# Patient Record
Sex: FEMALE | Race: WHITE | NOT HISPANIC OR LATINO | Employment: OTHER | URBAN - METROPOLITAN AREA
[De-identification: names, ages, dates, MRNs, and addresses within clinical notes are randomized per-mention and may not be internally consistent; named-entity substitution may affect disease eponyms.]

---

## 2017-04-03 ENCOUNTER — NURSING HOME VISIT (OUTPATIENT)
Dept: GERIATRICS | Facility: CLINIC | Age: 82
End: 2017-04-03
Payer: MEDICARE

## 2017-04-03 VITALS
OXYGEN SATURATION: 97 % | HEART RATE: 63 BPM | RESPIRATION RATE: 18 BRPM | DIASTOLIC BLOOD PRESSURE: 63 MMHG | WEIGHT: 205 LBS | TEMPERATURE: 97.6 F | SYSTOLIC BLOOD PRESSURE: 122 MMHG

## 2017-04-03 DIAGNOSIS — E11.51 TYPE II DIABETES MELLITUS WITH PERIPHERAL CIRCULATORY DISORDER (H): ICD-10-CM

## 2017-04-03 DIAGNOSIS — M62.81 GENERALIZED MUSCLE WEAKNESS: ICD-10-CM

## 2017-04-03 DIAGNOSIS — I63.89 CEREBROVASCULAR ACCIDENT (CVA) DUE TO OTHER MECHANISM (H): Primary | ICD-10-CM

## 2017-04-03 DIAGNOSIS — I10 BENIGN ESSENTIAL HYPERTENSION: ICD-10-CM

## 2017-04-03 PROBLEM — E78.00 HYPERCHOLESTEROLEMIA: Status: ACTIVE | Noted: 2017-04-03

## 2017-04-03 PROBLEM — I48.91 A-FIB (H): Status: ACTIVE | Noted: 2017-04-03

## 2017-04-03 PROBLEM — E11.9 DIABETES MELLITUS, TYPE 2 (H): Status: ACTIVE | Noted: 2017-04-03

## 2017-04-03 PROBLEM — I50.9 CHF (CONGESTIVE HEART FAILURE) (H): Status: ACTIVE | Noted: 2017-04-03

## 2017-04-03 PROBLEM — I63.9 CEREBROVASCULAR ACCIDENT (H): Status: ACTIVE | Noted: 2017-04-03

## 2017-04-03 PROCEDURE — 99306 1ST NF CARE HIGH MDM 50: CPT | Performed by: NURSE PRACTITIONER

## 2017-04-03 PROCEDURE — 99207 ZZC CDG-CODE CATEGORY CHANGED: CPT | Performed by: NURSE PRACTITIONER

## 2017-04-03 RX ORDER — LIDOCAINE 50 MG/G
1 PATCH TOPICAL EVERY 12 HOURS
COMMUNITY
End: 2017-07-19

## 2017-04-03 RX ORDER — CARBOXYMETHYLCELLULOSE SODIUM 5 MG/ML
1 SOLUTION/ DROPS OPHTHALMIC 3 TIMES DAILY PRN
COMMUNITY
End: 2020-10-26

## 2017-04-03 RX ORDER — AMOXICILLIN 250 MG
1 CAPSULE ORAL 2 TIMES DAILY PRN
COMMUNITY
End: 2018-01-16

## 2017-04-03 NOTE — PROGRESS NOTES
[place] [facility]  PRIMARY CARE PROVIDER AND CLINIC:  Anirudh Ye  GERIATRIC SERVICES [address] / [place] *  Chief Complaint   Patient presents with     Hospital F/U     Nursing Home Acute       HPI:    Mecca Slade is a 89 year old  (12/14/1921),admitted to the [facility]  from [facility].    Admitted to this facility for  rehab, medical management and nursing care. Current issues are:      Cerebrovascular accident (CVA) due to other mechanism (H)  With physical and functional decline and Significant deficits requiring NH placement   Transferred from rehab unit in [place]  Evidently not able to return home r/t weakness   NH working on getting more information from rehab unit     Type II diabetes mellitus with peripheral circulatory disorder (H)  Diet controlled per orders from rehab unit  Good po intake  Will have nursing do accu cheks tid to get baselin  Benign essential hypertension  Stable per chart and nursing     Generalized muscle weakness   Requiring extensive assistance from nursing  Up for meals only o/w spends the day resting in bed  Participating with therapy   Right now needs PAL for transfers      CODE STATUS/ADVANCE DIRECTIVES DISCUSSION:   DNR / DNI  Patient's living condition: lives alone    ALLERGIES:Review of patient's allergies indicates no known allergies.  PAST MEDICAL HISTORY:  has no past medical history on file.  PAST SURGICAL HISTORY:  has no past surgical history on file.  FAMILY HISTORY: family history is not on file.  SOCIAL HISTORY:      Post Discharge Medication Reconciliation Status: discharge medications reconciled and changed, per note/orders (see AVS).  Current Outpatient Prescriptions   Medication Sig Dispense Refill     Acetaminophen (TYLENOL PO) Take 1,000 mg by mouth 2 times daily And BID PRN       ATORVASTATIN CALCIUM PO Take 20 mg by mouth daily       carboxymethylcellulose (REFRESH PLUS) 0.5 % SOLN ophthalmic  solution Place 1 drop into both eyes 3 times daily as needed for dry eyes       VITAMIN D, CHOLECALCIFEROL, PO Take 2,000 Units by mouth daily       Furosemide (LASIX PO) Take 40 mg by mouth daily       lidocaine (LIDODERM) 5 % Patch Place 1 patch onto the skin every 12 hours To right knee       METOPROLOL SUCCINATE ER PO Take 50 mg by mouth daily       Omega-3 300 MG CAPS Take 1 capsule by mouth daily       senna-docusate (SENOKOT-S;PERICOLACE) 8.6-50 MG per tablet Take 1 tablet by mouth 2 times daily       VERAPAMIL HCL PO Take 120 mg by mouth At Bedtime       Warfarin Sodium (COUMADIN PO) Take by mouth daily         ROS:  10 point ROS of systems including Constitutional, Eyes, Respiratory, Cardiovascular, Gastroenterology, Genitourinary, Integumentary, Muscularskeletal, Psychiatric were all negative except for pertinent positives noted in my HPI.    Exam:  /63  Pulse 63  Temp 97.6  F (36.4  C)  Resp 18  Wt 205 lb (93 kg)  SpO2 97%  GENERAL APPEARANCE:  Alert, in no distress  ENT:  normal hearing acuity, oral mucous membranes moist  EYES:  EOM normal, conjunctiva and lids normal  RESP:  lungs clear to auscultation , no respiratory distress, diminished breath sounds    CV:  regular rate and rhythm, no murmur, rub, or gallop, no edema  ABDOMEN:  bowel sounds normal, soft, non-tender  M/S:   Gait and station abnormal up in w/c  frail  SKIN:  pale w/d  PSYCH:  oriented X 3, normal insight, judgement and memory, memory impaired , affect and mood normal       ASSESSMENT/PLAN:  Encounter Diagnoses   Name Primary?     Cerebrovascular accident (CVA) due to other mechanism (H) Yes     Type II diabetes mellitus with peripheral circulatory disorder (H)      Benign essential hypertension      Generalized muscle weakness          Orders:  accu chek tid  The current medical regimen is effective;  continue present plan and medications.    Information reviewed:  Medications, vital signs, orders, nursing notes, problem  list, hospital information. Total time spent with patient visit was 35 min including patient visit and review of past records. Greater than 50% of total time spent with counseling and coordinating care.    Electronically signed by:  JOSUE Tai CNP

## 2017-04-10 ENCOUNTER — NURSING HOME VISIT (OUTPATIENT)
Dept: GERIATRICS | Facility: CLINIC | Age: 82
End: 2017-04-10
Payer: COMMERCIAL

## 2017-04-10 VITALS
OXYGEN SATURATION: 97 % | DIASTOLIC BLOOD PRESSURE: 69 MMHG | HEART RATE: 62 BPM | RESPIRATION RATE: 16 BRPM | SYSTOLIC BLOOD PRESSURE: 140 MMHG | TEMPERATURE: 98 F | WEIGHT: 205 LBS

## 2017-04-10 DIAGNOSIS — I10 BENIGN ESSENTIAL HYPERTENSION: ICD-10-CM

## 2017-04-10 DIAGNOSIS — I63.411 CEREBROVASCULAR ACCIDENT (CVA) DUE TO EMBOLISM OF RIGHT MIDDLE CEREBRAL ARTERY (H): Primary | ICD-10-CM

## 2017-04-10 DIAGNOSIS — I48.20 CHRONIC ATRIAL FIBRILLATION (H): ICD-10-CM

## 2017-04-10 DIAGNOSIS — H35.30 MACULAR DEGENERATION OF RIGHT EYE: ICD-10-CM

## 2017-04-10 DIAGNOSIS — E11.8 TYPE 2 DIABETES MELLITUS WITH COMPLICATION, WITHOUT LONG-TERM CURRENT USE OF INSULIN (H): ICD-10-CM

## 2017-04-10 DIAGNOSIS — R54 FRAILTY: ICD-10-CM

## 2017-04-10 DIAGNOSIS — I50.32 CHRONIC DIASTOLIC CONGESTIVE HEART FAILURE (H): ICD-10-CM

## 2017-04-10 PROCEDURE — 99207 ZZC CDG-CORRECTLY CODED, REVIEWED AND AGREE: CPT | Performed by: FAMILY MEDICINE

## 2017-04-10 PROCEDURE — 99306 1ST NF CARE HIGH MDM 50: CPT | Performed by: FAMILY MEDICINE

## 2017-04-10 NOTE — PROGRESS NOTES
[place] [facility]  PRIMARY CARE PROVIDER AND CLINIC:  Anirudh Ye  GERIATRIC SERVICES [address] / [place] *  Chief Complaint   Patient presents with     Establish Care       HPI:   Obtained from the patient, medical record and from the medial staffs.     Mecca Slade is a 89 year old  (12/14/1921),admitted to the [facility]  from [facility].    Admitted to this facility for  rehab, medical management and nursing care. Current issues are:      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)  - Recent stroke March 1st 2017, had TPA but was not a candidate for thrombectomy. Continues to have weakness on the left side. Transferred to [facility] and transferred to this facility due to increase ADLs dependency.  - Started on PT/OT reports left arm is getting  Better but not the left leg.   -     Benign essential hypertension  - No CP, HA or fainting      Type 2 diabetes mellitus with complication, without long-term current use of insulin (H)  - No hypoglycemic episode reported.  -  Resident diet of choice  - Reports was on glipizide 5 mg from home, but unsure why it was stopped.       Chronic atrial fibrillation (H)  - Denies CP, SOB or palpitation  -Was not on anticoagulation at the time of admission to the hospital for CVA due to hx of difficulty controlling INR and hemarthrosis according to HP at the time of admission to CVA. No DC summary in the chart, but apparently patient started on coumadin.     Frailty   Requires assistance with ADLs.         CODE STATUS/ADVANCE DIRECTIVES DISCUSSION:   DNR / DNI  Patient's living condition: lives alone    ALLERGIES:Review of patient's allergies indicates no known allergies.  PAST MEDICAL HISTORY:  has no past medical history on file.  PAST SURGICAL HISTORY:  has no past surgical history on file.  FAMILY HISTORY: family history is not on file.  SOCIAL HISTORY:      Post  Discharge Medication Reconciliation Status: discharge medications reconciled and changed, per note/orders (see AVS).  Current Outpatient Prescriptions   Medication Sig Dispense Refill     Acetaminophen (TYLENOL PO) Take 1,000 mg by mouth 2 times daily And BID PRN       ATORVASTATIN CALCIUM PO Take 20 mg by mouth daily       carboxymethylcellulose (REFRESH PLUS) 0.5 % SOLN ophthalmic solution Place 1 drop into both eyes 3 times daily as needed for dry eyes       VITAMIN D, CHOLECALCIFEROL, PO Take 2,000 Units by mouth daily       Furosemide (LASIX PO) Take 40 mg by mouth daily       lidocaine (LIDODERM) 5 % Patch Place 1 patch onto the skin every 12 hours To right knee       METOPROLOL SUCCINATE ER PO Take 50 mg by mouth daily       Omega-3 300 MG CAPS Take 1 capsule by mouth daily       senna-docusate (SENOKOT-S;PERICOLACE) 8.6-50 MG per tablet Take 1 tablet by mouth 2 times daily       VERAPAMIL HCL PO Take 120 mg by mouth At Bedtime       Warfarin Sodium (COUMADIN PO) Take by mouth daily         ROS:  10 point ROS of systems including Constitutional, Eyes, Respiratory, Cardiovascular, Gastroenterology, Genitourinary, Integumentary, Muscularskeletal, Psychiatric were all negative except for pertinent positives noted in my HPI.    Exam:  /69  Pulse 62  Temp 98  F (36.7  C)  Resp 16  Wt 205 lb (93 kg)  SpO2 97%  GENERAL APPEARANCE:  Alert, in no distress  ENT:  Mouth and posterior oropharynx normal, moist mucous membranes, [place], edentulous  with denture plateboth levels  EYES:  EOM, conjunctivae, lids, pupils and irises normal  NECK:  No adenopathy,masses or thyromegaly  RESP:  respiratory effort and palpation of chest normal, lungs clear to auscultation , no respiratory distress  CV:  Palpation and auscultation of heart done , regular rate and rhythm, no murmur, rub, or gallop, peripheral edema 1+ in pedal area, pitting  ABDOMEN:  normal bowel sounds, soft, nontender, no hepatosplenomegaly or other  masses  M/S:   Gait and station abnormal uses WC for ambulation. Ms strenght: 4/5 LUE, 3/5 LLE.   SKIN:  Inspection of skin and subcutaneous tissue baseline, Palpation of skin and subcutaneous tissue baseline  NEURO:   Cranial nerves decreased visual acuity on both sides. No facial droop.  tested and .  PSYCH:  oriented X 3, normal insight, judgement and memory, affect and mood normal    Lab/Diagnostic data: All labs reviewed, none recent         ASSESSMENT/PLAN:  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)  - with left-sided weakness  - On ASA, lipitor and coumadin.  - Continue OT/PT    Benign essential hypertension   BP Readings from Last 3 Encounters:   04/10/17 140/69   04/03/17 122/63   -  On average SBP in 120s as in the EHR  - On metoprolol and verapamil and lasix  - Keep SBP> 130 mmHg and DBP > 65 mmHg (levels below these increase mortality as shown by standard studies and observations), and in pt with stroke it is advisable to keep SBP b/w 135 and 145 mmHg according to a recent study.  - Will reduce Toprol from 50 mg to 25 mg. HR around 65's on average.         Type 2 diabetes mellitus with complication, without long-term current use of insulin (H)  - No results found for: A1C  - not on medications.   - Accu check showed < 200s fasting but upper 200s HS on average.  - Will check HbA1C, and will start metformin 500 mg daily.    Chronic atrial fibrillation (H)  -  on coumadin with INR followed closely  - Has hx of difficulty controlling INR. If INR plus/minus 0.5 from the normal range, consider not to change the dose but rather repeat INR in 3 days.     - On reate control (Toprol and verapamil).     Frailty  - Significant  Deficits requiring NH placement. Requiring extensive assistance from nursing. Up for meals only o/w spends the day resting in bed      Macular degeneration of right eye  - cause unknown, possible related to DM2.  - Also has poor vision on the left  side.     Chronic diastolic congestive heart failure (H)  - recent echo result unavailable.  The one from 2012 with EF > 70% according to medical record  - On lasix, and metoprolol  - Compensated.        Orders:  - check HbA1C (dx DM2)  - Start metformin 500 mg daily  - Reduce Toprol from 50 mg to 25 mg.   - Check BMP ( on lasix and BB), Dx HTN    Information reviewed:  Medications, vital signs, orders, nursing notes, problem list, hospital information. Total time spent with patient visit was 45 min including patient visit and review of past records.     Electronically signed by:  Anirudh Ye MD

## 2017-04-12 ENCOUNTER — NURSING HOME VISIT (OUTPATIENT)
Dept: GERIATRICS | Facility: CLINIC | Age: 82
End: 2017-04-12
Payer: COMMERCIAL

## 2017-04-12 VITALS
OXYGEN SATURATION: 97 % | SYSTOLIC BLOOD PRESSURE: 132 MMHG | TEMPERATURE: 97.8 F | WEIGHT: 205 LBS | HEART RATE: 64 BPM | DIASTOLIC BLOOD PRESSURE: 63 MMHG | RESPIRATION RATE: 16 BRPM

## 2017-04-12 DIAGNOSIS — I48.20 CHRONIC ATRIAL FIBRILLATION (H): ICD-10-CM

## 2017-04-12 DIAGNOSIS — I63.411 CEREBROVASCULAR ACCIDENT (CVA) DUE TO EMBOLISM OF RIGHT MIDDLE CEREBRAL ARTERY (H): ICD-10-CM

## 2017-04-12 DIAGNOSIS — R52 PAIN: ICD-10-CM

## 2017-04-12 DIAGNOSIS — E11.8 TYPE 2 DIABETES MELLITUS WITH COMPLICATION, WITHOUT LONG-TERM CURRENT USE OF INSULIN (H): Primary | ICD-10-CM

## 2017-04-12 PROCEDURE — 99207 ZZC CDG-CORRECTLY CODED, REVIEWED AND AGREE: CPT | Performed by: NURSE PRACTITIONER

## 2017-04-12 PROCEDURE — 99309 SBSQ NF CARE MODERATE MDM 30: CPT | Performed by: NURSE PRACTITIONER

## 2017-04-12 NOTE — PROGRESS NOTES
[place] [facility]    Chief Complaint   Patient presents with     Nursing Home Acute       HPI:    Mecca Slade is a 89 year old  (12/14/1921), who is being seen today for an episodic care visit at [facility] . Today's concern is:  Here to see patient at request of nursing    Type 2 diabetes mellitus with complication, without long-term current use of insulin (H)  Previously on glipizide but d/c prior to admit to NH now on metformin 500 mg po qd  With BS checked bid and several BS > 350   Good po intake and appetite   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)  With physical and functional decline and Significant deficits requiring NH placement  Previously lived independently, now needs LTC  Tends to sleep t/o much of day but Participates with therapy with goal to improve strength and gait    Chronic atrial fibrillation (H)  Prior to CVA was not on coumadin r/t difficulty managing medication on own.  Now On chronic coumadin with goal INR 2-3   INR back today at 7.45 on 4 mg coumadin.  No CP/SOB,HA   Pain  Hx of right knee pain and back pain  On tylenol without effectiveness.  Therapy and staff report patient crying out in pain with transfers and cares    ALLERGIES: Review of patient's allergies indicates no known allergies.  Past Medical, Surgical, Family and Social History reviewed and updated in [employer].    Current Outpatient Prescriptions   Medication Sig Dispense Refill     METFORMIN HCL PO Take 500 mg by mouth 2 times daily (with meals)       TraMADol HCl (ULTRAM PO) Take 25 mg by mouth daily And q 4 hr prn       Acetaminophen (TYLENOL PO) Take 1,000 mg by mouth 2 times daily And BID PRN       ATORVASTATIN CALCIUM PO Take 20 mg by mouth daily       carboxymethylcellulose (REFRESH PLUS) 0.5 % SOLN ophthalmic solution Place 1 drop into both eyes 3 times daily as needed for dry eyes       VITAMIN D, CHOLECALCIFEROL, PO Take 2,000 Units by mouth  daily       Furosemide (LASIX PO) Take 40 mg by mouth daily       lidocaine (LIDODERM) 5 % Patch Place 1 patch onto the skin every 12 hours To right knee       METOPROLOL SUCCINATE ER PO Take 25 mg by mouth daily        Omega-3 300 MG CAPS Take 1 capsule by mouth daily       senna-docusate (SENOKOT-S;PERICOLACE) 8.6-50 MG per tablet Take 1 tablet by mouth 2 times daily       VERAPAMIL HCL PO Take 120 mg by mouth At Bedtime       Warfarin Sodium (COUMADIN PO) Take by mouth daily       Medications reviewed:  Medications reconciled to facility chart and changes were made to reflect current medications as identified as above med list. Below are the changes that were made:   Medications stopped since last EPIC medication reconciliation:   There are no discontinued medications.    Medications started since last [facility] medication reconciliation:  Orders Placed This Encounter   Medications     METFORMIN HCL PO     Sig: Take 500 mg by mouth 2 times daily (with meals)     TraMADol HCl (ULTRAM PO)     Sig: Take 25 mg by mouth daily And q 4 hr prn         REVIEW OF SYSTEMS:  4 point ROS including Respiratory, CV, GI and , other than that noted in the HPI,  is negative    Physical Exam:  /63  Pulse 64  Temp 97.8  F (36.6  C)  Resp 16  Wt 205 lb (93 kg)  SpO2 97%  GENERAL APPEARANCE:  Alert, in no distress  ENT:  normal hearing acuity, oral mucous membranes moist  EYES:  EOM normal, conjunctiva and lids normal  RESP:  lungs clear to auscultation , no respiratory distress, diminished breath sounds    CV:  regular rate and rhythm, no murmur, rub, or gallop, no edema  ABDOMEN:  bowel sounds normal, soft, non-tender  M/S:   Gait and station abnormal up in recliner napping frail  SKIN:  pale w/d  PSYCH:  oriented X 3, normal insight, judgement and memory, memory impaired , affect and mood normal    Recent Labs:  All labs reviewed, none recent    Blood Sugars-  8am   162 - 210  2000pm  211 - 393    Assessment/Plan:     Type  2 diabetes mellitus with complication, without long-term current use of insulin (H)  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)  Chronic atrial fibrillation (H)  Pain      Orders:  Hold coumadin x 3 days then check INR  Ultram 25 mg po qd and q 4 hrs prn  Ok for 4 u novolog x 1 for BS > 350    Time  Total time spent with patient visit was 25 min including review of past records. Greater than 50% of total time spent with counseling and coordinating care    Electronically signed by  JOSUE Tai CNP

## 2017-04-13 ENCOUNTER — TRANSFERRED RECORDS (OUTPATIENT)
Dept: HEALTH INFORMATION MANAGEMENT | Facility: CLINIC | Age: 82
End: 2017-04-13

## 2017-04-13 ENCOUNTER — RECORDS - HEALTHEAST (OUTPATIENT)
Dept: LAB | Facility: CLINIC | Age: 82
End: 2017-04-13

## 2017-04-13 LAB
ANION GAP SERPL CALCULATED.3IONS-SCNC: 8 MMOL/L (ref 5–18)
BUN SERPL-MCNC: 27 MG/DL (ref 8–28)
CALCIUM SERPL-MCNC: 10.8 MG/DL (ref 8.5–10.5)
CHLORIDE SERPLBLD-SCNC: 101 MMOL/L (ref 98–107)
CO2 SERPL-SCNC: 30 MMOL/L (ref 22–31)
CREAT SERPL-MCNC: 0.93 MG/DL (ref 0.6–1.1)
GFR SERPL CREATININE-BSD FRML MDRD: 56 ML/MIN/1.73M2
GLUCOSE SERPL-MCNC: 193 MG/DL (ref 70–125)
HBA1C MFR BLD: 7.6 % (ref 4.2–6.1)
HBA1C MFR BLD: 7.6 % (ref 4.2–6.1)
POTASSIUM SERPL-SCNC: 4 MMOL/L (ref 3.5–5)
SODIUM SERPL-SCNC: 139 MMOL/L (ref 136–145)

## 2017-04-14 ENCOUNTER — TELEPHONE (OUTPATIENT)
Dept: GERIATRICS | Facility: CLINIC | Age: 82
End: 2017-04-14

## 2017-04-14 NOTE — TELEPHONE ENCOUNTER
TELEPHONE ENCOUNTER:    Mecca Slade is a 89 year old  (12/14/1921),Nurse called today to report: INR continues to be elevated, now down to 4.81 from 7.49.  Has been off Coumadin since 4/10.  No antibiotics, ASA, or other anticoagulants.  No S/SX bleeding    ASSESSMENT/PLAN  Supra-therapeutic INR - continue to hold Coumadin Friday, Saturday, and Sunday with a recheck INR on Monday.    JOSUE Larsen CNP

## 2017-04-17 ENCOUNTER — NURSING HOME VISIT (OUTPATIENT)
Dept: GERIATRICS | Facility: CLINIC | Age: 82
End: 2017-04-17
Payer: COMMERCIAL

## 2017-04-17 VITALS
DIASTOLIC BLOOD PRESSURE: 73 MMHG | RESPIRATION RATE: 16 BRPM | HEART RATE: 60 BPM | SYSTOLIC BLOOD PRESSURE: 129 MMHG | OXYGEN SATURATION: 95 % | WEIGHT: 205 LBS | TEMPERATURE: 97.9 F

## 2017-04-17 DIAGNOSIS — R52 PAIN: ICD-10-CM

## 2017-04-17 DIAGNOSIS — I48.20 CHRONIC ATRIAL FIBRILLATION (H): Primary | ICD-10-CM

## 2017-04-17 DIAGNOSIS — E11.8 TYPE 2 DIABETES MELLITUS WITH COMPLICATION, WITHOUT LONG-TERM CURRENT USE OF INSULIN (H): ICD-10-CM

## 2017-04-17 DIAGNOSIS — I63.411 CEREBROVASCULAR ACCIDENT (CVA) DUE TO EMBOLISM OF RIGHT MIDDLE CEREBRAL ARTERY (H): ICD-10-CM

## 2017-04-17 PROCEDURE — 99309 SBSQ NF CARE MODERATE MDM 30: CPT | Performed by: NURSE PRACTITIONER

## 2017-04-17 PROCEDURE — 99207 ZZC CDG-CORRECTLY CODED, REVIEWED AND AGREE: CPT | Performed by: NURSE PRACTITIONER

## 2017-04-20 ENCOUNTER — TELEPHONE (OUTPATIENT)
Dept: GERIATRICS | Facility: CLINIC | Age: 82
End: 2017-04-20

## 2017-04-20 ENCOUNTER — NURSING HOME VISIT (OUTPATIENT)
Dept: GERIATRICS | Facility: CLINIC | Age: 82
End: 2017-04-20
Payer: COMMERCIAL

## 2017-04-20 VITALS
HEIGHT: 66 IN | RESPIRATION RATE: 18 BRPM | SYSTOLIC BLOOD PRESSURE: 143 MMHG | DIASTOLIC BLOOD PRESSURE: 66 MMHG | WEIGHT: 205 LBS | TEMPERATURE: 98.9 F | HEART RATE: 62 BPM | BODY MASS INDEX: 32.95 KG/M2 | OXYGEN SATURATION: 96 %

## 2017-04-20 DIAGNOSIS — R52 PAIN: ICD-10-CM

## 2017-04-20 DIAGNOSIS — I48.91 ATRIAL FIBRILLATION, UNSPECIFIED TYPE (H): Primary | ICD-10-CM

## 2017-04-20 DIAGNOSIS — I63.89 CEREBROVASCULAR ACCIDENT (CVA) DUE TO OTHER MECHANISM (H): ICD-10-CM

## 2017-04-20 PROCEDURE — 99207 ZZC CDG-CORRECTLY CODED, REVIEWED AND AGREE: CPT | Performed by: NURSE PRACTITIONER

## 2017-04-20 PROCEDURE — 99309 SBSQ NF CARE MODERATE MDM 30: CPT | Performed by: NURSE PRACTITIONER

## 2017-04-20 NOTE — PROGRESS NOTES
[place] [facility]    Chief Complaint   Patient presents with     Nursing Home Acute       HPI:    Mecca Slade is a 89 year old  (12/14/1921), who is being seen today for an episodic care visit at [facility] . Today's concern is:  Here to see patient at request of nursing and patient   A-fib (H)  Cerebrovascular accident (CVA) due to other mechanism (H)  With physical and functional decline  And Significant deficits requiring NH placement  Up for meals only o/w spends the day resting in recliner  On chronic coumadin and metoprolol   Denies CP/SOB, HA  Recent INR back at 7.45 with coumadin now on hold and new INR back today  Prior to medication on hold dose was 5 mg qd  Pain  R/t arthritis and CVA   Patient c/o right knee and back pain and crying out with cares and transfers     voltaren 2 gr qid suspected to contribute to increased INR value   Discussed options with staff and patient  Patient relies on staff for most decision making r/t memory loss     ALLERGIES: Review of patient's allergies indicates no known allergies.  Past Medical, Surgical, Family and Social History reviewed and updated in [facility].    Current Outpatient Prescriptions   Medication Sig Dispense Refill     diclofenac (VOLTAREN) 1 % GEL topical gel Place 1 g onto the skin 2 times daily       METFORMIN HCL PO Take 500 mg by mouth 2 times daily (with meals)       TraMADol HCl (ULTRAM PO) Take 25 mg by mouth 3 times daily And q 4 hr prn        Acetaminophen (TYLENOL PO) Take 1,000 mg by mouth 2 times daily And BID PRN       ATORVASTATIN CALCIUM PO Take 20 mg by mouth daily       carboxymethylcellulose (REFRESH PLUS) 0.5 % SOLN ophthalmic solution Place 1 drop into both eyes 3 times daily as needed for dry eyes       VITAMIN D, CHOLECALCIFEROL, PO Take 2,000 Units by mouth daily       Furosemide (LASIX PO) Take 40 mg by mouth daily       lidocaine (LIDODERM) 5 % Patch Place 1 patch onto the skin every 12 hours To right  "knee       METOPROLOL SUCCINATE ER PO Take 25 mg by mouth daily        Omega-3 300 MG CAPS Take 1 capsule by mouth daily       senna-docusate (SENOKOT-S;PERICOLACE) 8.6-50 MG per tablet Take 1 tablet by mouth 2 times daily       VERAPAMIL HCL PO Take 120 mg by mouth At Bedtime       Warfarin Sodium (COUMADIN PO) Take by mouth daily       Medications reviewed:  Medications reconciled to facility chart and changes were made to reflect current medications as identified as above med list. Below are the changes that were made:   Medications stopped since last EPIC medication reconciliation:   There are no discontinued medications.    Medications started since last [facility] medication reconciliation:  No orders of the defined types were placed in this encounter.       REVIEW OF SYSTEMS:  Unobtainable secondary to cognitive impairment or aphasia. and 4 point ROS including Respiratory, CV, GI and , other than that noted in the HPI,  is negative    Physical Exam:  /66  Pulse 62  Temp 98.9  F (37.2  C)  Resp 18  Ht 5' 6\" (1.676 m)  Wt 205 lb (93 kg)  SpO2 96%  BMI 33.09 kg/m2  GENERAL APPEARANCE:  Alert, in no distress  ENT:  normal hearing acuity, oral mucous membranes moist  EYES:  EOM normal, conjunctiva and lids normal  RESP:  lungs clear to auscultation , no respiratory distress, diminished breath sounds    CV:  regular rate and rhythm, no murmur, rub, or gallop, no edema  ABDOMEN:  bowel sounds normal, soft, non-tender  M/S:   Gait and station abnormal up in recliner napping frail  SKIN:  pale w/d  PSYCH:  oriented X 3, normal insight, judgement and memory, memory impaired , affect and mood normal      Recent Labs:    Results for orders placed or performed in visit on 04/13/17   Hemoglobin A1c   Result Value Ref Range    Hemoglobin A1C 7.6 (A) 4.2 - 6.1 %    [facility]    [facility]   Basic metabolic panel   Result Value Ref Range    Sodium 139 136 - 145 mmol/L    Potassium 4.0 3.5 - 5.0 mmol/L    " Chloride 101 98 - 107 mmol/L    Carbon Dioxide 30 22 - 31 mmol/L    Anion Gap 8 5 - 18 mmol/L    Glucose 193 (A) 70 - 125 mg/dL    Urea Nitrogen 27 8 - 28 mg/dL    Creatinine 0.93 0.60 - 1.10 mg/dL    Calcium 10.8 (A) 8.5 - 10.5 mg/dL    GFR Estimate 56 (L) >60 mL/min/1.73m2    GFR Estimate If Black >60 >60 mL/min/1.73m2    Narrative    [facility]   INR 1.58 With coumadin on hold    Assessment/Plan:     A-fib (H)  Cerebrovascular accident (CVA) due to other mechanism (H)  Pain      Orders:  1.  Increase Tramadol to 25 mg po tid and po q 4 hours prn.  2.  Decrease Voltaren gel to 1 gram bid to right knee.  3  Coumadin 4 mg po qd   4 check INR Three days       Time Total time spent with patient visit was 25 min including patient visit and review of past records. Greater than 50% of total time spent with counseling and coordinating care .       Electronically signed by  Ruth Araujo, JOSUE CNP                23

## 2017-04-20 NOTE — TELEPHONE ENCOUNTER
Called by nursing staff at [facility] about patient's INR results  4/17  1.57  Today 1.58     Taking 3 mg/day of warfarin    PLAN: increase to 4 mg/day and recheck INR Monday 4/24    Ana Yoo MD

## 2017-04-20 NOTE — PATIENT INSTRUCTIONS
Orders:  1.  Increase Tramadol to 25 mg po tid and po q 4 hours prn.  2.  Decrease Voltaren gel to 1 gram bid to right knee.

## 2017-05-07 PROBLEM — Z00.00 ROUTINE GENERAL MEDICAL EXAMINATION AT A HEALTH CARE FACILITY: Status: ACTIVE | Noted: 2017-05-07

## 2017-05-11 ENCOUNTER — NURSING HOME VISIT (OUTPATIENT)
Dept: GERIATRICS | Facility: CLINIC | Age: 82
End: 2017-05-11
Payer: COMMERCIAL

## 2017-05-11 VITALS
RESPIRATION RATE: 18 BRPM | OXYGEN SATURATION: 95 % | BODY MASS INDEX: 33.75 KG/M2 | WEIGHT: 210 LBS | SYSTOLIC BLOOD PRESSURE: 132 MMHG | TEMPERATURE: 97.9 F | HEART RATE: 76 BPM | DIASTOLIC BLOOD PRESSURE: 72 MMHG | HEIGHT: 66 IN

## 2017-05-11 DIAGNOSIS — M62.81 GENERALIZED MUSCLE WEAKNESS: ICD-10-CM

## 2017-05-11 DIAGNOSIS — I63.89 CEREBROVASCULAR ACCIDENT (CVA) DUE TO OTHER MECHANISM (H): Primary | ICD-10-CM

## 2017-05-11 DIAGNOSIS — I48.91 ATRIAL FIBRILLATION, UNSPECIFIED TYPE (H): ICD-10-CM

## 2017-05-11 DIAGNOSIS — R52 PAIN: ICD-10-CM

## 2017-05-11 DIAGNOSIS — E11.51 TYPE II DIABETES MELLITUS WITH PERIPHERAL CIRCULATORY DISORDER (H): ICD-10-CM

## 2017-05-11 DIAGNOSIS — I10 BENIGN ESSENTIAL HYPERTENSION: ICD-10-CM

## 2017-05-11 DIAGNOSIS — F41.8 DEPRESSION WITH ANXIETY: ICD-10-CM

## 2017-05-11 PROCEDURE — 99207 ZZC CDG-CORRECTLY CODED, REVIEWED AND AGREE: CPT | Performed by: NURSE PRACTITIONER

## 2017-05-11 PROCEDURE — 99318 ZZC ANNUAL NURSING FAC ASSESSMNT, STABLE: CPT | Performed by: NURSE PRACTITIONER

## 2017-05-11 NOTE — PROGRESS NOTES
[place] [facility]  Chief Complaint   Patient presents with     Annual Comprehensive Nursing Home       HPI:    Mecca Slade is a 89 year old  (12/14/1921), who is being seen today for an annual comprehensive visit at [facility] . Today's concerns are:  Cerebrovascular accident (CVA) due to other mechanism (H)  With physical and functional decline and Significant deficits requiring NH placement   Transferred from rehab unit in [place]  Evidently not able to return home r/t weakness  - Recent stroke March 1st 2017, had TPA but was not a candidate for thrombectomy. Continues to have weakness on the left side.Previously lived independently, now needs LTC  Tends to sleep t/o much of day but Participated with therapy with goal to improve strength and gait.  Patient remains w/c bound and requires assistance with all ADLs    Type II diabetes mellitus with peripheral circulatory disorder (H)  Previously on glipizide but d/c prior to admit to NH now on metformin 500 mg po bid r/t several high BS.  BS  checked bid and range 185 to 225.    Good po intake and appetite. Tolerates medications without problems   Atrial fibrillation, unspecified type (H)  On chronic coumadin  No CP/SOB, HA  Prior to CVA and admit to NH patient was not on coumadin r/t difficulty managing medication on own.  On Verapamil and Toprol XL with good rate control  Pain  Long hx DJD now with left side pain probably r/t CVA.  Patient especially c/o right knee pain and reports that scheduled and prn tramadol is helpful.  Staff reports that patient would cry with transfers and cares but since the tramadol started, patient tolerating activity.  No adverse s/e reported   Generalized muscle weakness   Requiring extensive assistance from nursing  Up for meals only o/w spends the day resting in recliner    Benign essential hypertension  Stable per chart and nursing     BP Readings from Last 3 Encounters:   05/11/17 132/72   04/20/17  143/66   04/17/17 129/73   Depression  Does express some sadness over loss of independence but overall appears to accept NH placement  Always very sweet and cooperative with staff.   PHQ9 0-4 indicating no depression    ALLERGIES: Review of patient's allergies indicates no known allergies.  PROBLEM LIST:  Patient Active Problem List   Diagnosis     Cerebrovascular accident (H)     A-fib (H)     CHF (congestive heart failure) (H)     Diabetes mellitus, type 2 (H)     Benign essential hypertension     Hypercholesterolemia     Right sided cerebral hemisphere cerebrovascular accident (H)     Cardiac pacemaker in situ     Neurological neglect syndrome     Pure hypercholesterolemia     ANNUAL 5/9/17     PAST MEDICAL HISTORY:  has a past medical history of A-fib (H); Acute CVA (cerebrovascular accident) (H) (03/01/2017); Acute right MCA stroke (H) (03/01/2017); Cardiac pacemaker in situ; CHF (congestive heart failure) (H); 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  PAST SURGICAL HISTORY:  has no past surgical history on file.  FAMILY HISTORY: family history is not on file.  SOCIAL HISTORY:  reports that she has never smoked. She has never used smokeless tobacco. She reports that she does not drink alcohol or use illicit drugs.  IMMUNIZATIONS:  Most Recent Immunizations   Administered Date(s) Administered     Influenza (High Dose) 3 valent vaccine 09/30/2011     Influenza (IIV3) 09/15/2012     Influenza Vaccine IM 3yrs+ 4 Valent IIV4 09/03/2013     Pneumococcal 23 valent 10/28/2011     TD (ADULT, 7+) 01/24/2001     Above immunizations pulled from [facility]. [facility] and facility  records also reconciled. Outstanding information sent to  to update [facility]  .  Future immunizations needed:  TDAP, PCV13 and yearly influenza per facility protocol  MEDICATIONS:  Current Outpatient Prescriptions   Medication Sig Dispense Refill     diclofenac (VOLTAREN) 1 % GEL topical gel Place 1 g onto the skin 2 times daily       METFORMIN HCL PO Take 500 mg by mouth 2 times daily (with meals)       TraMADol HCl (ULTRAM PO) Take 25 mg by mouth 3 times daily And q 4 hr prn        Acetaminophen (TYLENOL PO) Take 1,000 mg by mouth 2 times daily And BID PRN       ATORVASTATIN CALCIUM PO Take 20 mg by mouth daily       carboxymethylcellulose (REFRESH PLUS) 0.5 % SOLN ophthalmic solution Place 1 drop into both eyes 3 times daily as needed for dry eyes       VITAMIN D, CHOLECALCIFEROL, PO Take 2,000 Units by mouth daily       Furosemide (LASIX PO) Take 40 mg by mouth daily       lidocaine (LIDODERM) 5 % Patch Place 1 patch onto the skin every 12 hours To right knee       METOPROLOL SUCCINATE ER PO Take 25 mg by mouth daily        Omega-3 300 MG CAPS Take 1 capsule by mouth daily       senna-docusate (SENOKOT-S;PERICOLACE) 8.6-50 MG per tablet Take 1 tablet by mouth 2 times daily       VERAPAMIL HCL PO Take 120 mg by mouth At Bedtime       Warfarin Sodium (COUMADIN PO) Take by mouth daily       Medications reviewed:  Medications reconciled to facility chart and changes were made to reflect current medications as identified as above med list. Below are the changes that were made:   Medications stopped since last EPIC medication reconciliation:   There are no discontinued medications.    Medications started since last [facility] medication reconciliation:  No orders of the defined types were placed in this encounter.  Case Management:  I have reviewed the facility/SNF care plan/MDS which was done 5/11/17, including the falls risk, nutrition and pain screening. I also reviewed the current immunizations, and  "preventive care..Future cancer screening is not clinically indicated secondary to age/goals of care Patient's desire to return to the community is present, but is not able due to care needs . Current Level of Care is appropriate.    Advance Directive Discussion:    I reviewed the current advanced directives as reflected in EPIC, the POLST and the facility chart, and verified the congruency of orders  . I contacted the first party frandy lau and left a message regarding the plan of Care.  I did review the advance directives with the resident.     Team Discussion:  I communicated with the appropriate disciplines involved with the Plan of Care:   Nursing    Patient Goal:  Patient's goal is pain control and comfort.    Information reviewed:  Medications, vital signs, orders, and nursing notes.    ROS:  10 point ROS of systems including Constitutional, Eyes, Respiratory, Cardiovascular, Gastroenterology, Genitourinary, Integumentary, Muscularskeletal, Psychiatric were all negative except for pertinent positives noted in my HPI.    Exam:  /72  Pulse 76  Temp 97.9  F (36.6  C)  Resp 18  Ht 5' 6\" (1.676 m)  Wt 210 lb (95.3 kg)  SpO2 95%  BMI 33.89 kg/m2   GENERAL APPEARANCE:  Alert, in no distress  ENT:  normal hearing acuity, oral mucous membranes moist  EYES:  EOM normal, conjunctiva and lids normal  RESP:  lungs clear to auscultation , no respiratory distress, diminished breath sounds    CV:  regular rate and rhythm, no murmur, rub, or gallop, no edema  ABDOMEN:  bowel sounds normal, soft, non-tender  M/S:   Gait and station abnormal up in w/c frail limited ROM left   SKIN:  pale w/d  PSYCH:  oriented X 3, normal insight, judgement and memory, memory impaired , affect and mood normal      Lab/Diagnostic data:    Results for orders placed or performed in visit on 04/13/17   Hemoglobin A1c   Result Value Ref Range    Hemoglobin A1C 7.6 (A) 4.2 - 6.1 %    Narrative    [facility] 04/13/17   Basic " metabolic panel   Result Value Ref Range    Sodium 139 136 - 145 mmol/L    Potassium 4.0 3.5 - 5.0 mmol/L    Chloride 101 98 - 107 mmol/L    Carbon Dioxide 30 22 - 31 mmol/L    Anion Gap 8 5 - 18 mmol/L    Glucose 193 (A) 70 - 125 mg/dL    Urea Nitrogen 27 8 - 28 mg/dL    Creatinine 0.93 0.60 - 1.10 mg/dL    Calcium 10.8 (A) 8.5 - 10.5 mg/dL    GFR Estimate 56 (L) >60 mL/min/1.73m2    GFR Estimate If Black >60 >60 mL/min/1.73m2    [facility]    [facility] 04/13/17         ASSESSMENT/PLAN     Cerebrovascular accident (CVA) due to other mechanism (H)  Type II diabetes mellitus with peripheral circulatory disorder (H)  Atrial fibrillation, unspecified type (H)  Pain  Generalized muscle weakness  Benign essential hypertension  Depression         Orders:  The current medical regimen is effective;  continue present plan and medications.    Electronically signed by:  JOSUE Tai CNP

## 2017-05-24 ENCOUNTER — NURSING HOME VISIT (OUTPATIENT)
Dept: GERIATRICS | Facility: CLINIC | Age: 82
End: 2017-05-24
Payer: COMMERCIAL

## 2017-05-24 VITALS
SYSTOLIC BLOOD PRESSURE: 132 MMHG | BODY MASS INDEX: 34.06 KG/M2 | OXYGEN SATURATION: 95 % | TEMPERATURE: 97.9 F | RESPIRATION RATE: 18 BRPM | DIASTOLIC BLOOD PRESSURE: 72 MMHG | HEART RATE: 76 BPM | WEIGHT: 211 LBS

## 2017-05-24 DIAGNOSIS — I63.89 CEREBROVASCULAR ACCIDENT (CVA) DUE TO OTHER MECHANISM (H): ICD-10-CM

## 2017-05-24 DIAGNOSIS — M62.81 GENERALIZED MUSCLE WEAKNESS: ICD-10-CM

## 2017-05-24 DIAGNOSIS — R52 PAIN: ICD-10-CM

## 2017-05-24 DIAGNOSIS — I48.91 ATRIAL FIBRILLATION, UNSPECIFIED TYPE (H): Primary | ICD-10-CM

## 2017-05-24 PROCEDURE — 99310 SBSQ NF CARE HIGH MDM 45: CPT | Performed by: NURSE PRACTITIONER

## 2017-05-24 PROCEDURE — 99207 ZZC CDG-CORRECTLY CODED, REVIEWED AND AGREE: CPT | Performed by: NURSE PRACTITIONER

## 2017-05-24 NOTE — PROGRESS NOTES
"[place] [facility]    Chief Complaint   Patient presents with     Nursing Home Acute       HPI:    Mecca Slade is a 89 year old  (12/14/1921), who is being seen today for an episodic care visit at [facility] .  HPI information obtained from: facility chart records and facility staff.Today's concern is: Here to see patient at request of Daughter and patient   Atrial fibrillation, unspecified type (H)  On chronic coumadin  Denies CP/SOB, HA  INR therapeutic  Previous to admit, patient was taken off coumadin r/t recurring falls.  Daughter states she feels better now that her mother is back on it  Cerebrovascular accident (CVA) due to other mechanism (H)  With physical and functional decline and Significant deficits requiring NH placement   Daughter reports that the move to the NH was difficult and initially the hope was that patient would regain her function and return home but now the acceptance is LTC.  Discussed patient wording finding problems inablity to ambulate and memory problems.  Pain  Significant pain in right knee r/t long standing arthritis and now immobility.  Currently on 25 mg tramadol \"which helps\" but having restless nights r/t knee pain.  Discussed options.  Xray of knee done yesterday and shows DJD.  Updated Dr Ye on results.  Dr Ye will give a cortisone injection to right knee.  Both daughter and patient express appreciation for the doctor doing the cortisone shot for pain control and for coming to NH to do it.  Also discussed option of increasing pain medications to help with sleep at night and both daughter and patient request additional medication.   Generalized muscle weakness   Requiring extensive assistance from nursing and Up for meals only o/w spends the day resting in recliner.      ALLERGIES: Review of patient's allergies indicates no known allergies.  Past Medical, Surgical, Family and Social History reviewed and updated in EPIC.    Current " Outpatient Prescriptions   Medication Sig Dispense Refill     diclofenac (VOLTAREN) 1 % GEL topical gel Place 1 g onto the skin 2 times daily       METFORMIN HCL PO Take 500 mg by mouth 2 times daily (with meals)       TraMADol HCl (ULTRAM PO) Take 25 mg by mouth 3 times daily And q 4 hr prn        Acetaminophen (TYLENOL PO) Take 1,000 mg by mouth 2 times daily And BID PRN       ATORVASTATIN CALCIUM PO Take 20 mg by mouth daily       carboxymethylcellulose (REFRESH PLUS) 0.5 % SOLN ophthalmic solution Place 1 drop into both eyes 3 times daily as needed for dry eyes       VITAMIN D, CHOLECALCIFEROL, PO Take 2,000 Units by mouth daily       Furosemide (LASIX PO) Take 40 mg by mouth daily       lidocaine (LIDODERM) 5 % Patch Place 1 patch onto the skin every 12 hours To right knee       METOPROLOL SUCCINATE ER PO Take 25 mg by mouth daily        Omega-3 300 MG CAPS Take 1 capsule by mouth daily       senna-docusate (SENOKOT-S;PERICOLACE) 8.6-50 MG per tablet Take 1 tablet by mouth 2 times daily       VERAPAMIL HCL PO Take 120 mg by mouth At Bedtime       Warfarin Sodium (COUMADIN PO) Take by mouth daily       Medications reviewed:  Medications reconciled to facility chart and changes were made to reflect current medications as identified as above med list. Below are the changes that were made:   Medications stopped since last EPIC medication reconciliation:   There are no discontinued medications.    Medications started since last [facility] medication reconciliation:  No orders of the defined types were placed in this encounter.  REVIEW OF SYSTEMS:  4 point ROS including Respiratory, CV, GI and , other than that noted in the HPI,  is negative    Physical Exam:  /72  Pulse 76  Temp 97.9  F (36.6  C)  Resp 18  Wt 211 lb (95.7 kg)  SpO2 95%  BMI 34.06 kg/m2  GENERAL APPEARANCE:  Alert, in no distress  ENT:  normal hearing acuity, oral mucous membranes moist  EYES:  EOM normal, conjunctiva and lids normal  RESP:   lungs clear to auscultation , no respiratory distress, diminished breath sounds    CV:  regular rate and rhythm, no murmur, rub, or gallop, no edema  ABDOMEN:  bowel sounds normal, soft, non-tender  M/S:   Gait and station abnormal up in w/c frail limited ROM left   SKIN:  pale w/d  PSYCH:  oriented X 3, normal insight, judgement and memory, memory impaired , affect and mood normal     5/25/17 right knee xray scanned to EPIC  Results for orders placed or performed in visit on 04/13/17   Hemoglobin A1c   Result Value Ref Range    Hemoglobin A1C 7.6 (A) 4.2 - 6.1 %    [facility]    LAB [facility] 04/13/17   Basic metabolic panel   Result Value Ref Range    Sodium 139 136 - 145 mmol/L    Potassium 4.0 3.5 - 5.0 mmol/L    Chloride 101 98 - 107 mmol/L    Carbon Dioxide 30 22 - 31 mmol/L    Anion Gap 8 5 - 18 mmol/L    Glucose 193 (A) 70 - 125 mg/dL    Urea Nitrogen 27 8 - 28 mg/dL    Creatinine 0.93 0.60 - 1.10 mg/dL    Calcium 10.8 (A) 8.5 - 10.5 mg/dL    GFR Estimate 56 (L) >60 mL/min/1.73m2    GFR Estimate If Black >60 >60 mL/min/1.73m2    [facility]    LAB [facility] 04/13/17         Assessment/Plan:     Atrial fibrillation, unspecified type (H)  Cerebrovascular accident (CVA) due to other mechanism (H)  Pain  Generalized muscle weakness      Orders:  Increase tramadol to 25mg qid  Ok for cortisone shot.  Dr Rivers will provide     Total time spent with patient visit was 35 min including patient visit, review of past records and discussion with Esther Mc daughter/HPOA/ patient contact. Greater than 50% of total time spent with counseling and coordinating care.       Electronically signed by  JOSUE Tai CNP

## 2017-05-25 ENCOUNTER — TRANSFERRED RECORDS (OUTPATIENT)
Dept: HEALTH INFORMATION MANAGEMENT | Facility: CLINIC | Age: 82
End: 2017-05-25

## 2017-06-05 ENCOUNTER — NURSING HOME VISIT (OUTPATIENT)
Dept: GERIATRICS | Facility: CLINIC | Age: 82
End: 2017-06-05
Payer: COMMERCIAL

## 2017-06-05 VITALS
DIASTOLIC BLOOD PRESSURE: 76 MMHG | HEART RATE: 65 BPM | OXYGEN SATURATION: 97 % | SYSTOLIC BLOOD PRESSURE: 136 MMHG | RESPIRATION RATE: 17 BRPM | TEMPERATURE: 98 F

## 2017-06-05 DIAGNOSIS — G89.29 CHRONIC PAIN OF RIGHT KNEE: ICD-10-CM

## 2017-06-05 DIAGNOSIS — M17.11 PRIMARY OSTEOARTHRITIS OF RIGHT KNEE: Primary | ICD-10-CM

## 2017-06-05 DIAGNOSIS — M70.50 PES ANSERINE BURSITIS: ICD-10-CM

## 2017-06-05 DIAGNOSIS — M25.561 ACUTE PAIN OF RIGHT KNEE: ICD-10-CM

## 2017-06-05 DIAGNOSIS — M25.561 CHRONIC PAIN OF RIGHT KNEE: ICD-10-CM

## 2017-06-05 PROCEDURE — 20610 DRAIN/INJ JOINT/BURSA W/O US: CPT | Performed by: FAMILY MEDICINE

## 2017-06-05 PROCEDURE — 99207 ZZC CDG-CORRECTLY CODED, REVIEWED AND AGREE: CPT | Performed by: FAMILY MEDICINE

## 2017-06-05 RX ORDER — METHYLPREDNISOLONE ACETATE 40 MG/ML
40 INJECTION, SUSPENSION INTRA-ARTICULAR; INTRALESIONAL; INTRAMUSCULAR; SOFT TISSUE ONCE
Qty: 1 ML | Refills: 0 | OUTPATIENT
Start: 2017-06-05 | End: 2017-06-05

## 2017-06-05 NOTE — PROGRESS NOTES
[place] [facility]    Chief Complaint   Patient presents with     Nursing Home Acute       HPI:    Mecca Slade is a 89 year old  (12/14/1921), who is being seen today for an episodic care visit at [facility] .  HPI information obtained from: facility chart records and facility staff, [facility] and Resident    -Today's concern is:  Primary osteoarthritis of right knee  Chronic pain of right knee  Acute pain of right knee  - pt reports chronic pain in right knee, recently worse.   - pain is burning in nature,localized, 10/10, worse at night, also with movements.   - reports pain meds has not been helping much.       ALLERGIES: Review of patient's allergies indicates no known allergies.  Past Medical, Surgical, Family and Social History reviewed and updated in [facility].    Current Outpatient Prescriptions   Medication Sig Dispense Refill     diclofenac (VOLTAREN) 1 % GEL topical gel Place 1 g onto the skin 2 times daily       METFORMIN HCL PO Take 500 mg by mouth 2 times daily (with meals)       TraMADol HCl (ULTRAM PO) Take 25 mg by mouth 3 times daily And q 4 hr prn        Acetaminophen (TYLENOL PO) Take 1,000 mg by mouth 2 times daily And BID PRN       ATORVASTATIN CALCIUM PO Take 20 mg by mouth daily       carboxymethylcellulose (REFRESH PLUS) 0.5 % SOLN ophthalmic solution Place 1 drop into both eyes 3 times daily as needed for dry eyes       VITAMIN D, CHOLECALCIFEROL, PO Take 2,000 Units by mouth daily       Furosemide (LASIX PO) Take 40 mg by mouth daily       lidocaine (LIDODERM) 5 % Patch Place 1 patch onto the skin every 12 hours To right knee       METOPROLOL SUCCINATE ER PO Take 25 mg by mouth daily        Omega-3 300 MG CAPS Take 1 capsule by mouth daily       senna-docusate (SENOKOT-S;PERICOLACE) 8.6-50 MG per tablet Take 1 tablet by mouth 2 times daily       VERAPAMIL HCL PO Take 120 mg by mouth At Bedtime       Warfarin Sodium (COUMADIN PO) Take by mouth daily       Medications  reviewed: Reviewed in the chart and EHR.      REVIEW OF SYSTEMS:  4 point ROS including Respiratory, CV, GI and , other than that noted in the HPI,  is negative    Physical Exam:  /76  Pulse 65  Temp 98  F (36.7  C)  Resp 17  SpO2 97%  GENERAL APPEARANCE:  Alert, anxious  RESP:  respiratory effort and palpation of chest normal, lungs clear to auscultation , no respiratory distress  CV:  Palpation and auscultation of heart done , regular rate and rhythm, no murmur, rub, or gallop, peripheral edema 1+ in pedal and pitting.  M/S:   Gait and station abnormal uses a walker with a help, left sided weakness Right knee with decreased ROM. Right pes anserine tender on palpation.   SKIN:  Inspection of skin and subcutaneous tissue baseline, Palpation of skin and subcutaneous tissue baseline, statis distal legs.     Recent Labs:  All labs reviewed, none recent.    Assessment/Plan:  Primary osteoarthritis of right knee    Acute on Chronic pain of right knee  Right Pes Anserine Bursitis  - Resident will benefit from steroid injection.   - The following medication was given:   - After informed consent obtained and under aseptic technique, 40 mg of methylprednisolone mixed with 5 cc of 0.9% NS, 4 cc were injected into the right knee, and two cc injected into Right Pes Anserine bursa and surrounding tissue without any complication. Resident tolerated procedure well. Hemostasis implemented.   - The following medication was given:     ROUTE: Intra-articular and intra bursa  SITE: right knee and right Pes anserine bursitis  DOSE: 40 mg  LOT #: [id number]  :  [employer]  EXPIRATION DATE:  05/31/2017  NDC: 6437-0617-08        Orders:  - apply ice for 15 minutes using a barrier to the aching area tid for 2 days.     Electronically signed by  Anirudh Ye MD

## 2017-06-13 ENCOUNTER — NURSING HOME VISIT (OUTPATIENT)
Dept: GERIATRICS | Facility: CLINIC | Age: 82
End: 2017-06-13
Payer: COMMERCIAL

## 2017-06-13 VITALS
SYSTOLIC BLOOD PRESSURE: 136 MMHG | BODY MASS INDEX: 34.06 KG/M2 | WEIGHT: 211 LBS | TEMPERATURE: 98 F | HEART RATE: 65 BPM | DIASTOLIC BLOOD PRESSURE: 76 MMHG | RESPIRATION RATE: 17 BRPM | OXYGEN SATURATION: 97 %

## 2017-06-13 DIAGNOSIS — M62.81 GENERALIZED MUSCLE WEAKNESS: ICD-10-CM

## 2017-06-13 DIAGNOSIS — I48.91 ATRIAL FIBRILLATION, UNSPECIFIED TYPE (H): ICD-10-CM

## 2017-06-13 DIAGNOSIS — M19.90 ARTHRITIS: ICD-10-CM

## 2017-06-13 DIAGNOSIS — I63.411 CEREBROVASCULAR ACCIDENT (CVA) DUE TO EMBOLISM OF RIGHT MIDDLE CEREBRAL ARTERY (H): Primary | ICD-10-CM

## 2017-06-13 PROCEDURE — 99309 SBSQ NF CARE MODERATE MDM 30: CPT | Performed by: NURSE PRACTITIONER

## 2017-06-13 PROCEDURE — 99207 ZZC CDG-CORRECTLY CODED, REVIEWED AND AGREE: CPT | Performed by: NURSE PRACTITIONER

## 2017-06-13 NOTE — PROGRESS NOTES
[place] [facility]    Chief Complaint   Patient presents with     Nursing Home Acute       HPI:    Mecca Slade is a 89 year old  (12/14/1921), who is being seen today for an episodic care visit at [facility] .  HPI information obtained from: facility chart records, facility staff, patient report and [facility] chart review.Today's concern is:  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)  Atrial fibrillation, unspecified type (H)  With chronic SOB and poor endurance With physical and functional decline and Significant deficits requiring NH placement  Denies CP/SOB, HA    INR 2.1   Generalized muscle weakness   Requiring extensive assistance from nursing  Up for meals only o/w spends the day resting in recliner  Participated with therapy but remains w/c bound  Arthritis  With limited mobility and chronic pain with c/o severe pain.  Recent adjustment in medications along with cortisone injection help patient be more comfortable    ALLERGIES: Review of patient's allergies indicates no known allergies.  Past Medical, Surgical, Family and Social History reviewed and updated in [facility].    Current Outpatient Prescriptions   Medication Sig Dispense Refill     diclofenac (VOLTAREN) 1 % GEL topical gel Place 1 g onto the skin 2 times daily       METFORMIN HCL PO Take 500 mg by mouth 2 times daily (with meals)       TraMADol HCl (ULTRAM PO) Take 25 mg by mouth 3 times daily And q 4 hr prn        Acetaminophen (TYLENOL PO) Take 1,000 mg by mouth 2 times daily And BID PRN       ATORVASTATIN CALCIUM PO Take 20 mg by mouth daily       carboxymethylcellulose (REFRESH PLUS) 0.5 % SOLN ophthalmic solution Place 1 drop into both eyes 3 times daily as needed for dry eyes       VITAMIN D, CHOLECALCIFEROL, PO Take 2,000 Units by mouth daily       Furosemide (LASIX PO) Take 40 mg by mouth daily       lidocaine (LIDODERM) 5 % Patch Place 1 patch onto the skin every 12  hours To right knee       METOPROLOL SUCCINATE ER PO Take 25 mg by mouth daily        Omega-3 300 MG CAPS Take 1 capsule by mouth daily       senna-docusate (SENOKOT-S;PERICOLACE) 8.6-50 MG per tablet Take 1 tablet by mouth 2 times daily       VERAPAMIL HCL PO Take 120 mg by mouth At Bedtime       Warfarin Sodium (COUMADIN PO) Take by mouth daily       Medications reviewed:  Medications reconciled to facility chart and changes were made to reflect current medications as identified as above med list. Below are the changes that were made:   Medications stopped since last EPIC medication reconciliation:   There are no discontinued medications.    Medications started since last [facility] medication reconciliation:  No orders of the defined types were placed in this encounter.       REVIEW OF SYSTEMS:  Unobtainable secondary to cognitive impairment or aphasia. and 4 point ROS including Respiratory, CV, GI and , other than that noted in the HPI,  is negative    Physical Exam:  /76  Pulse 65  Temp 98  F (36.7  C)  Resp 17  Wt 211 lb (95.7 kg)  SpO2 97%  BMI 34.06 kg/m2  GENERAL APPEARANCE:  Alert, in no distress  ENT:  normal hearing acuity, oral mucous membranes moist  EYES:  EOM normal, conjunctiva and lids normal  RESP:  lungs clear to auscultation , no respiratory distress, diminished breath sounds    CV:  regular rate and rhythm, no murmur, rub, or gallop, no edema  ABDOMEN:  bowel sounds normal, soft, non-tender  M/S:   Gait and station abnormal up in w/c frail limited ROM left   SKIN:  pale w/d  PSYCH:  oriented X 3, normal insight, judgement and memory, memory impaired , affect and mood normal      Recent Labs:  CBC RESULTS: No results for input(s): WBC, RBC, HGB, HCT, MCV, MCH, MCHC, RDW, PLT in the last 67994 hours.    Last Basic Metabolic Panel:  Recent Labs   Lab Test 04/13/17   NA  139   POTASSIUM  4.0   CHLORIDE  101   SUN  10.8*   CO2  30   BUN  27   CR  0.93   GLC  193*       Liver Function Studies  - No results for input(s): PROTTOTAL, ALBUMIN, BILITOTAL, ALKPHOS, AST, ALT, BILIDIRECT in the last 66489 hours.    No results found for: TSH]    Lab Results   Component Value Date    A1C 7.6 04/13/2017       Assessment/Plan:     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)  Atrial fibrillation, unspecified type (H)  Generalized muscle weakness  Arthritis      Orders:  The current medical regimen is effective;  continue present plan and medications.  Continue same dose coumadin and check INR one month    Electronically signed by  JOSUE Tai CNP

## 2017-06-14 VITALS
DIASTOLIC BLOOD PRESSURE: 76 MMHG | SYSTOLIC BLOOD PRESSURE: 136 MMHG | RESPIRATION RATE: 17 BRPM | HEART RATE: 65 BPM | OXYGEN SATURATION: 97 % | WEIGHT: 201 LBS | TEMPERATURE: 98 F | BODY MASS INDEX: 32.44 KG/M2

## 2017-06-14 NOTE — PROGRESS NOTES
[place] [facility]    Chief Complaint   Patient presents with     senior living Regulatory       HPI:    Mecca Slade is a 89 year old  (12/14/1921), who is being seen today for a federally mandated E/M visit at [facility] .  HPI information obtained from: facility chart records and facility staff, patient and GNP at the facility.    . Today's concerns are:  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)  - No new issue. Continues to require mechanical lift, uses WC all the time.     Atrial fibrillation, unspecified type (H)  - denies palpitation, or chest pain    Type II diabetes mellitus with peripheral circulatory disorder (H)  - report oral intake good. .     Primary osteoarthritis of right knee  - Reports had a recent knee injection and feels good.     ------------------------------------------------------------------------------------------  # Past Medical, social, family histories, medications, and allergies reviewed and updated      MEDICATIONS:  Current Outpatient Prescriptions   Medication Sig Dispense Refill     diclofenac (VOLTAREN) 1 % GEL topical gel Place 1 g onto the skin 2 times daily       METFORMIN HCL PO Take 500 mg by mouth 2 times daily (with meals)       TraMADol HCl (ULTRAM PO) Take 25 mg by mouth 3 times daily And q 4 hr prn        Acetaminophen (TYLENOL PO) Take 1,000 mg by mouth 2 times daily And BID PRN       ATORVASTATIN CALCIUM PO Take 20 mg by mouth daily       carboxymethylcellulose (REFRESH PLUS) 0.5 % SOLN ophthalmic solution Place 1 drop into both eyes 3 times daily as needed for dry eyes       VITAMIN D, CHOLECALCIFEROL, PO Take 2,000 Units by mouth daily       Furosemide (LASIX PO) Take 40 mg by mouth daily       lidocaine (LIDODERM) 5 % Patch Place 1 patch onto the skin every 12 hours To right knee       METOPROLOL SUCCINATE ER PO Take 25 mg by mouth daily        Omega-3 300 MG CAPS Take 1 capsule by mouth daily        senna-docusate (SENOKOT-S;PERICOLACE) 8.6-50 MG per tablet Take 1 tablet by mouth 2 times daily       VERAPAMIL HCL PO Take 120 mg by mouth At Bedtime       Warfarin Sodium (COUMADIN PO) Take by mouth daily         Case Management:  I have reviewed the care plan and MDS and do agree with the plan. Patient's desire to return to the community is not present.  Information reviewed:  Medications, vital signs, orders, and nursing notes.    ROS:  10 point ROS of systems including Constitutional, Eyes, Respiratory, Cardiovascular, Gastroenterology, Genitourinary, Integumentary, Muscularskeletal, Psychiatric were all negative except for pertinent positives noted in my HPI.    Exam:  Vitals: /76  Pulse 65  Temp 98  F (36.7  C)  Resp 17  Wt 201 lb (91.2 kg)  SpO2 97%  BMI 32.44 kg/m2  BMI= Body mass index is 32.44 kg/(m^2).   GENERAL APPEARANCE:  Alert, in no distress, obese  ENT:  Mouth and posterior oropharynx normal, moist mucous membranes, [place], edentulous  with denture plateboth levels  EYES:  EOM, conjunctivae, lids, pupils and irises normal  NECK:  No adenopathy,masses or thyromegaly  RESP:  respiratory effort and palpation of chest normal, lungs clear to auscultation , no respiratory distress  CV:  Palpation and auscultation of heart done , irregular rate and rhythm, no murmur, rub, or gallop, trace pedal edema b/l. Pacemaker.   ABDOMEN:  normal bowel sounds, soft, nontender, no hepatosplenomegaly or other masses  M/S:   Gait and station abnormal uses WC for ambulation. Ms strength: 4/5 LUE, 4/5 LLE.   SKIN:  Inspection of skin and subcutaneous tissue baseline, Palpation of skin and subcutaneous tissue baseline  NEURO:   Cranial nerves decreased visual acuity on both sides. No facial droop.  tested and .  PSYCH:  oriented X 3, normal insight, judgement and memory, affect and mood normal      Lab/Diagnostic data:  All labs reviewed, none  recent  ---------------------------------------------------------------------------    ASSESSMENT/PLAN  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)  - Left sided residual weakness, LLE muscle strengths has improved from 3/5 to 4/5.   - On ASA, lipitor and coumadin  - OT/PT prn.     Atrial fibrillation, unspecified type (H)  Long term use of OAC  Encounter for therapeutic drug monitoring.   - INR today is 1.48 from 2.14, on 1.5 mg of coumadin  - Give 2.0 mg tonight and tomorrow, 1.5 mg after tomorrow  - recheck INR in 3 days.   - Rate control with metoprolol and verapamil.     Type II diabetes mellitus with peripheral circulatory disorder (H)  - Blood Sugars BID-  A.m.- 151 - 185  P.m. - 196 - 211  -   Lab Results   Component Value Date    A1C 7.6 04/13/2017   -  Keep HbA1C b/w 8-9% (per AGS there is a potential harm in lowering A1C to <6.5 % in older adults with diabetes), life expectancy less than 5 years, tight glucose control is note recommended  - on metformin 500 mg.       Primary osteoarthritis of right knee  - recent steroid injection by the writer, improved. May benefit from hyaluronic acid intrasynovial injection in the future.     Frail elderly  - Significant  Deficits requiring NH placement. Requiring extensive assistance from nursing. Up for meals only o/w spends the day resting in bed        Orders:  - See above, otherwise, continue the rest of the current POC.       Total time spent with patient visit at the skilled nursing facility was 35 min including patient visit and review of past records. Greater than 50% of total time spent with counseling and coordinating care due to OA, diabetes.     Electronically signed by:  Anirudh Ye MD

## 2017-06-19 ENCOUNTER — NURSING HOME VISIT (OUTPATIENT)
Dept: GERIATRICS | Facility: CLINIC | Age: 82
End: 2017-06-19
Payer: COMMERCIAL

## 2017-06-19 DIAGNOSIS — R54 FRAIL ELDERLY: ICD-10-CM

## 2017-06-19 DIAGNOSIS — E11.51 TYPE II DIABETES MELLITUS WITH PERIPHERAL CIRCULATORY DISORDER (H): ICD-10-CM

## 2017-06-19 DIAGNOSIS — Z79.01 LONG TERM CURRENT USE OF ANTICOAGULANT THERAPY: ICD-10-CM

## 2017-06-19 DIAGNOSIS — I48.91 ATRIAL FIBRILLATION, UNSPECIFIED TYPE (H): ICD-10-CM

## 2017-06-19 DIAGNOSIS — M17.11 PRIMARY OSTEOARTHRITIS OF RIGHT KNEE: ICD-10-CM

## 2017-06-19 DIAGNOSIS — I63.411 CEREBROVASCULAR ACCIDENT (CVA) DUE TO EMBOLISM OF RIGHT MIDDLE CEREBRAL ARTERY (H): Primary | ICD-10-CM

## 2017-06-19 DIAGNOSIS — Z51.81 ENCOUNTER FOR THERAPEUTIC DRUG MONITORING: ICD-10-CM

## 2017-06-19 PROCEDURE — 99310 SBSQ NF CARE HIGH MDM 45: CPT | Performed by: FAMILY MEDICINE

## 2017-06-19 PROCEDURE — 99207 ZZC CDG-CORRECTLY CODED, REVIEWED AND AGREE: CPT | Performed by: FAMILY MEDICINE

## 2017-06-26 ENCOUNTER — NURSING HOME VISIT (OUTPATIENT)
Dept: GERIATRICS | Facility: CLINIC | Age: 82
End: 2017-06-26
Payer: COMMERCIAL

## 2017-06-26 VITALS
TEMPERATURE: 98 F | RESPIRATION RATE: 17 BRPM | WEIGHT: 201 LBS | OXYGEN SATURATION: 97 % | SYSTOLIC BLOOD PRESSURE: 136 MMHG | BODY MASS INDEX: 32.44 KG/M2 | DIASTOLIC BLOOD PRESSURE: 76 MMHG | HEART RATE: 65 BPM

## 2017-06-26 DIAGNOSIS — I63.411 CEREBROVASCULAR ACCIDENT (CVA) DUE TO EMBOLISM OF RIGHT MIDDLE CEREBRAL ARTERY (H): Primary | ICD-10-CM

## 2017-06-26 DIAGNOSIS — Z95.0 CARDIAC PACEMAKER IN SITU: ICD-10-CM

## 2017-06-26 DIAGNOSIS — I48.91 ATRIAL FIBRILLATION, UNSPECIFIED TYPE (H): ICD-10-CM

## 2017-06-26 DIAGNOSIS — E11.40 TYPE 2 DIABETES MELLITUS WITH DIABETIC NEUROPATHY, WITHOUT LONG-TERM CURRENT USE OF INSULIN (H): ICD-10-CM

## 2017-06-26 DIAGNOSIS — L98.9 SKIN LESION: ICD-10-CM

## 2017-06-26 PROCEDURE — 99207 ZZC CDG-CORRECTLY CODED, REVIEWED AND AGREE: CPT | Performed by: NURSE PRACTITIONER

## 2017-06-26 PROCEDURE — 99310 SBSQ NF CARE HIGH MDM 45: CPT | Performed by: NURSE PRACTITIONER

## 2017-06-26 NOTE — PROGRESS NOTES
"[place] [facility]    Chief Complaint   Patient presents with     Nursing Home Acute       HPI:    Mecca Slade is a 89 year old  (12/14/1921), who is being seen today for an episodic care visit at [facility] .  HPI information obtained from: facility chart records, facility staff and patient report.Today's concern is:  Here to see patient at request of nursing and daughter Esther Joe who is at patient bedside.  Atrial fibrillation, unspecified type (H)  Cardiac pacemaker in situ  On chronic coumadin.  Denies CP/SOB, HA  On Verapamil and Metoprolol with no reports of bradycardia or tachycardia.  INR followed closely  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)  Significant deficits requiring NH placement  And With physical and functional decline.  Lived independently prior to CVA and is still having problems coming to terms with significant changes in function and environment.  From [place] but moved to [place] to be closer to daughter.  Daughter supportive and visits often.  Both daughter and patient discuss the sadness they feel about situation but no depression reported.  Patient becomes frustrated about lack of help and also the need to ask for help \"for everything\".  At this time, no requests for change to poc.  Goal is to emphasize comfort with conservative treatment in NH  Type 2 diabetes mellitus with diabetic neuropathy, without long-term current use of insulin (H)  On metformin.  Good po intake.  No nausea from medication. BMP followed q 6 months.  No reports of hypo/hyperglycemia.  Good po intake.  Followed by dietician and po intake supervised by nursing  Skin lesion  Patient and daughter both concerned about what is described as a tiny (less than 1 cm) hemangioma that has now grown to a large, rubbery somewhat tender lesion measuring around 2.5 x 3 cm.  Both daughter and patient express frustration that this lesion has been " "present x 2 weeks or more and despite weekly baths and ADLs no staff member has mentioned it.  Patient states she brought it up to staff who did not \"seem interested\".  Discussed with NM who was not aware of growth.  Discussed with daughter and patient options.  Patient limited in function and mobility and reluctant to leave NH but agreeable to travel to [facility] for dermatology consult.  Daughter will meet her there for support and to aid in communication.    Blood sugars as follows   - 234  Supper 123 - 205      ALLERGIES: Review of patient's allergies indicates no known allergies.  Past Medical, Surgical, Family and Social History reviewed and updated in [facility].    Current Outpatient Prescriptions   Medication Sig Dispense Refill     diclofenac (VOLTAREN) 1 % GEL topical gel Place 1 g onto the skin 2 times daily       METFORMIN HCL PO Take 500 mg by mouth 2 times daily (with meals)       TraMADol HCl (ULTRAM PO) Take 25 mg by mouth 3 times daily And q 4 hr prn        Acetaminophen (TYLENOL PO) Take 1,000 mg by mouth 2 times daily And BID PRN       ATORVASTATIN CALCIUM PO Take 20 mg by mouth daily       carboxymethylcellulose (REFRESH PLUS) 0.5 % SOLN ophthalmic solution Place 1 drop into both eyes 3 times daily as needed for dry eyes       VITAMIN D, CHOLECALCIFEROL, PO Take 2,000 Units by mouth daily       Furosemide (LASIX PO) Take 40 mg by mouth daily       lidocaine (LIDODERM) 5 % Patch Place 1 patch onto the skin every 12 hours To right knee       METOPROLOL SUCCINATE ER PO Take 25 mg by mouth daily        Omega-3 300 MG CAPS Take 1 capsule by mouth daily       senna-docusate (SENOKOT-S;PERICOLACE) 8.6-50 MG per tablet Take 1 tablet by mouth 2 times daily       VERAPAMIL HCL PO Take 120 mg by mouth At Bedtime       Warfarin Sodium (COUMADIN PO) Take by mouth daily       Medications reviewed:  Medications reconciled to facility chart and changes were made to reflect current medications as identified as " above med list. Below are the changes that were made:   Medications stopped since last EPIC medication reconciliation:   There are no discontinued medications.    Medications started since last EPIC meGENERAL    dication reconciliation:  No orders of the defined types were placed in this encounter.    REVIEW OF SYSTEMS:  10 point ROS of systems including Constitutional, Eyes, Respiratory, Cardiovascular, Gastroenterology, Genitourinary, Integumentary, Muscularskeletal, Psychiatric were all negative except for pertinent positives noted in my HPI.    Physical Exam:  /76  Pulse 65  Temp 98  F (36.7  C)  Resp 17  Wt 201 lb (91.2 kg)  SpO2 97%  BMI 32.44 kg/m2 GENERAL APPEARANCE:  Alert, in no distress  ENT:  normal hearing acuity, oral mucous membranes moist  EYES:  EOM normal, conjunctiva and lids normal  RESP:  lungs clear to auscultation , no respiratory distress, diminished breath sounds    CV:  regular rate and rhythm, no murmur, rub, or gallop, no edema  ABDOMEN:  bowel sounds normal, soft, non-tender  M/S:   Gait and station abnormal up in w/c frail limited ROM left   SKIN:  pale w/d  PSYCH:  oriented X 3, normal insight, judgement and memory, memory impaired , affect and mood normal  Recent Labs:    Results for orders placed or performed in visit on 04/13/17   Hemoglobin A1c   Result Value Ref [place]    Hemoglobin A1C 7.6 (A) 4.2 - 6.1 %    [facility]    LAB [facility] 04/13/17   Basic metabolic panel   Result Value Ref Range    Sodium 139 136 - 145 mmol/L    Potassium 4.0 3.5 - 5.0 mmol/L    Chloride 101 98 - 107 mmol/L    Carbon Dioxide 30 22 - 31 mmol/L    Anion Gap 8 5 - 18 mmol/L    Glucose 193 (A) 70 - 125 mg/dL    Urea Nitrogen 27 8 - 28 mg/dL    Creatinine 0.93 0.60 - 1.10 mg/dL    Calcium 10.8 (A) 8.5 - 10.5 mg/dL    GFR Estimate 56 (L) >60 mL/min/1.73m2    GFR Estimate If Black >60 >60 mL/min/1.73m2    [facility]    LAB [facility] 04/13/17     Assessment/Plan:     Atrial fibrillation,  unspecified type (H)  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)  Type 2 diabetes mellitus with diabetic neuropathy, without long-term current use of insulin (H)  Cardiac pacemaker in situ  Skin lesion      Orders:  1. Dermatology consult for abd lesion o/w The current medical regimen is effective;  continue present plan and medications.      Total time spent with patient visit was 35 min including patient visit, review of past records and discussion with Esther Joe/daughter/HPOA/ patient contact. Greater than 50% of total time spent with counseling and coordinating care.       Electronically signed by  JOSUE Tai CNP

## 2017-07-04 PROBLEM — I50.22 CHRONIC SYSTOLIC CONGESTIVE HEART FAILURE (H): Status: ACTIVE | Noted: 2017-04-03

## 2017-07-04 PROBLEM — E11.40 TYPE 2 DIABETES MELLITUS WITH DIABETIC NEUROPATHY, WITHOUT LONG-TERM CURRENT USE OF INSULIN (H): Status: ACTIVE | Noted: 2017-04-03

## 2017-07-10 ENCOUNTER — NURSING HOME VISIT (OUTPATIENT)
Dept: GERIATRICS | Facility: CLINIC | Age: 82
End: 2017-07-10
Payer: COMMERCIAL

## 2017-07-10 VITALS
HEART RATE: 65 BPM | WEIGHT: 209 LBS | SYSTOLIC BLOOD PRESSURE: 136 MMHG | TEMPERATURE: 98 F | BODY MASS INDEX: 33.73 KG/M2 | RESPIRATION RATE: 17 BRPM | DIASTOLIC BLOOD PRESSURE: 76 MMHG | OXYGEN SATURATION: 97 %

## 2017-07-10 DIAGNOSIS — I48.91 ATRIAL FIBRILLATION, UNSPECIFIED TYPE (H): ICD-10-CM

## 2017-07-10 DIAGNOSIS — L98.9 SKIN LESION: ICD-10-CM

## 2017-07-10 DIAGNOSIS — E11.40 TYPE 2 DIABETES MELLITUS WITH DIABETIC NEUROPATHY, WITHOUT LONG-TERM CURRENT USE OF INSULIN (H): Primary | ICD-10-CM

## 2017-07-10 PROCEDURE — 99207 ZZC CDG-CORRECTLY CODED, REVIEWED AND AGREE: CPT | Performed by: NURSE PRACTITIONER

## 2017-07-10 PROCEDURE — 99310 SBSQ NF CARE HIGH MDM 45: CPT | Performed by: NURSE PRACTITIONER

## 2017-07-10 NOTE — PROGRESS NOTES
[place] [facility]    Chief Complaint   Patient presents with     Nursing Home Acute       HPI:    Mecca Slade is a 89 year old  (12/14/1921), who is being seen today for an episodic care visit at [facility] .  HPI information obtained from: facility chart records, facility staff and patient report.Today's concern is:  Here to see patient at request of daughter Esther Joe who is at patient bedside  Type 2 diabetes mellitus with diabetic neuropathy, without long-term current use of insulin (H)  Daughter requests update on DM and BS.  Discussed medications, diet and po intake.  Weight slowing increasing problably r/t immobility.  Good po intake.  Followed by dietician and po intake supervised by nursing  On Metformin with patient and daughter request to decrease # accu checks   Wt Readings from Last 10 Encounters:   07/19/17 209 lb (94.8 kg)   07/10/17 209 lb (94.8 kg)   06/26/17 201 lb (91.2 kg)   06/14/17 201 lb (91.2 kg)   06/13/17 211 lb (95.7 kg)   05/24/17 211 lb (95.7 kg)   05/11/17 210 lb (95.3 kg)   04/20/17 205 lb (93 kg)   04/17/17 205 lb (93 kg)   04/12/17 205 lb (93 kg)       Atrial fibrillation, unspecified type (H)  On chronic coumadin  INR followed closely.  Denies CP/SOB, HA    Skin lesion -  Right LQ Abd  Lesion opened revealing abscess measuring 1x3 cm and stage 2 depth.  Small amount of pus drainage with pressure with c/o mild pain with palpation.  No s/s infection.  No fever, chills.  VSS    Blood sugars as follows   - 175   - 252      ALLERGIES: Review of patient's allergies indicates no known allergies.  Past Medical, Surgical, Family and Social History reviewed and updated in [facility].    Current Outpatient Prescriptions   Medication Sig Dispense Refill     diclofenac (VOLTAREN) 1 % GEL topical gel Place 1 g onto the skin 2 times daily       METFORMIN HCL PO Take 500 mg by mouth daily (with breakfast)        TraMADol HCl (ULTRAM PO) Take 25 mg by  mouth 4 times daily And q 3 hr prn       Acetaminophen (TYLENOL PO) Take 1,000 mg by mouth 2 times daily And BID PRN       ATORVASTATIN CALCIUM PO Take 20 mg by mouth daily       carboxymethylcellulose (REFRESH PLUS) 0.5 % SOLN ophthalmic solution Place 1 drop into both eyes 3 times daily as needed for dry eyes       VITAMIN D, CHOLECALCIFEROL, PO Take 2,000 Units by mouth daily       Furosemide (LASIX PO) Take 40 mg by mouth daily       METOPROLOL SUCCINATE ER PO Take 25 mg by mouth daily        senna-docusate (SENOKOT-S;PERICOLACE) 8.6-50 MG per tablet Take 1 tablet by mouth 2 times daily       VERAPAMIL HCL PO Take 120 mg by mouth At Bedtime       Warfarin Sodium (COUMADIN PO) Take by mouth daily       METFORMIN HCL PO Take 1,000 mg by mouth At Bedtime       Medications reviewed:  Medications reconciled to facility chart and changes were made to reflect current medications as identified as above med list. Below are the changes that were made:   Medications stopped since last EPIC medication reconciliation:   There are no discontinued medications.    Medications started since last [facility] medication reconciliation:  No orders of the defined types were placed in this encounter.  REVIEW OF SYSTEMS:  4 point ROS including Respiratory, CV, GI and , other than that noted in the HPI,  is negative    Physical Exam:  /76  Pulse 65  Temp 98  F (36.7  C)  Resp 17  Wt 209 lb (94.8 kg)  SpO2 97%  BMI 33.73 kg/m2  GENERAL APPEARANCE:  Alert, in no distress  ENT:  normal hearing acuity, oral mucous membranes moist  EYES:  EOM normal, conjunctiva and lids normal  RESP:  lungs clear to auscultation , no respiratory distress, diminished breath sounds    CV:  regular rate and rhythm, no murmur, rub, or gallop, no edema  ABDOMEN:  bowel sounds normal, soft, non-tender  M/S:   Gait and station abnormal up in w/c frail limited ROM left   SKIN:  pale w/d  PSYCH:  oriented X 3, normal insight, judgement and memory, memory  impaired , affect and mood normal     INR 2.3 on 4 mg coumadin    Results for orders placed or performed in visit on 04/13/17   Hemoglobin A1c   Result Value Ref [place]    Hemoglobin A1C 7.6 (A) 4.2 - 6.1 %    [facility]    LAB [facility] 04/13/17   Basic metabolic panel   Result Value Ref [place]    Sodium 139 136 - 145 mmol/L    Potassium 4.0 3.5 - 5.0 mmol/L    Chloride 101 98 - 107 mmol/L    Carbon Dioxide 30 22 - 31 mmol/L    Anion Gap 8 5 - 18 mmol/L    Glucose 193 (A) 70 - 125 mg/dL    Urea Nitrogen 27 8 - 28 mg/dL    Creatinine 0.93 0.60 - 1.10 mg/dL    Calcium 10.8 (A) 8.5 - 10.5 mg/dL    GFR Estimate 56 (L) >60 mL/min/1.73m2    GFR Estimate If Black >60 >60 mL/min/1.73m2    [facility] 04/13/17         Assessment/Plan:     Type 2 diabetes mellitus with diabetic neuropathy, without long-term current use of insulin (H)  Atrial fibrillation, unspecified type (H)  Skin lesion      Orders:  Coumadin 4 mg po qd and check INR 7/24  Irrigate OA RLQ bid and apply bacitracin to OA bid.  Cover with nonadaptive dressing  Reduce accu check to q d 3 x week  And a1c q 6 month    Total time spent with patient visit was 35 min including discussion with daughter/ CLAUDIO Blackmon and patient. Greater than 50% of total time spent with counseling and coordinating care.       Electronically signed by  JOSUE Tai CNP

## 2017-07-19 ENCOUNTER — NURSING HOME VISIT (OUTPATIENT)
Dept: GERIATRICS | Facility: CLINIC | Age: 82
End: 2017-07-19
Payer: COMMERCIAL

## 2017-07-19 VITALS
SYSTOLIC BLOOD PRESSURE: 136 MMHG | WEIGHT: 209 LBS | RESPIRATION RATE: 17 BRPM | OXYGEN SATURATION: 97 % | DIASTOLIC BLOOD PRESSURE: 76 MMHG | TEMPERATURE: 98 F | BODY MASS INDEX: 33.73 KG/M2 | HEART RATE: 65 BPM

## 2017-07-19 DIAGNOSIS — I63.411 CEREBROVASCULAR ACCIDENT (CVA) DUE TO EMBOLISM OF RIGHT MIDDLE CEREBRAL ARTERY (H): ICD-10-CM

## 2017-07-19 DIAGNOSIS — E11.40 TYPE 2 DIABETES MELLITUS WITH DIABETIC NEUROPATHY, WITHOUT LONG-TERM CURRENT USE OF INSULIN (H): Primary | ICD-10-CM

## 2017-07-19 DIAGNOSIS — L98.491 STAGE 2 SKIN ULCER LIMITED TO BREAKDOWN OF SKIN (H): ICD-10-CM

## 2017-07-19 DIAGNOSIS — I48.91 ATRIAL FIBRILLATION, UNSPECIFIED TYPE (H): ICD-10-CM

## 2017-07-19 DIAGNOSIS — R52 PAIN: ICD-10-CM

## 2017-07-19 PROCEDURE — 99207 ZZC CDG-CORRECTLY CODED, REVIEWED AND AGREE: CPT | Performed by: NURSE PRACTITIONER

## 2017-07-19 PROCEDURE — 99310 SBSQ NF CARE HIGH MDM 45: CPT | Performed by: NURSE PRACTITIONER

## 2017-07-19 NOTE — PROGRESS NOTES
"[place] [facility]    Chief Complaint   Patient presents with     RECHECK       HPI:    Mecca Slade is a 89 year old  (12/14/1921), who is being seen today for an episodic care visit at [facility] .  HPI information obtained from: facility chart records, facility staff and patient report.Today's concern is: Here to see patient at request of daughter frandy cooper   Type 2 diabetes mellitus with diabetic neuropathy, without long-term current use of insulin (H)  On metformin.  Recent reduction in BS per patient and daugher request.  Patient requests no changes in that.  Patient and daughter would like to discuss diet and the amount of food offered to her at mealtimes.  Patient reports she eats a \"good breakfast\" and all of her lunch but when supper comes, she has no appetite for the large amount of food offered to her   If she tries to refuse the food, the nurses get \"upset with me\" and state that \"i have to eat the meal r/t DM\".  Both daughter and patient request small supper like a fruit bowl or banana.  Patient states that prior to admit to NH, patient had a piece of fruit for supper and did fine.  Since CVA and resulting immobility requiring NH placement, patient has gained weight and would like to lose some weight so she doesn't outgrow her w/c or recliner.  Discussed at length with NM, daughter and patient.  BS stable  Followed by dietician and po intake supervised by nursing  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)  Atrial fibrillation, unspecified type (H)  On chronic coumadin, metoprolol.  INR followed closely Denies CP/SOB, HA  Patient nonambulatory and finds it difficult to transfer from chair to either bed or recliner.  Participated with therapy with little to no progress and now needs LTC.  Stage 2 skin ulcer limited to breakdown of skin (H)  Abscess RLQ with no further drainage, tenderness but remains a stage 2.  No s/s " "infection and appears to be healing without problems.  Patient and daughter request to d/c dermatology consult r/t immobility  Patient also has start of skin breakdown on coccyx also r/t immobility and preference to sit up in chair   Pain  Long hx of DJD pain and now with CVA pain.  On scheduled tramadol and prn.  Reluctant to ask for prn medications r/t belief that the \"nurses know when I need it\".  Gets up about 4 am r/t staff waking her up for product change.  Patient then prefers to get up and sit in chair r/t back pain lying in bed.  Discussed options but patient states the best thing to do is just get me out of bed.  Staff concerned that patient putting too much pressure on coccyx with sitting up in chair and would prefer patient to stay in bed until breakfast  Patient states \"I can't do that.  My back hurts\"  Daughter agrees that it is asking too much of patient to stay in bed    Blood sugars Q Mondays-  7/17 - 141    ALLERGIES: Review of patient's allergies indicates no known allergies.  Past Medical, Surgical, Family and Social History reviewed and updated in [facility].    Current Outpatient Prescriptions   Medication Sig Dispense Refill     METFORMIN HCL PO Take 1,000 mg by mouth At Bedtime       diclofenac (VOLTAREN) 1 % GEL topical gel Place 1 g onto the skin 2 times daily       METFORMIN HCL PO Take 500 mg by mouth daily (with breakfast)        TraMADol HCl (ULTRAM PO) Take 25 mg by mouth 4 times daily And q 3 hr prn       Acetaminophen (TYLENOL PO) Take 1,000 mg by mouth 2 times daily And BID PRN       ATORVASTATIN CALCIUM PO Take 20 mg by mouth daily       carboxymethylcellulose (REFRESH PLUS) 0.5 % SOLN ophthalmic solution Place 1 drop into both eyes 3 times daily as needed for dry eyes       VITAMIN D, CHOLECALCIFEROL, PO Take 2,000 Units by mouth daily       Furosemide (LASIX PO) Take 40 mg by mouth daily       METOPROLOL SUCCINATE ER PO Take 25 mg by mouth daily        senna-docusate " (SENOKOT-S;PERICOLACE) 8.6-50 MG per tablet Take 1 tablet by mouth 2 times daily       VERAPAMIL HCL PO Take 120 mg by mouth At Bedtime       Warfarin Sodium (COUMADIN PO) Take by mouth daily       Medications reviewed:  Medications reconciled to facility chart and changes were made to reflect current medications as identified as above med list. Below are the changes that were made:   Medications stopped since last EPIC medication reconciliation:   Medications Discontinued During This Encounter   Medication Reason     lidocaine (LIDODERM) 5 % Patch      Omega-3 300 MG CAPS        Medications started since last [facility] medication reconciliation:  Orders Placed This Encounter   Medications     METFORMIN HCL PO     Sig: Take 1,000 mg by mouth At Bedtime         REVIEW OF SYSTEMS:  4 point ROS including Respiratory, CV, GI and , other than that noted in the HPI,  is negative    Physical Exam:  /76  Pulse 65  Temp 98  F (36.7  C)  Resp 17  Wt 209 lb (94.8 kg)  SpO2 97%  BMI 33.73 kg/m2  GENERAL APPEARANCE:  Alert, in no distress  ENT:  normal hearing acuity, oral mucous membranes moist  EYES:  EOM normal, conjunctiva and lids normal  RESP:  lungs clear to auscultation , no respiratory distress, diminished breath sounds    CV:  regular rate and rhythm, no murmur, rub, or gallop, no edema  ABDOMEN:  bowel sounds normal, soft, non-tender  M/S:   Gait and station abnormal up in w/c frail limited ROM left   SKIN:  pale w/d  PSYCH:  oriented X 3, normal insight, judgement and memory, memory impaired , affect and mood normal         Results for orders placed or performed in visit on 04/13/17   Hemoglobin A1c   Result Value Ref Range    Hemoglobin A1C 7.6 (A) 4.2 - 6.1 %    [facility]    LAB [facility] 04/13/17   Basic metabolic panel   Result Value Ref Range    Sodium 139 136 - 145 mmol/L    Potassium 4.0 3.5 - 5.0 mmol/L    Chloride 101 98 - 107 mmol/L    Carbon Dioxide 30 22 - 31 mmol/L    Anion Gap 8 5 - 18 mmol/L     Glucose 193 (A) 70 - 125 mg/dL    Urea Nitrogen 27 8 - 28 mg/dL    Creatinine 0.93 0.60 - 1.10 mg/dL    Calcium 10.8 (A) 8.5 - 10.5 mg/dL    GFR Estimate 56 (L) >60 mL/min/1.73m2    GFR Estimate If Black >60 >60 mL/min/1.73m2    Narrative    [facility] 04/13/17         Assessment/Plan:     Type 2 diabetes mellitus with diabetic neuropathy, without long-term current use of insulin (H)  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)  Atrial fibrillation, unspecified type (H)  Stage 2 skin ulcer limited to breakdown of skin (H)  Pain      Orders:  1. Offer fruit, 1/2 sandwich at supper instead of reg meal  2.  Pain monitoring for possible increase in pain meds    3 skin prep to coccyx area bid until healed   4  Ok for patient to get OOB at 4 am and rest in recliner     Total time spent with patient visit was 35 min including patient visit, review of past records and discussion with Esther Calixtocristino daughter/ patient contact. Greater than 50% of total time spent with counseling and coordinating care .     Electronically signed by  JOSUE Tai CNP

## 2017-07-23 PROBLEM — R52 PAIN: Status: ACTIVE | Noted: 2017-07-23

## 2017-08-04 ENCOUNTER — RECORDS - HEALTHEAST (OUTPATIENT)
Dept: LAB | Facility: CLINIC | Age: 82
End: 2017-08-04

## 2017-08-07 ENCOUNTER — NURSING HOME VISIT (OUTPATIENT)
Dept: GERIATRICS | Facility: CLINIC | Age: 82
End: 2017-08-07
Payer: MEDICARE

## 2017-08-07 ENCOUNTER — TRANSFERRED RECORDS (OUTPATIENT)
Dept: HEALTH INFORMATION MANAGEMENT | Facility: CLINIC | Age: 82
End: 2017-08-07

## 2017-08-07 VITALS
WEIGHT: 207 LBS | OXYGEN SATURATION: 97 % | BODY MASS INDEX: 33.41 KG/M2 | HEART RATE: 83 BPM | DIASTOLIC BLOOD PRESSURE: 77 MMHG | TEMPERATURE: 97.4 F | SYSTOLIC BLOOD PRESSURE: 136 MMHG | RESPIRATION RATE: 20 BRPM

## 2017-08-07 DIAGNOSIS — R52 PAIN: ICD-10-CM

## 2017-08-07 DIAGNOSIS — I63.411 CEREBROVASCULAR ACCIDENT (CVA) DUE TO EMBOLISM OF RIGHT MIDDLE CEREBRAL ARTERY (H): Primary | ICD-10-CM

## 2017-08-07 DIAGNOSIS — R26.2 DIFFICULTY WALKING: ICD-10-CM

## 2017-08-07 DIAGNOSIS — I48.91 ATRIAL FIBRILLATION, UNSPECIFIED TYPE (H): ICD-10-CM

## 2017-08-07 DIAGNOSIS — M62.81 GENERALIZED MUSCLE WEAKNESS: ICD-10-CM

## 2017-08-07 LAB
ANION GAP SERPL CALCULATED.3IONS-SCNC: 6 MMOL/L (ref 5–18)
BUN SERPL-MCNC: 26 MG/DL (ref 8–28)
CALCIUM SERPL-MCNC: 11 MG/DL (ref 8.5–10.5)
CHLORIDE SERPLBLD-SCNC: 103 MMOL/L (ref 98–107)
CO2 SERPL-SCNC: 31 MMOL/L (ref 22–31)
CREAT SERPL-MCNC: 0.9 MG/DL (ref 0.6–1.1)
GFR SERPL CREATININE-BSD FRML MDRD: 58 ML/MIN/1.73M2
GLUCOSE SERPL-MCNC: 167 MG/DL (ref 70–125)
HBA1C MFR BLD: 7.9 % (ref 4.2–6.1)
HBA1C MFR BLD: 7.9 % (ref 4.2–6.1)
INR PPP: 1.82 (ref 0.9–1.1)
POTASSIUM SERPL-SCNC: 4.4 MMOL/L (ref 3.5–5)
SODIUM SERPL-SCNC: 140 MMOL/L (ref 136–145)

## 2017-08-07 PROCEDURE — 99309 SBSQ NF CARE MODERATE MDM 30: CPT | Performed by: NURSE PRACTITIONER

## 2017-08-07 NOTE — PROGRESS NOTES
"[place] [facility]    Chief Complaint   Patient presents with     Nursing Home Acute       HPI:    Mecca Slade is a 89 year old  (12/14/1921), who is being seen today for an episodic care visit at [facility] .  HPI information obtained from: facility chart records, facility staff and patient report.Today's concern is:  Here to see patient at request of daughter Esther/CLAUDIO  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)  Atrial fibrillation, unspecified type (H)  On chronic coumadin  Denies CP/SOB, HA  INR therapeutic With physical and functional decline and Significant deficits requiring NH placement   Complicates nursing care and patients ability to follow and understand POC.  Pain  Persistent and unmanaged.  Received cortisone injection to right knee which was helpful but patient states \"it has worn off now\"  Patient c/o severe back pain and right knee pain  Patient at times aware of availability of prn pain meds but believes that nursing \"Knows when I need it\"  Discussed with daughter and patient both request increase in scheduled tramadol and continue prn   Difficulty walking  Jm lift now and patient c/o pain with transfers  Discussed with staff and daughter  Generalized muscle weakness   Requiring extensive assistance from nursing  Up for meals only o/w spends the day resting in recliner    ALLERGIES: Review of patient's allergies indicates no known allergies.  Past Medical, Surgical, Family and Social History reviewed and updated in EPIC.    Current Outpatient Prescriptions   Medication Sig Dispense Refill     TraMADol HCl (ULTRAM PO) Take 50 mg by mouth 3 times daily       TraMADol HCl (ULTRAM PO) Take 25 mg by mouth every 4 hours as needed And q 3 hr prn       METFORMIN HCL PO Take 1,000 mg by mouth At Bedtime       diclofenac (VOLTAREN) 1 % GEL topical gel Place 1 g onto the skin 2 times daily       METFORMIN HCL PO Take 500 mg by " mouth daily (with breakfast)        Acetaminophen (TYLENOL PO) Take 1,000 mg by mouth 2 times daily And BID PRN       ATORVASTATIN CALCIUM PO Take 20 mg by mouth daily       carboxymethylcellulose (REFRESH PLUS) 0.5 % SOLN ophthalmic solution Place 1 drop into both eyes 3 times daily as needed for dry eyes       VITAMIN D, CHOLECALCIFEROL, PO Take 2,000 Units by mouth daily       Furosemide (LASIX PO) Take 40 mg by mouth daily       METOPROLOL SUCCINATE ER PO Take 25 mg by mouth daily        senna-docusate (SENOKOT-S;PERICOLACE) 8.6-50 MG per tablet Take 1 tablet by mouth 2 times daily       VERAPAMIL HCL PO Take 120 mg by mouth At Bedtime       Warfarin Sodium (COUMADIN PO) Take by mouth daily       Medications reviewed:  Medications reconciled to facility chart and changes were made to reflect current medications as identified as above med list. Below are the changes that were made:   Medications stopped since last EPIC medication reconciliation:   There are no discontinued medications.    Medications started since last [facility] medication reconciliation:  No orders of the defined types were placed in this encounter.  REVIEW OF SYSTEMS:  4 point ROS including Respiratory, CV, GI and , other than that noted in the HPI,  is negative    Physical Exam:  /77  Pulse 83  Temp 97.4  F (36.3  C)  Resp 20  Wt 207 lb (93.9 kg)  SpO2 97%  BMI 33.41 kg/m2  GENERAL APPEARANCE:  Alert, in no distress  ENT:  normal hearing acuity, oral mucous membranes moist  EYES:  EOM normal, conjunctiva and lids normal  RESP:  lungs clear to auscultation , no respiratory distress, diminished breath sounds    CV:  regular rate and rhythm, no murmur, +1 edema  ABDOMEN:  bowel sounds normal, soft, non-tender  M/S:   Gait and station abnormal up in w/c frail limited ROM left   SKIN:  pale w/d  PSYCH:  oriented X 3, normal insight, judgement and memory, memory impaired , affect and mood normal     Results for orders placed or performed in  visit on 04/13/17   Hemoglobin A1c   Result Value Ref Range    Hemoglobin A1C 7.6 (A) 4.2 - 6.1 %    [facility]    LAB [facility] 04/13/17   Basic metabolic panel   Result Value Ref Range    Sodium 139 136 - 145 mmol/L    Potassium 4.0 3.5 - 5.0 mmol/L    Chloride 101 98 - 107 mmol/L    Carbon Dioxide 30 22 - 31 mmol/L    Anion Gap 8 5 - 18 mmol/L    Glucose 193 (A) 70 - 125 mg/dL    Urea Nitrogen 27 8 - 28 mg/dL    Creatinine 0.93 0.60 - 1.10 mg/dL    Calcium 10.8 (A) 8.5 - 10.5 mg/dL    GFR Estimate 56 (L) >60 mL/min/1.73m2    GFR Estimate If Black >60 >60 mL/min/1.73m2    [facility] 04/13/17         Assessment/Plan:     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)  Atrial fibrillation, unspecified type (H)  Pain  Difficulty walking  Generalized muscle weakness      Orders:  1. OT to eval for chair positioning  2. Ultram 50 mg TID and q 4 hr prn pain  3. Okay for cortisone injection after 9/1/17  4   physical therapy for staff training with transfers    Total time spent with patient visit at the skilled nursing facility was 25 min including patient visit, review of past records and discussion with daughter Esther/HPOA/ patient contact. Greater than 50% of total time spent with counseling and coordinating care    Electronically signed by  JOSUE Tai CNP

## 2017-08-21 ENCOUNTER — NURSING HOME VISIT (OUTPATIENT)
Dept: GERIATRICS | Facility: CLINIC | Age: 82
End: 2017-08-21
Payer: MEDICARE

## 2017-08-21 VITALS
TEMPERATURE: 97 F | OXYGEN SATURATION: 97 % | SYSTOLIC BLOOD PRESSURE: 136 MMHG | RESPIRATION RATE: 20 BRPM | BODY MASS INDEX: 33.73 KG/M2 | DIASTOLIC BLOOD PRESSURE: 77 MMHG | HEART RATE: 83 BPM | WEIGHT: 209 LBS

## 2017-08-21 DIAGNOSIS — R52 PAIN: ICD-10-CM

## 2017-08-21 DIAGNOSIS — M62.81 GENERALIZED MUSCLE WEAKNESS: ICD-10-CM

## 2017-08-21 DIAGNOSIS — E11.40 TYPE 2 DIABETES MELLITUS WITH DIABETIC NEUROPATHY, WITHOUT LONG-TERM CURRENT USE OF INSULIN (H): Primary | ICD-10-CM

## 2017-08-21 DIAGNOSIS — I48.91 ATRIAL FIBRILLATION, UNSPECIFIED TYPE (H): ICD-10-CM

## 2017-08-21 DIAGNOSIS — I63.411 CEREBROVASCULAR ACCIDENT (CVA) DUE TO EMBOLISM OF RIGHT MIDDLE CEREBRAL ARTERY (H): ICD-10-CM

## 2017-08-21 PROCEDURE — 99309 SBSQ NF CARE MODERATE MDM 30: CPT | Performed by: NURSE PRACTITIONER

## 2017-08-21 NOTE — PROGRESS NOTES
[place] [facility]    Chief Complaint   Patient presents with     FPC Regulatory       HPI:    Mecca Slade is a 89 year old  (12/14/1921), who is being seen today for a federally mandated E/M visit at [facility] .  HPI information obtained from: facility chart records, facility staff and patient report. Today's concerns are:  Type 2 diabetes mellitus with diabetic neuropathy, without long-term current use of insulin (H)  Previously on glipizide but d/c prior to admit to NH now on metformin 500 mg po bid   Good po intake and appetite discussed with patient and nursing  Followed by dietician and po intake supervised by nursing  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)  Atrial fibrillation, unspecified type (H)  On chronic coumadin  Denies CP/SOB, HA  INR therapeutic With physical and functional decline and Significant deficits requiring NH placement  Memory loss complicates nursing care and patients ability to follow and understand POC.  Pain  Improved comfort with addition and subsequent increase of tramadol both scheduled and prn.  Also benefits from cortisone injection to right knee.  Generalized muscle weakness   Requiring extensive assistance from nursing  Up for meals only o/w spends the day resting in chair  Blood sugars Q Week -  8/7 - 169  8/14 - 151    ALLERGIES: Review of patient's allergies indicates no known allergies.  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  PAST SURGICAL HISTORY:  has no past surgical history on file.  FAMILY HISTORY: family history is not on file.  SOCIAL HISTORY:  reports that she has never smoked. She has never used smokeless tobacco. She reports that she does not drink alcohol or use illicit drugs.    MEDICATIONS:  Current Outpatient Prescriptions   Medication Sig Dispense Refill     TraMADol HCl (ULTRAM PO) Take 50 mg by mouth 3 times daily       METFORMIN HCL PO Take 1,000 mg by mouth At Bedtime       diclofenac (VOLTAREN) 1 % GEL topical gel Place 1 g onto the skin 2 times daily       METFORMIN HCL PO Take 500 mg by mouth daily (with breakfast)        TraMADol HCl (ULTRAM PO) Take 50 mg by mouth every 3 hours as needed        Acetaminophen (TYLENOL PO) Take 1,000 mg by mouth 2 times daily And BID PRN       ATORVASTATIN CALCIUM PO Take 20 mg by mouth daily       carboxymethylcellulose (REFRESH PLUS) 0.5 % SOLN ophthalmic solution Place 1 drop into both eyes 3 times daily as needed for dry eyes       VITAMIN D, CHOLECALCIFEROL, PO Take 2,000 Units by mouth daily       Furosemide (LASIX PO) Take 40 mg by mouth daily       METOPROLOL SUCCINATE ER PO Take 25 mg by mouth daily        senna-docusate (SENOKOT-S;PERICOLACE) 8.6-50 MG per tablet Take 1 tablet by mouth daily        VERAPAMIL HCL PO Take 120 mg by mouth At Bedtime       Warfarin Sodium (COUMADIN PO) Take by mouth daily       Cephalexin (KEFLEX PO) Take 500 mg by mouth 3 times daily       Medications reviewed:  Medications reconciled to facility chart and changes were made to reflect current medications as identified as above med list. Below are the changes that were made:   Medications stopped since last EPIC  medication reconciliation:   There are no discontinued medications.    Medications started since last EPIC medication reconciliation:  No orders of the defined types were placed in this encounter.    Case Management:  I have reviewed the care plan and MDS and do agree with the plan. Patient's desire to return to the community is present, but is not able due to care needs .  Information reviewed:  Medications, vital signs, orders, and nursing notes.    ROS:  4 point ROS including Respiratory, CV, GI and , other than that noted in the HPI,  is negative    Exam:  Vitals: /77  Pulse 83  Temp 97  F (36.1  C)  Resp 20  Wt 209 lb (94.8 kg)  SpO2 97%  BMI 33.73 kg/m2  BMI= Body mass index is 33.73 kg/(m^2).  GENERAL APPEARANCE:  Alert, in no distress  ENT:  normal hearing acuity, oral mucous membranes moist  EYES:  EOM normal, conjunctiva and lids normal  RESP:  lungs clear to auscultation , no respiratory distress, diminished breath sounds    CV:  regular rate and rhythm, no murmur, +1 edema  ABDOMEN:  bowel sounds normal, soft, non-tender  M/S:   Gait and station abnormal up in w/c frail limited ROM left   SKIN:  pale w/d  PSYCH:  oriented X 3, normal insight, judgement and memory, memory impaired , affect and mood normal     Results for orders placed or performed in visit on 08/07/17   INR   Result Value Ref Range    INR 1.82 (H) 0.90 - 1.10    Narrative    LAB RESULTS, [facility]   Basic metabolic panel   Result Value Ref Range    Sodium 140 136 - 145 mmol/L    Potassium 4.4 3.5 - 5.0 mmol/L    Chloride 103 98 - 107 mmol/L    Carbon Dioxide 31 22 - 31 mmol/L    Anion Gap 6 5 - 18 mmol/L    Glucose 167 (H) 70 - 125 mg/dL    Urea Nitrogen 26 8 - 28 mg/dL    Creatinine 0.90 0.60 - 1.10 mg/dL    Calcium 11.0 (H) 8.5 - 10.5 mg/dL    GFR Estimate 58 (L) >60 ml/min/1.73m2    GFR Estimate If Black >60 >60 ml/min/1.73m2    Narrative    LAB RESULTS, [facility]   Hemoglobin A1c   Result Value Ref Range     Hemoglobin A1C 7.9 (H) 4.2 - 6.1 %    Narrative    LAB RESULTS, [facility]         ASSESSMENT/PLAN     Type 2 diabetes mellitus with diabetic neuropathy, without long-term current use of insulin (H)  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)  Atrial fibrillation, unspecified type (H)  Pain  Generalized muscle weakness      Orders:  The current medical regimen is effective;  continue present plan and medications.    Total time spent with patient visit at the skilled nursing facility was 25 min including patient visit, review of past records and discussion with daughter Esther patient contact. Greater than 50% of total time spent with counseling and coordinating care     Electronically signed by:  JOSUE Tai CNP

## 2017-08-28 ENCOUNTER — NURSING HOME VISIT (OUTPATIENT)
Dept: GERIATRICS | Facility: CLINIC | Age: 82
End: 2017-08-28
Payer: MEDICARE

## 2017-08-28 VITALS
OXYGEN SATURATION: 97 % | DIASTOLIC BLOOD PRESSURE: 77 MMHG | HEART RATE: 83 BPM | WEIGHT: 212 LBS | SYSTOLIC BLOOD PRESSURE: 136 MMHG | RESPIRATION RATE: 20 BRPM | BODY MASS INDEX: 34.22 KG/M2 | TEMPERATURE: 97.4 F

## 2017-08-28 DIAGNOSIS — I48.91 ATRIAL FIBRILLATION, UNSPECIFIED TYPE (H): ICD-10-CM

## 2017-08-28 DIAGNOSIS — L02.211 ABSCESS OF ABDOMINAL WALL: ICD-10-CM

## 2017-08-28 DIAGNOSIS — E11.40 TYPE 2 DIABETES MELLITUS WITH DIABETIC NEUROPATHY, WITHOUT LONG-TERM CURRENT USE OF INSULIN (H): Primary | ICD-10-CM

## 2017-08-28 PROCEDURE — 99309 SBSQ NF CARE MODERATE MDM 30: CPT | Performed by: NURSE PRACTITIONER

## 2017-08-28 NOTE — PROGRESS NOTES
[place] [facility]    Chief Complaint   Patient presents with     Nursing Home Acute       HPI:    Mecca Slade is a 89 year old  (12/14/1921), who is being seen today for an episodic care visit at [facility] .  HPI information obtained from: facility chart records, facility staff and patient report.Today's concern is:  Here to see patient at request of patient and family     Type 2 diabetes mellitus with diabetic neuropathy, without long-term current use of insulin (H)  Previously on glipizide but d/c prior to admit to NH now on metformin 500 mg po bid   Good po intake and appetite discussed with patient and nursing  Followed by dietician and po intake supervised by nursing  Recent treatment of abscess with keflex with no spike in BS    Atrial fibrillation, unspecified type (H)  On chronic coumadin  On metoprolol  Denies CP/SOB, HA  Abscess of abdominal wall  Much improved with treatment with keflex.  Staff irrigates, covers with gauze but no longer needs dressing    ALLERGIES: Review of patient's allergies indicates no known allergies.  Past Medical, Surgical, Family and Social History reviewed and updated in [facility].    Current Outpatient Prescriptions   Medication Sig Dispense Refill     TraMADol HCl (ULTRAM PO) Take 50 mg by mouth 3 times daily       METFORMIN HCL PO Take 1,000 mg by mouth At Bedtime       diclofenac (VOLTAREN) 1 % GEL topical gel Place 1 g onto the skin 2 times daily       METFORMIN HCL PO Take 500 mg by mouth daily (with breakfast)        TraMADol HCl (ULTRAM PO) Take 50 mg by mouth every 3 hours as needed        Acetaminophen (TYLENOL PO) Take 1,000 mg by mouth 2 times daily And BID PRN       ATORVASTATIN CALCIUM PO Take 20 mg by mouth daily       carboxymethylcellulose (REFRESH PLUS) 0.5 % SOLN ophthalmic solution Place 1 drop into both eyes 3 times daily as needed for dry eyes       VITAMIN D, CHOLECALCIFEROL, PO Take 2,000 Units by mouth daily       Furosemide  "(LASIX PO) Take 40 mg by mouth daily       METOPROLOL SUCCINATE ER PO Take 25 mg by mouth daily        senna-docusate (SENOKOT-S;PERICOLACE) 8.6-50 MG per tablet Take 1 tablet by mouth daily        VERAPAMIL HCL PO Take 120 mg by mouth At Bedtime       Warfarin Sodium (COUMADIN PO) Take by mouth daily       Medications reviewed:  Medications reconciled to facility chart and changes were made to reflect current medications as identified as above med list. Below are the changes that were made:   Medications stopped since last EPIC medication reconciliation:   There are no discontinued medications.    Medications started since last [facility] medication reconciliation:  No orders of the defined types were placed in this encounter.    REVIEW OF SYSTEMS:  4 point ROS including Respiratory, CV, GI and , other than that noted in the HPI,  is negative    Physical Exam:  /77  Pulse 83  Temp 97.4  F (36.3  C)  Resp 20  Wt 212 lb (96.2 kg)  SpO2 97%  BMI 34.22 kg/m2  GENERAL APPEARANCE:  Alert, in no distress  ENT:  normal hearing acuity, oral mucous membranes moist  EYES:  EOM normal, conjunctiva and lids normal  RESP:  lungs clear to auscultation , no respiratory distress, diminished breath sounds    CV:  regular rate and rhythm, no murmur, +1 edema  ABDOMEN:  bowel sounds normal, soft, non-tender  M/S:   Gait and station abnormal up in w/c frail limited ROM left   SKIN:  pale w/d RLQ abscess clean dry  Appears like a \"dimple\"  No s/s infection  No drainage  PSYCH:  oriented X 3, normal insight, judgement and memory, memory impaired , affect and mood normal       Results for orders placed or performed in visit on 08/07/17   INR   Result Value Ref Range    INR 1.82 (H) 0.90 - 1.10    Narrative    LAB RESULTS, [facility]   Basic metabolic panel   Result Value Ref Range    Sodium 140 136 - 145 mmol/L    Potassium 4.4 3.5 - 5.0 mmol/L    Chloride 103 98 - 107 mmol/L    Carbon Dioxide 31 22 - 31 mmol/L    Anion Gap 6 5 - " 18 mmol/L    Glucose 167 (H) 70 - 125 mg/dL    Urea Nitrogen 26 8 - 28 mg/dL    Creatinine 0.90 0.60 - 1.10 mg/dL    Calcium 11.0 (H) 8.5 - 10.5 mg/dL    GFR Estimate 58 (L) >60 ml/min/1.73m2    GFR Estimate If Black >60 >60 ml/min/1.73m2    Narrative    LAB RESULTS, [facility]   Hemoglobin A1c   Result Value Ref Range    Hemoglobin A1C 7.9 (H) 4.2 - 6.1 %    Narrative    LAB RESULTS, [facility]     .      Assessment/Plan:     Type 2 diabetes mellitus with diabetic neuropathy, without long-term current use of insulin (H)  Atrial fibrillation, unspecified type (H)  Abscess of abdominal wall      Orders:   Wound is healing without complication    Continue same poc  No need for extension of antibiotics     Total time spent with patient visit at the skilled nursing facility was 25 min including patient visit and review of past records. Greater than 50% of total time spent with counseling and coordinating care     Electronically signed by  Brittany Hinton

## 2017-09-05 ENCOUNTER — NURSING HOME VISIT (OUTPATIENT)
Dept: GERIATRICS | Facility: CLINIC | Age: 82
End: 2017-09-05
Payer: MEDICARE

## 2017-09-05 VITALS
SYSTOLIC BLOOD PRESSURE: 136 MMHG | RESPIRATION RATE: 20 BRPM | OXYGEN SATURATION: 97 % | DIASTOLIC BLOOD PRESSURE: 77 MMHG | BODY MASS INDEX: 33.11 KG/M2 | TEMPERATURE: 98.2 F | WEIGHT: 206 LBS | HEART RATE: 83 BPM | HEIGHT: 66 IN

## 2017-09-05 DIAGNOSIS — I48.91 ATRIAL FIBRILLATION, UNSPECIFIED TYPE (H): ICD-10-CM

## 2017-09-05 DIAGNOSIS — L02.211 ABSCESS OF ABDOMINAL WALL: ICD-10-CM

## 2017-09-05 DIAGNOSIS — E11.40 TYPE 2 DIABETES MELLITUS WITH DIABETIC NEUROPATHY, WITHOUT LONG-TERM CURRENT USE OF INSULIN (H): ICD-10-CM

## 2017-09-05 DIAGNOSIS — M19.90 ARTHRITIS: ICD-10-CM

## 2017-09-05 DIAGNOSIS — I63.411 CEREBROVASCULAR ACCIDENT (CVA) DUE TO EMBOLISM OF RIGHT MIDDLE CEREBRAL ARTERY (H): Primary | ICD-10-CM

## 2017-09-05 PROCEDURE — 99309 SBSQ NF CARE MODERATE MDM 30: CPT | Performed by: NURSE PRACTITIONER

## 2017-09-05 NOTE — PATIENT INSTRUCTIONS
Orders:  1.  Wound care consult if daughter/pt agree.  2.  Send culture of drainage.  3.  Irrigate abscess qd, place wick, cover with gauze.  4.  Dr. Rivers will do a cortisone injection of right knee either 9/18 or 9/25.

## 2017-09-05 NOTE — PROGRESS NOTES
"[place] [facility]    Chief Complaint   Patient presents with     RECHECK       HPI:    Mecca Slade is a 89 year old  (12/14/1921), who is being seen today for an episodic care visit at [facility] .  HPI information obtained from: facility chart records, facility staff, patient report, [facility] chart review and family/first contact Esther daughter report.Today's concern is:  Here to see patient at request of patient and daughter Esther  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)  Atrial fibrillation, unspecified type (H)  On chronic coumadin  Denies CP/SOB, HA.    INR therapeutic With physical and functional decline and Significant deficits requiring NH placement   Memory loss s/p CVA complicates nursing care and patients ability to follow and understand POC.  Patient is limited in mobility and family prefers tx be done in NH.    Type 2 diabetes mellitus with diabetic neuropathy, without long-term current use of insulin (H)  Previously on glipizide but d/c prior to admit to NH now on metformin 500 mg   Good po intake and appetite discussed with patient and nursing  Followed by dietician and po intake supervised by nursing  Recent treatment of abscess with keflex with no spike in BS   BS range in 100s    Abscess of abdominal wall  Looks more like a scar now  Dry wound bed, no odor, no drainage.  No need for dressing.  Patient denies pain and is glad \"that is done with\"   Arthritis  With limited mobility and chronic pain  Significant pain   Improved comfort with addition and subsequent increase of tramadol.  Family and patient request cortisone injecition to right knee but do not want to travel out to clinic.  Discussed with Dr Ye who agrees to assess right knee and give injection at NH    ALLERGIES: Review of patient's allergies indicates no known allergies.  Past Medical, Surgical, Family and Social History reviewed and updated in " "[facility].    Current Outpatient Prescriptions   Medication Sig Dispense Refill     TraMADol HCl (ULTRAM PO) Take 50 mg by mouth 3 times daily       METFORMIN HCL PO Take 1,000 mg by mouth At Bedtime       diclofenac (VOLTAREN) 1 % GEL topical gel Place 1 g onto the skin 2 times daily       METFORMIN HCL PO Take 500 mg by mouth daily (with breakfast)        TraMADol HCl (ULTRAM PO) Take 50 mg by mouth every 3 hours as needed        Acetaminophen (TYLENOL PO) Take 1,000 mg by mouth 2 times daily And BID PRN       ATORVASTATIN CALCIUM PO Take 20 mg by mouth daily       carboxymethylcellulose (REFRESH PLUS) 0.5 % SOLN ophthalmic solution Place 1 drop into both eyes 3 times daily as needed for dry eyes       VITAMIN D, CHOLECALCIFEROL, PO Take 2,000 Units by mouth daily       Furosemide (LASIX PO) Take 40 mg by mouth daily       METOPROLOL SUCCINATE ER PO Take 25 mg by mouth daily        senna-docusate (SENOKOT-S;PERICOLACE) 8.6-50 MG per tablet Take 1 tablet by mouth daily        VERAPAMIL HCL PO Take 120 mg by mouth At Bedtime       Warfarin Sodium (COUMADIN PO) Take by mouth daily       Medications reviewed:  Medications reconciled to facility chart and changes were made to reflect current medications as identified as above med list. Below are the changes that were made:   Medications stopped since last EPIC medication reconciliation:   There are no discontinued medications.    Medications started since last [facility] medication reconciliation:  No orders of the defined types were placed in this encounter.   REVIEW OF SYSTEMS:  4 point ROS including Respiratory, CV, GI and , other than that noted in the HPI,  is negative    Physical Exam:  /77  Pulse 83  Temp 98.2  F (36.8  C)  Resp 20  Ht 5' 6\" (1.676 m)  Wt 206 lb (93.4 kg)  SpO2 97%  BMI 33.25 kg/m2     GENERAL APPEARANCE:  Alert, in no distress  ENT:  normal hearing acuity, oral mucous membranes moist  EYES:  EOM normal, conjunctiva and lids normal  RESP:  " lungs clear to auscultation , no respiratory distress, diminished breath sounds    CV:  regular rate and rhythm, no murmur, +1 edema  ABDOMEN:  bowel sounds normal, soft, non-tender  M/S:   Gait and station abnormal up in w/c frail limited ROM left   SKIN:  pale w/d  PSYCH:  oriented X 3, normal insight, judgement and memory, memory impaired , affect and mood normal      Last Basic Metabolic Panel:  Recent Labs   Lab Test 08/07/17 04/13/17   NA  140  139   POTASSIUM  4.4  4.0   CHLORIDE  103  101   SUN  11.0*  10.8*   CO2  31  30   BUN  26  27   CR  0.90  0.93   GLC  167*  193*       Lab Results   Component Value Date    A1C 7.9 08/07/2017    A1C 7.6 04/13/2017       Assessment/Plan:     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)  Atrial fibrillation, unspecified type (H)  Type 2 diabetes mellitus with diabetic neuropathy, without long-term current use of insulin (H)  Abscess of abdominal wall  Arthritis      Orders:   1 Dr. Ye will do a cortisone injection of right knee either 9/18 or 9/25.  O/w The current medical regimen is effective;  continue present plan and medications.      Electronically signed by  JOSUE Tai CNP

## 2017-09-14 ENCOUNTER — TRANSFERRED RECORDS (OUTPATIENT)
Dept: HEALTH INFORMATION MANAGEMENT | Facility: CLINIC | Age: 82
End: 2017-09-14

## 2017-09-14 LAB — INR PPP: 2.1 (ref 0.9–1.1)

## 2017-09-15 VITALS
OXYGEN SATURATION: 97 % | HEART RATE: 83 BPM | TEMPERATURE: 98.2 F | BODY MASS INDEX: 34.38 KG/M2 | RESPIRATION RATE: 20 BRPM | WEIGHT: 213 LBS | DIASTOLIC BLOOD PRESSURE: 77 MMHG | SYSTOLIC BLOOD PRESSURE: 136 MMHG

## 2017-09-15 NOTE — PROGRESS NOTES
[place] [facility]    Chief Complaint   Patient presents with     Nursing Home Acute       HPI:    Mecca Slade is a 89 year old  (12/14/1921), who is being seen today for an episodic care visit at [facility] .  HPI information obtained from: facility chart records, facility staff and patient report.      Today's concern is:  - Right knee pain more in the morning mainly below the knee cap.   - had one injection in June and to right pes anserine bursitis with good relief.   - Resident is requesting a steroid injection.    Physical Exam:  /77  Pulse 83  Temp 98.2  F (36.8  C)  Resp 20  Wt 213 lb (96.6 kg)  SpO2 97%  BMI 34.38 kg/m2  M/S:   moderate tenderness over right Pes Anersine and surrounding tissue. Right knee jont ROM is acceptable and did not elecit much discomfort.     Recent Labs:  All labs reviewed, none recent.      Assessment/Plan:    Right Pes Anserine Bursitis  - Resident will benefit from steroid injection.   - After informed consent obtained and under aseptic technique, 40 mg of methylprednisolone mixed with 2 cc of 1% lidocaine were injected into the  Right Pes Anserine bursa and surrounding tissue without any complication. Resident tolerated procedure well. Hemostasis implemented.     - The following medication was given  - methylprednisolone [id number] pfizer exp date: 08/2018    Orders:  - ice packs for 15 min tid x 2 day over injected area  - monitor for any bleeding/hematoma formation.       Electronically signed by  Anirudh Ye MD

## 2017-09-18 ENCOUNTER — NURSING HOME VISIT (OUTPATIENT)
Dept: GERIATRICS | Facility: CLINIC | Age: 82
End: 2017-09-18
Payer: MEDICARE

## 2017-09-18 DIAGNOSIS — M70.51 PES ANSERINUS BURSITIS OF RIGHT KNEE: Primary | ICD-10-CM

## 2017-09-18 PROCEDURE — 20610 DRAIN/INJ JOINT/BURSA W/O US: CPT | Performed by: FAMILY MEDICINE

## 2017-09-27 ENCOUNTER — TRANSFERRED RECORDS (OUTPATIENT)
Dept: HEALTH INFORMATION MANAGEMENT | Facility: CLINIC | Age: 82
End: 2017-09-27

## 2017-09-27 ENCOUNTER — NURSING HOME VISIT (OUTPATIENT)
Dept: GERIATRICS | Facility: CLINIC | Age: 82
End: 2017-09-27
Payer: MEDICARE

## 2017-09-27 VITALS
BODY MASS INDEX: 34.7 KG/M2 | WEIGHT: 215 LBS | TEMPERATURE: 98.2 F | OXYGEN SATURATION: 97 % | SYSTOLIC BLOOD PRESSURE: 136 MMHG | HEART RATE: 83 BPM | DIASTOLIC BLOOD PRESSURE: 77 MMHG | RESPIRATION RATE: 20 BRPM

## 2017-09-27 DIAGNOSIS — Z79.01 LONG TERM (CURRENT) USE OF ANTICOAGULANTS: ICD-10-CM

## 2017-09-27 DIAGNOSIS — I10 BENIGN ESSENTIAL HYPERTENSION: ICD-10-CM

## 2017-09-27 DIAGNOSIS — R52 PAIN: ICD-10-CM

## 2017-09-27 DIAGNOSIS — I50.9 CHRONIC CONGESTIVE HEART FAILURE, UNSPECIFIED CONGESTIVE HEART FAILURE TYPE: ICD-10-CM

## 2017-09-27 DIAGNOSIS — I48.20 CHRONIC ATRIAL FIBRILLATION (H): ICD-10-CM

## 2017-09-27 DIAGNOSIS — E11.40 TYPE 2 DIABETES MELLITUS WITH DIABETIC NEUROPATHY, WITHOUT LONG-TERM CURRENT USE OF INSULIN (H): ICD-10-CM

## 2017-09-27 DIAGNOSIS — M70.51 PES ANSERINUS BURSITIS OF RIGHT KNEE: ICD-10-CM

## 2017-09-27 DIAGNOSIS — M17.11 PRIMARY OSTEOARTHRITIS OF RIGHT KNEE: ICD-10-CM

## 2017-09-27 DIAGNOSIS — I63.411 CEREBROVASCULAR ACCIDENT (CVA) DUE TO EMBOLISM OF RIGHT MIDDLE CEREBRAL ARTERY (H): Primary | ICD-10-CM

## 2017-09-27 LAB — INR PPP: 1.93 (ref 0.9–1.1)

## 2017-09-27 PROCEDURE — 99309 SBSQ NF CARE MODERATE MDM 30: CPT | Performed by: NURSE PRACTITIONER

## 2017-09-27 NOTE — PROGRESS NOTES
[place] [facility]    Chief Complaint   Patient presents with     Nursing Home Acute       HPI:    Mecca Slade is a 89 year old  (12/14/1921), who is being seen today for an episodic care visit at [facility] .  HPI information obtained from: facility chart records, facility staff and patient report.Today's concern is:  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)  No new symptoms, she is alert, oriented, no obvious sided weakness. Did have TPA when CVA noted, from Care Everywhere about 3/1/17.     Type 2 diabetes mellitus with diabetic neuropathy, without long-term current use of insulin (H)  On metformin, blood sugar as noted below. Previously on   Lab Results   Component Value Date    A1C 7.9 08/07/2017    A1C 7.6 04/13/2017        Benign essential hypertension  No headache, no dizziness. Does not ambulate and uses wheelchair or recliner at all times    Pain  Primary osteoarthritis of right knee  Pes anserinus bursitis of right knee  Had knee steroid injection last week and reports pain at rest is well controlled. Reports pain with transfers (uses PAL lift) is excruciating, but only when she bears weight and resolves after transfer is completed. She is on tramadol tid, tylenol prn. She would like to reduce pill burden    Chronic atrial fibrillation (H)  Long term (current) use of anticoagulants  On verapamil, metoprolol, coumadin-has pacemaker.     Chronic congestive heart failure, unspecified congestive heart failure type (H)  No new symptoms, no dyspnea, no cough, does sleep in her recliner at all times.        Blood sugars Q Week-  9/11 - 131  9/18 - 137  9/25 - 136    ALLERGIES: Review of patient's allergies indicates no known allergies.  Past Medical, Surgical, Family and Social History reviewed and updated in [employer].    Current Outpatient Prescriptions   Medication Sig Dispense Refill     METOPROLOL TARTRATE PO Take 25 mg by mouth daily        TraMADol HCl (ULTRAM PO) Take 50 mg by mouth 3 times daily       METFORMIN HCL PO Take 1,000 mg by mouth At Bedtime       diclofenac (VOLTAREN) 1 % GEL topical gel Place 1 g onto the skin 2 times daily       METFORMIN HCL PO Take 500 mg by mouth daily (with breakfast)        TraMADol HCl (ULTRAM PO) Take 50 mg by mouth every 3 hours as needed        Acetaminophen (TYLENOL PO) Take 1,000 mg by mouth 2 times daily And BID PRN       ATORVASTATIN CALCIUM PO Take 20 mg by mouth daily       carboxymethylcellulose (REFRESH PLUS) 0.5 % SOLN ophthalmic solution Place 1 drop into both eyes 3 times daily as needed for dry eyes       VITAMIN D, CHOLECALCIFEROL, PO Take 2,000 Units by mouth daily       Furosemide (LASIX PO) Take 40 mg by mouth daily       senna-docusate (SENOKOT-S;PERICOLACE) 8.6-50 MG per tablet Take 1 tablet by mouth daily        VERAPAMIL HCL PO Take 120 mg by mouth At Bedtime       Warfarin Sodium (COUMADIN PO) Take by mouth daily       Medications reviewed:  Medications reconciled to facility chart and changes were made to reflect current medications as identified as above med list. Below are the changes that were made:   Medications stopped since last EPIC medication reconciliation:   Medications Discontinued During This Encounter   Medication Reason     METOPROLOL SUCCINATE ER PO        Medications started since last [facility] medication reconciliation:  Orders Placed This Encounter   Medications     METOPROLOL TARTRATE PO     Sig: Take 25 mg by mouth daily         REVIEW OF SYSTEMS:  4 point ROS including Respiratory, CV, GI and , other than that noted in the HPI,  is negative    Physical Exam:  /77  Pulse 83  Temp 98.2  F (36.8  C)  Resp 20  Wt 215 lb (97.5 kg)  SpO2 97%  BMI 34.7 kg/m2  GENERAL APPEARANCE:  Alert, in no distress  HEAD:  Normal, normocephalic, atraumatic  EYE EXAM: normal external eye, conjunctiva, lids, LARS  NECK EXAM: supple, no JVD  CHEST/RESP:  respiratory effort  normal, lung sounds CTA , no respiratory distress  CV:  Rate reg, rhythm reg, no murmur, trace peripheral edema   M/S:   extremities normal, gait abnormal-does not ambulate and uses wheelchair for all mobility, stand-assist device for transfers, normal muscle tone, and range of motion limited by artheritis  NEUROLOGIC EXAM: Normal gross motor movement, tone and coordination. No tremor.  Cranial nerves 2-12 are normal tested and grossly at patient's baseline  PSYCH:  Alert and oriented to person-place-time , affect pleasant , judgement appropriate     Recent Labs:  All labs reviewed, none recent.      Assessment/Plan:  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)  No new symptoms, is alert, oriented with recent BIMS 15/15    Type 2 diabetes mellitus with diabetic neuropathy, without long-term current use of insulin (H)  Blood sugars- good control; last A1C within normal limits  as noted above; The current medical regimen is effective;  continue present plan and medications.      Benign essential hypertension  Based on JNC-8 goals,  patients age of 89 year old, presence of diabetes or CKD, and goals of care goal BP is  <140/90 mm Hg. patient is stable with current plan of care and routine assessment.. Will have pharmacy review medications to simplify plan   08/03/2017 19:14 136/77 mmHg (Sitting r/arm)  08/03/2017 12:40 113/70 mmHg (Sitting l/arm)  08/02/2017 10:24 123/65 mmHg (Sitting l/arm)  08/01/2017 13:40 131/80 mmHg (Sitting r/arm)  07/31/2017 14:16 127/74 mmHg (Standing r/arm)  07/30/2017 11:37 166/85 mmHg (Sitting l/arm)  07/29/2017 13:16 139/80 mmHg (Sitting l/arm)  07/28/2017 13:29 135/80 mmHg (Sitting r/arm)  05/26/2017 08:00 136/76 mmHg (Sitting l/arm)  05/21/2017 08:38 160/88 mmHg (Other)  05/10/2017 22:10 132/72 mmHg (Sitting l/arm)    Pain  Primary osteoarthritis of right knee  Pes anserinus bursitis of right knee  Mecca has no complaints of pain in knees when at  rest. Reports short lived pain only when standing in PAL lift for transfers. This resolves quickly, and continues even when on TID tramadol and BID tylenol use. She would like to reduce her pill burden. Will discontinue scheduled tramadol and increase tylenol to tid. Monitor for pain control     Chronic atrial fibrillation (H)  Long term (current) use of anticoagulants  On warfarin, no new bleeding.   Lab Results   Component Value Date    INR 1.93 09/27/2017    INR 2.10 09/14/2017    INR 1.82 08/07/2017        Chronic congestive heart failure, unspecified congestive heart failure type (H)  Compensated, no dyspnea, no edema. The current medical regimen is effective;  continue present plan and medications.        Orders:  1. Coumadin 2 mg po QD Dx Afib  2  Recheck INR in 1 month Dx Afib  3. Clarify metoprolol to metoprolol succinate 25 mg po QD Dx HTN  4. DC scheduled senna s  5. Senna S 1 tab po BID prn constipation  6. DC tylenol 100 BID and BID prn  7. Tylenol 1000 mg po TID Dx pain  8. DC scheduled tramadol and continue tramadol 50 mg po Q 3 h prn pain       Electronically signed by  Serena Radford CNP   [facility]  [phone] cell

## 2017-09-29 VITALS
WEIGHT: 215 LBS | TEMPERATURE: 98.2 F | HEART RATE: 83 BPM | OXYGEN SATURATION: 97 % | RESPIRATION RATE: 20 BRPM | SYSTOLIC BLOOD PRESSURE: 136 MMHG | BODY MASS INDEX: 34.7 KG/M2 | DIASTOLIC BLOOD PRESSURE: 77 MMHG

## 2017-09-29 PROBLEM — Z71.89 ADVANCED DIRECTIVES, COUNSELING/DISCUSSION: Status: ACTIVE | Noted: 2017-09-29

## 2017-09-29 PROBLEM — M70.51 PES ANSERINUS BURSITIS OF RIGHT KNEE: Status: ACTIVE | Noted: 2017-09-29

## 2017-09-29 PROBLEM — R52 PAIN: Status: RESOLVED | Noted: 2017-07-23 | Resolved: 2017-09-29

## 2017-09-29 PROBLEM — M17.11 PRIMARY OSTEOARTHRITIS OF RIGHT KNEE: Status: ACTIVE | Noted: 2017-09-29

## 2017-09-29 PROBLEM — Z71.89 ADVANCED DIRECTIVES, COUNSELING/DISCUSSION: Chronic | Status: ACTIVE | Noted: 2017-09-29

## 2017-09-29 PROBLEM — I50.22 CHRONIC SYSTOLIC CONGESTIVE HEART FAILURE (H): Status: RESOLVED | Noted: 2017-04-03 | Resolved: 2017-09-29

## 2017-09-29 PROBLEM — Z79.01 LONG TERM (CURRENT) USE OF ANTICOAGULANTS: Status: ACTIVE | Noted: 2017-09-29

## 2017-09-29 PROBLEM — I63.9 CEREBROVASCULAR ACCIDENT (H): Status: RESOLVED | Noted: 2017-04-03 | Resolved: 2017-09-29

## 2017-09-29 PROBLEM — Z00.00 ROUTINE GENERAL MEDICAL EXAMINATION AT A HEALTH CARE FACILITY: Chronic | Status: ACTIVE | Noted: 2017-05-07

## 2017-09-29 NOTE — PROGRESS NOTES
[place] [facility]    Chief Complaint   Patient presents with     care home Regulatory       HPI:    Mecca Slade is a 89 year old  (12/14/1921), who is being seen today for a federally mandated E/M visit at [facility] .  HPI information obtained from: facility chart records, facility staff and patient report.     Today's concerns are:  - Resident seen and examined.   - Reports sleep, appetite and BM are fine.   - reports had cold but better now, however still has a running nose but no itching eyes or nose, denies cough or chest pain.   - RN / GNP has no concern today.     -------------------------------------------  - Past Medical, social, family histories, medications, and allergies reviewed and updated  - Medications reviewed: in the chart and EHR.   - Case Management:  I have reviewed the care plan and MDS and do agree with the plan. Patient's desire to return to the community is not present.  Information reviewed:  Medications, vital signs, orders, and nursing notes.    ROS:  4 point ROS including Respiratory, CV, GI and , other than that noted in the HPI,  is negative    Exam:  Vitals: /77  Pulse 83  Temp 98.2  F (36.8  C)  Resp 20  Wt 215 lb (97.5 kg)  SpO2 97%  BMI 34.7 kg/m2  BMI= Body mass index is 34.7 kg/(m^2).  GENERAL APPEARANCE:  Alert, in no distress, obese  ENT: )oral mucous membrane moist,  [place], edentulous  with denture plateboth levels  EYES:  EOM, lids, pupils and irises normal  RESP:  respiratory effort and palpation of chest normal, lungs clear to auscultation , no respiratory distress  CV:  Palpation and auscultation of heart done , irregular rate and rhythm, no murmur, rub, or gallop, trace pedal edema b/l. Pacemaker.   ABDOMEN:  normal bowel sounds, soft, nontender, no hepatosplenomegaly or other masses  M/S:   Gait and station abnormal uses WC for ambulation. Ms strength: 4/5 LUE, 4/5 LLE.   SKIN:  Inspection of skin and subcutaneous tissue  baseline, Palpation of skin and subcutaneous tissue baseline  NEURO:   Cranial nerves decreased visual acuity on both sides. No facial droop.  tested and .  PSYCH:  oriented X 3, normal insight, judgement and memory, affect and mood normal    Lab/Diagnostic data:  All labs reviewed, none recent.      ASSESSMENT/PLAN  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)  - Left sided residual weakness, stable.   - Off ASA, on lipitor 20 mg.   - requires assistance with ADLs.    Atrial fibrillation, unspecified type (H)  Long term use of OAC  - CVR, rate control with metoprolol and verapamil.   -  on coumadin with INR followed closely       Type II diabetes mellitus with peripheral circulatory disorder (H)    Lab Results   Component Value Date    A1C 7.9 08/07/2017    A1C 7.6 04/13/2017   - controlled.   - on metformin 500 mg in am and 1000 mg pm.   -  Keep HbA1C b/w 8-9% (per AGS there is a potential harm in lowering A1C to <6.5 % in older adults with diabetes), life expectancy less than 5 years, tight glucose control is note recommended       HTN:  BP Readings from Last 3 Encounters:   09/29/17 136/77   09/27/17 136/77   09/15/17 136/77    - this reading seems inaccurate.   - Will need to check VS on a weekly basis, and document in the Epic.   - Keep SBP> 130 mmHg and DBP > 65 mmHg (levels below these increase mortality as shown by standard studies and observations).     Right pes anserine bursitis  - recent steroid injection by the writer  with good result, hence Tramadol was changed from scheduled to prn.      Frail elderly  - Significant  Deficits requiring NH placement. Requiring extensive assistance from nursing. Up for meals only o/w spends the day resting in bed      Vit D Deficiency:  - on Vit D supplement 2000 units.   - no baseline level, to avoid toxicity consider checking Vit D serum level.     Orders:  - See above, otherwise, continue the rest of the current POC.          Electronically signed by:  Anirudh Ye MD

## 2017-10-02 ENCOUNTER — NURSING HOME VISIT (OUTPATIENT)
Dept: GERIATRICS | Facility: CLINIC | Age: 82
End: 2017-10-02
Payer: MEDICARE

## 2017-10-02 DIAGNOSIS — M70.51 PES ANSERINUS BURSITIS OF RIGHT KNEE: ICD-10-CM

## 2017-10-02 DIAGNOSIS — I63.411 CEREBROVASCULAR ACCIDENT (CVA) DUE TO EMBOLISM OF RIGHT MIDDLE CEREBRAL ARTERY (H): Primary | ICD-10-CM

## 2017-10-02 DIAGNOSIS — I48.20 CHRONIC ATRIAL FIBRILLATION (H): ICD-10-CM

## 2017-10-02 DIAGNOSIS — R54 FRAIL ELDERLY: ICD-10-CM

## 2017-10-02 DIAGNOSIS — E11.40 TYPE 2 DIABETES MELLITUS WITH DIABETIC NEUROPATHY, WITHOUT LONG-TERM CURRENT USE OF INSULIN (H): ICD-10-CM

## 2017-10-02 DIAGNOSIS — I10 BENIGN ESSENTIAL HYPERTENSION: ICD-10-CM

## 2017-10-02 PROCEDURE — 99310 SBSQ NF CARE HIGH MDM 45: CPT | Performed by: FAMILY MEDICINE

## 2017-11-20 ENCOUNTER — TRANSFERRED RECORDS (OUTPATIENT)
Dept: HEALTH INFORMATION MANAGEMENT | Facility: CLINIC | Age: 82
End: 2017-11-20

## 2017-11-20 LAB — INR PPP: 1.86 (ref 0.9–1.1)

## 2017-12-04 ENCOUNTER — TRANSFERRED RECORDS (OUTPATIENT)
Dept: HEALTH INFORMATION MANAGEMENT | Facility: CLINIC | Age: 82
End: 2017-12-04

## 2017-12-04 LAB — INR PPP: 2.43 (ref 0.9–1.1)

## 2017-12-11 ENCOUNTER — NURSING HOME VISIT (OUTPATIENT)
Dept: GERIATRICS | Facility: CLINIC | Age: 82
End: 2017-12-11
Payer: MEDICARE

## 2017-12-11 VITALS
OXYGEN SATURATION: 96 % | SYSTOLIC BLOOD PRESSURE: 136 MMHG | WEIGHT: 211 LBS | DIASTOLIC BLOOD PRESSURE: 83 MMHG | BODY MASS INDEX: 34.06 KG/M2 | HEART RATE: 74 BPM | RESPIRATION RATE: 14 BRPM | TEMPERATURE: 99.1 F

## 2017-12-11 DIAGNOSIS — N18.4 CKD (CHRONIC KIDNEY DISEASE) STAGE 4, GFR 15-29 ML/MIN (H): ICD-10-CM

## 2017-12-11 DIAGNOSIS — M25.561 CHRONIC PAIN OF RIGHT KNEE: ICD-10-CM

## 2017-12-11 DIAGNOSIS — I48.20 CHRONIC ATRIAL FIBRILLATION (H): ICD-10-CM

## 2017-12-11 DIAGNOSIS — Z95.0 CARDIAC PACEMAKER IN SITU: ICD-10-CM

## 2017-12-11 DIAGNOSIS — I50.9 CHRONIC CONGESTIVE HEART FAILURE, UNSPECIFIED CONGESTIVE HEART FAILURE TYPE: ICD-10-CM

## 2017-12-11 DIAGNOSIS — G89.29 CHRONIC PAIN OF RIGHT KNEE: ICD-10-CM

## 2017-12-11 DIAGNOSIS — R53.81 DEBILITY: ICD-10-CM

## 2017-12-11 DIAGNOSIS — I10 BENIGN ESSENTIAL HYPERTENSION: ICD-10-CM

## 2017-12-11 DIAGNOSIS — E11.40 TYPE 2 DIABETES MELLITUS WITH DIABETIC NEUROPATHY, WITHOUT LONG-TERM CURRENT USE OF INSULIN (H): ICD-10-CM

## 2017-12-11 DIAGNOSIS — S80.11XA HEMATOMA OF LOWER EXTREMITY, RIGHT, INITIAL ENCOUNTER: ICD-10-CM

## 2017-12-11 DIAGNOSIS — Z86.73 H/O: CVA (CEREBROVASCULAR ACCIDENT): Primary | ICD-10-CM

## 2017-12-11 PROCEDURE — 99309 SBSQ NF CARE MODERATE MDM 30: CPT | Performed by: NURSE PRACTITIONER

## 2017-12-11 NOTE — LETTER
12/11/2017        RE: Mecca Slade  [address] [facility] [address]          [place] [facility]    Chief Complaint   Patient presents with     [facility] Regulatory       HPI:    Mecca Slade is a 89 year old  (12/14/1921), who is being seen today for a federally mandated E/M visit at [facility] .  HPI information obtained from: facility chart records, facility staff, patient report and [facility] chart review.     This is my first time meeting Mrs. Slade.  We are meeting her for her regulatory visit but also due to a report she has developed a large bruise on her RLE.  In meeting her, she reports overall she has been doing well, reports her chronic Right knee pain has been better lately, minimal, and feels good.  She reports she is aware of the large RLE bruise she has, but denies any trauma, fall or injury, does not know how she got it.  Thinks its a day or two old, no pain.  She has no other complaints.     ALLERGIES: Review of patient's allergies indicates no known allergies.  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  PAST SURGICAL HISTORY:  has no past surgical history on file.  FAMILY HISTORY: family history is not on file.  SOCIAL  HISTORY:  reports that she has never smoked. She has never used smokeless tobacco. She reports that she does not drink alcohol or use illicit drugs.    MEDICATIONS:  Current Outpatient Prescriptions   Medication Sig Dispense Refill     Acetaminophen (TYLENOL PO) Take 1,000 mg by mouth every 8 hours as needed for mild pain or fever       Acetaminophen (TYLENOL PO) Take 1,000 mg by mouth At Bedtime       METOPROLOL TARTRATE PO Take 25 mg by mouth daily       METFORMIN HCL PO Take 1,000 mg by mouth At Bedtime       diclofenac (VOLTAREN) 1 % GEL topical gel Place 1 g onto the skin 2 times daily       METFORMIN HCL PO Take 500 mg by mouth daily (with breakfast)        TraMADol HCl (ULTRAM PO) Take 50 mg by mouth every 3 hours as needed        ATORVASTATIN CALCIUM PO Take 20 mg by mouth daily       carboxymethylcellulose (REFRESH PLUS) 0.5 % SOLN ophthalmic solution Place 1 drop into both eyes 3 times daily as needed for dry eyes       VITAMIN D, CHOLECALCIFEROL, PO Take 2,000 Units by mouth daily       Furosemide (LASIX PO) Take 40 mg by mouth daily       senna-docusate (SENOKOT-S;PERICOLACE) 8.6-50 MG per tablet Take 1 tablet by mouth 2 times daily as needed        VERAPAMIL HCL PO Take 120 mg by mouth At Bedtime       Warfarin Sodium (COUMADIN PO) Take 2 mg by mouth daily        Medications reviewed:  Medications reconciled to facility chart and changes were made to reflect current medications as identified as above med list. Below are the changes that were made:   Medications stopped since last EPIC medication reconciliation:   There are no discontinued medications.    Medications started since last [facility] medication reconciliation:  No orders of the defined types were placed in this encounter.    Case Management:  I have reviewed the care plan and MDS and do agree with the plan. Patient's desire to return to the community is not present.  Information reviewed:  Medications, vital signs, orders, and nursing  notes.    ROS:  7 point ROS done including, light headedness/dizziness, fever/chills, pain, Resp, CV, GI, and  and is negative other than noted in HPI.      Exam:  Vitals: /83  Pulse 74  Temp 99.1  F (37.3  C)  Resp 14  Wt 211 lb (95.7 kg)  SpO2 96%  BMI 34.06 kg/m2  BMI= Body mass index is 34.06 kg/(m^2).     GENERAL APPEARANCE:  Elderly obese female sitting up in WC, NAD, non-toxic.  ENT:  Mouth and oropharynx normal, moist mucous membranes.   RESP:  Lungs CTA.  Regular relaxed breathing effort.  No cough.   CV: S1/S2 no murmur or rubs.  Regular rhythm and rate.  No generalized edema.   ABDOMEN:   Obese, soft, non-tender with active bowel sounds.  No guarding, rigidity, or rebound tenderness.  EXTREMITIES:  There is a large oblong hematoma/bruise on her RLE just below the knee to the lateral side.  Hematoma/swelling is localized roughly 6 x 10 cm but bruising does run down to her ankle area, non tender, no excessive warmth.  No lower extremity edema, no calf tenderness.   PSYCH: Alert and orientated, pleasant and cooperative.      Lab/Diagnostic data:  All labs reviewed, none recent.    ASSESSMENT/PLAN  H/O: CVA (cerebrovascular accident), R MCA embolism s/p tPA, March 2017  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  This is my first time meeting Mrs. Slade but she appears AOx3 with no residual deficients, but not able to determine baseline.  RNs report she has been stable/unchanged.  She reports her main deficient from the CVA was not being able to ambulate any more.    -Continues on Statin and Warfarin.     Type 2 diabetes mellitus with diabetic neuropathy, without long-term current use of insulin (H)  Only getting weekly glucose checks which have been acceptable ranges.  Appears to be doing well on BID Metformin.  Last A1c was Aug which was 7.9, is due for recheck.   -Will get updated A1c.   -Continue BID Metformin.     CKD (chronic kidney disease) stage 4, GFR 15-29 ml/min (H)  Last  creatine check was back in August.    -Will recheck creatine to confirm still at baseline.      Chronic atrial fibrillation (H)  Chronic congestive heart failure, unspecified congestive heart failure type (H)  Benign essential hypertension  Cardiac pacemaker in situ  Per EMR pacemaker placed 2012 due to sinus node dysfunction tachy/lion.  We do not have a ECHO on profile.  Weights, HR, BP have been stable, acceptable limits.  No clinical s/s of CHF.    -On daily Furosemide, statin, Warfarin.   -On Metoprolol and Verapamil.     Chronic pain of right knee  Reports this has been doing much better lately but also is mostly NWB as she is WC dependent and usually uses a lift to get in/out of bed.    -qHS Acetaminophen and PRN Acetaminophen for pain.     Hematoma of lower extremity, right, initial encounter  This is a new and etiology is unclear.  No external s/s of skin breakdown or trauma.  Hematoma is large, roughly 6x10 cm on RLE below knee to lateral side, there is bruising that runs dependent to ankle.  Non-tender, no excessive warmth, afebrile, thus no s/s of infected at this time.  However, it is large and spontaneous infection is a risk here.  At this time no need to I+D it but if it get's worse or s/s of infection we should keep a low threshold for I+D.    -Will check Hgb, INR for completeness.    -Will ask RN to keep this ace wrapped to help with resolution.      Orders:  1. DC INR check on 1/8/18  2. Get INR, HGB, Cr, HGB A1c and Vit D Level on 12/14/17 Dx CKD and Afib  3. Wrap right ankle to below knee with ACE wrap to apply compression on RLE bruise/hematoma.  4. Call NP if bruise gets bigger/worse    Electronically signed by:  JOSUE Monroy CNP        Sincerely,        JOSUE Monroy CNP

## 2017-12-11 NOTE — PROGRESS NOTES
[place] [facility]    Chief Complaint   Patient presents with     longterm Regulatory       HPI:    Mecca Slade is a 89 year old  (12/14/1921), who is being seen today for a federally mandated E/M visit at [facility] .  HPI information obtained from: facility chart records, facility staff, patient report and [facility] chart review.     This is my first time meeting Mrs. Slade.  We are meeting her for her regulatory visit but also due to a report she has developed a large bruise on her RLE.  In meeting her, she reports overall she has been doing well, reports her chronic Right knee pain has been better lately, minimal, and feels good.  She reports she is aware of the large RLE bruise she has, but denies any trauma, fall or injury, does not know how she got it.  Thinks its a day or two old, no pain.  She has no other complaints.     ALLERGIES: Review of patient's allergies indicates no known allergies.  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  PAST SURGICAL HISTORY:  has no past surgical history on file.  FAMILY HISTORY: family history is not on file.  SOCIAL HISTORY:  reports that she has never smoked. She has never used smokeless tobacco. She  reports that she does not drink alcohol or use illicit drugs.    MEDICATIONS:  Current Outpatient Prescriptions   Medication Sig Dispense Refill     Acetaminophen (TYLENOL PO) Take 1,000 mg by mouth every 8 hours as needed for mild pain or fever       Acetaminophen (TYLENOL PO) Take 1,000 mg by mouth At Bedtime       METOPROLOL TARTRATE PO Take 25 mg by mouth daily       METFORMIN HCL PO Take 1,000 mg by mouth At Bedtime       diclofenac (VOLTAREN) 1 % GEL topical gel Place 1 g onto the skin 2 times daily       METFORMIN HCL PO Take 500 mg by mouth daily (with breakfast)        TraMADol HCl (ULTRAM PO) Take 50 mg by mouth every 3 hours as needed        ATORVASTATIN CALCIUM PO Take 20 mg by mouth daily       carboxymethylcellulose (REFRESH PLUS) 0.5 % SOLN ophthalmic solution Place 1 drop into both eyes 3 times daily as needed for dry eyes       VITAMIN D, CHOLECALCIFEROL, PO Take 2,000 Units by mouth daily       Furosemide (LASIX PO) Take 40 mg by mouth daily       senna-docusate (SENOKOT-S;PERICOLACE) 8.6-50 MG per tablet Take 1 tablet by mouth 2 times daily as needed        VERAPAMIL HCL PO Take 120 mg by mouth At Bedtime       Warfarin Sodium (COUMADIN PO) Take 2 mg by mouth daily        Medications reviewed:  Medications reconciled to facility chart and changes were made to reflect current medications as identified as above med list. Below are the changes that were made:   Medications stopped since last EPIC medication reconciliation:   There are no discontinued medications.    Medications started since last [facility] medication reconciliation:  No orders of the defined types were placed in this encounter.    Case Management:  I have reviewed the care plan and MDS and do agree with the plan. Patient's desire to return to the community is not present.  Information reviewed:  Medications, vital signs, orders, and nursing notes.    ROS:  7 point ROS done including, light headedness/dizziness, fever/chills, pain, Resp,  CV, GI, and  and is negative other than noted in HPI.      Exam:  Vitals: /83  Pulse 74  Temp 99.1  F (37.3  C)  Resp 14  Wt 211 lb (95.7 kg)  SpO2 96%  BMI 34.06 kg/m2  BMI= Body mass index is 34.06 kg/(m^2).     GENERAL APPEARANCE:  Elderly obese female sitting up in WC, NAD, non-toxic.  ENT:  Mouth and oropharynx normal, moist mucous membranes.   RESP:  Lungs CTA.  Regular relaxed breathing effort.  No cough.   CV: S1/S2 no murmur or rubs.  Regular rhythm and rate.  No generalized edema.   ABDOMEN:   Obese, soft, non-tender with active bowel sounds.  No guarding, rigidity, or rebound tenderness.  EXTREMITIES:  There is a large oblong hematoma/bruise on her RLE just below the knee to the lateral side.  Hematoma/swelling is localized roughly 6 x 10 cm but bruising does run down to her ankle area, non tender, no excessive warmth.  No lower extremity edema, no calf tenderness.   PSYCH: Alert and orientated, pleasant and cooperative.      Lab/Diagnostic data:  All labs reviewed, none recent.    ASSESSMENT/PLAN  H/O: CVA (cerebrovascular accident), R MCA embolism s/p tPA, March 2017  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  This is my first time meeting Mrs. Slade but she appears AOx3 with no residual deficients, but not able to determine baseline.  RNs report she has been stable/unchanged.  She reports her main deficient from the CVA was not being able to ambulate any more.    -Continues on Statin and Warfarin.     Type 2 diabetes mellitus with diabetic neuropathy, without long-term current use of insulin (H)  Only getting weekly glucose checks which have been acceptable ranges.  Appears to be doing well on BID Metformin.  Last A1c was Aug which was 7.9, is due for recheck.   -Will get updated A1c.   -Continue BID Metformin.     CKD (chronic kidney disease) stage 4, GFR 15-29 ml/min (H)  Last creatine check was back in August.    -Will recheck creatine to confirm still at baseline.      Chronic  atrial fibrillation (H)  Chronic congestive heart failure, unspecified congestive heart failure type (H)  Benign essential hypertension  Cardiac pacemaker in situ  Per EMR pacemaker placed 2012 due to sinus node dysfunction tachy/lion.  We do not have a ECHO on profile.  Weights, HR, BP have been stable, acceptable limits.  No clinical s/s of CHF.    -On daily Furosemide, statin, Warfarin.   -On Metoprolol and Verapamil.     Chronic pain of right knee  Reports this has been doing much better lately but also is mostly NWB as she is WC dependent and usually uses a lift to get in/out of bed.    -qHS Acetaminophen and PRN Acetaminophen for pain.     Hematoma of lower extremity, right, initial encounter  This is a new and etiology is unclear.  No external s/s of skin breakdown or trauma.  Hematoma is large, roughly 6x10 cm on RLE below knee to lateral side, there is bruising that runs dependent to ankle.  Non-tender, no excessive warmth, afebrile, thus no s/s of infected at this time.  However, it is large and spontaneous infection is a risk here.  At this time no need to I+D it but if it get's worse or s/s of infection we should keep a low threshold for I+D.    -Will check Hgb, INR for completeness.    -Will ask RN to keep this ace wrapped to help with resolution.      Orders:  1. DC INR check on 1/8/18  2. Get INR, HGB, Cr, HGB A1c and Vit D Level on 12/14/17 Dx CKD and Afib  3. Wrap right ankle to below knee with ACE wrap to apply compression on RLE bruise/hematoma.  4. Call NP if bruise gets bigger/worse    Electronically signed by:  JOSUE Monroy CNP

## 2017-12-13 ENCOUNTER — NURSING HOME VISIT (OUTPATIENT)
Dept: GERIATRICS | Facility: CLINIC | Age: 82
End: 2017-12-13
Payer: MEDICARE

## 2017-12-13 ENCOUNTER — RECORDS - HEALTHEAST (OUTPATIENT)
Dept: LAB | Facility: CLINIC | Age: 82
End: 2017-12-13

## 2017-12-13 VITALS
RESPIRATION RATE: 14 BRPM | HEART RATE: 74 BPM | DIASTOLIC BLOOD PRESSURE: 83 MMHG | WEIGHT: 211 LBS | OXYGEN SATURATION: 96 % | SYSTOLIC BLOOD PRESSURE: 163 MMHG | TEMPERATURE: 99.1 F | BODY MASS INDEX: 34.06 KG/M2

## 2017-12-13 DIAGNOSIS — S80.11XD HEMATOMA OF LOWER EXTREMITY, RIGHT, SUBSEQUENT ENCOUNTER: Primary | ICD-10-CM

## 2017-12-13 PROCEDURE — 99308 SBSQ NF CARE LOW MDM 20: CPT | Performed by: NURSE PRACTITIONER

## 2017-12-13 NOTE — LETTER
12/13/2017        RE: Mecca Slade  [address] [facility] [address]        [place] [facility]    Chief Complaint   Patient presents with     Nursing Home Acute       HPI:    Mecca Slade is a 89 year old  (12/14/1921), who is being seen today for an episodic care visit at [place].    HPI information obtained from: facility chart records, facility staff, patient report and [facility] chart review.     Met with Mrs. Slade today to f/u on her RLE hematoma.  She continues to report the hematoma is non-painful, does not bother her.  She does endorse the ace wrap at times get's to tight.  No other complaints.      REVIEW OF SYSTEMS:  7 point ROS done including, light headedness/dizziness, fever/chills, pain, Resp, CV, GI, and  and is negative other than noted in HPI.      /83  Pulse 74  Temp 99.1  F (37.3  C)  Resp 14  Wt 211 lb (95.7 kg)  SpO2 96%  BMI 34.06 kg/m2     GENERAL APPEARANCE:  Elderly obese female sitting up in WC, NAD, non-toxic.  RESP:  Regular relaxed breathing effort.  No cough.   EXTREMITIES:  There is a large oblong hematoma/bruise on her RLE just below the knee to the lateral side.  Hematoma/swelling is localized roughly 6 x 10 cm but bruising does run down to her ankle area, non tender, no excessive warmth.  No lower extremity edema, no calf tenderness.  No change today, ace wrap in place.   PSYCH: Alert and orientated, pleasant and cooperative.    ASSESSMENT/PLAN:  Hematoma of lower extremity, right, subsequent encounter  As noted, Mrs. Slade thinks the hematoma is a couple weeks old but unclear.  One RN reports she has not seen the hematoma before, one RN reports she had the hematoma for several months.  Thus etiology and chronicity of hematoma remains unclear.  She did get a Right knee injection in Sept by Dr. Ye, no hematoma noted at that time.  Hematoma is below the knee not the knee its self, thus low suspicion as etiology but again details are  unclear.   Currently hematoma appears stable/unchanged.   -Awaiting Hgb and PT/INR check.   -Continue ace wrap on RLE.     1. No new orders    Electronically signed by:  JOSUE Monory CNP      Sincerely,        JOSUE Monroy CNP

## 2017-12-14 ENCOUNTER — TRANSFERRED RECORDS (OUTPATIENT)
Dept: HEALTH INFORMATION MANAGEMENT | Facility: CLINIC | Age: 82
End: 2017-12-14

## 2017-12-14 LAB
CREAT SERPL-MCNC: 0.97 MG/DL (ref 0.6–1.1)
CREAT SERPL-MCNC: 0.97 MG/DL (ref 0.6–1.1)
GFR SERPL CREATININE-BSD FRML MDRD: 53 ML/MIN/1.73M2
GFR SERPL CREATININE-BSD FRML MDRD: 53 ML/MIN/1.73M2
HBA1C MFR BLD: 7.2 % (ref 4.2–6.1)
HEMOGLOBIN: 14.2 G/DL (ref 12–16)
INR PPP: 2.39 (ref 0.9–1.1)

## 2017-12-18 ENCOUNTER — NURSING HOME VISIT (OUTPATIENT)
Dept: GERIATRICS | Facility: CLINIC | Age: 82
End: 2017-12-18
Payer: MEDICARE

## 2017-12-18 VITALS
HEART RATE: 74 BPM | SYSTOLIC BLOOD PRESSURE: 136 MMHG | OXYGEN SATURATION: 96 % | WEIGHT: 211 LBS | BODY MASS INDEX: 34.06 KG/M2 | DIASTOLIC BLOOD PRESSURE: 83 MMHG | RESPIRATION RATE: 14 BRPM | TEMPERATURE: 99.1 F

## 2017-12-18 DIAGNOSIS — Z86.73 H/O: CVA (CEREBROVASCULAR ACCIDENT): ICD-10-CM

## 2017-12-18 DIAGNOSIS — S80.11XD HEMATOMA OF LOWER EXTREMITY, RIGHT, SUBSEQUENT ENCOUNTER: Primary | ICD-10-CM

## 2017-12-18 DIAGNOSIS — Z79.01 LONG-TERM (CURRENT) USE OF ANTICOAGULANTS: ICD-10-CM

## 2017-12-18 DIAGNOSIS — I48.20 CHRONIC ATRIAL FIBRILLATION (H): ICD-10-CM

## 2017-12-18 PROCEDURE — 99308 SBSQ NF CARE LOW MDM 20: CPT | Performed by: NURSE PRACTITIONER

## 2017-12-18 NOTE — LETTER
12/18/2017        RE: Mecca Slade  [address] [facility] [address]        [place] [facility]    Chief Complaint   Patient presents with     Nursing Home Acute       HPI:    Mecca Slade is a 89 year old  (12/14/1921), who is being seen today for an episodic care visit at [facility] .  HPI information obtained from: facility chart records, facility staff, patient report and [facility] chart review.    Followed up with Mrs. Slade today in regards to her RLE hematoma.  Asked my collegue Dr. Ye to review as unfortunately Mrs. Slade has some degree of memory impairment and does not remember when she got the hematoma and some RN's reported she has had for several weeks/months.  Dr. Ye does not remember seeing it the last time he saw her and injected her Right knee back in Oct.  Thus we know it's less than 8 weeks old.  Mrs. Slade continues to endorse the hematoma does not bother her, non-tender.  She has no new complaints.      ALLERGIES: Review of patient's allergies indicates no known allergies.  Past Medical, Surgical, Family and Social History reviewed and updated in [facility].    Current Outpatient Prescriptions   Medication Sig Dispense Refill     Acetaminophen (TYLENOL PO) Take 1,000 mg by mouth every 8 hours as needed for mild pain or fever       Acetaminophen (TYLENOL PO) Take 1,000 mg by mouth At Bedtime       METOPROLOL TARTRATE PO Take 25 mg by mouth daily       METFORMIN HCL PO Take 1,000 mg by mouth At Bedtime       diclofenac (VOLTAREN) 1 % GEL topical gel Place 1 g onto the skin 2 times daily       METFORMIN HCL PO Take 500 mg by mouth daily (with breakfast)        TraMADol HCl (ULTRAM PO) Take 50 mg by mouth every 3 hours as needed        ATORVASTATIN CALCIUM PO Take 20 mg by mouth daily       carboxymethylcellulose (REFRESH PLUS) 0.5 % SOLN ophthalmic solution Place 1 drop into both eyes 3 times daily as needed for dry eyes       VITAMIN D,  CHOLECALCIFEROL, PO Take 2,000 Units by mouth daily       Furosemide (LASIX PO) Take 40 mg by mouth daily       senna-docusate (SENOKOT-S;PERICOLACE) 8.6-50 MG per tablet Take 1 tablet by mouth 2 times daily as needed        VERAPAMIL HCL PO Take 120 mg by mouth At Bedtime       Warfarin Sodium (COUMADIN PO) Take 2 mg by mouth daily        Medications reviewed:  Medications reconciled to facility chart and changes were made to reflect current medications as identified as above med list. Below are the changes that were made:   Medications stopped since last EPIC medication reconciliation:   There are no discontinued medications.    Medications started since last [facility] medication reconciliation:  No orders of the defined types were placed in this encounter.    REVIEW OF SYSTEMS:  7 point ROS done including, light headedness/dizziness, fever/chills, pain, Resp, CV, GI, and  and is negative other than noted in HPI.      Physical Exam:  /83  Pulse 74  Temp 99.1  F (37.3  C)  Resp 14  Wt 211 lb (95.7 kg)  SpO2 96%  BMI 34.06 kg/m2     GENERAL APPEARANCE:  Elderly obese female sitting up in WC, NAD, non-toxic.  RESP:  Regular relaxed breathing effort.  No cough.   EXTREMITIES:  There is a large oblong hematoma/bruise on her RLE just below the knee to the lateral side.  Hematoma/swelling is localized roughly 6 x 10 cm but bruising does run down to her ankle area, non tender, no excessive warmth.  The swelling and redness are moderately improved today.  No lower extremity edema, no calf tenderness.    PSYCH: Alert and orientated, pleasant and cooperative.    Recent Labs:  INR WNL's 2-3.     Assessment/Plan:  Hematoma of lower extremity, right, subsequent encounter  H/O: CVA (cerebrovascular accident), R MCA embolism s/p tPA, March 2017  Chronic atrial fibrillation (H)  Long-term (current) use of anticoagulants  INR check last week WNL 2-3 range, thus continues on Warfarin as benefit>risks.  Some RN's reported  the hematoma was weeks/months old, but seeing how it improved over the weekend with ace wrap, I suspect it's only a few days old.  Dr. Ye confirmed it was not present 8 weeks ago, so we know it happened sometime in between.  Leading suspicion is that it likely was trauma induced but improving with conservative measures.    -Continue to ace wrap.   -Continue to clinically monitor.     Orders:  1. Okay to wrap RLE with ACE wrap toes to below ankle to cover RLL hematoma and provide compression. Off BID for breaks/cleaning Dx hematoma    Electronically signed by  JOSUE Monroy CNP                      Sincerely,        JOSUE Monroy CNP

## 2017-12-18 NOTE — PROGRESS NOTES
[place] [facility]    Chief Complaint   Patient presents with     Nursing Home Acute       HPI:    Mecca Slade is a 89 year old  (12/14/1921), who is being seen today for an episodic care visit at [facility] .  HPI information obtained from: facility chart records, facility staff, patient report and [facility] chart review.    Followed up with Mrs. Slade today in regards to her RLE hematoma.  Asked my collegue Dr. Ye to review as unfortunately Mrs. Slade has some degree of memory impairment and does not remember when she got the hematoma and some RN's reported she has had for several weeks/months.  Dr. Ye does not remember seeing it the last time he saw her and injected her Right knee back in Oct.  Thus we know it's less than 8 weeks old.  Mrs. Slade continues to endorse the hematoma does not bother her, non-tender.  She has no new complaints.      ALLERGIES: Review of patient's allergies indicates no known allergies.  Past Medical, Surgical, Family and Social History reviewed and updated in [facility].    Current Outpatient Prescriptions   Medication Sig Dispense Refill     Acetaminophen (TYLENOL PO) Take 1,000 mg by mouth every 8 hours as needed for mild pain or fever       Acetaminophen (TYLENOL PO) Take 1,000 mg by mouth At Bedtime       METOPROLOL TARTRATE PO Take 25 mg by mouth daily       METFORMIN HCL PO Take 1,000 mg by mouth At Bedtime       diclofenac (VOLTAREN) 1 % GEL topical gel Place 1 g onto the skin 2 times daily       METFORMIN HCL PO Take 500 mg by mouth daily (with breakfast)        TraMADol HCl (ULTRAM PO) Take 50 mg by mouth every 3 hours as needed        ATORVASTATIN CALCIUM PO Take 20 mg by mouth daily       carboxymethylcellulose (REFRESH PLUS) 0.5 % SOLN ophthalmic solution Place 1 drop into both eyes 3 times daily as needed for dry eyes       VITAMIN D, CHOLECALCIFEROL, PO Take 2,000 Units by mouth daily       Furosemide (LASIX PO) Take 40 mg by  mouth daily       senna-docusate (SENOKOT-S;PERICOLACE) 8.6-50 MG per tablet Take 1 tablet by mouth 2 times daily as needed        VERAPAMIL HCL PO Take 120 mg by mouth At Bedtime       Warfarin Sodium (COUMADIN PO) Take 2 mg by mouth daily        Medications reviewed:  Medications reconciled to facility chart and changes were made to reflect current medications as identified as above med list. Below are the changes that were made:   Medications stopped since last EPIC medication reconciliation:   There are no discontinued medications.    Medications started since last [facility] medication reconciliation:  No orders of the defined types were placed in this encounter.    REVIEW OF SYSTEMS:  7 point ROS done including, light headedness/dizziness, fever/chills, pain, Resp, CV, GI, and  and is negative other than noted in HPI.      Physical Exam:  /83  Pulse 74  Temp 99.1  F (37.3  C)  Resp 14  Wt 211 lb (95.7 kg)  SpO2 96%  BMI 34.06 kg/m2     GENERAL APPEARANCE:  Elderly obese female sitting up in WC, NAD, non-toxic.  RESP:  Regular relaxed breathing effort.  No cough.   EXTREMITIES:  There is a large oblong hematoma/bruise on her RLE just below the knee to the lateral side.  Hematoma/swelling is localized roughly 6 x 10 cm but bruising does run down to her ankle area, non tender, no excessive warmth.  The swelling and redness are moderately improved today.  No lower extremity edema, no calf tenderness.    PSYCH: Alert and orientated, pleasant and cooperative.    Recent Labs:  INR WNL's 2-3.     Assessment/Plan:  Hematoma of lower extremity, right, subsequent encounter  H/O: CVA (cerebrovascular accident), R MCA embolism s/p tPA, March 2017  Chronic atrial fibrillation (H)  Long-term (current) use of anticoagulants  INR check last week WNL 2-3 range, thus continues on Warfarin as benefit>risks.  Some RN's reported the hematoma was weeks/months old, but seeing how it improved over the weekend with ace wrap, I  suspect it's only a few days old.  Dr. Ye confirmed it was not present 8 weeks ago, so we know it happened sometime in between.  Leading suspicion is that it likely was trauma induced but improving with conservative measures.    -Continue to ace wrap.   -Continue to clinically monitor.     Orders:  1. Okay to wrap RLE with ACE wrap toes to below ankle to cover RLL hematoma and provide compression. Off BID for breaks/cleaning Dx hematoma    Electronically signed by  JOSUE Monroy CNP

## 2018-01-03 ENCOUNTER — RECORDS - HEALTHEAST (OUTPATIENT)
Dept: LAB | Facility: CLINIC | Age: 83
End: 2018-01-03

## 2018-01-04 ENCOUNTER — TRANSFERRED RECORDS (OUTPATIENT)
Dept: HEALTH INFORMATION MANAGEMENT | Facility: CLINIC | Age: 83
End: 2018-01-04

## 2018-01-04 LAB
INR PPP: 2.48 (ref 0.9–1.1)
INR PPP: 2.48 (ref 0.9–1.1)

## 2018-01-12 ENCOUNTER — RECORDS - HEALTHEAST (OUTPATIENT)
Dept: LAB | Facility: CLINIC | Age: 83
End: 2018-01-12

## 2018-01-15 ENCOUNTER — TRANSFERRED RECORDS (OUTPATIENT)
Dept: HEALTH INFORMATION MANAGEMENT | Facility: CLINIC | Age: 83
End: 2018-01-15

## 2018-01-15 LAB
INR PPP: 2.18 (ref 0.9–1.1)
INR PPP: 2.18 (ref 0.9–1.1)

## 2018-01-16 ENCOUNTER — NURSING HOME VISIT (OUTPATIENT)
Dept: GERIATRICS | Facility: CLINIC | Age: 83
End: 2018-01-16
Payer: MEDICARE

## 2018-01-16 VITALS
DIASTOLIC BLOOD PRESSURE: 60 MMHG | TEMPERATURE: 97.4 F | RESPIRATION RATE: 18 BRPM | HEART RATE: 70 BPM | WEIGHT: 213 LBS | OXYGEN SATURATION: 97 % | BODY MASS INDEX: 34.38 KG/M2 | SYSTOLIC BLOOD PRESSURE: 108 MMHG

## 2018-01-16 DIAGNOSIS — I48.20 CHRONIC ATRIAL FIBRILLATION (H): ICD-10-CM

## 2018-01-16 DIAGNOSIS — I50.9 CHRONIC CONGESTIVE HEART FAILURE, UNSPECIFIED CONGESTIVE HEART FAILURE TYPE: ICD-10-CM

## 2018-01-16 DIAGNOSIS — Z86.73 H/O: CVA (CEREBROVASCULAR ACCIDENT): ICD-10-CM

## 2018-01-16 DIAGNOSIS — S80.11XD HEMATOMA OF LOWER EXTREMITY, RIGHT, SUBSEQUENT ENCOUNTER: ICD-10-CM

## 2018-01-16 DIAGNOSIS — J06.9 VIRAL UPPER RESPIRATORY TRACT INFECTION: Primary | ICD-10-CM

## 2018-01-16 DIAGNOSIS — E11.40 TYPE 2 DIABETES MELLITUS WITH DIABETIC NEUROPATHY, WITHOUT LONG-TERM CURRENT USE OF INSULIN (H): ICD-10-CM

## 2018-01-16 DIAGNOSIS — I10 BENIGN ESSENTIAL HYPERTENSION: ICD-10-CM

## 2018-01-16 PROCEDURE — 99309 SBSQ NF CARE MODERATE MDM 30: CPT | Performed by: NURSE PRACTITIONER

## 2018-01-16 NOTE — PROGRESS NOTES
[place] [facility]    Chief Complaint   Patient presents with     Nursing Home Acute       HPI:    Mecca Slade is a 89 year old  (12/14/1921), who is being seen today for an episodic care visit at [facility] .  HPI information obtained from: facility chart records, facility staff, patient report and [facility] chart review.  Also met with Marge/daughter/POA at bedside.      Called into room by Mrs. Slade as she and her daughter wanted an update on her RLE hematoma.  Mrs. Slade continues to endorse the bruise/swelling on her RLE does not bother her, no pain.  She does however endorse sinus congestion, a non-productive cough, denies fever/chills, body aches, SOB/BOWDEN.  Has no other complaints.     ALLERGIES: Review of patient's allergies indicates no known allergies.  Past Medical, Surgical, Family and Social History reviewed and updated in [facility].    Current Outpatient Prescriptions   Medication Sig Dispense Refill     Acetaminophen (TYLENOL PO) Take 1,000 mg by mouth every 8 hours as needed for mild pain or fever       Acetaminophen (TYLENOL PO) Take 1,000 mg by mouth At Bedtime       METOPROLOL TARTRATE PO Take 25 mg by mouth daily       METFORMIN HCL PO Take 1,000 mg by mouth At Bedtime       diclofenac (VOLTAREN) 1 % GEL topical gel Place 1 g onto the skin 2 times daily       METFORMIN HCL PO Take 500 mg by mouth daily (with breakfast)        ATORVASTATIN CALCIUM PO Take 20 mg by mouth daily       carboxymethylcellulose (REFRESH PLUS) 0.5 % SOLN ophthalmic solution Place 1 drop into both eyes 3 times daily as needed for dry eyes       VITAMIN D, CHOLECALCIFEROL, PO Take 2,000 Units by mouth daily       Furosemide (LASIX PO) Take 40 mg by mouth daily       VERAPAMIL HCL PO Take 120 mg by mouth At Bedtime       Warfarin Sodium (COUMADIN PO) Take 2 mg by mouth daily        Medications reviewed:  Medications reconciled to facility chart and changes were made to reflect current  medications as identified as above med list. Below are the changes that were made:   Medications stopped since last EPIC medication reconciliation:   There are no discontinued medications.    Medications started since last [facility] medication reconciliation:  No orders of the defined types were placed in this encounter.    REVIEW OF SYSTEMS:  7 point ROS done including, light headedness/dizziness, fever/chills, pain, Resp, CV, GI, and  and is negative other than noted in HPI.      Physical Exam:  /60  Pulse 70  Temp 97.4  F (36.3  C)  Resp 18  Wt 213 lb (96.6 kg)  SpO2 97%  BMI 34.38 kg/m2     GENERAL APPEARANCE:  Elderly obese female sitting up in WC, NAD, non-toxic.  ENT:  Mouth and oropharynx normal, moist mucous membranes.   RESP:  Lungs CTA.  Regular relaxed breathing effort.  No cough.   CV: S1/S2 no murmur or rubs.  Regular rhythm and rate.  No generalized edema.   ABDOMEN:   Obese, soft, non-tender with active bowel sounds.  No guarding, rigidity, or rebound tenderness.  EXTREMITIES:  There is a large oblong hematoma/bruise on her RLE just below the knee to the lateral side.  Over all the hematoma is much smaller and softer than before.  Still no s/s of infection, non-tender.   PSYCH: Alert and orientated, pleasant and cooperative.       Recent Labs: I have personally reviewed glucose checks.     Assessment/Plan:  Viral upper respiratory tract infection  She endorses nasal congestion, non-productive cough, which is common right now in the facility.  No fever, body aches, non-toxic, low suspicion for influ.  Encouraged extra fluids and rest.  -Supportive care and clinical monitoring.     Hematoma of lower extremity, right, subsequent encounter  Reports of how long she has had this are still unclear, as some RN's report she has had it for months.  Daughter also does not know.  Overall hematoma is smaller/softer, is improving.  She sometimes will wear the ace wrap other times reports it bothers her.   At this point due to it's improving, wearing a ace wrap is helpful but minimally so, thus she can refuse if she desires.   -Encourage RLE Ace wrap use but she can decline if desires.     Type 2 diabetes mellitus with diabetic neuropathy, without long-term current use of insulin (H)  Weekly glucose checks are within acceptable limits.   -Continues on Metformin.     H/O: CVA (cerebrovascular accident), R MCA embolism s/p tPA, March 2017  Chronic atrial fibrillation (H)  Chronic congestive heart failure, unspecified congestive heart failure type (H)  Benign essential hypertension  No clinical s/s of CHF.  Review of VS's show VSS and within acceptable range.     Orders:  1. DC prn tramadol  2. DC prn senna  3. Ace wrap to RLE on for most of the day but patient can refuse if desires or have off for breaks Dx lymphedema    Electronically signed by  JOSUE Monroy CNP

## 2018-02-04 VITALS
SYSTOLIC BLOOD PRESSURE: 110 MMHG | BODY MASS INDEX: 35.64 KG/M2 | HEART RATE: 64 BPM | RESPIRATION RATE: 16 BRPM | TEMPERATURE: 98 F | DIASTOLIC BLOOD PRESSURE: 62 MMHG | OXYGEN SATURATION: 98 % | WEIGHT: 220.8 LBS

## 2018-02-05 ENCOUNTER — NURSING HOME VISIT (OUTPATIENT)
Dept: GERIATRICS | Facility: CLINIC | Age: 83
End: 2018-02-05
Payer: MEDICARE

## 2018-02-05 DIAGNOSIS — E11.51 TYPE II DIABETES MELLITUS WITH PERIPHERAL CIRCULATORY DISORDER (H): ICD-10-CM

## 2018-02-05 DIAGNOSIS — I48.20 CHRONIC ATRIAL FIBRILLATION (H): ICD-10-CM

## 2018-02-05 DIAGNOSIS — E66.01 MORBID OBESITY (H): ICD-10-CM

## 2018-02-05 DIAGNOSIS — M70.21 OLECRANON BURSITIS OF RIGHT ELBOW: ICD-10-CM

## 2018-02-05 DIAGNOSIS — Z86.73 H/O: CVA (CEREBROVASCULAR ACCIDENT): Primary | ICD-10-CM

## 2018-02-05 PROCEDURE — 99310 SBSQ NF CARE HIGH MDM 45: CPT | Performed by: FAMILY MEDICINE

## 2018-02-05 NOTE — PROGRESS NOTES
[place] [facility]    Chief Complaint   Patient presents with     [employer]I Regulatory       HPI:  Mecca Slade is a 89 year old  (12/14/1921), who is being seen today for a federally mandated E/M visit at [facility] .  HPI information obtained from: facility chart records, facility staff, patient report and [facility] chart review.     Today's concerns are:  - - Resident seen and examined.  Resident reports right elbow pain which is worse with movement but no alleviating factor, aching in nature, off an on.  Reports that he keeps his right elbow on the arm rest of the wheelchair most of the time.    - GNP Reports that resident has intermittent depression, however both the resident and the family have declined any treatment.  - Reports sleep, appetite and BM are fine.   - RN / GNP has no concern today.   -----------------------------------------  - Past Medical, social, family histories, medications, and allergies reviewed and updated  - Medications reviewed: in the chart and EHR.   - Case Management:   I have reviewed the care plan and MDS and do agree with the plan. Patient's desire to return to the community is not present.  Information reviewed:  Medications, vital signs, orders, and nursing notes.    MEDICATIONS:  Current Outpatient Prescriptions   Medication Sig Dispense Refill     Acetaminophen (TYLENOL PO) Take 1,000 mg by mouth every 8 hours as needed for mild pain or fever       Acetaminophen (TYLENOL PO) Take 1,000 mg by mouth At Bedtime       METOPROLOL TARTRATE PO Take 25 mg by mouth daily       METFORMIN HCL PO Take 1,000 mg by mouth At Bedtime       diclofenac (VOLTAREN) 1 % GEL topical gel Place 1 g onto the skin 2 times daily       METFORMIN HCL PO Take 500 mg by mouth daily (with breakfast)        ATORVASTATIN CALCIUM PO Take 20 mg by mouth daily       carboxymethylcellulose (REFRESH PLUS) 0.5 % SOLN ophthalmic solution Place 1 drop into both eyes 3 times  daily as needed for dry eyes       VITAMIN D, CHOLECALCIFEROL, PO Take 2,000 Units by mouth daily       Furosemide (LASIX PO) Take 40 mg by mouth daily       VERAPAMIL HCL PO Take 120 mg by mouth At Bedtime       Warfarin Sodium (COUMADIN PO) Take 2 mg by mouth daily        ROS:  4 point ROS including Respiratory, CV, GI and , other than that noted in the HPI,  is negative    Exam:  Vitals: /62  Pulse 64  Temp 98  F (36.7  C)  Resp 16  Wt 220 lb 12.8 oz (100.2 kg)  SpO2 98%  BMI 35.64 kg/m2  BMI= Body mass index is 35.64 kg/(m^2).  GENERAL APPEARANCE:  Alert, in no distress, obese  ENT: )oral mucous membrane moist,  [place], edentulous  with denture plateboth levels  EYES:  EOM, lids, pupils and irises normal  RESP:  respiratory effort and palpation of chest normal, lungs clear to auscultation , no respiratory distress  CV:  Palpation and auscultation of heart done , irregular rate and rhythm, no murmur, rub, or gallop, trace pedal edema b/l. Pacemaker.   ABDOMEN:  normal bowel sounds, soft, nontender, no hepatosplenomegaly or other masses  M/S:    Right elbow is erythematic over olecranon bursa, tender to touch, warm on palpation, no skin breach, skin slightly elevated.   - Gait and station abnormal uses WC for ambulation. Ms strength: 4/5 LUE, 4/5 LLE.   SKIN:  Inspection of skin and subcutaneous tissue baseline, Palpation of skin and subcutaneous tissue baseline  NEURO:   Cranial nerves decreased visual acuity on both sides. No facial droop.  tested and .  PSYCH:  oriented X 3, normal insight, judgement and memory, affect and mood normal    Lab/Diagnostic data:     CBC RESULTS:   Recent Labs   Lab Test 12/14/17   HGB  14.2       ASSESSMENT/PLAN  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)  - Left sided residual weakness, stable.   - Off ASA, on lipitor 20 mg.   - requires assistance with ADLs.      Morbid Obesity:  - Body mass index is 35.64 kg/(m^2). in this age  group- frail elderly, keep BMI b/lw 25-35 Kg(m2). No intervention is needed at this stage.     Atrial fibrillation, unspecified type (H)  Long term use of OAC  - CVR, rate control with metoprolol and verapamil.   -  on coumadin with INR followed closely         Type II diabetes mellitus with peripheral circulatory disorder (H)           Lab Results   Component Value Date     A1C 7.9 08/07/2017     A1C 7.6 04/13/2017   - controlled.   - on metformin 500 mg in am and 1000 mg pm.   -  Keep HbA1C b/w 8-9% (per AGS there is a potential harm in lowering A1C to <6.5 % in older adults with diabetes), life expectancy less than 5 years, tight glucose control is note recommended      Olecranon Bursitis:  - right side, likely 2/2 to constant friction against the arm-rest of the wheel chair. counseling provided.   - see below for detail orders. Discussed with GNP, will follow.     Frail elderly  - Significant  Deficits requiring NH placement. Requiring extensive assistance from nursing. Up for meals only o/w spends the day resting in bed       Orders:  1. CBC with Differential Dx: R olecranon bursitis  2. Augmentin 875 mg/12.5 mg tab Q12 hours x 7 days dx: R olecranon bursitis  3. Apply Moist Heat to R Elbow  5. Splint Right elbow when possible      Electronically signed by:  Anirudh Ye MD

## 2018-02-05 NOTE — LETTER
2/5/2018        RE: Mecca Slade  [address] [facility] [address]          [place] [facility]    Chief Complaint   Patient presents with     intermediate Regulatory       HPI:  Mecca Slade is a 89 year old  (12/14/1921), who is being seen today for a federally mandated E/M visit at [facility] .  HPI information obtained from: facility chart records, facility staff, patient report and [facility] chart review.     Today's concerns are:  - - Resident seen and examined.  Resident reports right elbow pain which is worse with movement but no alleviating factor, aching in nature, off an on.  Reports that he keeps his right elbow on the arm rest of the wheelchair most of the time.    - GNP Reports that resident has intermittent depression, however both the resident and the family have declined any treatment.  - Reports sleep, appetite and BM are fine.   - RN / GNP has no concern today.   -----------------------------------------  - Past Medical, social, family histories, medications, and allergies reviewed and updated  - Medications reviewed: in the chart and EHR.   - Case Management:   I have reviewed the care plan and MDS and do agree with the plan. Patient's desire to return to the community is not present.  Information reviewed:  Medications, vital signs, orders, and nursing notes.    MEDICATIONS:  Current Outpatient Prescriptions   Medication Sig Dispense Refill     Acetaminophen (TYLENOL PO) Take 1,000 mg by mouth every 8 hours as needed for mild pain or fever       Acetaminophen (TYLENOL PO) Take 1,000 mg by mouth At Bedtime       METOPROLOL TARTRATE PO Take 25 mg by mouth daily       METFORMIN HCL PO Take 1,000 mg by mouth At Bedtime       diclofenac (VOLTAREN) 1 % GEL topical gel Place 1 g onto the skin 2 times daily       METFORMIN HCL PO Take 500 mg by mouth daily (with breakfast)        ATORVASTATIN CALCIUM PO Take 20 mg by mouth daily       carboxymethylcellulose  (REFRESH PLUS) 0.5 % SOLN ophthalmic solution Place 1 drop into both eyes 3 times daily as needed for dry eyes       VITAMIN D, CHOLECALCIFEROL, PO Take 2,000 Units by mouth daily       Furosemide (LASIX PO) Take 40 mg by mouth daily       VERAPAMIL HCL PO Take 120 mg by mouth At Bedtime       Warfarin Sodium (COUMADIN PO) Take 2 mg by mouth daily        ROS:  4 point ROS including Respiratory, CV, GI and , other than that noted in the HPI,  is negative    Exam:  Vitals: /62  Pulse 64  Temp 98  F (36.7  C)  Resp 16  Wt 220 lb 12.8 oz (100.2 kg)  SpO2 98%  BMI 35.64 kg/m2  BMI= Body mass index is 35.64 kg/(m^2).  GENERAL APPEARANCE:  Alert, in no distress, obese  ENT: )oral mucous membrane moist,  [place], edentulous  with denture plateboth levels  EYES:  EOM, lids, pupils and irises normal  RESP:  respiratory effort and palpation of chest normal, lungs clear to auscultation , no respiratory distress  CV:  Palpation and auscultation of heart done , irregular rate and rhythm, no murmur, rub, or gallop, trace pedal edema b/l. Pacemaker.   ABDOMEN:  normal bowel sounds, soft, nontender, no hepatosplenomegaly or other masses  M/S:    Right elbow is erythematic over olecranon bursa, tender to touch, warm on palpation, no skin breach, skin slightly elevated.   - Gait and station abnormal uses WC for ambulation. Ms strength: 4/5 LUE, 4/5 LLE.   SKIN:  Inspection of skin and subcutaneous tissue baseline, Palpation of skin and subcutaneous tissue baseline  NEURO:   Cranial nerves decreased visual acuity on both sides. No facial droop.  tested and .  PSYCH:  oriented X 3, normal insight, judgement and memory, affect and mood normal    Lab/Diagnostic data:     CBC RESULTS:   Recent Labs   Lab Test 12/14/17   HGB  14.2       ASSESSMENT/PLAN  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)  - Left sided residual weakness, stable.   - Off ASA, on lipitor 20 mg.   - requires assistance  with ADLs.      Morbid Obesity:  - Body mass index is 35.64 kg/(m^2). in this age group- frail elderly, keep BMI b/lw 25-35 Kg(m2). No intervention is needed at this stage.     Atrial fibrillation, unspecified type (H)  Long term use of OAC  - CVR, rate control with metoprolol and verapamil.   -  on coumadin with INR followed closely         Type II diabetes mellitus with peripheral circulatory disorder (H)           Lab Results   Component Value Date     A1C 7.9 08/07/2017     A1C 7.6 04/13/2017   - controlled.   - on metformin 500 mg in am and 1000 mg pm.   -  Keep HbA1C b/w 8-9% (per AGS there is a potential harm in lowering A1C to <6.5 % in older adults with diabetes), life expectancy less than 5 years, tight glucose control is note recommended      Olecranon Bursitis:  - right side, likely 2/2 to constant friction against the arm-rest of the wheel chair. counseling provided.   - see below for detail orders. Discussed with GNP, will follow.     Frail elderly  - Significant  Deficits requiring NH placement. Requiring extensive assistance from nursing. Up for meals only o/w spends the day resting in bed       Orders:  1. CBC with Differential Dx: R olecranon bursitis  2. Augmentin 875 mg/12.5 mg tab Q12 hours x 7 days dx: R olecranon bursitis  3. Apply Moist Heat to R Elbow  5. Splint Right elbow when possible      Electronically signed by:  Anirudh Ye MD      Sincerely,        Anirudh Ye MD

## 2018-02-09 ENCOUNTER — RECORDS - HEALTHEAST (OUTPATIENT)
Dept: LAB | Facility: CLINIC | Age: 83
End: 2018-02-09

## 2018-02-11 PROBLEM — E66.01 MORBID OBESITY (H): Status: ACTIVE | Noted: 2018-02-11

## 2018-02-12 LAB — INR PPP: 1.96 (ref 0.9–1.1)

## 2018-02-13 ENCOUNTER — NURSING HOME VISIT (OUTPATIENT)
Dept: GERIATRICS | Facility: CLINIC | Age: 83
End: 2018-02-13
Payer: MEDICARE

## 2018-02-13 VITALS
WEIGHT: 218 LBS | BODY MASS INDEX: 35.19 KG/M2 | TEMPERATURE: 97.7 F | RESPIRATION RATE: 16 BRPM | DIASTOLIC BLOOD PRESSURE: 75 MMHG | OXYGEN SATURATION: 96 % | HEART RATE: 69 BPM | SYSTOLIC BLOOD PRESSURE: 132 MMHG

## 2018-02-13 DIAGNOSIS — R54 FRAIL ELDERLY: ICD-10-CM

## 2018-02-13 DIAGNOSIS — M70.21 OLECRANON BURSITIS OF RIGHT ELBOW: Primary | ICD-10-CM

## 2018-02-13 DIAGNOSIS — I10 ESSENTIAL HYPERTENSION: ICD-10-CM

## 2018-02-13 DIAGNOSIS — I48.91 ATRIAL FIBRILLATION, UNSPECIFIED TYPE (H): ICD-10-CM

## 2018-02-13 DIAGNOSIS — E11.51 TYPE II DIABETES MELLITUS WITH PERIPHERAL CIRCULATORY DISORDER (H): ICD-10-CM

## 2018-02-13 DIAGNOSIS — I50.9 CHRONIC CONGESTIVE HEART FAILURE, UNSPECIFIED CONGESTIVE HEART FAILURE TYPE: ICD-10-CM

## 2018-02-13 DIAGNOSIS — Z79.01 LONG-TERM (CURRENT) USE OF ANTICOAGULANTS: ICD-10-CM

## 2018-02-13 DIAGNOSIS — Z51.81 ENCOUNTER FOR THERAPEUTIC DRUG MONITORING: ICD-10-CM

## 2018-02-13 PROCEDURE — 99309 SBSQ NF CARE MODERATE MDM 30: CPT | Performed by: NURSE PRACTITIONER

## 2018-02-13 NOTE — PROGRESS NOTES
[facility]    Chief Complaint   Patient presents with     Nursing Home Acute       HPI:    Mecca Slade is a 89 year old  (12/14/1921), who is being seen today for an episodic care visit at [facility] .  HPI information obtained from: facility chart records, facility staff, patient report and [facility] chart review.    Followed up with Mrs. Slade today for several reasons.  Mrs. Slade reports her Right elbow pain is much better or gone.  Reports overall she is doing well, no complaints.  Reports she has been feeling more up beat today.      ALLERGIES: Review of patient's allergies indicates no known allergies.  Past Medical, Surgical, Family and Social History reviewed and updated in [facility].    Current Outpatient Prescriptions   Medication Sig Dispense Refill     Warfarin Sodium (COUMADIN PO) Take 3 mg by mouth See Admin Instructions Give 3mg on Mondays       Acetaminophen (TYLENOL PO) Take 1,000 mg by mouth every 8 hours as needed for mild pain or fever       Acetaminophen (TYLENOL PO) Take 1,000 mg by mouth At Bedtime       METOPROLOL TARTRATE PO Take 25 mg by mouth daily       METFORMIN HCL PO Take 1,000 mg by mouth At Bedtime       diclofenac (VOLTAREN) 1 % GEL topical gel Place 1 g onto the skin 2 times daily       METFORMIN HCL PO Take 500 mg by mouth daily (with breakfast)        ATORVASTATIN CALCIUM PO Take 20 mg by mouth daily       carboxymethylcellulose (REFRESH PLUS) 0.5 % SOLN ophthalmic solution Place 1 drop into both eyes 3 times daily as needed for dry eyes       VITAMIN D, CHOLECALCIFEROL, PO Take 2,000 Units by mouth daily       Furosemide (LASIX PO) Take 40 mg by mouth daily       VERAPAMIL HCL PO Take 120 mg by mouth At Bedtime       Warfarin Sodium (COUMADIN PO) Take 2 mg by mouth daily Give on Apq-Gvm-Sltz-Fri-Sat- Sun       Medications reviewed:  Medications reconciled to facility chart and changes were made to reflect current medications as  identified as above med list. Below are the changes that were made:   Medications stopped since last EPIC medication reconciliation:   There are no discontinued medications.    Medications started since last [facility] medication reconciliation:  No orders of the defined types were placed in this encounter.    REVIEW OF SYSTEMS:  7 point ROS done including, light headedness/dizziness, fever/chills, pain, Resp, CV, GI, and  and is negative other than noted in HPI.      Physical Exam:  /75  Pulse 69  Temp 97.7  F (36.5  C)  Resp 16  Wt 218 lb (98.9 kg)  SpO2 96%  BMI 35.19 kg/m2     GENERAL APPEARANCE:  Elderly obese female sitting up in , NAD, non-toxic.  ENT:  Mouth and oropharynx normal, moist mucous membranes.   RESP:  Lungs CTA.  Regular relaxed breathing effort.  No cough.   CV: S1/S2 no murmur or rubs.  Regular rhythm and rate.  No generalized edema.   ABDOMEN:   Obese, soft, non-tender with active bowel sounds.  No guarding, rigidity, or rebound tenderness.  EXTREMITIES:  Mild bilateral pedal edema, no calf tenderness.   JOINTS: Right elbow has no swelling, redness or warmth, also non-tender.  No pain with active or passive ROM.  Appears   PSYCH: Alert and orientated, pleasant and cooperative.      Recent Labs: I have personally reviewed glucose checks.     Assessment/Plan:  Olecranon bursitis of right elbow  Just finished a course of Augmentin.  Appears resolved.  Etiology unclear but was spontaneous when PT was initiated, in setting of being WC bound.  She sleeps in her WC some times, spends most of her day there, noted the padding on the arms is thin.   -No further antibiotics at this time.   -Will have PT see if they can boost the arm padding on WC.     Type II diabetes mellitus with peripheral circulatory disorder (H)  A1c was 7.2 in Dec, weekly glucose checks are WNL's.    -No changes.     Chronic congestive heart failure, unspecified congestive heart failure type (H)  Essential  hypertension  Atrial fibrillation, unspecified type (H)  Encounter for therapeutic drug monitoring  Long-term (current) use of anticoagulants  No clinical s/s of CHF.   Review of VS's show VSS and within acceptable range.   A1c was slightly sup-therapeutic.   -Warfarin adjusted as noted below.     Frail elderly    Orders:  1. Warfarin 3 mg po today x 1 dose 2/13 Dx afib  2. Warfarin 3 mg on Mondays and 2 mg po all other days Dx Afib  3. Next INR on 2/26/18 Dx Afib    Electronically signed by  JOSUE Monroy CNP

## 2018-02-13 NOTE — LETTER
2/13/2018        RE: Mecca Slade  [address] [facility] [address]        [facility]    Chief Complaint   Patient presents with     Nursing Home Acute       HPI:    Mecca Slade is a 89 year old  (12/14/1921), who is being seen today for an episodic care visit at [facility] .  HPI information obtained from: facility chart records, facility staff, patient report and [facility] chart review.    Followed up with Mrs. Slade today for several reasons.  Mrs. Slade reports her Right elbow pain is much better or gone.  Reports overall she is doing well, no complaints.  Reports she has been feeling more up beat today.      ALLERGIES: Review of patient's allergies indicates no known allergies.  Past Medical, Surgical, Family and Social History reviewed and updated in [facility].    Current Outpatient Prescriptions   Medication Sig Dispense Refill     Warfarin Sodium (COUMADIN PO) Take 3 mg by mouth See Admin Instructions Give 3mg on Mondays       Acetaminophen (TYLENOL PO) Take 1,000 mg by mouth every 8 hours as needed for mild pain or fever       Acetaminophen (TYLENOL PO) Take 1,000 mg by mouth At Bedtime       METOPROLOL TARTRATE PO Take 25 mg by mouth daily       METFORMIN HCL PO Take 1,000 mg by mouth At Bedtime       diclofenac (VOLTAREN) 1 % GEL topical gel Place 1 g onto the skin 2 times daily       METFORMIN HCL PO Take 500 mg by mouth daily (with breakfast)        ATORVASTATIN CALCIUM PO Take 20 mg by mouth daily       carboxymethylcellulose (REFRESH PLUS) 0.5 % SOLN ophthalmic solution Place 1 drop into both eyes 3 times daily as needed for dry eyes       VITAMIN D, CHOLECALCIFEROL, PO Take 2,000 Units by mouth daily       Furosemide (LASIX PO) Take 40 mg by mouth daily       VERAPAMIL HCL PO Take 120 mg by mouth At Bedtime       Warfarin Sodium (COUMADIN PO) Take 2 mg by mouth daily Give on Gym-Iiq-Nelv-Fri-Sat- Sun       Medications reviewed:  Medications  reconciled to facility chart and changes were made to reflect current medications as identified as above med list. Below are the changes that were made:   Medications stopped since last EPIC medication reconciliation:   There are no discontinued medications.    Medications started since last [facility] medication reconciliation:  No orders of the defined types were placed in this encounter.    REVIEW OF SYSTEMS:  7 point ROS done including, light headedness/dizziness, fever/chills, pain, Resp, CV, GI, and  and is negative other than noted in HPI.      Physical Exam:  /75  Pulse 69  Temp 97.7  F (36.5  C)  Resp 16  Wt 218 lb (98.9 kg)  SpO2 96%  BMI 35.19 kg/m2     GENERAL APPEARANCE:  Elderly obese female sitting up in , NAD, non-toxic.  ENT:  Mouth and oropharynx normal, moist mucous membranes.   RESP:  Lungs CTA.  Regular relaxed breathing effort.  No cough.   CV: S1/S2 no murmur or rubs.  Regular rhythm and rate.  No generalized edema.   ABDOMEN:   Obese, soft, non-tender with active bowel sounds.  No guarding, rigidity, or rebound tenderness.  EXTREMITIES:  Mild bilateral pedal edema, no calf tenderness.   JOINTS: Right elbow has no swelling, redness or warmth, also non-tender.  No pain with active or passive ROM.  Appears   PSYCH: Alert and orientated, pleasant and cooperative.      Recent Labs: I have personally reviewed glucose checks.     Assessment/Plan:  Olecranon bursitis of right elbow  Just finished a course of Augmentin.  Appears resolved.  Etiology unclear but was spontaneous when PT was initiated, in setting of being WC bound.  She sleeps in her WC some times, spends most of her day there, noted the padding on the arms is thin.   -No further antibiotics at this time.   -Will have PT see if they can boost the arm padding on WC.     Type II diabetes mellitus with peripheral circulatory disorder (H)  A1c was 7.2 in Dec, weekly glucose checks are WNL's.    -No changes.     Chronic congestive  heart failure, unspecified congestive heart failure type (H)  Essential hypertension  Atrial fibrillation, unspecified type (H)  Encounter for therapeutic drug monitoring  Long-term (current) use of anticoagulants  No clinical s/s of CHF.   Review of VS's show VSS and within acceptable range.   A1c was slightly sup-therapeutic.   -Warfarin adjusted as noted below.     Frail elderly    Orders:  1. Warfarin 3 mg po today x 1 dose 2/13 Dx afib  2. Warfarin 3 mg on Mondays and 2 mg po all other days Dx Afib  3. Next INR on 2/26/18 Dx Afib    Electronically signed by  JOSUE Monroy CNP                      Sincerely,        JOSUE Monroy CNP

## 2018-02-17 ENCOUNTER — RECORDS - HEALTHEAST (OUTPATIENT)
Dept: LAB | Facility: CLINIC | Age: 83
End: 2018-02-17

## 2018-02-19 ENCOUNTER — TRANSFERRED RECORDS (OUTPATIENT)
Dept: HEALTH INFORMATION MANAGEMENT | Facility: CLINIC | Age: 83
End: 2018-02-19

## 2018-02-19 LAB
INR PPP: 2.09 (ref 0.9–1.1)
INR PPP: 2.09 (ref 0.9–1.1)

## 2018-02-23 ENCOUNTER — RECORDS - HEALTHEAST (OUTPATIENT)
Dept: LAB | Facility: CLINIC | Age: 83
End: 2018-02-23

## 2018-02-26 LAB — INR PPP: 2.34 (ref 0.9–1.1)

## 2018-03-09 ENCOUNTER — RECORDS - HEALTHEAST (OUTPATIENT)
Dept: LAB | Facility: CLINIC | Age: 83
End: 2018-03-09

## 2018-03-12 LAB — INR PPP: 2.26 (ref 0.9–1.1)

## 2018-04-09 ENCOUNTER — RECORDS - HEALTHEAST (OUTPATIENT)
Dept: LAB | Facility: CLINIC | Age: 83
End: 2018-04-09

## 2018-04-10 LAB — INR PPP: 2.48 (ref 0.9–1.1)

## 2018-04-12 ENCOUNTER — NURSING HOME VISIT (OUTPATIENT)
Dept: GERIATRICS | Facility: CLINIC | Age: 83
End: 2018-04-12
Payer: MEDICARE

## 2018-04-12 VITALS
DIASTOLIC BLOOD PRESSURE: 80 MMHG | SYSTOLIC BLOOD PRESSURE: 142 MMHG | RESPIRATION RATE: 18 BRPM | BODY MASS INDEX: 35.67 KG/M2 | TEMPERATURE: 97 F | WEIGHT: 221 LBS | HEART RATE: 59 BPM | OXYGEN SATURATION: 97 %

## 2018-04-12 DIAGNOSIS — N18.4 CKD (CHRONIC KIDNEY DISEASE) STAGE 4, GFR 15-29 ML/MIN (H): ICD-10-CM

## 2018-04-12 DIAGNOSIS — Z86.73 H/O: CVA (CEREBROVASCULAR ACCIDENT): Primary | ICD-10-CM

## 2018-04-12 DIAGNOSIS — I10 ESSENTIAL HYPERTENSION: ICD-10-CM

## 2018-04-12 DIAGNOSIS — E66.09 CLASS 2 OBESITY DUE TO EXCESS CALORIES WITHOUT SERIOUS COMORBIDITY WITH BODY MASS INDEX (BMI) OF 35.0 TO 35.9 IN ADULT: ICD-10-CM

## 2018-04-12 DIAGNOSIS — Z79.01 LONG-TERM (CURRENT) USE OF ANTICOAGULANTS: ICD-10-CM

## 2018-04-12 DIAGNOSIS — E66.812 CLASS 2 OBESITY DUE TO EXCESS CALORIES WITHOUT SERIOUS COMORBIDITY WITH BODY MASS INDEX (BMI) OF 35.0 TO 35.9 IN ADULT: ICD-10-CM

## 2018-04-12 DIAGNOSIS — Z95.0 CARDIAC PACEMAKER IN SITU: ICD-10-CM

## 2018-04-12 DIAGNOSIS — I48.91 ATRIAL FIBRILLATION, UNSPECIFIED TYPE (H): ICD-10-CM

## 2018-04-12 DIAGNOSIS — R53.81 DEBILITY: ICD-10-CM

## 2018-04-12 DIAGNOSIS — I50.9 CHRONIC CONGESTIVE HEART FAILURE, UNSPECIFIED CONGESTIVE HEART FAILURE TYPE: ICD-10-CM

## 2018-04-12 DIAGNOSIS — R54 FRAIL ELDERLY: ICD-10-CM

## 2018-04-12 DIAGNOSIS — E11.51 TYPE II DIABETES MELLITUS WITH PERIPHERAL CIRCULATORY DISORDER (H): ICD-10-CM

## 2018-04-12 PROCEDURE — 99318 ZZC ANNUAL NURSING FAC ASSESSMNT, STABLE: CPT | Performed by: NURSE PRACTITIONER

## 2018-04-12 NOTE — PROGRESS NOTES
[facility]  Chief Complaint   Patient presents with     Annual Comprehensive Nursing Home       HPI:    Mecca Slade is a 89 year old  (12/14/1921), who is being seen today for an annual comprehensive visit at [facility] .  HPI information obtained from: facility chart records, facility staff, patient report and [facility] chart review.      Met with Mrs. Slade this afternoon for her annual review.  Mrs. Slade reports overall she is doing well.  She does report she wishes things could be different, wishes she could walk so she could go home, but otherwise reports she is doing well, no complaints.  She reports her mood as good, as there have been reports of depression, but she continues to decline anti depressants.      ALLERGIES: Review of patient's allergies indicates no known allergies.  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)     Cardiac pacemaker in situ     Neurological neglect syndrome     ANNUAL 5/9/17     Primary osteoarthritis of right knee     Pes anserinus bursitis of right knee     Chronic congestive heart failure, unspecified congestive heart failure type (H)     Advanced directives, counseling/discussion     Long term (current) use of anticoagulants     H/O: CVA (cerebrovascular accident), R MCA embolism s/p tPA, March 2017     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     CKD (chronic kidney disease) stage 4, GFR 15-29 ml/min (H)     Chronic pain of right knee     Hematoma of lower extremity, right, initial encounter     Hematoma of lower extremity, right, subsequent encounter     Chronic atrial fibrillation (H)     Long-term (current) use of anticoagulants     Viral upper respiratory tract infection      Morbid obesity (H)     Olecranon bursitis of right elbow     Type II diabetes mellitus with peripheral circulatory disorder (H)     Atrial fibrillation, unspecified type (H)     Encounter for therapeutic drug monitoring     Essential hypertension     Frail elderly     Class 2 obesity due to excess calories without serious comorbidity with body mass index (BMI) of 35.0 to 35.9 in adult     PAST MEDICAL HISTORY:  has a past medical history of A-fib (H); Acute CVA (cerebrovascular accident) (H) (03/01/2017); Acute right MCA stroke (H) (03/01/2017); Cardiac pacemaker in situ; CHF (congestive heart failure) (H); Chronic systolic congestive heart failure (H) (4/3/2017); CKD stage 4 due to type 2 diabetes mellitus (H); DM type 2 (diabetes mellitus, type 2) (H); HTN (hypertension); Left-sided weakness (03/01/2017); Neurological neglect syndrome; Pure hypercholesterolemia; Right sided cerebral hemisphere cerebrovascular accident (H); Tissue plasminogen activator (t-PA) administered at other facility within 24 hours prior to current admission (03/01/2017); and Type 2 diabetes mellitus with diabetic neuropathy, without long-term current use of insulin (H) (4/3/2017).  PAST SURGICAL HISTORY:  has no past surgical history on file.  FAMILY HISTORY: family history is not on file.  SOCIAL HISTORY:  reports that she has never smoked. She has never used smokeless tobacco. She reports that she does not drink alcohol or use illicit drugs.  IMMUNIZATIONS:  Most Recent Immunizations   Administered Date(s) Administered     Influenza (High Dose) 3 valent vaccine 09/30/2011     Influenza (IIV3) PF 10/06/2016     Influenza Vaccine IM 3yrs+ 4 Valent IIV4 09/03/2013     Pneumo Conj 13-V (2010&after) 04/20/2017     Pneumococcal 23 valent 10/28/2011     TD (ADULT, 7+) 12/14/2017     Above immunizations pulled from [facility]. [facility] and facility records also reconciled. Outstanding information sent to  to update [facility]  yes.  Future immunizations are not needed at this point as all recommended immunizations are up to date.   MEDICATIONS:  Current Outpatient Prescriptions   Medication Sig Dispense Refill     Warfarin Sodium (COUMADIN PO) Take 3 mg by mouth See Admin Instructions Give 3mg on Mondays       Acetaminophen (TYLENOL PO) Take 1,000 mg by mouth every 8 hours as needed for mild pain or fever       Acetaminophen (TYLENOL PO) Take 1,000 mg by mouth At Bedtime       METOPROLOL TARTRATE PO Take 25 mg by mouth daily       METFORMIN HCL PO Take 1,000 mg by mouth At Bedtime       diclofenac (VOLTAREN) 1 % GEL topical gel Place 1 g onto the skin 2 times daily       METFORMIN HCL PO Take 500 mg by mouth daily (with breakfast)        ATORVASTATIN CALCIUM PO Take 20 mg by mouth daily       carboxymethylcellulose (REFRESH PLUS) 0.5 % SOLN ophthalmic solution Place 1 drop into both eyes 3 times daily as needed for dry eyes       VITAMIN D, CHOLECALCIFEROL, PO Take 2,000 Units by mouth daily       Furosemide (LASIX PO) Take 40 mg by mouth daily       VERAPAMIL HCL PO Take 120 mg by mouth At Bedtime       Warfarin Sodium (COUMADIN PO) Take 2 mg by mouth daily Give on Sof-Btg-Yqzo-Fri-Sat- Sun       Medications reviewed:  Medications reconciled to facility chart and changes were made to reflect current medications as identified as above med list. Below are the changes that were made:   Medications stopped since last EPIC medication reconciliation:   There are no discontinued medications.    Medications started since last [facility] medication reconciliation:  No orders of the defined types were placed in this encounter.    Case Management:  I have reviewed the facility/SNF care plan/MDS which was done 18 Jan 18 but updated April in process/review,, including the falls risk, nutrition and pain screening.    Wellness checks  Labs: Annual physical labs required: CBC, BMP, Vit D level, A1C, TSH.   Cancer screening:  Not indicated due to age/goals of  care.   Vaccinations: Influenza, Pneumo, Tdap: All current.     Patient's desire to return to the community is present, but is not able due to care needs . Current Level of Care is appropriate.    The health plan new enrollment has happened. I have reviewed the  MDS, the preventative needs, and facility care plan. The level of care is appropriate. I have reviewed the code status/advanced directives.     Advance Directive Discussion:    I reviewed the current advanced directives as reflected in EPIC, the POLST and the facility chart, and verified the congruency of orders. I contacted the first party Marge/daughter and discussed the plan of Care.  I did review the advance directives with the resident.     Team Discussion:  I communicated with the appropriate disciplines involved with the Plan of Care:   Nursing      Patient Goal:   Patient's goal is pain control and comfort.    Information reviewed:  Medications, vital signs, orders, and nursing notes.    ROS:  10 point ROS of systems including Constitutional, Eyes, Respiratory, Cardiovascular, Gastroenterology, Genitourinary, Integumentary, Muscularskeletal, Psychiatric were all negative except for pertinent positives noted in my HPI.    Exam:  /80  Pulse 59  Temp 97  F (36.1  C)  Resp 18  Wt 221 lb (100.2 kg)  SpO2 97%  BMI 35.67 kg/m2    GENERAL APPEARANCE:  Elderly, obese female sitting up in WC, NAD, not-toxic.  ENT:  Mouth and posterior oropharynx normal, moist mucous membranes, gross hearing acuity WNL.   EYES:  EOM, conjunctivae, lids, pupils WNL.   RESP:  Lungs are CTA.  Regular relaxed breathing effort. No cough.    CV:  S1/S2 no murmurs heard.  Irregular-irregular rhythm and rate.    ABDOMEN:  Obese with large pannus, soft, non-tender with active bowel sounds.  No guarding, rigidity, or rebound tenderness.  EXTREMITIES: Trace RLE edema with a chronic Right lateral induration/firmness that is slightly red, no tenderness, no excessive warmth,  unchanged in nature.  No LLE edema.  No calf tenderness.  Compression in place.   SKIN:  Inspection/Palpation of skin and subcutaneous tissue WNL.   NEURO: CN II-XII grossly intact.   PSYCH:  Alert and orientated x3.  Cooperative and pleasant.  CAM negative.     Lab/Diagnostic data: I have personally reviewed glucose checks.     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  Was recently hoping to get more mobile thus we started PT/OT to see if she could mobilize her WC better independently but then she developed a Right elbow bursitis which we treated and she has not attempted much PT/OT since.  Right elbow bursitis is resolved.  Continues to be WC dependent, sleeps in recliner due to choice.   -No changes, continue to clinically monitor.     Type II diabetes mellitus with peripheral circulatory disorder (H)  A1c was 7.2 last Dec, needs to be updated.  Continues on Metformin.  Review of weekly glucoses shows good control.  -Continue Metformin, no change.   -Will get updated A1c.     Chronic congestive heart failure, unspecified congestive heart failure type (H)  Essential hypertension  Atrial fibrillation, unspecified type (H)  Cardiac pacemaker in situ  Long-term (current) use of anticoagulants  Carries the h/o CHF but we do not have a ECHO on profile.  No clinical s/s of CHF, is on diuretics.    Review of VS's show VSS and within acceptable range, good rate control.   -No changes, continue to clinically monitor.     CKD (chronic kidney disease) stage 4, GFR 15-29 ml/min (H)  Creatine baseline around 0.9, is due for recheck.     Frail elderly  Class 2 obesity due to excess calories without serious comorbidity with body mass index (BMI) of 35.0 to 35.9 in adult  Unfortunately due to significant debility and WC bound, weight control/improvment in setting of DM II is unlikely.  We have  attempted PT/OT in the past with minimal improvement.      Orders:  1) Will change Next INR date to 23 April.   2) On 23 April get Vit D, A1c, CBC no diff, BMP and TSH labs for annual physical.     Electronically signed by:  JOSUE Monroy CNP

## 2018-04-12 NOTE — LETTER
4/12/2018        RE: Mecca Slade  [facility]  [address] [facility] [address]        [place] [facility]  Chief Complaint   Patient presents with     Annual Comprehensive Nursing Home       HPI:    Mecca Slade is a 89 year old  (12/14/1921), who is being seen today for an annual comprehensive visit at [facility] .  HPI information obtained from: facility chart records, facility staff, patient report and [facility] chart review.      Met with Mrs. Slade this afternoon for her annual review.  Mrs. Slade reports overall she is doing well.  She does report she wishes things could be different, wishes she could walk so she could go home, but otherwise reports she is doing well, no complaints.  She reports her mood as good, as there have been reports of depression, but she continues to decline anti depressants.      ALLERGIES: Review of patient's allergies indicates no known allergies.  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)     Cardiac pacemaker in situ     Neurological neglect syndrome     ANNUAL 5/9/17     Primary osteoarthritis of right knee     Pes anserinus bursitis of right knee     Chronic congestive heart failure, unspecified congestive heart failure type (H)     Advanced directives, counseling/discussion     Long term (current) use of anticoagulants     H/O: CVA (cerebrovascular accident), R MCA embolism s/p tPA, March 2017     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     CKD (chronic kidney disease) stage 4, GFR 15-29 ml/min (H)     Chronic pain of right knee     Hematoma of lower extremity, right, initial encounter     Hematoma of lower extremity, right, subsequent encounter     Chronic  atrial fibrillation (H)     Long-term (current) use of anticoagulants     Viral upper respiratory tract infection     Morbid obesity (H)     Olecranon bursitis of right elbow     Type II diabetes mellitus with peripheral circulatory disorder (H)     Atrial fibrillation, unspecified type (H)     Encounter for therapeutic drug monitoring     Essential hypertension     Frail elderly     Class 2 obesity due to excess calories without serious comorbidity with body mass index (BMI) of 35.0 to 35.9 in adult     PAST MEDICAL HISTORY:  has a past medical history of A-fib (H); Acute CVA (cerebrovascular accident) (H) (03/01/2017); Acute right MCA stroke (H) (03/01/2017); Cardiac pacemaker in situ; CHF (congestive heart failure) (H); Chronic systolic congestive heart failure (H) (4/3/2017); CKD stage 4 due to type 2 diabetes mellitus (H); DM type 2 (diabetes mellitus, type 2) (H); HTN (hypertension); Left-sided weakness (03/01/2017); Neurological neglect syndrome; Pure hypercholesterolemia; Right sided cerebral hemisphere cerebrovascular accident (H); Tissue plasminogen activator (t-PA) administered at other facility within 24 hours prior to current admission (03/01/2017); and Type 2 diabetes mellitus with diabetic neuropathy, without long-term current use of insulin (H) (4/3/2017).  PAST SURGICAL HISTORY:  has no past surgical history on file.  FAMILY HISTORY: family history is not on file.  SOCIAL HISTORY:  reports that she has never smoked. She has never used smokeless tobacco. She reports that she does not drink alcohol or use illicit drugs.  IMMUNIZATIONS:  Most Recent Immunizations   Administered Date(s) Administered     Influenza (High Dose) 3 valent vaccine 09/30/2011     Influenza (IIV3) PF 10/06/2016     Influenza Vaccine IM 3yrs+ 4 Valent IIV4 09/03/2013     Pneumo Conj 13-V (2010&after) 04/20/2017     Pneumococcal 23 valent 10/28/2011     TD (ADULT, 7+) 12/14/2017     Above immunizations pulled from [facility].  [facility] and facility records also reconciled. Outstanding information sent to  to update [facility] yes.  Future immunizations are not needed at this point as all recommended immunizations are up to date.   MEDICATIONS:  Current Outpatient Prescriptions   Medication Sig Dispense Refill     Warfarin Sodium (COUMADIN PO) Take 3 mg by mouth See Admin Instructions Give 3mg on Mondays       Acetaminophen (TYLENOL PO) Take 1,000 mg by mouth every 8 hours as needed for mild pain or fever       Acetaminophen (TYLENOL PO) Take 1,000 mg by mouth At Bedtime       METOPROLOL TARTRATE PO Take 25 mg by mouth daily       METFORMIN HCL PO Take 1,000 mg by mouth At Bedtime       diclofenac (VOLTAREN) 1 % GEL topical gel Place 1 g onto the skin 2 times daily       METFORMIN HCL PO Take 500 mg by mouth daily (with breakfast)        ATORVASTATIN CALCIUM PO Take 20 mg by mouth daily       carboxymethylcellulose (REFRESH PLUS) 0.5 % SOLN ophthalmic solution Place 1 drop into both eyes 3 times daily as needed for dry eyes       VITAMIN D, CHOLECALCIFEROL, PO Take 2,000 Units by mouth daily       Furosemide (LASIX PO) Take 40 mg by mouth daily       VERAPAMIL HCL PO Take 120 mg by mouth At Bedtime       Warfarin Sodium (COUMADIN PO) Take 2 mg by mouth daily Give on Tob-Mgk-Axyo-Fri-Sat- Sun       Medications reviewed:  Medications reconciled to facility chart and changes were made to reflect current medications as identified as above med list. Below are the changes that were made:   Medications stopped since last EPIC medication reconciliation:   There are no discontinued medications.    Medications started since last [facility] medication reconciliation:  No orders of the defined types were placed in this encounter.    Case Management:  I have reviewed the facility/SNF care plan/MDS which was done 18 Jan 18 but updated April in process/review,, including the falls risk, nutrition and pain screening.    Wellness checks  Labs:  Annual physical labs required: CBC, BMP, Vit D level, A1C, TSH.   Cancer screening:  Not indicated due to age/goals of care.   Vaccinations: Influenza, Pneumo, Tdap: All current.     Patient's desire to return to the community is present, but is not able due to care needs . Current Level of Care is appropriate.    The health plan new enrollment has happened. I have reviewed the  MDS, the preventative needs, and facility care plan. The level of care is appropriate. I have reviewed the code status/advanced directives.     Advance Directive Discussion:    I reviewed the current advanced directives as reflected in EPIC, the POLST and the facility chart, and verified the congruency of orders. I contacted the first party Marge/daughter and discussed the plan of Care.  I did review the advance directives with the resident.     Team Discussion:  I communicated with the appropriate disciplines involved with the Plan of Care:   Nursing      Patient Goal:   Patient's goal is pain control and comfort.    Information reviewed:  Medications, vital signs, orders, and nursing notes.    ROS:  10 point ROS of systems including Constitutional, Eyes, Respiratory, Cardiovascular, Gastroenterology, Genitourinary, Integumentary, Muscularskeletal, Psychiatric were all negative except for pertinent positives noted in my HPI.    Exam:  /80  Pulse 59  Temp 97  F (36.1  C)  Resp 18  Wt 221 lb (100.2 kg)  SpO2 97%  BMI 35.67 kg/m2    GENERAL APPEARANCE:  Elderly, obese female sitting up in WC, NAD, not-toxic.  ENT:  Mouth and posterior oropharynx normal, moist mucous membranes, gross hearing acuity WNL.   EYES:  EOM, conjunctivae, lids, pupils WNL.   RESP:  Lungs are CTA.  Regular relaxed breathing effort. No cough.    CV:  S1/S2 no murmurs heard.  Irregular-irregular rhythm and rate.    ABDOMEN:  Obese with large pannus, soft, non-tender with active bowel sounds.  No guarding, rigidity, or rebound tenderness.  EXTREMITIES: Trace  RLE edema with a chronic Right lateral induration/firmness that is slightly red, no tenderness, no excessive warmth, unchanged in nature.  No LLE edema.  No calf tenderness.  Compression in place.   SKIN:  Inspection/Palpation of skin and subcutaneous tissue WNL.   NEURO: CN II-XII grossly intact.   PSYCH:  Alert and orientated x3.  Cooperative and pleasant.  CAM negative.     Lab/Diagnostic data: I have personally reviewed glucose checks.     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  Was recently hoping to get more mobile thus we started PT/OT to see if she could mobilize her WC better independently but then she developed a Right elbow bursitis which we treated and she has not attempted much PT/OT since.  Right elbow bursitis is resolved.  Continues to be WC dependent, sleeps in recliner due to choice.   -No changes, continue to clinically monitor.     Type II diabetes mellitus with peripheral circulatory disorder (H)  A1c was 7.2 last Dec, needs to be updated.  Continues on Metformin.  Review of weekly glucoses shows good control.  -Continue Metformin, no change.   -Will get updated A1c.     Chronic congestive heart failure, unspecified congestive heart failure type (H)  Essential hypertension  Atrial fibrillation, unspecified type (H)  Cardiac pacemaker in situ  Long-term (current) use of anticoagulants  Carries the h/o CHF but we do not have a ECHO on profile.  No clinical s/s of CHF, is on diuretics.    Review of VS's show VSS and within acceptable range, good rate control.   -No changes, continue to clinically monitor.     CKD (chronic kidney disease) stage 4, GFR 15-29 ml/min (H)  Creatine baseline around 0.9, is due for recheck.     Frail elderly  Class 2 obesity due to excess calories without serious comorbidity with body mass index (BMI) of 35.0 to 35.9 in  adult  Unfortunately due to significant debility and WC bound, weight control/improvment in setting of DM II is unlikely.  We have attempted PT/OT in the past with minimal improvement.      Orders:  1) Will change Next INR date to 23 April.   2) On 23 April get Vit D, A1c, CBC no diff, BMP and TSH labs for annual physical.     Electronically signed by:  JOSUE Monroy CNP          Sincerely,        JOSUE Monroy CNP

## 2018-04-20 ENCOUNTER — RECORDS - HEALTHEAST (OUTPATIENT)
Dept: LAB | Facility: CLINIC | Age: 83
End: 2018-04-20

## 2018-04-23 LAB
25(OH)D3 SERPL-MCNC: 48.3 NG/ML (ref 30–80)
ANION GAP SERPL CALCULATED.3IONS-SCNC: 10 MMOL/L (ref 5–18)
BUN SERPL-MCNC: 34 MG/DL (ref 8–28)
CALCIUM SERPL-MCNC: 11.1 MG/DL (ref 8.5–10.5)
CHLORIDE BLD-SCNC: 103 MMOL/L (ref 98–107)
CO2 SERPL-SCNC: 30 MMOL/L (ref 22–31)
CREAT SERPL-MCNC: 1.03 MG/DL (ref 0.6–1.1)
ERYTHROCYTE [DISTWIDTH] IN BLOOD BY AUTOMATED COUNT: 12.8 % (ref 11–14.5)
GFR SERPL CREATININE-BSD FRML MDRD: 50 ML/MIN/1.73M2
GLUCOSE BLD-MCNC: 126 MG/DL (ref 70–125)
HBA1C MFR BLD: 7.2 % (ref 4.2–6.1)
HCT VFR BLD AUTO: 43 % (ref 35–47)
HGB BLD-MCNC: 13.3 G/DL (ref 12–16)
INR PPP: 1.52 (ref 0.9–1.1)
MCH RBC QN AUTO: 31.7 PG (ref 27–34)
MCHC RBC AUTO-ENTMCNC: 30.9 G/DL (ref 32–36)
MCV RBC AUTO: 103 FL (ref 80–100)
PLATELET # BLD AUTO: 127 THOU/UL (ref 140–440)
PMV BLD AUTO: 12.1 FL (ref 8.5–12.5)
POTASSIUM BLD-SCNC: 4.6 MMOL/L (ref 3.5–5)
RBC # BLD AUTO: 4.19 MILL/UL (ref 3.8–5.4)
SODIUM SERPL-SCNC: 143 MMOL/L (ref 136–145)
TSH SERPL DL<=0.005 MIU/L-ACNC: 1.83 UIU/ML (ref 0.3–5)
WBC: 6.3 THOU/UL (ref 4–11)

## 2018-04-27 ENCOUNTER — RECORDS - HEALTHEAST (OUTPATIENT)
Dept: LAB | Facility: CLINIC | Age: 83
End: 2018-04-27

## 2018-04-30 LAB — INR PPP: 2.19 (ref 0.9–1.1)

## 2018-05-11 ENCOUNTER — RECORDS - HEALTHEAST (OUTPATIENT)
Dept: LAB | Facility: CLINIC | Age: 83
End: 2018-05-11

## 2018-05-14 ENCOUNTER — TRANSFERRED RECORDS (OUTPATIENT)
Dept: HEALTH INFORMATION MANAGEMENT | Facility: CLINIC | Age: 83
End: 2018-05-14

## 2018-05-14 LAB
INR PPP: 2.64 (ref 0.9–1.1)
INR PPP: 2.64 (ref 0.9–1.1)

## 2018-05-15 ENCOUNTER — NURSING HOME VISIT (OUTPATIENT)
Dept: GERIATRICS | Facility: CLINIC | Age: 83
End: 2018-05-15
Payer: MEDICARE

## 2018-05-15 VITALS
BODY MASS INDEX: 35.99 KG/M2 | TEMPERATURE: 97 F | HEART RATE: 59 BPM | WEIGHT: 223 LBS | SYSTOLIC BLOOD PRESSURE: 142 MMHG | RESPIRATION RATE: 18 BRPM | DIASTOLIC BLOOD PRESSURE: 80 MMHG | OXYGEN SATURATION: 97 %

## 2018-05-15 DIAGNOSIS — M25.572 ACUTE LEFT ANKLE PAIN: ICD-10-CM

## 2018-05-15 DIAGNOSIS — S93.402D SPRAIN OF LEFT ANKLE, UNSPECIFIED LIGAMENT, SUBSEQUENT ENCOUNTER: ICD-10-CM

## 2018-05-15 DIAGNOSIS — R53.81 DEBILITY: Primary | ICD-10-CM

## 2018-05-15 DIAGNOSIS — R41.89 COGNITIVE IMPAIRMENT: ICD-10-CM

## 2018-05-15 DIAGNOSIS — R54 FRAIL ELDERLY: ICD-10-CM

## 2018-05-15 PROCEDURE — 99309 SBSQ NF CARE MODERATE MDM 30: CPT | Performed by: NURSE PRACTITIONER

## 2018-05-15 RX ORDER — POLYETHYLENE GLYCOL 3350 17 G/17G
17 POWDER, FOR SOLUTION ORAL DAILY PRN
COMMUNITY
End: 2019-08-13

## 2018-05-15 NOTE — PROGRESS NOTES
[place] [facility]    Chief Complaint   Patient presents with     assisted Acute       [place] Medical Record Number:  [id number]    HPI:    Mecca Slade is a 89 year old  (12/14/1921), who is being seen today for an episodic care visit at [facility] .  HPI information obtained from: facility chart records, facility staff and patient report.     Met with Mrs. Slade today due to c/o a new acute Left ankle pain.  Mrs. Slade has moderate memory/cognitive impairment thus is a poor historian.  She does not remember any injury or fall and does not remember when her Left ankle started hurting.  RN's report for at least 2 days, they are not aware of any injury.  Mrs. Slade is WC bound and does use the lift stand assist lift to transfer, where all her weight is on her ankles during transfers, and now reports that is very pain fun during transfer.  Also hurts with any weight bearing, minimal at rest and if supported.  No other complaints.     ALLERGIES: Review of patient's allergies indicates no known allergies.  Past Medical, Surgical, Family and Social History reviewed and updated in [facility].    Current Outpatient Prescriptions   Medication Sig Dispense Refill     [START ON 5/30/2018] Acetaminophen (TYLENOL PO) Take 1,000 mg by mouth every 8 hours as needed for mild pain or fever       Acetaminophen (TYLENOL PO) Take 1,000 mg by mouth 3 times daily       ATORVASTATIN CALCIUM PO Take 20 mg by mouth daily       carboxymethylcellulose (REFRESH PLUS) 0.5 % SOLN ophthalmic solution Place 1 drop into both eyes 3 times daily as needed for dry eyes       diclofenac (VOLTAREN) 1 % GEL topical gel Place 1 g onto the skin 2 times daily       Furosemide (LASIX PO) Take 40 mg by mouth daily       METFORMIN HCL PO Take 500 mg by mouth daily (with breakfast)        METFORMIN HCL PO Take 1,000 mg by mouth At Bedtime       METOPROLOL TARTRATE PO Take 25 mg by mouth daily       polyethylene glycol  (MIRALAX/GLYCOLAX) Packet Take 17 g by mouth daily       VERAPAMIL HCL PO Take 120 mg by mouth At Bedtime       VITAMIN D, CHOLECALCIFEROL, PO Take 2,000 Units by mouth daily       Warfarin Sodium (COUMADIN PO) Take 2 mg by mouth daily Give on Tue-Thur--Sat- Sun       Warfarin Sodium (COUMADIN PO) Take 3 mg by mouth See Admin Instructions Give 3mg on M/W/F       Medications reviewed:  Medications reconciled to facility chart and changes were made to reflect current medications as identified as above med list. Below are the changes that were made:   Medications stopped since last EPIC medication reconciliation:       Medications started since last [facility] medication reconciliation:    REVIEW OF SYSTEMS:  7 point ROS done including, light headedness/dizziness, fever/chills, pain, Resp, CV, GI, and  and is negative other than noted in HPI.      Physical Exam:  /80  Pulse 59  Temp 97  F (36.1  C)  Resp 18  Wt 223 lb (101.2 kg)  SpO2 97%  BMI 35.99 kg/m2     GENERAL APPEARANCE:  Elderly obese female sitting up in WC, NAD, non-toxic.  ENT:  Mouth and oropharynx normal, moist mucous membranes.   RESP:  Lungs CTA.  Regular relaxed breathing effort.  No cough.   CV: S1/S2 no murmur or rubs.  Regular rhythm and rate.    ABDOMEN:   Obese, soft, non-tender with active bowel sounds.  No guarding, rigidity, or rebound tenderness.  EXTREMITIES:  Mild bilateral pedal edema, no calf tenderness.   PSYCH: Alert and orientated, pleasant and cooperative.  Mild-moderate cognitive/memory impairment, stable/unchanged.   JOINTS: There is localized 2+ swelling about the Left ankle.  No skin breakdown, no s/s of injury/trauma.  She can minimally flex/extend the foot, only several degrees.  With passive lateral/medial or flex/extend of foot causes pain.  Majority of pain is localized at the lateral Left ankle area.  Pain with weight bearing.     Recent Labs: I have personally reviewed images, which are in facility chart.   5/14  Ankle xray notes Marked demineralized bone with mild degenerative changes.  There is a plantar calcaneal spur.  No acute fracture/dislocation.  There is diffuse soft tissue swelling present.     Assessment/Plan:  Sprain of left ankle, unspecified ligament, subsequent encounter  Acute left ankle pain  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  Frail elderly  Cognitive impairment  Moderate swelling and significant pain with passive ROM of Left ankle with focal point being the lateral Left ankle.  No reports/sign of trauma or injury.  Suspect sprain or strain.  Suspect etiology is that she is obese, and WC dependent and has to use a assist lift zac for pivot transfers and during that time all her weight is on her two ankles.  Now she is not able to transfer that way due to pain.    -Start TID scheduled Acetaminophen for two weeks.   -Ace wrap Left ankle and recommend NWB for now.   --PT/OT to eval and treat, with consideration of short period of NWB.   Which means using a full lift for transfers.      Orders:  1. DC prior prn acetaminophen  2. Start acetaminophen 1000 mg po TID scheduled for pain x 2 weeks.  3. After two weeks, switch tylenol to 1000 mg po Q 8 hr prn pain.  4. Ace wrap left ankle until seen by PT.  Dx left ankle sprain  5. PT to eval and treat left ankle sprain, NWB 1 week per PT Dx left ankle sprain.    Electronically signed by  JOSUE Monroy CNP

## 2018-05-18 ENCOUNTER — CLINICAL UPDATE (OUTPATIENT)
Dept: PHARMACY | Facility: CLINIC | Age: 83
End: 2018-05-18

## 2018-05-18 NOTE — PROGRESS NOTES
This patient's medication list and chart were reviewed as part of the service provided by [facility].    Assessment/Recommendations:  No recommendations for changes at this time.  Of note, Epic med list indicates pt is on Metoprolol IR 25mg once daily and Verapamil IR 120mg once daily vs the 24hr formulations - please clarify and update med list if necessary.    Yanely Juárez, Pharm.D.,[facility]  Board Certified Geriatric Pharmacist  Medication Therapy Management Pharmacist  [phone]

## 2018-06-01 ENCOUNTER — RECORDS - HEALTHEAST (OUTPATIENT)
Dept: LAB | Facility: CLINIC | Age: 83
End: 2018-06-01

## 2018-06-01 VITALS
SYSTOLIC BLOOD PRESSURE: 128 MMHG | TEMPERATURE: 97.6 F | DIASTOLIC BLOOD PRESSURE: 64 MMHG | RESPIRATION RATE: 18 BRPM | BODY MASS INDEX: 35.93 KG/M2 | OXYGEN SATURATION: 95 % | WEIGHT: 222.6 LBS | HEART RATE: 86 BPM

## 2018-06-01 NOTE — PROGRESS NOTES
[facility]    Chief Complaint   Patient presents with     [facility] Regulatory       [facility] Medical Record Number:  [id number]    HPI:    Mecca Slade is a 89 year old  (12/14/1921), who is being seen today for a federally mandated E/M visit at [facility] .  HPI information obtained from: facility chart records, facility staff, patient report and [facility] Epic chart review.     Today's concerns are:  - Resident seen and examined. Reports is doing better now. Reports ankle and LB pain but better now. Uses WC for ambulation.   - Reports sleep, appetite and BM are fine.   - RN / GNP has no concern today.   -----------------------------------------  - - Past Medical, social, family histories, medications, and allergies reviewed and updated  - Medications reviewed: in the chart and EHR.   - Case Management:   I have reviewed the care plan and MDS and do agree with the plan. Patient's desire to return to the community is not present.  Information reviewed:  Medications, vital signs, orders, and nursing notes.    MEDICATIONS:  Current Outpatient Prescriptions   Medication Sig Dispense Refill     Acetaminophen (TYLENOL PO) Take 1,000 mg by mouth every 8 hours as needed for mild pain or fever       ATORVASTATIN CALCIUM PO Take 20 mg by mouth daily       carboxymethylcellulose (REFRESH PLUS) 0.5 % SOLN ophthalmic solution Place 1 drop into both eyes 3 times daily as needed for dry eyes       diclofenac (VOLTAREN) 1 % GEL topical gel Place 1 g onto the skin 2 times daily       Furosemide (LASIX PO) Take 40 mg by mouth daily       METFORMIN HCL PO Take 1,000 mg by mouth At Bedtime       METFORMIN HCL PO Take 500 mg by mouth daily (with breakfast)        METOPROLOL TARTRATE PO Take 25 mg by mouth daily       polyethylene glycol (MIRALAX/GLYCOLAX) Packet Take 17 g by mouth daily       VERAPAMIL HCL PO Take 120 mg by mouth At Bedtime       VITAMIN D, CHOLECALCIFEROL, PO Take 2,000  Units by mouth daily       Warfarin Sodium (COUMADIN PO) Take 3 mg by mouth See Admin Instructions Give 3mg on M/W/F       Warfarin Sodium (COUMADIN PO) Take 2 mg by mouth daily Give on Tue-Thur--Sat- Sun       ROS:  4 point ROS including Respiratory, CV, GI and , other than that noted in the HPI,  is negative    Exam:  Vitals: /64  Pulse 86  Temp 97.6  F (36.4  C)  Resp 18  Wt 222 lb 9.6 oz (101 kg)  SpO2 95%  BMI 35.93 kg/m2  BMI= Body mass index is 35.93 kg/(m^2).  GENERAL APPEARANCE:  Alert, in no distress, obese  ENT: [place], edentulous  with denture plateboth levels  RESP:  respiratory effort and palpation of chest normal, lungs clear to auscultation , no respiratory distress  CV:  Palpation and auscultation of heart done , irregular rate and rhythm, no murmur, rub, or gallop, trace pedal edema b/l. Pacemaker.   ABDOMEN:  normal bowel sounds, soft, nontender, no hepatosplenomegaly or other masses  MSK: - Gait and station abnormal uses WC for ambulation. Ms strength: 4/5 LUE, 4/5 LLE.   SKIN:  Inspection of skin and subcutaneous tissue baseline, Palpation of skin and subcutaneous tissue baseline  NEURO:   no NFD appreciated on observation.   PSYCH:  oriented X 3, normal insight, judgement and memory, affect and mood normal    Lab/Diagnostic data: Reviewed in the chart and EHR.      ASSESSMENT/PLAN  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)  - Left sided residual weakness, stable.   - Off ASA, on lipitor 20 mg.   - requires assistance with ADLs.       Chronic congestive heart failure, unspecified congestive heart failure type (H)  Essential hypertension  - compensated. Continue meds    Atrial fibrillation, chronic type (H)  Long term use of OAC  - CVR, rate control with metoprolol and verapamil.   -  on coumadin with INR followed closely        Type II diabetes mellitus with peripheral circulatory disorder (H)     A1C 7.9 08/07/2017      A1C 7.6 04/13/2017   -  controlled.   - on metformin 500 mg in am and 1000 mg pm.   -  Keep HbA1C b/w 8-9% (per AGS there is a potential harm in lowering A1C to <6.5 % in older adults with diabetes), life expectancy less than 5 years, tight glucose control is note recommended       Morbid Obesity:  - Body mass index is 35.93 kg/(m^2). in this age group- frail elderly, keep BMI b/lw 25-35 Kg(m2). No intervention is needed at this stage.     Frail elderly  - Significant  Deficits requiring NH placement. Requiring extensive assistance from nursing. Up for meals only o/w spends the day resting in bed     Cognitive Impairment:  - Continue to anticipate needs. Chronic condition, ongoing decline expected.   -  Continue to provide redirection and reassurance as needed. Maintain safe living situation with goals focused on comfort.    Orders:  - See above, otherwise, continue the rest of the current POC.     Electronically signed by:  Anirudh Ye MD

## 2018-06-04 ENCOUNTER — NURSING HOME VISIT (OUTPATIENT)
Dept: GERIATRICS | Facility: CLINIC | Age: 83
End: 2018-06-04
Payer: COMMERCIAL

## 2018-06-04 DIAGNOSIS — I48.20 CHRONIC ATRIAL FIBRILLATION (H): ICD-10-CM

## 2018-06-04 DIAGNOSIS — I10 ESSENTIAL HYPERTENSION: ICD-10-CM

## 2018-06-04 DIAGNOSIS — E11.51 TYPE II DIABETES MELLITUS WITH PERIPHERAL CIRCULATORY DISORDER (H): ICD-10-CM

## 2018-06-04 DIAGNOSIS — I50.9 CHRONIC CONGESTIVE HEART FAILURE, UNSPECIFIED CONGESTIVE HEART FAILURE TYPE: ICD-10-CM

## 2018-06-04 DIAGNOSIS — E66.01 MORBID OBESITY (H): ICD-10-CM

## 2018-06-04 DIAGNOSIS — R41.89 COGNITIVE IMPAIRMENT: ICD-10-CM

## 2018-06-04 DIAGNOSIS — Z86.73 H/O: CVA (CEREBROVASCULAR ACCIDENT): Primary | ICD-10-CM

## 2018-06-04 LAB — INR PPP: 3.2 (ref 0.9–1.1)

## 2018-06-04 PROCEDURE — 99309 SBSQ NF CARE MODERATE MDM 30: CPT | Performed by: FAMILY MEDICINE

## 2018-06-04 NOTE — LETTER
6/4/2018        RE: Mecca Slade  [facility]  [address] [facility] [address]          [place] [facility]    Chief Complaint   Patient presents with     USP Regulatory       [place] Medical Record Number:  [id number]    HPI:    Mecca Slade is a 89 year old  (12/14/1921), who is being seen today for a federally mandated E/M visit at [facility] .  HPI information obtained from: facility chart records, facility staff, patient report and [facility] chart review.     Today's concerns are:  - Resident seen and examined. Reports is doing better now. Reports ankle and LB pain but better now. Uses WC for ambulation.   - Reports sleep, appetite and BM are fine.   - RN / GNP has no concern today.   -----------------------------------------  - - Past Medical, social, family histories, medications, and allergies reviewed and updated  - Medications reviewed: in the chart and EHR.   - Case Management:   I have reviewed the care plan and MDS and do agree with the plan. Patient's desire to return to the community is not present.  Information reviewed:  Medications, vital signs, orders, and nursing notes.    MEDICATIONS:  Current Outpatient Prescriptions   Medication Sig Dispense Refill     Acetaminophen (TYLENOL PO) Take 1,000 mg by mouth every 8 hours as needed for mild pain or fever       ATORVASTATIN CALCIUM PO Take 20 mg by mouth daily       carboxymethylcellulose (REFRESH PLUS) 0.5 % SOLN ophthalmic solution Place 1 drop into both eyes 3 times daily as needed for dry eyes       diclofenac (VOLTAREN) 1 % GEL topical gel Place 1 g onto the skin 2 times daily       Furosemide (LASIX PO) Take 40 mg by mouth daily       METFORMIN HCL PO Take 1,000 mg by mouth At Bedtime       METFORMIN HCL PO Take 500 mg by mouth daily (with breakfast)        METOPROLOL TARTRATE PO Take 25 mg by mouth daily       polyethylene glycol (MIRALAX/GLYCOLAX) Packet Take 17 g by  mouth daily       VERAPAMIL HCL PO Take 120 mg by mouth At Bedtime       VITAMIN D, CHOLECALCIFEROL, PO Take 2,000 Units by mouth daily       Warfarin Sodium (COUMADIN PO) Take 3 mg by mouth See Admin Instructions Give 3mg on M/W/F       Warfarin Sodium (COUMADIN PO) Take 2 mg by mouth daily Give on Tue-Thur--Sat- Sun       ROS:  4 point ROS including Respiratory, CV, GI and , other than that noted in the HPI,  is negative    Exam:  Vitals: /64  Pulse 86  Temp 97.6  F (36.4  C)  Resp 18  Wt 222 lb 9.6 oz (101 kg)  SpO2 95%  BMI 35.93 kg/m2  BMI= Body mass index is 35.93 kg/(m^2).  GENERAL APPEARANCE:  Alert, in no distress, obese  ENT: [place], edentulous  with denture plateboth levels  RESP:  respiratory effort and palpation of chest normal, lungs clear to auscultation , no respiratory distress  CV:  Palpation and auscultation of heart done , irregular rate and rhythm, no murmur, rub, or gallop, trace pedal edema b/l. Pacemaker.   ABDOMEN:  normal bowel sounds, soft, nontender, no hepatosplenomegaly or other masses  MSK: - Gait and station abnormal uses WC for ambulation. Ms strength: 4/5 LUE, 4/5 LLE.   SKIN:  Inspection of skin and subcutaneous tissue baseline, Palpation of skin and subcutaneous tissue baseline  NEURO:   no NFD appreciated on observation.   PSYCH:  oriented X 3, normal insight, judgement and memory, affect and mood normal    Lab/Diagnostic data: Reviewed in the chart and EHR.      ASSESSMENT/PLAN  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)  - Left sided residual weakness, stable.   - Off ASA, on lipitor 20 mg.   - requires assistance with ADLs.       Chronic congestive heart failure, unspecified congestive heart failure type (H)  Essential hypertension  - compensated. Continue meds    Atrial fibrillation, chronic type (H)  Long term use of OAC  - CVR, rate control with metoprolol and verapamil.   -  on coumadin with INR followed  closely        Type II diabetes mellitus with peripheral circulatory disorder (H)     A1C 7.9 08/07/2017      A1C 7.6 04/13/2017   - controlled.   - on metformin 500 mg in am and 1000 mg pm.   -  Keep HbA1C b/w 8-9% (per AGS there is a potential harm in lowering A1C to <6.5 % in older adults with diabetes), life expectancy less than 5 years, tight glucose control is note recommended       Morbid Obesity:  - Body mass index is 35.93 kg/(m^2). in this age group- frail elderly, keep BMI b/lw 25-35 Kg(m2). No intervention is needed at this stage.     Frail elderly  - Significant  Deficits requiring NH placement. Requiring extensive assistance from nursing. Up for meals only o/w spends the day resting in bed     Cognitive Impairment:  - Continue to anticipate needs. Chronic condition, ongoing decline expected.   -  Continue to provide redirection and reassurance as needed. Maintain safe living situation with goals focused on comfort.    Orders:  - See above, otherwise, continue the rest of the current POC.     Electronically signed by:  Anirudh Ye MD      Sincerely,        Anirudh Ye MD

## 2018-06-05 PROBLEM — N18.4 CKD (CHRONIC KIDNEY DISEASE) STAGE 4, GFR 15-29 ML/MIN (H): Status: RESOLVED | Noted: 2017-12-11 | Resolved: 2018-06-05

## 2018-06-05 PROBLEM — E66.09 CLASS 2 OBESITY DUE TO EXCESS CALORIES WITHOUT SERIOUS COMORBIDITY WITH BODY MASS INDEX (BMI) OF 35.0 TO 35.9 IN ADULT: Status: RESOLVED | Noted: 2018-04-12 | Resolved: 2018-06-05

## 2018-06-05 PROBLEM — E66.812 CLASS 2 OBESITY DUE TO EXCESS CALORIES WITHOUT SERIOUS COMORBIDITY WITH BODY MASS INDEX (BMI) OF 35.0 TO 35.9 IN ADULT: Status: RESOLVED | Noted: 2018-04-12 | Resolved: 2018-06-05

## 2018-06-05 PROBLEM — I48.91 ATRIAL FIBRILLATION, UNSPECIFIED TYPE (H): Status: RESOLVED | Noted: 2018-02-13 | Resolved: 2018-06-05

## 2018-06-05 PROBLEM — J06.9 VIRAL UPPER RESPIRATORY TRACT INFECTION: Status: RESOLVED | Noted: 2018-01-16 | Resolved: 2018-06-05

## 2018-06-05 PROBLEM — I10 ESSENTIAL HYPERTENSION: Status: RESOLVED | Noted: 2018-02-13 | Resolved: 2018-06-05

## 2018-06-05 PROBLEM — I48.91 A-FIB (H): Status: RESOLVED | Noted: 2017-04-03 | Resolved: 2018-06-05

## 2018-06-05 PROBLEM — M70.21 OLECRANON BURSITIS OF RIGHT ELBOW: Status: RESOLVED | Noted: 2018-02-13 | Resolved: 2018-06-05

## 2018-06-05 PROBLEM — M25.572 ACUTE LEFT ANKLE PAIN: Status: RESOLVED | Noted: 2018-05-15 | Resolved: 2018-06-05

## 2018-06-07 ENCOUNTER — RECORDS - HEALTHEAST (OUTPATIENT)
Dept: LAB | Facility: CLINIC | Age: 83
End: 2018-06-07

## 2018-06-08 LAB — INR PPP: 2.93 (ref 0.9–1.1)

## 2018-06-18 ENCOUNTER — RECORDS - HEALTHEAST (OUTPATIENT)
Dept: LAB | Facility: CLINIC | Age: 83
End: 2018-06-18

## 2018-06-19 ENCOUNTER — NURSING HOME VISIT (OUTPATIENT)
Dept: GERIATRICS | Facility: CLINIC | Age: 83
End: 2018-06-19
Payer: COMMERCIAL

## 2018-06-19 VITALS
BODY MASS INDEX: 35.35 KG/M2 | DIASTOLIC BLOOD PRESSURE: 64 MMHG | TEMPERATURE: 97.6 F | OXYGEN SATURATION: 95 % | RESPIRATION RATE: 18 BRPM | SYSTOLIC BLOOD PRESSURE: 128 MMHG | WEIGHT: 219 LBS | HEART RATE: 86 BPM

## 2018-06-19 DIAGNOSIS — I10 ESSENTIAL HYPERTENSION: ICD-10-CM

## 2018-06-19 DIAGNOSIS — R60.0 EDEMA, LOWER EXTREMITY: ICD-10-CM

## 2018-06-19 DIAGNOSIS — R41.89 COGNITIVE IMPAIRMENT: ICD-10-CM

## 2018-06-19 DIAGNOSIS — I50.9 CHRONIC CONGESTIVE HEART FAILURE, UNSPECIFIED CONGESTIVE HEART FAILURE TYPE: ICD-10-CM

## 2018-06-19 DIAGNOSIS — R53.81 DEBILITY: ICD-10-CM

## 2018-06-19 DIAGNOSIS — I48.20 CHRONIC ATRIAL FIBRILLATION (H): ICD-10-CM

## 2018-06-19 DIAGNOSIS — R54 FRAIL ELDERLY: ICD-10-CM

## 2018-06-19 DIAGNOSIS — M25.562 ACUTE PAIN OF LEFT KNEE: Primary | ICD-10-CM

## 2018-06-19 LAB — INR PPP: 2.48 (ref 0.9–1.1)

## 2018-06-19 PROCEDURE — 99309 SBSQ NF CARE MODERATE MDM 30: CPT | Performed by: NURSE PRACTITIONER

## 2018-06-19 RX ORDER — SENNOSIDES 8.6 MG
1 CAPSULE ORAL DAILY
COMMUNITY
End: 2019-03-29

## 2018-06-19 NOTE — LETTER
6/19/2018        RE: Mecca Slade  [facility]  [address] [facility] [address]        [place] [facility]    Chief Complaint   Patient presents with     retirement Acute       [place] Medical Record Number:  [id number]    HPI:    Mecca Slade is a 89 year old  (12/14/1921), who is being seen today for an episodic care visit at [facility] . HPI information obtained from: facility chart records, facility staff and patient report.  Also met with JEFF/Lexie at bedside.      Was called to Mrs. Slade's room due to c/o acute Left knee pain.  In meeting Mrs. Slade she reports her Left knee hurts but only with movement.  Reports it started a couple days ago.  She sleeps in a recliner and is WC dependent, uses a lift to get in between, thus does not bear weight or use her Left knee.  Reports the only time it's been hurting is in the morning when she wakes up in her recliner with legs out and when she needs to bend them to get into lift / WC, that hurts.  Once in the WC, the pain resolved and no pain until at night when she bends her knees again.  The other new pain she reports is she has new compression stockings that are digging into her skin just below the knees, reports this area hurts as well, L>R.  No other complaints.     ALLERGIES: Review of patient's allergies indicates no known allergies.  Past Medical, Surgical, Family and Social History reviewed and updated in [facility].    Current Outpatient Prescriptions   Medication Sig Dispense Refill     Acetaminophen (TYLENOL PO) Take 1,000 mg by mouth every morning And give Q8hrs PRN x 2 doses       ATORVASTATIN CALCIUM PO Take 20 mg by mouth daily       carboxymethylcellulose (REFRESH PLUS) 0.5 % SOLN ophthalmic solution Place 1 drop into both eyes 3 times daily as needed for dry eyes       diclofenac (VOLTAREN) 1 % GEL topical gel Place 1 g onto the skin 2 times daily       Furosemide (LASIX PO) Take 40 mg by mouth  daily       METFORMIN HCL PO Take 1,000 mg by mouth At Bedtime       METFORMIN HCL PO Take 500 mg by mouth daily (with breakfast)        METOPROLOL TARTRATE PO Take 25 mg by mouth daily       polyethylene glycol (MIRALAX/GLYCOLAX) Packet Take 17 g by mouth daily as needed        Sennosides (SENNA) 8.6 MG CAPS Take 1 Tablespoonful by mouth daily       VERAPAMIL HCL PO Take 120 mg by mouth At Bedtime       VITAMIN D, CHOLECALCIFEROL, PO Take 2,000 Units by mouth daily       Warfarin Sodium (COUMADIN PO) Take 3 mg by mouth See Admin Instructions Give 3mg on M/W/F       Warfarin Sodium (COUMADIN PO) Take 2 mg by mouth daily Give on Tue-Thur--Sat- Sun       Medications reviewed:  Medications reconciled to facility chart and changes were made to reflect current medications as identified as above med list. Below are the changes that were made:   Medications stopped since last EPIC medication reconciliation:   There are no discontinued medications.    Medications started since last [facility] medication reconciliation:  No orders of the defined types were placed in this encounter.    REVIEW OF SYSTEMS:  7 point ROS done including, light headedness/dizziness, fever/chills, pain, Resp, CV, GI, and  and is negative other than noted in HPI.      Physical Exam:  /64  Pulse 86  Temp 97.6  F (36.4  C)  Resp 18  Wt 219 lb (99.3 kg)  SpO2 95%  BMI 35.35 kg/m2     GENERAL APPEARANCE:  Elderly obese female sitting up in WC, NAD, non-toxic.  ENT:  Mouth and oropharynx normal, moist mucous membranes.   RESP:  Lungs CTA.  Regular relaxed breathing effort.  No cough.   CV: S1/S2 no murmur or rubs.  Regular rhythm and rate.    ABDOMEN:   Obese, soft, non-tender with active bowel sounds.  No guarding, rigidity, or rebound tenderness.  EXTREMITIES:  No LE edema currently with compression in place, 1-2+ edema starts where compression ends just below knee, clear area of demarkation where compression ends on skin.  No calf tenderness.    PSYCH: Alert and orientated, pleasant and cooperative.  Mild-moderate cognitive/memory impairment, stable/unchanged.   JOINTS: No swelling, redness, warmth in Left knee.  Does endorse pain with passive ROM, mostly anteriorly but does endorse some posterior knee pain.  No pain at rest.      Recent Labs: None recent.   I have personally reviewed glucose checks.     Assessment/Plan:  Acute pain of left knee  Edema, lower extremity  Chronic congestive heart failure, unspecified congestive heart failure type (H)  She does have a h/o CHF, but weights stable and ongoing LE edema is likely dependent edema, as there are no other s/s of clinical CHF.  Etiology of Left knee pain is unclear.  She does have known DJD in the Right knee, we do not have imaging of the Left but suspect there is a degree of DJD contributing.  No fall/trauma.  There is localized edema but query if that is from the new compression stockings she has that goes to just below the knee.  No edema where stocking is but 1+2 where it ends in the bilateral knee area.  There is clear demarcation where the compression ends, does appear overly tight.  Thus query if this knee pain could be related to skin issue and overly tight compression, or did she just aggravate her DJD somehow during a transfer.   No s/s of infection or trauma.     On a side note in the past several months she has had various c/o pain in her other joints, intermittent in nature.  She reports she has pain thus we make her Acetaminophen scheduled TID, then later she reports she feels better, does not want the Acetaminophen thus we switch back to PRN, then she notes random joint pain.  She has not used any Acetaminophen in last few weeks.  We strongly encouraged her Acetaminophen to be TID scheduled, she declined.    -Will schedule 1 dose of Acetaminophen 1000 mg qAM for now, PRN q8h rest of the day.   -Will have OT re-eval compression to see if they feel it's to tight and adjust.   -If not  improved in one week, we will get imaging/xray.   -If not improved in couple weeks, will have Dr. Ye assess for possible injection therapy.     Essential hypertension  Chronic atrial fibrillation (H)  Review of VS's show VSS and within acceptable range.   -Warfarin therapeutic, orders below.     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  Cognitive impairment  Frail elderly  Stable/unchanged.     Orders:  1. Change acetaminophen to 1000 mg scheduled in AM for pain Can have Q8hrs PRN after to a max of 2 times per day  2. have OT eval and treat, query if new compression socks are too tight?  3. Change Miralax 17g daily to PRN daily Dx: Constipation  4. Start Senna 8.6 mg daily for constipation. Hold for loose stools.   5. Call lab and have them send Vit D results to [facility] and put in my book for review, was done in April 2018  6. Warfarin 2 mg daily Dx A-fib  7. Next INR on July 7th Dx: A-fib    Electronically signed by  JOSUE Monroy CNP      Sincerely,        JOSUE Monroy CNP

## 2018-06-19 NOTE — PROGRESS NOTES
[place] [facility]    Chief Complaint   Patient presents with     jail Acute       [place] Medical Record Number:  [id number]    HPI:    Mecca Slade is a 89 year old  (12/14/1921), who is being seen today for an episodic care visit at [facility] . HPI information obtained from: facility chart records, facility staff and patient report.  Also met with JEFF/Lexie at bedside.      Was called to Mrs. Slade's room due to c/o acute Left knee pain.  In meeting Mrs. Slade she reports her Left knee hurts but only with movement.  Reports it started a couple days ago.  She sleeps in a recliner and is WC dependent, uses a lift to get in between, thus does not bear weight or use her Left knee.  Reports the only time it's been hurting is in the morning when she wakes up in her recliner with legs out and when she needs to bend them to get into lift / WC, that hurts.  Once in the WC, the pain resolved and no pain until at night when she bends her knees again.  The other new pain she reports is she has new compression stockings that are digging into her skin just below the knees, reports this area hurts as well, L>R.  No other complaints.     ALLERGIES: Review of patient's allergies indicates no known allergies.  Past Medical, Surgical, Family and Social History reviewed and updated in [facility].    Current Outpatient Prescriptions   Medication Sig Dispense Refill     Acetaminophen (TYLENOL PO) Take 1,000 mg by mouth every morning And give Q8hrs PRN x 2 doses       ATORVASTATIN CALCIUM PO Take 20 mg by mouth daily       carboxymethylcellulose (REFRESH PLUS) 0.5 % SOLN ophthalmic solution Place 1 drop into both eyes 3 times daily as needed for dry eyes       diclofenac (VOLTAREN) 1 % GEL topical gel Place 1 g onto the skin 2 times daily       Furosemide (LASIX PO) Take 40 mg by mouth daily       METFORMIN HCL PO Take 1,000 mg by mouth At Bedtime       METFORMIN HCL PO Take 500 mg by mouth daily  (with breakfast)        METOPROLOL TARTRATE PO Take 25 mg by mouth daily       polyethylene glycol (MIRALAX/GLYCOLAX) Packet Take 17 g by mouth daily as needed        Sennosides (SENNA) 8.6 MG CAPS Take 1 Tablespoonful by mouth daily       VERAPAMIL HCL PO Take 120 mg by mouth At Bedtime       VITAMIN D, CHOLECALCIFEROL, PO Take 2,000 Units by mouth daily       Warfarin Sodium (COUMADIN PO) Take 3 mg by mouth See Admin Instructions Give 3mg on M/W/F       Warfarin Sodium (COUMADIN PO) Take 2 mg by mouth daily Give on Tue-Thur--Sat- Sun       Medications reviewed:  Medications reconciled to facility chart and changes were made to reflect current medications as identified as above med list. Below are the changes that were made:   Medications stopped since last EPIC medication reconciliation:   There are no discontinued medications.    Medications started since last [facility] medication reconciliation:  No orders of the defined types were placed in this encounter.    REVIEW OF SYSTEMS:  7 point ROS done including, light headedness/dizziness, fever/chills, pain, Resp, CV, GI, and  and is negative other than noted in HPI.      Physical Exam:  /64  Pulse 86  Temp 97.6  F (36.4  C)  Resp 18  Wt 219 lb (99.3 kg)  SpO2 95%  BMI 35.35 kg/m2     GENERAL APPEARANCE:  Elderly obese female sitting up in WC, NAD, non-toxic.  ENT:  Mouth and oropharynx normal, moist mucous membranes.   RESP:  Lungs CTA.  Regular relaxed breathing effort.  No cough.   CV: S1/S2 no murmur or rubs.  Regular rhythm and rate.    ABDOMEN:   Obese, soft, non-tender with active bowel sounds.  No guarding, rigidity, or rebound tenderness.  EXTREMITIES:  No LE edema currently with compression in place, 1-2+ edema starts where compression ends just below knee, clear area of demarkation where compression ends on skin.  No calf tenderness.   PSYCH: Alert and orientated, pleasant and cooperative.  Mild-moderate cognitive/memory impairment,  stable/unchanged.   JOINTS: No swelling, redness, warmth in Left knee.  Does endorse pain with passive ROM, mostly anteriorly but does endorse some posterior knee pain.  No pain at rest.      Recent Labs: None recent.   I have personally reviewed glucose checks.     Assessment/Plan:  Acute pain of left knee  Edema, lower extremity  Chronic congestive heart failure, unspecified congestive heart failure type (H)  She does have a h/o CHF, but weights stable and ongoing LE edema is likely dependent edema, as there are no other s/s of clinical CHF.  Etiology of Left knee pain is unclear.  She does have known DJD in the Right knee, we do not have imaging of the Left but suspect there is a degree of DJD contributing.  No fall/trauma.  There is localized edema but query if that is from the new compression stockings she has that goes to just below the knee.  No edema where stocking is but 1+2 where it ends in the bilateral knee area.  There is clear demarcation where the compression ends, does appear overly tight.  Thus query if this knee pain could be related to skin issue and overly tight compression, or did she just aggravate her DJD somehow during a transfer.   No s/s of infection or trauma.     On a side note in the past several months she has had various c/o pain in her other joints, intermittent in nature.  She reports she has pain thus we make her Acetaminophen scheduled TID, then later she reports she feels better, does not want the Acetaminophen thus we switch back to PRN, then she notes random joint pain.  She has not used any Acetaminophen in last few weeks.  We strongly encouraged her Acetaminophen to be TID scheduled, she declined.    -Will schedule 1 dose of Acetaminophen 1000 mg qAM for now, PRN q8h rest of the day.   -Will have OT re-eval compression to see if they feel it's to tight and adjust.   -If not improved in one week, we will get imaging/xray.   -If not improved in couple weeks, will have Dr. Ye  assess for possible injection therapy.     Essential hypertension  Chronic atrial fibrillation (H)  Review of VS's show VSS and within acceptable range.   -Warfarin therapeutic, orders below.     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  Cognitive impairment  Frail elderly  Stable/unchanged.     Orders:  1. Change acetaminophen to 1000 mg scheduled in AM for pain Can have Q8hrs PRN after to a max of 2 times per day  2. have OT eval and treat, query if new compression socks are too tight?  3. Change Miralax 17g daily to PRN daily Dx: Constipation  4. Start Senna 8.6 mg daily for constipation. Hold for loose stools.   5. Call lab and have them send Vit D results to  and put in my book for review, was done in April 2018  6. Warfarin 2 mg daily Dx A-fib  7. Next INR on July 7th Dx: A-fib    Electronically signed by  JOSUE Monroy CNP

## 2018-07-05 ENCOUNTER — NURSING HOME VISIT (OUTPATIENT)
Dept: GERIATRICS | Facility: CLINIC | Age: 83
End: 2018-07-05
Payer: COMMERCIAL

## 2018-07-05 VITALS
RESPIRATION RATE: 16 BRPM | TEMPERATURE: 97.6 F | BODY MASS INDEX: 36.12 KG/M2 | SYSTOLIC BLOOD PRESSURE: 136 MMHG | OXYGEN SATURATION: 96 % | WEIGHT: 223.8 LBS | DIASTOLIC BLOOD PRESSURE: 78 MMHG | HEART RATE: 78 BPM

## 2018-07-05 DIAGNOSIS — I48.20 CHRONIC ATRIAL FIBRILLATION (H): ICD-10-CM

## 2018-07-05 DIAGNOSIS — I50.9 CHRONIC CONGESTIVE HEART FAILURE, UNSPECIFIED CONGESTIVE HEART FAILURE TYPE: ICD-10-CM

## 2018-07-05 DIAGNOSIS — R60.0 EDEMA, LOWER EXTREMITY: Primary | ICD-10-CM

## 2018-07-05 DIAGNOSIS — M79.606 ACUTE PAIN OF LOWER EXTREMITY, UNSPECIFIED LATERALITY: ICD-10-CM

## 2018-07-05 DIAGNOSIS — R53.81 DEBILITY: ICD-10-CM

## 2018-07-05 DIAGNOSIS — I10 ESSENTIAL HYPERTENSION: ICD-10-CM

## 2018-07-05 PROCEDURE — 99308 SBSQ NF CARE LOW MDM 20: CPT | Performed by: NURSE PRACTITIONER

## 2018-07-05 NOTE — LETTER
7/5/2018        RE: Mecca Slade  [facility]  [address] [facility] [address]        [place] [facility]    Chief Complaint   Patient presents with     USP Acute       [place] Medical Record Number:  [id number]    HPI:    Mecca Slade is a 89 year old  (12/14/1921), who is being seen today for an episodic care visit at [facility] .  HPI information obtained from: facility chart records, facility staff and patient report.    Was called to Mrs. Slade's room today due to c/o newer LE pain.  In meeting Mrs. Slade she is endorsing the newer compression stockings are cutting into her leg.  On review, they are overly tight.  We met with her a couple weeks ago about this same issue.  She still has the same compression stockings on.  Besides the pain from the stockings, she has no other complaints.  Reports her prior knee pain as much better since stopping with PT/OT.     ALLERGIES: Review of patient's allergies indicates no known allergies.  Past Medical, Surgical, Family and Social History reviewed and updated in [facility].    Current Outpatient Prescriptions   Medication Sig Dispense Refill     Acetaminophen (TYLENOL PO) Take 1,000 mg by mouth every morning And give Q8hrs PRN x 2 doses       ATORVASTATIN CALCIUM PO Take 20 mg by mouth daily       carboxymethylcellulose (REFRESH PLUS) 0.5 % SOLN ophthalmic solution Place 1 drop into both eyes 3 times daily as needed for dry eyes       diclofenac (VOLTAREN) 1 % GEL topical gel Place 1 g onto the skin 2 times daily       Furosemide (LASIX PO) Take 40 mg by mouth daily       METFORMIN HCL PO Take 1,000 mg by mouth At Bedtime       METFORMIN HCL PO Take 500 mg by mouth daily (with breakfast)        METOPROLOL TARTRATE PO Take 25 mg by mouth daily       polyethylene glycol (MIRALAX/GLYCOLAX) Packet Take 17 g by mouth daily as needed        Sennosides (SENNA) 8.6 MG CAPS Take 1 Tablespoonful by mouth daily        VERAPAMIL HCL PO Take 120 mg by mouth At Bedtime       VITAMIN D, CHOLECALCIFEROL, PO Take 2,000 Units by mouth daily       Warfarin Sodium (COUMADIN PO) Take 3 mg by mouth See Admin Instructions Give 3mg on M/W/F       Warfarin Sodium (COUMADIN PO) Take 2 mg by mouth daily Give on Tue-Thur--Sat- Sun       Medications reviewed:  Medications reconciled to facility chart and changes were made to reflect current medications as identified as above med list. Below are the changes that were made:   Medications stopped since last EPIC medication reconciliation:   There are no discontinued medications.    Medications started since last [facility] medication reconciliation:  No orders of the defined types were placed in this encounter.    REVIEW OF SYSTEMS:  7 point ROS done including, light headedness/dizziness, fever/chills, pain, Resp, CV, GI, and  and is negative other than noted in HPI.      Physical Exam:  /78  Pulse 78  Temp 97.6  F (36.4  C)  Resp 16  Wt 223 lb 12.8 oz (101.5 kg)  SpO2 96%  BMI 36.12 kg/m2     GENERAL APPEARANCE:  Elderly obese female sitting up in WC, NAD, non-toxic.  ENT:  Mouth and oropharynx normal, moist mucous membranes.   RESP:  Lungs CTA.  Regular relaxed breathing effort.  No cough.   CV: S1/S2 no murmur or rubs.  Regular rhythm and rate.    ABDOMEN:   Obese, soft, non-tender with active bowel sounds.  No guarding, rigidity, or rebound tenderness.  EXTREMITIES:  No LE edema currently with compression in place, 1+ edema starts where compression ends just below knee, clear area of demarkation where compression ends on skin, bilateral, but no skin breakdown.  No calf tenderness.   PSYCH: Alert and orientated, pleasant and cooperative.  Mild-moderate cognitive/memory impairment, stable/unchanged.     Recent Labs:  None recent.     Assessment/Plan:  Acute pain of lower extremity, unspecified laterality  Edema, lower extremity  As noted last week, the new compression stocking are clearly  to tight and digging into her skin below the knee area, but no skin breakdown.  Edema is clearly better where compression is, but now is just above stockings.  Last week asked OT to eval/treat, she still has same stockings on.  OT reports they are working on getting re-measured/re-ordered.   -DC new compression completely.   -Use old Tenso's for now if still available.   -OT continues to work on getting new compression.     Chronic congestive heart failure, unspecified congestive heart failure type (H)  Essential hypertension  Chronic atrial fibrillation (H)  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  Review of VS's show VSS and within acceptable range.   No overt clinical s/s of CHF, weight is stable.   Thus: We do not feel that CHF exacerbation or fluid is the etiology of they stocking/pain, but feel the compression is too tight, as her fluid balance appears stable.  There is a degree of dependency contributing.     Orders:  1. D/C Compression stocking for now or the tan new ones. Dx: Too tight  2. Use older Tenso stockings if still available  3. OT to continue toe sylvia and treat LE edema    Electronically signed by  JOSUE Monroy CNP      Sincerely,        JOSUE Monroy CNP

## 2018-07-05 NOTE — PROGRESS NOTES
[place] [facility]    Chief Complaint   Patient presents with     prison Acute       [place] Medical Record Number:  [id number]    HPI:    Mecca Slade is a 89 year old  (12/14/1921), who is being seen today for an episodic care visit at [facility] .  HPI information obtained from: facility chart records, facility staff and patient report.    Was called to Mrs. Slade's room today due to c/o newer LE pain.  In meeting Mrs. Slade she is endorsing the newer compression stockings are cutting into her leg.  On review, they are overly tight.  We met with her a couple weeks ago about this same issue.  She still has the same compression stockings on.  Besides the pain from the stockings, she has no other complaints.  Reports her prior knee pain as much better since stopping with PT/OT.     ALLERGIES: Review of patient's allergies indicates no known allergies.  Past Medical, Surgical, Family and Social History reviewed and updated in [facility].    Current Outpatient Prescriptions   Medication Sig Dispense Refill     Acetaminophen (TYLENOL PO) Take 1,000 mg by mouth every morning And give Q8hrs PRN x 2 doses       ATORVASTATIN CALCIUM PO Take 20 mg by mouth daily       carboxymethylcellulose (REFRESH PLUS) 0.5 % SOLN ophthalmic solution Place 1 drop into both eyes 3 times daily as needed for dry eyes       diclofenac (VOLTAREN) 1 % GEL topical gel Place 1 g onto the skin 2 times daily       Furosemide (LASIX PO) Take 40 mg by mouth daily       METFORMIN HCL PO Take 1,000 mg by mouth At Bedtime       METFORMIN HCL PO Take 500 mg by mouth daily (with breakfast)        METOPROLOL TARTRATE PO Take 25 mg by mouth daily       polyethylene glycol (MIRALAX/GLYCOLAX) Packet Take 17 g by mouth daily as needed        Sennosides (SENNA) 8.6 MG CAPS Take 1 Tablespoonful by mouth daily       VERAPAMIL HCL PO Take 120 mg by mouth At Bedtime       VITAMIN D, CHOLECALCIFEROL, PO Take 2,000 Units by mouth  daily       Warfarin Sodium (COUMADIN PO) Take 3 mg by mouth See Admin Instructions Give 3mg on M/W/F       Warfarin Sodium (COUMADIN PO) Take 2 mg by mouth daily Give on Tue-Thur--Sat- Sun       Medications reviewed:  Medications reconciled to facility chart and changes were made to reflect current medications as identified as above med list. Below are the changes that were made:   Medications stopped since last EPIC medication reconciliation:   There are no discontinued medications.    Medications started since last [facility] medication reconciliation:  No orders of the defined types were placed in this encounter.    REVIEW OF SYSTEMS:  7 point ROS done including, light headedness/dizziness, fever/chills, pain, Resp, CV, GI, and  and is negative other than noted in HPI.      Physical Exam:  /78  Pulse 78  Temp 97.6  F (36.4  C)  Resp 16  Wt 223 lb 12.8 oz (101.5 kg)  SpO2 96%  BMI 36.12 kg/m2     GENERAL APPEARANCE:  Elderly obese female sitting up in WC, NAD, non-toxic.  ENT:  Mouth and oropharynx normal, moist mucous membranes.   RESP:  Lungs CTA.  Regular relaxed breathing effort.  No cough.   CV: S1/S2 no murmur or rubs.  Regular rhythm and rate.    ABDOMEN:   Obese, soft, non-tender with active bowel sounds.  No guarding, rigidity, or rebound tenderness.  EXTREMITIES:  No LE edema currently with compression in place, 1+ edema starts where compression ends just below knee, clear area of demarkation where compression ends on skin, bilateral, but no skin breakdown.  No calf tenderness.   PSYCH: Alert and orientated, pleasant and cooperative.  Mild-moderate cognitive/memory impairment, stable/unchanged.     Recent Labs:  None recent.     Assessment/Plan:  Acute pain of lower extremity, unspecified laterality  Edema, lower extremity  As noted last week, the new compression stocking are clearly to tight and digging into her skin below the knee area, but no skin breakdown.  Edema is clearly better where  compression is, but now is just above stockings.  Last week asked OT to eval/treat, she still has same stockings on.  OT reports they are working on getting re-measured/re-ordered.   -DC new compression completely.   -Use old Tenso's for now if still available.   -OT continues to work on getting new compression.     Chronic congestive heart failure, unspecified congestive heart failure type (H)  Essential hypertension  Chronic atrial fibrillation (H)  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  Review of VS's show VSS and within acceptable range.   No overt clinical s/s of CHF, weight is stable.   Thus: We do not feel that CHF exacerbation or fluid is the etiology of they stocking/pain, but feel the compression is too tight, as her fluid balance appears stable.  There is a degree of dependency contributing.     Orders:  1. D/C Compression stocking for now or the tan new ones. Dx: Too tight  2. Use older Tenso stockings if still available  3. OT to continue toe sylvia and treat LE edema    Electronically signed by  JOSUE Monroy CNP

## 2018-07-16 ENCOUNTER — RECORDS - HEALTHEAST (OUTPATIENT)
Dept: LAB | Facility: CLINIC | Age: 83
End: 2018-07-16

## 2018-07-17 LAB — INR PPP: 2.65 (ref 0.9–1.1)

## 2018-07-24 ENCOUNTER — NURSING HOME VISIT (OUTPATIENT)
Dept: GERIATRICS | Facility: CLINIC | Age: 83
End: 2018-07-24
Payer: COMMERCIAL

## 2018-07-24 VITALS
TEMPERATURE: 98.2 F | BODY MASS INDEX: 35.83 KG/M2 | OXYGEN SATURATION: 96 % | SYSTOLIC BLOOD PRESSURE: 148 MMHG | HEART RATE: 88 BPM | DIASTOLIC BLOOD PRESSURE: 86 MMHG | WEIGHT: 222 LBS | RESPIRATION RATE: 18 BRPM

## 2018-07-24 DIAGNOSIS — I10 ESSENTIAL HYPERTENSION: ICD-10-CM

## 2018-07-24 DIAGNOSIS — I50.9 CHRONIC CONGESTIVE HEART FAILURE, UNSPECIFIED CONGESTIVE HEART FAILURE TYPE: ICD-10-CM

## 2018-07-24 DIAGNOSIS — R53.81 DEBILITY: ICD-10-CM

## 2018-07-24 DIAGNOSIS — R10.13 ABDOMINAL PAIN, EPIGASTRIC: Primary | ICD-10-CM

## 2018-07-24 DIAGNOSIS — I48.20 CHRONIC ATRIAL FIBRILLATION (H): ICD-10-CM

## 2018-07-24 DIAGNOSIS — E11.51 TYPE II DIABETES MELLITUS WITH PERIPHERAL CIRCULATORY DISORDER (H): ICD-10-CM

## 2018-07-24 PROCEDURE — 99309 SBSQ NF CARE MODERATE MDM 30: CPT | Performed by: NURSE PRACTITIONER

## 2018-07-24 NOTE — LETTER
7/24/2018        RE: Mecca Slade  [facility]  [address] [place] [id number]        [place] [facility]    Chief Complaint   Patient presents with     half-way Acute       [place] Medical Record Number:  [id number]    HPI:    Mecca Slade is a 89 year old  (12/14/1921), who is being seen today for an episodic care visit at [facility] .  HPI information obtained from: facility chart records, facility staff and patient report.    We met with Mrs. Slade for follow up per family request.  Starting roughly 8 weeks ago Mrs. Slade started noting mild abdominal discomfort.  At first was unclear if constipation, gas, or just hungry.  These came and went and we modified and changed her bowel regimen around but today she continues to endorse mild 4/10 abdominal discomfort which she has now correlated to 30-60 minutes after eating, 4/10, and resolves after that.  No N/V, does get hungry and full feelings at times.  Reports ongoing bowel movements, she thinks they are normal.  No other complaints.     ALLERGIES: Review of patient's allergies indicates no known allergies.  Past Medical, Surgical, Family and Social History reviewed and updated in [facility].    Current Outpatient Prescriptions   Medication Sig Dispense Refill     Acetaminophen (TYLENOL PO) Take 1,000 mg by mouth every morning And give Q8hrs PRN x 2 doses       ATORVASTATIN CALCIUM PO Take 20 mg by mouth daily       carboxymethylcellulose (REFRESH PLUS) 0.5 % SOLN ophthalmic solution Place 1 drop into both eyes 3 times daily as needed for dry eyes       diclofenac (VOLTAREN) 1 % GEL topical gel Place 1 g onto the skin 2 times daily       Furosemide (LASIX PO) Take 40 mg by mouth daily       METFORMIN HCL PO Take 1,000 mg by mouth At Bedtime       METFORMIN HCL PO Take 500 mg by mouth daily (with breakfast)        METOPROLOL TARTRATE PO Take 25 mg by mouth daily       polyethylene glycol  (MIRALAX/GLYCOLAX) Packet Take 17 g by mouth daily as needed        Sennosides (SENNA) 8.6 MG CAPS Take 1 Tablespoonful by mouth daily       VERAPAMIL HCL PO Take 120 mg by mouth At Bedtime       VITAMIN D, CHOLECALCIFEROL, PO Take 2,000 Units by mouth daily       Warfarin Sodium (COUMADIN PO) Take 3 mg by mouth See Admin Instructions Give 3mg on M/W/F       Warfarin Sodium (COUMADIN PO) Take 2 mg by mouth daily Give on Tue-Thur--Sat- Sun       Medications reviewed:  Medications reconciled to facility chart and changes were made to reflect current medications as identified as above med list. Below are the changes that were made:   Medications stopped since last EPIC medication reconciliation:   There are no discontinued medications.    Medications started since last [facility] medication reconciliation:  No orders of the defined types were placed in this encounter.    REVIEW OF SYSTEMS:  7 point ROS done including, light headedness/dizziness, fever/chills, pain, Resp, CV, GI, and  and is negative other than noted in HPI.      Physical Exam:  /86  Pulse 88  Temp 98.2  F (36.8  C)  Resp 18  Wt 222 lb (100.7 kg)  SpO2 96%  BMI 35.83 kg/m2     GENERAL APPEARANCE:  Elderly obese female laying down in recliner, NAD, non-toxic.  ENT:  Mouth and oropharynx normal, moist mucous membranes.   RESP:  Lungs CTA.  Regular relaxed breathing effort.  No cough.   CV: S1/S2 no murmur or rubs.  Regular rhythm and rate.    ABDOMEN:   Obese, soft, non-tender with active bowel sounds.  No guarding, rigidity, or rebound tenderness.  EXTREMITIES:  Trace bilateral edema, no calf tenderness.   PSYCH: Alert and orientated, pleasant and cooperative.  Mild-moderate cognitive/memory impairment, stable/unchanged.    Recent Labs: I have personally reviewed glucose checks.     Assessment/Plan:  Abdominal pain, epigastric, post prandial  She does not report this as overt pain, more as discomfort, epigastric area.  Reports going on for  roughly two months.  She now has more data in that it is post prandial, lasts for 30-60 minutes and does go way on it's own.  No N/V, no cramping, does get hungry/full feelings, and having BM's.  On exam she is benign with no surgical warning signs.      Etiology is unclear, with more specific post prandial, we now query gastroparesis or possible mesenteric insufficiency etiology but in absence of overt/sharp pain, we would favor the gastroparesis with the dull pain that resolves with time.     Plan: Will get CBC, CMP, Lipase and Abd AP x ray for completeness.  If all normal, we will consider empiric treatment for gastroparesis.  If any findings, will treat as indicated.  No changes at this time.      Type II diabetes mellitus with peripheral circulatory disorder (H)  Review of weekly glucoses show ongoing good control.   -No changes, continue to clinically monitor.     Chronic atrial fibrillation (H)  Chronic congestive heart failure, unspecified congestive heart failure type (H)  Essential hypertension  Review of VS's show VSS and within acceptable range.   No current s/s of clinical CHF.   -No changes, continue to clinically monitor.     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  Stable/unchanged.     Orders:  1. CBC with Diff and CMP and Lipase on 25 July  Dx: ABD pain  2. X-ray AP view abdomen due to ABD pain -order under gavino ibrahim NP on 24-25 July please    Electronically signed by  JOSUE Monroy CNP      Sincerely,        JOSUE Monroy CNP

## 2018-07-24 NOTE — PROGRESS NOTES
[place] [facility]    Chief Complaint   Patient presents with     [facility] Acute       [place] Medical Record Number:  [id number]    HPI:    Mecca Slade is a 89 year old  (12/14/1921), who is being seen today for an episodic care visit at [facility] .  HPI information obtained from: facility chart records, facility staff and patient report.    We met with Mrs. Slade for follow up per family request.  Starting roughly 8 weeks ago Mrs. Slade started noting mild abdominal discomfort.  At first was unclear if constipation, gas, or just hungry.  These came and went and we modified and changed her bowel regimen around but today she continues to endorse mild 4/10 abdominal discomfort which she has now correlated to 30-60 minutes after eating, 4/10, and resolves after that.  No N/V, does get hungry and full feelings at times.  Reports ongoing bowel movements, she thinks they are normal.  No other complaints.     ALLERGIES: Review of patient's allergies indicates no known allergies.  Past Medical, Surgical, Family and Social History reviewed and updated in [facility].    Current Outpatient Prescriptions   Medication Sig Dispense Refill     Acetaminophen (TYLENOL PO) Take 1,000 mg by mouth every morning And give Q8hrs PRN x 2 doses       ATORVASTATIN CALCIUM PO Take 20 mg by mouth daily       carboxymethylcellulose (REFRESH PLUS) 0.5 % SOLN ophthalmic solution Place 1 drop into both eyes 3 times daily as needed for dry eyes       diclofenac (VOLTAREN) 1 % GEL topical gel Place 1 g onto the skin 2 times daily       Furosemide (LASIX PO) Take 40 mg by mouth daily       METFORMIN HCL PO Take 1,000 mg by mouth At Bedtime       METFORMIN HCL PO Take 500 mg by mouth daily (with breakfast)        METOPROLOL TARTRATE PO Take 25 mg by mouth daily       polyethylene glycol (MIRALAX/GLYCOLAX) Packet Take 17 g by mouth daily as needed        Sennosides (SENNA) 8.6 MG CAPS Take 1 Tablespoonful by mouth  daily       VERAPAMIL HCL PO Take 120 mg by mouth At Bedtime       VITAMIN D, CHOLECALCIFEROL, PO Take 2,000 Units by mouth daily       Warfarin Sodium (COUMADIN PO) Take 3 mg by mouth See Admin Instructions Give 3mg on M/W/F       Warfarin Sodium (COUMADIN PO) Take 2 mg by mouth daily Give on Tue-Thur--Sat- Sun       Medications reviewed:  Medications reconciled to facility chart and changes were made to reflect current medications as identified as above med list. Below are the changes that were made:   Medications stopped since last EPIC medication reconciliation:   There are no discontinued medications.    Medications started since last [facility] medication reconciliation:  No orders of the defined types were placed in this encounter.    REVIEW OF SYSTEMS:  7 point ROS done including, light headedness/dizziness, fever/chills, pain, Resp, CV, GI, and  and is negative other than noted in HPI.      Physical Exam:  /86  Pulse 88  Temp 98.2  F (36.8  C)  Resp 18  Wt 222 lb (100.7 kg)  SpO2 96%  BMI 35.83 kg/m2     GENERAL APPEARANCE:  Elderly obese female laying down in recliner, NAD, non-toxic.  ENT:  Mouth and oropharynx normal, moist mucous membranes.   RESP:  Lungs CTA.  Regular relaxed breathing effort.  No cough.   CV: S1/S2 no murmur or rubs.  Regular rhythm and rate.    ABDOMEN:   Obese, soft, non-tender with active bowel sounds.  No guarding, rigidity, or rebound tenderness.  EXTREMITIES:  Trace bilateral edema, no calf tenderness.   PSYCH: Alert and orientated, pleasant and cooperative.  Mild-moderate cognitive/memory impairment, stable/unchanged.    Recent Labs: I have personally reviewed glucose checks.     Assessment/Plan:  Abdominal pain, epigastric, post prandial  She does not report this as overt pain, more as discomfort, epigastric area.  Reports going on for roughly two months.  She now has more data in that it is post prandial, lasts for 30-60 minutes and does go way on it's own.  No N/V,  no cramping, does get hungry/full feelings, and having BM's.  On exam she is benign with no surgical warning signs.      Etiology is unclear, with more specific post prandial, we now query gastroparesis or possible mesenteric insufficiency etiology but in absence of overt/sharp pain, we would favor the gastroparesis with the dull pain that resolves with time.     Plan: Will get CBC, CMP, Lipase and Abd AP x ray for completeness.  If all normal, we will consider empiric treatment for gastroparesis.  If any findings, will treat as indicated.  No changes at this time.      Type II diabetes mellitus with peripheral circulatory disorder (H)  Review of weekly glucoses show ongoing good control.   -No changes, continue to clinically monitor.     Chronic atrial fibrillation (H)  Chronic congestive heart failure, unspecified congestive heart failure type (H)  Essential hypertension  Review of VS's show VSS and within acceptable range.   No current s/s of clinical CHF.   -No changes, continue to clinically monitor.     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  Stable/unchanged.     Orders:  1. CBC with Diff and CMP and Lipase on 25 July  Dx: ABD pain  2. X-ray AP view abdomen due to ABD pain -order under gavino ibrahim NP on 24-25 July please    Electronically signed by  JOSUE Monroy CNP

## 2018-07-25 ENCOUNTER — RECORDS - HEALTHEAST (OUTPATIENT)
Dept: LAB | Facility: CLINIC | Age: 83
End: 2018-07-25

## 2018-07-25 LAB
ALBUMIN SERPL-MCNC: 3.1 G/DL (ref 3.5–5)
ALP SERPL-CCNC: 106 U/L (ref 45–120)
ALT SERPL W P-5'-P-CCNC: <9 U/L (ref 0–45)
ANION GAP SERPL CALCULATED.3IONS-SCNC: 7 MMOL/L (ref 5–18)
AST SERPL W P-5'-P-CCNC: 23 U/L (ref 0–40)
BILIRUB SERPL-MCNC: 0.6 MG/DL (ref 0–1)
BUN SERPL-MCNC: 38 MG/DL (ref 8–28)
CALCIUM SERPL-MCNC: 10.8 MG/DL (ref 8.5–10.5)
CHLORIDE BLD-SCNC: 105 MMOL/L (ref 98–107)
CO2 SERPL-SCNC: 31 MMOL/L (ref 22–31)
CREAT SERPL-MCNC: 0.87 MG/DL (ref 0.6–1.1)
ERYTHROCYTE [DISTWIDTH] IN BLOOD BY AUTOMATED COUNT: 13.5 % (ref 11–14.5)
GFR SERPL CREATININE-BSD FRML MDRD: 60 ML/MIN/1.73M2
GLUCOSE BLD-MCNC: 123 MG/DL (ref 70–125)
HCT VFR BLD AUTO: 40 % (ref 35–47)
HGB BLD-MCNC: 12.5 G/DL (ref 12–16)
LIPASE SERPL-CCNC: 44 U/L (ref 0–52)
MCH RBC QN AUTO: 32 PG (ref 27–34)
MCHC RBC AUTO-ENTMCNC: 31.3 G/DL (ref 32–36)
MCV RBC AUTO: 102 FL (ref 80–100)
PLATELET # BLD AUTO: 173 THOU/UL (ref 140–440)
PMV BLD AUTO: 10.6 FL (ref 8.5–12.5)
POTASSIUM BLD-SCNC: 3.5 MMOL/L (ref 3.5–5)
PROT SERPL-MCNC: 5.7 G/DL (ref 6–8)
RBC # BLD AUTO: 3.91 MILL/UL (ref 3.8–5.4)
SODIUM SERPL-SCNC: 143 MMOL/L (ref 136–145)
WBC: 7.7 THOU/UL (ref 4–11)

## 2018-08-13 ENCOUNTER — RECORDS - HEALTHEAST (OUTPATIENT)
Dept: LAB | Facility: CLINIC | Age: 83
End: 2018-08-13

## 2018-08-14 LAB — INR PPP: 2.47 (ref 0.9–1.1)

## 2018-08-15 ENCOUNTER — NURSING HOME VISIT (OUTPATIENT)
Dept: GERIATRICS | Facility: CLINIC | Age: 83
End: 2018-08-15
Payer: COMMERCIAL

## 2018-08-15 VITALS
HEART RATE: 79 BPM | SYSTOLIC BLOOD PRESSURE: 142 MMHG | OXYGEN SATURATION: 96 % | BODY MASS INDEX: 36.06 KG/M2 | DIASTOLIC BLOOD PRESSURE: 80 MMHG | WEIGHT: 223.4 LBS | RESPIRATION RATE: 18 BRPM | TEMPERATURE: 97.3 F

## 2018-08-15 DIAGNOSIS — I50.9 CHRONIC CONGESTIVE HEART FAILURE, UNSPECIFIED CONGESTIVE HEART FAILURE TYPE: ICD-10-CM

## 2018-08-15 DIAGNOSIS — I48.20 CHRONIC ATRIAL FIBRILLATION (H): ICD-10-CM

## 2018-08-15 DIAGNOSIS — R10.13 ABDOMINAL PAIN, EPIGASTRIC: Primary | ICD-10-CM

## 2018-08-15 DIAGNOSIS — I10 ESSENTIAL HYPERTENSION: ICD-10-CM

## 2018-08-15 DIAGNOSIS — R54 FRAIL ELDERLY: ICD-10-CM

## 2018-08-15 DIAGNOSIS — R53.81 DEBILITY: ICD-10-CM

## 2018-08-15 DIAGNOSIS — E11.51 TYPE II DIABETES MELLITUS WITH PERIPHERAL CIRCULATORY DISORDER (H): ICD-10-CM

## 2018-08-15 PROCEDURE — 99309 SBSQ NF CARE MODERATE MDM 30: CPT | Performed by: NURSE PRACTITIONER

## 2018-08-15 NOTE — PROGRESS NOTES
[place] [facility]    Chief Complaint   Patient presents with     [facility] Regulatory       [place] Medical Record Number:  [id number]    HPI:    Mecca Slade is a 89 year old  (12/14/1921), who is being seen today for a federally mandated E/M visit at [facility] .  HPI information obtained from: facility chart records, facility staff and patient report.     Met with Mrs. Slade today for her regulatory visit.  Mrs. Slade continues to endorse mild post prandial pain that improves after time or a BM.  Mild tolerable.  She otherwise reports her mood as good, sleeping well, no other complaints.     ALLERGIES: Review of patient's allergies indicates no known allergies.  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  PAST SURGICAL HISTORY:  has no past surgical history on file.  FAMILY HISTORY: family history is not on file.  SOCIAL HISTORY:  reports that she has never smoked. She has never used smokeless tobacco. She reports that she does not drink alcohol or use illicit drugs.    MEDICATIONS:  Current Outpatient Prescriptions   Medication Sig Dispense Refill     Acetaminophen (TYLENOL PO) Take 1,000 mg by mouth every morning And give Q8hrs PRN x 2 doses        ATORVASTATIN CALCIUM PO Take 20 mg by mouth daily       carboxymethylcellulose (REFRESH PLUS) 0.5 % SOLN ophthalmic solution Place 1 drop into both eyes 3 times daily as needed for dry eyes       diclofenac (VOLTAREN) 1 % GEL topical gel Place 1 g onto the skin 2 times daily       Furosemide (LASIX PO) Take 40 mg by mouth daily       METFORMIN HCL PO Take 1,000 mg by mouth At Bedtime       METFORMIN HCL PO Take 500 mg by mouth daily (with breakfast)        METOPROLOL TARTRATE PO Take 25 mg by mouth daily       polyethylene glycol (MIRALAX/GLYCOLAX) Packet Take 17 g by mouth daily as needed        Sennosides (SENNA) 8.6 MG CAPS Take 1 Tablespoonful by mouth daily       VERAPAMIL HCL PO Take 120 mg by mouth At Bedtime       VITAMIN D, CHOLECALCIFEROL, PO Take 2,000 Units by mouth daily       Warfarin Sodium (COUMADIN PO) Take by mouth daily Take as directed per INR results       Medications reviewed:  Medications reconciled to facility chart and changes were made to reflect current medications as identified as above med list. Below are the changes that were made:   Medications stopped since last EPIC medication reconciliation:   There are no discontinued medications.    Medications started since last [facility] medication reconciliation:  No orders of the defined types were placed in this encounter.    Case Management:  I have reviewed the care plan and MDS and do agree with the plan. Patient's desire to return to the community is present, but is not able due to care needs .  Information reviewed:  Medications, vital signs, orders, and nursing notes.    ROS:  7 point ROS done including, light headedness/dizziness, fever/chills, pain, Resp, CV, GI, and  and is negative other than noted in HPI.      Exam:  Vitals: /80  Pulse 79  Temp 97.3  F (36.3  C)  Resp 18  Wt 223 lb 6.4 oz (101.3 kg)  SpO2 96%  BMI 36.06 kg/m2  BMI= Body mass index is 36.06 kg/(m^2).     GENERAL APPEARANCE:  Elderly obese female sitting  up in WC, NAD, non-toxic.  ENT:  Mouth and oropharynx normal, moist mucous membranes.   RESP:  Lungs CTA.  Regular relaxed breathing effort.  No cough.   CV: S1/S2 no murmur or rubs.  Regular rhythm and rate.    ABDOMEN:   Obese, soft, non-tender with active bowel sounds.  No guarding, rigidity, or rebound tenderness.  EXTREMITIES:  Trace bilateral edema, no calf tenderness.   PSYCH: Alert and orientated, pleasant and cooperative.  Mild-moderate cognitive/memory impairment, stable/unchanged.    Lab/Diagnostic data: I have personally reviewed labs, which are in facility chart.     ASSESSMENT/PLAN  Abdominal pain, epigastric, post prandial, suspect gastroparesis  Mrs. Slade continue to endorse post prandial abdominal pain, which is now more intermittent, improves with time or BM's.  Workup a few weeks ago was unremarkable.  Thus suspect this could be either SMA occlusion and/or a degree of gastroparesis, which is likely more the etiology.  At this time pain is mild.    I am reluctant to try Reglan which is the recommended treatment for gastroparesis, there is some research/documents that support trial of Gabapentin.  Other option would be to escalate workup and get an EGD or see GI.  At this time Mrs. Slade reports she does not know what she wants, we are planning on reviewing options with her daughter/POA in near future to decide next steps.   -No changes, continue to clinically monitor.     Chronic congestive heart failure, unspecified congestive heart failure type (H)  Chronic atrial fibrillation (H)  Essential hypertension  Review of VS's show VSS and within acceptable range.   No current s/s of clinical CHF.   -No changes, continue to clinically monitor.     Type II diabetes mellitus with peripheral circulatory disorder (H)  Review of weekly glucoses show good control.   -Will get updated A1c for completeness.     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  Frail elderly    Orders:  1. Get Hgb  A1C lab on  18 Sept Dx: Dm2    Electronically signed by:  JOSUE Monroy CNP

## 2018-08-15 NOTE — LETTER
8/15/2018        RE: Mecca Slade  [facility]  [address] [facility] [address]          [place] [facility]    Chief Complaint   Patient presents with     prison Regulatory       [place] Medical Record Number:  [id number]    HPI:    Mecca Slade is a 89 year old  (12/14/1921), who is being seen today for a federally mandated E/M visit at [facility] .  HPI information obtained from: facility chart records, facility staff and patient report.     Met with Mrs. Slade today for her regulatory visit.  Mrs. Slade continues to endorse mild post prandial pain that improves after time or a BM.  Mild tolerable.  She otherwise reports her mood as good, sleeping well, no other complaints.     ALLERGIES: Review of patient's allergies indicates no known allergies.  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  PAST SURGICAL HISTORY:  has no past surgical history on file.  FAMILY HISTORY: family history is not on file.  SOCIAL HISTORY:  reports that she has never smoked. She has never used smokeless tobacco. She reports that she does not drink alcohol or use illicit drugs.    MEDICATIONS:  Current Outpatient Prescriptions   Medication Sig  Dispense Refill     Acetaminophen (TYLENOL PO) Take 1,000 mg by mouth every morning And give Q8hrs PRN x 2 doses       ATORVASTATIN CALCIUM PO Take 20 mg by mouth daily       carboxymethylcellulose (REFRESH PLUS) 0.5 % SOLN ophthalmic solution Place 1 drop into both eyes 3 times daily as needed for dry eyes       diclofenac (VOLTAREN) 1 % GEL topical gel Place 1 g onto the skin 2 times daily       Furosemide (LASIX PO) Take 40 mg by mouth daily       METFORMIN HCL PO Take 1,000 mg by mouth At Bedtime       METFORMIN HCL PO Take 500 mg by mouth daily (with breakfast)        METOPROLOL TARTRATE PO Take 25 mg by mouth daily       polyethylene glycol (MIRALAX/GLYCOLAX) Packet Take 17 g by mouth daily as needed        Sennosides (SENNA) 8.6 MG CAPS Take 1 Tablespoonful by mouth daily       VERAPAMIL HCL PO Take 120 mg by mouth At Bedtime       VITAMIN D, CHOLECALCIFEROL, PO Take 2,000 Units by mouth daily       Warfarin Sodium (COUMADIN PO) Take by mouth daily Take as directed per INR results       Medications reviewed:  Medications reconciled to facility chart and changes were made to reflect current medications as identified as above med list. Below are the changes that were made:   Medications stopped since last EPIC medication reconciliation:   There are no discontinued medications.    Medications started since last [facility] medication reconciliation:  No orders of the defined types were placed in this encounter.    Case Management:  I have reviewed the care plan and MDS and do agree with the plan. Patient's desire to return to the community is present, but is not able due to care needs .  Information reviewed:  Medications, vital signs, orders, and nursing notes.    ROS:  7 point ROS done including, light headedness/dizziness, fever/chills, pain, Resp, CV, GI, and  and is negative other than noted in HPI.      Exam:  Vitals: /80  Pulse 79  Temp 97.3  F (36.3  C)  Resp 18  Wt 223 lb 6.4 oz (101.3 kg)  SpO2  96%  BMI 36.06 kg/m2  BMI= Body mass index is 36.06 kg/(m^2).     GENERAL APPEARANCE:  Elderly obese female sitting up in WC, NAD, non-toxic.  ENT:  Mouth and oropharynx normal, moist mucous membranes.   RESP:  Lungs CTA.  Regular relaxed breathing effort.  No cough.   CV: S1/S2 no murmur or rubs.  Regular rhythm and rate.    ABDOMEN:   Obese, soft, non-tender with active bowel sounds.  No guarding, rigidity, or rebound tenderness.  EXTREMITIES:  Trace bilateral edema, no calf tenderness.   PSYCH: Alert and orientated, pleasant and cooperative.  Mild-moderate cognitive/memory impairment, stable/unchanged.    Lab/Diagnostic data: I have personally reviewed labs, which are in facility chart.     ASSESSMENT/PLAN  Abdominal pain, epigastric, post prandial, suspect gastroparesis  Mrs. Slade continue to endorse post prandial abdominal pain, which is now more intermittent, improves with time or BM's.  Workup a few weeks ago was unremarkable.  Thus suspect this could be either SMA occlusion and/or a degree of gastroparesis, which is likely more the etiology.  At this time pain is mild.    I am reluctant to try Reglan which is the recommended treatment for gastroparesis, there is some research/documents that support trial of Gabapentin.  Other option would be to escalate workup and get an EGD or see GI.  At this time Mrs. Slade reports she does not know what she wants, we are planning on reviewing options with her daughter/POA in near future to decide next steps.   -No changes, continue to clinically monitor.     Chronic congestive heart failure, unspecified congestive heart failure type (H)  Chronic atrial fibrillation (H)  Essential hypertension  Review of VS's show VSS and within acceptable range.   No current s/s of clinical CHF.   -No changes, continue to clinically monitor.     Type II diabetes mellitus with peripheral circulatory disorder (H)  Review of weekly glucoses show good control.   -Will get updated A1c for  completeness.     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  Frail elderly    Orders:  1. Get Hgb  A1C lab on 18 Sept Dx: Dm2    Electronically signed by:  JOSUE Monroy CNP      Sincerely,        JOSUE Monroy CNP

## 2018-09-17 ENCOUNTER — RECORDS - HEALTHEAST (OUTPATIENT)
Dept: LAB | Facility: CLINIC | Age: 83
End: 2018-09-17

## 2018-09-18 ENCOUNTER — NURSING HOME VISIT (OUTPATIENT)
Dept: GERIATRICS | Facility: CLINIC | Age: 83
End: 2018-09-18
Payer: COMMERCIAL

## 2018-09-18 VITALS
WEIGHT: 228.8 LBS | BODY MASS INDEX: 36.93 KG/M2 | TEMPERATURE: 97.3 F | HEART RATE: 79 BPM | DIASTOLIC BLOOD PRESSURE: 80 MMHG | OXYGEN SATURATION: 96 % | RESPIRATION RATE: 18 BRPM | SYSTOLIC BLOOD PRESSURE: 142 MMHG

## 2018-09-18 DIAGNOSIS — Z51.81 ENCOUNTER FOR THERAPEUTIC DRUG MONITORING: ICD-10-CM

## 2018-09-18 DIAGNOSIS — Z79.01 LONG-TERM (CURRENT) USE OF ANTICOAGULANTS: ICD-10-CM

## 2018-09-18 DIAGNOSIS — I48.20 CHRONIC ATRIAL FIBRILLATION (H): Primary | ICD-10-CM

## 2018-09-18 LAB
HBA1C MFR BLD: 7.5 % (ref 4.2–6.1)
INR PPP: 2.77 (ref 0.9–1.1)

## 2018-09-18 PROCEDURE — 99308 SBSQ NF CARE LOW MDM 20: CPT | Performed by: NURSE PRACTITIONER

## 2018-09-18 NOTE — PROGRESS NOTES
[place] [facility]  HPI:    Mecca Slade is a 89 year old  (12/14/1921), who is being seen today for an episodic care visit at [facility].  HPI information obtained from: facility chart records, facility staff, patient report and [facility] chart review. Today's concern is INR/Coumadin management for A. Fib    Bleeding Signs/Symptoms:  None  Thromboembolic Signs/Symptoms:  None    Medication Changes:  No  Dietary Changes:  No  Activity Changes: No  Bacterial/Viral Infection:  No    Missed Coumadin Doses:  None    On ASA: No    Other Concerns:  No    OBJECTIVE:    INR Today:  2.77  Current Dose:  Coumadin 2mg PO every day.    ASSESSMENT:  Chronic atrial fibrillation (H)  Long-term (current) use of anticoagulants  Encounter for therapeutic drug monitoring  Chronic. Stable. Therapeutic. Will continue current dosing and recheck accordingly.   Therapeutic INR for goal of 2-3    PLAN:    New Dose: Coumadin 2 mg PO every day   Next INR: 10/17/18    Electronically signed by:  Dr. Haley Sims, JOSUE CNP DNP

## 2018-09-18 NOTE — LETTER
9/18/2018        RE: Mecca Slade  [facility]  [address] [facility] [address]        [place] [facility]  HPI:    Mecca Slade is a 89 year old  (12/14/1921), who is being seen today for an episodic care visit at [facility].  HPI information obtained from: facility chart records, facility staff, patient report and [facility] chart review. Today's concern is INR/Coumadin management for A. Fib    Bleeding Signs/Symptoms:  None  Thromboembolic Signs/Symptoms:  None    Medication Changes:  No  Dietary Changes:  No  Activity Changes: No  Bacterial/Viral Infection:  No    Missed Coumadin Doses:  None    On ASA: No    Other Concerns:  No    OBJECTIVE:    INR Today:  2.77  Current Dose:  Coumadin 2mg PO every day.    ASSESSMENT:  Chronic atrial fibrillation (H)  Long-term (current) use of anticoagulants  Encounter for therapeutic drug monitoring  Chronic. Stable. Therapeutic. Will continue current dosing and recheck accordingly.   Therapeutic INR for goal of 2-3    PLAN:    New Dose: Coumadin 2 mg PO every day   Next INR: 10/17/18    Electronically signed by:  Dr. Haley Sims, APRN CNP DNP            Sincerely,        Haley Sims, NP

## 2018-09-20 ENCOUNTER — RECORDS - HEALTHEAST (OUTPATIENT)
Dept: LAB | Facility: CLINIC | Age: 83
End: 2018-09-20

## 2018-09-21 ENCOUNTER — NURSING HOME VISIT (OUTPATIENT)
Dept: GERIATRICS | Facility: CLINIC | Age: 83
End: 2018-09-21
Payer: COMMERCIAL

## 2018-09-21 VITALS
WEIGHT: 228.8 LBS | HEART RATE: 79 BPM | DIASTOLIC BLOOD PRESSURE: 80 MMHG | TEMPERATURE: 97.3 F | RESPIRATION RATE: 18 BRPM | BODY MASS INDEX: 36.93 KG/M2 | OXYGEN SATURATION: 96 % | SYSTOLIC BLOOD PRESSURE: 142 MMHG

## 2018-09-21 DIAGNOSIS — H02.9 EYELID ABNORMALITY: ICD-10-CM

## 2018-09-21 DIAGNOSIS — H01.005 BLEPHARITIS OF LEFT LOWER EYELID, UNSPECIFIED TYPE: Primary | ICD-10-CM

## 2018-09-21 DIAGNOSIS — L98.9 SKIN LESION: ICD-10-CM

## 2018-09-21 LAB
ANION GAP SERPL CALCULATED.3IONS-SCNC: 11 MMOL/L (ref 5–18)
BUN SERPL-MCNC: 34 MG/DL (ref 8–28)
CALCIUM SERPL-MCNC: 12 MG/DL (ref 8.5–10.5)
CHLORIDE BLD-SCNC: 101 MMOL/L (ref 98–107)
CO2 SERPL-SCNC: 31 MMOL/L (ref 22–31)
CREAT SERPL-MCNC: 0.97 MG/DL (ref 0.6–1.1)
GFR SERPL CREATININE-BSD FRML MDRD: 53 ML/MIN/1.73M2
GLUCOSE BLD-MCNC: 117 MG/DL (ref 70–125)
POTASSIUM BLD-SCNC: 4.3 MMOL/L (ref 3.5–5)
SODIUM SERPL-SCNC: 143 MMOL/L (ref 136–145)

## 2018-09-21 PROCEDURE — 99308 SBSQ NF CARE LOW MDM 20: CPT | Performed by: NURSE PRACTITIONER

## 2018-09-21 NOTE — PROGRESS NOTES
[place] [facility]  Chief Complaint   Patient presents with     USP Acute     [place] Medical Record Number:  [id number]    HPI:    Mecca Slade is a 89 year old  (12/14/1921), who is being seen today for an episodic care visit at [facility].   HPI information obtained from: facility chart records, facility staff, patient report and [place] [facility] chart review.     Today's concern is:  Blepharitis of the left lower eyelid, unspecified type  Eyelid abnormality  Skin lesion  Nursing contacted provider for new onset, left lower eyelid lesion.  Appears to be a pimple or cystic-like lesion, intact, she states it itches slightly.  Patient denies trauma to the area, denies a bug bite, denies seasonal allergy, denies watery drainage from eyes/nose, denies fever/chills, vision changes.    REVIEW OF SYSTEMS:  4 point ROS including Respiratory, CV, GI and , other than that noted in the HPI,  is negative    /80  Pulse 79  Temp 97.3  F (36.3  C)  Resp 18  Wt 228 lb 12.8 oz (103.8 kg)  SpO2 96%  BMI 36.93 kg/m2  GENERAL APPEARANCE: Alert, in no distress, cooperative.   ENT: Mouth and posterior oropharynx normal, moist mucous membranes, hearing acuity [place].  EYES: EOM, conjunctivae, lids, pupils and irises normal except the pustule-like lesion and associated blepharitis pictured below.     M/S: Gait and station baseline, Digits and nails baseline  SKIN:  Inspection/Palpation of skin and subcutaneous tissue otherwise baseline compared to what is noted in picture above.  NEURO: 2-12 in normal limits and at patient's baseline, sensation intact PPS.  PSYCH: Insight and judgement, memory intact/forgetful at baseline, affect and mood normal/happy.    ASSESSMENT/PLAN:  Blepharitis of left lower eyelid, unspecified type  Eyelid abnormality  Skin lesion  Acute. Stable. Will institute comfort measures as noted below and follow-up as needed. Differentials for acne vulgaris, hydrocytoma, or BCC (not  likely given skin is intact).     Orders:  1. Warm + Moist compress to Left eyelid BID x 7 days. Dx: Blepharitis.     Electronically signed by:  Dr. Haley Sims, JOSUE CNP DNP

## 2018-09-21 NOTE — LETTER
9/21/2018        RE: Mecca Slade  [facility]  [address] [place] [id number]        [place] [facility]  Chief Complaint   Patient presents with     FCI Acute     [place] Medical Record Number:  [id number]    HPI:    Mecca Slade is a 89 year old  (12/14/1921), who is being seen today for an episodic care visit at [facility].   HPI information obtained from: facility chart records, facility staff, patient report and [facility] chart review.     Today's concern is:  Blepharitis of the left lower eyelid, unspecified type  Eyelid abnormality  Skin lesion  Nursing contacted provider for new onset, left lower eyelid lesion.  Appears to be a pimple or cystic-like lesion, intact, she states it itches slightly.  Patient denies trauma to the area, denies a bug bite, denies seasonal allergy, denies watery drainage from eyes/nose, denies fever/chills, vision changes.    REVIEW OF SYSTEMS:  4 point ROS including Respiratory, CV, GI and , other than that noted in the HPI,  is negative    /80  Pulse 79  Temp 97.3  F (36.3  C)  Resp 18  Wt 228 lb 12.8 oz (103.8 kg)  SpO2 96%  BMI 36.93 kg/m2  GENERAL APPEARANCE: Alert, in no distress, cooperative.   ENT: Mouth and posterior oropharynx normal, moist mucous membranes, hearing acuity [place].  EYES: EOM, conjunctivae, lids, pupils and irises normal except the pustule-like lesion and associated blepharitis pictured below.     M/S: Gait and station baseline, Digits and nails baseline  SKIN:  Inspection/Palpation of skin and subcutaneous tissue otherwise baseline compared to what is noted in picture above.  NEURO: 2-12 in normal limits and at patient's baseline, sensation intact PPS.  PSYCH: Insight and judgement, memory intact/forgetful at baseline, affect and mood normal/happy.    ASSESSMENT/PLAN:  Blepharitis of left lower eyelid, unspecified type  Eyelid abnormality  Skin lesion  Acute. Stable. Will institute  comfort measures as noted below and follow-up as needed. Differentials for acne vulgaris, hydrocytoma, or BCC (not likely given skin is intact).     Orders:  1. Warm + Moist compress to Left eyelid BID x 7 days. Dx: Blepharitis.     Electronically signed by:  Dr. Haley Sims, APRN CNP DNP        Sincerely,        Haley Sims, NP

## 2018-09-30 VITALS
DIASTOLIC BLOOD PRESSURE: 60 MMHG | BODY MASS INDEX: 36.74 KG/M2 | TEMPERATURE: 97.2 F | SYSTOLIC BLOOD PRESSURE: 114 MMHG | RESPIRATION RATE: 12 BRPM | HEART RATE: 65 BPM | WEIGHT: 227.6 LBS | OXYGEN SATURATION: 96 %

## 2018-09-30 NOTE — PROGRESS NOTES
[place] [facility]    Chief Complaint   Patient presents with     residential Regulatory       [place] Medical Record Number:  [id number]    HPI:    Mecca Slade is a 89 year old  (12/14/1921), who is being seen today for a federally mandated E/M visit at [facility].  HPI information obtained from: facility chart records, facility staff, patient report and [place] Epic chart review.     Today's concerns are:  - NP reports resident has been c/o  a vague mild abdominal pain after eating, treating with scheduled APAP. Possibly a  gastroparesis due to her DMII or possible SMA/enteric arterial stenosis. Offered GI referral to POA, she has declined as long as it's mild, no further workup. Also reportedly has a left lower eyelid lesion,   - Resident seen and examined.   - Reports the pain in the abdomen is very mild, gets BM QOD, feels like going sometimes but no BM, however, no constipation. Reports the left lower eyelid is fine now.   - Denies n/v/SOB or BP.   - Reports sleep, appetite  are fine.   --------------------------------  - - Past Medical, social, family histories, medications, and allergies reviewed and updated  - Medications reviewed: in the chart and EHR.   - Case Management:   I have reviewed the care plan and MDS and do agree with the plan. Patient's desire to return to the community is not present.  Information reviewed:  Medications, vital signs, orders, and nursing notes.  MEDICATIONS:  Current Outpatient Prescriptions   Medication Sig Dispense Refill     Acetaminophen (TYLENOL PO) Take 1,000 mg by mouth every morning And give Q8hrs PRN x 2 doses       ATORVASTATIN CALCIUM PO Take 20 mg by mouth daily       carboxymethylcellulose (REFRESH PLUS) 0.5 % SOLN ophthalmic solution Place 1 drop into both eyes 3 times daily as needed for dry eyes       diclofenac (VOLTAREN) 1 % GEL topical gel Place 1 g onto the skin 2 times daily       Furosemide (LASIX PO) Take 40 mg by mouth daily        METFORMIN HCL PO Take 1,000 mg by mouth At Bedtime       METFORMIN HCL PO Take 500 mg by mouth daily (with breakfast)        METOPROLOL TARTRATE PO Take 25 mg by mouth daily       polyethylene glycol (MIRALAX/GLYCOLAX) Packet Take 17 g by mouth daily as needed        Sennosides (SENNA) 8.6 MG CAPS Take 1 Tablespoonful by mouth daily       VERAPAMIL HCL PO Take 120 mg by mouth At Bedtime       VITAMIN D, CHOLECALCIFEROL, PO Take 2,000 Units by mouth daily       Warfarin Sodium (COUMADIN PO) Take by mouth daily Take as directed per INR results       ROS:  4 point ROS including Respiratory, CV, GI and , other than that noted in the HPI,  is negative    Exam:  Vitals: /60  Pulse 65  Temp 97.2  F (36.2  C)  Resp 12  Wt 227 lb 9.6 oz (103.2 kg)  SpO2 96%  BMI 36.74 kg/m2  BMI= Body mass index is 36.74 kg/(m^2).  GENERAL APPEARANCE:  Alert, in no distress, obese  RESP:  respiratory effort and palpation of chest normal, lungs clear to auscultation , no respiratory distress  CV:  Palpation and auscultation of heart done , irregular rate and rhythm, no murmur, rub, or gallop, trace pedal edema b/l. Pacemaker.   ABDOMEN:  normal bowel sounds, soft, nontender, no hepatosplenomegaly or other masses  MSK: - Gait and station abnormal uses WC for ambulation. Ms strength: 4/5 LUE, 4/5 LLE. Braces b/l feet.   SKIN:  Inspection of skin and subcutaneous tissue baseline, Palpation of skin and subcutaneous tissue baseline  NEURO:   no NFD appreciated on observation.   PSYCH:  oriented X 3, normal insight, judgement and memory, affect and mood normal    Lab/Diagnostic data: none new.     ASSESSMENT/PLAN  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)  - Left sided residual weakness, stable.   - Off ASA, on lipitor 20 mg.   - requires assistance with ADLs.       Chronic congestive heart failure, unspecified congestive heart failure type (H)  Essential hypertension  - compensated. Continue  meds     Atrial fibrillation, chronic type (H)  Long term use of OAC  - CVR, rate control with metoprolol and verapamil. Consider taper verapamil and increase metoprolol if needed.   - on coumadin with INR followed closely      Type II diabetes mellitus with peripheral circulatory disorder (H)     A1C 7.9 08/07/2017      A1C 7.6 04/13/2017   - on metformin 500 mg in am and 1000 mg pm.   -  Keep HbA1C b/w 8-9% (per AGS there is a potential harm in lowering A1C to <6.5 % in older adults with diabetes), life expectancy less than 5 years, tight glucose control is note recommended  - consider reducing metformin.  In this frail elderly adult with a limited life expectancy, the goal of  the management is to address the hyperglycemia sx if any,  rather than the  BGs numbers per se.       Morbid Obesity:  - Body mass index is 36.74 kg/(m^2). from 35.93 kg/(m^2). in this age group- frail elderly, keep BMI b/lw 25-35 Kg(m2). No intervention is needed at this stage.      Mild Abdominal Pain:  - non specific. Continue supportive measures.   Frail elderly  - Significant  Deficits requiring NH placement. Requiring extensive assistance from nursing. Up for meals only o/w spends the day resting in bed      Cognitive Impairment:  - Continue to anticipate needs. Chronic condition, ongoing decline expected.   -  Continue to provide redirection and reassurance as needed. Maintain safe living situation with goals focused on comfort.       Orders:  - See above, otherwise, continue the rest of the current POC.       Total time spent with patient visit at the skilled nursing facility was 35 min including patient visit, review of past records and discussing with NP. Greater than 50% of total time spent with counseling and coordinating care due to above multiple comorbidities.     Electronically signed by:  Anirudh Ye MD

## 2018-10-01 ENCOUNTER — NURSING HOME VISIT (OUTPATIENT)
Dept: GERIATRICS | Facility: CLINIC | Age: 83
End: 2018-10-01
Payer: COMMERCIAL

## 2018-10-01 DIAGNOSIS — E11.51 TYPE II DIABETES MELLITUS WITH PERIPHERAL CIRCULATORY DISORDER (H): ICD-10-CM

## 2018-10-01 DIAGNOSIS — R10.13 ABDOMINAL PAIN, EPIGASTRIC: ICD-10-CM

## 2018-10-01 DIAGNOSIS — I50.9 CHRONIC CONGESTIVE HEART FAILURE, UNSPECIFIED CONGESTIVE HEART FAILURE TYPE: ICD-10-CM

## 2018-10-01 DIAGNOSIS — I48.20 CHRONIC ATRIAL FIBRILLATION (H): ICD-10-CM

## 2018-10-01 DIAGNOSIS — Z86.73 H/O: CVA (CEREBROVASCULAR ACCIDENT): Primary | ICD-10-CM

## 2018-10-01 DIAGNOSIS — I10 ESSENTIAL HYPERTENSION: ICD-10-CM

## 2018-10-01 PROCEDURE — 99310 SBSQ NF CARE HIGH MDM 45: CPT | Performed by: FAMILY MEDICINE

## 2018-10-16 ENCOUNTER — RECORDS - HEALTHEAST (OUTPATIENT)
Dept: LAB | Facility: CLINIC | Age: 83
End: 2018-10-16

## 2018-10-17 ENCOUNTER — NURSING HOME VISIT (OUTPATIENT)
Dept: GERIATRICS | Facility: CLINIC | Age: 83
End: 2018-10-17
Payer: COMMERCIAL

## 2018-10-17 VITALS
DIASTOLIC BLOOD PRESSURE: 68 MMHG | HEART RATE: 86 BPM | SYSTOLIC BLOOD PRESSURE: 133 MMHG | OXYGEN SATURATION: 95 % | BODY MASS INDEX: 37.06 KG/M2 | WEIGHT: 229.6 LBS | RESPIRATION RATE: 16 BRPM | TEMPERATURE: 98 F

## 2018-10-17 DIAGNOSIS — Z79.01 LONG TERM CURRENT USE OF ANTICOAGULANT THERAPY: ICD-10-CM

## 2018-10-17 DIAGNOSIS — I48.20 CHRONIC ATRIAL FIBRILLATION (H): Primary | ICD-10-CM

## 2018-10-17 DIAGNOSIS — Z51.81 ENCOUNTER FOR THERAPEUTIC DRUG MONITORING: ICD-10-CM

## 2018-10-17 LAB — INR PPP: 2.69 (ref 0.9–1.1)

## 2018-10-17 PROCEDURE — 99308 SBSQ NF CARE LOW MDM 20: CPT | Performed by: NURSE PRACTITIONER

## 2018-10-17 NOTE — LETTER
10/17/2018        RE: Mecca Slade  [facility]  [address] [facility] [address]        [place] [facility]  [place] Medical Record Number:  [id number]  Place of Service where encounter took place:  [facility]  ([facility]) [[id number]]    HPI:    Mecca Slade is a 89 year old  (12/14/1921), who is being seen today for an episodic care visit at the above location.   HPI information obtained from: facility chart records, facility staff, patient report and [facility] chart review. Today's concern is INR/Coumadin management for A. Fib    Bleeding Signs/Symptoms:  None  Thromboembolic Signs/Symptoms:  None  Medication Changes:  No  Dietary Changes:  No  Activity Changes: No  Bacterial/Viral Infection:  No  Missed Coumadin Doses:  None  On ASA: No  Other Concerns:  No    OBJECTIVE:  INR Today: 2.69  Current Dose: 2mg every day.    ASSESSMENT:  Chronic atrial fibrillation (H)  Encounter for therapeutic drug monitoring  Long term current use of anticoagulant therapy  Chronic. Stable. Therapeutic. Will dose Coumadin as noted below and recheck INR in 1 month. Follow-up accordingly. Therapeutic INR for goal of 2-3.    PLAN:  New Dose: 2mg PO every day.  Next INR: 1 month    Electronically signed by:  Dr. Haley Sims, APRN CNP DNP        Sincerely,        Haley Sims, NP

## 2018-10-17 NOTE — PROGRESS NOTES
[place] [facility]  [place] Medical Record Number:  [id number]  Place of Service where encounter took place:  [facility]  ([facility]) [[id number]]    HPI:    Mecca Slade is a 89 year old  (12/14/1921), who is being seen today for an episodic care visit at the above location.   HPI information obtained from: facility chart records, facility staff, patient report and [facility] chart review. Today's concern is INR/Coumadin management for A. Fib    Bleeding Signs/Symptoms:  None  Thromboembolic Signs/Symptoms:  None  Medication Changes:  No  Dietary Changes:  No  Activity Changes: No  Bacterial/Viral Infection:  No  Missed Coumadin Doses:  None  On ASA: No  Other Concerns:  No    OBJECTIVE:  INR Today: 2.69  Current Dose: 2mg every day.    ASSESSMENT:  Chronic atrial fibrillation (H)  Encounter for therapeutic drug monitoring  Long term current use of anticoagulant therapy  Chronic. Stable. Therapeutic. Will dose Coumadin as noted below and recheck INR in 1 month. Follow-up accordingly. Therapeutic INR for goal of 2-3.    PLAN:  New Dose: 2mg PO every day.  Next INR: 1 month    Electronically signed by:  Dr. Haley Sims, APRN CNP DNP

## 2018-10-22 ENCOUNTER — NURSING HOME VISIT (OUTPATIENT)
Dept: GERIATRICS | Facility: CLINIC | Age: 83
End: 2018-10-22
Payer: COMMERCIAL

## 2018-10-22 VITALS
WEIGHT: 229.6 LBS | SYSTOLIC BLOOD PRESSURE: 133 MMHG | HEART RATE: 86 BPM | TEMPERATURE: 98 F | BODY MASS INDEX: 37.06 KG/M2 | OXYGEN SATURATION: 95 % | RESPIRATION RATE: 16 BRPM | DIASTOLIC BLOOD PRESSURE: 68 MMHG

## 2018-10-22 DIAGNOSIS — L30.9 DERMATITIS: ICD-10-CM

## 2018-10-22 DIAGNOSIS — L98.9 SKIN LESION: ICD-10-CM

## 2018-10-22 DIAGNOSIS — E11.40 TYPE 2 DIABETES MELLITUS WITH DIABETIC NEUROPATHY, WITHOUT LONG-TERM CURRENT USE OF INSULIN (H): Primary | ICD-10-CM

## 2018-10-22 DIAGNOSIS — K59.01 SLOW TRANSIT CONSTIPATION: ICD-10-CM

## 2018-10-22 PROCEDURE — 99309 SBSQ NF CARE MODERATE MDM 30: CPT | Performed by: NURSE PRACTITIONER

## 2018-10-22 NOTE — LETTER
"    10/22/2018        RE: Mecca Slade  [facility]  [address] [facility] [address]        [place] [facility]  Chief Complaint   Patient presents with     snf Acute     [place] Medical Record Number:  [id number]  Place of Service where encounter took place:  [facility]  (FGS) [[id number]]    HPI:    Mecca Slade is a 89 year old  (12/14/1921), who is being seen today for an episodic care visit.  HPI information obtained from: facility chart records, facility staff, patient report and [facility] chart review.     Today's concern is:  Type 2 diabetes mellitus with diabetic neuropathy, without long-term current use of insulin (H)  Patient states that her appetite is too good and that she has gained weight. Nursing has requested medication review and clarification as Metformin is currently ordered at 500 QAM, and 1000mg QPM. Last a1c was 7.9 in August 2018, which is appropriate for frail elders.  Patient is not on insulin. BG trend is as noted below:  10/15/2018 13:56 138 mg/dL  10/01/2018 13:14 132 mg/dL  09/24/2018 11:12 132 mg/dL  09/17/2018 11:27 158 mg/dL  09/10/2018 10:30 144 mg/dL  08/20/2018 12:25 148 mg/dL  08/13/2018 10:51 190 mg/dL  07/16/2018 08:03 154 mg/dL    Dermatitis/Skin Lesion  Nursing contacted provider as patient had a new \"slit/redness\" under abdominal fold, for which they are requesting Nystatin treatments. Patient states it was painful at first, and is now feeling better. She denies itch, odor, and doesn't know the cause. She already has Nystatin ordered as part of her cares.     Slow transit constipation  Patient c/o bowel problems, sometimes being loose and sometimes being constipated. She is on scheduled Senna, but chart review shows that she almost always refuses this. She has PRN Mirlax, which she does not take. She denies nausea/heartburn, fevers.     PMH/PSH reviewed in EPIC today.    REVIEW OF SYSTEMS:  Limited secondary to " cognitive impairment but today pt reports the above and 10 point ROS of systems including Constitutional, Eyes, Respiratory, Cardiovascular, Gastroenterology, Genitourinary, Integumentary, Musculoskeletal, Psychiatric were all negative except for pertinent positives noted in my HPI.    /68  Pulse 86  Temp 98  F (36.7  C)  Resp 16  Wt 229 lb 9.6 oz (104.1 kg)  SpO2 95%  BMI 37.06 kg/m2  GENERAL APPEARANCE: Alert, in no distress, cooperative.  ENT: Mouth and posterior oropharynx normal, moist mucous membranes, hearing acuity [place].  EYES: EOM, conjunctivae, lids, pupils and irises normal, PERRL2, Glasses.  RESP: Respiratory effort and palpation of chest normal, no respiratory distress, Lung sounds clear.  CV: Palpation and auscultation of heart done w/ pacer present, rate-controlled and rhythm irregular, no murmur, no rub or gallop, Edema 2+ BLE.  ABDOMEN: Normal bowel sounds, soft, nontender, no hepatosplenomegaly or other masses.  SKIN: Inspection/Palpation of skin and subcutaneous tissue baseline w/ fragility.  NEURO: 2-12 in normal limits and at patient's baseline, sensation intact PPS.  PSYCH: Insight and judgement, memory forgetful at baseline, affect and mood normal.    ASSESSMENT/PLAN:  Type 2 diabetes mellitus with diabetic neuropathy, without long-term current use of insulin (H)  Chronic. Stable. Well-controlled. Will lower Metformin, as diarrhea is a common SE and recheck a1c in 8 wks. Follow-up routinely or as needed.    Dermatitis/Skin Lesion  Acute. Stable. Resolving. Will discontinue current supply of Nystatin when gone and nursing to continue hygiene cares. Follow-up as needed.    Slow transit constipation  Chronic. Stable. Will change Metformin as already noted and change Senna order as noted below. Follow-up routinely or as needed.    Orders:  1. Discontinue all metformin orders.  2. Metformin 500 mg PO ID Dx: DM.  3. Hgb A1C x1 in 8 weeks Dx: DM.  4. Change current Senna order to: Senna to  senna 1 tab PO BID PRN Dx: Constipation.  5. Discontinue Nystatin when supply gone.    Electronically signed by:  Dr. Haley Sims, APRN CNP DNP        Sincerely,        Haley Sims, NP

## 2018-10-22 NOTE — PROGRESS NOTES
"[place] [facility]  Chief Complaint   Patient presents with     jail Acute     [place] Medical Record Number:  [id number]  Place of Service where encounter took place:  [facility]  (FGS) [[id number]]    HPI:    Mecca Slade is a 89 year old  (12/14/1921), who is being seen today for an episodic care visit.  HPI information obtained from: facility chart records, facility staff, patient report and [facility] chart review.     Today's concern is:  Type 2 diabetes mellitus with diabetic neuropathy, without long-term current use of insulin (H)  Patient states that her appetite is too good and that she has gained weight. Nursing has requested medication review and clarification as Metformin is currently ordered at 500 QAM, and 1000mg QPM. Last a1c was 7.9 in August 2018, which is appropriate for frail elders.  Patient is not on insulin. BG trend is as noted below:  10/15/2018 13:56 138 mg/dL  10/01/2018 13:14 132 mg/dL  09/24/2018 11:12 132 mg/dL  09/17/2018 11:27 158 mg/dL  09/10/2018 10:30 144 mg/dL  08/20/2018 12:25 148 mg/dL  08/13/2018 10:51 190 mg/dL  07/16/2018 08:03 154 mg/dL    Dermatitis/Skin Lesion  Nursing contacted provider as patient had a new \"slit/redness\" under abdominal fold, for which they are requesting Nystatin treatments. Patient states it was painful at first, and is now feeling better. She denies itch, odor, and doesn't know the cause. She already has Nystatin ordered as part of her cares.     Slow transit constipation  Patient c/o bowel problems, sometimes being loose and sometimes being constipated. She is on scheduled Senna, but chart review shows that she almost always refuses this. She has PRN Mirlax, which she does not take. She denies nausea/heartburn, fevers.     PMH/PSH reviewed in EPIC today.    REVIEW OF SYSTEMS:  Limited secondary to cognitive impairment but today pt reports the above and 10 point ROS of systems including Constitutional, Eyes, " Respiratory, Cardiovascular, Gastroenterology, Genitourinary, Integumentary, Musculoskeletal, Psychiatric were all negative except for pertinent positives noted in my HPI.    /68  Pulse 86  Temp 98  F (36.7  C)  Resp 16  Wt 229 lb 9.6 oz (104.1 kg)  SpO2 95%  BMI 37.06 kg/m2  GENERAL APPEARANCE: Alert, in no distress, cooperative.  ENT: Mouth and posterior oropharynx normal, moist mucous membranes, hearing acuity [place].  EYES: EOM, conjunctivae, lids, pupils and irises normal, PERRL2, Glasses.  RESP: Respiratory effort and palpation of chest normal, no respiratory distress, Lung sounds clear.  CV: Palpation and auscultation of heart done w/ pacer present, rate-controlled and rhythm irregular, no murmur, no rub or gallop, Edema 2+ BLE.  ABDOMEN: Normal bowel sounds, soft, nontender, no hepatosplenomegaly or other masses.  SKIN: Inspection/Palpation of skin and subcutaneous tissue baseline w/ fragility.  NEURO: 2-12 in normal limits and at patient's baseline, sensation intact PPS.  PSYCH: Insight and judgement, memory forgetful at baseline, affect and mood normal.    ASSESSMENT/PLAN:  Type 2 diabetes mellitus with diabetic neuropathy, without long-term current use of insulin (H)  Chronic. Stable. Well-controlled. Will lower Metformin, as diarrhea is a common SE and recheck a1c in 8 wks. Follow-up routinely or as needed.    Dermatitis/Skin Lesion  Acute. Stable. Resolving. Will discontinue current supply of Nystatin when gone and nursing to continue hygiene cares. Follow-up as needed.    Slow transit constipation  Chronic. Stable. Will change Metformin as already noted and change Senna order as noted below. Follow-up routinely or as needed.    Orders:  1. Discontinue all metformin orders.  2. Metformin 500 mg PO ID Dx: DM.  3. Hgb A1C x1 in 8 weeks Dx: DM.  4. Change current Senna order to: Senna to senna 1 tab PO BID PRN Dx: Constipation.  5. Discontinue Nystatin when supply gone.    Electronically signed  by:  Dr. Haley Sims, APRN CNP DNP

## 2018-11-15 ENCOUNTER — RECORDS - HEALTHEAST (OUTPATIENT)
Dept: LAB | Facility: CLINIC | Age: 83
End: 2018-11-15

## 2018-11-16 ENCOUNTER — NURSING HOME VISIT (OUTPATIENT)
Dept: GERIATRICS | Facility: CLINIC | Age: 83
End: 2018-11-16
Payer: COMMERCIAL

## 2018-11-16 VITALS
WEIGHT: 229 LBS | OXYGEN SATURATION: 95 % | SYSTOLIC BLOOD PRESSURE: 133 MMHG | TEMPERATURE: 98 F | HEART RATE: 86 BPM | RESPIRATION RATE: 16 BRPM | DIASTOLIC BLOOD PRESSURE: 68 MMHG | BODY MASS INDEX: 36.96 KG/M2

## 2018-11-16 DIAGNOSIS — I48.20 CHRONIC ATRIAL FIBRILLATION (H): Primary | ICD-10-CM

## 2018-11-16 DIAGNOSIS — Z79.01 LONG TERM CURRENT USE OF ANTICOAGULANT THERAPY: ICD-10-CM

## 2018-11-16 DIAGNOSIS — Z51.81 ENCOUNTER FOR THERAPEUTIC DRUG MONITORING: ICD-10-CM

## 2018-11-16 LAB — INR PPP: 2.51 (ref 0.9–1.1)

## 2018-11-16 PROCEDURE — 99308 SBSQ NF CARE LOW MDM 20: CPT | Performed by: NURSE PRACTITIONER

## 2018-11-16 NOTE — PROGRESS NOTES
[place] [facility]    [place] Medical Record Number:  [id number]  Place of Service where encounter took place:  [facility]  (FGS) [[id number]]    HPI:    Mecca Slade is a 89 year old  (12/14/1921), who is being seen today for an episodic care visit at the above location.   HPI information obtained from: facility chart records, facility staff, patient report and [facility] chart review. Today's concern is INR/Coumadin management for A. Fib    Bleeding Signs/Symptoms:  None  Thromboembolic Signs/Symptoms:  None    Medication Changes:  No  Dietary Changes:  No  Activity Changes: No  Bacterial/Viral Infection:  No    Missed Coumadin Doses:  None    On ASA: No    Other Concerns:  No    OBJECTIVE:    INR Today:  2.51  Current Dose:  2mg daily    ASSESSMENT:     Chronic atrial fibrillation (H)  Encounter for therapeutic drug monitoring  Long term current use of anticoagulant therapy  Therapeutic INR for goal of 2-3    PLAN:    New Dose: No Change      Next INR: 12/14      Electronically signed by:  JOSUE Rodriguez CNP

## 2018-11-29 ENCOUNTER — NURSING HOME VISIT (OUTPATIENT)
Dept: GERIATRICS | Facility: CLINIC | Age: 83
End: 2018-11-29
Payer: COMMERCIAL

## 2018-11-29 VITALS
BODY MASS INDEX: 36.15 KG/M2 | WEIGHT: 224 LBS | DIASTOLIC BLOOD PRESSURE: 76 MMHG | TEMPERATURE: 98 F | HEART RATE: 60 BPM | SYSTOLIC BLOOD PRESSURE: 138 MMHG | RESPIRATION RATE: 16 BRPM | OXYGEN SATURATION: 96 %

## 2018-11-29 DIAGNOSIS — I48.20 CHRONIC ATRIAL FIBRILLATION (H): Primary | ICD-10-CM

## 2018-11-29 DIAGNOSIS — E11.40 TYPE 2 DIABETES MELLITUS WITH DIABETIC NEUROPATHY, WITHOUT LONG-TERM CURRENT USE OF INSULIN (H): ICD-10-CM

## 2018-11-29 DIAGNOSIS — I10 ESSENTIAL HYPERTENSION: ICD-10-CM

## 2018-11-29 DIAGNOSIS — R60.0 EDEMA, LOWER EXTREMITY: ICD-10-CM

## 2018-11-29 DIAGNOSIS — K59.01 SLOW TRANSIT CONSTIPATION: ICD-10-CM

## 2018-11-29 DIAGNOSIS — E66.01 MORBID OBESITY (H): ICD-10-CM

## 2018-11-29 PROCEDURE — 99309 SBSQ NF CARE MODERATE MDM 30: CPT | Performed by: NURSE PRACTITIONER

## 2018-11-29 NOTE — PROGRESS NOTES
"[place] [facility]  Chief Complaint   Patient presents with     FDC Regulatory     [place] Medical Record Number:  [id number]  Place of Service where encounter took place:  [facility]  (FGS) [[id number]]    HPI:    Mecca Slade is a 89 year old  (12/14/1921), who is being seen today for a federally mandated E/M visit.  HPI information obtained from: facility chart records, facility staff, patient report, [facility] chart review and Care Everywhere [facility] chart review. Today's concerns are:    Chronic atrial fibrillation (H)  Essential hypertension  Edema, lower extremity  Patient denies CP, SOB, palpitations, headache/dizziness, and says her edema is pretty good. She is rate-controlled w/ Toprol XL, and is anticoagulated w/ coumadin. She is also on Lasix, Verapamil, Lipitor.  Her BPs are noted below:  11/24/2018 18:01 138/76 mmHg   11/17/2018 22:09 138/78 mmHg   11/10/2018 21:23 132/78 mmHg   11/04/2018 16:07 136/82 mmHg   10/13/2018 17:42 133/68 mmHg   10/04/2018 21:47 140/66 mmHg   10/04/2018 07:19 146/67 mmHg   10/03/2018 22:35 122/66 mmHg     Type 2 diabetes mellitus with diabetic neuropathy, without long-term current use of insulin (H)  Morbid obesity (H)  Slow transit constipation  Patient states her appetite is \"too good,\" she denies numbness/tingling, denies changes in weight. Patient c/o constipation every other day or so.  Patient's regimen includes Senna BID, Miralax PRN. Chart review shows daily or every other day BMs. She is currently taking the following and her BG trend is noted below:    Metformin 500mg BID.  11/26/2018 07:50 158 mg/dL  11/19/2018 09:45 155 mg/dL  11/05/2018 10:30 157 mg/dL  10/29/2018 11:29 158 mg/dL  10/22/2018 08:08 133 mg/dL  10/15/2018 13:56 138 mg/dL  10/01/2018 13:14 132 mg/dL  09/24/2018 11:12 132 mg/dL  09/17/2018 11:27 158 mg/dL      ALLERGIES: Review of patient's allergies indicates no known allergies.  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  PAST SURGICAL HISTORY:  has no past surgical history on file.  FAMILY HISTORY: family history is not on file.  SOCIAL HISTORY:  reports that she has never smoked. She has never used smokeless tobacco. She reports that she does not drink alcohol or use illicit drugs.    MEDICATIONS:  Current Outpatient Prescriptions   Medication Sig Dispense Refill     Acetaminophen (TYLENOL PO) Take 1,000 mg by mouth every morning And give Q8hrs PRN x 2 doses       ATORVASTATIN CALCIUM PO Take 20 mg by mouth daily       carboxymethylcellulose (REFRESH PLUS) 0.5 % SOLN ophthalmic solution Place 1 drop into both eyes 3 times daily as needed for dry eyes       Furosemide (LASIX PO) Take 40 mg by mouth daily       METFORMIN HCL PO Take 500 mg by mouth daily (with breakfast)        METOPROLOL TARTRATE PO Take 25 mg by mouth daily       polyethylene glycol (MIRALAX/GLYCOLAX) Packet Take 17 g by mouth daily as needed        Sennosides (SENNA) 8.6 MG CAPS Take 1 Tablespoonful by mouth daily       VERAPAMIL HCL PO Take 120 mg by mouth At Bedtime       VITAMIN D, CHOLECALCIFEROL, PO Take 2,000 Units by mouth daily       Warfarin Sodium (COUMADIN PO) Take by mouth daily Take as directed per INR results        [DISCONTINUED] METFORMIN HCL PO Take 1,000 mg by mouth At Bedtime       Medications reviewed:  Medications reconciled to facility chart and changes were made to reflect current medications as identified as above med list. Below are the changes that were made:   Medications stopped since last EPIC medication reconciliation:   There are no discontinued medications.    Medications started since last [facility] medication reconciliation:  No orders of the defined types were placed in this encounter.    Case Management:  I have reviewed the care plan and MDS and do agree with the plan. Patient's desire to return to the community is present, but is not able due to care needs .  Information reviewed:  Medications, vital signs, orders, and nursing notes.    ROS:  Limited secondary to cognitive impairment but today pt reports the above and 10 point ROS of systems including Constitutional, Eyes, Respiratory, Cardiovascular, Gastroenterology, Genitourinary, Integumentary, Musculoskeletal, Psychiatric were all negative except for pertinent positives noted in my HPI.    Exam:  Vitals: /76  Pulse 60  Temp 98  F (36.7  C)  Resp 16  Wt 224 lb (101.6 kg)  SpO2 96%  BMI 36.15 kg/m2. BMI= Body mass index is 36.15 kg/(m^2).  GENERAL APPEARANCE: Alert, in no distress, morbidly obese.  ENT: Mouth and posterior oropharynx normal, moist mucous membranes, [place].  EYES: EOM, conjunctivae, lids, pupils and irises normal, PERRL2. Glasses.  RESP: Respiratory effort and palpation of chest normal, lungs clear to auscultation , no respiratory distress.  CV: Palpation and auscultation of heart done, rate-controlled and rhythm irregular, no murmur, rub, or gallop, +2 pedal pulses, peripheral edema 2+ in BLE.  ABDOMEN: Normal bowel sounds, soft, nontender, no hepatosplenomegaly or other masses, no guarding or rebound.  SKIN: Inspection of skin and subcutaneous tissue baseline, Palpation of skin and subcutaneous tissue baseline w/  fragility.  NEURO: Cranial nerves 2-12 are grossly at patient's baseline, Examination of sensation by touch normal.  PSYCH: Insight and judgement impaired, memory impaired, affect and mood normal.    Lab/Diagnostic data:   CBC RESULTS:   Recent Labs   Lab Test 12/14/17   HGB  14.2     Last Basic Metabolic Panel:  Recent Labs   Lab Test 08/07/17 04/13/17   NA  140  139   POTASSIUM  4.4  4.0   CHLORIDE  103  101   SUN  11.0*  10.8*   CO2  31  30   BUN  26  27   CR  0.90  0.93   GLC  167*  193*     Lab Results   Component Value Date    A1C 7.9 08/07/2017    A1C 7.6 04/13/2017     ASSESSMENT/PLAN  Chronic atrial fibrillation (H)  Essential hypertension  Edema, lower extremity  Chronic. Stable. Well controlled. May consider Lipitor GDR in future.  Continue on current regimen and follow-up routinely or as needed.    Type 2 diabetes mellitus with diabetic neuropathy, without long-term current use of insulin (H)  Morbid obesity (H)  Slow transit constipation  Chronic. Stable. Controlled. Last a1c was 7.5%. BM record shows control. Follow-up routinely or as needed.    Orders:  No new orders.     Electronically signed by:  Dr. Haley Sims, APRN CNP DNP

## 2018-12-13 ENCOUNTER — RECORDS - HEALTHEAST (OUTPATIENT)
Dept: LAB | Facility: CLINIC | Age: 83
End: 2018-12-13

## 2018-12-14 ENCOUNTER — RECORDS - HEALTHEAST (OUTPATIENT)
Dept: LAB | Facility: CLINIC | Age: 83
End: 2018-12-14

## 2018-12-14 LAB — INR PPP: 1.5 (ref 0.9–1.1)

## 2018-12-17 ENCOUNTER — NURSING HOME VISIT (OUTPATIENT)
Dept: GERIATRICS | Facility: CLINIC | Age: 83
End: 2018-12-17
Payer: COMMERCIAL

## 2018-12-17 VITALS
RESPIRATION RATE: 18 BRPM | DIASTOLIC BLOOD PRESSURE: 69 MMHG | OXYGEN SATURATION: 96 % | HEART RATE: 74 BPM | WEIGHT: 223.2 LBS | SYSTOLIC BLOOD PRESSURE: 125 MMHG | TEMPERATURE: 98.1 F | BODY MASS INDEX: 36.03 KG/M2

## 2018-12-17 DIAGNOSIS — Z51.81 ENCOUNTER FOR THERAPEUTIC DRUG MONITORING: ICD-10-CM

## 2018-12-17 DIAGNOSIS — Z79.01 LONG TERM (CURRENT) USE OF ANTICOAGULANTS: ICD-10-CM

## 2018-12-17 DIAGNOSIS — I63.9: Primary | ICD-10-CM

## 2018-12-17 LAB
HBA1C MFR BLD: 7.1 % (ref 4.2–6.1)
HGB BLD-MCNC: 12.8 G/DL (ref 12–16)
INR PPP: 2.52 (ref 0.9–1.1)

## 2018-12-17 PROCEDURE — 99308 SBSQ NF CARE LOW MDM 20: CPT | Performed by: NURSE PRACTITIONER

## 2018-12-17 NOTE — PROGRESS NOTES
[place] [facility]    [place] Medical Record Number:  [id number]  Place of Service where encounter took place:  [facility]  (FGS) [[id number]]    HPI:    Mecca Slade is a 89 year old  (12/14/1921), who is being seen today for an episodic care visit at the above location.   HPI information obtained from: facility chart records, facility staff, patient report and [facility] chart review. 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

## 2018-12-31 ENCOUNTER — RECORDS - HEALTHEAST (OUTPATIENT)
Dept: LAB | Facility: CLINIC | Age: 83
End: 2018-12-31

## 2019-01-01 NOTE — PROGRESS NOTES
[place] [facility]  [place] Medical Record Number:  [id number]  Place of Service where encounter took place:  [facility]  (S) [[id number]]    HPI:    Mecca Slade is a 89 year old  (12/14/1921), who is being seen today for an episodic care visit at the above location.   HPI information obtained from: facility chart records, facility staff, patient report, [facility] chart review and Care Everywhere [facility] chart review. Today's concern is INR/Coumadin management for A. Fib    Bleeding Signs/Symptoms:  None  Thromboembolic Signs/Symptoms:  None  Medication Changes:  No  Dietary Changes:  No  Activity Changes: No  Bacterial/Viral Infection:  No  Missed Coumadin Doses:  None  On ASA: No  Other Concerns:  No    OBJECTIVE:  INR Today: 3.33.  Current Dose: 2mg every day.    ASSESSMENT:  Chronic atrial fibrillation (H)  Long term (current) use of anticoagulants  Encounter for therapeutic drug monitoring  Chronic. Stable. Supratherapeutic. Will dose Coumadin as noted below and recheck INR in 1 week. Follow-up accordingly. Supratherapeutic INR for goal of 2-3    PLAN:  New Dose: 0.5 mg PO x1 today, 2mg AOD.    Next INR: 1 week    Electronically signed by:  Dr. Haley Sims, APRN CNP DNP

## 2019-01-02 ENCOUNTER — NURSING HOME VISIT (OUTPATIENT)
Dept: GERIATRICS | Facility: CLINIC | Age: 84
End: 2019-01-02
Payer: COMMERCIAL

## 2019-01-02 VITALS
OXYGEN SATURATION: 97 % | HEART RATE: 72 BPM | TEMPERATURE: 98.8 F | BODY MASS INDEX: 36.06 KG/M2 | DIASTOLIC BLOOD PRESSURE: 72 MMHG | SYSTOLIC BLOOD PRESSURE: 152 MMHG | WEIGHT: 223.4 LBS | RESPIRATION RATE: 16 BRPM

## 2019-01-02 DIAGNOSIS — Z51.81 ENCOUNTER FOR THERAPEUTIC DRUG MONITORING: ICD-10-CM

## 2019-01-02 DIAGNOSIS — I48.20 CHRONIC ATRIAL FIBRILLATION (H): Primary | ICD-10-CM

## 2019-01-02 DIAGNOSIS — Z79.01 LONG TERM (CURRENT) USE OF ANTICOAGULANTS: ICD-10-CM

## 2019-01-02 LAB — INR PPP: 3.33 (ref 0.9–1.1)

## 2019-01-02 PROCEDURE — 99308 SBSQ NF CARE LOW MDM 20: CPT | Performed by: NURSE PRACTITIONER

## 2019-01-02 NOTE — LETTER
1/2/2019        RE: Mecca Slade  [facility]  [address] [facility] [address]        [place] [facility]  [place] Medical Record Number:  [id number]  Place of Service where encounter took place:  [facility]  (FGS) [[id number]]    HPI:    Mecca Slade is a 89 year old  (12/14/1921), who is being seen today for an episodic care visit at the above location.   HPI information obtained from: facility chart records, facility staff, patient report, [facility] chart review and Care Everywhere [facility] chart review. Today's concern is INR/Coumadin management for A. Fib    Bleeding Signs/Symptoms:  None  Thromboembolic Signs/Symptoms:  None  Medication Changes:  No  Dietary Changes:  No  Activity Changes: No  Bacterial/Viral Infection:  No  Missed Coumadin Doses:  None  On ASA: No  Other Concerns:  No    OBJECTIVE:  INR Today: 3.33.  Current Dose: 2mg every day.    ASSESSMENT:  Chronic atrial fibrillation (H)  Long term (current) use of anticoagulants  Encounter for therapeutic drug monitoring  Chronic. Stable. Supratherapeutic. Will dose Coumadin as noted below and recheck INR in 1 week. Follow-up accordingly. Supratherapeutic INR for goal of 2-3    PLAN:  New Dose: 0.5 mg PO x1 today, 2mg AOD.    Next INR: 1 week    Electronically signed by:  Dr. Haley Sims, APRN CNP DNP            Sincerely,        Haley Sims, NP

## 2019-01-06 VITALS
WEIGHT: 223.4 LBS | HEART RATE: 64 BPM | SYSTOLIC BLOOD PRESSURE: 141 MMHG | BODY MASS INDEX: 36.06 KG/M2 | OXYGEN SATURATION: 94 % | RESPIRATION RATE: 18 BRPM | DIASTOLIC BLOOD PRESSURE: 84 MMHG | TEMPERATURE: 98 F

## 2019-01-06 NOTE — PROGRESS NOTES
"[place] [facility]  Chief Complaint   Patient presents with     residential Regulatory   [place] Medical Record Number:  [id number]  Location: [facility] [place]    HPI:    Mecca Slade is a 89 year old  (12/14/1921), who is being seen today for a federally mandated E/M visit at [facility].  HPI information obtained from: facility chart records, facility staff, patient report and [facility] chart review.     Today's concerns are:  - Resident seen and examined today in the presence of her daughter. Resident reports doing fine for \"a 89 years old lady\", had good times with family on the xmas.   - reports off and on constipation but does not want scheduled meds for it.   -  Denies n/v/SOB or BP.   - Reports sleep, appetite  are fine.   - DNP and RN have no concern.   --------------------------------  - - Past Medical, social, family histories, medications, and allergies reviewed and updated  - Medications reviewed: in the chart and EHR.   - Case Management:   I have reviewed the care plan and MDS and do agree with the plan. Patient's desire to return to the community is not present.  Information reviewed:  Medications, vital signs, orders, and nursing notes.  MEDICATIONS:  Current Outpatient Medications   Medication Sig Dispense Refill     Acetaminophen (TYLENOL PO) Take 1,000 mg by mouth every morning And give Q8hrs PRN x 2 doses       ATORVASTATIN CALCIUM PO Take 20 mg by mouth daily       carboxymethylcellulose (REFRESH PLUS) 0.5 % SOLN ophthalmic solution Place 1 drop into both eyes 3 times daily as needed for dry eyes       Furosemide (LASIX PO) Take 40 mg by mouth daily       METFORMIN HCL PO Take 500 mg by mouth daily (with breakfast)        METOPROLOL TARTRATE PO Take 25 mg by mouth daily       polyethylene glycol (MIRALAX/GLYCOLAX) Packet Take 17 g by mouth daily as needed        Sennosides (SENNA) 8.6 MG CAPS Take 1 Tablespoonful by mouth daily       VERAPAMIL HCL PO Take 120 mg by " mouth At Bedtime       VITAMIN D, CHOLECALCIFEROL, PO Take 2,000 Units by mouth daily       Warfarin Sodium (COUMADIN PO) Take by mouth daily Take as directed per INR results       ROS:  4 point ROS including Respiratory, CV, GI and , other than that noted in the HPI,  is negative    Exam:  Vitals: /84   Pulse 64   Temp 98  F (36.7  C)   Resp 18   Wt 101.3 kg (223 lb 6.4 oz)   SpO2 94%   BMI 36.06 kg/m    BMI= Body mass index is 36.06 kg/m .  GENERAL APPEARANCE:  Alert, in no distress, obese  RESP:  respiratory effort and palpation of chest normal, lungs clear to auscultation , no respiratory distress  CV:  Palpation and auscultation of heart done , irregular rate and rhythm, no murmur, rub, or gallop, no  edema b/l. Pacemaker.   ABDOMEN:  normal bowel sounds, soft, nontender, no hepatosplenomegaly or other masses  MSK: - Gait and station abnormal uses WC for ambulation. Ms strength: 4/5 LUE, 4/5 LLE. Braces left feet.   SKIN:  Inspection of skin and subcutaneous tissue baseline, Palpation of skin and subcutaneous tissue baseline  NEURO:   no NFD appreciated on observation.   PSYCH:  Normal insight, judgement and memory, affect and mood normal    Lab/Diagnostic data: Reviewed in the chart and EHR.    =======================================================  ASSESSMENT/PLAN    Chronic congestive heart failure, diastolic type (H)  Essential hypertension  -  EF > 70% (2012)  - compensated. Continue meds     Atrial fibrillation, chronic type (H)  - Long term use of OAC  - CVR, rate control with metoprolol and verapamil. Consider taper verapamil and increase metoprolol if needed.   - on coumadin with INR followed closely      Type II diabetes mellitus with peripheral circulatory disorder (H)     A1C 7.9 08/07/2017      A1C 7.6 04/13/2017   - on metformin 500 mg ( was also on 1000 mg at pm).   - consider repeating HBA1C  -  Keep HbA1C b/w 8-9% (per AGS there is a potential harm in lowering A1C to <6.5 % in  older adults with diabetes), life expectancy less than 5 years, tight glucose control is note recommended  - In this frail elderly adult with a limited life expectancy, the goal of  the management is to address the hyperglycemia sx if any,  rather than the  BGs numbers per se.       Morbid Obesity:  - Body mass index is 36.06 kg/m .  in this age group- frail elderly, keep BMI b/lw 25-35 Kg(m2). No intervention is needed at this stage.      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   - Off ASA, on lipitor 20 mg.   - requires assistance with ADLs.   - consider checking lipid profile, and if LDL is < 50 may reduce lipitor     Frail elderly  - Significant  Deficits requiring NH placement. Requiring extensive assistance from nursing. Up for meals only o/w spends the day resting in bed      Cognitive Impairment:  - Continue to anticipate needs. Chronic condition, ongoing decline expected.   -  Continue to provide redirection and reassurance as needed. Maintain safe living situation with goals focused on comfort.       Orders:  - See above, otherwise, continue the rest of the current POC.     Total time spent with patient visit at the skilled nursing facility was 35 min including patient visit, review of past records and discussing with NP, RN and daughter at the bedside. Greater than 50% of total time spent with counseling and coordinating care due to above multiple comorbidities.     Electronically signed by:  Anirudh Ye MD

## 2019-01-07 ENCOUNTER — NURSING HOME VISIT (OUTPATIENT)
Dept: GERIATRICS | Facility: CLINIC | Age: 84
End: 2019-01-07
Payer: COMMERCIAL

## 2019-01-07 DIAGNOSIS — I48.20 CHRONIC ATRIAL FIBRILLATION (H): ICD-10-CM

## 2019-01-07 DIAGNOSIS — I10 ESSENTIAL HYPERTENSION: ICD-10-CM

## 2019-01-07 DIAGNOSIS — E11.51 TYPE II DIABETES MELLITUS WITH PERIPHERAL CIRCULATORY DISORDER (H): ICD-10-CM

## 2019-01-07 DIAGNOSIS — E66.01 MORBID OBESITY (H): ICD-10-CM

## 2019-01-07 DIAGNOSIS — I63.9: ICD-10-CM

## 2019-01-07 DIAGNOSIS — I50.9 CHRONIC CONGESTIVE HEART FAILURE, UNSPECIFIED HEART FAILURE TYPE (H): Primary | ICD-10-CM

## 2019-01-07 DIAGNOSIS — Z79.01 LONG TERM (CURRENT) USE OF ANTICOAGULANTS: ICD-10-CM

## 2019-01-07 DIAGNOSIS — R54 FRAIL ELDERLY: ICD-10-CM

## 2019-01-07 PROCEDURE — 99310 SBSQ NF CARE HIGH MDM 45: CPT | Performed by: FAMILY MEDICINE

## 2019-01-07 NOTE — LETTER
"    1/7/2019        RE: Mecca Slade  [facility]  [address] [facility] [address]        [place] [facility]  Chief Complaint   Patient presents with     custodial Regulatory   [place] Medical Record Number:  [id number]  Location: [facility] [place]    HPI:    Mecca Slade is a 89 year old  (12/14/1921), who is being seen today for a federally mandated E/M visit at [facility].  HPI information obtained from: facility chart records, facility staff, patient report and [facility] chart review.     Today's concerns are:  - Resident seen and examined today in the presence of her daughter. Resident reports doing fine for \"a 89 years old lady\", had good times with family on the xmas.   - reports off and on constipation but does not want scheduled meds for it.   -  Denies n/v/SOB or BP.   - Reports sleep, appetite  are fine.   - DNP and RN have no concern.   --------------------------------  - - Past Medical, social, family histories, medications, and allergies reviewed and updated  - Medications reviewed: in the chart and EHR.   - Case Management:   I have reviewed the care plan and MDS and do agree with the plan. Patient's desire to return to the community is not present.  Information reviewed:  Medications, vital signs, orders, and nursing notes.  MEDICATIONS:  Current Outpatient Medications   Medication Sig Dispense Refill     Acetaminophen (TYLENOL PO) Take 1,000 mg by mouth every morning And give Q8hrs PRN x 2 doses       ATORVASTATIN CALCIUM PO Take 20 mg by mouth daily       carboxymethylcellulose (REFRESH PLUS) 0.5 % SOLN ophthalmic solution Place 1 drop into both eyes 3 times daily as needed for dry eyes       Furosemide (LASIX PO) Take 40 mg by mouth daily       METFORMIN HCL PO Take 500 mg by mouth daily (with breakfast)        METOPROLOL TARTRATE PO Take 25 mg by mouth daily       polyethylene glycol (MIRALAX/GLYCOLAX) Packet Take 17 g by mouth daily as " needed        Sennosides (SENNA) 8.6 MG CAPS Take 1 Tablespoonful by mouth daily       VERAPAMIL HCL PO Take 120 mg by mouth At Bedtime       VITAMIN D, CHOLECALCIFEROL, PO Take 2,000 Units by mouth daily       Warfarin Sodium (COUMADIN PO) Take by mouth daily Take as directed per INR results       ROS:  4 point ROS including Respiratory, CV, GI and , other than that noted in the HPI,  is negative    Exam:  Vitals: /84   Pulse 64   Temp 98  F (36.7  C)   Resp 18   Wt 101.3 kg (223 lb 6.4 oz)   SpO2 94%   BMI 36.06 kg/m     BMI= Body mass index is 36.06 kg/m .  GENERAL APPEARANCE:  Alert, in no distress, obese  RESP:  respiratory effort and palpation of chest normal, lungs clear to auscultation , no respiratory distress  CV:  Palpation and auscultation of heart done , irregular rate and rhythm, no murmur, rub, or gallop, no  edema b/l. Pacemaker.   ABDOMEN:  normal bowel sounds, soft, nontender, no hepatosplenomegaly or other masses  MSK: - Gait and station abnormal uses WC for ambulation. Ms strength: 4/5 LUE, 4/5 LLE. Braces left feet.   SKIN:  Inspection of skin and subcutaneous tissue baseline, Palpation of skin and subcutaneous tissue baseline  NEURO:   no NFD appreciated on observation.   PSYCH:  Normal insight, judgement and memory, affect and mood normal    Lab/Diagnostic data: Reviewed in the chart and EHR.    =======================================================  ASSESSMENT/PLAN    Chronic congestive heart failure, diastolic type (H)  Essential hypertension  -  EF > 70%  (2012)  - compensated. Continue meds     Atrial fibrillation, chronic type (H)  - Long term use of OAC  - CVR, rate control with metoprolol and verapamil. Consider taper verapamil and increase metoprolol if needed.   - on coumadin with INR followed closely      Type II diabetes mellitus with peripheral circulatory disorder (H)     A1C 7.9 08/07/2017      A1C 7.6 04/13/2017   - on metformin 500 mg ( was also on 1000 mg at  pm).   - consider repeating HBA1C  -  Keep HbA1C b/w 8-9% (per AGS there is a potential harm in lowering A1C to <6.5 % in older adults with diabetes), life expectancy less than 5 years, tight glucose control is note recommended  - In this frail elderly adult with a limited life expectancy, the goal of  the management is to address the hyperglycemia sx if any,  rather than the  BGs numbers per se.       Morbid Obesity:  - Body mass index is 36.06 kg/m .  in this age group- frail elderly, keep BMI b/lw 25-35 Kg(m2). No intervention is needed at this stage.      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   - Off ASA, on lipitor 20 mg.   - requires assistance with ADLs.   - consider checking lipid profile, and if LDL is < 50 may reduce lipitor     Frail elderly  - Significant  Deficits requiring NH placement. Requiring extensive assistance from nursing. Up for meals only o/w spends the day resting in bed      Cognitive Impairment:  - Continue to anticipate needs. Chronic condition, ongoing decline expected.   -  Continue to provide redirection and reassurance as needed. Maintain safe living situation with goals focused on comfort.       Orders:  - See above, otherwise, continue the rest of the current POC.     Total time spent with patient visit at the skilled nursing facility was 35 min including patient visit, review of past records and discussing with NP, RN and daughter at the bedside. Greater than 50% of total time spent with counseling and coordinating care due to above multiple comorbidities.     Electronically signed by:  Anirudh Ye MD      Sincerely,        Anirudh Ye MD

## 2019-01-08 ENCOUNTER — RECORDS - HEALTHEAST (OUTPATIENT)
Dept: LAB | Facility: CLINIC | Age: 84
End: 2019-01-08

## 2019-01-08 NOTE — PROGRESS NOTES
[place] [facility]  [place] Medical Record Number:  [id number]  Place of Service where encounter took place:  [facility]  ([facility]) [[id number]]    HPI:    Mecca Slade is a 89 year old  (12/14/1921), who is being seen today for an episodic care visit at the above location.   HPI information obtained from: facility chart records, facility staff, patient report, [facility] chart review and Care Everywhere [facility] chart review. Today's concern is INR/Coumadin management for A. Fib    Bleeding Signs/Symptoms:  None  Thromboembolic Signs/Symptoms:  None  Medication Changes:  No  Dietary Changes:  No  Activity Changes: No  Bacterial/Viral Infection:  No  Missed Coumadin Doses:  None  On ASA: No  Other Concerns:  No    OBJECTIVE:  INR Today: 2.62  Current Dose: 0.5mg x1 then 2mg AOD. 1 weeks ago was 3.33.     ASSESSMENT:  Chronic atrial fibrillation (H)  Long term (current) use of anticoagulants  Encounter for therapeutic drug monitoring  Chronic. Stable. Therapeutic. Will dose Couamdin as noted below and recheck INR in 1 week. Follow-up accordingly.  Therapeutic INR for goal of 2-3.    PLAN:  New Dose: 2 mg PO every day     Next INR: 1 week    Electronically signed by:  Dr. Haley Sims, APRN CNP DNP

## 2019-01-09 ENCOUNTER — NURSING HOME VISIT (OUTPATIENT)
Dept: GERIATRICS | Facility: CLINIC | Age: 84
End: 2019-01-09
Payer: COMMERCIAL

## 2019-01-09 VITALS
DIASTOLIC BLOOD PRESSURE: 84 MMHG | TEMPERATURE: 98 F | SYSTOLIC BLOOD PRESSURE: 141 MMHG | WEIGHT: 224 LBS | RESPIRATION RATE: 18 BRPM | BODY MASS INDEX: 36.15 KG/M2 | OXYGEN SATURATION: 94 % | HEART RATE: 64 BPM

## 2019-01-09 DIAGNOSIS — Z51.81 ENCOUNTER FOR THERAPEUTIC DRUG MONITORING: ICD-10-CM

## 2019-01-09 DIAGNOSIS — I48.20 CHRONIC ATRIAL FIBRILLATION (H): Primary | ICD-10-CM

## 2019-01-09 DIAGNOSIS — Z79.01 LONG TERM (CURRENT) USE OF ANTICOAGULANTS: ICD-10-CM

## 2019-01-09 LAB — INR PPP: 2.62 (ref 0.9–1.1)

## 2019-01-09 PROCEDURE — 99308 SBSQ NF CARE LOW MDM 20: CPT | Performed by: NURSE PRACTITIONER

## 2019-01-09 NOTE — LETTER
1/9/2019        RE: Mecca Slade  [facility]  [address] [facility] [address]        [place] [facility]  [place] Medical Record Number:  [id number]  Place of Service where encounter took place:  [facility]  ([facility]GS) [[id number]]    HPI:    Mecca Slade is a 89 year old  (12/14/1921), who is being seen today for an episodic care visit at the above location.   HPI information obtained from: facility chart records, facility staff, patient report, [facility] chart review and Care Everywhere [facility] chart review. Today's concern is INR/Coumadin management for A. Fib    Bleeding Signs/Symptoms:  None  Thromboembolic Signs/Symptoms:  None  Medication Changes:  No  Dietary Changes:  No  Activity Changes: No  Bacterial/Viral Infection:  No  Missed Coumadin Doses:  None  On ASA: No  Other Concerns:  No    OBJECTIVE:  INR Today: 2.62  Current Dose: 0.5mg x1 then 2mg AOD. 1 weeks ago was 3.33.     ASSESSMENT:  Chronic atrial fibrillation (H)  Long term (current) use of anticoagulants  Encounter for therapeutic drug monitoring  Chronic. Stable. Therapeutic. Will dose Couamdin as noted below and recheck INR in 1 week. Follow-up accordingly.  Therapeutic INR for goal of 2-3.    PLAN:  New Dose: 2 mg PO every day     Next INR: 1 week    Electronically signed by:  Dr. Haley Sims, APRN CNP DNP            Sincerely,        Haley Sims, NP

## 2019-01-16 ENCOUNTER — RECORDS - HEALTHEAST (OUTPATIENT)
Dept: LAB | Facility: CLINIC | Age: 84
End: 2019-01-16

## 2019-01-16 ENCOUNTER — NURSING HOME VISIT (OUTPATIENT)
Dept: GERIATRICS | Facility: CLINIC | Age: 84
End: 2019-01-16
Payer: COMMERCIAL

## 2019-01-16 VITALS
HEART RATE: 75 BPM | SYSTOLIC BLOOD PRESSURE: 136 MMHG | WEIGHT: 223 LBS | RESPIRATION RATE: 18 BRPM | DIASTOLIC BLOOD PRESSURE: 63 MMHG | OXYGEN SATURATION: 99 % | BODY MASS INDEX: 35.99 KG/M2 | TEMPERATURE: 98.1 F

## 2019-01-16 DIAGNOSIS — Z51.81 ENCOUNTER FOR THERAPEUTIC DRUG MONITORING: ICD-10-CM

## 2019-01-16 DIAGNOSIS — Z79.01 LONG TERM (CURRENT) USE OF ANTICOAGULANTS: ICD-10-CM

## 2019-01-16 DIAGNOSIS — I48.20 CHRONIC ATRIAL FIBRILLATION (H): Primary | ICD-10-CM

## 2019-01-16 LAB — INR PPP: 2.59 (ref 0.9–1.1)

## 2019-01-16 PROCEDURE — 99308 SBSQ NF CARE LOW MDM 20: CPT | Performed by: NURSE PRACTITIONER

## 2019-01-16 NOTE — LETTER
1/16/2019        RE: Mecca Slade  [facility]  [address] [facility] [address]        [place] [facility]  [place] Medical Record Number:  [id number]  Place of Service where encounter took place:  [facility]  ([facility]GS) [[id number]]    HPI:    Mecca Slade is a 89 year old  (12/14/1921), who is being seen today for an episodic care visit at the above location.   HPI information obtained from: facility chart records, facility staff, patient report, [facility] chart review and Care Everywhere [facility] chart review. Today's concern is INR/Coumadin management for A. Fib    Bleeding Signs/Symptoms:  None  Thromboembolic Signs/Symptoms:  None  Medication Changes:  No  Dietary Changes:  No  Activity Changes: No  Bacterial/Viral Infection:  No  Missed Coumadin Doses:  None  On ASA: No  Other Concerns:  No    OBJECTIVE:  INR Today: 2.59.  Current Dose: 2mg daily.    ASSESSMENT:  Chronic atrial fibrillation (H)  Long term (current) use of anticoagulants  Encounter for therapeutic drug monitoring  Chronic. Stable. Therapeutic. Will dose Coumadin as noted beow and recheck INR 2 weeks. Follow-up accordingly. Therapeutic INR for goal of 2-3.    PLAN:  New Dose: 2mg/day.    Next INR: 2 weeks.    Electronically signed by:  Dr. Haley Sims, APRN CNP DNP            Sincerely,        Haley Sims, NP

## 2019-01-16 NOTE — PROGRESS NOTES
[place] [facility]  [place] Medical Record Number:  [id number]  Place of Service where encounter took place:  [facility]  (FGS) [[id number]]    HPI:    Mecca Slade is a 89 year old  (12/14/1921), who is being seen today for an episodic care visit at the above location.   HPI information obtained from: facility chart records, facility staff, patient report, [facility] chart review and Care Everywhere [facility] chart review. Today's concern is INR/Coumadin management for A. Fib    Bleeding Signs/Symptoms:  None  Thromboembolic Signs/Symptoms:  None  Medication Changes:  No  Dietary Changes:  No  Activity Changes: No  Bacterial/Viral Infection:  No  Missed Coumadin Doses:  None  On ASA: No  Other Concerns:  No    OBJECTIVE:  INR Today: 2.59.  Current Dose: 2mg daily.    ASSESSMENT:  Chronic atrial fibrillation (H)  Long term (current) use of anticoagulants  Encounter for therapeutic drug monitoring  Chronic. Stable. Therapeutic. Will dose Coumadin as noted beow and recheck INR 2 weeks. Follow-up accordingly. Therapeutic INR for goal of 2-3.    PLAN:  New Dose: 2mg/day.    Next INR: 2 weeks.    Electronically signed by:  Dr. Haley Sims, APRN CNP DNP

## 2019-01-29 NOTE — PROGRESS NOTES
[place] [facility]  [place] Medical Record Number:  [id number]  Place of Service where encounter took place:  [facility]  (FGS) [[id number]]    HPI:    Mecca Slade is a 89 year old  (12/14/1921), who is being seen today for an episodic care visit at the above location.   HPI information obtained from: facility chart records, facility staff, patient report and [facility] chart review. Today's concern is INR/Coumadin management for A. Fib    Bleeding Signs/Symptoms:  None  Thromboembolic Signs/Symptoms:  None  Medication Changes:  No  Dietary Changes:  No  Activity Changes: No  Bacterial/Viral Infection:  No  Missed Coumadin Doses:  None  On ASA: No  Other Concerns:  No    OBJECTIVE:  INR Today: Not completed secondary to lab not showing up for services at SNF.   Current Dose:  2mg/day.    ASSESSMENT:  Chronic atrial fibrillation (H)  Long term (current) use of anticoagulants  Encounter for therapeutic drug monitoring  Chronic. Stable. Unknown. Will dose Coumadin as noted below and recheck INR in 2 days since patient has been therapeutic on last several checks. Follow-up accordingly. Therapeutic INR goal of 2-3    PLAN:  New Dose: 2mg/day x 2 days.   Next INR: 2/1/19.    Electronically signed by:  Dr. Haley Sims, APRN CNP DNP

## 2019-01-30 ENCOUNTER — NURSING HOME VISIT (OUTPATIENT)
Dept: GERIATRICS | Facility: CLINIC | Age: 84
End: 2019-01-30
Payer: COMMERCIAL

## 2019-01-30 VITALS
HEART RATE: 67 BPM | WEIGHT: 223.4 LBS | SYSTOLIC BLOOD PRESSURE: 129 MMHG | TEMPERATURE: 98.9 F | DIASTOLIC BLOOD PRESSURE: 66 MMHG | BODY MASS INDEX: 36.06 KG/M2 | OXYGEN SATURATION: 97 % | RESPIRATION RATE: 18 BRPM

## 2019-01-30 DIAGNOSIS — I48.20 CHRONIC ATRIAL FIBRILLATION (H): Primary | ICD-10-CM

## 2019-01-30 DIAGNOSIS — Z79.01 LONG TERM (CURRENT) USE OF ANTICOAGULANTS: ICD-10-CM

## 2019-01-30 DIAGNOSIS — Z51.81 ENCOUNTER FOR THERAPEUTIC DRUG MONITORING: ICD-10-CM

## 2019-01-30 PROCEDURE — 99308 SBSQ NF CARE LOW MDM 20: CPT | Performed by: NURSE PRACTITIONER

## 2019-01-30 NOTE — LETTER
1/30/2019        RE: Mecca Slade  [facility]  [address] [facility] [address]        [place] [facility]  [place] Medical Record Number:  [id number]  Place of Service where encounter took place:  [facility]  (FGS) [[id number]]    HPI:    Mecca Slade is a 89 year old  (12/14/1921), who is being seen today for an episodic care visit at the above location.   HPI information obtained from: facility chart records, facility staff, patient report and [facility] chart review. Today's concern is INR/Coumadin management for A. Fib    Bleeding Signs/Symptoms:  None  Thromboembolic Signs/Symptoms:  None  Medication Changes:  No  Dietary Changes:  No  Activity Changes: No  Bacterial/Viral Infection:  No  Missed Coumadin Doses:  None  On ASA: No  Other Concerns:  No    OBJECTIVE:  INR Today: Not completed secondary to lab not showing up for services at SNF.   Current Dose:  2mg/day.    ASSESSMENT:  Chronic atrial fibrillation (H)  Long term (current) use of anticoagulants  Encounter for therapeutic drug monitoring  Chronic. Stable. Unknown. Will dose Coumadin as noted below and recheck INR in 2 days since patient has been therapeutic on last several checks. Follow-up accordingly. Therapeutic INR goal of 2-3    PLAN:  New Dose: 2mg/day x 2 days.   Next INR: 2/1/19.    Electronically signed by:  Dr. Haley Sims, APRN CNP DNP            Sincerely,        Haley Sims, NP

## 2019-01-31 NOTE — PROGRESS NOTES
[place] [facility]  [place] Medical Record Number:  [id number]  Place of Service where encounter took place:  [facility]  (FGS) [[id number]]    HPI:    Mecca Slade is a 89 year old  (12/14/1921), who is being seen today for an episodic care visit at the above location.   HPI information obtained from: facility chart records, facility staff, patient report, [facility] chart review and Care Everywhere [facility] chart review. Today's concern is INR/Coumadin management for A. Fib    Bleeding Signs/Symptoms:  None  Thromboembolic Signs/Symptoms:  None  Medication Changes:  No  Dietary Changes:  No  Activity Changes: No  Bacterial/Viral Infection:  No  Missed Coumadin Doses:  None  On ASA: No  Other Concerns:  No    OBJECTIVE:  INR Today: 2.54.  Current Dose:  2mg/day.    ASSESSMENT:  Chronic atrial fibrillation (H)  Long term (current) use of anticoagulants  Encounter for therapeutic drug monitoring  Chronic. Stable. Therapeutic. Will dose Coumadin as noted below and recheck INR in 4 weeks. Follow-up accordingly. Therapeutic INR for goal of 2-3.    PLAN:  New Dose: Same. 2mg/day.  Next INR: 4 weeks on 3/1/19.     Electronically signed by:  Dr. Haley Sims, APRN CNP DNP

## 2019-02-01 ENCOUNTER — NURSING HOME VISIT (OUTPATIENT)
Dept: GERIATRICS | Facility: CLINIC | Age: 84
End: 2019-02-01
Payer: COMMERCIAL

## 2019-02-01 ENCOUNTER — RECORDS - HEALTHEAST (OUTPATIENT)
Dept: LAB | Facility: CLINIC | Age: 84
End: 2019-02-01

## 2019-02-01 VITALS
WEIGHT: 223.4 LBS | SYSTOLIC BLOOD PRESSURE: 129 MMHG | TEMPERATURE: 98.9 F | DIASTOLIC BLOOD PRESSURE: 66 MMHG | BODY MASS INDEX: 36.06 KG/M2 | HEART RATE: 67 BPM | OXYGEN SATURATION: 97 % | RESPIRATION RATE: 18 BRPM

## 2019-02-01 DIAGNOSIS — Z51.81 ENCOUNTER FOR THERAPEUTIC DRUG MONITORING: ICD-10-CM

## 2019-02-01 DIAGNOSIS — I48.20 CHRONIC ATRIAL FIBRILLATION (H): Primary | ICD-10-CM

## 2019-02-01 DIAGNOSIS — Z79.01 LONG TERM (CURRENT) USE OF ANTICOAGULANTS: ICD-10-CM

## 2019-02-01 LAB — INR PPP: 2.54 (ref 0.9–1.1)

## 2019-02-01 PROCEDURE — 99308 SBSQ NF CARE LOW MDM 20: CPT | Performed by: NURSE PRACTITIONER

## 2019-02-01 NOTE — LETTER
2/1/2019        RE: Mecca Slade  [facility]  [address] [facility] [address]        [place] [facility]  [place] Medical Record Number:  [id number]  Place of Service where encounter took place:  [facility]  ([facility]GS) [[id number]]    HPI:    Mecca Slade is a 89 year old  (12/14/1921), who is being seen today for an episodic care visit at the above location.   HPI information obtained from: facility chart records, facility staff, patient report, [facility] chart review and Care Everywhere [facility] chart review. Today's concern is INR/Coumadin management for A. Fib    Bleeding Signs/Symptoms:  None  Thromboembolic Signs/Symptoms:  None  Medication Changes:  No  Dietary Changes:  No  Activity Changes: No  Bacterial/Viral Infection:  No  Missed Coumadin Doses:  None  On ASA: No  Other Concerns:  No    OBJECTIVE:  INR Today: 2.54.  Current Dose:  2mg/day.    ASSESSMENT:  Chronic atrial fibrillation (H)  Long term (current) use of anticoagulants  Encounter for therapeutic drug monitoring  Chronic. Stable. Therapeutic. Will dose Coumadin as noted below and recheck INR in 4 weeks. Follow-up accordingly. Therapeutic INR for goal of 2-3.    PLAN:  New Dose: Same. 2mg/day.  Next INR: 4 weeks on 3/1/19.     Electronically signed by:  Dr. Haley Sims, APRN CNP DNP            Sincerely,        Haley Sims, JULIET

## 2019-02-04 ENCOUNTER — RECORDS - HEALTHEAST (OUTPATIENT)
Dept: LAB | Facility: CLINIC | Age: 84
End: 2019-02-04

## 2019-02-04 ENCOUNTER — TRANSFERRED RECORDS (OUTPATIENT)
Dept: HEALTH INFORMATION MANAGEMENT | Facility: CLINIC | Age: 84
End: 2019-02-04

## 2019-02-04 LAB
ANION GAP SERPL CALCULATED.3IONS-SCNC: 8 MMOL/L (ref 5–18)
ANION GAP SERPL CALCULATED.3IONS-SCNC: 8 MMOL/L (ref 5–18)
BUN SERPL-MCNC: 35 MG/DL (ref 8–28)
BUN SERPL-MCNC: 35 MG/DL (ref 8–28)
CALCIUM SERPL-MCNC: 11.1 MG/DL (ref 8.5–10.5)
CALCIUM SERPL-MCNC: 11.1 MG/DL (ref 8.5–10.5)
CHLORIDE BLD-SCNC: 104 MMOL/L (ref 98–107)
CHLORIDE SERPLBLD-SCNC: 104 MMOL/L (ref 98–107)
CO2 SERPL-SCNC: 30 MMOL/L (ref 22–31)
CO2 SERPL-SCNC: 30 MMOL/L (ref 22–31)
CREAT SERPL-MCNC: 0.92 MG/DL (ref 0.6–1.1)
CREAT SERPL-MCNC: 0.92 MG/DL (ref 0.6–1.1)
GFR SERPL CREATININE-BSD FRML MDRD: 56 ML/MIN/1.73M2
GFR SERPL CREATININE-BSD FRML MDRD: 56 ML/MIN/1.73M2
GLUCOSE BLD-MCNC: 106 MG/DL (ref 70–125)
GLUCOSE SERPL-MCNC: 106 MG/DL (ref 70–125)
HBA1C MFR BLD: 7.1 % (ref 4.2–6.1)
HEMOGLOBIN: 13.5 G/DL (ref 12–16)
HGB BLD-MCNC: 13.5 G/DL (ref 12–16)
POTASSIUM BLD-SCNC: 3.9 MMOL/L (ref 3.5–5)
POTASSIUM SERPL-SCNC: 3.9 MMOL/L (ref 3.5–5)
SODIUM SERPL-SCNC: 142 MMOL/L (ref 136–145)
SODIUM SERPL-SCNC: 142 MMOL/L (ref 136–145)

## 2019-03-01 ENCOUNTER — RECORDS - HEALTHEAST (OUTPATIENT)
Dept: LAB | Facility: CLINIC | Age: 84
End: 2019-03-01

## 2019-03-01 ENCOUNTER — NURSING HOME VISIT (OUTPATIENT)
Dept: GERIATRICS | Facility: CLINIC | Age: 84
End: 2019-03-01
Payer: COMMERCIAL

## 2019-03-01 VITALS
WEIGHT: 226.4 LBS | OXYGEN SATURATION: 95 % | RESPIRATION RATE: 18 BRPM | TEMPERATURE: 98.1 F | HEART RATE: 72 BPM | BODY MASS INDEX: 36.54 KG/M2 | SYSTOLIC BLOOD PRESSURE: 128 MMHG | DIASTOLIC BLOOD PRESSURE: 80 MMHG

## 2019-03-01 DIAGNOSIS — Z79.01 LONG TERM (CURRENT) USE OF ANTICOAGULANTS: ICD-10-CM

## 2019-03-01 DIAGNOSIS — Z51.81 ENCOUNTER FOR THERAPEUTIC DRUG MONITORING: ICD-10-CM

## 2019-03-01 DIAGNOSIS — I48.20 CHRONIC ATRIAL FIBRILLATION (H): Primary | ICD-10-CM

## 2019-03-01 LAB — INR PPP: 2.35 (ref 0.9–1.1)

## 2019-03-01 PROCEDURE — 99308 SBSQ NF CARE LOW MDM 20: CPT | Performed by: NURSE PRACTITIONER

## 2019-03-01 NOTE — PROGRESS NOTES
[place] [facility]  [place] Medical Record Number:  [id number]  Place of Service where encounter took place: [facility]  (FGS) [[id number]]    HPI:    Mecca Slade is a 89 year old  (12/14/1921), who is being seen today for an episodic care visit at the above location.   HPI information obtained from: facility chart records, facility staff, patient report and [facility] chart review. Today's concern is INR/Coumadin management for A. Fib    ROS/Subjective:  Bleeding Signs/Symptoms:  None  Thromboembolic Signs/Symptoms:  None  Medication Changes:  No  Dietary Changes:  No  Activity Changes: No  Bacterial/Viral Infection:  No  Missed Coumadin Doses:  None  On ASA: No  Other Concerns:  No    OBJECTIVE:  /80   Pulse 72   Temp 98.1  F (36.7  C)   Resp 18   Wt 102.7 kg (226 lb 6.4 oz)   SpO2 95%   BMI 36.54 kg/m    Last INR: 2.54 on 2/1/19.  INR Today:  2.35.  Current Dose:  2mg/day.    ASSESSMENT:  Chronic atrial fibrillation (H)  Long term (current) use of anticoagulants  Encounter for therapeutic drug monitoring  Chronic. Stable. Therapeutic. Will dose Coumadin as noted below and recheck INR in about 1 month. Follow-up accordingly. Therapeutic INR for goal of 2-3.    PLAN:  New Dose: 2 mg PO every day     Next INR: Friday 3/29/19    Electronically signed by:  Dr. Haley Sims, APRN CNP DNP

## 2019-03-01 NOTE — LETTER
3/1/2019        RE: Mecca Slade  [facility], [place]  [address] [facility] [address]        [place] [facility]  [place] [facility] Record Number:  [id number]  Place of Service where encounter took place: [facility]  ([facility]GS) [[id number]]    HPI:    Mecca Slade is a 89 year old  (12/14/1921), who is being seen today for an episodic care visit at the above location.   HPI information obtained from: facility chart records, facility staff, patient report and [facility] chart review. Today's concern is INR/Coumadin management for A. Fib    ROS/Subjective:  Bleeding Signs/Symptoms:  None  Thromboembolic Signs/Symptoms:  None  Medication Changes:  No  Dietary Changes:  No  Activity Changes: No  Bacterial/Viral Infection:  No  Missed Coumadin Doses:  None  On ASA: No  Other Concerns:  No    OBJECTIVE:  /80   Pulse 72   Temp 98.1  F (36.7  C)   Resp 18   Wt 102.7 kg (226 lb 6.4 oz)   SpO2 95%   BMI 36.54 kg/m     Last INR: 2.54 on 2/1/19.  INR Today:  2.35.  Current Dose:  2mg/day.    ASSESSMENT:  Chronic atrial fibrillation (H)  Long term (current) use of anticoagulants  Encounter for therapeutic drug monitoring  Chronic. Stable. Therapeutic. Will dose Coumadin as noted below and recheck INR in about 1 month. Follow-up accordingly. Therapeutic INR for goal of 2-3.    PLAN:  New Dose: 2 mg PO every day     Next INR: Friday 3/29/19    Electronically signed by:  JOSUE Clark CNP DNP        Sincerely,        JOSUE Gillis CNP

## 2019-03-28 NOTE — PROGRESS NOTES
[place] [facility]  Chief Complaint   Patient presents with     Annual Comprehensive Nursing Home   [place] Medical Record Number: [id number]  Place of Service where encounter took place:  [facility]  ([facility]GS) [[id number]]    HPI:  Mecca Slade  is a 89 year old  (12/14/1921), who is being seen today for an annual comprehensive visit. HPI information obtained from: facility chart records, facility staff, patient report, [facility] chart review and Care Everywhere [facility] chart review.  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 INR resulted today at 3.31. Patient denies CP, SOB, palpitations, headaches, dizziness, weakness, or bleeding/bruising. She is rate-controlled on Toprol XL, Verapamil ER, she is diuresed w/ Lasix, HLD is managed w/ Lipitor, she is not on aspirin. Her BPs are well-controlled as noted below:  3/15/2019 11:04 154/83mmHg   3/14/2019 12:39 128/76mmHg   3/14/2019 05:36 118/73mmHg   3/13/2019 08:41 145/78mmHg  3/12/2019 14:10 132/78mmHg   3/12/2019 10:39 133/82mmHg   3/11/2019 09:10 128/75mmHg   3/10/2019 05:59 138/80mmHg   3/8/2019 14:16 142/80mmHg    3/2/2019 21:57 120/76mmHg  2/23/2019 21:03 128/80mmHg   2/16/2019 20:43 134/82mmHg   2/9/2019 19:13 134/82mmHg     Type 2 diabetes mellitus with diabetic neuropathy, without long-term current use of insulin (H)  Morbid obesity (H)  CKD (chronic kidney disease) stage 4, GFR 15-29 ml/min (H)  Patient states she has a good appetite, she denies nausea, heartburn, but states sometimes she has either constipation or diarrhea. We are monitoring her BG QWk, and her a1c in Feb 2019 was 7.1%. She is on PO  Metformin, and her BG trend is as noted below:  3/25/2019 07:38 150.0 mg/dL   3/18/2019 07:16 152.0 mg/dL   3/11/2019 07:07 140.0 mg/dL  3/4/2019 07:09 131.0 mg/dL   2/25/2019 07:37 157.0 mg/dL   2/18/2019 07:44 137.0 mg/dL  2/11/2019 07:05 142.0 mg/dL   2/4/2019 07:14 134.0 mg/dL   1/28/2019 07:18 135.0 mg/dL   1/21/2019 07:43 154.0 mg/dL   1/14/2019 07:11 153.0 mg/dL    Annual physical exam  Last TSH was in April 2018 and was 1.83 (stable) w/o medication. Her BPs, a1c, are WDL. Last lipid panel was noted in 2017. No known last mammography or CRC screening.     ALLERGIES: Patient has no known allergies.  PAST MEDICAL HISTORY:  has a past medical history of A-fib (H), Acute CVA (cerebrovascular accident) (H) (03/01/2017), Acute right MCA stroke (H) (03/01/2017), Cardiac pacemaker in 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), DM type 2 (diabetes mellitus, type 2) (H), HTN (hypertension), Left-sided weakness (03/01/2017), Neurological neglect syndrome, Pure hypercholesterolemia, Right sided cerebral hemisphere cerebrovascular accident (H), Tissue plasminogen activator (t-PA) administered at other facility within 24 hours prior to current admission (03/01/2017), and Type 2 diabetes mellitus with diabetic neuropathy, without long-term current use of insulin (H) (4/3/2017).  PAST SURGICAL HISTORY:  has no past surgical history on file.  IMMUNIZATIONS:  Immunization History   Administered Date(s) Administered     Influenza (High Dose) 3 valent vaccine 10/14/2010, 09/30/2011     Influenza (IIV3) PF 10/06/2004, 10/26/2006, 10/25/2007, 10/14/2008, 09/18/2009, 09/15/2012, 10/06/2016     Influenza Vaccine IM 3yrs+ 4 Valent IIV4 09/03/2013     Pneumo Conj 13-V (2010&after) 04/20/2017     Pneumococcal 23 valent 11/06/1995, 01/27/2005, 10/28/2011     TD (ADULT, 7+) 10/07/1997, 01/24/2001, 12/14/2017     Above immunizations  pulled from [facility]. [facility] and facility records also reconciled. Outstanding information sent to  to update [facility].  Future immunizations are not needed at this point as all recommended immunizations are up to date.   Medication Sig     Acetaminophen (TYLENOL PO) Take 1,000 mg by mouth 3 times daily      ATORVASTATIN CALCIUM PO Take 20 mg by mouth daily     carboxymethylcellulose (REFRESH PLUS) 0.5 % SOLN ophthalmic solution Place 1 drop into both eyes 3 times daily as needed for dry eyes     Furosemide (LASIX PO) Take 40 mg by mouth daily     METFORMIN HCL PO Take 500 mg by mouth daily (with breakfast)      METOPROLOL TARTRATE PO Take 25 mg by mouth daily     polyethylene glycol (MIRALAX/GLYCOLAX) Packet Take 17 g by mouth daily as needed      VERAPAMIL HCL PO Take 120 mg by mouth At Bedtime     VITAMIN D, CHOLECALCIFEROL, PO Take 2,000 Units by mouth daily     Warfarin Sodium (COUMADIN PO) Take by mouth daily Take as directed per INR results     Sennosides (SENNA) 8.6 MG CAPS Take 1 Tablespoonful by mouth daily     Case Management: I have reviewed the facility/SNF care plan/MDS, including the falls risk, nutrition and pain screening. I also reviewed the current immunizations, and preventive care. .Future cancer screening is not clinically indicated secondary to age/goals of care Patient's desire to return to the community is not assessible due to cognitive impairment. Current Level of Care is appropriate.    Advance Directive Discussion: I reviewed the current advanced directives as reflected in EPIC, the POLST and the facility chart, and verified the congruency of orders. I contacted the first party Jacqui (daughter and NOK) and discussed the plan of Care.  I did not due to cognitive impairment review the advance directives with the resident.     Team Discussion: I communicated with the appropriate disciplines involved with the Plan of Care:   Nursing   Patient's goal is pain control  and comfort. Information reviewed:  Medications, vital signs, orders, and nursing notes.    ROS: Limited secondary to cognitive impairment but today pt reports the above and 10 point ROS of systems including Constitutional, Eyes, Respiratory, Cardiovascular, Gastroenterology, Genitourinary, Integumentary, Musculoskeletal, Psychiatric were all negative except for pertinent positives noted in my HPI.    Vitals:  /83   Pulse 62   Temp 97.1  F (36.2  C)   Resp 18   Wt 103.3 kg (227 lb 12.8 oz)   SpO2 99%   BMI 36.77 kg/m   Body mass index is 36.77 kg/m .  Exam:  GENERAL APPEARANCE: Alert, in no distress, cooperative.   ENT: Mouth and posterior oropharynx normal, moist mucous membranes, hearing acuity [place].  EYES: EOM, conjunctivae, lids, pupils and irises normal, PERRL2.   RESP: Respiratory effort and palpation of chest normal, no respiratory distress, Lung sounds clear/diminished  CV: Palpation and auscultation of heart done - paced 60-80, rate-controlled and rhythm paced, no murmur, no rub or gallop, Edema 1+ BLE.  ABDOMEN: Normal bowel sounds, soft, nontender, no hepatosplenomegaly or other masses.  SKIN: Inspection/Palpation of skin and subcutaneous tissue baseline w/ fragility.  NEURO: 2-12 at patient's baseline, sensation baseline PPS.  PSYCH: Insight and judgement, memory baseline, affect and mood normal.    Lab/Diagnostic data:         ASSESSMENT/PLAN  Chronic atrial fibrillation (H)  Chronic congestive heart failure, unspecified heart failure type (H)  Essential hypertension  Right sided cerebral hemisphere cerebrovascular accident (H)  Long term current use of anticoagulant therapy  Encounter for therapeutic drug monitoring  Chronic. Stable. Slightly supratherapeutic. Will dose Coumadin as noted below and recheck INR in less than 1 week. Will assess for hyperlipidemia given Lipitor dosing. Follow-up routinely or as needed.    Type 2 diabetes mellitus with diabetic neuropathy, without long-term  current use of insulin (H)  Morbid obesity (H)  CKD (chronic kidney disease) stage 4, GFR 15-29 ml/min (H)  Annual physical exam  Chronic. Stable. Controlled. Given obesity and last levels, will recheck TSH x1 to assess for hypothyroid and/or anemia r/t CKD. Follow-up routinely or as needed.    Orders written by provider at facility and transcribed by : José Miguel Radford  1. Coumadin 0.5 mg PO x1 today  2. Coumadin 2 mg PO every day on AOD  3. Recheck INR x1 on 4/4/19 Dx: Afib  4. Fasting lipid panel, CBC and TSH x1 on 4/4/19 Dx: Annual Physical    Electronically signed by:  Dr. Haley Sims, APRN CNP DNP

## 2019-03-29 ENCOUNTER — RECORDS - HEALTHEAST (OUTPATIENT)
Dept: LAB | Facility: CLINIC | Age: 84
End: 2019-03-29

## 2019-03-29 ENCOUNTER — NURSING HOME VISIT (OUTPATIENT)
Dept: GERIATRICS | Facility: CLINIC | Age: 84
End: 2019-03-29
Payer: COMMERCIAL

## 2019-03-29 VITALS
RESPIRATION RATE: 18 BRPM | SYSTOLIC BLOOD PRESSURE: 154 MMHG | WEIGHT: 227.8 LBS | DIASTOLIC BLOOD PRESSURE: 83 MMHG | TEMPERATURE: 97.1 F | HEART RATE: 62 BPM | OXYGEN SATURATION: 99 % | BODY MASS INDEX: 36.77 KG/M2

## 2019-03-29 DIAGNOSIS — Z79.01 LONG TERM CURRENT USE OF ANTICOAGULANT THERAPY: ICD-10-CM

## 2019-03-29 DIAGNOSIS — N18.4 CKD (CHRONIC KIDNEY DISEASE) STAGE 4, GFR 15-29 ML/MIN (H): ICD-10-CM

## 2019-03-29 DIAGNOSIS — Z00.00 ANNUAL PHYSICAL EXAM: ICD-10-CM

## 2019-03-29 DIAGNOSIS — I50.9 CHRONIC CONGESTIVE HEART FAILURE, UNSPECIFIED HEART FAILURE TYPE (H): ICD-10-CM

## 2019-03-29 DIAGNOSIS — E66.01 MORBID OBESITY (H): ICD-10-CM

## 2019-03-29 DIAGNOSIS — Z51.81 ENCOUNTER FOR THERAPEUTIC DRUG MONITORING: ICD-10-CM

## 2019-03-29 DIAGNOSIS — E11.40 TYPE 2 DIABETES MELLITUS WITH DIABETIC NEUROPATHY, WITHOUT LONG-TERM CURRENT USE OF INSULIN (H): ICD-10-CM

## 2019-03-29 DIAGNOSIS — I10 ESSENTIAL HYPERTENSION: ICD-10-CM

## 2019-03-29 DIAGNOSIS — I48.20 CHRONIC ATRIAL FIBRILLATION (H): Primary | ICD-10-CM

## 2019-03-29 DIAGNOSIS — I63.9: ICD-10-CM

## 2019-03-29 PROBLEM — R10.13 ABDOMINAL PAIN, EPIGASTRIC: Status: RESOLVED | Noted: 2018-07-24 | Resolved: 2019-03-29

## 2019-03-29 PROBLEM — S93.402D SPRAIN OF LEFT ANKLE, UNSPECIFIED LIGAMENT, SUBSEQUENT ENCOUNTER: Status: RESOLVED | Noted: 2018-05-15 | Resolved: 2019-03-29

## 2019-03-29 PROBLEM — M70.51 PES ANSERINUS BURSITIS OF RIGHT KNEE: Status: RESOLVED | Noted: 2017-09-29 | Resolved: 2019-03-29

## 2019-03-29 PROBLEM — M79.606 ACUTE PAIN OF LOWER EXTREMITY, UNSPECIFIED LATERALITY: Status: RESOLVED | Noted: 2018-07-05 | Resolved: 2019-03-29

## 2019-03-29 PROBLEM — S80.11XA HEMATOMA OF LOWER EXTREMITY, RIGHT, INITIAL ENCOUNTER: Status: RESOLVED | Noted: 2017-12-11 | Resolved: 2019-03-29

## 2019-03-29 PROBLEM — S80.11XD: Status: RESOLVED | Noted: 2017-12-13 | Resolved: 2019-03-29

## 2019-03-29 PROBLEM — M25.562 ACUTE PAIN OF LEFT KNEE: Status: RESOLVED | Noted: 2018-06-19 | Resolved: 2019-03-29

## 2019-03-29 LAB — INR PPP: 3.31 (ref 0.9–1.1)

## 2019-03-29 PROCEDURE — 99318 ZZC ANNUAL NURSING FAC ASSESSMNT, STABLE: CPT | Performed by: NURSE PRACTITIONER

## 2019-03-29 NOTE — LETTER
3/29/2019        RE: Mecca Slade  [facility]  [address] [place] [id number]        [place] [facility]  Chief Complaint   Patient presents with     Annual Comprehensive Nursing Home   [place] Medical Record Number: [id number]  Place of Service where encounter took place:  [facility]  ([facility]GS) [[id number]]    HPI:  Mecca Slade  is a 89 year old  (12/14/1921), who is being seen today for an annual comprehensive visit. HPI information obtained from: facility chart records, facility staff, patient report, [facility] chart review and Care Everywhere [facility] chart review.  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 INR resulted today at 3.31. Patient denies CP, SOB, palpitations, headaches, dizziness, weakness, or bleeding/bruising. She is rate-controlled on Toprol XL, Verapamil ER, she is diuresed w/ Lasix, HLD is managed w/ Lipitor, she is not on aspirin. Her BPs are well-controlled as noted below:  3/15/2019 11:04 154/83mmHg   3/14/2019 12:39 128/76mmHg   3/14/2019 05:36 118/73mmHg   3/13/2019 08:41 145/78mmHg  3/12/2019 14:10 132/78mmHg   3/12/2019 10:39 133/82mmHg   3/11/2019 09:10 128/75mmHg   3/10/2019 05:59 138/80mmHg   3/8/2019 14:16 142/80mmHg    3/2/2019 21:57 120/76mmHg  2/23/2019 21:03 128/80mmHg   2/16/2019 20:43 134/82mmHg   2/9/2019 19:13 134/82mmHg     Type 2 diabetes mellitus with diabetic neuropathy, without long-term current use of insulin (H)  Morbid obesity (H)  CKD (chronic kidney disease) stage 4, GFR 15-29 ml/min (H)  Patient states she has a good appetite, she denies nausea, heartburn, but states sometimes  she has either constipation or diarrhea. We are monitoring her BG QWk, and her a1c in Feb 2019 was 7.1%. She is on PO Metformin, and her BG trend is as noted below:  3/25/2019 07:38 150.0 mg/dL   3/18/2019 07:16 152.0 mg/dL   3/11/2019 07:07 140.0 mg/dL  3/4/2019 07:09 131.0 mg/dL   2/25/2019 07:37 157.0 mg/dL   2/18/2019 07:44 137.0 mg/dL  2/11/2019 07:05 142.0 mg/dL   2/4/2019 07:14 134.0 mg/dL   1/28/2019 07:18 135.0 mg/dL   1/21/2019 07:43 154.0 mg/dL   1/14/2019 07:11 153.0 mg/dL    Annual physical exam  Last TSH was in April 2018 and was 1.83 (stable) w/o medication. Her BPs, a1c, are WDL. Last lipid panel was noted in 2017. No known last mammography or CRC screening.     ALLERGIES: Patient has no known allergies.  PAST MEDICAL HISTORY:  has a past medical history of A-fib (H), Acute CVA (cerebrovascular accident) (H) (03/01/2017), Acute right MCA stroke (H) (03/01/2017), Cardiac pacemaker in 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), DM type 2 (diabetes mellitus, type 2) (H), HTN (hypertension), Left-sided weakness (03/01/2017), Neurological neglect syndrome, Pure hypercholesterolemia, Right sided cerebral hemisphere cerebrovascular accident (H), Tissue plasminogen activator (t-PA) administered at other facility within 24 hours prior to current admission (03/01/2017), and Type 2 diabetes mellitus with diabetic neuropathy, without long-term current use of insulin (H) (4/3/2017).  PAST SURGICAL HISTORY:  has no past surgical history on file.  IMMUNIZATIONS:  Immunization History   Administered Date(s) Administered     Influenza (High Dose) 3 valent vaccine 10/14/2010, 09/30/2011     Influenza (IIV3) PF 10/06/2004, 10/26/2006, 10/25/2007, 10/14/2008, 09/18/2009, 09/15/2012, 10/06/2016     Influenza Vaccine IM 3yrs+ 4 Valent IIV4 09/03/2013     Pneumo Conj 13-V (2010&after) 04/20/2017     Pneumococcal 23  valent 11/06/1995, 01/27/2005, 10/28/2011     TD (ADULT, 7+) 10/07/1997, 01/24/2001, 12/14/2017     Above immunizations pulled from [facility]. [facility] and facility records also reconciled. Outstanding information sent to  to update [facility].  Future immunizations are not needed at this point as all recommended immunizations are up to date.   Medication Sig     Acetaminophen (TYLENOL PO) Take 1,000 mg by mouth 3 times daily      ATORVASTATIN CALCIUM PO Take 20 mg by mouth daily     carboxymethylcellulose (REFRESH PLUS) 0.5 % SOLN ophthalmic solution Place 1 drop into both eyes 3 times daily as needed for dry eyes     Furosemide (LASIX PO) Take 40 mg by mouth daily     METFORMIN HCL PO Take 500 mg by mouth daily (with breakfast)      METOPROLOL TARTRATE PO Take 25 mg by mouth daily     polyethylene glycol (MIRALAX/GLYCOLAX) Packet Take 17 g by mouth daily as needed      VERAPAMIL HCL PO Take 120 mg by mouth At Bedtime     VITAMIN D, CHOLECALCIFEROL, PO Take 2,000 Units by mouth daily     Warfarin Sodium (COUMADIN PO) Take by mouth daily Take as directed per INR results     Sennosides (SENNA) 8.6 MG CAPS Take 1 Tablespoonful by mouth daily     Case Management: I have reviewed the facility/SNF care plan/MDS, including the falls risk, nutrition and pain screening. I also reviewed the current immunizations, and preventive care. .Future cancer screening is not clinically indicated secondary to age/goals of care Patient's desire to return to the community is not assessible due to cognitive impairment. Current Level of Care is appropriate.    Advance Directive Discussion: I reviewed the current advanced directives as reflected in EPIC, the POLST and the facility chart, and verified the congruency of orders. I contacted the first party Jacqui (daughter and NOK) and discussed the plan of Care.  I did not due to cognitive impairment review the advance directives with the resident.     Team Discussion: I  communicated with the appropriate disciplines involved with the Plan of Care:   Nursing   Patient's goal is pain control and comfort. Information reviewed:  Medications, vital signs, orders, and nursing notes.    ROS: Limited secondary to cognitive impairment but today pt reports the above and 10 point ROS of systems including Constitutional, Eyes, Respiratory, Cardiovascular, Gastroenterology, Genitourinary, Integumentary, Musculoskeletal, Psychiatric were all negative except for pertinent positives noted in my HPI.    Vitals:  /83   Pulse 62   Temp 97.1  F (36.2  C)   Resp 18   Wt 103.3 kg (227 lb 12.8 oz)   SpO2 99%   BMI 36.77 kg/m    Body mass index is 36.77 kg/m .  Exam:  GENERAL APPEARANCE: Alert, in no distress, cooperative.   ENT: Mouth and posterior oropharynx normal, moist mucous membranes, hearing acuity [place].  EYES: EOM, conjunctivae, lids, pupils and irises normal, PERRL2.   RESP: Respiratory effort and palpation of chest normal, no respiratory distress, Lung sounds clear/diminished  CV: Palpation and auscultation of heart done - paced 60-80, rate-controlled and rhythm paced, no murmur, no rub or gallop, Edema 1+ BLE.  ABDOMEN: Normal bowel sounds, soft, nontender, no hepatosplenomegaly or other masses.  SKIN: Inspection/Palpation of skin and subcutaneous tissue baseline w/ fragility.  NEURO: 2-12 at patient's baseline, sensation baseline PPS.  PSYCH: Insight and judgement, memory baseline, affect and mood normal.    Lab/Diagnostic data:         ASSESSMENT/PLAN  Chronic atrial fibrillation (H)  Chronic congestive heart failure, unspecified heart failure type (H)  Essential hypertension  Right sided cerebral hemisphere cerebrovascular accident (H)  Long term current use of anticoagulant therapy  Encounter for therapeutic drug monitoring  Chronic. Stable. Slightly supratherapeutic. Will dose Coumadin as noted below and recheck INR in less than 1 week. Will assess for hyperlipidemia given  Lipitor dosing. Follow-up routinely or as needed.    Type 2 diabetes mellitus with diabetic neuropathy, without long-term current use of insulin (H)  Morbid obesity (H)  CKD (chronic kidney disease) stage 4, GFR 15-29 ml/min (H)  Annual physical exam  Chronic. Stable. Controlled. Given obesity and last levels, will recheck TSH x1 to assess for hypothyroid and/or anemia r/t CKD. Follow-up routinely or as needed.    Orders written by provider at facility and transcribed by : José Miguel Radford  1. Coumadin 0.5 mg PO x1 today  2. Coumadin 2 mg PO every day on AOD  3. Recheck INR x1 on 4/4/19 Dx: Afib  4. Fasting lipid panel, CBC and TSH x1 on 4/4/19 Dx: Annual Physical    Electronically signed by:  JOSUE Clark CNP DNP        Sincerely,        JOSUE Gillis CNP

## 2019-04-03 NOTE — PROGRESS NOTES
[place] [facility]  [place] Medical Record Number:  [id number]  Place of Service where encounter took place:  [facility]  ([facility]GS) [[id number]]  Chief Complaint   Patient presents with     Nursing Home Acute     HPI: Mecca Slade  is a 89 year old (12/14/1921), who is being seen today for an episodic care visit.  HPI information obtained from: facility chart records, facility staff, patient report, [facility] chart review and Care Everywhere [facility] chart review. 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 Her coumadin dosing was 0.5mg x1, then resumed her usual 2mg/day. Recheck INR today is 2.57. She denies CP, dizziness, palpitations, bleeding/bruising. Given Lipitor 20mg/day, we rechecked fasting lipid panel, which resulted well within normal limits.     Type 2 diabetes mellitus with diabetic neuropathy, without long-term current use of insulin (H)  Morbid obesity (H)  We are following up today w/ patient after annual physical. We obtained CBC, Lipids, TSH x1 today as part of follow-up given some weight gain, fatigue. Patient and nursing reported that clothing is too tight, and nursing actually noting bruising along her waistband area. Lab results are WDL as noted below.     Past Medical and Surgical History reviewed in [facility] today.  REVIEW OF SYSTEMS: Limited secondary to cognitive impairment but today pt reports the above and 6 point ROS including Respiratory, CV, GI and , other than that noted in the HPI, is negative.    Objective:  /73   Pulse 70   Temp 97.2  F (36.2  C)   Resp 18   Wt 104.5 kg (230 lb 6.4 oz)   SpO2 96%   BMI 37.19 kg/m    Exam:  GENERAL  APPEARANCE: Alert, in no distress, cooperative.   ENT: Mouth and posterior oropharynx normal, moist mucous membranes, hearing acuity [place].  EYES: EOM, conjunctivae, lids, pupils and irises normal, PERRL2.   RESP: Respiratory effort and palpation of chest normal, no respiratory distress, Lung sounds clear/diminshed.  CV: Palpation and auscultation of heart done, rate-controlled and rhythm irregular, no murmur, no rub or gallop, Edema 2+ BLE.  ABDOMEN: Normal bowel sounds, soft, nontender, no hepatosplenomegaly or other masses.  SKIN: Inspection/Palpation of skin and subcutaneous tissue baseline w/ fragility.  NEURO: 2-12 at patient's baseline, sensation baseline PPS.  PSYCH: Insight and judgement, memory baseline, affect and mood normal.    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 Stable. Therapeutic. Will decrease Lipitor to lowest dose. Will dose Coumadin as noted below and recheck INR in 1 week. Follow-up accordingly.    Type 2 diabetes mellitus with diabetic neuropathy, without long-term current use of insulin (H)  Morbid obesity (H)  Chronic. Stable. Will order for new DM orthotic shoes and ask family/SW to get more comfortable clothing for patient. Follow-up routinely or as needed.    Orders written by provider at facility and transcribed by : José Miguel Radford  1. Shoes with orthotic/DM support x1 Dx: DM II.  2. Coumadin 1 mg PO every day on Tu/Th.  3. Coumadin 2 mg PO every day on all other days. Dx: Afib.  4. Recheck INR x1 on 4/15/19. Dx: Afib.  5. Decrease Lipitor from 20 mg PO every day to 10 mg PO every day. Dx: HLD.    FYI: SW/Family to bring in better fitting clothing for weight gain/comfort    Electronically signed  by:  Dr. Haley Sims, APRN CNP DNP

## 2019-04-04 ENCOUNTER — NURSING HOME VISIT (OUTPATIENT)
Dept: GERIATRICS | Facility: CLINIC | Age: 84
End: 2019-04-04
Payer: COMMERCIAL

## 2019-04-04 ENCOUNTER — RECORDS - HEALTHEAST (OUTPATIENT)
Dept: LAB | Facility: CLINIC | Age: 84
End: 2019-04-04

## 2019-04-04 ENCOUNTER — TRANSFERRED RECORDS (OUTPATIENT)
Dept: HEALTH INFORMATION MANAGEMENT | Facility: CLINIC | Age: 84
End: 2019-04-04

## 2019-04-04 VITALS
BODY MASS INDEX: 37.19 KG/M2 | DIASTOLIC BLOOD PRESSURE: 73 MMHG | SYSTOLIC BLOOD PRESSURE: 152 MMHG | HEART RATE: 70 BPM | TEMPERATURE: 97.2 F | OXYGEN SATURATION: 96 % | WEIGHT: 230.4 LBS | RESPIRATION RATE: 18 BRPM

## 2019-04-04 DIAGNOSIS — I48.20 CHRONIC ATRIAL FIBRILLATION (H): Primary | ICD-10-CM

## 2019-04-04 DIAGNOSIS — I10 ESSENTIAL HYPERTENSION: ICD-10-CM

## 2019-04-04 DIAGNOSIS — Z79.01 LONG TERM CURRENT USE OF ANTICOAGULANT THERAPY: ICD-10-CM

## 2019-04-04 DIAGNOSIS — Z51.81 ENCOUNTER FOR THERAPEUTIC DRUG MONITORING: ICD-10-CM

## 2019-04-04 DIAGNOSIS — I50.9 CHRONIC CONGESTIVE HEART FAILURE, UNSPECIFIED HEART FAILURE TYPE (H): ICD-10-CM

## 2019-04-04 DIAGNOSIS — E66.01 MORBID OBESITY (H): ICD-10-CM

## 2019-04-04 DIAGNOSIS — I63.9: ICD-10-CM

## 2019-04-04 DIAGNOSIS — E11.40 TYPE 2 DIABETES MELLITUS WITH DIABETIC NEUROPATHY, WITHOUT LONG-TERM CURRENT USE OF INSULIN (H): ICD-10-CM

## 2019-04-04 PROBLEM — M25.561 CHRONIC PAIN OF RIGHT KNEE: Status: RESOLVED | Noted: 2017-12-11 | Resolved: 2019-04-04

## 2019-04-04 PROBLEM — R53.81 DEBILITY: Status: RESOLVED | Noted: 2017-12-11 | Resolved: 2019-04-04

## 2019-04-04 PROBLEM — R60.0 EDEMA, LOWER EXTREMITY: Status: RESOLVED | Noted: 2018-06-19 | Resolved: 2019-04-04

## 2019-04-04 PROBLEM — E78.00 HYPERCHOLESTEROLEMIA: Status: RESOLVED | Noted: 2017-04-03 | Resolved: 2019-04-04

## 2019-04-04 PROBLEM — G89.29 CHRONIC PAIN OF RIGHT KNEE: Status: RESOLVED | Noted: 2017-12-11 | Resolved: 2019-04-04

## 2019-04-04 PROBLEM — Z86.73 H/O: CVA (CEREBROVASCULAR ACCIDENT): Status: RESOLVED | Noted: 2017-12-11 | Resolved: 2019-04-04

## 2019-04-04 LAB
BASOPHILS # BLD AUTO: 0.1 THOU/UL (ref 0–0.2)
BASOPHILS NFR BLD AUTO: 1 % (ref 0–2)
CHOLEST SERPL-MCNC: 127 MG/DL
CHOLEST SERPL-MCNC: 127 MG/DL
DIFFERENTIAL: ABNORMAL
EOSINOPHIL # BLD AUTO: 0.1 THOU/UL (ref 0–0.4)
EOSINOPHIL NFR BLD AUTO: 2 % (ref 0–6)
ERYTHROCYTE [DISTWIDTH] IN BLOOD BY AUTOMATED COUNT: 13.9 % (ref 11–14.5)
ERYTHROCYTE [DISTWIDTH] IN BLOOD BY AUTOMATED COUNT: 13.9 % (ref 11–14.5)
FASTING STATUS PATIENT QL REPORTED: YES
HCT VFR BLD AUTO: 39.3 % (ref 35–47)
HCT VFR BLD AUTO: 39.3 % (ref 35–47)
HDLC SERPL-MCNC: 38 MG/DL
HDLC SERPL-MCNC: 38 MG/DL
HEMOGLOBIN: 12.1 G/DL (ref 12–16)
HGB BLD-MCNC: 12.1 G/DL (ref 12–16)
INR PPP: 2.57 (ref 0.9–1.1)
INR PPP: 2.57 (ref 0.9–1.1)
LDLC SERPL CALC-MCNC: 66 MG/DL
LDLC SERPL CALC-MCNC: 66 MG/DL
LYMPHOCYTES # BLD AUTO: 1.3 THOU/UL (ref 0.8–4.4)
LYMPHOCYTES NFR BLD AUTO: 23 % (ref 20–40)
MCH RBC QN AUTO: 31 PG (ref 27–34)
MCH RBC QN AUTO: 31 PG (ref 27–34)
MCHC RBC AUTO-ENTMCNC: 30.8 G/DL (ref 32–36)
MCHC RBC AUTO-ENTMCNC: 30.8 G/DL (ref 32–36)
MCV RBC AUTO: 101 FL (ref 80–100)
MCV RBC AUTO: 101 FL (ref 80–100)
MONOCYTES # BLD AUTO: 0.6 THOU/UL (ref 0–0.9)
MONOCYTES NFR BLD AUTO: 10 % (ref 2–10)
NEUTROPHILS # BLD AUTO: 3.6 THOU/UL (ref 2–7.7)
NEUTROPHILS NFR BLD AUTO: 65 % (ref 50–70)
PLATELET # BLD AUTO: 161 THOU/UL (ref 140–440)
PLATELET # BLD AUTO: 161 THOU/UL (ref 140–440)
PMV BLD AUTO: 10.5 FL (ref 8.5–12.5)
RBC # BLD AUTO: 3.9 MILL/UL (ref 3.8–5.4)
RBC # BLD AUTO: 3.9 MILL/UL (ref 3.8–5.4)
TRIGL SERPL-MCNC: 115 MG/DL
TRIGL SERPL-MCNC: 115 MG/DL
TSH SERPL DL<=0.005 MIU/L-ACNC: 1.78 UIU/ML (ref 0.3–5)
TSH SERPL-ACNC: 1.78 UIU/ML (ref 0.3–5)
WBC # BLD AUTO: 5.6 THOU/UL (ref 4–11)
WBC: 5.6 THOU/UL (ref 4–11)

## 2019-04-04 PROCEDURE — 99309 SBSQ NF CARE MODERATE MDM 30: CPT | Performed by: NURSE PRACTITIONER

## 2019-04-04 NOTE — LETTER
4/4/2019        RE: Mecca Slade  [facility]  [address] [facility] [address]        [place] [facility]  [place] Medical Record Number:  [id number]  Place of Service where encounter took place:  [facility]  (FGS) [[id number]]  Chief Complaint   Patient presents with     Nursing Home Acute     HPI: Mecca Slade  is a 89 year old (12/14/1921), who is being seen today for an episodic care visit.  HPI information obtained from: facility chart records, facility staff, patient report, [facility] chart review and Care Everywhere [facility] chart review. 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 Her coumadin dosing was 0.5mg x1, then resumed her usual 2mg/day. Recheck INR today is 2.57. She denies CP, dizziness, palpitations, bleeding/bruising. Given Lipitor 20mg/day, we rechecked fasting lipid panel, which resulted well within normal limits.     Type 2 diabetes mellitus with diabetic neuropathy, without long-term current use of insulin (H)  Morbid obesity (H)  We are following up today w/ patient after annual physical. We obtained CBC, Lipids, TSH x1 today as part of follow-up given some weight gain, fatigue. Patient and nursing reported that clothing is too tight, and nursing actually noting bruising along her waistband area. Lab results are WDL as noted below.     Past Medical and Surgical History reviewed in [facility] today.  REVIEW OF SYSTEMS: Limited secondary to cognitive impairment but today pt reports the above and 6 point ROS including Respiratory, CV, GI and , other than that noted in the HPI, is negative.    Objective:  /73   Pulse 70   Temp  97.2  F (36.2  C)   Resp 18   Wt 104.5 kg (230 lb 6.4 oz)   SpO2 96%   BMI 37.19 kg/m     Exam:  GENERAL APPEARANCE: Alert, in no distress, cooperative.   ENT: Mouth and posterior oropharynx normal, moist mucous membranes, hearing acuity [place].  EYES: EOM, conjunctivae, lids, pupils and irises normal, PERRL2.   RESP: Respiratory effort and palpation of chest normal, no respiratory distress, Lung sounds clear/diminshed.  CV: Palpation and auscultation of heart done, rate-controlled and rhythm irregular, no murmur, no rub or gallop, Edema 2+ BLE.  ABDOMEN: Normal bowel sounds, soft, nontender, no hepatosplenomegaly or other masses.  SKIN: Inspection/Palpation of skin and subcutaneous tissue baseline w/ fragility.  NEURO: 2-12 at patient's baseline, sensation baseline PPS.  PSYCH: Insight and judgement, memory baseline, affect and mood normal.    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 Stable. Therapeutic. Will decrease Lipitor to lowest dose. Will dose Coumadin as noted below and recheck INR in 1 week. Follow-up accordingly.    Type 2 diabetes mellitus with diabetic neuropathy, without long-term current use of insulin (H)  Morbid obesity (H)  Chronic. Stable. Will order for new DM orthotic shoes and ask family/SW to get more comfortable clothing for patient. Follow-up routinely or as needed.    Orders written by provider at facility and transcribed by : José Miguel Radford  1. Shoes with orthotic/DM support x1 Dx: DM II.  2. Coumadin 1 mg PO every day on Tu/Th.  3. Coumadin 2 mg PO every day on all other days. Dx: Afib.  4. Recheck INR x1 on 4/15/19. Dx: Afib.  5. Decrease Lipitor from 20 mg PO every day to 10 mg PO every day. Dx:  BLAKE.    FYI: SW/Family to bring in better fitting clothing for weight gain/comfort    Electronically signed by:  JOSUE Clark CNP DNP          Sincerely,        JOSUE Gillis CNP

## 2019-04-12 ENCOUNTER — RECORDS - HEALTHEAST (OUTPATIENT)
Dept: LAB | Facility: CLINIC | Age: 84
End: 2019-04-12

## 2019-04-12 NOTE — PROGRESS NOTES
[place] [facility]  [place] Medical Record Number:  [id number]  Place of Service where encounter took place: [facility]  (FGS) [[id number]]    HPI: Mecca Slade is a 89 year old  (12/14/1921), who is being seen today for an episodic care visit at the above location.   HPI information obtained from: facility chart records, facility staff, patient report, [facility] chart review and Care Everywhere [facility] chart review. Today's concern is INR/Coumadin management for A. Fib    ROS/Subjective:  Bleeding Signs/Symptoms:  None  Thromboembolic Signs/Symptoms:  None  Medication Changes:  Yes: Lipitor reduction during last visit to lowest dose.  Dietary Changes:  No  Activity Changes: No  Bacterial/Viral Infection:  No  Missed Coumadin Doses:  None  On ASA: No  Other Concerns:  No    OBJECTIVE:  There were no vitals taken for this visit.  Last INR: 2.57 on 4/4/19.  INR Today: 2.10.  Current Dose: 1mg Tu/Th, 2mg AOD.     ASSESSMENT:  Chronic atrial fibrillation (H)  Long term current use of anticoagulant therapy  Encounter for therapeutic drug monitoring  Chronic. Stable. Therapeutic. Will dose Coumadin as noted below and recheck INR in about 3 weeks. Follow-up accordingly. Therapeutic INR for goal of 2-3.    PLAN:   Orders written by provider at facility and transcribed by : Brittany Hinton:  New Dose: 1 mg po every day Tu/Thur and 2 mg po every day AOD  Next INR: 5/3/19.     Electronically signed by:  Dr. Haley Sims, APRN CNP DNP

## 2019-04-15 ENCOUNTER — NURSING HOME VISIT (OUTPATIENT)
Dept: GERIATRICS | Facility: CLINIC | Age: 84
End: 2019-04-15
Payer: COMMERCIAL

## 2019-04-15 VITALS
BODY MASS INDEX: 37.45 KG/M2 | TEMPERATURE: 97.9 F | SYSTOLIC BLOOD PRESSURE: 116 MMHG | DIASTOLIC BLOOD PRESSURE: 68 MMHG | HEART RATE: 67 BPM | OXYGEN SATURATION: 97 % | RESPIRATION RATE: 16 BRPM | WEIGHT: 232 LBS

## 2019-04-15 DIAGNOSIS — Z51.81 ENCOUNTER FOR THERAPEUTIC DRUG MONITORING: ICD-10-CM

## 2019-04-15 DIAGNOSIS — I48.20 CHRONIC ATRIAL FIBRILLATION (H): Primary | ICD-10-CM

## 2019-04-15 DIAGNOSIS — Z79.01 LONG TERM CURRENT USE OF ANTICOAGULANT THERAPY: ICD-10-CM

## 2019-04-15 LAB — INR PPP: 2.1 (ref 0.9–1.1)

## 2019-04-15 PROCEDURE — 99308 SBSQ NF CARE LOW MDM 20: CPT | Performed by: NURSE PRACTITIONER

## 2019-04-15 NOTE — LETTER
4/15/2019        RE: Mecca Slade  [facility]  [address] [facility] [address]        [place] [facility]  [place] Medical Record Number:  [id number]  Place of Service where encounter took place: [facility]  (FGS) [[id number]]    HPI: Mecca Slade is a 89 year old  (12/14/1921), who is being seen today for an episodic care visit at the above location.   HPI information obtained from: facility chart records, facility staff, patient report, [facility] chart review and Care Everywhere [facility] chart review. Today's concern is INR/Coumadin management for A. Fib    ROS/Subjective:  Bleeding Signs/Symptoms:  None  Thromboembolic Signs/Symptoms:  None  Medication Changes:  Yes: Lipitor reduction during last visit to lowest dose.  Dietary Changes:  No  Activity Changes: No  Bacterial/Viral Infection:  No  Missed Coumadin Doses:  None  On ASA: No  Other Concerns:  No    OBJECTIVE:  There were no vitals taken for this visit.  Last INR: 2.57 on 4/4/19.  INR Today: 2.10.  Current Dose: 1mg Tu/Th, 2mg AOD.     ASSESSMENT:  Chronic atrial fibrillation (H)  Long term current use of anticoagulant therapy  Encounter for therapeutic drug monitoring  Chronic. Stable. Therapeutic. Will dose Coumadin as noted below and recheck INR in about 3 weeks. Follow-up accordingly. Therapeutic INR for goal of 2-3.    PLAN:   Orders written by provider at facility and transcribed by : Brittany Hinton:  New Dose: 1 mg po every day Tu/Thur and 2 mg po every day AOD  Next INR: 5/3/19.     Electronically signed by:  JOSUE Clark CNP [facility]          Sincerely,        JOSUE Gillis CNP

## 2019-04-19 ENCOUNTER — CLINICAL UPDATE (OUTPATIENT)
Dept: PHARMACY | Facility: CLINIC | Age: 84
End: 2019-04-19

## 2019-04-19 NOTE — PROGRESS NOTES
This patient's medication list and chart were reviewed as part of the service provided by [facility] and [facility].    Assessment/Recommendations:    Of note, Epic med list indicates pt is on Metoprolol IR 25mg once daily and Verapamil IR 120mg once daily vs the 24hr formulations (encounters indicate long acting formulations being used) - please clarify and update med list if necessary.    1. (HTN/Afib):  As noted above, pt on both Verapamil and Metoprolol.  Verapamil may contribute to peripheral edema as well as constipation (7-12%).  May consider d'c of Verapamil and increase Metoprolol ER dose.  BP goal <150/90mmHg reasonable.  Do not want tight control in this elderly pt with limited function and life expectancy, at risk for adverse effects, tissue/cerebral hypoperfusion.  2. (Supplements):  Noted most recent calcium level is elevated.  May benefit from d'c of vitamin D since this may be contributing to hypercalcemia (hypercalcemia may contribute to fatigue, constipation).    3. (Diabetes):  Most recent a1c 7.1%, which looks in line with most recent BG levels noted in Epic. Reasonable to attempt reduction in Metformin to 500mg daily and f/u a1c in 3 months.  a1c goal <8.5-9% and avoiding symptoms of hypo or hyperglycemia is treatment target for this very elderly female with limited level of function, life expectancy, multiple comorbidities.      Yanely Juárez, Pharm.D.,[facility]  Board Certified Geriatric Pharmacist  Medication Therapy Management Pharmacist  [phone]

## 2019-05-02 ENCOUNTER — RECORDS - HEALTHEAST (OUTPATIENT)
Dept: LAB | Facility: CLINIC | Age: 84
End: 2019-05-02

## 2019-05-03 ENCOUNTER — NURSING HOME VISIT (OUTPATIENT)
Dept: GERIATRICS | Facility: CLINIC | Age: 84
End: 2019-05-03
Payer: COMMERCIAL

## 2019-05-03 VITALS
HEART RATE: 65 BPM | WEIGHT: 230.4 LBS | RESPIRATION RATE: 16 BRPM | TEMPERATURE: 97.8 F | BODY MASS INDEX: 37.19 KG/M2 | OXYGEN SATURATION: 94 % | SYSTOLIC BLOOD PRESSURE: 152 MMHG | DIASTOLIC BLOOD PRESSURE: 87 MMHG

## 2019-05-03 VITALS
RESPIRATION RATE: 16 BRPM | DIASTOLIC BLOOD PRESSURE: 87 MMHG | OXYGEN SATURATION: 94 % | TEMPERATURE: 97.8 F | BODY MASS INDEX: 37.19 KG/M2 | WEIGHT: 230.4 LBS | HEART RATE: 65 BPM | SYSTOLIC BLOOD PRESSURE: 152 MMHG

## 2019-05-03 DIAGNOSIS — E66.01 MORBID OBESITY (H): ICD-10-CM

## 2019-05-03 DIAGNOSIS — I63.9: ICD-10-CM

## 2019-05-03 DIAGNOSIS — E11.40 TYPE 2 DIABETES MELLITUS WITH DIABETIC NEUROPATHY, WITHOUT LONG-TERM CURRENT USE OF INSULIN (H): ICD-10-CM

## 2019-05-03 DIAGNOSIS — N18.4 CKD (CHRONIC KIDNEY DISEASE) STAGE 4, GFR 15-29 ML/MIN (H): ICD-10-CM

## 2019-05-03 DIAGNOSIS — Z51.81 ENCOUNTER FOR THERAPEUTIC DRUG MONITORING: ICD-10-CM

## 2019-05-03 DIAGNOSIS — I50.9 CHRONIC CONGESTIVE HEART FAILURE, UNSPECIFIED HEART FAILURE TYPE (H): ICD-10-CM

## 2019-05-03 DIAGNOSIS — I48.20 CHRONIC ATRIAL FIBRILLATION (H): Primary | ICD-10-CM

## 2019-05-03 DIAGNOSIS — K59.01 SLOW TRANSIT CONSTIPATION: ICD-10-CM

## 2019-05-03 DIAGNOSIS — I10 ESSENTIAL HYPERTENSION: ICD-10-CM

## 2019-05-03 DIAGNOSIS — Z79.01 LONG TERM CURRENT USE OF ANTICOAGULANT THERAPY: ICD-10-CM

## 2019-05-03 LAB — INR PPP: 1.74 (ref 0.9–1.1)

## 2019-05-03 PROCEDURE — 99309 SBSQ NF CARE MODERATE MDM 30: CPT | Performed by: NURSE PRACTITIONER

## 2019-05-03 RX ORDER — METOPROLOL SUCCINATE 25 MG/1
25 TABLET, EXTENDED RELEASE ORAL DAILY
Qty: 30 TABLET | Refills: 0 | Status: ON HOLD
Start: 2019-05-03 | End: 2021-01-01

## 2019-05-03 NOTE — LETTER
5/3/2019        RE: Mecca Slade  [facility]  [address] [facility] [address]        [place] [facility]  [place] Medical Record Number:  [id number]  Place of Service where encounter took place:  [facility]  (FGS) [[id number]]  Chief Complaint   Patient presents with     Nursing Home Acute     HPI: Mecca Slade  is a 89 year old (12/14/1921), who is being seen today for an episodic care visit.  HPI information obtained from: facility chart records, facility staff, patient report, [facility] chart review and Care Everywhere [facility] chart review. Today's concern is:    Chronic atrial fibrillation (H)  Chronic congestive heart failure, unspecified heart failure type (H)  Essential hypertension  Long term current use of anticoagulant therapy  Encounter for therapeutic drug monitoring  Last INR was 2.10 on 4/15, and current Coumadin dosing is 1mg Tu/Th, 2mg AOD. INR resulted today at 1.74. Patient denies CP, SOB, new edema, or bleeding/bruising. PharmD recommending change in either/or Verapamil/Toprol, given tight control at age/goals-of-care. 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 Also noting hypercalcemia on BMP, which Vitamin D  supplementation can contribute to, in addition to constipation, which patient has on-and-off. PharmD suggesting discontinuation of Vitamin D and Metformin reduction w/ a1c recheck. Also noting overall weight gain and trouble with some clothing fitting. Her BGs (Qwk) are noted below:  4/29/2019 07:19 153.0 mg/dL  4/22/2019 07:27 175.0 mg/dL  4/15/2019 07:26 141.0 mg/dL    4/8/2019 09:07 142.0 mg/dL   4/1/2019 07:11 136.0 mg/dL  3/25/2019 07:38 150.0 mg/dL  3/18/2019 07:16 152.0 mg/dL    Past Medical and Surgical History reviewed in Epic today.  REVIEW OF SYSTEMS: Limited secondary to cognitive impairment but today pt reports the above and 4 point ROS including Respiratory, CV, GI and , other than that noted in the HPI, is negative.    Objective:  /87   Pulse 65   Temp 97.8  F (36.6  C)   Resp 16   Wt 104.5 kg (230 lb 6.4 oz)   SpO2 94%   BMI 37.19 kg/m     Exam:  GENERAL APPEARANCE: Alert, in no distress, cooperative.   ENT: Mouth/posterior oropharynx intact w/ moist mucous membranes, hearing acuity [place].  EYES: EOM, conjunctivae, lids, pupils and irises normal, PERRL2.   RESP: Respiratory effort good, no respiratory distress, Lung sounds clear. On RA.   CV: Auscultation of heart reveals S1, S2, rate-controlled and rhythm irregular, no murmur, no rub or gallop, Edema 1+ BLE. Peripheral pulses are 2+.  ABDOMEN: Normal bowel sounds, soft, non-tender abdomen, and no masses palpated.  SKIN: Inspection/Palpation of skin and subcutaneous tissue baseline w/ fragility. No wounds/rashes noted.   NEURO: CN II-XII at patient's baseline, sensation baseline PPS.  PSYCH: Insight, judgement, and memory are baseline, affect and mood are engaged/happy.    Labs:   Labs done in SNF are in [facility]. Please refer to them using [employer]/Care Everywhere. and Recent labs in EPIC reviewed by me today.     ASSESSMENT/PLAN:  Chronic atrial fibrillation (H)  Chronic congestive heart failure, unspecified heart failure type  (H)  Essential hypertension  Long term current use of anticoagulant therapy  Encounter for therapeutic drug monitoring  Chronic. Stable. INR Subtherapeutic. Will dose Coumadin as noted below and recheck INR in 1 week. Will discontinue Verapamil and monitor HRs/BPs closely. Follow-up routinely or as needed.    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 Stable. Will attempt Metformin reduction, recheck a1c, discontinue vitamin D, consult dietician, and follow-up routinely or as needed.    Orders written by provider at facility and transcribed by : José Miguel Radford  1. Decrease Metformin from BID to every day. Dx: DM II.   2. Discontinue Vitamin D.   3. Discontinue Verapamil.  4. Monitor HR and BP's BID x14 days. Dx: Afib.  5. Recheck A1C x1 on 5/31. Dx: DM II.   6. Coumadin 2 mg PO every day. Dx: afib.   7. Recheck INR x1 in 1 week on 5/10. Dx: Afib.  8. Consult Dietician. Dx: Obesity    Electronically signed by:  Dr. Hlaey Sims, JOSUE CNP DNP            Sincerely,        JOSUE Gillis CNP

## 2019-05-03 NOTE — PROGRESS NOTES
[place] [facility]  [place] Medical Record Number:  [id number]  Place of Service where encounter took place:  [facility]  (FGS) [[id number]]  Chief Complaint   Patient presents with     Nursing Home Acute     HPI: Mecca Slade  is a 89 year old (12/14/1921), who is being seen today for an episodic care visit.  HPI information obtained from: facility chart records, facility staff, patient report, [facility] chart review and Care Everywhere [facility] chart review. Today's concern is:    Chronic atrial fibrillation (H)  Chronic congestive heart failure, unspecified heart failure type (H)  Essential hypertension  Long term current use of anticoagulant therapy  Encounter for therapeutic drug monitoring  Last INR was 2.10 on 4/15, and current Coumadin dosing is 1mg Tu/Th, 2mg AOD. INR resulted today at 1.74. Patient denies CP, SOB, new edema, or bleeding/bruising. PharmD recommending change in either/or Verapamil/Toprol, given tight control at age/goals-of-care. 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 Also noting hypercalcemia on BMP, which Vitamin D supplementation can contribute to, in addition to constipation, which patient has on-and-off. PharmD suggesting  discontinuation of Vitamin D and Metformin reduction w/ a1c recheck. Also noting overall weight gain and trouble with some clothing fitting. Her BGs (Qwk) are noted below:  4/29/2019 07:19 153.0 mg/dL  4/22/2019 07:27 175.0 mg/dL  4/15/2019 07:26 141.0 mg/dL    4/8/2019 09:07 142.0 mg/dL   4/1/2019 07:11 136.0 mg/dL  3/25/2019 07:38 150.0 mg/dL  3/18/2019 07:16 152.0 mg/dL    Past Medical and Surgical History reviewed in Epic today.  REVIEW OF SYSTEMS: Limited secondary to cognitive impairment but today pt reports the above and 4 point ROS including Respiratory, CV, GI and , other than that noted in the HPI, is negative.    Objective:  /87   Pulse 65   Temp 97.8  F (36.6  C)   Resp 16   Wt 104.5 kg (230 lb 6.4 oz)   SpO2 94%   BMI 37.19 kg/m    Exam:  GENERAL APPEARANCE: Alert, in no distress, cooperative.   ENT: Mouth/posterior oropharynx intact w/ moist mucous membranes, hearing acuity [place].  EYES: EOM, conjunctivae, lids, pupils and irises normal, PERRL2.   RESP: Respiratory effort good, no respiratory distress, Lung sounds clear. On RA.   CV: Auscultation of heart reveals S1, S2, rate-controlled and rhythm irregular, no murmur, no rub or gallop, Edema 1+ BLE. Peripheral pulses are 2+.  ABDOMEN: Normal bowel sounds, soft, non-tender abdomen, and no masses palpated.  SKIN: Inspection/Palpation of skin and subcutaneous tissue baseline w/ fragility. No wounds/rashes noted.   NEURO: CN II-XII at patient's baseline, sensation baseline PPS.  PSYCH: Insight, judgement, and memory are baseline, affect and mood are engaged/happy.    Labs:   Labs done in SNF are in [place] EPIC. Please refer to them using [place]/Care Everywhere. and Recent labs in [place] reviewed by me today.     ASSESSMENT/PLAN:  Chronic atrial fibrillation (H)  Chronic congestive heart failure, unspecified heart failure type (H)  Essential hypertension  Long term current use of anticoagulant therapy  Encounter for therapeutic drug  monitoring  Chronic. Stable. INR Subtherapeutic. Will dose Coumadin as noted below and recheck INR in 1 week. Will discontinue Verapamil and monitor HRs/BPs closely. Follow-up routinely or as needed.    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 Stable. Will attempt Metformin reduction, recheck a1c, discontinue vitamin D, consult dietician, and follow-up routinely or as needed.    Orders written by provider at facility and transcribed by : José Miguel Radford  1. Decrease Metformin from BID to every day. Dx: DM II.   2. Discontinue Vitamin D.   3. Discontinue Verapamil.  4. Monitor HR and BP's BID x14 days. Dx: Afib.  5. Recheck A1C x1 on 5/31. Dx: DM II.   6. Coumadin 2 mg PO every day. Dx: afib.   7. Recheck INR x1 in 1 week on 5/10. Dx: Afib.  8. Consult Dietician. Dx: Obesity    Electronically signed by:  Dr. Haley Sims, APRN CNP DNP

## 2019-05-03 NOTE — PROGRESS NOTES
[place] [facility]  Chief Complaint   Patient presents with     [facility] Regulatory     [place] Medical Record Number:  [id number]  Place of Service where encounter took place:  [facility]  (FGS) [[id number]]      HPI:    Mecca Slade  is 89 year old (12/14/1921), who is being seen today for a federally mandated E/M visit.  HPI information obtained from: facility staff, patient report, [facility] chart review and DNP.     Today's concerns are:  - DNP reports that metformin decreased, verapamil stopped, noted to have weight gain felt to be secondary to excessive caloric intake, dietitian consulted      - Resident seen and examined in her room in the presence of her daughter  -  reports doing fine.  Reports itchy nose and eyes and runny nose, dry cough.   - Denies chest pain, palpitation, shortness of breath  - Reports sleep is good, appetite is two good and BM is fine.   - Rn Reports resident lost couple pounds over the last week  - --------------------------------  - - Past Medical, social, family histories, medications, and allergies reviewed and updated  - Medications reviewed: in the chart and EHR.   - Case Management:   I have reviewed the care plan and MDS and do agree with the plan. Patient's desire to return to the community is not present.  Information reviewed:  Medications, vital signs, orders, and nursing notes.  MEDICATIONS:  Current Outpatient Medications   Medication Sig Dispense Refill     Acetaminophen (TYLENOL PO) Take 500 mg by mouth 2 times daily        ATORVASTATIN CALCIUM PO Take 10 mg by mouth daily       calcium carbonate (TUMS) 500 MG chewable tablet Take 1 chew tab by mouth 2 times daily as needed for heartburn x2 weeks       carboxymethylcellulose (REFRESH PLUS) 0.5 % SOLN ophthalmic solution Place 1 drop into both eyes 3 times daily as needed for dry eyes       fluticasone (VERAMYST) 27.5 MCG/SPRAY nasal spray [place] 1 spray into both nostrils daily x4  "weeks       Furosemide (LASIX PO) Take 40 mg by mouth daily       METFORMIN HCL PO Take 500 mg by mouth daily        polyethylene glycol (MIRALAX/GLYCOLAX) Packet Take 17 g by mouth daily as needed        Warfarin Sodium (COUMADIN PO) Take by mouth daily Take as directed per INR results       metoprolol succinate ER (TOPROL-XL) 25 MG 24 hr tablet Take 1 tablet (25 mg) by mouth daily 30 tablet 0     ROS:  4 point ROS including Respiratory, CV, GI and , other than that noted in the HPI,  is negative    Vitals:  /87   Pulse 65   Temp 97.8  F (36.6  C)   Resp 16   Wt 104.5 kg (230 lb 6.4 oz)   SpO2 94%   BMI 37.19 kg/m    Body mass index is 37.19 kg/m .  Exam:   GENERAL APPEARANCE:  Alert, in no distress, obese  RESP: lungs clear to auscultation , no wheezing.   CV: S1S2 audible, irregular rate and rhythm, no murmur, rub, or gallop, trace pedal edema b/l. Pacemaker.   ABDOMEN:  normal bowel sounds, soft, nontender, no hepatosplenomegaly or other masses  MSK: Annie strength: 4/5 LUE, 4/5 LLE. Braces left feet.   SKIN:  Inspection of skin and subcutaneous tissue baseline, Palpation of skin and subcutaneous tissue baseline  NEURO:   no NFD appreciated on observation.   PSYCH:  Normal insight, judgement and memory, affect and mood normal    Lab/Diagnostic data: Reviewed in the chart and EHR.        ASSESSMENT/PLAN  Chronic congestive heart failure, diastolic type (H)  Essential hypertension  -  EF > 70% (2012)  - compensated. Continue meds     Atrial fibrillation, chronic type (H)  - Long term use of OAC  -  on coumadin with INR followed closely  - CVR, off rate control      Type II diabetes mellitus with peripheral circulatory disorder (H)  -     A1C 7.9 08/07/2017      A1C 7.6 04/13/2017   - on metformin 500 mg       Morbid Obesity:  - Body mass index is 37.19 kg/m . from 36.06 kg/m .  in this age group- frail elderly, keep BMI b/lw 25-35 Kg(m2).   - counseled to avoid excessive carbs diet, \"to desert the " "desert\".     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   - Off ASA, on lipitor 20 mg.   - requires assistance with ADLs.      Seasonal Rhinitis: will start fluticasone for 4 weeks.   GERD Sx: Tums prn    Frail elderly  - Significant  Deficits requiring NH placement. Requiring extensive assistance from nursing. Up for meals only o/w spends the day resting in bed      Cognitive Impairment:  - Continue to anticipate needs. Chronic condition, ongoing decline expected.   -  Continue to provide redirection and reassurance as needed. Maintain safe living situation with goals focused on comfort.      transcribed by : José Miguel Radford  1. Fluticasone nasal spray give 1 spray each nostril once a day for seasonal Rhinitis x4 weeks  2. Tums 500 mg BID PRN Dx: Acid Reflux/Dry Cough x2 weeks    Total time spent with patient visit at the skilled nursing facility was 35 min including patient visit, review of past records and discussing with nursing staff, Daughter at the bedise, reviewing NP's note. Greater than 50% of total time spent with counseling and coordinating care due to above ongoing medical conditions.    Electronically signed by:  Anirudh Ye MD          "

## 2019-05-06 ENCOUNTER — NURSING HOME VISIT (OUTPATIENT)
Dept: GERIATRICS | Facility: CLINIC | Age: 84
End: 2019-05-06
Payer: COMMERCIAL

## 2019-05-06 DIAGNOSIS — I50.9 CHRONIC CONGESTIVE HEART FAILURE, UNSPECIFIED HEART FAILURE TYPE (H): Primary | ICD-10-CM

## 2019-05-06 DIAGNOSIS — E11.40 TYPE 2 DIABETES MELLITUS WITH DIABETIC NEUROPATHY, WITHOUT LONG-TERM CURRENT USE OF INSULIN (H): ICD-10-CM

## 2019-05-06 DIAGNOSIS — K21.9 GASTROESOPHAGEAL REFLUX DISEASE WITHOUT ESOPHAGITIS: ICD-10-CM

## 2019-05-06 DIAGNOSIS — I10 ESSENTIAL HYPERTENSION: ICD-10-CM

## 2019-05-06 DIAGNOSIS — J30.2 SEASONAL ALLERGIC RHINITIS, UNSPECIFIED TRIGGER: ICD-10-CM

## 2019-05-06 DIAGNOSIS — Z86.73 H/O: CVA (CEREBROVASCULAR ACCIDENT): ICD-10-CM

## 2019-05-06 DIAGNOSIS — E66.01 MORBID OBESITY (H): ICD-10-CM

## 2019-05-06 DIAGNOSIS — I48.20 CHRONIC ATRIAL FIBRILLATION (H): ICD-10-CM

## 2019-05-06 PROCEDURE — 99310 SBSQ NF CARE HIGH MDM 45: CPT | Performed by: FAMILY MEDICINE

## 2019-05-06 RX ORDER — CALCIUM CARBONATE 500 MG/1
1 TABLET, CHEWABLE ORAL 2 TIMES DAILY PRN
COMMUNITY
End: 2019-05-31

## 2019-05-06 NOTE — LETTER
5/6/2019        RE: Mecca Slade  [facility]  [address] [place] [address]        [place] [facility]  Chief Complaint   Patient presents with     senior care Regulatory     [place] Medical Record Number:  [id number]  Place of Service where encounter took place:  [facility]  (FGS) [[id number]]      HPI:    Mecca Slade  is 89 year old (12/14/1921), who is being seen today for a federally mandated E/M visit.  HPI information obtained from: facility staff, patient report, [facility] chart review and DNP.     Today's concerns are:  - DNP reports that metformin decreased, verapamil stopped, noted to have weight gain felt to be secondary to excessive caloric intake, dietitian consulted      - Resident seen and examined in her room in the presence of her daughter  -  reports doing fine.  Reports itchy nose and eyes and runny nose, dry cough.   - Denies chest pain, palpitation, shortness of breath  - Reports sleep is good, appetite is two good and BM is fine.   - Rn Reports resident lost couple pounds over the last week  - --------------------------------  - - Past Medical, social, family histories, medications, and allergies reviewed and updated  - Medications reviewed: in the chart and EHR.   - Case Management:   I have reviewed the care plan and MDS and do agree with the plan. Patient's desire to return to the community is not present.  Information reviewed:  Medications, vital signs, orders, and nursing notes.  MEDICATIONS:  Current Outpatient Medications   Medication Sig Dispense Refill     Acetaminophen (TYLENOL PO) Take 500 mg by mouth 2 times daily        ATORVASTATIN CALCIUM PO Take 10 mg by mouth daily       calcium carbonate (TUMS) 500 MG chewable tablet Take 1 chew tab by mouth 2 times daily as needed for heartburn x2 weeks       carboxymethylcellulose (REFRESH PLUS) 0.5 % SOLN ophthalmic solution Place 1 drop into both eyes 3 times daily as needed  for dry eyes       fluticasone (VERAMYST) 27.5 MCG/SPRAY nasal spray [place] 1 spray into both nostrils daily x4 weeks       Furosemide (LASIX PO) Take 40 mg by mouth daily       METFORMIN HCL PO Take 500 mg by mouth daily        polyethylene glycol (MIRALAX/GLYCOLAX) Packet Take 17 g by mouth daily as needed        Warfarin Sodium (COUMADIN PO) Take by mouth daily Take as directed per INR results       metoprolol succinate ER (TOPROL-XL) 25 MG 24 hr tablet Take 1 tablet (25 mg) by mouth daily 30 tablet 0     ROS:  4 point ROS including Respiratory, CV, GI and , other than that noted in the HPI,  is negative    Vitals:  /87   Pulse 65   Temp 97.8  F (36.6  C)   Resp 16   Wt 104.5 kg (230 lb 6.4 oz)   SpO2 94%   BMI 37.19 kg/m     Body mass index is 37.19 kg/m .  Exam:   GENERAL APPEARANCE:  Alert, in no distress, obese  RESP: lungs clear to auscultation , no wheezing.   CV: S1S2 audible, irregular rate and rhythm, no murmur, rub, or gallop, trace pedal edema b/l. Pacemaker.   ABDOMEN:  normal bowel sounds, soft, nontender, no hepatosplenomegaly or other masses  MSK: Annie strength: 4/5 LUE, 4/5 LLE. Braces left feet.   SKIN:  Inspection of skin and subcutaneous tissue baseline, Palpation of skin and subcutaneous tissue baseline  NEURO:   no NFD appreciated on observation.   PSYCH:  Normal insight, judgement and memory, affect and mood normal    Lab/Diagnostic data: Reviewed in the chart and EHR.        ASSESSMENT/PLAN  Chronic congestive heart failure, diastolic type (H)  Essential hypertension  -  EF > 70% (2012)  - compensated. Continue meds     Atrial fibrillation, chronic type (H)  - Long term use of OAC  -  on coumadin with INR followed closely  - CVR, off rate control      Type II diabetes mellitus with peripheral circulatory disorder (H)  -     A1C 7.9 08/07/2017      A1C 7.6 04/13/2017   - on metformin 500 mg       Morbid Obesity:  - Body mass index is 37.19 kg/m . from 36.06 kg/m .  in this age  "group- frail elderly, keep BMI b/lw 25-35 Kg(m2).   - counseled to avoid excessive carbs diet, \"to desert the desert\".     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   - Off ASA, on lipitor 20 mg.   - requires assistance with ADLs.      Seasonal Rhinitis: will start fluticasone for 4 weeks.   GERD Sx: Tums prn    Frail elderly  - Significant  Deficits requiring NH placement. Requiring extensive assistance from nursing. Up for meals only o/w spends the day resting in bed      Cognitive Impairment:  - Continue to anticipate needs. Chronic condition, ongoing decline expected.   -  Continue to provide redirection and reassurance as needed. Maintain safe living situation with goals focused on comfort.      transcribed by : José Miguel Radford  1. Fluticasone nasal spray give 1 spray each nostril once a day for seasonal Rhinitis x4 weeks  2. Tums 500 mg BID PRN Dx: Acid Reflux/Dry Cough x2 weeks    Total time spent with patient visit at the skilled nursing facility was 35 min including patient visit, review of past records and discussing with nursing staff, Daughter at the bedise, reviewing NP's note. Greater than 50% of total time spent with counseling and coordinating care due to above ongoing medical conditions.    Electronically signed by:  Anirudh Ye MD              Sincerely,        Anirudh Ye MD    "

## 2019-05-09 ENCOUNTER — RECORDS - HEALTHEAST (OUTPATIENT)
Dept: LAB | Facility: CLINIC | Age: 84
End: 2019-05-09

## 2019-05-10 ENCOUNTER — NURSING HOME VISIT (OUTPATIENT)
Dept: GERIATRICS | Facility: CLINIC | Age: 84
End: 2019-05-10
Payer: COMMERCIAL

## 2019-05-10 VITALS
OXYGEN SATURATION: 100 % | BODY MASS INDEX: 37.06 KG/M2 | TEMPERATURE: 97.8 F | RESPIRATION RATE: 16 BRPM | DIASTOLIC BLOOD PRESSURE: 73 MMHG | HEART RATE: 57 BPM | SYSTOLIC BLOOD PRESSURE: 146 MMHG | WEIGHT: 229.6 LBS

## 2019-05-10 DIAGNOSIS — Z79.01 LONG TERM CURRENT USE OF ANTICOAGULANT THERAPY: ICD-10-CM

## 2019-05-10 DIAGNOSIS — Z51.81 ENCOUNTER FOR THERAPEUTIC DRUG MONITORING: ICD-10-CM

## 2019-05-10 DIAGNOSIS — I48.20 CHRONIC ATRIAL FIBRILLATION (H): Primary | ICD-10-CM

## 2019-05-10 LAB — INR PPP: 2.05 (ref 0.9–1.1)

## 2019-05-10 PROCEDURE — 99308 SBSQ NF CARE LOW MDM 20: CPT | Performed by: NURSE PRACTITIONER

## 2019-05-10 NOTE — LETTER
5/10/2019        RE: Mecca Slade  [facility]  [address] [facility] [address]        [place] [facility]  [place] Medical Record Number:  [id number]  Place of Service where encounter took place: [facility]  (FGS) [[id number]]    HPI:    Mecca Slade is a 89 year old  (12/14/1921), who is being seen today for an episodic care visit at the above location.   HPI information obtained from: facility chart records, facility staff and patient report. Today's concern is INR/Coumadin management for A. Fib    ROS/Subjective:  Bleeding Signs/Symptoms:  None  Thromboembolic Signs/Symptoms:  None  Medication Changes:  No  Dietary Changes:  No  Activity Changes: No  Bacterial/Viral Infection:  No  Missed Coumadin Doses:  None  On ASA: No  Other Concerns:  No    OBJECTIVE:  /73   Pulse 57   Temp 97.8  F (36.6  C)   Resp 16   Wt 104.1 kg (229 lb 9.6 oz)   SpO2 100%   BMI 37.06 kg/m     Last INR: 1.74 on 5/3  INR Today:  2.05  Current Dose: Coumadin 2mg PO QD      ASSESSMENT:  Chronic atrial fibrillation (H)  Long term current use of anticoagulant therapy  Encounter for therapeutic drug monitoring  Therapeutic INR for goal of 2-3    PLAN:   transcribed by : José Miguel Radford  New Dose: Continue Coumdain 2 mg PO every day  Next INR: 2 weeks      Electronically signed by:  JOSUE Mari CNP         Sincerely,        JOSUE Mari CNP

## 2019-05-23 ENCOUNTER — RECORDS - HEALTHEAST (OUTPATIENT)
Dept: LAB | Facility: CLINIC | Age: 84
End: 2019-05-23

## 2019-05-24 ENCOUNTER — NURSING HOME VISIT (OUTPATIENT)
Dept: GERIATRICS | Facility: CLINIC | Age: 84
End: 2019-05-24
Payer: COMMERCIAL

## 2019-05-24 VITALS
RESPIRATION RATE: 17 BRPM | HEART RATE: 71 BPM | TEMPERATURE: 97.9 F | SYSTOLIC BLOOD PRESSURE: 148 MMHG | BODY MASS INDEX: 36.48 KG/M2 | DIASTOLIC BLOOD PRESSURE: 78 MMHG | WEIGHT: 226 LBS | OXYGEN SATURATION: 98 %

## 2019-05-24 DIAGNOSIS — I48.20 CHRONIC ATRIAL FIBRILLATION (H): Primary | ICD-10-CM

## 2019-05-24 DIAGNOSIS — Z51.81 ENCOUNTER FOR THERAPEUTIC DRUG MONITORING: ICD-10-CM

## 2019-05-24 DIAGNOSIS — Z79.01 LONG TERM CURRENT USE OF ANTICOAGULANT THERAPY: ICD-10-CM

## 2019-05-24 LAB — INR PPP: 2.6 (ref 0.9–1.1)

## 2019-05-24 PROCEDURE — 99308 SBSQ NF CARE LOW MDM 20: CPT | Performed by: NURSE PRACTITIONER

## 2019-05-24 NOTE — PROGRESS NOTES
[place] [facility]  [place] Medical Record Number:  [id number]  Place of Service where encounter took place: [facility]  (S) [[id number]]    HPI:    Mecca Slade is a 89 year old  (12/14/1921), who is being seen today for an episodic care visit at the above location.   HPI information obtained from: facility chart records, patient report and [facility] chart review. Today's concern is INR/Coumadin management for A. Fib    ROS/Subjective:  Bleeding Signs/Symptoms:  None  Thromboembolic Signs/Symptoms:  None  Medication Changes:  No  Dietary Changes:  No  Activity Changes: No  Bacterial/Viral Infection:  No  Missed Coumadin Doses:  None  On ASA: No  Other Concerns:  No    OBJECTIVE:  /78   Pulse 71   Temp 97.9  F (36.6  C)   Resp 17   Wt 102.5 kg (226 lb)   SpO2 98%   BMI 36.48 kg/m    Last INR: 2.05 on 5/10/19  INR Today:  2.6  Current Dose:  Coumdain 2 mg PO every day      ASSESSMENT:  1. Chronic atrial fibrillation (H)    2. Long term current use of anticoagulant therapy    3. Encounter for therapeutic drug monitoring      Therapeutic INR for goal of 2-3    PLAN:   Orders written by provider at facility and transcribed by : José Miguel Radford  New Dose: Continue 2 mg PO every day    Next INR: 2 weeks      Electronically signed by:  JOSUE Crawley CNP

## 2019-05-30 ENCOUNTER — RECORDS - HEALTHEAST (OUTPATIENT)
Dept: LAB | Facility: CLINIC | Age: 84
End: 2019-05-30

## 2019-05-31 ENCOUNTER — NURSING HOME VISIT (OUTPATIENT)
Dept: GERIATRICS | Facility: CLINIC | Age: 84
End: 2019-05-31
Payer: COMMERCIAL

## 2019-05-31 VITALS
TEMPERATURE: 97.3 F | RESPIRATION RATE: 20 BRPM | DIASTOLIC BLOOD PRESSURE: 82 MMHG | BODY MASS INDEX: 36.32 KG/M2 | WEIGHT: 225 LBS | OXYGEN SATURATION: 96 % | HEART RATE: 67 BPM | SYSTOLIC BLOOD PRESSURE: 145 MMHG

## 2019-05-31 DIAGNOSIS — R52 GENERALIZED PAIN: ICD-10-CM

## 2019-05-31 DIAGNOSIS — M10.041 ACUTE IDIOPATHIC GOUT OF RIGHT HAND: Primary | ICD-10-CM

## 2019-05-31 LAB — HBA1C MFR BLD: 7.4 % (ref 4.2–6.1)

## 2019-05-31 PROCEDURE — 99309 SBSQ NF CARE MODERATE MDM 30: CPT | Performed by: NURSE PRACTITIONER

## 2019-05-31 RX ORDER — ACETAMINOPHEN 325 MG/1
650 TABLET ORAL 2 TIMES DAILY PRN
Start: 2019-05-31 | End: 2019-08-13

## 2019-05-31 RX ORDER — PREDNISONE 20 MG/1
20 TABLET ORAL DAILY
Qty: 5 TABLET | Refills: 0
Start: 2019-05-31 | End: 2019-06-07

## 2019-05-31 NOTE — LETTER
5/31/2019        RE: Mecca Slade  [facility]  [address] [facility] [address]        [place] [facility]  [place] Medical Record Number:  [id number]  Place of Service where encounter took place:  [facility]  (FGS) [[id number]]  Chief Complaint   Patient presents with     Nursing Home Acute     HPI:    Mecca Slade  is a 89 year old (12/14/1921), who is being seen today for an episodic care visit.  HPI information obtained from: facility chart records, facility staff, patient report and [facility] chart review.   Nsg asking me to see the pt today with concerns of acute R wrist pain, swelling and redness. No known trauma.   Of note pt also has L 5th finger redness/swelling/pain  Hx of gout - not on medications.   Last flare was years ago per pt.   She doesn't remember tx.     Past Medical and Surgical History reviewed in Epic today.    MEDICATIONS:  Reviewed.      REVIEW OF SYSTEMS:  4 point ROS including Respiratory, CV, GI and , other than that noted in the HPI,  is negative    Objective:  /82   Pulse 67   Temp 97.3  F (36.3  C)   Resp 20   Wt 102.1 kg (225 lb)   SpO2 96%   BMI 36.32 kg/m     Exam:  GENERAL APPEARANCE: Alert, in no distress, cooperative.  ENT: Mouth and posterior oropharynx normal, moist mucous membranes, hearing acuity at baseline [place]   RESP: non labored breathing  SKIN: Inspection/Palpation of skin and subcutaneous tissue baseline w/ fragility.  MS: L 5th finger distal joint red.swollen, enlarged ,painful - possible uric acid crystals in fluid visible. R medial wrist red, swollen, painful to touch.   NEURO: 2-12 at patient's baseline, sensation baseline PPS.  PSYCH: Insight and judgement, memory baseline, affect and mood normal.    Labs:   Recent labs in [facility] reviewed by me today.     ASSESSMENT/PLAN:  Acute idiopathic gout of right hand  Will tx with prednisone. - monitor.   - predniSONE (DELTASONE) 20 MG tablet; Take 1  tablet (20 mg) by mouth daily for 5 days    Generalized pain  bee tx with OTCs  - acetaminophen (TYLENOL) 325 MG tablet; Take 2 tablets (650 mg) by mouth 2 times daily as needed for mild pain  - Menthol, Topical Analgesic, (BENGAY COLD THERAPY) 5 % GEL; Externally apply topically 4 times daily as needed (pain)    Orders written by provider at facility and transcribed by : José Miguel Radford  1. [place] to painful areas QID PRN  2. Start prednisone 20 mg PO daily x5 days Dx: Gout  -update provider in 5-7 days if no improvement or worsening of R wrist and left 5th finger inflamation    Electronically signed by:  JOSUE Middleton CNP               Sincerely,        JOSUE Middleton CNP

## 2019-05-31 NOTE — PROGRESS NOTES
[place] [facility]  [place] Medical Record Number:  [id number]  Place of Service where encounter took place:  [facility]  (FGS) [[id number]]  Chief Complaint   Patient presents with     Nursing Home Acute     HPI:    Mecca Slade  is a 89 year old (12/14/1921), who is being seen today for an episodic care visit.  HPI information obtained from: facility chart records, facility staff, patient report and [facility] chart review.   Nsg asking me to see the pt today with concerns of acute R wrist pain, swelling and redness. No known trauma.   Of note pt also has L 5th finger redness/swelling/pain  Hx of gout - not on medications.   Last flare was years ago per pt.   She doesn't remember tx.     Past Medical and Surgical History reviewed in Epic today.    MEDICATIONS:  Reviewed.      REVIEW OF SYSTEMS:  4 point ROS including Respiratory, CV, GI and , other than that noted in the HPI,  is negative    Objective:  /82   Pulse 67   Temp 97.3  F (36.3  C)   Resp 20   Wt 102.1 kg (225 lb)   SpO2 96%   BMI 36.32 kg/m    Exam:  GENERAL APPEARANCE: Alert, in no distress, cooperative.  ENT: Mouth and posterior oropharynx normal, moist mucous membranes, hearing acuity at baseline [place]   RESP: non labored breathing  SKIN: Inspection/Palpation of skin and subcutaneous tissue baseline w/ fragility.  MS: L 5th finger distal joint red.swollen, enlarged ,painful - possible uric acid crystals in fluid visible. R medial wrist red, swollen, painful to touch.   NEURO: 2-12 at patient's baseline, sensation baseline PPS.  PSYCH: Insight and judgement, memory baseline, affect and mood normal.    Labs:   Recent labs in [facility] reviewed by me today.     ASSESSMENT/PLAN:  Acute idiopathic gout of right hand  Will tx with prednisone. - monitor.   - predniSONE (DELTASONE) 20 MG tablet; Take 1 tablet (20 mg) by mouth daily for 5 days    Generalized pain  bee tx with OTCs  - acetaminophen (TYLENOL) 325 MG  tablet; Take 2 tablets (650 mg) by mouth 2 times daily as needed for mild pain  - Menthol, Topical Analgesic, (BENGAY COLD THERAPY) 5 % GEL; Externally apply topically 4 times daily as needed (pain)    Orders written by provider at facility and transcribed by : José Miguel Radford  1. [place] to painful areas QID PRN  2. Start prednisone 20 mg PO daily x5 days Dx: Gout  -update provider in 5-7 days if no improvement or worsening of R wrist and left 5th finger inflamation    Electronically signed by:  JOSUE Middleton CNP

## 2019-06-06 ENCOUNTER — RECORDS - HEALTHEAST (OUTPATIENT)
Dept: LAB | Facility: CLINIC | Age: 84
End: 2019-06-06

## 2019-06-07 ENCOUNTER — NURSING HOME VISIT (OUTPATIENT)
Dept: GERIATRICS | Facility: CLINIC | Age: 84
End: 2019-06-07
Payer: COMMERCIAL

## 2019-06-07 VITALS
HEART RATE: 67 BPM | BODY MASS INDEX: 36.12 KG/M2 | WEIGHT: 223.8 LBS | RESPIRATION RATE: 20 BRPM | OXYGEN SATURATION: 96 % | DIASTOLIC BLOOD PRESSURE: 82 MMHG | SYSTOLIC BLOOD PRESSURE: 145 MMHG | TEMPERATURE: 97.3 F

## 2019-06-07 DIAGNOSIS — Z51.81 ENCOUNTER FOR THERAPEUTIC DRUG MONITORING: ICD-10-CM

## 2019-06-07 DIAGNOSIS — Z79.01 LONG TERM CURRENT USE OF ANTICOAGULANT THERAPY: ICD-10-CM

## 2019-06-07 DIAGNOSIS — I48.20 CHRONIC ATRIAL FIBRILLATION (H): Primary | ICD-10-CM

## 2019-06-07 LAB — INR PPP: 2.98 (ref 0.9–1.1)

## 2019-06-07 PROCEDURE — 99309 SBSQ NF CARE MODERATE MDM 30: CPT | Performed by: NURSE PRACTITIONER

## 2019-06-07 NOTE — LETTER
6/7/2019        RE: Mecca Slade  [facility]  [address] [facility] [address]        [place] [facility]  [place] Medical Record Number:  [id number]  Place of Service where encounter took place: [facility]  (FGS) [[id number]]    HPI:    Mecca Slade is a 89 year old  (12/14/1921), who is being seen today for an episodic care visit at the above location.   HPI information obtained from: facility chart records, facility staff and patient report. Today's concern is INR/Coumadin management for A. Fib    ROS/Subjective:  Bleeding Signs/Symptoms:  None  Thromboembolic Signs/Symptoms:  None  Medication Changes:  Yes: completed course of prednisone yesterday  Dietary Changes:  No  Activity Changes: No  Bacterial/Viral Infection:  No  Missed Coumadin Doses:  None  On ASA: No  Other Concerns:  No    OBJECTIVE:  /82   Pulse 67   Temp 97.3  F (36.3  C)   Resp 20   Wt 101.5 kg (223 lb 12.8 oz)   SpO2 96%   BMI 36.12 kg/m     Last INR :  2.6 on 5/24/19  INR Today 2.98  Current dose 2 mg daily     ASSESSMENT:     Chronic atrial fibrillation (H)  Long term current use of anticoagulant therapy  Encounter for therapeutic drug monitoring  Therapeutic INR for goal of 2-3    PLAN:   transcribed by : José Miguel Radford  New Dose: 2 mg PO QD    Next INR: 6/24/19    Electronically signed by:  JOSUE Read CNP           Sincerely,        JOSUE Read CNP

## 2019-06-07 NOTE — PROGRESS NOTES
[place] [facility]  [place] Medical Record Number:  [id number]  Place of Service where encounter took place: [facility]  (FGS) [[id number]]    HPI:    Mecca Slade is a 89 year old  (12/14/1921), who is being seen today for an episodic care visit at the above location.   HPI information obtained from: facility chart records, facility staff and patient report. Today's concern is INR/Coumadin management for A. Fib    ROS/Subjective:  Bleeding Signs/Symptoms:  None  Thromboembolic Signs/Symptoms:  None  Medication Changes:  Yes: completed course of prednisone yesterday  Dietary Changes:  No  Activity Changes: No  Bacterial/Viral Infection:  No  Missed Coumadin Doses:  None  On ASA: No  Other Concerns:  No    OBJECTIVE:  /82   Pulse 67   Temp 97.3  F (36.3  C)   Resp 20   Wt 101.5 kg (223 lb 12.8 oz)   SpO2 96%   BMI 36.12 kg/m    Last INR :  2.6 on 5/24/19  INR Today 2.98  Current dose 2 mg daily     ASSESSMENT:     Chronic atrial fibrillation (H)  Long term current use of anticoagulant therapy  Encounter for therapeutic drug monitoring  Therapeutic INR for goal of 2-3    PLAN:   transcribed by : José Miguel Radford  New Dose: 2 mg PO QD    Next INR: 6/24/19    Electronically signed by:  JOSUE Read CNP

## 2019-06-17 PROBLEM — I50.9 CHRONIC CONGESTIVE HEART FAILURE (H): Status: ACTIVE | Noted: 2017-09-29

## 2019-06-21 ENCOUNTER — RECORDS - HEALTHEAST (OUTPATIENT)
Dept: LAB | Facility: CLINIC | Age: 84
End: 2019-06-21

## 2019-06-24 ENCOUNTER — NURSING HOME VISIT (OUTPATIENT)
Dept: GERIATRICS | Facility: CLINIC | Age: 84
End: 2019-06-24
Payer: COMMERCIAL

## 2019-06-24 VITALS
WEIGHT: 225.6 LBS | RESPIRATION RATE: 16 BRPM | SYSTOLIC BLOOD PRESSURE: 128 MMHG | OXYGEN SATURATION: 97 % | DIASTOLIC BLOOD PRESSURE: 90 MMHG | HEART RATE: 75 BPM | BODY MASS INDEX: 36.41 KG/M2 | TEMPERATURE: 97.6 F

## 2019-06-24 DIAGNOSIS — Z51.81 ENCOUNTER FOR THERAPEUTIC DRUG MONITORING: ICD-10-CM

## 2019-06-24 DIAGNOSIS — I48.20 CHRONIC ATRIAL FIBRILLATION (H): Primary | ICD-10-CM

## 2019-06-24 DIAGNOSIS — Z79.01 LONG TERM (CURRENT) USE OF ANTICOAGULANTS: ICD-10-CM

## 2019-06-24 LAB — INR PPP: 2.55 (ref 0.9–1.1)

## 2019-06-24 PROCEDURE — 99308 SBSQ NF CARE LOW MDM 20: CPT | Performed by: NURSE PRACTITIONER

## 2019-06-24 NOTE — PROGRESS NOTES
[place] [facility]  [place] Medical Record Number:  [id number]  Place of Service where encounter took place: [facility]  (FGS) [[id number]]    HPI:    Mecca Slade is a 89 year old  (12/14/1921), who is being seen today for an episodic care visit at the above location.   HPI information obtained from: facility chart records, facility staff, patient report and [facility] chart review. Today's concern is INR/Coumadin management for A. Fib    ROS/Subjective:  Bleeding Signs/Symptoms:  None  Thromboembolic Signs/Symptoms:  None  Medication Changes:  No  Dietary Changes:  No  Activity Changes: No  Bacterial/Viral Infection:  No  Missed Coumadin Doses:  None  On ASA: No  Other Concerns:  No    OBJECTIVE:  /90   Pulse 75   Temp 97.6  F (36.4  C)   Resp 16   Wt 102.3 kg (225 lb 9.6 oz)   SpO2 97%   BMI 36.41 kg/m    Last INR: 2.6 on 6/7  INR Today:  2.55  Current Dose:  2mg daily  INR Flow sheet at [facility]:    ASSESSMENT:     Chronic atrial fibrillation (H)  Encounter for therapeutic drug monitoring  Long term (current) use of anticoagulants  Therapeutic INR for goal of 2-3    PLAN:   Orders written by provider at facility  New Dose: No Change    Next INR: 7/15      Electronically signed by:  JOSUE Rodriguez CNP

## 2019-07-01 ENCOUNTER — NURSING HOME VISIT (OUTPATIENT)
Dept: GERIATRICS | Facility: CLINIC | Age: 84
End: 2019-07-01
Payer: COMMERCIAL

## 2019-07-01 VITALS
HEART RATE: 88 BPM | DIASTOLIC BLOOD PRESSURE: 74 MMHG | WEIGHT: 172.4 LBS | BODY MASS INDEX: 27.83 KG/M2 | OXYGEN SATURATION: 95 % | SYSTOLIC BLOOD PRESSURE: 138 MMHG | RESPIRATION RATE: 20 BRPM | TEMPERATURE: 98.3 F

## 2019-07-01 DIAGNOSIS — R41.89 COGNITIVE IMPAIRMENT: ICD-10-CM

## 2019-07-01 DIAGNOSIS — H04.123 DRY EYES: ICD-10-CM

## 2019-07-01 DIAGNOSIS — E11.40 TYPE 2 DIABETES MELLITUS WITH DIABETIC NEUROPATHY, WITHOUT LONG-TERM CURRENT USE OF INSULIN (H): Primary | ICD-10-CM

## 2019-07-01 DIAGNOSIS — I48.20 CHRONIC ATRIAL FIBRILLATION (H): ICD-10-CM

## 2019-07-01 PROCEDURE — 99309 SBSQ NF CARE MODERATE MDM 30: CPT | Performed by: NURSE PRACTITIONER

## 2019-07-01 NOTE — LETTER
7/1/2019        RE: Mecca Slade  [facility]  [address] [facility] [address]        [place] [facility]  Chief Complaint   Patient presents with     jail Regulatory     [place] Medical Record Number:  [id number]  Place of Service where encounter took place:  [facility]  (FGS) [[id number]]    HPI:    Mecca Slade  is 89 year old (12/14/1921), who is being seen today for a federally mandated E/M visit.  HPI information obtained from: facility chart records, facility staff, patient report and [facility] chart review. Today's concerns are:     Type 2 diabetes mellitus with diabetic neuropathy, without long-term current use of insulin (H)  Dry eyes  Chronic atrial fibrillation (H)  Cognitive impairment     Patient seen today for follow up, report of dry crusty eyes in mornings, also BG's elevated since reduction of metformin on 5/4, from 130-170 range to 150-190 range, was reduced due to mild diarrhea issues, also on warfarin with INR due 7/15, per staff is stable and pleasant, no acute concerns today.    ALLERGIES:Patient has no known allergies.  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  PAST SURGICAL HISTORY:   has no past  surgical history on file.  FAMILY HISTORY: family history is not on file.  SOCIAL HISTORY:  reports that she has never smoked. She has never used smokeless tobacco. She reports that she does not drink alcohol or use drugs.    MEDICATIONS:  Current Outpatient Medications   Medication Sig Dispense Refill     Acetaminophen (TYLENOL PO) Take 500 mg by mouth 2 times daily        acetaminophen (TYLENOL) 325 MG tablet Take 2 tablets (650 mg) by mouth 2 times daily as needed for mild pain       ATORVASTATIN CALCIUM PO Take 10 mg by mouth daily       carboxymethylcellulose (REFRESH PLUS) 0.5 % SOLN ophthalmic solution Place 1 drop into both eyes 3 times daily as needed for dry eyes And Qam scheduled       Furosemide (LASIX PO) Take 40 mg by mouth daily       Menthol, Topical Analgesic, (BENGAY COLD THERAPY) 5 % GEL Externally apply topically 4 times daily as needed (pain)       METFORMIN HCL PO Take 500 mg by mouth 2 times daily       metoprolol succinate ER (TOPROL-XL) 25 MG 24 hr tablet Take 1 tablet (25 mg) by mouth daily 30 tablet 0     polyethylene glycol (MIRALAX/GLYCOLAX) Packet Take 17 g by mouth daily as needed        Warfarin Sodium (COUMADIN PO) Take by mouth daily Take as directed per INR results         Case Management:  I have reviewed the care plan and MDS and do agree with the plan. Patient's desire to return to the community is present, but is not able due to care needs . Information reviewed:  Medications, vital signs, orders, and nursing notes.    ROS:  Limited secondary to cognitive impairment but today pt reports I'm doing just fine    Vitals:  /74   Pulse 88   Temp 98.3  F (36.8  C)   Resp 20   Wt 78.2 kg (172 lb 6.4 oz)   SpO2 95%   BMI 27.83 kg/m     Body mass index is 27.83 kg/m .  Exam:  GENERAL APPEARANCE:  in no distress, appears healthy  ENT:  Mouth and posterior oropharynx normal, moist mucous membranes  RESP:  lungs clear to auscultation   CV:  regular rate and rhythm, no murmur,  rub, or gallop, peripheral edema scant+ in lower legs  ABDOMEN:  bowel sounds normal  M/S:   Gait and station abnormal requries transfer assist/WC mobility  SKIN:  Inspection of skin and subcutaneous tissue baseline  NEURO:   Examination of sensation by touch normal  PSYCH:  oriented to self    Lab/Diagnostic data:   Labs done in SNF are in [place] EPIC. Please refer to them using EPIC/Care Everywhere.    ASSESSMENT/PLAN  Type 2 diabetes mellitus with diabetic neuropathy, without long-term current use of insulin (H)  -Since reduction of metformin to 500mg/day BG's trending up in the 150-190 range  -increase metformin back to 500mg BID  -if starting to have diarrhea again consider switching to glipizide    Dry eyes  -newer issue, crusty and dry in am's  -change refresh to Qam and TID PRN    Chronic atrial fibrillation (H)  -denies CP/palpitations  -INR on 6/24 was 2.55  -continue warfarin 2mg/day  -recheck INR on 7/15    Cognitive impairment  -moderate       Orders written by provider at facility and transcribed by : José Miguel Radford  1. Increase Metformin to 500 mg PO BID        Electronically signed by:  JOSUE Rodriguez CNP              Sincerely,        JOSUE Rodriguez CNP

## 2019-07-01 NOTE — PROGRESS NOTES
[place] [facility]  Chief Complaint   Patient presents with     long-term Regulatory     [place] Medical Record Number:  [id number]  Place of Service where encounter took place:  [facility]  (S) [[id number]]    HPI:    Mecca Slade  is 89 year old (12/14/1921), who is being seen today for a federally mandated E/M visit.  HPI information obtained from: facility chart records, facility staff, patient report and [place] Epic chart review. Today's concerns are:     Type 2 diabetes mellitus with diabetic neuropathy, without long-term current use of insulin (H)  Dry eyes  Chronic atrial fibrillation (H)  Cognitive impairment     Patient seen today for follow up, report of dry crusty eyes in mornings, also BG's elevated since reduction of metformin on 5/4, from 130-170 range to 150-190 range, was reduced due to mild diarrhea issues, also on warfarin with INR due 7/15, per staff is stable and pleasant, no acute concerns today.    ALLERGIES:Patient has no known allergies.  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  PAST SURGICAL HISTORY:   has no past surgical history on file.  FAMILY HISTORY: family history is not on file.  SOCIAL HISTORY:  reports that she has  never smoked. She has never used smokeless tobacco. She reports that she does not drink alcohol or use drugs.    MEDICATIONS:  Current Outpatient Medications   Medication Sig Dispense Refill     Acetaminophen (TYLENOL PO) Take 500 mg by mouth 2 times daily        acetaminophen (TYLENOL) 325 MG tablet Take 2 tablets (650 mg) by mouth 2 times daily as needed for mild pain       ATORVASTATIN CALCIUM PO Take 10 mg by mouth daily       carboxymethylcellulose (REFRESH PLUS) 0.5 % SOLN ophthalmic solution Place 1 drop into both eyes 3 times daily as needed for dry eyes And Qam scheduled       Furosemide (LASIX PO) Take 40 mg by mouth daily       Menthol, Topical Analgesic, (BENGAY COLD THERAPY) 5 % GEL Externally apply topically 4 times daily as needed (pain)       METFORMIN HCL PO Take 500 mg by mouth 2 times daily       metoprolol succinate ER (TOPROL-XL) 25 MG 24 hr tablet Take 1 tablet (25 mg) by mouth daily 30 tablet 0     polyethylene glycol (MIRALAX/GLYCOLAX) Packet Take 17 g by mouth daily as needed        Warfarin Sodium (COUMADIN PO) Take by mouth daily Take as directed per INR results         Case Management:  I have reviewed the care plan and MDS and do agree with the plan. Patient's desire to return to the community is present, but is not able due to care needs . Information reviewed:  Medications, vital signs, orders, and nursing notes.    ROS:  Limited secondary to cognitive impairment but today pt reports I'm doing just fine    Vitals:  /74   Pulse 88   Temp 98.3  F (36.8  C)   Resp 20   Wt 78.2 kg (172 lb 6.4 oz)   SpO2 95%   BMI 27.83 kg/m    Body mass index is 27.83 kg/m .  Exam:  GENERAL APPEARANCE:  in no distress, appears healthy  ENT:  Mouth and posterior oropharynx normal, moist mucous membranes  RESP:  lungs clear to auscultation   CV:  regular rate and rhythm, no murmur, rub, or gallop, peripheral edema scant+ in lower legs  ABDOMEN:  bowel sounds normal  M/S:   Gait and station  abnormal requries transfer assist/WC mobility  SKIN:  Inspection of skin and subcutaneous tissue baseline  NEURO:   Examination of sensation by touch normal  PSYCH:  oriented to self    Lab/Diagnostic data:   Labs done in SNF are in [place] EPIC. Please refer to them using [facility]/Care Everywhere.    ASSESSMENT/PLAN  Type 2 diabetes mellitus with diabetic neuropathy, without long-term current use of insulin (H)  -Since reduction of metformin to 500mg/day BG's trending up in the 150-190 range  -increase metformin back to 500mg BID  -if starting to have diarrhea again consider switching to glipizide    Dry eyes  -newer issue, crusty and dry in am's  -change refresh to Qam and TID PRN    Chronic atrial fibrillation (H)  -denies CP/palpitations  -INR on 6/24 was 2.55  -continue warfarin 2mg/day  -recheck INR on 7/15    Cognitive impairment  -moderate       Orders written by provider at facility and transcribed by : José Miguel Radford  1. Increase Metformin to 500 mg PO BID        Electronically signed by:  JOSUE Rodriguez CNP

## 2019-07-12 ENCOUNTER — RECORDS - HEALTHEAST (OUTPATIENT)
Dept: LAB | Facility: CLINIC | Age: 84
End: 2019-07-12

## 2019-07-15 ENCOUNTER — NURSING HOME VISIT (OUTPATIENT)
Dept: GERIATRICS | Facility: CLINIC | Age: 84
End: 2019-07-15
Payer: COMMERCIAL

## 2019-07-15 VITALS
WEIGHT: 227 LBS | TEMPERATURE: 96.8 F | SYSTOLIC BLOOD PRESSURE: 147 MMHG | DIASTOLIC BLOOD PRESSURE: 81 MMHG | RESPIRATION RATE: 16 BRPM | BODY MASS INDEX: 36.64 KG/M2 | OXYGEN SATURATION: 95 % | HEART RATE: 68 BPM

## 2019-07-15 DIAGNOSIS — Z79.1 ENCOUNTER FOR LONG-TERM (CURRENT) USE OF NON-STEROIDAL ANTI-INFLAMMATORIES: ICD-10-CM

## 2019-07-15 DIAGNOSIS — I48.20 CHRONIC ATRIAL FIBRILLATION (H): Primary | ICD-10-CM

## 2019-07-15 DIAGNOSIS — Z51.81 ENCOUNTER FOR THERAPEUTIC DRUG MONITORING: ICD-10-CM

## 2019-07-15 LAB — INR PPP: 2.03 (ref 0.9–1.1)

## 2019-07-15 PROCEDURE — 99308 SBSQ NF CARE LOW MDM 20: CPT | Performed by: NURSE PRACTITIONER

## 2019-07-15 NOTE — PROGRESS NOTES
[place] [facility]  [place] Medical Record Number:  [id number]  Place of Service where encounter took place: [facility]  (FGS) [[id number]]    HPI:    Mecca Slade is a 89 year old  (12/14/1921), who is being seen today for an episodic care visit at the above location.   HPI information obtained from: facility chart records, facility staff, patient report and [facility] chart review. Today's concern is INR/Coumadin management for A. Fib    ROS/Subjective:  Bleeding Signs/Symptoms:  None  Thromboembolic Signs/Symptoms:  None  Medication Changes:  No  Dietary Changes:  No  Activity Changes: No  Bacterial/Viral Infection:  No  Missed Coumadin Doses:  None  On ASA: No  Other Concerns:  No    OBJECTIVE:  /81   Pulse 68   Temp 96.8  F (36  C)   Resp 16   Wt 103 kg (227 lb)   SpO2 95%   BMI 36.64 kg/m    Last INR: 2.55 on 7/15  INR Today:  2.03  Current Dose:  2mg/day  INR Flow sheet at SNF:    ASSESSMENT:     Chronic atrial fibrillation (H)  Encounter for long-term (current) use of non-steroidal anti-inflammatories  Encounter for therapeutic drug monitoring  Therapeutic INR for goal of 2-3    PLAN:   Orders written by provider at facility  New Dose: 3mg T/Th, 2mg ROW    Next INR: 1 week      Electronically signed by:  JOSUE Rodriguez CNP

## 2019-07-19 ENCOUNTER — RECORDS - HEALTHEAST (OUTPATIENT)
Dept: LAB | Facility: CLINIC | Age: 84
End: 2019-07-19

## 2019-07-22 ENCOUNTER — NURSING HOME VISIT (OUTPATIENT)
Dept: GERIATRICS | Facility: CLINIC | Age: 84
End: 2019-07-22
Payer: COMMERCIAL

## 2019-07-22 ENCOUNTER — TRANSFERRED RECORDS (OUTPATIENT)
Dept: HEALTH INFORMATION MANAGEMENT | Facility: CLINIC | Age: 84
End: 2019-07-22

## 2019-07-22 VITALS
TEMPERATURE: 97.4 F | DIASTOLIC BLOOD PRESSURE: 67 MMHG | SYSTOLIC BLOOD PRESSURE: 139 MMHG | BODY MASS INDEX: 36.41 KG/M2 | OXYGEN SATURATION: 97 % | RESPIRATION RATE: 16 BRPM | HEART RATE: 78 BPM | WEIGHT: 225.6 LBS

## 2019-07-22 DIAGNOSIS — Z79.01 LONG TERM (CURRENT) USE OF ANTICOAGULANTS: ICD-10-CM

## 2019-07-22 DIAGNOSIS — I48.20 CHRONIC ATRIAL FIBRILLATION (H): Primary | ICD-10-CM

## 2019-07-22 DIAGNOSIS — Z51.81 ENCOUNTER FOR THERAPEUTIC DRUG MONITORING: ICD-10-CM

## 2019-07-22 LAB
INR PPP: 2.65 (ref 0.9–1.1)
INR PPP: 2.65 (ref 0.9–1.1)

## 2019-07-22 PROCEDURE — 99308 SBSQ NF CARE LOW MDM 20: CPT | Performed by: NURSE PRACTITIONER

## 2019-07-22 NOTE — PROGRESS NOTES
[place] [facility]  [place] Medical Record Number:  [id number]  Place of Service where encounter took place: [facility]  (FGS) [[id number]]    HPI:    Mecca Slade is a 89 year old  (12/14/1921), who is being seen today for an episodic care visit at the above location.   HPI information obtained from: facility chart records, facility staff, patient report and [facility] chart review. Today's concern is INR/Coumadin management for A. Fib    ROS/Subjective:  Bleeding Signs/Symptoms:  None  Thromboembolic Signs/Symptoms:  None  Medication Changes:  No  Dietary Changes:  No  Activity Changes: No  Bacterial/Viral Infection:  No  Missed Coumadin Doses:  None  On ASA: No  Other Concerns:  No    OBJECTIVE:  /67   Pulse 78   Temp 97.4  F (36.3  C)   Resp 16   Wt 102.3 kg (225 lb 9.6 oz)   SpO2 97%   BMI 36.41 kg/m    Last INR: 2.03 on 7/15  INR Today:  3mg T/Th, 2mg ROW  Current Dose:  2.65  INR Flow sheet at [facility]:    ASSESSMENT:     Chronic atrial fibrillation (H)  Long term (current) use of anticoagulants  Encounter for therapeutic drug monitoring  Therapeutic INR for goal of 2-3    PLAN:   Orders written by provider at facility  New Dose: 2mg/day    Next INR: 8/12      Electronically signed by:  JOSUE Rodriguez CNP

## 2019-08-01 ENCOUNTER — DOCUMENTATION ONLY (OUTPATIENT)
Dept: OTHER | Facility: CLINIC | Age: 84
End: 2019-08-01

## 2019-08-01 ENCOUNTER — AMBULATORY - HEALTHEAST (OUTPATIENT)
Dept: OTHER | Facility: CLINIC | Age: 84
End: 2019-08-01

## 2019-08-01 PROBLEM — Z71.89 ADVANCED DIRECTIVES, COUNSELING/DISCUSSION: Chronic | Status: RESOLVED | Noted: 2017-09-29 | Resolved: 2019-08-01

## 2019-08-05 ENCOUNTER — RECORDS - HEALTHEAST (OUTPATIENT)
Dept: LAB | Facility: CLINIC | Age: 84
End: 2019-08-05

## 2019-08-05 ENCOUNTER — TRANSFERRED RECORDS (OUTPATIENT)
Dept: HEALTH INFORMATION MANAGEMENT | Facility: CLINIC | Age: 84
End: 2019-08-05

## 2019-08-05 LAB
ANION GAP SERPL CALCULATED.3IONS-SCNC: 8 MMOL/L (ref 5–18)
ANION GAP SERPL CALCULATED.3IONS-SCNC: 8 MMOL/L (ref 5–18)
BUN SERPL-MCNC: 39 MG/DL (ref 8–28)
BUN SERPL-MCNC: 39 MG/DL (ref 8–28)
CALCIUM SERPL-MCNC: 11.3 MG/DL (ref 8.5–10.5)
CALCIUM SERPL-MCNC: 11.3 MG/DL (ref 8.5–10.5)
CHLORIDE BLD-SCNC: 104 MMOL/L (ref 98–107)
CHLORIDE SERPLBLD-SCNC: 104 MMOL/L (ref 98–107)
CO2 SERPL-SCNC: 29 MMOL/L (ref 22–31)
CO2 SERPL-SCNC: 29 MMOL/L (ref 22–31)
CREAT SERPL-MCNC: 1.01 MG/DL (ref 0.6–1.1)
CREAT SERPL-MCNC: 1.01 MG/DL (ref 0.6–1.1)
GFR SERPL CREATININE-BSD FRML MDRD: 51 ML/MIN/1.73M2
GFR SERPL CREATININE-BSD FRML MDRD: 51 ML/MIN/1.73M2
GLUCOSE BLD-MCNC: 141 MG/DL (ref 70–125)
GLUCOSE SERPL-MCNC: 141 MG/DL (ref 70–125)
HBA1C MFR BLD: 8.6 % (ref 4.2–6.1)
HBA1C MFR BLD: 8.6 % (ref 4.2–6.1)
HEMOGLOBIN: 13.7 G/DL (ref 12–16)
HGB BLD-MCNC: 13.7 G/DL (ref 12–16)
POTASSIUM BLD-SCNC: 3.9 MMOL/L (ref 3.5–5)
POTASSIUM SERPL-SCNC: 3.9 MMOL/L (ref 3.5–5)
SODIUM SERPL-SCNC: 141 MMOL/L (ref 136–145)
SODIUM SERPL-SCNC: 141 MMOL/L (ref 136–145)

## 2019-08-09 ENCOUNTER — RECORDS - HEALTHEAST (OUTPATIENT)
Dept: LAB | Facility: CLINIC | Age: 84
End: 2019-08-09

## 2019-08-10 NOTE — PROGRESS NOTES
[place] [facility]  [place] Medical Record Number:  [id number]  Place of Service where encounter took place: [facility]  (FGS) [[id number]]    HPI: Mecca Slade is a 89 year old  (12/14/1921), who is being seen today for an episodic care visit at the above location.   HPI information obtained from: facility chart records, facility staff, patient report, [facility] chart review and Care Everywhere [facility] chart review. Today's concern is INR/Coumadin management for Afib:    ROS/Subjective:  Bleeding Signs/Symptoms:  None  Thromboembolic Signs/Symptoms:  None  Medication Changes:  No  Dietary Changes:  No  Activity Changes: No  Bacterial/Viral Infection:  No  Missed Coumadin Doses:  None  On ASA: No  Other Concerns:  No    OBJECTIVE:  BP (!) 148/78   Pulse 66   Temp 97.4  F (36.3  C)   Resp 19   Wt 102.4 kg (225 lb 12.8 oz)   SpO2 98%   BMI 36.45 kg/m    Last INR: 2.65 on 7/22.  INR Today:  1.76  Current Dose: 2mg/day.    ASSESSMENT:  Chronic atrial fibrillation (H)  Long term (current) use of anticoagulants  Encounter for therapeutic drug monitoring  Chronic. Stable. Subtherapeutic. Will dose Coumadin as noted below and recheck INR in 3 days on 8/15/19. Follow-up accordingly. Therapeutic INR goal of 2-3.    PLAN:   Orders:  New Dose: 3mg/day.   Next INR: 8/15/19.    Electronically signed by:  Dr. Haley Sims, APRN CNP DNP

## 2019-08-12 ENCOUNTER — NURSING HOME VISIT (OUTPATIENT)
Dept: GERIATRICS | Facility: CLINIC | Age: 84
End: 2019-08-12
Payer: COMMERCIAL

## 2019-08-12 VITALS
BODY MASS INDEX: 36.45 KG/M2 | DIASTOLIC BLOOD PRESSURE: 78 MMHG | OXYGEN SATURATION: 98 % | HEART RATE: 66 BPM | WEIGHT: 225.8 LBS | TEMPERATURE: 97.4 F | RESPIRATION RATE: 19 BRPM | SYSTOLIC BLOOD PRESSURE: 148 MMHG

## 2019-08-12 DIAGNOSIS — Z79.01 LONG TERM (CURRENT) USE OF ANTICOAGULANTS: ICD-10-CM

## 2019-08-12 DIAGNOSIS — I48.20 CHRONIC ATRIAL FIBRILLATION (H): Primary | ICD-10-CM

## 2019-08-12 DIAGNOSIS — Z51.81 ENCOUNTER FOR THERAPEUTIC DRUG MONITORING: ICD-10-CM

## 2019-08-12 LAB — INR PPP: 1.76 (ref 0.9–1.1)

## 2019-08-12 PROCEDURE — 99308 SBSQ NF CARE LOW MDM 20: CPT | Performed by: NURSE PRACTITIONER

## 2019-08-12 NOTE — LETTER
8/12/2019        RE: Mecca Slade  [facility]  [address] [facility] [address]        [place] [facility]  [place] Medical Record Number:  [id number]  Place of Service where encounter took place: [facility]  (FGS) [[id number]]    HPI: Mecca Slade is a 89 year old  (12/14/1921), who is being seen today for an episodic care visit at the above location.   HPI information obtained from: facility chart records, facility staff, patient report, [facility] chart review and Care Everywhere [facility] chart review. Today's concern is INR/Coumadin management for Afib:    ROS/Subjective:  Bleeding Signs/Symptoms:  None  Thromboembolic Signs/Symptoms:  None  Medication Changes:  No  Dietary Changes:  No  Activity Changes: No  Bacterial/Viral Infection:  No  Missed Coumadin Doses:  None  On ASA: No  Other Concerns:  No    OBJECTIVE:  BP (!) 148/78   Pulse 66   Temp 97.4  F (36.3  C)   Resp 19   Wt 102.4 kg (225 lb 12.8 oz)   SpO2 98%   BMI 36.45 kg/m     Last INR: 2.65 on 7/22.  INR Today:  1.76  Current Dose: 2mg/day.    ASSESSMENT:  Chronic atrial fibrillation (H)  Long term (current) use of anticoagulants  Encounter for therapeutic drug monitoring  Chronic. Stable. Subtherapeutic. Will dose Coumadin as noted below and recheck INR in 3 days on 8/15/19. Follow-up accordingly. Therapeutic INR goal of 2-3.    PLAN:   Orders:  New Dose: 3mg/day.   Next INR: 8/15/19.    Electronically signed by:  JOSUE Clark CNP DNP            Sincerely,        JOSUE Gillis CNP

## 2019-08-14 ENCOUNTER — RECORDS - HEALTHEAST (OUTPATIENT)
Dept: LAB | Facility: CLINIC | Age: 84
End: 2019-08-14

## 2019-08-14 NOTE — PROGRESS NOTES
[place] [facility]  [place] Medical Record Number:  [id number]  Place of Service where encounter took place: [facility]  (FGS) [[id number]]    HPI: Mecca Slade is a 89 year old  (12/14/1921), who is being seen today for an episodic care visit at the above location.   HPI information obtained from: facility chart records, facility staff, patient report, [facility] chart review and Care Everywhere [facility] chart review. Today's concern is INR/Coumadin management for Afib:    ROS/Subjective:  Bleeding Signs/Symptoms:  None  Thromboembolic Signs/Symptoms:  None  Medication Changes:  No  Dietary Changes:  No  Activity Changes: No  Bacterial/Viral Infection:  No  Missed Coumadin Doses:  None  On ASA: No  Other Concerns:  No    OBJECTIVE:  BP (!) 148/78   Pulse 66   Temp 97.4  F (36.3  C)   Resp 19   Wt 101.9 kg (224 lb 11.2 oz)   SpO2 98%   BMI 36.27 kg/m    Last INR: 1.76 on 8/12.  INR Today: 1.33.  Current Dose: 3mg/day x 3 days, prior was 2mg/day.    ASSESSMENT:  Chronic atrial fibrillation (H)  Long term (current) use of anticoagulants  Encounter for therapeutic drug monitoring  Chronic. Stable. Subtherapeutic. Will dose Coumadin as noted below and recheck INR in 4 days. Follow-up accordingly. Therapeutic INR goal of 2-3.    PLAN:   Orders:  New Dose: Coumadin 4 mg PO x2 days. (8/15, 8/16). Then give 3 mg PO on Sat (8/17). gvie 2 mg PO on Sun (8/18).   Next INR: Monday 8/19/19 Dx: Afib  Other orders: Simethicone 125 mg Chewable PO    Electronically signed by:  Dr. Haley Sims, APRN CNP DNP

## 2019-08-15 ENCOUNTER — NURSING HOME VISIT (OUTPATIENT)
Dept: GERIATRICS | Facility: CLINIC | Age: 84
End: 2019-08-15
Payer: COMMERCIAL

## 2019-08-15 ENCOUNTER — TRANSFERRED RECORDS (OUTPATIENT)
Dept: HEALTH INFORMATION MANAGEMENT | Facility: CLINIC | Age: 84
End: 2019-08-15

## 2019-08-15 VITALS
RESPIRATION RATE: 19 BRPM | DIASTOLIC BLOOD PRESSURE: 78 MMHG | HEART RATE: 66 BPM | SYSTOLIC BLOOD PRESSURE: 148 MMHG | TEMPERATURE: 97.4 F | OXYGEN SATURATION: 98 % | WEIGHT: 224.7 LBS | BODY MASS INDEX: 36.27 KG/M2

## 2019-08-15 DIAGNOSIS — I48.20 CHRONIC ATRIAL FIBRILLATION (H): Primary | ICD-10-CM

## 2019-08-15 DIAGNOSIS — R10.13 ABDOMINAL PAIN, EPIGASTRIC: ICD-10-CM

## 2019-08-15 DIAGNOSIS — Z79.01 LONG TERM (CURRENT) USE OF ANTICOAGULANTS: ICD-10-CM

## 2019-08-15 DIAGNOSIS — Z51.81 ENCOUNTER FOR THERAPEUTIC DRUG MONITORING: ICD-10-CM

## 2019-08-15 LAB
INR PPP: 1.33 (ref 0.9–1.1)
INR PPP: 1.33 (ref 0.9–1.1)

## 2019-08-15 PROCEDURE — 99308 SBSQ NF CARE LOW MDM 20: CPT | Performed by: NURSE PRACTITIONER

## 2019-08-15 RX ORDER — SIMETHICONE 125 MG
125 TABLET,CHEWABLE ORAL 2 TIMES DAILY
Refills: 0
Start: 2019-08-15 | End: 2019-09-19

## 2019-08-15 NOTE — PROGRESS NOTES
[place] [facility]    Chief Complaint   Patient presents with     Nursing Home Acute       HPI:    Mecca Slade is a 89 year old  (12/14/1921), who is being seen today for an episodic care visit at [facility] . Today's concern is:  Chronic atrial fibrillation (H)  On chronic coumadin  No CP/SOB, HA  Prior to CVA and admit to NH patient was not on coumadin r/t difficulty managing medication on own.   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)  With physical and functional decline and Significant deficits requiring NH placement  Previously lived independently, now needs LTC  Tends to sleep t/o much of day but ParticipateD with therapy with goal to improve strength and gait    Patient shows improved strength but unable to return home   Type 2 diabetes mellitus with complication, without long-term current use of insulin (H)  Previously on glipizide but d/c prior to admit to NH now on metformin 500 mg po bid  With BS checked bid and several BS > 350   Good po intake and appetite discussed with patient and nursing  Followed by dietician and po intake supervised by nursing  Pain  Severe knee and back pain  No lasting relief with addition of ultram 25 mg po qd    No real relief from rest    ALLERGIES: Review of patient's allergies indicates no known allergies.  Past Medical, Surgical, Family and Social History reviewed and updated in [facility].    Current Outpatient Prescriptions   Medication Sig Dispense Refill     METFORMIN HCL PO Take 500 mg by mouth 2 times daily (with meals)       TraMADol HCl (ULTRAM PO) Take 25 mg by mouth 3 times daily And q 4 hr prn        Acetaminophen (TYLENOL PO) Take 1,000 mg by mouth 2 times daily And BID PRN       ATORVASTATIN CALCIUM PO Take 20 mg by mouth daily       carboxymethylcellulose (REFRESH PLUS) 0.5 % SOLN ophthalmic solution Place 1 drop into both eyes 3 times daily as needed for dry eyes       VITAMIN D,  CHOLECALCIFEROL, PO Take 2,000 Units by mouth daily       Furosemide (LASIX PO) Take 40 mg by mouth daily       lidocaine (LIDODERM) 5 % Patch Place 1 patch onto the skin every 12 hours To right knee       METOPROLOL SUCCINATE ER PO Take 25 mg by mouth daily        Omega-3 300 MG CAPS Take 1 capsule by mouth daily       senna-docusate (SENOKOT-S;PERICOLACE) 8.6-50 MG per tablet Take 1 tablet by mouth 2 times daily       VERAPAMIL HCL PO Take 120 mg by mouth At Bedtime       Warfarin Sodium (COUMADIN PO) Take by mouth daily       diclofenac (VOLTAREN) 1 % GEL topical gel Place 1 g onto the skin 2 times daily       Medications reviewed:  Medications reconciled to facility chart and changes were made to reflect current medications as identified as above med list. Below are the changes that were made:   Medications stopped since last EPIC medication reconciliation:   There are no discontinued medications.    Medications started since last [facility] medication reconciliation:  No orders of the defined types were placed in this encounter.        REVIEW OF SYSTEMS:  4 point ROS including Respiratory, CV, GI and , other than that noted in the HPI,  is negative    Physical Exam:  /73  Pulse 60  Temp 97.9  F (36.6  C)  Resp 16  Wt 205 lb (93 kg)  SpO2 95%  GENERAL APPEARANCE:  Alert, in no distress  ENT:  normal hearing acuity, oral mucous membranes moist  EYES:  EOM normal, conjunctiva and lids normal  RESP:  lungs clear to auscultation , no respiratory distress, diminished breath sounds    CV:  regular rate and rhythm, no murmur, rub, or gallop, no edema  ABDOMEN:  bowel sounds normal, soft, non-tender  M/S:   Gait and station abnormal up in recliner napping frail  SKIN:  pale w/d  PSYCH:  oriented X 3, normal insight, judgement and memory, memory impaired , affect and mood normal    Recent Labs:     Results for orders placed or performed in visit on 04/13/17   Hemoglobin A1c   Result Value Ref Range    Hemoglobin  A1C 7.6 (A) 4.2 - 6.1 %    [facility]    [facility] 04/13/17   Basic metabolic panel   Result Value Ref Range    Sodium 139 136 - 145 mmol/L    Potassium 4.0 3.5 - 5.0 mmol/L    Chloride 101 98 - 107 mmol/L    Carbon Dioxide 30 22 - 31 mmol/L    Anion Gap 8 5 - 18 mmol/L    Glucose 193 (A) 70 - 125 mg/dL    Urea Nitrogen 27 8 - 28 mg/dL    Creatinine 0.93 0.60 - 1.10 mg/dL    Calcium 10.8 (A) 8.5 - 10.5 mg/dL    GFR Estimate 56 (L) >60 mL/min/1.73m2    GFR Estimate If Black >60 >60 mL/min/1.73m2    [facility]    [facility] 04/13/17       Blood sugars BID-    0800am  194 - 217    2000  194 - 390      Assessment/Plan:     Chronic atrial fibrillation (H)  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 Ultram 25 mg po tid and q 4 hr po prn  O/w The current medical regimen is effective;  continue present plan and medications.    Time  Total time spent with patient visit was 25 min including patient visit and review of past records. Greater than 50% of total time spent with counseling and coordinating care .       Electronically signed by  JOSUE Tai CNP                   29.3

## 2019-08-15 NOTE — LETTER
8/15/2019        RE: Mecca Slade  [facility]  [address] [facility] [address]        [facility] [facility]  [place] Medical Record Number:  [id number]  Place of Service where encounter took place: [facility]  (FGS) [[id number]]    HPI: Mecca Slade is a 89 year old  (12/14/1921), who is being seen today for an episodic care visit at the above location.   HPI information obtained from: facility chart records, facility staff, patient report, [facility] chart review and Care Everywhere [facility] chart review. Today's concern is INR/Coumadin management for Afib:    ROS/Subjective:  Bleeding Signs/Symptoms:  None  Thromboembolic Signs/Symptoms:  None  Medication Changes:  No  Dietary Changes:  No  Activity Changes: No  Bacterial/Viral Infection:  No  Missed Coumadin Doses:  None  On ASA: No  Other Concerns:  No    OBJECTIVE:  BP (!) 148/78   Pulse 66   Temp 97.4  F (36.3  C)   Resp 19   Wt 101.9 kg (224 lb 11.2 oz)   SpO2 98%   BMI 36.27 kg/m     Last INR: 1.76 on 8/12.  INR Today: 1.33.  Current Dose: 3mg/day x 3 days, prior was 2mg/day.    ASSESSMENT:  Chronic atrial fibrillation (H)  Long term (current) use of anticoagulants  Encounter for therapeutic drug monitoring  Chronic. Stable. Subtherapeutic. Will dose Coumadin as noted below and recheck INR in 4 days. Follow-up accordingly. Therapeutic INR goal of 2-3.    PLAN:   Orders:  New Dose: Coumadin 4 mg PO x2 days. (8/15, 8/16). Then give 3 mg PO on Sat (8/17). gvie 2 mg PO on Sun (8/18).   Next INR: Monday 8/19/19 Dx: Afib  Other orders: Simethicone 125 mg Chewable PO    Electronically signed by:  JOSUE Clark CNP DNP          Sincerely,        JOSUE Gillis CNP

## 2019-08-19 ENCOUNTER — NURSING HOME VISIT (OUTPATIENT)
Dept: GERIATRICS | Facility: CLINIC | Age: 84
End: 2019-08-19
Payer: COMMERCIAL

## 2019-08-19 ENCOUNTER — TRANSFERRED RECORDS (OUTPATIENT)
Dept: HEALTH INFORMATION MANAGEMENT | Facility: CLINIC | Age: 84
End: 2019-08-19

## 2019-08-19 ENCOUNTER — RECORDS - HEALTHEAST (OUTPATIENT)
Dept: LAB | Facility: CLINIC | Age: 84
End: 2019-08-19

## 2019-08-19 VITALS
BODY MASS INDEX: 36.27 KG/M2 | DIASTOLIC BLOOD PRESSURE: 78 MMHG | RESPIRATION RATE: 19 BRPM | WEIGHT: 224.7 LBS | OXYGEN SATURATION: 98 % | SYSTOLIC BLOOD PRESSURE: 148 MMHG | HEART RATE: 66 BPM | TEMPERATURE: 97.4 F

## 2019-08-19 DIAGNOSIS — I48.20 CHRONIC ATRIAL FIBRILLATION (H): Primary | ICD-10-CM

## 2019-08-19 DIAGNOSIS — Z51.81 ENCOUNTER FOR THERAPEUTIC DRUG MONITORING: ICD-10-CM

## 2019-08-19 DIAGNOSIS — Z79.01 LONG TERM (CURRENT) USE OF ANTICOAGULANTS: ICD-10-CM

## 2019-08-19 LAB
INR PPP: 1.97 (ref 0.9–1.1)
INR PPP: 1.97 (ref 0.9–1.1)

## 2019-08-19 PROCEDURE — 99308 SBSQ NF CARE LOW MDM 20: CPT | Performed by: NURSE PRACTITIONER

## 2019-08-19 NOTE — PROGRESS NOTES
[place] [facility]  [place] Medical Record Number:  [id number]  Place of Service where encounter took place: [facility]  (FGS) [[id number]]    HPI: Mecca Slade is a 89 year old  (12/14/1921), who is being seen today for an episodic care visit at the above location.   HPI information obtained from: facility chart records, facility staff, patient report, [facility] chart review and Care Everywhere [facility] chart review. Today's concern is INR/Coumadin management for A. Fib    ROS/Subjective:  Bleeding Signs/Symptoms:  None  Thromboembolic Signs/Symptoms:  None  Medication Changes:  No  Dietary Changes:  No  Activity Changes: No  Bacterial/Viral Infection:  No  Missed Coumadin Doses:  None  On ASA: No  Other Concerns:  No    OBJECTIVE:  BP (!) 148/78   Pulse 66   Temp 97.4  F (36.3  C)   Resp 19   Wt 101.9 kg (224 lb 11.2 oz)   SpO2 98%   BMI 36.27 kg/m    Last INR: 1.33 on 8/15/19.  INR Today:  1.97  Current Dose: 4mg x2 days, 3mg x1, 2mg x1. Prior dosing was 2mg/day.    ASSESSMENT:  Chronic atrial fibrillation (H)  Long term (current) use of anticoagulants  Encounter for therapeutic drug monitoring  Chronic. Stable. Almost therapeutic. Will dose Coumadin as noted below and recheck INR in 3 days. Follow-up accordingly. Therapeutic INR for goal of 2-3.    PLAN:   Orders written by provider at facility and transcribed by : José Miguel Radford  New Dose: 3 mg PO daily. Dx: Afib.    Next INR: 8/22/19.     Electronically signed by:  Dr. Haley Sims, APRN CNP DNP

## 2019-08-21 ENCOUNTER — RECORDS - HEALTHEAST (OUTPATIENT)
Dept: LAB | Facility: CLINIC | Age: 84
End: 2019-08-21

## 2019-08-21 NOTE — PROGRESS NOTES
[facility]  [place] Medical Record Number:  [id number]  Place of Service where encounter took place:  [facility]  (FGS) [[id number]]  Chief Complaint   Patient presents with     INR RESULTS     Nursing Home Acute     HPI: Mecca Slade  is a 89 year old (12/14/1921), who is being seen today for an episodic care visit.  HPI information obtained from: facility chart records, facility staff, patient report, [facility] chart review and Care Everywhere [facility] chart review. Today's concern is:    Chronic atrial fibrillation (H)  Long term (current) use of anticoagulants  Encounter for therapeutic drug monitoring  Bleeding Signs/Symptoms:  None  Thromboembolic Signs/Symptoms:  None  Medication Changes:  No  Dietary Changes:  No  Activity Changes: No  Bacterial/Viral Infection:  No  Missed Coumadin Doses:  None  On ASA: No  Other Concerns:  No  Last INR: 1.97 on 8/19/19.  INR Today: 2.64  Current Dose: 3mg/day. Prior dosing was 2mg/day.    Chronic pain of right knee  Nursing consulted provider today, as patient was complaining of new pain to right knee with some redness noted. She has chronic right knee arthritic pain, but she states it is worse than usual. She is currently on Tylenol BID. Today, patient states that her pain can be moderate, has been slightly worse for several days, she denies numbness/tingling, or new swelling. She does not remember bumping it. She denies fever/chills.     Past Medical and Surgical History reviewed in [facility] today.  REVIEW OF SYSTEMS: Limited secondary to cognitive impairment but today pt reports the above and 4 point ROS including Respiratory, CV, GI and , other than that noted in the HPI, is negative.    Objective:  BP (!) 148/78   Pulse 66   Temp 97.4  F (36.3  C)   Resp 19   Wt 101.6 kg (224 lb)   SpO2 98%   BMI 36.15 kg/m    Exam:  GENERAL APPEARANCE: Alert, in no distress, cooperative.   ENT: Mouth/posterior oropharynx intact w/ moist mucous  membranes, hearing acuity [place].  EYES: EOM, conjunctivae, lids, pupils and irises normal, PERRL2.   SKIN: Inspection/Palpation of skin and subcutaneous tissue baseline w/ fragility. No wounds/rashes noted. Some mild redness to the right knee surface appreciated.  MSK: Right knee tenderness (arrows), with reddened area on patella (pink [place]). Joint is not warm.      NEURO: CN II-XII at patient's baseline, sensation baseline PPS.  PSYCH: Insight, judgement, and memory are baseline, affect and mood are happy/engaged.    Labs:   Recent labs in [employer] reviewed by me today.        ASSESSMENT/PLAN:  Chronic atrial fibrillation (H)  Long term (current) use of anticoagulants  Encounter for therapeutic drug monitoring  Chronic. Stable. Therapeutic. Will dose Coumadin as noted below and recheck INR in 1 week. Follow-up accordingly.    Chronic pain of right knee  Acute-on-chronic. Stable. Differentials for effusion, ligament strain, gout, trauma unknown. Will increase scheduled Tylenol dosing, offer ice/heat regularly. PRN Bengay already in place. If no improvement, will follow-up w/in the week and may consider XR (see previous XR noted above.      Orders written by provider at facility and transcribed by : José Miguel Radford  1. Increase Tylenol from 500 BID to 500 mg PO TID x5 days then go back to BID dosing Dx: R knee pain  2. Offer R knee pain ice/heat every  4 hours x 3 days. Dx: Knee pain  3. Coumadin 3 mg PO daily on M, W, F.   4. Coumadin 2 mg PO daily on all other days.  5. Recheck INR x1 in 1 week on Fri 8/30 Dx: Afib.    Electronically signed by:   Dr. Haley Sims, APRN CNP DNP

## 2019-08-22 ENCOUNTER — NURSING HOME VISIT (OUTPATIENT)
Dept: GERIATRICS | Facility: CLINIC | Age: 84
End: 2019-08-22
Payer: COMMERCIAL

## 2019-08-22 ENCOUNTER — TRANSFERRED RECORDS (OUTPATIENT)
Dept: HEALTH INFORMATION MANAGEMENT | Facility: CLINIC | Age: 84
End: 2019-08-22

## 2019-08-22 VITALS
SYSTOLIC BLOOD PRESSURE: 148 MMHG | DIASTOLIC BLOOD PRESSURE: 78 MMHG | TEMPERATURE: 97.4 F | WEIGHT: 224 LBS | OXYGEN SATURATION: 98 % | RESPIRATION RATE: 19 BRPM | BODY MASS INDEX: 36.15 KG/M2 | HEART RATE: 66 BPM

## 2019-08-22 DIAGNOSIS — G89.29 CHRONIC PAIN OF RIGHT KNEE: ICD-10-CM

## 2019-08-22 DIAGNOSIS — Z79.01 LONG TERM (CURRENT) USE OF ANTICOAGULANTS: ICD-10-CM

## 2019-08-22 DIAGNOSIS — I48.20 CHRONIC ATRIAL FIBRILLATION (H): Primary | ICD-10-CM

## 2019-08-22 DIAGNOSIS — M25.561 CHRONIC PAIN OF RIGHT KNEE: ICD-10-CM

## 2019-08-22 DIAGNOSIS — Z51.81 ENCOUNTER FOR THERAPEUTIC DRUG MONITORING: ICD-10-CM

## 2019-08-22 LAB
INR PPP: 2.64 (ref 0.9–1.1)
INR PPP: 2.64 (ref 0.9–1.1)

## 2019-08-22 PROCEDURE — 99309 SBSQ NF CARE MODERATE MDM 30: CPT | Performed by: NURSE PRACTITIONER

## 2019-08-22 NOTE — LETTER
8/22/2019        RE: Mecca Slade  [facility]  [address] [facility] [address]        [place] [facility]  [place] Medical Record Number:  [id number]  Place of Service where encounter took place:  [facility]  (FGS) [[id number]]  Chief Complaint   Patient presents with     INR RESULTS     Nursing Home Acute     HPI: Mecca Slade  is a 89 year old (12/14/1921), who is being seen today for an episodic care visit.  HPI information obtained from: facility chart records, facility staff, patient report, [facility] chart review and Care Everywhere [facility] chart review. Today's concern is:    Chronic atrial fibrillation (H)  Long term (current) use of anticoagulants  Encounter for therapeutic drug monitoring  Bleeding Signs/Symptoms:  None  Thromboembolic Signs/Symptoms:  None  Medication Changes:  No  Dietary Changes:  No  Activity Changes: No  Bacterial/Viral Infection:  No  Missed Coumadin Doses:  None  On ASA: No  Other Concerns:  No  Last INR: 1.97 on 8/19/19.  INR Today: 2.64  Current Dose: 3mg/day. Prior dosing was 2mg/day.    Chronic pain of right knee  Nursing consulted provider today, as patient was complaining of new pain to right knee with some redness noted. She has chronic right knee arthritic pain, but she states it is worse than usual. She is currently on Tylenol BID. Today, patient states that her pain can be moderate, has been slightly worse for several days, she denies numbness/tingling, or new swelling. She does not remember bumping it. She denies fever/chills.     Past Medical and Surgical History reviewed in [facility] today.  REVIEW OF SYSTEMS: Limited secondary to cognitive impairment but today pt reports the above and 4 point ROS including Respiratory, CV, GI and , other than that noted in the HPI, is negative.    Objective:  BP (!) 148/78   Pulse 66   Temp 97.4  F (36.3  C)   Resp 19   Wt 101.6 kg (224 lb)   SpO2 98%   BMI 36.15 kg/m      Exam:  GENERAL APPEARANCE: Alert, in no distress, cooperative.   ENT: Mouth/posterior oropharynx intact w/ moist mucous membranes, hearing acuity [place].  EYES: EOM, conjunctivae, lids, pupils and irises normal, PERRL2.   SKIN: Inspection/Palpation of skin and subcutaneous tissue baseline w/ fragility. No wounds/rashes noted. Some mild redness to the right knee surface appreciated.  MSK: Right knee tenderness (arrows), with reddened area on patella (pink [place]). Joint is not warm.      NEURO: CN II-XII at patient's baseline, sensation baseline PPS.  PSYCH: Insight, judgement, and memory are baseline, affect and mood are happy/engaged.    Labs:   Recent labs in [employer] reviewed by me today.        ASSESSMENT/PLAN:  Chronic atrial fibrillation (H)  Long term (current) use of anticoagulants  Encounter for therapeutic drug monitoring  Chronic. Stable. Therapeutic. Will dose Coumadin as noted below and recheck INR in 1 week. Follow-up accordingly.    Chronic pain of right knee  Acute-on-chronic. Stable. Differentials for effusion, ligament strain, gout, trauma unknown. Will increase scheduled Tylenol dosing, offer ice/heat regularly. PRN Bengay already in place. If no improvement, will follow-up w/in the week and may consider XR (see previous XR noted above.      Orders written by provider at facility and transcribed by : José Miguel Radford  1. Increase Tylenol from 500 BID to 500 mg PO TID x5 days then go back to BID dosing Dx: R knee pain  2. Offer R knee pain ice/heat every  4 hours x 3 days. Dx: Knee pain  3. Coumadin 3 mg PO daily on M, W, F.   4. Coumadin 2 mg PO daily on all other days.  5. Recheck INR x1 in 1 week on Fri 8/30 Dx: Afib.    Electronically signed by:   JOSUE Clark CNP DNP        Sincerely,        JOSUE Gillis CNP

## 2019-08-25 NOTE — PROGRESS NOTES
[facility]  [place] Medical Record Number:  [id number]  Place of Service where encounter took place:  [facility]  (FGS) [[id number]]  Chief Complaint   Patient presents with     Nursing Home Acute     HPI: Mecca Slade  is a 89 year old (12/14/1921), who is being seen today for an episodic care visit.  HPI information obtained from: facility chart records, facility staff, patient report, [facility] chart review and Care Everywhere [facility] chart review. Today's concern is:    Chronic atrial fibrillation (H)  Long term (current) use of anticoagulants  Encounter for therapeutic drug monitoring  Bleeding Signs/Symptoms:  None  Thromboembolic Signs/Symptoms:  None  Medication Changes:  No  Dietary Changes:  No  Activity Changes: No  Bacterial/Viral Infection:  No  Missed Coumadin Doses:  None  On ASA: No  Other Concerns:  No  Last INR: 2.64 on 8/22/19.  INR Today: 3.63.  Current Dose: 3mg M/W/F, 2mg AOD.    Chronic pain of right knee  Nursing consulted provider last week, as patient was complaining of new pain to right knee with some redness noted. She has chronic right knee arthritic pain. We increased scheduled Tylenol for short period and offered heat/ice as it appeared as if she had bumped her knee. Today, patient states that her pain is better than last week and the interventions helped. She denies numbness/tingling, or new swelling. She denies fever/chills.     Yeast infection of the skin  Nursing notified provider today regarding small skin tear under patient's pannus. This is non-tender, reddened, and bothersome to patient. Nursing had been placing powder under skin fold, but is requesting switch to cream for better adherence to reddened area.     Past Medical and Surgical History reviewed in [facility] today.  REVIEW OF SYSTEMS: Limited secondary to cognitive impairment but today pt reports the above and 4 point ROS including Respiratory, CV, GI and , other than that noted in the  HPI, is negative.    Objective:  /75   Pulse 66   Temp 97.6  F (36.4  C)   Resp 16   Wt 100.2 kg (221 lb)   SpO2 97%   BMI 35.67 kg/m    Exam:  GENERAL APPEARANCE: Alert, in no distress, cooperative.   ENT: Mouth/posterior oropharynx intact w/ moist mucous membranes, hearing acuity [place].  EYES: EOM, conjunctivae, lids, pupils and irises normal, PERRL2.   SKIN: Inspection/Palpation of skin and subcutaneous tissue baseline w/ fragility. No wounds/rashes noted. Some mild bruising to the right knee surface appreciated w/ PVD ruddiness to BLE.   MSK: Right knee tenderness, with bruised area on patella. Joint is not warm.    NEURO: CN II-XII at patient's baseline, sensation baseline PPS.  PSYCH: Insight, judgement, and memory are baseline, affect and mood are happy/engaged.    Labs:   Recent labs in [employer] reviewed by me today.      ASSESSMENT/PLAN:  Chronic atrial fibrillation (H)  Long term (current) use of anticoagulants  Encounter for therapeutic drug monitoring  Chronic. Stable. Supratherapeutic. Will dose Coumadin as noted below and recheck INR in 5 days. Follow-up accordingly.    Chronic pain of right knee  Chronic. Stable. Improved. Nursing to monitor bruising and BLE discoloration. Follow-up routinely or as needed.    Yeast infection of the skin  Acute-on-chronic. Stable. Will order Nystatin cream x 7 days. Follow-up routinely or as needed.    Orders written by provider at facility and transcribed by : José Miguel Radford  1. Coumadin 0.5 mg PO daily on Fri (8/30) and Sat(8/31). Coumdain 2 mg AOD. Dx: Afib.  2. Nursing to Monitor BLE Discoloration and busing, noted and notify NP for changes.   3. Nystatin cream 100,000 units, apply to abd fold BID x7 days then discontinue. Dx: yeast dermatitis.   4. Recheck INR x1 on 9/4/19 Dx: afib    Electronically signed by:   Dr. Haley Sims, APRN CNP DNP

## 2019-08-30 ENCOUNTER — TRANSFERRED RECORDS (OUTPATIENT)
Dept: HEALTH INFORMATION MANAGEMENT | Facility: CLINIC | Age: 84
End: 2019-08-30

## 2019-08-30 ENCOUNTER — RECORDS - HEALTHEAST (OUTPATIENT)
Dept: LAB | Facility: CLINIC | Age: 84
End: 2019-08-30

## 2019-08-30 ENCOUNTER — NURSING HOME VISIT (OUTPATIENT)
Dept: GERIATRICS | Facility: CLINIC | Age: 84
End: 2019-08-30
Payer: COMMERCIAL

## 2019-08-30 VITALS
WEIGHT: 221 LBS | BODY MASS INDEX: 35.67 KG/M2 | OXYGEN SATURATION: 97 % | HEART RATE: 66 BPM | RESPIRATION RATE: 16 BRPM | DIASTOLIC BLOOD PRESSURE: 75 MMHG | SYSTOLIC BLOOD PRESSURE: 115 MMHG | TEMPERATURE: 97.6 F

## 2019-08-30 DIAGNOSIS — Z51.81 ENCOUNTER FOR THERAPEUTIC DRUG MONITORING: ICD-10-CM

## 2019-08-30 DIAGNOSIS — Z79.01 LONG TERM (CURRENT) USE OF ANTICOAGULANTS: ICD-10-CM

## 2019-08-30 DIAGNOSIS — G89.29 CHRONIC PAIN OF RIGHT KNEE: ICD-10-CM

## 2019-08-30 DIAGNOSIS — M25.561 CHRONIC PAIN OF RIGHT KNEE: ICD-10-CM

## 2019-08-30 DIAGNOSIS — B37.2 YEAST INFECTION OF THE SKIN: ICD-10-CM

## 2019-08-30 DIAGNOSIS — I48.20 CHRONIC ATRIAL FIBRILLATION (H): Primary | ICD-10-CM

## 2019-08-30 DIAGNOSIS — R60.0 EDEMA, LOWER EXTREMITY: ICD-10-CM

## 2019-08-30 LAB
INR PPP: 3.63 (ref 0.9–1.1)
INR PPP: 3.63 (ref 0.9–1.1)

## 2019-08-30 PROCEDURE — 99309 SBSQ NF CARE MODERATE MDM 30: CPT | Performed by: NURSE PRACTITIONER

## 2019-08-30 RX ORDER — NYSTATIN 100000 U/G
CREAM TOPICAL 2 TIMES DAILY
Start: 2019-08-30 | End: 2019-09-19

## 2019-08-30 NOTE — LETTER
8/30/2019        RE: Mecca Slade  [facility]  [address] [facility] [address]        [facility] [facility]  [place] Medical Record Number:  [id number]  Place of Service where encounter took place:  [facility]  (FGS) [[id number]]  Chief Complaint   Patient presents with     Nursing Home Acute     HPI: Mecca Slade  is a 89 year old (12/14/1921), who is being seen today for an episodic care visit.  HPI information obtained from: facility chart records, facility staff, patient report, [facility] chart review and Care Everywhere [facility] chart review. Today's concern is:    Chronic atrial fibrillation (H)  Long term (current) use of anticoagulants  Encounter for therapeutic drug monitoring  Bleeding Signs/Symptoms:  None  Thromboembolic Signs/Symptoms:  None  Medication Changes:  No  Dietary Changes:  No  Activity Changes: No  Bacterial/Viral Infection:  No  Missed Coumadin Doses:  None  On ASA: No  Other Concerns:  No  Last INR: 2.64 on 8/22/19.  INR Today: 3.63.  Current Dose: 3mg M/W/F, 2mg AOD.    Chronic pain of right knee  Nursing consulted provider last week, as patient was complaining of new pain to right knee with some redness noted. She has chronic right knee arthritic pain. We increased scheduled Tylenol for short period and offered heat/ice as it appeared as if she had bumped her knee. Today, patient states that her pain is better than last week and the interventions helped. She denies numbness/tingling, or new swelling. She denies fever/chills.     Yeast infection of the skin  Nursing notified provider today regarding small skin tear under patient's pannus. This is non-tender, reddened, and bothersome to patient. Nursing had been placing powder under skin fold, but is requesting switch to cream for better adherence to reddened area.     Past Medical and Surgical History reviewed in [facility] today.  REVIEW OF SYSTEMS: Limited secondary to cognitive  impairment but today pt reports the above and 4 point ROS including Respiratory, CV, GI and , other than that noted in the HPI, is negative.    Objective:  /75   Pulse 66   Temp 97.6  F (36.4  C)   Resp 16   Wt 100.2 kg (221 lb)   SpO2 97%   BMI 35.67 kg/m     Exam:  GENERAL APPEARANCE: Alert, in no distress, cooperative.   ENT: Mouth/posterior oropharynx intact w/ moist mucous membranes, hearing acuity [place].  EYES: EOM, conjunctivae, lids, pupils and irises normal, PERRL2.   SKIN: Inspection/Palpation of skin and subcutaneous tissue baseline w/ fragility. No wounds/rashes noted. Some mild bruising to the right knee surface appreciated w/ PVD ruddiness to BLE.   MSK: Right knee tenderness, with bruised area on patella. Joint is not warm.    NEURO: CN II-XII at patient's baseline, sensation baseline PPS.  PSYCH: Insight, judgement, and memory are baseline, affect and mood are happy/engaged.    Labs:   Recent labs in [employer] reviewed by me today.      ASSESSMENT/PLAN:  Chronic atrial fibrillation (H)  Long term (current) use of anticoagulants  Encounter for therapeutic drug monitoring  Chronic. Stable. Supratherapeutic. Will dose Coumadin as noted below and recheck INR in 5 days. Follow-up accordingly.    Chronic pain of right knee  Chronic. Stable. Improved. Nursing to monitor bruising and BLE discoloration. Follow-up routinely or as needed.    Yeast infection of the skin  Acute-on-chronic. Stable. Will order Nystatin cream x 7 days. Follow-up routinely or as needed.    Orders written by provider at facility and transcribed by : José Miguel Radford  1. Coumadin 0.5 mg PO daily on Fri (8/30) and Sat(8/31). Coumdain 2 mg AOD. Dx: Afib.  2. Nursing to Monitor BLE Discoloration and busing, noted and notify NP for changes.   3. Nystatin cream 100,000 units, apply to abd fold BID x7 days then discontinue. Dx: yeast dermatitis.   4. Recheck INR x1 on 9/4/19 Dx: afib    Electronically signed by:     JOSUE Mcconnell CNP DNP      Sincerely,        JOSUE Gillis CNP

## 2019-09-02 NOTE — PROGRESS NOTES
[place] [facility]  [place] Medical Record Number:  [id number]  Place of Service where encounter took place: [facility]  (FGS) [[id number]]    HPI: Mecca Slade is a 89 year old  (12/14/1921), who is being seen today for an episodic care visit at the above location.   HPI information obtained from: facility chart records, facility staff, patient report, [facility] chart review and Care Everywhere [facility] chart review. Today's concern is INR/Coumadin management for A. Fib    ROS/Subjective:  Bleeding Signs/Symptoms:  None  Thromboembolic Signs/Symptoms:  None  Medication Changes:  No  Dietary Changes:  No  Activity Changes: No  Bacterial/Viral Infection:  No  Missed Coumadin Doses:  None  On ASA: No  Other Concerns:  No    OBJECTIVE:  /66   Pulse 65   Temp 97.9  F (36.6  C)   Resp 18   Wt 101.2 kg (223 lb 3.2 oz)   SpO2 96%   BMI 36.03 kg/m    Last INR: 3.63 on 8/30.  INR Today: 1.60  Current Dose: 0.5 mg on Fri (8/30) and Sat (8/31). 2 mg AOD. Prior was 3mg M/W/F, 2mg AOD.      ASSESSMENT:  Chronic atrial fibrillation (H)  Long term (current) use of anticoagulants  Encounter for therapeutic drug monitoring  Chronic. Stable. Subtherapeutic. Will dose Coumadin as noted below and recheck INR in 2 days. Follow-up accordingly.  Therapeutic INR for goal of 2-3.    PLAN:   Orders written by provider at facility and transcribed by : José Miguel Radford  New Dose: 3 mg PO daily    Next INR: 9/6/19 Dx: Afib    Electronically signed by:   Dr. Haley Sims, APRN CNP DNP

## 2019-09-03 ENCOUNTER — RECORDS - HEALTHEAST (OUTPATIENT)
Dept: LAB | Facility: CLINIC | Age: 84
End: 2019-09-03

## 2019-09-04 ENCOUNTER — NURSING HOME VISIT (OUTPATIENT)
Dept: GERIATRICS | Facility: CLINIC | Age: 84
End: 2019-09-04
Payer: COMMERCIAL

## 2019-09-04 VITALS
DIASTOLIC BLOOD PRESSURE: 66 MMHG | BODY MASS INDEX: 36.03 KG/M2 | RESPIRATION RATE: 18 BRPM | SYSTOLIC BLOOD PRESSURE: 106 MMHG | HEART RATE: 65 BPM | WEIGHT: 223.2 LBS | TEMPERATURE: 97.9 F | OXYGEN SATURATION: 96 %

## 2019-09-04 DIAGNOSIS — I48.20 CHRONIC ATRIAL FIBRILLATION (H): Primary | ICD-10-CM

## 2019-09-04 DIAGNOSIS — Z51.81 ENCOUNTER FOR THERAPEUTIC DRUG MONITORING: ICD-10-CM

## 2019-09-04 DIAGNOSIS — Z79.01 LONG TERM (CURRENT) USE OF ANTICOAGULANTS: ICD-10-CM

## 2019-09-04 LAB — INR PPP: 1.6 (ref 0.9–1.1)

## 2019-09-04 PROCEDURE — 99308 SBSQ NF CARE LOW MDM 20: CPT | Performed by: NURSE PRACTITIONER

## 2019-09-04 NOTE — LETTER
9/4/2019        RE: Mecca Slade  [facility]  [address] [facility] [address]        [place] [facility]  [place] Medical Record Number:  [id number]  Place of Service where encounter took place: [facility]  (FGS) [[id number]]    HPI: Mecca Slade is a 89 year old  (12/14/1921), who is being seen today for an episodic care visit at the above location.   HPI information obtained from: facility chart records, facility staff, patient report, [facility] chart review and Care Everywhere [facility] chart review. Today's concern is INR/Coumadin management for A. Fib    ROS/Subjective:  Bleeding Signs/Symptoms:  None  Thromboembolic Signs/Symptoms:  None  Medication Changes:  No  Dietary Changes:  No  Activity Changes: No  Bacterial/Viral Infection:  No  Missed Coumadin Doses:  None  On ASA: No  Other Concerns:  No    OBJECTIVE:  /66   Pulse 65   Temp 97.9  F (36.6  C)   Resp 18   Wt 101.2 kg (223 lb 3.2 oz)   SpO2 96%   BMI 36.03 kg/m     Last INR: 3.63 on 8/30.  INR Today: 1.60  Current Dose: 0.5 mg on Fri (8/30) and Sat (8/31). 2 mg AOD . Prior was 3mg M/W/F, 2mg AOD.      ASSESSMENT:  Chronic atrial fibrillation (H)  Long term (current) use of anticoagulants  Encounter for therapeutic drug monitoring  Chronic. Stable. Subtherapeutic. Will dose Coumadin as noted below and recheck INR in 2 days. Follow-up accordingly.  Therapeutic INR for goal of 2-3.    PLAN:   Orders written by provider at facility and transcribed by : José Miguel Radford  New Dose: 3 mg PO daily    Next INR: 9/6/19 Dx: Afib    Electronically signed by:   JOSUE Clark CNP DNP        Sincerely,        JOSUE Gillis CNP

## 2019-09-04 NOTE — LETTER
9/4/2019        RE: Mecca Slade  [facility]  [address] [facility] [address]        [place] [facility]  [place] Medical Record Number:  [id number]  Place of Service where encounter took place: [facility]  (FGS) [[id number]]    HPI: Mecca Slade is a 89 year old  (12/14/1921), who is being seen today for an episodic care visit at the above location.   HPI information obtained from: facility chart records, facility staff, patient report, [facility] chart review and Care Everywhere [facility] chart review. Today's concern is INR/Coumadin management for A. Fib    ROS/Subjective:  Bleeding Signs/Symptoms:  None  Thromboembolic Signs/Symptoms:  None  Medication Changes:  No  Dietary Changes:  No  Activity Changes: No  Bacterial/Viral Infection:  No  Missed Coumadin Doses:  None  On ASA: No  Other Concerns:  No    OBJECTIVE:  /66   Pulse 65   Temp 97.9  F (36.6  C)   Resp 18   Wt 101.2 kg (223 lb 3.2 oz)   SpO2 96%   BMI 36.03 kg/m     Last INR: 3.63 on 8/30.  INR Today: 1.60  Current Dose: 0.5 mg on Fri (8/30) and Sat (8/31). 2 mg AOD . Prior was 3mg M/W/F, 2mg AOD.      ASSESSMENT:  Chronic atrial fibrillation (H)  Long term (current) use of anticoagulants  Encounter for therapeutic drug monitoring  Chronic. Stable. Subtherapeutic. Will dose Coumadin as noted below and recheck INR ***. Follow-up accordingly.  Therapeutic INR for goal of 2-3.    PLAN:   Orders written by provider at facility and transcribed by : José Miguel Radford  New Dose: 3 mg PO daily    Next INR: 9/16/19 Dx: Afib    Electronically signed by:   JOSUE Clark CNP DNP        Sincerely,        JOSUE Gillis CNP

## 2019-09-06 ENCOUNTER — RECORDS - HEALTHEAST (OUTPATIENT)
Dept: LAB | Facility: CLINIC | Age: 84
End: 2019-09-06

## 2019-09-06 ENCOUNTER — NURSING HOME VISIT (OUTPATIENT)
Dept: GERIATRICS | Facility: CLINIC | Age: 84
End: 2019-09-06
Payer: COMMERCIAL

## 2019-09-06 VITALS
HEART RATE: 60 BPM | RESPIRATION RATE: 16 BRPM | BODY MASS INDEX: 36.03 KG/M2 | DIASTOLIC BLOOD PRESSURE: 90 MMHG | WEIGHT: 223.2 LBS | TEMPERATURE: 97.5 F | OXYGEN SATURATION: 98 % | SYSTOLIC BLOOD PRESSURE: 134 MMHG

## 2019-09-06 DIAGNOSIS — Z51.81 ENCOUNTER FOR THERAPEUTIC DRUG MONITORING: ICD-10-CM

## 2019-09-06 DIAGNOSIS — Z79.01 LONG TERM (CURRENT) USE OF ANTICOAGULANTS: ICD-10-CM

## 2019-09-06 DIAGNOSIS — I48.20 CHRONIC ATRIAL FIBRILLATION (H): Primary | ICD-10-CM

## 2019-09-06 LAB — INR PPP: 1.92 (ref 0.9–1.1)

## 2019-09-06 PROCEDURE — 99308 SBSQ NF CARE LOW MDM 20: CPT | Performed by: NURSE PRACTITIONER

## 2019-09-06 NOTE — LETTER
9/6/2019        RE: Mecca Slade  [facility]  [address] [facility] [address]        [place] [facility]  [place] Medical Record Number:  [id number]  Place of Service where encounter took place: [facility]  (FGS) [[id number]]    HPI: Mecca Slade is a 89 year old  (12/14/1921), who is being seen today for an episodic care visit at the above location.   HPI information obtained from: facility chart records, facility staff, patient report, [facility] chart review and Care Everywhere [facility] chart review. Today's concern is INR/Coumadin management for A. Fib    ROS/Subjective:  Bleeding Signs/Symptoms:  None  Thromboembolic Signs/Symptoms:  None  Medication Changes:  No  Dietary Changes:  No  Activity Changes: No  Bacterial/Viral Infection:  No  Missed Coumadin Doses:  None  On ASA: No  Other Concerns:  No    OBJECTIVE:  BP (!) 134/90   Pulse 60   Temp 97.5  F (36.4  C)   Resp 16   Wt 101.2 kg (223 lb 3.2 oz)   SpO2 98%   BMI 36.03 kg/m     Last INR: 1.60 on 9/4.  INR Today: 1.92  Current Dose: 3mg x 2 days. Prior was 3mg M/W/F, 2mg AOD.      ASSESSMENT:  Chronic atrial fibrillation (H)  Long term (current) use of anticoagulants  Encounter for therapeutic drug monitoring  Chronic. Stable. Slightly subtherapeutic. Will dose Coumadin as noted below and recheck INR in 2 days. Follow-up accordingly. Therapeutic INR for goal of 2-3.    PLAN:   Orders written by provider at facility and transcribed by : José Miguel Radford  New Dose: 3 mg on Sat, Mon/Wed/Fri. 2 mg on Sun/Tue.    Next INR: Wednesday 9/11/19.    Electronically signed by:   JOSUE Clark CNP DNP        Sincerely,        JOSUE Gillis CNP

## 2019-09-06 NOTE — PROGRESS NOTES
[place] [facility]  [place] Medical Record Number:  [id number]  Place of Service where encounter took place: [facility]  (FGS) [[id number]]    HPI: Mecca Slade is a 89 year old  (12/14/1921), who is being seen today for an episodic care visit at the above location.   HPI information obtained from: facility chart records, facility staff, patient report, [facility] chart review and Care Everywhere [facility] chart review. Today's concern is INR/Coumadin management for A. Fib    ROS/Subjective:  Bleeding Signs/Symptoms:  None  Thromboembolic Signs/Symptoms:  None  Medication Changes:  No  Dietary Changes:  No  Activity Changes: No  Bacterial/Viral Infection:  No  Missed Coumadin Doses:  None  On ASA: No  Other Concerns:  No    OBJECTIVE:  BP (!) 134/90   Pulse 60   Temp 97.5  F (36.4  C)   Resp 16   Wt 101.2 kg (223 lb 3.2 oz)   SpO2 98%   BMI 36.03 kg/m    Last INR: 1.60 on 9/4.  INR Today: 1.92  Current Dose: 3mg x 2 days. Prior was 3mg M/W/F, 2mg AOD.      ASSESSMENT:  Chronic atrial fibrillation (H)  Long term (current) use of anticoagulants  Encounter for therapeutic drug monitoring  Chronic. Stable. Slightly subtherapeutic. Will dose Coumadin as noted below and recheck INR in 2 days. Follow-up accordingly. Therapeutic INR for goal of 2-3.    PLAN:   Orders written by provider at facility and transcribed by : José Miguel Radford  New Dose: 3 mg on Sat, Mon/Wed/Fri. 2 mg on Sun/Tue.    Next INR: Wednesday 9/11/19.    Electronically signed by:   Dr. Haley Sims, APRN CNP DNP

## 2019-09-10 ENCOUNTER — RECORDS - HEALTHEAST (OUTPATIENT)
Dept: LAB | Facility: CLINIC | Age: 84
End: 2019-09-10

## 2019-09-10 NOTE — PROGRESS NOTES
[place] [facility]  [place] Medical Record Number:  [id number]  Place of Service where encounter took place: [facility]  (FGS) [[id number]]    HPI: Mecca Slade is a 89 year old  (12/14/1921), who is being seen today for an episodic care visit at the above location.   HPI information obtained from: facility chart records, facility staff, patient report, [facility] chart review and Care Everywhere [facility] chart review. Today's concern is INR/Coumadin management for A. Fib    ROS/Subjective:  Bleeding Signs/Symptoms:  None  Thromboembolic Signs/Symptoms:  None  Medication Changes:  No  Dietary Changes:  No  Activity Changes: No  Bacterial/Viral Infection:  No  Missed Coumadin Doses:  None  On ASA: No  Other Concerns:  No    OBJECTIVE:  BP (!) 144/78   Pulse 76   Temp 98  F (36.7  C)   Resp 17   Wt 101.5 kg (223 lb 12.8 oz)   SpO2 99%   BMI 36.12 kg/m    Last INR: 1.92 on 9/6.  INR Today: 3.09.  Current Dose: 3 mg Sa/M/W/F, 2 mg Sun/Tue.  Prior was 3mg M/W/F, 2mg AOD.      ASSESSMENT:  Chronic atrial fibrillation (H)  Long term (current) use of anticoagulants  Encounter for therapeutic drug monitoring  Chronic. Stable. Slightly suprtherapeutic. Will dose Coumadin as noted below and recheck INR in 1 week. Follow-up accordingly. Therapeutic INR for goal of 2-3.    PLAN:   Orders written by provider at facility and transcribed by : José Miguel Radford  New Dose: 3mg M/W/F, 2 mg AOD.  Next INR: 9/18/19.  Other Orders: Simethicone 80 mg chewable 2 tab PO at 0900 Dx: gas pain.     Electronically signed by:   Dr. Haley Sims, APRN CNP DNP

## 2019-09-11 ENCOUNTER — NURSING HOME VISIT (OUTPATIENT)
Dept: GERIATRICS | Facility: CLINIC | Age: 84
End: 2019-09-11
Payer: COMMERCIAL

## 2019-09-11 VITALS
SYSTOLIC BLOOD PRESSURE: 144 MMHG | TEMPERATURE: 98 F | RESPIRATION RATE: 17 BRPM | OXYGEN SATURATION: 99 % | WEIGHT: 223.8 LBS | DIASTOLIC BLOOD PRESSURE: 78 MMHG | BODY MASS INDEX: 36.12 KG/M2 | HEART RATE: 76 BPM

## 2019-09-11 DIAGNOSIS — Z51.81 ENCOUNTER FOR THERAPEUTIC DRUG MONITORING: ICD-10-CM

## 2019-09-11 DIAGNOSIS — I48.20 CHRONIC ATRIAL FIBRILLATION (H): Primary | ICD-10-CM

## 2019-09-11 DIAGNOSIS — R14.1 GAS PAIN: ICD-10-CM

## 2019-09-11 DIAGNOSIS — Z79.01 LONG TERM (CURRENT) USE OF ANTICOAGULANTS: ICD-10-CM

## 2019-09-11 LAB — INR PPP: 3.09 (ref 0.9–1.1)

## 2019-09-11 PROCEDURE — 99308 SBSQ NF CARE LOW MDM 20: CPT | Performed by: NURSE PRACTITIONER

## 2019-09-11 RX ORDER — SIMETHICONE 80 MG
160 TABLET,CHEWABLE ORAL DAILY
Start: 2019-09-11 | End: 2021-01-01

## 2019-09-11 NOTE — LETTER
9/11/2019        RE: Mecca Slade  [facility]  [address] [facility] [address]        [place] [facility]  [place] Medical Record Number:  [id number]  Place of Service where encounter took place: [facility]  (FGS) [[id number]]    HPI: Mecca Slade is a 89 year old  (12/14/1921), who is being seen today for an episodic care visit at the above location.   HPI information obtained from: facility chart records, facility staff, patient report, [facility] chart review and Care Everywhere [facility] chart review. Today's concern is INR/Coumadin management for A. Fib    ROS/Subjective:  Bleeding Signs/Symptoms:  None  Thromboembolic Signs/Symptoms:  None  Medication Changes:  No  Dietary Changes:  No  Activity Changes: No  Bacterial/Viral Infection:  No  Missed Coumadin Doses:  None  On ASA: No  Other Concerns:  No    OBJECTIVE:  BP (!) 144/78   Pulse 76   Temp 98  F (36.7  C)   Resp 17   Wt 101.5 kg (223 lb 12.8 oz)   SpO2 99%   BMI 36.12 kg/m     Last INR: 1.92 on 9/6.  INR Today: 3.09.  Current Dose: 3 mg Sa/M/W/F, 2 mg Sun/Tue.  Prior was 3mg M/W/F, 2mg AOD.      ASSESSMENT:  Chronic atrial fibrillation (H)  Long term (current) use of anticoagulants  Encounter for therapeutic drug monitoring  Chronic. Stable. Slightly suprtherapeutic. Will dose Coumadin as noted below and recheck INR in 1 week. Follow-up accordingly. Therapeutic INR for goal of 2-3.    PLAN:   Orders written by provider at facility and transcribed by : José Miguel Radford  New Dose: 3mg M/W/F, 2 mg AOD.  Next INR: 9/18/19.  Other Orders: Simethicone 80 mg chewable 2 tab PO at 0900 Dx: gas pain.     Electronically signed by:   JOSUE Clark CNP DNP      Sincerely,        JOSUE Gillis CNP

## 2019-09-15 NOTE — PROGRESS NOTES
[place] [facility]  [place] Medical Record Number:  [id number]  Place of Service where encounter took place: [facility]  (S) [[id number]]    HPI: Mecca Slade is a 89 year old  (12/14/1921), who is being seen today for an episodic care visit at the above location.   HPI information obtained from: facility chart records, facility staff, patient report, [facility] chart review and Care Everywhere [facility] chart review. Today's concern is INR/Coumadin management for A. Fib    ROS/Subjective:  Bleeding Signs/Symptoms:  None  Thromboembolic Signs/Symptoms:  None  Medication Changes:  No  Dietary Changes:  No  Activity Changes: No  Bacterial/Viral Infection:  No  Missed Coumadin Doses:  None  On ASA: No  Other Concerns:  No    OBJECTIVE:  /59   Pulse 63   Temp 97.5  F (36.4  C)   Resp 18   Wt 101.7 kg (224 lb 3.2 oz)   SpO2 98%   BMI 36.19 kg/m    Last INR: 3.09 on 9/11.  INR Today: 3.56  Current Dose: 3mg M/W/F, 2mg AOD.     ASSESSMENT:  Chronic atrial fibrillation (H)  Long term (current) use of anticoagulants  Encounter for therapeutic drug monitoring  Chronic. Stable. Supratherapeutic. Will dose Coumadin as noted below and recheck INR in 2 days. Follow-up accordingly. Therapeutic INR for goal of 2-3.    PLAN:   Orders written by provider at facility and transcribed by : José Miguel Radford  New Dose: Coumadin 0.5 mg PO x1 on 9/18, 2 mg PO x1 on 9/19. Dx: Afib.  Next INR Friday 9/20 Dx: Afib    Electronically signed by:   Dr. Haley Sims, APRN CNP DNP

## 2019-09-18 ENCOUNTER — NURSING HOME VISIT (OUTPATIENT)
Dept: GERIATRICS | Facility: CLINIC | Age: 84
End: 2019-09-18
Payer: COMMERCIAL

## 2019-09-18 ENCOUNTER — RECORDS - HEALTHEAST (OUTPATIENT)
Dept: LAB | Facility: CLINIC | Age: 84
End: 2019-09-18

## 2019-09-18 VITALS
OXYGEN SATURATION: 98 % | WEIGHT: 224.2 LBS | TEMPERATURE: 97.5 F | DIASTOLIC BLOOD PRESSURE: 59 MMHG | SYSTOLIC BLOOD PRESSURE: 123 MMHG | RESPIRATION RATE: 18 BRPM | HEART RATE: 63 BPM | BODY MASS INDEX: 36.19 KG/M2

## 2019-09-18 DIAGNOSIS — Z51.81 ENCOUNTER FOR THERAPEUTIC DRUG MONITORING: ICD-10-CM

## 2019-09-18 DIAGNOSIS — Z79.01 LONG TERM (CURRENT) USE OF ANTICOAGULANTS: ICD-10-CM

## 2019-09-18 DIAGNOSIS — I48.20 CHRONIC ATRIAL FIBRILLATION (H): Primary | ICD-10-CM

## 2019-09-18 LAB — INR PPP: 3.56 (ref 0.9–1.1)

## 2019-09-18 PROCEDURE — 99308 SBSQ NF CARE LOW MDM 20: CPT | Performed by: NURSE PRACTITIONER

## 2019-09-18 NOTE — NURSING NOTE
PRACTICE PROVIDER NOTE BELOW: NOT FOR CARE OR BILLING PURPOSES    [place] [facility]  [place] Medical Record Number:  [id number]  Place of Service where encounter took place: [facility]  (FGS) [[id number]]    HPI:    Mecca Slade is a 89 year old  (12/14/1921), who is being seen today for an episodic care visit at the above location.   HPI information obtained from: facility chart records, facility staff, patient report, [facility] chart review and Care Everywhere [facility] chart review. Today's concern is INR/Coumadin management for A. Fib    ROS/Subjective:  Bleeding Signs/Symptoms:  None  Thromboembolic Signs/Symptoms:  None  Medication Changes:  No  Dietary Changes:  No  Activity Changes: No  Bacterial/Viral Infection:  No  Missed Coumadin Doses:  None  On ASA: No  Other Concerns:  No    OBJECTIVE:  /59   Pulse 63   Temp 97.5  F (36.4  C)   Resp 18   Wt 101.7 kg (224 lb 3.2 oz)   SpO2 98%   BMI 36.19 kg/m    Last INR:3.09 on 9/11  INR Today:  3.56  Current Dose:3mg MWF, 2mg AOD    ASSESSMENT:  Chronic atrial fibrillation (H)  Long term (current) use of anticoagulants  Encounter for therapeutic drug monitoring  Chronic. Stable. Slightly supratherapeutic. Will dose Coumadin as noted below and recheck INR in two days (Friday 9/20). Follow-up accordingly. Therapeutic INR for goal of 2-3.    PLAN:   Orders written by provider at facility  New Dose: 3mg T/Th. 2mg AOD    Next INR: 9/20/19      Electronically signed by:  Nancy Lopez DNP Student

## 2019-09-18 NOTE — LETTER
9/18/2019        RE: Mecca Slade  [facility]  [address] [facility] [address]        [place] [facility]  [place] Medical Record Number:  [id number]  Place of Service where encounter took place: [facility]  (FGS) [[id number]]    HPI: Mecca Slade is a 89 year old  (12/14/1921), who is being seen today for an episodic care visit at the above location.   HPI information obtained from: facility chart records, facility staff, patient report, [facility] chart review and Care Everywhere [facility] chart review. Today's concern is INR/Coumadin management for A. Fib    ROS/Subjective:  Bleeding Signs/Symptoms:  None  Thromboembolic Signs/Symptoms:  None  Medication Changes:  No  Dietary Changes:  No  Activity Changes: No  Bacterial/Viral Infection:  No  Missed Coumadin Doses:  None  On ASA: No  Other Concerns:  No    OBJECTIVE:  /59   Pulse 63   Temp 97.5  F (36.4  C)   Resp 18   Wt 101.7 kg (224 lb 3.2 oz)   SpO2 98%   BMI 36.19 kg/m     Last INR: 3.09 on 9/11.  INR Today: 3.56  Current Dose: 3mg M/W/F, 2mg AOD.     ASSESSMENT:  Chronic atrial fibrillation (H)  Long term (current) use of anticoagulants  Encounter for therapeutic drug monitoring  Chronic. Stable. Supratherapeutic. Will dose Coumadin as noted below and recheck INR in 2 days. Follow-up accordingly. Therapeutic INR for goal of 2-3.    PLAN:   Orders written by provider at facility and transcribed by : José Miguel Radford  New Dose: Coumadin 0.5 mg PO x1 on 9/18, 2 mg PO x1 on 9/19. Dx: Afib.  Next INR Friday 9/20 Dx: Afib    Electronically signed by:   JOSUE Clark CNP DNP      Sincerely,        JOSUE Gillis CNP

## 2019-09-19 ENCOUNTER — RECORDS - HEALTHEAST (OUTPATIENT)
Dept: LAB | Facility: CLINIC | Age: 84
End: 2019-09-19

## 2019-09-19 VITALS
OXYGEN SATURATION: 98 % | RESPIRATION RATE: 18 BRPM | HEART RATE: 63 BPM | BODY MASS INDEX: 36.19 KG/M2 | SYSTOLIC BLOOD PRESSURE: 123 MMHG | DIASTOLIC BLOOD PRESSURE: 59 MMHG | TEMPERATURE: 97.5 F | WEIGHT: 224.2 LBS

## 2019-09-19 NOTE — PROGRESS NOTES
[place] [facility]  [place] Medical Record Number:  [id number]  Place of Service where encounter took place: [facility]  (FGS) [[id number]]    HPI:    Mecca Slade is a 89 year old  (12/14/1921), who is being seen today for an episodic care visit at the above location.   HPI information obtained from: facility chart records, facility staff, patient report and [facility] chart review. Today's concern is INR/Coumadin management for A. Fib    ROS/Subjective:  Bleeding Signs/Symptoms:  None  Thromboembolic Signs/Symptoms:  None  Medication Changes:  No  Dietary Changes:  No  Activity Changes: No  Bacterial/Viral Infection:  No  Missed Coumadin Doses:  None  On ASA: No  Other Concerns:  No    OBJECTIVE:  /59   Pulse 63   Temp 97.5  F (36.4  C)   Resp 18   Wt 101.7 kg (224 lb 3.2 oz)   SpO2 98%   BMI 36.19 kg/m    Last INR: 3.56 on 9/18  INR Today:  3.40  Current Dose:  0.5mg 9/18, 2mg 9/19   INR Flow sheet at [facility]:    ASSESSMENT:     Chronic atrial fibrillation (H)  Encounter for therapeutic drug monitoring  Long term (current) use of anticoagulants  Therapeutic INR for goal of 2-3    PLAN:   Orders written by provider at facility  New Dose: Hold Fri?sat, 1mg Sun    Next INR: Mon 9/23    Electronically signed by:  JOSUE Rodriguez CNP

## 2019-09-20 ENCOUNTER — AMBULATORY - HEALTHEAST (OUTPATIENT)
Dept: OTHER | Facility: CLINIC | Age: 84
End: 2019-09-20

## 2019-09-20 ENCOUNTER — NURSING HOME VISIT (OUTPATIENT)
Dept: GERIATRICS | Facility: CLINIC | Age: 84
End: 2019-09-20
Payer: COMMERCIAL

## 2019-09-20 ENCOUNTER — DOCUMENTATION ONLY (OUTPATIENT)
Dept: OTHER | Facility: CLINIC | Age: 84
End: 2019-09-20

## 2019-09-20 DIAGNOSIS — Z51.81 ENCOUNTER FOR THERAPEUTIC DRUG MONITORING: ICD-10-CM

## 2019-09-20 DIAGNOSIS — Z79.01 LONG TERM (CURRENT) USE OF ANTICOAGULANTS: ICD-10-CM

## 2019-09-20 DIAGNOSIS — I48.20 CHRONIC ATRIAL FIBRILLATION (H): Primary | ICD-10-CM

## 2019-09-20 LAB — INR PPP: 3.4 (ref 0.9–1.1)

## 2019-09-20 PROCEDURE — 99308 SBSQ NF CARE LOW MDM 20: CPT | Performed by: NURSE PRACTITIONER

## 2019-09-20 NOTE — PROGRESS NOTES
[place] [facility]  Chief Complaint   Patient presents with     FDC Regulatory     [place] Medical Record Number:  [id number]  Place of Service where encounter took place:  [facility]  ([facility]) [[id number]]      HPI:    Mecca Slade  is 89 year old (12/14/1921), who is being seen today for a federally mandated E/M visit.  HPI information obtained from: facility staff, patient report, [facility] chart review and DNP.     Today's concerns are:  - DNP reports that metformin decreased, verapamil stopped, noted to have weight gain felt to be secondary to excessive caloric intake, dietitian consulted      - Resident seen and examined, reports doing fine in general, continues to have some abdominal discomfort after eating cauliflower and broccoli, but no n/v/diarrhea.   - Denies chest pain, palpitation, shortness of breath  - Reports sleep is good, appetite is two good and BM is fine.   - - --------------------------------  - - Past Medical, social, family histories, medications, and allergies reviewed and updated  - Medications reviewed: in the chart and EHR.   - Case Management:   I have reviewed the care plan and MDS and do agree with the plan. Patient's desire to return to the community is not present.  Information reviewed:  Medications, vital signs, orders, and nursing notes.  MEDICATIONS:  Current Outpatient Medications   Medication Sig Dispense Refill     Acetaminophen (TYLENOL PO) Take 500 mg by mouth 2 times daily        ATORVASTATIN CALCIUM PO Take 10 mg by mouth daily       carboxymethylcellulose (REFRESH PLUS) 0.5 % SOLN ophthalmic solution Place 1 drop into both eyes 3 times daily as needed for dry eyes And Qam scheduled       Furosemide (LASIX PO) Take 40 mg by mouth daily       Menthol, Topical Analgesic, (BENGAY COLD THERAPY) 5 % GEL Externally apply topically 4 times daily as needed (pain)       METFORMIN HCL PO Take 500 mg by mouth 2 times daily       metoprolol  succinate ER (TOPROL-XL) 25 MG 24 hr tablet Take 1 tablet (25 mg) by mouth daily 30 tablet 0     simethicone (MYLICON) 80 MG chewable tablet Take 2 tablets (160 mg) by mouth daily       Warfarin Sodium (COUMADIN PO) Take by mouth daily Take as directed per INR results       ROS:  4 point ROS including Respiratory, CV, GI and , other than that noted in the HPI,  is negative    Vitals:  /72   Pulse 63   Temp 97.6  F (36.4  C)   Resp 16   Wt 101.7 kg (224 lb 3.2 oz)   SpO2 97%   BMI 36.19 kg/m    Body mass index is 36.19 kg/m .  Exam:   GENERAL APPEARANCE:  Alert, in no distress, obese  RESP: lungs clear to auscultation , no wheezing.   CV: S1S2 audible, irregular rate and rhythm, no murmur, rub, or gallop, trace pedal edema b/l. Pacemaker.   ABDOMEN:  normal bowel sounds, soft, nontender, no palpable mass  MSK: Ms strength: 4/5 LUE, 4/5 LLE. Braces left feet.   SKIN:  Inspection of skin and subcutaneous tissue baseline, Palpation of skin and subcutaneous tissue baseline  NEURO:   no NFD appreciated on observation.   PSYCH:  Normal insight, judgement and memory, affect and mood normal    Lab/Diagnostic data: Reviewed in the chart and EHR.        ASSESSMENT/PLAN  Chronic congestive heart failure, diastolic type (H)  Essential hypertension  -  EF > 70% (2012)  - compensated. Continue meds     Atrial fibrillation, chronic type (H)  -  on coumadin with INR followed closely. CVR, on metoprolol succinate.   - see under order for new order.       Type II diabetes mellitus with peripheral circulatory disorder (H)   A1C 8.6 08/05/2019   - controlled. - on metformin 500 mg  bid.       Morbid Obesity:  - Body mass index is 36.19 kg/m . from 36.06 kg/m .  in this age group- frail elderly, keep BMI b/lw 25-35 Kg(m2).   -  to reduce carbs intake.      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   - Off ASA, on lipitor 10 mg  from 20 mg.   - requires assistance with ADLs.      GERD Sx/Bloating: counseled on food choice, prefers to continue consuming current diet for she loves it. On simethicone.     Frail elderly  - Significant  Deficits requiring NH placement. Requiring extensive assistance from nursing. Up for meals only o/w spends the day resting in bed      Cognitive Impairment:  - Continue to anticipate needs. Chronic condition, ongoing decline expected.   -  Continue to provide redirection and reassurance as needed. Maintain safe living situation with goals focused on comfort.    Orders written by provider at facility and transcribed by : José Miguel Radford  1. Ok to discontinue Carboxymethylcellulose solution for not using.   2. Give coumadin 3 mg po today and tomorrow, Recheck INR on Wednesday 9/25   3. See above, otherwise, continue the rest of the current POC.     Electronically signed by:  Anirudh Ye MD

## 2019-09-22 VITALS
TEMPERATURE: 97.6 F | OXYGEN SATURATION: 97 % | RESPIRATION RATE: 16 BRPM | DIASTOLIC BLOOD PRESSURE: 72 MMHG | WEIGHT: 224.2 LBS | BODY MASS INDEX: 36.19 KG/M2 | SYSTOLIC BLOOD PRESSURE: 128 MMHG | HEART RATE: 63 BPM

## 2019-09-23 ENCOUNTER — TRANSFERRED RECORDS (OUTPATIENT)
Dept: HEALTH INFORMATION MANAGEMENT | Facility: CLINIC | Age: 84
End: 2019-09-23

## 2019-09-23 ENCOUNTER — RECORDS - HEALTHEAST (OUTPATIENT)
Dept: LAB | Facility: CLINIC | Age: 84
End: 2019-09-23

## 2019-09-23 ENCOUNTER — NURSING HOME VISIT (OUTPATIENT)
Dept: GERIATRICS | Facility: CLINIC | Age: 84
End: 2019-09-23
Payer: COMMERCIAL

## 2019-09-23 DIAGNOSIS — I48.20 CHRONIC ATRIAL FIBRILLATION (H): ICD-10-CM

## 2019-09-23 DIAGNOSIS — Z86.73 H/O: CVA (CEREBROVASCULAR ACCIDENT): ICD-10-CM

## 2019-09-23 DIAGNOSIS — I50.32 CHRONIC DIASTOLIC CONGESTIVE HEART FAILURE (H): Primary | ICD-10-CM

## 2019-09-23 DIAGNOSIS — I10 ESSENTIAL HYPERTENSION: ICD-10-CM

## 2019-09-23 DIAGNOSIS — R54 FRAIL ELDERLY: ICD-10-CM

## 2019-09-23 DIAGNOSIS — E11.51 TYPE II DIABETES MELLITUS WITH PERIPHERAL CIRCULATORY DISORDER (H): ICD-10-CM

## 2019-09-23 DIAGNOSIS — E66.01 MORBID OBESITY (H): ICD-10-CM

## 2019-09-23 LAB
INR PPP: 1.67 (ref 0.9–1.1)
INR PPP: 1.67 (ref 0.9–1.1)

## 2019-09-23 PROCEDURE — 99309 SBSQ NF CARE MODERATE MDM 30: CPT | Performed by: FAMILY MEDICINE

## 2019-09-23 NOTE — LETTER
9/23/2019        RE: Mecca Slade  [facility]  [address] [facility] [address]        [place] [facility]  Chief Complaint   Patient presents with     snf Regulatory     [place] Medical Record Number:  [id number]  Place of Service where encounter took place:  [facility]  (FGS) [[id number]]      HPI:    Mecca Slade  is 89 year old (12/14/1921), who is being seen today for a federally mandated E/M visit.  HPI information obtained from: facility staff, patient report, [facility] chart review and DNP.     Today's concerns are:  - DNP reports that metformin decreased, verapamil stopped, noted to have weight gain felt to be secondary to excessive caloric intake, dietitian consulted      - Resident seen and examined, reports doing fine in general, continues to have some abdominal discomfort after eating cauliflower and broccoli, but no n/v/diarrhea.   - Denies chest pain, palpitation, shortness of breath  - Reports sleep is good, appetite is two good and BM is fine.   - - --------------------------------  - - Past Medical, social, family histories, medications, and allergies reviewed and updated  - Medications reviewed: in the chart and EHR.   - Case Management:   I have reviewed the care plan and MDS and do agree with the plan. Patient's desire to return to the community is not present.  Information reviewed:  Medications, vital signs, orders, and nursing notes.  MEDICATIONS:  Current Outpatient Medications   Medication Sig Dispense Refill     Acetaminophen (TYLENOL PO) Take 500 mg by mouth 2 times daily        ATORVASTATIN CALCIUM PO Take 10 mg by mouth daily       carboxymethylcellulose (REFRESH PLUS) 0.5 % SOLN ophthalmic solution Place 1 drop into both eyes 3 times daily as needed for dry eyes And Qam scheduled       Furosemide (LASIX PO) Take 40 mg by mouth daily       Menthol, Topical Analgesic, (BENGAY COLD THERAPY) 5 % GEL Externally apply  topically 4 times daily as needed (pain)       METFORMIN HCL PO Take 500 mg by mouth 2 times daily       metoprolol succinate ER (TOPROL-XL) 25 MG 24 hr tablet Take 1 tablet (25 mg) by mouth daily 30 tablet 0     simethicone (MYLICON) 80 MG chewable tablet Take 2 tablets (160 mg) by mouth daily       Warfarin Sodium (COUMADIN PO) Take by mouth daily Take as directed per INR results       ROS:  4 point ROS including Respiratory, CV, GI and , other than that noted in the HPI,  is negative    Vitals:  /72   Pulse 63   Temp 97.6  F (36.4  C)   Resp 16   Wt 101.7 kg (224 lb 3.2 oz)   SpO2 97%   BMI 36.19 kg/m     Body mass index is 36.19 kg/m .  Exam:   GENERAL APPEARANCE:  Alert, in no distress, obese  RESP: lungs clear to auscultation , no wheezing.   CV: S1S2 audible, irregular rate and rhythm, no murmur, rub, or gallop, trace pedal edema b/l. Pacemaker.   ABDOMEN:  normal bowel sounds, soft, nontender, no palpable mass  MSK: Ms strength: 4/5 LUE, 4/5 LLE. Braces left feet.   SKIN:  Inspection of skin and subcutaneous tissue baseline, Palpation of skin and subcutaneous tissue baseline  NEURO:   no NFD appreciated on observation.   PSYCH:  Normal insight, judgement and memory, affect and mood normal    Lab/Diagnostic data: Reviewed in the chart and EHR.        ASSESSMENT/PLAN  Chronic congestive heart failure, diastolic type (H)  Essential hypertension  -  EF > 70% (2012)  - compensated. Continue meds     Atrial fibrillation, chronic type (H)  -  on coumadin with INR followed closely. CVR, on metoprolol succinate.   - see under order for new order.       Type II diabetes mellitus with peripheral circulatory disorder (H)   A1C 8.6 08/05/2019   - controlled. - on metformin 500 mg  bid.       Morbid Obesity:  - Body mass index is 36.19 kg/m . from 36.06 kg/m .  in this age group- frail elderly, keep BMI b/lw 25-35 Kg(m2).   -  to reduce carbs intake.      Hx of Cerebrovascular accident (CVA) due to  embolism of right middle cerebral artery (H)  - Left sided residual weakness, stable.   - Off ASA, on lipitor 10 mg from 20 mg.   - requires assistance with ADLs.      GERD Sx/Bloating: counseled on food choice, prefers to continue consuming current diet for she loves it. On simethicone.     Frail elderly  - Significant  Deficits requiring NH placement. Requiring extensive assistance from nursing. Up for meals only o/w spends the day resting in bed      Cognitive Impairment:  - Continue to anticipate needs. Chronic condition, ongoing decline expected.   -  Continue to provide redirection and reassurance as needed. Maintain safe living situation with goals focused on comfort.    Orders written by provider at facility and transcribed by : José Miguel Radford  1. Ok to discontinue Carboxymethylcellulose solution for not using.   2. Give coumadin 3 mg po today and tomorrow, Recheck INR on Wednesday 9/25   3. See above, otherwise, continue the rest of the current POC.     Electronically signed by:  Anirudh Ye MD        Sincerely,        Anirudh Ye MD

## 2019-09-25 ENCOUNTER — RECORDS - HEALTHEAST (OUTPATIENT)
Dept: LAB | Facility: CLINIC | Age: 84
End: 2019-09-25

## 2019-09-25 ENCOUNTER — NURSING HOME VISIT (OUTPATIENT)
Dept: GERIATRICS | Facility: CLINIC | Age: 84
End: 2019-09-25
Payer: COMMERCIAL

## 2019-09-25 VITALS
DIASTOLIC BLOOD PRESSURE: 72 MMHG | HEART RATE: 63 BPM | TEMPERATURE: 97.6 F | RESPIRATION RATE: 16 BRPM | OXYGEN SATURATION: 97 % | WEIGHT: 225.4 LBS | BODY MASS INDEX: 36.38 KG/M2 | SYSTOLIC BLOOD PRESSURE: 128 MMHG

## 2019-09-25 DIAGNOSIS — I48.20 CHRONIC ATRIAL FIBRILLATION (H): Primary | ICD-10-CM

## 2019-09-25 DIAGNOSIS — Z51.81 ENCOUNTER FOR THERAPEUTIC DRUG MONITORING: ICD-10-CM

## 2019-09-25 DIAGNOSIS — Z79.01 LONG TERM (CURRENT) USE OF ANTICOAGULANTS: ICD-10-CM

## 2019-09-25 LAB — INR PPP: 1.72 (ref 0.9–1.1)

## 2019-09-25 PROCEDURE — 99308 SBSQ NF CARE LOW MDM 20: CPT | Performed by: NURSE PRACTITIONER

## 2019-09-25 NOTE — LETTER
9/25/2019        RE: Mecca Slade  [facility]  [address] [facility] [address]        [place] [facility]  [place] Medical Record Number:  [id number]  Place of Service where encounter took place: [facility]  (FGS) [[id number]]    HPI: Mecca Slade is a 89 year old  (12/14/1921), who is being seen today for an episodic care visit at the above location.   HPI information obtained from: facility chart records, facility staff, patient report, [facility] chart review and Care Everywhere [facility] chart review. Today's concern is INR/Coumadin management for A. Fib    ROS/Subjective:  Bleeding Signs/Symptoms:  None  Thromboembolic Signs/Symptoms:  None  Medication Changes:  No  Dietary Changes:  No  Activity Changes: No  Bacterial/Viral Infection:  No  Missed Coumadin Doses:  None  On ASA: No  Other Concerns:  No    OBJECTIVE:  /72   Pulse 63   Temp 97.6  F (36.4  C)   Resp 16   Wt 102.2 kg (225 lb 6.4 oz)   SpO2 97%   BMI 36.38 kg/m     Last INR: 1.67 on 9/23.  INR Today: 1.72.  Current Dose: 3 mg x 2 days.  Date INR New Dose/Orders   9/20 3.40 Held x2 days and then 1mg 9/22.   9/18 3.56 0.5mg x1 and then 2mg on 9/19.   9/11 3.09 0.5mg x1, then 2mg x2 days.   9/6 1.92 2mg Sun/Tu, 3mg AOD.   9/4 1.60 3mg x2 days.   8/30 3.63 3mg M/W/F, 2mg AOD.     ASSESSMENT:  Chronic atrial fibrillation  Encounter for therapeutic drug monitoring  Long term (current) use of anticoagulants  Chronic. Stable. Slightly subtherapeutic. Will dose Coumadin as noted below and recheck INR on Tuesday 10/1. Follow-up accordingly. Therapeutic INR goal of 2-3    PLAN:   Orders written by provider at facility and transcribed by : LALO Sosa:  New Dose: 2mg Tu/Th, 3mg AOD.   Next INR: TUES 10/1/19.    Electronically signed by:   JOSUE Clark CNP DNP      Sincerely,        JOSUE Gillis CNP

## 2019-09-30 ENCOUNTER — RECORDS - HEALTHEAST (OUTPATIENT)
Dept: LAB | Facility: CLINIC | Age: 84
End: 2019-09-30

## 2019-10-01 ENCOUNTER — TRANSFERRED RECORDS (OUTPATIENT)
Dept: HEALTH INFORMATION MANAGEMENT | Facility: CLINIC | Age: 84
End: 2019-10-01

## 2019-10-01 ENCOUNTER — NURSING HOME VISIT (OUTPATIENT)
Dept: GERIATRICS | Facility: CLINIC | Age: 84
End: 2019-10-01
Payer: COMMERCIAL

## 2019-10-01 VITALS
TEMPERATURE: 97.9 F | OXYGEN SATURATION: 92 % | SYSTOLIC BLOOD PRESSURE: 123 MMHG | WEIGHT: 225.4 LBS | HEART RATE: 66 BPM | DIASTOLIC BLOOD PRESSURE: 78 MMHG | RESPIRATION RATE: 18 BRPM | BODY MASS INDEX: 36.38 KG/M2

## 2019-10-01 DIAGNOSIS — Z79.01 LONG TERM (CURRENT) USE OF ANTICOAGULANTS: ICD-10-CM

## 2019-10-01 DIAGNOSIS — Z51.81 ENCOUNTER FOR THERAPEUTIC DRUG MONITORING: ICD-10-CM

## 2019-10-01 DIAGNOSIS — I48.20 CHRONIC ATRIAL FIBRILLATION (H): Primary | ICD-10-CM

## 2019-10-01 LAB
INR PPP: 2.23 (ref 0.9–1.1)
INR PPP: 2.23 (ref 0.9–1.1)

## 2019-10-01 PROCEDURE — 99308 SBSQ NF CARE LOW MDM 20: CPT | Performed by: NURSE PRACTITIONER

## 2019-10-01 NOTE — LETTER
10/1/2019        RE: Mecca Slade  [facility]  [address] [facility] [address]        [place] [facility]  [place] Medical Record Number:  [id number]  Place of Service where encounter took place: [facility]  (S) [[id number]]    HPI: Mecca Slade is a 89 year old  (12/14/1921), who is being seen today for an episodic care visit at the above location.   HPI information obtained from: facility chart records, facility staff, patient report, [facility] chart review and Care Everywhere [facility] chart review. Today's concern is INR/Coumadin management for A. Fib    ROS/Subjective:  Bleeding Signs/Symptoms:  None  Thromboembolic Signs/Symptoms:  None  Medication Changes:  No  Dietary Changes:  No  Activity Changes: No  Bacterial/Viral Infection:  No  Missed Coumadin Doses:  None  On ASA: No  Other Concerns:  No    OBJECTIVE:  /78   Pulse 66   Temp 97.9  F (36.6  C)   Resp 18   Wt 102.2 kg (225 lb 6.4 oz)   SpO2 92%   BMI 36.38 kg/m     Last INR: 1.72 on 9/25.  INR Today: 2.23.  Current Dose: 2mg Tu/Th, 3mg AOD.  Date INR New Dose/Orders   9/20 3.40 Held x2 days and then 1mg 9/22.   9/18 3.56 0.5mg x1 and then 2mg on 9/19.   9/11 3.09 0.5mg x1, then 2mg x2 days.   9/6 1.92 2mg Sun/Tu, 3mg AOD.   9/4 1.60 3mg x2 days.   8/30 3.63 3mg M/W/F, 2mg AOD.     ASSESSMENT:  Chronic atrial fibrillation  Encounter for therapeutic drug monitoring  Long term (current) use of anticoagulants  Chronic. Stable. Therapeutic. Will dose Coumadin as noted below and recheck INR in 1 week. Follow-up accordingly. Therapeutic INR goal of 2-3    PLAN:   Orders:  New Dose: Same. 2mg Tu/Th, 3mg AOD..   Next INR: 1 week on 10/8/19.     Electronically signed by:   Dr. Haley Sims, APRN CNP DNP      Sincerely,        JOSUE Gillis CNP

## 2019-10-01 NOTE — PROGRESS NOTES
[place] [facility]  [place] Medical Record Number:  [id number]  Place of Service where encounter took place: [facility]  (S) [[id number]]    HPI: Mecca Slade is a 89 year old  (12/14/1921), who is being seen today for an episodic care visit at the above location.   HPI information obtained from: facility chart records, facility staff, patient report, [facility] chart review and Care Everywhere [facility] chart review. Today's concern is INR/Coumadin management for A. Fib    ROS/Subjective:  Bleeding Signs/Symptoms:  None  Thromboembolic Signs/Symptoms:  None  Medication Changes:  No  Dietary Changes:  No  Activity Changes: No  Bacterial/Viral Infection:  No  Missed Coumadin Doses:  None  On ASA: No  Other Concerns:  No    OBJECTIVE:  /78   Pulse 66   Temp 97.9  F (36.6  C)   Resp 18   Wt 102.2 kg (225 lb 6.4 oz)   SpO2 92%   BMI 36.38 kg/m    Last INR: 1.72 on 9/25.  INR Today: 2.23.  Current Dose: 2mg Tu/Th, 3mg AOD.  Date INR New Dose/Orders   9/20 3.40 Held x2 days and then 1mg 9/22.   9/18 3.56 0.5mg x1 and then 2mg on 9/19.   9/11 3.09 0.5mg x1, then 2mg x2 days.   9/6 1.92 2mg Sun/Tu, 3mg AOD.   9/4 1.60 3mg x2 days.   8/30 3.63 3mg M/W/F, 2mg AOD.     ASSESSMENT:  Chronic atrial fibrillation  Encounter for therapeutic drug monitoring  Long term (current) use of anticoagulants  Chronic. Stable. Therapeutic. Will dose Coumadin as noted below and recheck INR in 1 week. Follow-up accordingly. Therapeutic INR goal of 2-3    PLAN:   Orders:  New Dose: Same. 2mg Tu/Th, 3mg AOD..   Next INR: 1 week on 10/8/19.     Electronically signed by:   Dr. Haley Sims, APRN CNP DNP

## 2019-10-07 NOTE — PROGRESS NOTES
"[place] [facility]  [place] Medical Record Number:  [id number]  Place of Service where encounter took place: [facility]  (FGS) [[id number]]  HPI: Mecca Slade is a 89 year old  (12/14/1921), who is being seen today for an episodic care visit at the above location.   HPI information obtained from: facility chart records, facility staff, patient report, [facility] chart review and Care Everywhere [facility] chart review. Today's concern is INR/Coumadin management for A. Fib:    ROS/Subjective:  Bleeding Signs/Symptoms:  None  Thromboembolic Signs/Symptoms:  None  Medication Changes:  No  Dietary Changes:  No  Activity Changes: No  Bacterial/Viral Infection:  No  Missed Coumadin Doses:  None  On ASA: No  Other Concerns:  No    OBJECTIVE:  BP (!) 143/81   Pulse 61   Temp 97.1  F (36.2  C)   Resp 16   Ht 1.676 m (5' 6\")   Wt 102.6 kg (226 lb 3.2 oz)   SpO2 100%   BMI 36.51 kg/m    Last INR: 2.23 on 10/1.  INR Today: 3.64.  Current Dose: 2mg Tu/Th, 3mg AOD..     ASSESSMENT:  Chronic atrial fibrillation  Encounter for therapeutic drug monitoring  Long term (current) use of anticoagulants  Chronic. Stable. Slightly supratherapeutic. Will dose Coumadin as noted below and recheck INR in 1 week. Follow-up accordingly. Therapeutic INR goal of 2-3    PLAN:   Orders:  New Dose: 3mg M/W/F, 2mg AOD.   Next INR: 10/14/19     Electronically signed by:   Dr. Haley Sims, APRN CNP DNP  "

## 2019-10-08 ENCOUNTER — TRANSFERRED RECORDS (OUTPATIENT)
Dept: HEALTH INFORMATION MANAGEMENT | Facility: CLINIC | Age: 84
End: 2019-10-08

## 2019-10-08 ENCOUNTER — RECORDS - HEALTHEAST (OUTPATIENT)
Dept: LAB | Facility: CLINIC | Age: 84
End: 2019-10-08

## 2019-10-08 ENCOUNTER — NURSING HOME VISIT (OUTPATIENT)
Dept: GERIATRICS | Facility: CLINIC | Age: 84
End: 2019-10-08
Payer: COMMERCIAL

## 2019-10-08 VITALS
HEART RATE: 61 BPM | BODY MASS INDEX: 36.35 KG/M2 | TEMPERATURE: 97.1 F | SYSTOLIC BLOOD PRESSURE: 143 MMHG | RESPIRATION RATE: 16 BRPM | DIASTOLIC BLOOD PRESSURE: 81 MMHG | WEIGHT: 226.2 LBS | HEIGHT: 66 IN | OXYGEN SATURATION: 100 %

## 2019-10-08 DIAGNOSIS — Z51.81 ENCOUNTER FOR THERAPEUTIC DRUG MONITORING: ICD-10-CM

## 2019-10-08 DIAGNOSIS — Z79.01 LONG TERM (CURRENT) USE OF ANTICOAGULANTS: ICD-10-CM

## 2019-10-08 DIAGNOSIS — I48.20 CHRONIC ATRIAL FIBRILLATION (H): Primary | ICD-10-CM

## 2019-10-08 LAB
INR PPP: 3.64 (ref 0.9–1.1)
INR PPP: 3.64 (ref 0.9–1.1)

## 2019-10-08 PROCEDURE — 99308 SBSQ NF CARE LOW MDM 20: CPT | Performed by: NURSE PRACTITIONER

## 2019-10-08 ASSESSMENT — MIFFLIN-ST. JEOR: SCORE: 1427.79

## 2019-10-08 NOTE — LETTER
"    10/8/2019        RE: Mecca Slade  [facility]  [address] [place] [address]        [place] [facility]  [place] Medical Record Number:  [id number]  Place of Service where encounter took place: [facility]  (FGS) [[id number]]  HPI: Mecca Slade is a 89 year old  (12/14/1921), who is being seen today for an episodic care visit at the above location.   HPI information obtained from: facility chart records, facility staff, patient report, [facility] chart review and Care Everywhere [facility] chart review. Today's concern is INR/Coumadin management for A. Fib:    ROS/Subjective:  Bleeding Signs/Symptoms:  None  Thromboembolic Signs/Symptoms:  None  Medication Changes:  No  Dietary Changes:  No  Activity Changes: No  Bacterial/Viral Infection:  No  Missed Coumadin Doses:  None  On ASA: No  Other Concerns:  No    OBJECTIVE:  BP (!) 143/81   Pulse 61   Temp 97.1  F (36.2  C)   Resp 16   Ht 1.676 m (5' 6\")   Wt 102.6 kg (226 lb 3.2 oz)   SpO2 100%   BMI 36.51 kg/m     Last INR: 2.23 on 10/1.  INR Today: 3.64.  Current Dose: 2mg Tu/Th, 3mg AOD..     ASSESSMENT:  Chronic atrial fibrillation  Encounter for therapeutic drug monitoring  Long term (current) use of anticoagulants  Chronic. Stable. Slightly supratherapeutic. Will dose Coumadin as noted below and recheck INR in 1 week. Follow-up accordingly. Therapeutic INR goal of 2-3    PLAN:   Orders:  New Dose: 3mg M/W/F, 2mg AOD.   Next INR: 10/14/19     Electronically signed by:   Dr. Haley Sims, JOSUE CNP DNP      Sincerely,        JOSUE Gillis CNP    "

## 2019-10-08 NOTE — LETTER
10/8/2019        RE: Mecca Slade  [facility]  [address] [facility] [address]        [facility] Medical Record Number:  [id number]  Place of Service where encounter took place: No question data found.  HPI: Mecca Slade is a 89 year old  (12/14/1921), who is being seen today for an episodic care visit at the above location.   HPI information obtained from: facility chart records, facility staff, patient report, [facility] chart review and Care Everywhere [facility] chart review. Today's concern is INR/Coumadin management for A. Fib:    ROS/Subjective:  Bleeding Signs/Symptoms:  None  Thromboembolic Signs/Symptoms:  None  Medication Changes:  No  Dietary Changes:  No  Activity Changes: No  Bacterial/Viral Infection:  No  Missed Coumadin Doses:  None  On ASA: No  Other Concerns:  No    OBJECTIVE:  There were no vitals taken for this visit.  Last INR: 2.23 on 10/1.  INR Today: ***.  Current Dose: 2mg Tu/Th, 3mg AOD..   Date INR New Dose/Orders   9/20 3.40 Held x2 days and then 1mg 9/22.   9/18 3.56 0.5mg x1 and then 2mg on 9/19.   9/11 3.09 0.5mg x1, then 2mg x2 days.   9/6 1.92 2mg Sun/Tu, 3mg AOD.   9/4 1.60 3mg x2 days.   8/30 3.63 3mg M/W/F, 2mg AOD.     ASSESSMENT:  Chronic atrial fibrillation  Encounter for therapeutic drug monitoring  Long term (current) use of anticoagulants  Chronic. Stable. Therapeutic***. Will dose Coumadin as noted below and recheck INR in ***. Follow-up accordingly. Therapeutic INR goal of 2-3    PLAN:   Orders:  New Dose: Same. ***  Next INR: ***     Electronically signed by:   JOSUE Clark CNP, DNP    [facility] Medical Record Number:  [id number]  Place of Service where encounter took place: [facility]  (FGS) [[id number]]  HPI: Mecca Slade is a 89 year old  (12/14/1921), who is being seen today for an episodic care visit at the above location.   HPI information obtained  "from: facility chart records, facility staff, patient report, [facility] chart review and Care Everywhere [facility] chart review. Today's concern is INR/Coumadin management for A. Fib:    ROS/Subjective:  Bleeding Signs/Symptoms:  None  Thromboembolic Signs/Symptoms:  None  Medication Changes:  No  Dietary Changes:  No  Activity Changes: No  Bacterial/Viral Infection:  No  Missed Coumadin Doses:  None  On ASA: No  Other Concerns:  No    OBJECTIVE:  BP (!) 143/81   Pulse 61   Temp 97.1  F (36.2  C)   Resp 16   Ht 1.676 m (5' 6\")   Wt 102.6 kg (226 lb 3.2 oz)   SpO2 100%   BMI 36.51 kg/m     Last INR: 2.23 on 10/1.  INR Today: ***.  Current Dose: 2mg Tu/Th, 3mg AOD..   Date INR New Dose/Orders   9/20 3.40 Held x2 days and then 1mg 9/22.   9/18 3.56 0.5mg x1 and then 2mg on 9/19.   9/11 3.09 0.5mg x1, then 2mg x2 days.   9/6 1.92 2mg Sun/Tu, 3mg AOD.   9/4 1.60 3mg x2 days.   8/30 3.63 3mg M/W/F, 2mg AOD.     ASSESSMENT:  Chronic atrial fibrillation  Encounter for therapeutic drug monitoring  Long term (current) use of anticoagulants  Chronic. Stable. Therapeutic***. Will dose Coumadin as noted below and recheck INR in ***. Follow-up accordingly. Therapeutic INR goal of 2-3    PLAN:   Orders:  New Dose: Same. ***  Next INR: ***     Electronically signed by:   JOSUE Clark CNP DNP      Sincerely,        JOSUE Gillis CNP    "

## 2019-10-14 ENCOUNTER — RECORDS - HEALTHEAST (OUTPATIENT)
Dept: LAB | Facility: CLINIC | Age: 84
End: 2019-10-14

## 2019-10-14 ENCOUNTER — TRANSFERRED RECORDS (OUTPATIENT)
Dept: HEALTH INFORMATION MANAGEMENT | Facility: CLINIC | Age: 84
End: 2019-10-14

## 2019-10-14 ENCOUNTER — NURSING HOME VISIT (OUTPATIENT)
Dept: GERIATRICS | Facility: CLINIC | Age: 84
End: 2019-10-14
Payer: COMMERCIAL

## 2019-10-14 VITALS
TEMPERATURE: 98 F | WEIGHT: 226.2 LBS | RESPIRATION RATE: 16 BRPM | BODY MASS INDEX: 36.51 KG/M2 | HEART RATE: 66 BPM | OXYGEN SATURATION: 98 % | SYSTOLIC BLOOD PRESSURE: 126 MMHG | DIASTOLIC BLOOD PRESSURE: 76 MMHG

## 2019-10-14 DIAGNOSIS — Z79.01 LONG TERM (CURRENT) USE OF ANTICOAGULANTS: ICD-10-CM

## 2019-10-14 DIAGNOSIS — Z51.81 ENCOUNTER FOR THERAPEUTIC DRUG MONITORING: ICD-10-CM

## 2019-10-14 DIAGNOSIS — I48.20 CHRONIC ATRIAL FIBRILLATION (H): Primary | ICD-10-CM

## 2019-10-14 DIAGNOSIS — M17.11 PRIMARY OSTEOARTHRITIS OF RIGHT KNEE: ICD-10-CM

## 2019-10-14 LAB
INR PPP: 3.73 (ref 0.9–1.1)
INR PPP: 3.73 (ref 0.9–1.1)

## 2019-10-14 PROCEDURE — 99308 SBSQ NF CARE LOW MDM 20: CPT | Performed by: NURSE PRACTITIONER

## 2019-10-14 RX ORDER — METHYLPREDNISOLONE ACETATE 40 MG/ML
40 INJECTION, SUSPENSION INTRA-ARTICULAR; INTRALESIONAL; INTRAMUSCULAR; SOFT TISSUE ONCE
Qty: 1 ML | Refills: 0 | Status: SHIPPED | OUTPATIENT
Start: 2019-10-14 | End: 2019-10-21

## 2019-10-14 NOTE — LETTER
10/14/2019        RE: Mecca Slade  [facility]  [address] [facility] [address]        [place] [facility]  [place] Medical Record Number:  [id number]  Place of Service where encounter took place: [facility]  (FGS) [[id number]]    HPI:    Mecca Slade is a 89 year old  (12/14/1921), who is being seen today for an episodic care visit at the above location.   HPI information obtained from: facility chart records, facility staff, patient report, [facility] chart review and Care Everywhere [facility] chart review. Today's concern is INR/Coumadin management for A. Fib    ROS/Subjective:  Bleeding Signs/Symptoms:  None  Thromboembolic Signs/Symptoms:  None  Medication Changes:  No  Dietary Changes:  No  Activity Changes: No  Bacterial/Viral Infection:  No  Missed Coumadin Doses:  None  On ASA: No  Other Concerns:  No    OBJECTIVE:  /76   Pulse 66   Temp 98  F (36.7  C)   Resp 16   Wt 102.6 kg (226 lb 3.2 oz)   SpO2 98%   BMI 36.51 kg/m     Last INR: 3.64 on 10/8.  INR Today: 3.73  Current Dose: 3mg M/W/F, 2mg AOD.    ASSESSMENT:  Chronic atrial fibrillation  Encounter for therapeutic drug monitoring  Long term (current) use of anticoagulants  Chronic. Stable. Supratherapeutic. Will dose Coumadin as noted below and recheck INR in 4 days. Follow-up accordingly. Supratherapeutic INR for goal of 2-3.     PLAN:   Orders written by provider at facility and transcribed by : José Miguel Radford  New Dose: 0.5mg x2 day (10/14, 10/15), then 2 mg x2 days (10/16, 10/17).   Next INR: Friday 10/18/19.    Electronically signed by:  JOSUE Clark CNP DNP      Sincerely,        JOSUE Gillis CNP

## 2019-10-14 NOTE — PROGRESS NOTES
[place] [facility]  [place] Medical Record Number:  [id number]  Place of Service where encounter took place: [facility]  (FGS) [[id number]]    HPI:    Mecca Slade is a 89 year old  (12/14/1921), who is being seen today for an episodic care visit at the above location.   HPI information obtained from: facility chart records, facility staff, patient report, [facility] chart review and Care Everywhere [facility] chart review. Today's concern is INR/Coumadin management for A. Fib    ROS/Subjective:  Bleeding Signs/Symptoms:  None  Thromboembolic Signs/Symptoms:  None  Medication Changes:  No  Dietary Changes:  No  Activity Changes: No  Bacterial/Viral Infection:  No  Missed Coumadin Doses:  None  On ASA: No  Other Concerns:  No    OBJECTIVE:  /76   Pulse 66   Temp 98  F (36.7  C)   Resp 16   Wt 102.6 kg (226 lb 3.2 oz)   SpO2 98%   BMI 36.51 kg/m    Last INR: 3.64 on 10/8.  INR Today: 3.73  Current Dose: 3mg M/W/F, 2mg AOD.    ASSESSMENT:  Chronic atrial fibrillation  Encounter for therapeutic drug monitoring  Long term (current) use of anticoagulants  Chronic. Stable. Supratherapeutic. Will dose Coumadin as noted below and recheck INR in 4 days. Follow-up accordingly. Supratherapeutic INR for goal of 2-3.     PLAN:   Orders written by provider at facility and transcribed by : José Miguel Radford  New Dose: 0.5mg x2 day (10/14, 10/15), then 2 mg x2 days (10/16, 10/17).   Next INR: Friday 10/18/19.    Electronically signed by:  Dr. Haley Sims, APRN CNP DNP

## 2019-10-17 NOTE — PROGRESS NOTES
[place] [facility]  [place] Medical Record Number:  [id number]  Place of Service where encounter took place: [facility]  (FGS) [[id number]]  HPI: Mecca Slade is a 89 year old  (12/14/1921), who is being seen today for an episodic care visit at the above location.   HPI information obtained from: facility chart records, facility staff, patient report, [facility] chart review and Care Everywhere [facility] chart review. Today's concern is INR/Coumadin management for A. Fib    ROS/Subjective:  Bleeding Signs/Symptoms:  None  Thromboembolic Signs/Symptoms:  None  Medication Changes:  No  Dietary Changes:  No  Activity Changes: No  Bacterial/Viral Infection:  No  Missed Coumadin Doses:  None  On ASA: No  Other Concerns:  No    OBJECTIVE:  /76   Pulse 66   Temp 98  F (36.7  C)   Resp 16   Wt 102.4 kg (225 lb 12.8 oz)   SpO2 98%   BMI 36.45 kg/m    Last INR: 3.73 on 10/14.  INR Today: 3.19  Current Dose: 0.5mg x2 days, the 2mg/day. Prior was 3mg M/W/F, 2mg AOD.    ASSESSMENT:  Chronic atrial fibrillation  Encounter for therapeutic drug monitoring  Long term (current) use of anticoagulants  Chronic. Stable. Slightly supratherapeutic. Will dose Coumadin as noted below and recheck INR in 3 days. Follow-up accordingly. Supratherapeutic INR for goal of 2-3.     PLAN:   Orders written by provider at facility and transcribed by : José Miguel Radford  New Dose: 0.5 mg PO x1 today, 2 mg on sat/sun  Next INR: Mon 10/18/19    Electronically signed by:  Dr. Haley Sims, APRN CNP DNP

## 2019-10-18 ENCOUNTER — RECORDS - HEALTHEAST (OUTPATIENT)
Dept: LAB | Facility: CLINIC | Age: 84
End: 2019-10-18

## 2019-10-18 ENCOUNTER — NURSING HOME VISIT (OUTPATIENT)
Dept: GERIATRICS | Facility: CLINIC | Age: 84
End: 2019-10-18
Payer: COMMERCIAL

## 2019-10-18 VITALS
HEART RATE: 66 BPM | OXYGEN SATURATION: 98 % | SYSTOLIC BLOOD PRESSURE: 126 MMHG | WEIGHT: 225.8 LBS | TEMPERATURE: 98 F | RESPIRATION RATE: 16 BRPM | DIASTOLIC BLOOD PRESSURE: 76 MMHG | BODY MASS INDEX: 36.45 KG/M2

## 2019-10-18 DIAGNOSIS — I48.20 CHRONIC ATRIAL FIBRILLATION (H): Primary | ICD-10-CM

## 2019-10-18 DIAGNOSIS — Z79.01 LONG TERM (CURRENT) USE OF ANTICOAGULANTS: ICD-10-CM

## 2019-10-18 DIAGNOSIS — Z51.81 ENCOUNTER FOR THERAPEUTIC DRUG MONITORING: ICD-10-CM

## 2019-10-18 LAB — INR PPP: 3.19 (ref 0.9–1.1)

## 2019-10-18 PROCEDURE — 99308 SBSQ NF CARE LOW MDM 20: CPT | Performed by: NURSE PRACTITIONER

## 2019-10-18 NOTE — LETTER
10/18/2019        RE: Mecca Slade  [facility]  [address] [facility] [address]        [place] [facility]  [place] Medical Record Number:  [id number]  Place of Service where encounter took place: [facility]  (FGS) [[id number]]  HPI: Mecca Slade is a 89 year old  (12/14/1921), who is being seen today for an episodic care visit at the above location.   HPI information obtained from: facility chart records, facility staff, patient report, [facility] chart review and Care Everywhere [facility] chart review. Today's concern is INR/Coumadin management for A. Fib    ROS/Subjective:  Bleeding Signs/Symptoms:  None  Thromboembolic Signs/Symptoms:  None  Medication Changes:  No  Dietary Changes:  No  Activity Changes: No  Bacterial/Viral Infection:  No  Missed Coumadin Doses:  None  On ASA: No  Other Concerns:  No    OBJECTIVE:  /76   Pulse 66   Temp 98  F (36.7  C)   Resp 16   Wt 102.4 kg (225 lb 12.8 oz)   SpO2 98%   BMI 36.45 kg/m     Last INR: 3.73 on 10/14.  INR Today: 3.19  Current Dose: 0.5mg x2 days, the 2mg/day. Prior was 3mg M/W/F, 2mg AOD.    ASSESSMENT:  Chronic atrial fibrillation  Encounter for therapeutic drug monitoring  Long term (current) use of anticoagulants  Chronic. Stable. Slightly supratherapeutic. Will dose Coumadin as noted below and recheck INR in 3 days. Follow-up accordingly. Supratherapeutic INR for goal of 2-3.     PLAN:   Orders written by provider at facility and transcribed by : José Miguel Radford  New Dose: 0.5 mg PO x1 today, 2 mg on sat/sun  Next INR: Mon 10/18/19    Electronically signed by:  JOSUE Clark CNP DNP      Sincerely,        JOSUE Gillis CNP

## 2019-10-18 NOTE — PROGRESS NOTES
[place] [facility]  [place] Medical Record Number:  [id number]  Place of Service where encounter took place: [facility]  (FGS) [[id number]]  HPI: Mecca Slade is a 89 year old  (12/14/1921), who is being seen today for an episodic care visit at the above location.   HPI information obtained from: facility chart records, facility staff, patient report, [facility] chart review and Care Everywhere [facility] chart review. Today's concern is INR/Coumadin management for A. Fib    ROS/Subjective:  Bleeding Signs/Symptoms:  None  Thromboembolic Signs/Symptoms:  None  Medication Changes:  No  Dietary Changes:  No  Activity Changes: No  Bacterial/Viral Infection:  No  Missed Coumadin Doses:  None  On ASA: No  Other Concerns:  No    OBJECTIVE:  /68   Pulse 60   Temp 97.3  F (36.3  C)   Resp 16   Wt 102.4 kg (225 lb 12.8 oz)   SpO2 96%   BMI 36.45 kg/m    Last INR: 3.19 on 10/18.  INR Today: 2.74.  Current Dose: 0.5mg x1 days, then 2mg/day. Prior was 3mg M/W/F, 2mg AOD.    ASSESSMENT:  Chronic atrial fibrillation  Encounter for therapeutic drug monitoring  Long term (current) use of anticoagulants  Chronic. Stable. Therapeutic. Will dose Coumadin as noted below and recheck INR in 1 week. Follow-up accordingly. Supratherapeutic INR for goal of 2-3.     PLAN:   Orders written by provider at facility and transcribed by : José Miguel Radford  New Dose: Coumadin 2 mg PO Daily   Next INR: 1 week (10/28/19)    Electronically signed by:  Dr. Haley Sims, APRN CNP DNP

## 2019-10-21 ENCOUNTER — RECORDS - HEALTHEAST (OUTPATIENT)
Dept: LAB | Facility: CLINIC | Age: 84
End: 2019-10-21

## 2019-10-21 ENCOUNTER — NURSING HOME VISIT (OUTPATIENT)
Dept: GERIATRICS | Facility: CLINIC | Age: 84
End: 2019-10-21
Payer: COMMERCIAL

## 2019-10-21 VITALS
TEMPERATURE: 97.3 F | WEIGHT: 225.8 LBS | HEART RATE: 60 BPM | RESPIRATION RATE: 16 BRPM | BODY MASS INDEX: 36.45 KG/M2 | DIASTOLIC BLOOD PRESSURE: 68 MMHG | SYSTOLIC BLOOD PRESSURE: 103 MMHG | OXYGEN SATURATION: 96 %

## 2019-10-21 DIAGNOSIS — Z51.81 ENCOUNTER FOR THERAPEUTIC DRUG MONITORING: ICD-10-CM

## 2019-10-21 DIAGNOSIS — I48.20 CHRONIC ATRIAL FIBRILLATION (H): Primary | ICD-10-CM

## 2019-10-21 DIAGNOSIS — Z79.01 LONG TERM (CURRENT) USE OF ANTICOAGULANTS: ICD-10-CM

## 2019-10-21 LAB — INR PPP: 2.74 (ref 0.9–1.1)

## 2019-10-21 PROCEDURE — 99308 SBSQ NF CARE LOW MDM 20: CPT | Performed by: NURSE PRACTITIONER

## 2019-10-21 NOTE — LETTER
10/21/2019        RE: Mecca Slade  [facility]  [address] [facility] [address]        [place] [facility]  [place] Medical Record Number:  [id number]  Place of Service where encounter took place: [facility]  (FGS) [[id number]]  HPI: Mecca Slade is a 89 year old  (12/14/1921), who is being seen today for an episodic care visit at the above location.   HPI information obtained from: facility chart records, facility staff, patient report, [facility] chart review and Care Everywhere [facility] chart review. Today's concern is INR/Coumadin management for A. Fib    ROS/Subjective:  Bleeding Signs/Symptoms:  None  Thromboembolic Signs/Symptoms:  None  Medication Changes:  No  Dietary Changes:  No  Activity Changes: No  Bacterial/Viral Infection:  No  Missed Coumadin Doses:  None  On ASA: No  Other Concerns:  No    OBJECTIVE:  /68   Pulse 60   Temp 97.3  F (36.3  C)   Resp 16   Wt 102.4 kg (225 lb 12.8 oz)   SpO2 96%   BMI 36.45 kg/m     Last INR: 3.19 on 10/18.  INR Today: 2.74.  Current Dose: 0.5mg x1 days, then 2mg/day. Prior was 3mg M/W/F, 2mg AOD.    ASSESSMENT:  Chronic atrial fibrillation  Encounter for therapeutic drug monitoring  Long term (current) use of anticoagulants  Chronic. Stable. Therapeutic. Will dose Coumadin as noted below and recheck INR in 1 week. Follow-up accordingly. Supratherapeutic INR for goal of 2-3.     PLAN:   Orders written by provider at facility and transcribed by : José Miguel Radford  New Dose: Coumadin 2 mg PO Daily   Next INR: 1 week (10/28/19)    Electronically signed by:  JOSUE Clark CNP DNP      Sincerely,        JOSUE Gillis CNP

## 2019-10-26 NOTE — PROGRESS NOTES
[place] [facility]  [place] Medical Record Number:  [id number]  Place of Service where encounter took place: [facility]  (FGS) [[id number]]  HPI: Mecca Slade is a 89 year old  (12/14/1921), who is being seen today for an episodic care visit at the above location.   HPI information obtained from: facility chart records, facility staff, patient report, [facility] chart review and Care Everywhere [facility] chart review. Today's concern is INR/Coumadin management for A. Fib    ROS/Subjective:  Bleeding Signs/Symptoms:  None  Thromboembolic Signs/Symptoms:  None  Medication Changes:  No  Dietary Changes:  No  Activity Changes: No  Bacterial/Viral Infection:  No  Missed Coumadin Doses:  None  On ASA: No  Other Concerns:  No    OBJECTIVE:  /82   Pulse 62   Temp 97.7  F (36.5  C)   Resp 16   Wt 101.8 kg (224 lb 6.4 oz)   SpO2 95%   BMI 36.22 kg/m    Last INR: 2.74 on 10/21.  INR Today: 1.99.  Current Dose: 2mg/day.    ASSESSMENT:  Chronic atrial fibrillation  Encounter for therapeutic drug monitoring  Long term (current) use of anticoagulants  Chronic. Stable. Almost therapeutic. Will dose Coumadin as noted below and recheck INR in 1 week. Follow-up accordingly. Supratherapeutic INR for goal of 2-3.     PLAN:   Orders written by provider at facility and transcribed by : José Miguel Radford  New Dose: Coumadin 3 mg x1 today(10/28), Coumadin 2 mg PO daily Dx: Afib  Next INR: 1 week Dx: 11/4    Electronically signed by:  Dr. Haley Sims, APRN CNP DNP

## 2019-10-28 ENCOUNTER — TRANSFERRED RECORDS (OUTPATIENT)
Dept: HEALTH INFORMATION MANAGEMENT | Facility: CLINIC | Age: 84
End: 2019-10-28

## 2019-10-28 ENCOUNTER — RECORDS - HEALTHEAST (OUTPATIENT)
Dept: LAB | Facility: CLINIC | Age: 84
End: 2019-10-28

## 2019-10-28 ENCOUNTER — NURSING HOME VISIT (OUTPATIENT)
Dept: GERIATRICS | Facility: CLINIC | Age: 84
End: 2019-10-28
Payer: COMMERCIAL

## 2019-10-28 VITALS
OXYGEN SATURATION: 95 % | DIASTOLIC BLOOD PRESSURE: 82 MMHG | TEMPERATURE: 97.7 F | SYSTOLIC BLOOD PRESSURE: 131 MMHG | RESPIRATION RATE: 16 BRPM | HEART RATE: 62 BPM | BODY MASS INDEX: 36.22 KG/M2 | WEIGHT: 224.4 LBS

## 2019-10-28 DIAGNOSIS — I48.20 CHRONIC ATRIAL FIBRILLATION (H): Primary | ICD-10-CM

## 2019-10-28 DIAGNOSIS — Z79.01 LONG TERM (CURRENT) USE OF ANTICOAGULANTS: ICD-10-CM

## 2019-10-28 DIAGNOSIS — Z51.81 ENCOUNTER FOR THERAPEUTIC DRUG MONITORING: ICD-10-CM

## 2019-10-28 LAB
INR PPP: 1.99 (ref 0.9–1.1)
INR PPP: 1.99 (ref 0.9–1.1)

## 2019-10-28 PROCEDURE — 99308 SBSQ NF CARE LOW MDM 20: CPT | Performed by: NURSE PRACTITIONER

## 2019-10-28 NOTE — LETTER
10/28/2019        RE: Mecca Slade  [facility]  [address] [facility] [address]        [place] [facility]  [place] Medical Record Number:  [id number]  Place of Service where encounter took place: [facility]  (FGS) [[id number]]  HPI: Mecca Slade is a 89 year old  (12/14/1921), who is being seen today for an episodic care visit at the above location.   HPI information obtained from: facility chart records, facility staff, patient report, [facility] chart review and Care Everywhere [facility] chart review. Today's concern is INR/Coumadin management for A. Fib    ROS/Subjective:  Bleeding Signs/Symptoms:  None  Thromboembolic Signs/Symptoms:  None  Medication Changes:  No  Dietary Changes:  No  Activity Changes: No  Bacterial/Viral Infection:  No  Missed Coumadin Doses:  None  On ASA: No  Other Concerns:  No    OBJECTIVE:  /82   Pulse 62   Temp 97.7  F (36.5  C)   Resp 16   Wt 101.8 kg (224 lb 6.4 oz)   SpO2 95%   BMI 36.22 kg/m     Last INR: 2.74 on 10/21.  INR Today: 1.99.  Current Dose: 2mg/day.    ASSESSMENT:  Chronic atrial fibrillation  Encounter for therapeutic drug monitoring  Long term (current) use of anticoagulants  Chronic. Stable. Almost therapeutic. Will dose Coumadin as noted below and recheck INR in 1 week. Follow-up accordingly. Supratherapeutic INR for goal of 2-3.     PLAN:   Orders written by provider at facility and transcribed by : José Miguel Radford  New Dose: Coumadin 3 mg x1 today(10/28), Coumadin 2 mg PO daily Dx: Afib  Next INR: 1 week Dx: 11/4    Electronically signed by:  Dr. Haley Sims, JOSUE CNP DNP      Sincerely,        JOSUE Gillis CNP

## 2019-11-01 ENCOUNTER — RECORDS - HEALTHEAST (OUTPATIENT)
Dept: LAB | Facility: CLINIC | Age: 84
End: 2019-11-01

## 2019-11-03 PROBLEM — N18.30 CKD (CHRONIC KIDNEY DISEASE) STAGE 3, GFR 30-59 ML/MIN (H): Status: ACTIVE | Noted: 2019-11-03

## 2019-11-04 ENCOUNTER — NURSING HOME VISIT (OUTPATIENT)
Dept: GERIATRICS | Facility: CLINIC | Age: 84
End: 2019-11-04
Payer: COMMERCIAL

## 2019-11-04 VITALS
DIASTOLIC BLOOD PRESSURE: 75 MMHG | HEART RATE: 80 BPM | TEMPERATURE: 97.4 F | OXYGEN SATURATION: 93 % | BODY MASS INDEX: 36.45 KG/M2 | WEIGHT: 225.8 LBS | RESPIRATION RATE: 16 BRPM | SYSTOLIC BLOOD PRESSURE: 122 MMHG

## 2019-11-04 DIAGNOSIS — I50.32 CHRONIC DIASTOLIC CONGESTIVE HEART FAILURE (H): ICD-10-CM

## 2019-11-04 DIAGNOSIS — I48.20 CHRONIC ATRIAL FIBRILLATION (H): Primary | ICD-10-CM

## 2019-11-04 DIAGNOSIS — E66.01 MORBID OBESITY (H): ICD-10-CM

## 2019-11-04 DIAGNOSIS — E11.51 TYPE II DIABETES MELLITUS WITH PERIPHERAL CIRCULATORY DISORDER (H): ICD-10-CM

## 2019-11-04 DIAGNOSIS — Z51.81 ENCOUNTER FOR THERAPEUTIC DRUG MONITORING: ICD-10-CM

## 2019-11-04 DIAGNOSIS — Z79.01 LONG TERM (CURRENT) USE OF ANTICOAGULANTS: ICD-10-CM

## 2019-11-04 DIAGNOSIS — I10 ESSENTIAL HYPERTENSION: ICD-10-CM

## 2019-11-04 DIAGNOSIS — N18.30 CKD (CHRONIC KIDNEY DISEASE) STAGE 3, GFR 30-59 ML/MIN (H): ICD-10-CM

## 2019-11-04 LAB — INR PPP: 2.5 (ref 0.9–1.1)

## 2019-11-04 PROCEDURE — 99309 SBSQ NF CARE MODERATE MDM 30: CPT | Performed by: NURSE PRACTITIONER

## 2019-11-04 NOTE — PROGRESS NOTES
"[place] [facility]  Chief Complaint   Patient presents with     longterm Regulatory   [place] Medical Record Number:  [id number]   Place of Service where encounter took place:  [facility]  (FGS) [[id number]]  HPI: Mecca Slade  is 89 year old (12/14/1921), who is being seen today for a federally mandated E/M visit.  HPI information obtained from: facility chart records, facility staff, patient report, [facility] chart review and Care Everywhere [facility] chart review. Today's concerns are:    Chronic atrial fibrillation  Encounter for therapeutic drug monitoring  Long term (current) use of anticoagulants  Chronic diastolic congestive heart failure (H)  Essential hypertension  Last INR was 1.99 on 10/28 (1 week ago), and current Coumadin dosing is 2mg/day. Her INR resulted today at 2.5. She denies bleeding/bruising, CP, SOB, dizziness, headache, or new swelling. Chart review shows her wts and BPs as noted below:  BPs:  11/1/2019 13:59 122 / 75 mmHg   10/25/2019 10:50 131 / 82 mmHg   10/18/2019 13:42 103 / 68 mmHg   10/11/2019 10:49 126 / 76 mmHg  10/4/2019 13:17 143 / 81 mmHg   9/27/2019 14:46 123 / 78 mmHg   9/20/2019 13:49 128 / 72 mmHg   9/13/2019 14:49 123 / 59 mmHg   9/6/2019 14:42 144 / 78 mmHg   Wts:  10/28/2019 13:48 225.8 Lbs  10/21/2019 11:09 224.4 Lbs   10/14/2019 12:35 225.8 Lbs  10/7/2019 14:41 226.2 Lbs   9/23/2019 08:33 225.4 Lbs   9/16/2019 15:14 224.2 Lbs    CKD (chronic kidney disease) stage 3, GFR 30-59 ml/min (H)  Type II diabetes mellitus with peripheral circulatory disorder (H)  Morbid obesity (H)  Last creatinine was 1.01 w/ GFR @ 51 (August 2019). At that time, a1c was 8.6%. Weights areas already noted. Patient states her appetite is \"too good.\" Her BG are controlled as noted below:  10/28/2019 07:23 161.0 mg/dL   10/21/2019 07:18 141.0 mg/dL  10/14/2019 07:47 215.0 mg/dL   10/7/2019 08:10 159.0 mg/dL   9/30/2019 08:01 186.0 mg/dL   9/23/2019 07:17 151.0 mg/dL "   9/16/2019 07:33 191.0 mg/dL   9/9/2019 07:29 161.0 mg/dL   9/5/2019 13:42 139.0 mg/dL   9/2/2019 07:16 153.0 mg/dL   8/26/2019 07:20 159.0 mg/dL  8/19/2019 07:11 164.0 mg/dL   8/12/2019 11:04 161.0 mg/dL   8/5/2019 07:17 168.0 mg/dL      ALLERGIES:Patient has no known allergies. 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 FAMILY HISTORY: family history is not on file. SOCIAL HISTORY:  reports that she has never smoked. She has never used smokeless tobacco. She reports that she does not drink alcohol or use drugs.  MEDICATIONS:  Current Outpatient Medications   Medication Sig Dispense Refill     Acetaminophen (TYLENOL PO) Take 500 mg by mouth 2 times daily        ATORVASTATIN CALCIUM PO Take 10 mg by mouth daily       carboxymethylcellulose (REFRESH PLUS) 0.5 % SOLN ophthalmic solution Place 1 drop into both eyes 3 times daily as needed for dry eyes And Qam scheduled       Furosemide (LASIX PO) Take 40 mg by mouth daily       Menthol, Topical Analgesic, (BENGAY COLD THERAPY) 5 % GEL Externally apply topically 4 times daily as needed (pain)       METFORMIN HCL PO Take 500 mg by mouth 2 times daily       metoprolol succinate ER  (TOPROL-XL) 25 MG 24 hr tablet Take 1 tablet (25 mg) by mouth daily 30 tablet 0     simethicone (MYLICON) 80 MG chewable tablet Take 2 tablets (160 mg) by mouth daily       Warfarin Sodium (COUMADIN PO) Take by mouth daily Take as directed per INR results       Case Management:  I have reviewed the care plan and MDS and do agree with the plan. Patient's desire to return to the community is not assessible due to cognitive impairment. Information reviewed:  Medications, vital signs, orders, and nursing notes.    ROS: Limited secondary to cognitive impairment but today pt reports the above and 6 point ROS including Respiratory, CV, GI and , other than that noted in the HPI, is negative.    Vitals:  /75   Pulse 80   Temp 97.4  F (36.3  C)   Resp 16   Wt 102.4 kg (225 lb 12.8 oz)   SpO2 93%   BMI 36.45 kg/m    Body mass index is 36.45 kg/m .  Exam:  GENERAL APPEARANCE: Alert, in no distress, cooperative.   ENT: Mouth/posterior oropharynx intact w/ moist mucous membranes, hearing acuity [place].  EYES: EOM, conjunctivae, lids, pupils and irises normal, PERRL2.   RESP: Respiratory effort good, no respiratory distress, Lung sounds clear/diminished. On RA.   CV: Auscultation of heart reveals S1, S2, rate-controlled and rhythm irregular, no murmur, no rub or gallop, Edema 1+ BLE. Peripheral pulses are 2+.  ABDOMEN: Normal bowel sounds, soft, non-tender abdomen, and no masses palpated.  SKIN: Inspection/Palpation of skin and subcutaneous tissue baseline w/ fragility. No wounds/rashes noted.   NEURO: CN II-XII at patient's baseline, sensation baseline PPS.  PSYCH: Insight, judgement, and memory are baseline, affect and mood are happy.    Lab/Diagnostic data:         ASSESSMENT/PLAN  Chronic atrial fibrillation  Encounter for therapeutic drug monitoring  Long term (current) use of anticoagulants  Chronic diastolic congestive heart failure (H)  Essential hypertension  CKD (chronic kidney disease) stage 3, GFR 30-59  ml/min (H)  Type II diabetes mellitus with peripheral circulatory disorder (H)  Morbid obesity (H)  Chronic. Ongoing. INR therapeutic. Unknown if a1c has improved, will also assess Calcium level given prior blood work. Will dose Coumadin as noted below and recheck INR, along w/ a1c and BMP x1 in 1 week. Follow-up routinely or as needed.    Orders written by provider at facility and transcribed by : José Miguel Radford  1. Coumadin 2 mg PO daliy  2. Recheck INR, A1C, BMP x1 on 11/11/19 Dx: Afib    Electronically signed by:  Dr. Haley Sims, APRN CNP DNP

## 2019-11-04 NOTE — LETTER
"    11/4/2019        RE: Mecca Slade  [facility]  [address] [place] [id number]        [place] [facility]  Chief Complaint   Patient presents with     custodial Regulatory   [place] Medical Record Number:  [id number]   Place of Service where encounter took place:  [facility]  (FGS) [[id number]]  HPI: Mecca Slade  is 89 year old (12/14/1921), who is being seen today for a federally mandated E/M visit.  HPI information obtained from: facility chart records, facility staff, patient report, [facility] chart review and Care Everywhere [facility] chart review. Today's concerns are:    Chronic atrial fibrillation  Encounter for therapeutic drug monitoring  Long term (current) use of anticoagulants  Chronic diastolic congestive heart failure (H)  Essential hypertension  Last INR was 1.99 on 10/28 (1 week ago), and current Coumadin dosing is 2mg/day. Her INR resulted today at 2.5. She denies bleeding/bruising, CP, SOB, dizziness, headache, or new swelling. Chart review shows her wts and BPs as noted below:  BPs:  11/1/2019 13:59 122 / 75 mmHg   10/25/2019 10:50 131 / 82 mmHg   10/18/2019 13:42 103 / 68 mmHg   10/11/2019 10:49 126 / 76 mmHg  10/4/2019 13:17 143 / 81 mmHg   9/27/2019 14:46 123 / 78 mmHg   9/20/2019 13:49 128 / 72 mmHg   9/13/2019 14:49 123 / 59 mmHg   9/6/2019 14:42 144 / 78 mmHg   Wts:  10/28/2019 13:48 225.8 Lbs  10/21/2019 11:09 224.4 Lbs   10/14/2019 12:35 225.8 Lbs  10/7/2019 14:41 226.2 Lbs   9/23/2019 08:33 225.4 Lbs   9/16/2019 15:14 224.2 Lbs    CKD (chronic kidney disease) stage 3, GFR 30-59 ml/min (H)  Type II diabetes mellitus with peripheral circulatory disorder (H)  Morbid obesity (H)  Last creatinine was 1.01 w/ GFR @ 51 (August 2019). At that time, a1c was 8.6%. Weights areas already noted. Patient states her appetite is \"too good.\" Her BG are controlled as noted below:  10/28/2019 07:23 161.0 mg/dL   10/21/2019 07:18 141.0 " mg/dL  10/14/2019 07:47 215.0 mg/dL   10/7/2019 08:10 159.0 mg/dL   9/30/2019 08:01 186.0 mg/dL   9/23/2019 07:17 151.0 mg/dL   9/16/2019 07:33 191.0 mg/dL   9/9/2019 07:29 161.0 mg/dL   9/5/2019 13:42 139.0 mg/dL   9/2/2019 07:16 153.0 mg/dL   8/26/2019 07:20 159.0 mg/dL  8/19/2019 07:11 164.0 mg/dL   8/12/2019 11:04 161.0 mg/dL   8/5/2019 07:17 168.0 mg/dL      ALLERGIES:Patient has no known allergies. 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 FAMILY HISTORY: family history is not on file. SOCIAL HISTORY:  reports that she has never smoked. She has never used smokeless tobacco. She reports that she does not drink alcohol or use drugs.  MEDICATIONS:  Current Outpatient Medications   Medication Sig Dispense Refill     Acetaminophen (TYLENOL PO) Take 500 mg by mouth 2 times daily        ATORVASTATIN CALCIUM PO Take 10 mg by mouth daily       carboxymethylcellulose (REFRESH PLUS) 0.5 % SOLN ophthalmic solution Place 1 drop into both eyes 3 times daily as needed for dry eyes And Qam scheduled       Furosemide (LASIX PO) Take 40 mg by mouth daily       Menthol, Topical Analgesic, (BENGAY COLD THERAPY) 5 % GEL Externally apply  topically 4 times daily as needed (pain)       METFORMIN HCL PO Take 500 mg by mouth 2 times daily       metoprolol succinate ER (TOPROL-XL) 25 MG 24 hr tablet Take 1 tablet (25 mg) by mouth daily 30 tablet 0     simethicone (MYLICON) 80 MG chewable tablet Take 2 tablets (160 mg) by mouth daily       Warfarin Sodium (COUMADIN PO) Take by mouth daily Take as directed per INR results       Case Management:  I have reviewed the care plan and MDS and do agree with the plan. Patient's desire to return to the community is not assessible due to cognitive impairment. Information reviewed:  Medications, vital signs, orders, and nursing notes.    ROS: Limited secondary to cognitive impairment but today pt reports the above and 6 point ROS including Respiratory, CV, GI and , other than that noted in the HPI, is negative.    Vitals:  /75   Pulse 80   Temp 97.4  F (36.3  C)   Resp 16   Wt 102.4 kg (225 lb 12.8 oz)   SpO2 93%   BMI 36.45 kg/m     Body mass index is 36.45 kg/m .  Exam:  GENERAL APPEARANCE: Alert, in no distress, cooperative.   ENT: Mouth/posterior oropharynx intact w/ moist mucous membranes, hearing acuity [place].  EYES: EOM, conjunctivae, lids, pupils and irises normal, PERRL2.   RESP: Respiratory effort good, no respiratory distress, Lung sounds clear/diminished. On RA.   CV: Auscultation of heart reveals S1, S2, rate-controlled and rhythm irregular, no murmur, no rub or gallop, Edema 1+ BLE. Peripheral pulses are 2+.  ABDOMEN: Normal bowel sounds, soft, non-tender abdomen, and no masses palpated.  SKIN: Inspection/Palpation of skin and subcutaneous tissue baseline w/ fragility. No wounds/rashes noted.   NEURO: CN II-XII at patient's baseline, sensation baseline PPS.  PSYCH: Insight, judgement, and memory are baseline, affect and mood are happy.    Lab/Diagnostic data:         ASSESSMENT/PLAN  Chronic atrial fibrillation  Encounter for therapeutic drug monitoring  Long term (current) use of  anticoagulants  Chronic diastolic congestive heart failure (H)  Essential hypertension  CKD (chronic kidney disease) stage 3, GFR 30-59 ml/min (H)  Type II diabetes mellitus with peripheral circulatory disorder (H)  Morbid obesity (H)  Chronic. Ongoing. INR therapeutic. Unknown if a1c has improved, will also assess Calcium level given prior blood work. Will dose Coumadin as noted below and recheck INR, along w/ a1c and BMP x1 in 1 week. Follow-up routinely or as needed.    Orders written by provider at facility and transcribed by : José Miguel Radford  1. Coumadin 2 mg PO daliy  2. Recheck INR, A1C, BMP x1 on 11/11/19 Dx: Afib    Electronically signed by:  Dr. Haley Sims, JOSUE CNP DNP        Sincerely,        JOSUE Gillis CNP

## 2019-11-08 ENCOUNTER — RECORDS - HEALTHEAST (OUTPATIENT)
Dept: LAB | Facility: CLINIC | Age: 84
End: 2019-11-08

## 2019-11-11 ENCOUNTER — NURSING HOME VISIT (OUTPATIENT)
Dept: GERIATRICS | Facility: CLINIC | Age: 84
End: 2019-11-11
Payer: COMMERCIAL

## 2019-11-11 ENCOUNTER — TRANSFERRED RECORDS (OUTPATIENT)
Dept: HEALTH INFORMATION MANAGEMENT | Facility: CLINIC | Age: 84
End: 2019-11-11

## 2019-11-11 VITALS
DIASTOLIC BLOOD PRESSURE: 62 MMHG | WEIGHT: 224.6 LBS | HEART RATE: 62 BPM | BODY MASS INDEX: 36.25 KG/M2 | OXYGEN SATURATION: 96 % | SYSTOLIC BLOOD PRESSURE: 116 MMHG | RESPIRATION RATE: 18 BRPM | TEMPERATURE: 98.7 F

## 2019-11-11 DIAGNOSIS — I48.20 CHRONIC ATRIAL FIBRILLATION (H): Primary | ICD-10-CM

## 2019-11-11 DIAGNOSIS — Z79.01 LONG TERM (CURRENT) USE OF ANTICOAGULANTS: ICD-10-CM

## 2019-11-11 DIAGNOSIS — E83.52 HYPERCALCEMIA: ICD-10-CM

## 2019-11-11 DIAGNOSIS — Z51.81 ENCOUNTER FOR THERAPEUTIC DRUG MONITORING: ICD-10-CM

## 2019-11-11 DIAGNOSIS — I10 ESSENTIAL HYPERTENSION: ICD-10-CM

## 2019-11-11 DIAGNOSIS — E11.51 TYPE II DIABETES MELLITUS WITH PERIPHERAL CIRCULATORY DISORDER (H): ICD-10-CM

## 2019-11-11 LAB
ANION GAP SERPL CALCULATED.3IONS-SCNC: 6 MMOL/L (ref 5–18)
ANION GAP SERPL CALCULATED.3IONS-SCNC: 6 MMOL/L (ref 5–18)
BUN SERPL-MCNC: 32 MG/DL (ref 8–28)
BUN SERPL-MCNC: 32 MG/DL (ref 8–28)
CALCIUM SERPL-MCNC: 10.9 MG/DL (ref 8.5–10.5)
CALCIUM SERPL-MCNC: 10.9 MG/DL (ref 8.5–10.5)
CHLORIDE BLD-SCNC: 105 MMOL/L (ref 98–107)
CHLORIDE SERPLBLD-SCNC: 105 MMOL/L (ref 98–107)
CO2 SERPL-SCNC: 30 MMOL/L (ref 22–31)
CO2 SERPL-SCNC: 30 MMOL/L (ref 22–31)
CREAT SERPL-MCNC: 0.93 MG/DL (ref 0.6–1.1)
CREAT SERPL-MCNC: 0.93 MG/DL (ref 0.6–1.1)
GFR SERPL CREATININE-BSD FRML MDRD: 56 ML/MIN/1.73M2
GFR SERPL CREATININE-BSD FRML MDRD: 56 ML/MIN/1.73M2
GLUCOSE BLD-MCNC: 148 MG/DL (ref 70–125)
GLUCOSE SERPL-MCNC: 148 MG/DL (ref 70–125)
HBA1C MFR BLD: 7.9 % (ref 4.2–6.1)
INR PPP: 2.4 (ref 0.9–1.1)
INR PPP: 2.4 (ref 0.9–1.1)
POTASSIUM BLD-SCNC: 4 MMOL/L (ref 3.5–5)
POTASSIUM SERPL-SCNC: 4 MMOL/L (ref 3.5–5)
SODIUM SERPL-SCNC: 141 MMOL/L (ref 136–145)
SODIUM SERPL-SCNC: 141 MMOL/L (ref 136–145)

## 2019-11-11 PROCEDURE — 99309 SBSQ NF CARE MODERATE MDM 30: CPT | Performed by: NURSE PRACTITIONER

## 2019-11-11 NOTE — PROGRESS NOTES
[place] [facility]  [place] Medical Record Number:  [id number]  Place of Service where encounter took place:  [facility]  (FGS) [[id number]]  Chief Complaint   Patient presents with     Nursing Home Acute   HPI: Mecca Slade  is a 89 year old (12/14/1921), who is being seen today for an episodic care visit.  HPI information obtained from: facility chart records, facility staff, patient report, [employer] chart review and Care Everywhere [facility] chart review. Today's concern is:    Chronic atrial fibrillation  Encounter for therapeutic drug monitoring  Long term (current) use of anticoagulants  Essential hypertension  Last INR was 2.50 on 11/4 and current Coumadin dosing is 2mg/day. Today, INR returned at 2.40 and patient denies CP, palpitations, dizziness, bleeding/bruising. BP stable as noted below:  11/8/2019 14:01 116 / 62 mmHg   11/8/2019 10:36 116 / 57 mmHg   11/1/2019 13:59 122 / 75 mmHg   10/25/2019 10:50 131 / 82 mmHg     Type II diabetes mellitus with peripheral circulatory disorder (H)  Last visit, we reviewed a1c of 8.6% in August 2019. Patient's appetite is good, her BG are overall controlled on 500mg of Metformin BID. Recheck a1c resulted today at 7.9% (which is appropriate for her age).  Her weekly BG checks are:  11/4/2019 07:12 167.0 mg/dL   10/28/2019 07:23 161.0 mg/dL   10/21/2019 07:18 141.0 mg/dL   10/14/2019 07:47 215.0 mg/dL   10/7/2019 08:10 159.0 mg/dL    Hypercalcemia  Last visit, upon chart review we had noted an incidental finding of hypercalcemia @ 11.3 in August 2019. Recheck BMP today shows grossly at baseline w/ Calcium of 10.9. Last albumin was in 2018, and therefore corrected calcium calculation likely not accurate.     Past Medical and Surgical History reviewed in [facility] today.  REVIEW OF SYSTEMS: Limited secondary to cognitive impairment but today pt reports the above and 4 point ROS including Respiratory, CV, GI and , other than that noted in the HPI,  is negative.    Objective:  /62   Pulse 62   Temp 98.7  F (37.1  C)   Resp 18   Wt 101.9 kg (224 lb 9.6 oz)   SpO2 96%   BMI 36.25 kg/m    Exam:   GENERAL APPEARANCE: Alert, in no distress, cooperative.   ENT: Mouth/posterior oropharynx intact w/ moist mucous membranes, hearing acuity [place].  EYES: EOM, conjunctivae, lids, pupils and irises normal, PERRL2.   RESP: Respiratory effort good, no respiratory distress, Lung sounds clear/diminished. On RA.   CV: Auscultation of heart reveals S1, S2, rate-controlled and rhythm paced, no murmur, no rub or gallop, Edema trace+ BLE. Peripheral pulses are 2+.  ABDOMEN: Normal bowel sounds, soft, non-tender abdomen, and no masses palpated.  SKIN: Inspection/Palpation of skin and subcutaneous tissue baseline w/ fragility. No wounds/rashes noted.   NEURO: CN II-XII at patient's baseline, sensation baseline PPS.  PSYCH: Insight, judgement, and memory are baseline, affect and mood are happy/engaged.    Labs:   Labs done in SNF are in [place] [employer]. Please refer to them using [employer] Everywhere.    ASSESSMENT/PLAN:  Chronic atrial fibrillation  Encounter for therapeutic drug monitoring  Long term (current) use of anticoagulants  Essential hypertension  Chronic. Stable. INR therapeutic. Will dose Coumadin as noted below and recheck INR in 2 weeks. Follow-up accordingly.    Type II diabetes mellitus with peripheral circulatory disorder (H)  Chronic. Ongoing. a1c returned WDL for age and goals of care. Will continue to clinically monitor and continue current POC. Follow-up routinely or as needed.    Hypercalcemia  Acute-on-chronic. Hypercalcemia still present. Will continue investigation w/ PHT, Phosphorus level, will add on labs to today's blood work to calculate corrected Calcium level. Follow-up w/ results or as needed.    Orders written by provider at facility and transcribed by : José Miguel Radford  1. Add on to today's labs   -Albumin lvl  -Vit D level Dx:  Hypercalcemia  2. Coumadin 2mg PO every day. Dx: Afib.  3. INR recheck on 11/25 x1. Dx: Afib.  4. PTH, Phosphorus Level x1 on 11/25. Dx: Hypercalcemia.     Electronically signed by:  Dr. Haley Sims, APRN CNP DNP

## 2019-11-11 NOTE — LETTER
11/11/2019        RE: Mecca Slade  [facility]  [address] [facility] [address]        [place] [facility]  [place] Medical Record Number:  [id number]  Place of Service where encounter took place:  [facility]  (FGS) [[id number]]  Chief Complaint   Patient presents with     Nursing Home Acute   HPI: Mecca Slade  is a 89 year old (12/14/1921), who is being seen today for an episodic care visit.  HPI information obtained from: facility chart records, facility staff, patient report, [facility] chart review and Care Everywhere [facility] chart review. Today's concern is:    Chronic atrial fibrillation  Encounter for therapeutic drug monitoring  Long term (current) use of anticoagulants  Essential hypertension  Last INR was 2.50 on 11/4 and current Coumadin dosing is 2mg/day. Today, INR returned at 2.40 and patient denies CP, palpitations, dizziness, bleeding/bruising. BP stable as noted below:  11/8/2019 14:01 116 / 62 mmHg   11/8/2019 10:36 116 / 57 mmHg   11/1/2019 13:59 122 / 75 mmHg   10/25/2019 10:50 131 / 82 mmHg     Type II diabetes mellitus with peripheral circulatory disorder (H)  Last visit, we reviewed a1c of 8.6% in August 2019. Patient's appetite is good, her BG are overall controlled on 500mg of Metformin BID. Recheck a1c resulted today at 7.9% (which is appropriate for her age).  Her weekly BG checks are:  11/4/2019 07:12 167.0 mg/dL   10/28/2019 07:23 161.0 mg/dL   10/21/2019 07:18 141.0 mg/dL   10/14/2019 07:47 215.0 mg/dL   10/7/2019 08:10 159.0 mg/dL    Hypercalcemia  Last visit, upon chart review we had noted an incidental finding of hypercalcemia @ 11.3 in August 2019. Recheck BMP today shows grossly at baseline w/ Calcium of 10.9. Last albumin was in 2018, and therefore corrected calcium calculation likely not accurate.     Past Medical and Surgical History reviewed in Epic today.  REVIEW OF SYSTEMS: Limited secondary to cognitive impairment but  today pt reports the above and 4 point ROS including Respiratory, CV, GI and , other than that noted in the HPI, is negative.    Objective:  /62   Pulse 62   Temp 98.7  F (37.1  C)   Resp 18   Wt 101.9 kg (224 lb 9.6 oz)   SpO2 96%   BMI 36.25 kg/m     Exam:   GENERAL APPEARANCE: Alert, in no distress, cooperative.   ENT: Mouth/posterior oropharynx intact w/ moist mucous membranes, hearing acuity [place].  EYES: EOM, conjunctivae, lids, pupils and irises normal, PERRL2.   RESP: Respiratory effort good, no respiratory distress, Lung sounds clear/diminished. On RA.   CV: Auscultation of heart reveals S1, S2, rate-controlled and rhythm paced, no murmur, no rub or gallop, Edema trace+ BLE. Peripheral pulses are 2+.  ABDOMEN: Normal bowel sounds, soft, non-tender abdomen, and no masses palpated.  SKIN: Inspection/Palpation of skin and subcutaneous tissue baseline w/ fragility. No wounds/rashes noted.   NEURO: CN II-XII at patient's baseline, sensation baseline PPS.  PSYCH: Insight, judgement, and memory are baseline, affect and mood are happy/engaged.    Labs:   Labs done in SNF are in [place] [employer]. Please refer to them using [employer] Everywhere.    ASSESSMENT/PLAN:  Chronic atrial fibrillation  Encounter for therapeutic drug monitoring  Long term (current) use of anticoagulants  Essential hypertension  Chronic. Stable. INR therapeutic. Will dose Coumadin as noted below and recheck INR in 2 weeks. Follow-up accordingly.    Type II diabetes mellitus with peripheral circulatory disorder (H)  Chronic. Ongoing. a1c returned WDL for age and goals of care. Will continue to clinically monitor and continue current POC. Follow-up routinely or as needed.    Hypercalcemia  Acute-on-chronic. Hypercalcemia still present. Will continue investigation w/ PHT, Phosphorus level, will add on labs to today's blood work to calculate corrected Calcium level. Follow-up w/ results or as needed.    Orders written by provider at  facility and transcribed by : José Miguel Radford  1. Add on to today's labs   -Albumin lvl  -Vit D level Dx: Hypercalcemia  2. Coumadin 2mg PO every day. Dx: Afib.  3. INR recheck on 11/25 x1. Dx: Afib.  4. PTH, Phosphorus Level x1 on 11/25. Dx: Hypercalcemia.     Electronically signed by:  Dr. Haley Sims, JOSUE CNP DNP          Sincerely,        JOSUE Gillis CNP

## 2019-11-21 NOTE — PROGRESS NOTES
[place] [facility]  [place] Medical Record Number:  [id number]  Place of Service where encounter took place:  [facility]  (FGS) [[id number]]  Chief Complaint   Patient presents with     Nursing Home Acute   HPI: Mecca Slade  is a 89 year old (12/14/1921), who is being seen today for an episodic care visit.  HPI information obtained from: facility chart records, facility staff, patient report, [facility] chart review and Care Everywhere [facility] chart review. Today's concern is:    Chronic atrial fibrillation  Encounter for therapeutic drug monitoring  Long term (current) use of anticoagulants  Last INR was 2.40 on 11/11 and current Coumadin dosing is 2mg/day. Today, INR returned at 2.5 and patient denies CP, palpitations, dizziness, bleeding/bruising.     Hypercalcemia  Last visit, we ordered PTH, albumin, which have resulted today. Recheck BMP showed Calcium of 10.9, w/ current albumin of 3.1, we calculated corrected calcium of 11.62. We note PTH of 196 (H), and Vitamin D level is still pending at this time. Phosphorus level of 2.5.    Past Medical and Surgical History reviewed in [facility] today.  REVIEW OF SYSTEMS: Limited secondary to cognitive impairment but today pt reports the above and 4 point ROS including Respiratory, CV, GI and , other than that noted in the HPI, is negative.    Objective:  BP (!) 123/99   Pulse 72   Temp 97.7  F (36.5  C)   Resp 16   Wt 101.2 kg (223 lb 3.2 oz)   SpO2 98%   BMI 36.03 kg/m    Exam:   GENERAL APPEARANCE: Alert, in no distress, cooperative.   ENT: Mouth/posterior oropharynx intact w/ moist mucous membranes, hearing acuity [place].  EYES: EOM, conjunctivae, lids, pupils and irises normal, PERRL2.   RESP: Respiratory effort good, no respiratory distress, Lung sounds clear/diminished. On RA.   CV: Auscultation of heart reveals S1, S2, rate-controlled and rhythm paced, no murmur, no rub or gallop, Edema trace+ BLE. Peripheral pulses are 2+.  ABDOMEN:  Normal bowel sounds, soft, non-tender abdomen, and no masses palpated.  SKIN: Inspection/Palpation of skin and subcutaneous tissue baseline w/ fragility. No wounds/rashes noted.   NEURO: CN II-XII at patient's baseline, sensation baseline PPS.  PSYCH: Insight, judgement, and memory are baseline, affect and mood are happy/engaged.    Labs:   Labs done in SNF are in [place] EPIC. Please refer to them using EPIC/Care Everywhere.    ASSESSMENT/PLAN:  Chronic atrial fibrillation  Encounter for therapeutic drug monitoring  Long term (current) use of anticoagulants  Chronic. Stable. INR therapeutic. Will dose Coumadin as noted below and recheck INR in 3 weeks. Follow-up accordingly.    Hypercalcemia  Acute-on-chronic. Likely hyperparathyroidism. Will await vitamin D level and then initiate family discussion when we have all results. Will offer specialty consultation for Endocrine. Follow-up w/ results or as needed.    Orders written by provider at facility and transcribed by : José Miguel Radford  1. Discontinue L ankle splint (per therapy).   2. Coumadin 2 mg PO every day Dx: Afib.  3. Recheck INR x1 on 12/6 Dx: Afib.    Electronically signed by:  Dr. Haley Sims, APRN CNP DNP

## 2019-11-25 ENCOUNTER — RECORDS - HEALTHEAST (OUTPATIENT)
Dept: LAB | Facility: CLINIC | Age: 84
End: 2019-11-25

## 2019-11-25 ENCOUNTER — NURSING HOME VISIT (OUTPATIENT)
Dept: GERIATRICS | Facility: CLINIC | Age: 84
End: 2019-11-25
Payer: COMMERCIAL

## 2019-11-25 VITALS
RESPIRATION RATE: 16 BRPM | TEMPERATURE: 97.7 F | WEIGHT: 223.2 LBS | SYSTOLIC BLOOD PRESSURE: 123 MMHG | HEART RATE: 72 BPM | DIASTOLIC BLOOD PRESSURE: 99 MMHG | OXYGEN SATURATION: 98 % | BODY MASS INDEX: 36.03 KG/M2

## 2019-11-25 DIAGNOSIS — Z79.01 LONG TERM (CURRENT) USE OF ANTICOAGULANTS: ICD-10-CM

## 2019-11-25 DIAGNOSIS — Z51.81 ENCOUNTER FOR THERAPEUTIC DRUG MONITORING: ICD-10-CM

## 2019-11-25 DIAGNOSIS — E83.52 HYPERCALCEMIA: ICD-10-CM

## 2019-11-25 DIAGNOSIS — I48.20 CHRONIC ATRIAL FIBRILLATION (H): Primary | ICD-10-CM

## 2019-11-25 LAB
ALBUMIN SERPL-MCNC: 3.1 G/DL (ref 3.5–5)
INR PPP: 2.57 (ref 0.9–1.1)
PHOSPHATE SERPL-MCNC: 2.5 MG/DL (ref 2.5–4.5)
PTH-INTACT SERPL-MCNC: 196 PG/ML (ref 10–86)

## 2019-11-25 PROCEDURE — 99309 SBSQ NF CARE MODERATE MDM 30: CPT | Performed by: NURSE PRACTITIONER

## 2019-11-25 NOTE — LETTER
11/25/2019        RE: Mecca Slade  [facility]  [address] [facility] [address]        [place] [facility]  [place] Medical Record Number:  [id number]  Place of Service where encounter took place:  [facility]  (FGS) [[id number]]  Chief Complaint   Patient presents with     Nursing Home Acute   HPI: Mecca Slade  is a 89 year old (12/14/1921), who is being seen today for an episodic care visit.  HPI information obtained from: facility chart records, facility staff, patient report, [facility] chart review and Care Everywhere [facility] chart review. Today's concern is:    Chronic atrial fibrillation  Encounter for therapeutic drug monitoring  Long term (current) use of anticoagulants  Last INR was 2.40 on 11/11 and current Coumadin dosing is 2mg/day. Today, INR returned at 2.5 and patient denies CP, palpitations, dizziness, bleeding/bruising.     Hypercalcemia  Last visit, we ordered PTH, albumin, which have resulted today. Recheck BMP showed Calcium of 10.9, w/ current albumin of 3.1, we calculated corrected calcium of 11.62. We note PTH of 196 (H), and Vitamin D level is still pending at this time. Phosphorus level of 2.5.    Past Medical and Surgical History reviewed in [facility] today.  REVIEW OF SYSTEMS: Limited secondary to cognitive impairment but today pt reports the above and 4 point ROS including Respiratory, CV, GI and , other than that noted in the HPI, is negative.    Objective:  BP (!) 123/99   Pulse 72   Temp 97.7  F (36.5  C)   Resp 16   Wt 101.2 kg (223 lb 3.2 oz)   SpO2 98%   BMI 36.03 kg/m     Exam:   GENERAL APPEARANCE: Alert, in no distress, cooperative.   ENT: Mouth/posterior oropharynx intact w/ moist mucous membranes, hearing acuity [place].  EYES: EOM, conjunctivae, lids, pupils and irises normal, PERRL2.   RESP: Respiratory effort good, no respiratory distress, Lung sounds clear/diminished. On RA.   CV: Auscultation of heart reveals S1, S2,  rate-controlled and rhythm paced, no murmur, no rub or gallop, Edema trace+ BLE. Peripheral pulses are 2+.  ABDOMEN: Normal bowel sounds, soft, non-tender abdomen, and no masses palpated.  SKIN: Inspection/Palpation of skin and subcutaneous tissue baseline w/ fragility. No wounds/rashes noted.   NEURO: CN II-XII at patient's baseline, sensation baseline PPS.  PSYCH: Insight, judgement, and memory are baseline, affect and mood are happy/engaged.    Labs:   Labs done in SNF are in [place] EPIC. Please refer to them using [facility]/Care Everywhere.    ASSESSMENT/PLAN:  Chronic atrial fibrillation  Encounter for therapeutic drug monitoring  Long term (current) use of anticoagulants  Chronic. Stable. INR therapeutic. Will dose Coumadin as noted below and recheck INR in 3 weeks. Follow-up accordingly.    Hypercalcemia  Acute-on-chronic. Likely hyperparathyroidism. Will await vitamin D level and then initiate family discussion when we have all results. Will offer specialty consultation for Endocrine. Follow-up w/ results or as needed.    Orders written by provider at facility and transcribed by : José Miguel Radford  1. Discontinue L ankle splint (per therapy).   2. Coumadin 2 mg PO every day Dx: Afib.  3. Recheck INR x1 on 12/6 Dx: Afib.    Electronically signed by:  JOSUE Clark CNP [facility]        Sincerely,        JOSUE Gillis CNP

## 2019-11-26 LAB — 25(OH)D3 SERPL-MCNC: 31.8 NG/ML (ref 30–80)

## 2019-12-05 NOTE — PROGRESS NOTES
[place] [facility]  [place] Medical Record Number:  [id number]  Place of Service where encounter took place:  [facility]  (FGS) [[id number]]  Chief Complaint   Patient presents with     Nursing Home Acute   HPI: Mecca Slade  is a 89 year old (12/14/1921), who is being seen today for an episodic care visit.  HPI information obtained from: facility chart records, facility staff, patient report, [facility] chart review and Care Everywhere [facility] chart review. Today's concern is:    Chronic atrial fibrillation  Encounter for therapeutic drug monitoring  Long term (current) use of anticoagulants  Last INR was 2.5 on 11/25 and current Coumadin dosing is 2mg/day. Today, INR returned at 2.9 and patient denies CP, palpitations, dizziness, bleeding/bruising.     Hypercalcemia  Last visit, we noted corrected calcium of 11.62, PTH of 196 (H), and Phosphorus level of 2.5. Vitamin D level was ordered and missed by lab/nursing, and we therefore do not have a result.     Past Medical and Surgical History reviewed in [facility] today.  REVIEW OF SYSTEMS: Limited secondary to cognitive impairment but today pt reports the above and 4 point ROS including Respiratory, CV, GI and , other than that noted in the HPI, is negative.    Objective:  BP (!) 145/75   Pulse 78   Temp 97.5  F (36.4  C)   Resp 16   Wt 100.4 kg (221 lb 6.4 oz)   SpO2 95%   BMI 35.73 kg/m    Exam:   GENERAL APPEARANCE: Alert, in no distress, cooperative.   ENT: Mouth/posterior oropharynx intact w/ moist mucous membranes, hearing acuity [place].  EYES: EOM, conjunctivae, lids, pupils and irises normal, PERRL2.   RESP: Respiratory effort good, no respiratory distress, Lung sounds clear/diminished. On RA.   CV: Auscultation of heart reveals S1, S2, rate-controlled and rhythm paced, no murmur, no rub or gallop, Edema trace+ BLE. Peripheral pulses are 2+.  ABDOMEN: Normal bowel sounds, soft, non-tender abdomen, and no masses palpated.  SKIN:  Inspection/Palpation of skin and subcutaneous tissue baseline w/ fragility. No wounds/rashes noted.   NEURO: CN II-XII at patient's baseline, sensation baseline PPS.  PSYCH: Insight, judgement, and memory are baseline, affect and mood are happy/engaged.    Labs:   Labs done in SNF are in [place] EPIC. Please refer to them using EPIC/Care Everywhere.    ASSESSMENT/PLAN:  Chronic atrial fibrillation  Encounter for therapeutic drug monitoring  Long term (current) use of anticoagulants  Chronic. Stable. INR therapeutic. Will dose Coumadin as noted below and recheck INR in 3 weeks. Follow-up accordingly.    Hypercalcemia   Acute-on-chronic. Likely hyperparathyroidism. Attempted to reach daughter x2 via phone regarding an endocrinology consult. Will offer specialty consultation w/ next visit w/ family consultation and recheck TSH. Follow-up w/ results or as needed.    Orders written by provider at facility and transcribed by : José Miguel Radford  1. Coumadin 2 mg PO daily   2. Recheck INR x1 on 12/20 Dx: Afib  3. Recheck TSH x1 on 12/20 Dx: Hypercalcemia  4. Recheck Vitamin D level on 12/20    Electronically signed by:  Dr. Haley Sims, APRN CNP DNP

## 2019-12-06 ENCOUNTER — RECORDS - HEALTHEAST (OUTPATIENT)
Dept: LAB | Facility: CLINIC | Age: 84
End: 2019-12-06

## 2019-12-06 ENCOUNTER — TRANSFERRED RECORDS (OUTPATIENT)
Dept: HEALTH INFORMATION MANAGEMENT | Facility: CLINIC | Age: 84
End: 2019-12-06

## 2019-12-06 ENCOUNTER — NURSING HOME VISIT (OUTPATIENT)
Dept: GERIATRICS | Facility: CLINIC | Age: 84
End: 2019-12-06
Payer: COMMERCIAL

## 2019-12-06 VITALS
OXYGEN SATURATION: 95 % | WEIGHT: 221.4 LBS | BODY MASS INDEX: 35.73 KG/M2 | RESPIRATION RATE: 16 BRPM | HEART RATE: 78 BPM | SYSTOLIC BLOOD PRESSURE: 145 MMHG | DIASTOLIC BLOOD PRESSURE: 75 MMHG | TEMPERATURE: 97.5 F

## 2019-12-06 DIAGNOSIS — I48.20 CHRONIC ATRIAL FIBRILLATION (H): Primary | ICD-10-CM

## 2019-12-06 DIAGNOSIS — Z79.01 LONG TERM (CURRENT) USE OF ANTICOAGULANTS: ICD-10-CM

## 2019-12-06 DIAGNOSIS — E83.52 HYPERCALCEMIA: ICD-10-CM

## 2019-12-06 DIAGNOSIS — Z51.81 ENCOUNTER FOR THERAPEUTIC DRUG MONITORING: ICD-10-CM

## 2019-12-06 LAB
INR PPP: 2.9 (ref 0.9–1.1)
INR PPP: 2.9 (ref 0.9–1.1)

## 2019-12-06 PROCEDURE — 99309 SBSQ NF CARE MODERATE MDM 30: CPT | Performed by: NURSE PRACTITIONER

## 2019-12-06 NOTE — LETTER
12/6/2019        RE: Mecca Slade  [facility]  [address] [facility] [address]        [place] [facility]  [place] Medical Record Number:  [id number]  Place of Service where encounter took place:  [facility]  (FGS) [[id number]]  Chief Complaint   Patient presents with     Nursing Home Acute   HPI: Mecca Slade  is a 89 year old (12/14/1921), who is being seen today for an episodic care visit.  HPI information obtained from: facility chart records, facility staff, patient report, [facility] chart review and Care Everywhere [facility] chart review. Today's concern is:    Chronic atrial fibrillation  Encounter for therapeutic drug monitoring  Long term (current) use of anticoagulants  Last INR was 2.5 on 11/25 and current Coumadin dosing is 2mg/day. Today, INR returned at 2.9 and patient denies CP, palpitations, dizziness, bleeding/bruising.     Hypercalcemia  Last visit, we noted corrected calcium of 11.62, PTH of 196 (H), and Phosphorus level of 2.5. Vitamin D level was ordered and missed by lab/nursing, and we therefore do not have a result.     Past Medical and Surgical History reviewed in [facility] today.  REVIEW OF SYSTEMS: Limited secondary to cognitive impairment but today pt reports the above and 4 point ROS including Respiratory, CV, GI and , other than that noted in the HPI, is negative.    Objective:  BP (!) 145/75   Pulse 78   Temp 97.5  F (36.4  C)   Resp 16   Wt 100.4 kg (221 lb 6.4 oz)   SpO2 95%   BMI 35.73 kg/m     Exam:   GENERAL APPEARANCE: Alert, in no distress, cooperative.   ENT: Mouth/posterior oropharynx intact w/ moist mucous membranes, hearing acuity [place].  EYES: EOM, conjunctivae, lids, pupils and irises normal, PERRL2.   RESP: Respiratory effort good, no respiratory distress, Lung sounds clear/diminished. On RA.   CV: Auscultation of heart reveals S1, S2, rate-controlled and rhythm paced, no murmur, no rub or gallop, Edema trace+ BLE.  Peripheral pulses are 2+.  ABDOMEN: Normal bowel sounds, soft, non-tender abdomen, and no masses palpated.  SKIN: Inspection/Palpation of skin and subcutaneous tissue baseline w/ fragility. No wounds/rashes noted.   NEURO: CN II-XII at patient's baseline, sensation baseline PPS.  PSYCH: Insight, judgement, and memory are baseline, affect and mood are happy/engaged.    Labs:   Labs done in SNF are in [place] EPIC. Please refer to them using EPIC/Care Everywhere.    ASSESSMENT/PLAN:  Chronic atrial fibrillation  Encounter for therapeutic drug monitoring  Long term (current) use of anticoagulants  Chronic. Stable. INR therapeutic. Will dose Coumadin as noted below and recheck INR in 3 weeks. Follow-up accordingly.    Hypercalcemia   Acute-on-chronic. Likely hyperparathyroidism. Attempted to reach daughter x2 via phone regarding an endocrinology consult. Will offer specialty consultation w/ next visit w/ family consultation and recheck TSH. Follow-up w/ results or as needed.    Orders written by provider at facility and transcribed by : José Miguel Radford  1. Coumadin 2 mg PO daily   2. Recheck INR x1 on 12/20 Dx: Afib  3. Recheck TSH x1 on 12/20 Dx: Hypercalcemia  4. Recheck Vitamin D level on 12/20    Electronically signed by:  JOSUE Clark CNP DNP      Sincerely,        JOSUE Glilis CNP

## 2019-12-20 ENCOUNTER — NURSING HOME VISIT (OUTPATIENT)
Dept: GERIATRICS | Facility: CLINIC | Age: 84
End: 2019-12-20
Payer: COMMERCIAL

## 2019-12-20 ENCOUNTER — RECORDS - HEALTHEAST (OUTPATIENT)
Dept: LAB | Facility: CLINIC | Age: 84
End: 2019-12-20

## 2019-12-20 VITALS
WEIGHT: 224 LBS | DIASTOLIC BLOOD PRESSURE: 80 MMHG | OXYGEN SATURATION: 98 % | RESPIRATION RATE: 16 BRPM | SYSTOLIC BLOOD PRESSURE: 137 MMHG | TEMPERATURE: 97.2 F | BODY MASS INDEX: 36.15 KG/M2 | HEART RATE: 58 BPM

## 2019-12-20 DIAGNOSIS — Z51.81 ENCOUNTER FOR THERAPEUTIC DRUG MONITORING: ICD-10-CM

## 2019-12-20 DIAGNOSIS — I48.20 CHRONIC ATRIAL FIBRILLATION (H): Primary | ICD-10-CM

## 2019-12-20 DIAGNOSIS — Z79.01 LONG TERM (CURRENT) USE OF ANTICOAGULANTS: ICD-10-CM

## 2019-12-20 PROBLEM — E83.52 HYPERCALCEMIA: Status: ACTIVE | Noted: 2017-03-03

## 2019-12-20 PROBLEM — R82.90 ABNORMAL URINALYSIS: Status: ACTIVE | Noted: 2017-03-03

## 2019-12-20 PROBLEM — G81.94 LEFT HEMIPARESIS (H): Status: ACTIVE | Noted: 2019-12-20

## 2019-12-20 PROBLEM — R41.4: Status: ACTIVE | Noted: 2019-12-20

## 2019-12-20 LAB
25(OH)D3 SERPL-MCNC: 31.4 NG/ML (ref 30–80)
INR PPP: 2.68 (ref 0.9–1.1)
TSH SERPL DL<=0.005 MIU/L-ACNC: 1.21 UIU/ML (ref 0.3–5)

## 2019-12-20 PROCEDURE — 99308 SBSQ NF CARE LOW MDM 20: CPT | Performed by: NURSE PRACTITIONER

## 2019-12-20 NOTE — LETTER
12/20/2019        RE: Mecca Slade  [facility]  [address] [facility] [address]        [place] [facility]  [place] Medical Record Number:  [id number]  Place of Service where encounter took place: [facility]  (FGS) [[id number]]. HPI: Mecca Slade is a 89 year old  (12/14/1921), who is being seen today for an episodic care visit at the above location.   HPI information obtained from: facility chart records, facility staff, patient report, [facility] chart review and Care Everywhere [facility] chart review. Today's concern is INR/Coumadin management for A. Fib    ROS/Subjective:  Bleeding Signs/Symptoms:  None  Thromboembolic Signs/Symptoms:  None  Medication Changes:  No  Dietary Changes:  No  Activity Changes: No  Bacterial/Viral Infection:  No  Missed Coumadin Doses:  None  On ASA: No  Other Concerns:  No    OBJECTIVE:  /80   Pulse 58   Temp 97.2  F (36.2  C)   Resp 16   Wt 101.6 kg (224 lb)   SpO2 98%   BMI 36.15 kg/m     Last INR: 2.9 on 12/6/19  INR Today:  2.68.  Current Dose:  2mg/day.    ASSESSMENT:  Chronic atrial fibrillation  Encounter for therapeutic drug monitoring  Long term (current) use of anticoagulants  Chronic. Stable. Therapeutic. Will dose Coumadin as noted below and recheck INR in 1 month. Follow-up accordingly.Therapeutic INR for goal of 2-3.    PLAN:   Orders written by provider at facility and transcribed by : José Miguel Radford  New Dose: Coumadin 2 mg PO daily.    Next INR: Recheck INR x1 on 1/17/20 Dx: Afib    Electronically signed by:  JOSUE Clark CNP DNP        Sincerely,        JOSUE Gillis CNP

## 2019-12-20 NOTE — PROGRESS NOTES
[place] [facility]  [place] Medical Record Number:  [id number]  Place of Service where encounter took place: [facility]  (FGS) [[id number]]. HPI: Mecca Slade is a 89 year old  (12/14/1921), who is being seen today for an episodic care visit at the above location.   HPI information obtained from: facility chart records, facility staff, patient report, [facility] chart review and Care Everywhere [facility] chart review. Today's concern is INR/Coumadin management for A. Fib    ROS/Subjective:  Bleeding Signs/Symptoms:  None  Thromboembolic Signs/Symptoms:  None  Medication Changes:  No  Dietary Changes:  No  Activity Changes: No  Bacterial/Viral Infection:  No  Missed Coumadin Doses:  None  On ASA: No  Other Concerns:  No    OBJECTIVE:  /80   Pulse 58   Temp 97.2  F (36.2  C)   Resp 16   Wt 101.6 kg (224 lb)   SpO2 98%   BMI 36.15 kg/m    Last INR: 2.9 on 12/6/19  INR Today:  2.68.  Current Dose:  2mg/day.    ASSESSMENT:  Chronic atrial fibrillation  Encounter for therapeutic drug monitoring  Long term (current) use of anticoagulants  Chronic. Stable. Therapeutic. Will dose Coumadin as noted below and recheck INR in 1 month. Follow-up accordingly.Therapeutic INR for goal of 2-3.    PLAN:   Orders written by provider at facility and transcribed by : José Miguel Radford  New Dose: Coumadin 2 mg PO daily.    Next INR: Recheck INR x1 on 1/17/20 Dx: Afib    Electronically signed by:  Dr. Haley Sims, APRN CNP DNP

## 2020-01-05 VITALS
WEIGHT: 221.4 LBS | SYSTOLIC BLOOD PRESSURE: 113 MMHG | OXYGEN SATURATION: 96 % | RESPIRATION RATE: 16 BRPM | HEART RATE: 63 BPM | TEMPERATURE: 97.3 F | BODY MASS INDEX: 35.73 KG/M2 | DIASTOLIC BLOOD PRESSURE: 72 MMHG

## 2020-01-05 NOTE — PROGRESS NOTES
[place] [facility]  Chief Complaint   Patient presents with     longterm Regulatory     [place] Medical Record Number:  [id number]  Place of Service where encounter took place:  [facility]  (FGS) [[id number]]      HPI:    Mecca Slade  is 89 year old (12/14/1921), who is being seen today for a federally mandated E/M visit.  HPI information obtained from: facility staff, patient report, [facility] chart review and DNP.     Today's concerns are:   Resident seen and examined, reports doing fine in general. Reports soreness over  Right little toe when the shoe is on.  - Denies chest pain, palpitation, shortness of breath, or palpitation   - Reports sleep, appetite and BM are fine.    ---------------------------------  - - Past Medical, social, family histories, medications, and allergies reviewed and updated  - Medications reviewed: in the chart and EHR.   - Case Management:   I have reviewed the care plan and MDS and do agree with the plan. Patient's desire to return to the community is not present.  Information reviewed:  Medications, vital signs, orders, and nursing notes.  MEDICATIONS:  Current Outpatient Medications   Medication Sig Dispense Refill     Acetaminophen (TYLENOL PO) Take 500 mg by mouth 2 times daily        ATORVASTATIN CALCIUM PO Take 10 mg by mouth daily       carboxymethylcellulose (REFRESH PLUS) 0.5 % SOLN ophthalmic solution Place 1 drop into both eyes 3 times daily as needed for dry eyes And Qam scheduled       Furosemide (LASIX PO) Take 40 mg by mouth daily       Menthol, Topical Analgesic, (BENGAY COLD THERAPY) 5 % GEL Externally apply topically 4 times daily as needed (pain)       METFORMIN HCL PO Take 500 mg by mouth 2 times daily       metoprolol succinate ER (TOPROL-XL) 25 MG 24 hr tablet Take 1 tablet (25 mg) by mouth daily 30 tablet 0     simethicone (MYLICON) 80 MG chewable tablet Take 2 tablets (160 mg) by mouth daily       Warfarin Sodium (COUMADIN  PO) Take by mouth daily Take as directed per INR results       ROS:  4 point ROS including Respiratory, CV, GI and , other than that noted in the HPI,  is negative    Vitals:  /72   Pulse 63   Temp 97.3  F (36.3  C)   Resp 16   Wt 100.4 kg (221 lb 6.4 oz)   SpO2 96%   BMI 35.73 kg/m    Body mass index is 35.73 kg/m .  Exam:   GENERAL APPEARANCE:  Alert, in no distress, obese  RESP: lungs clear to auscultation , no wheezing.   CV: S1S2 audible, irregular rate and rhythm, no murmur, rub, or gallop, trace pedal edema b/l. Pacemaker.   ABDOMEN:  normal bowel sounds, soft, nontender, no palpable mass  MSK: Ms strength: 4/5 LUE, 4/5 LLE.   SKIN:  grown toe nail, slight erythema over medial surface of R little toe with small dark spot, slight tenderness over lateral surface, skin is intact.   NEURO:   no NFD appreciated on observation.   PSYCH:  Normal insight, judgement and memory, affect and mood normal    Lab/Diagnostic data: Reviewed in the chart and EHR.        ASSESSMENT/PLAN  -------------------------------  Primary Hyperparathyroidism (H) elevated Ca and PTH.  Resident seems doing fair. Recommend no further work up given limited life expectancy. If become symptomatic consider cinacalcet 30 mg po daily, may increase to bid unitl Ca level have normalized- close attention to hydration status    Chronic congestive heart failure, diastolic type (H)  Essential hypertension  -  EF > 70% (2012)  - compensated. Continue meds       Type II diabetes mellitus with peripheral circulatory disorder (H)   A1C 8.6 08/05/2019   - controlled. - on metformin 500 mg  bid.         CKD stage 3a, GFR 56 ml/min (H)  - - Avoid nephrotoxic drugs  - Renal dose the medications.     Atrial fibrillation, chronic type  -  on coumadin with INR followed closely. CVR, on metoprolol succinate.   - see under order for new order.     Class 2 obesity due to excess calories w/o serious comorbidity with BMI 35.0-35.9 in adult  - Body mass  index is 35.73 kg/m . from 36.06 kg/m .  in this age group- frail elderly, keep BMI b/lw 25-35 Kg(m2).   -  to reduce carbs intake.      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   - Off ASA, on lipitor 10 mg from 20 mg.   - requires assistance with ADLs.        GERD: .no concern    Frail elderly:  Significant  Deficits requiring NH placement. Requiring extensive assistance from nursing. Up for meals only o/w spends the day resting in bed      Non specific R 5th toe tenderness: consider podiatry care.     Orders written by provider at facility and transcribed by : José Miguel Radford    Electronically signed by:  Anirudh Ye MD

## 2020-01-06 ENCOUNTER — NURSING HOME VISIT (OUTPATIENT)
Dept: GERIATRICS | Facility: CLINIC | Age: 85
End: 2020-01-06
Payer: COMMERCIAL

## 2020-01-06 DIAGNOSIS — I50.32 CHRONIC DIASTOLIC CONGESTIVE HEART FAILURE (H): ICD-10-CM

## 2020-01-06 DIAGNOSIS — E11.51 TYPE II DIABETES MELLITUS WITH PERIPHERAL CIRCULATORY DISORDER (H): ICD-10-CM

## 2020-01-06 DIAGNOSIS — N18.4 CKD (CHRONIC KIDNEY DISEASE) STAGE 4, GFR 15-29 ML/MIN (H): ICD-10-CM

## 2020-01-06 DIAGNOSIS — I48.20 CHRONIC ATRIAL FIBRILLATION (H): ICD-10-CM

## 2020-01-06 DIAGNOSIS — I10 ESSENTIAL HYPERTENSION: ICD-10-CM

## 2020-01-06 DIAGNOSIS — E21.0 HYPERPARATHYROIDISM, PRIMARY (H): Primary | ICD-10-CM

## 2020-01-06 DIAGNOSIS — E66.812 CLASS 2 OBESITY DUE TO EXCESS CALORIES WITHOUT SERIOUS COMORBIDITY WITH BODY MASS INDEX (BMI) OF 35.0 TO 35.9 IN ADULT: ICD-10-CM

## 2020-01-06 DIAGNOSIS — E66.09 CLASS 2 OBESITY DUE TO EXCESS CALORIES WITHOUT SERIOUS COMORBIDITY WITH BODY MASS INDEX (BMI) OF 35.0 TO 35.9 IN ADULT: ICD-10-CM

## 2020-01-06 PROCEDURE — 99309 SBSQ NF CARE MODERATE MDM 30: CPT | Performed by: FAMILY MEDICINE

## 2020-01-06 PROCEDURE — 99207 ZZC CDG-MDM COMPONENT: MEETS LOW - DOWN CODED: CPT | Performed by: FAMILY MEDICINE

## 2020-01-06 NOTE — LETTER
1/6/2020        RE: Mecca Slade  [facility]  [address] [facility] [address]        [place] [facility]  Chief Complaint   Patient presents with     [facility] Regulatory     [place] Medical Record Number:  [id number]  Place of Service where encounter took place:  [facility]  (FGS) [[id number]]      HPI:    Mecca Slade  is 89 year old (12/14/1921), who is being seen today for a federally mandated E/M visit.  HPI information obtained from: facility staff, patient report, [facility] chart review and DNP.     Today's concerns are:   Resident seen and examined, reports doing fine in general. Reports soreness over  Right little toe when the shoe is on.  - Denies chest pain, palpitation, shortness of breath, or palpitation   - Reports sleep, appetite and BM are fine.    ---------------------------------  - - Past Medical, social, family histories, medications, and allergies reviewed and updated  - Medications reviewed: in the chart and EHR.   - Case Management:   I have reviewed the care plan and MDS and do agree with the plan. Patient's desire to return to the community is not present.  Information reviewed:  Medications, vital signs, orders, and nursing notes.  MEDICATIONS:  Current Outpatient Medications   Medication Sig Dispense Refill     Acetaminophen (TYLENOL PO) Take 500 mg by mouth 2 times daily        ATORVASTATIN CALCIUM PO Take 10 mg by mouth daily       carboxymethylcellulose (REFRESH PLUS) 0.5 % SOLN ophthalmic solution Place 1 drop into both eyes 3 times daily as needed for dry eyes And Qam scheduled       Furosemide (LASIX PO) Take 40 mg by mouth daily       Menthol, Topical Analgesic, (BENGAY COLD THERAPY) 5 % GEL Externally apply topically 4 times daily as needed (pain)       METFORMIN HCL PO Take 500 mg by mouth 2 times daily       metoprolol succinate ER (TOPROL-XL) 25 MG 24 hr tablet Take 1 tablet (25 mg) by mouth daily 30 tablet 0      simethicone (MYLICON) 80 MG chewable tablet Take 2 tablets (160 mg) by mouth daily       Warfarin Sodium (COUMADIN PO) Take by mouth daily Take as directed per INR results       ROS:  4 point ROS including Respiratory, CV, GI and , other than that noted in the HPI,  is negative    Vitals:  /72   Pulse 63   Temp 97.3  F (36.3  C)   Resp 16   Wt 100.4 kg (221 lb 6.4 oz)   SpO2 96%   BMI 35.73 kg/m     Body mass index is 35.73 kg/m .  Exam:   GENERAL APPEARANCE:  Alert, in no distress, obese  RESP: lungs clear to auscultation , no wheezing.   CV: S1S2 audible, irregular rate and rhythm, no murmur, rub, or gallop, trace pedal edema b/l. Pacemaker.   ABDOMEN:  normal bowel sounds, soft, nontender, no palpable mass  MSK: Ms strength: 4/5 LUE, 4/5 LLE.   SKIN:  grown toe nail, slight erythema over medial surface of R little toe with small dark spot, slight tenderness over lateral surface, skin is intact.   NEURO:   no NFD appreciated on observation.   PSYCH:  Normal insight, judgement and memory, affect and mood normal    Lab/Diagnostic data: Reviewed in the chart and EHR.        ASSESSMENT/PLAN  -------------------------------  Primary Hyperparathyroidism (H) elevated Ca and PTH.  Resident seems doing fair. Recommend no further work up given limited life expectancy. If become symptomatic consider cinacalcet 30 mg po daily, may increase to bid unitl Ca level have normalized- close attention to hydration status    Chronic congestive heart failure, diastolic type (H)  Essential hypertension  -  EF > 70% (2012)  - compensated. Continue meds       Type II diabetes mellitus with peripheral circulatory disorder (H)   A1C 8.6 08/05/2019   - controlled. - on metformin 500 mg  bid.         CKD stage 3a, GFR 56 ml/min (H)  - - Avoid nephrotoxic drugs  - Renal dose the medications.     Atrial fibrillation, chronic type  -  on coumadin with INR followed closely. CVR, on metoprolol succinate.   - see under order for new  order.     Class 2 obesity due to excess calories w/o serious comorbidity with BMI 35.0-35.9 in adult  - Body mass index is 35.73 kg/m . from 36.06 kg/m .  in this age group- frail elderly, keep BMI b/lw 25-35 Kg(m2).   -  to reduce carbs intake.      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   - Off ASA, on lipitor 10 mg from 20 mg.   - requires assistance with ADLs.        GERD: .no concern    Frail elderly:  Significant  Deficits requiring NH placement. Requiring extensive assistance from nursing. Up for meals only o/w spends the day resting in bed      Non specific R 5th toe tenderness: consider podiatry care.     Orders written by provider at facility and transcribed by : José Miguel Radford    Electronically signed by:  Anirudh Ye MD        Sincerely,        Anirudh Ye MD

## 2020-01-11 LAB — INR PPP: 1.55 (ref 0.9–1.1)

## 2020-01-16 ENCOUNTER — NURSING HOME VISIT (OUTPATIENT)
Dept: GERIATRICS | Facility: CLINIC | Age: 85
End: 2020-01-16
Payer: COMMERCIAL

## 2020-01-16 VITALS
TEMPERATURE: 98 F | DIASTOLIC BLOOD PRESSURE: 84 MMHG | BODY MASS INDEX: 35.7 KG/M2 | HEART RATE: 68 BPM | SYSTOLIC BLOOD PRESSURE: 142 MMHG | RESPIRATION RATE: 16 BRPM | WEIGHT: 221.2 LBS | OXYGEN SATURATION: 98 %

## 2020-01-16 DIAGNOSIS — H90.0 CONDUCTIVE HEARING LOSS, BILATERAL: ICD-10-CM

## 2020-01-16 DIAGNOSIS — H61.23 BILATERAL IMPACTED CERUMEN: Primary | ICD-10-CM

## 2020-01-16 PROCEDURE — 69210 REMOVE IMPACTED EAR WAX UNI: CPT | Mod: 50 | Performed by: NURSE PRACTITIONER

## 2020-01-16 NOTE — PROGRESS NOTES
[place] [facility]  [place] Medical Record Number:  [id number]  Place of Service where encounter took place:  [facility]  (FGS) [[id number]]  Chief Complaint   Patient presents with     Nursing Home Acute   HPI: Mecca Slade  is a 89 year old (12/14/1921), who is being seen today for an episodic care visit.  HPI information obtained from: facility chart records, facility staff, patient report, [facility] chart review and Care Everywhere [facility] chart review. Today's concern is:    Bilateral impacted cerumen  Conductive hearing loss, bilateral  Nursing and patient's family requested provider debride cerumen in both ears. Patient presented w/ provider debridement as option, discussed risk/benefit/discomforts. Patient consented to procedure. She states she is [place], and she is hoping for cerumen removal to improve her hearing.    PMH/PSH [facility] in [facility] today.  REVIEW OF SYSTEMS: Limited secondary to cognitive impairment but today pt reports the above and 4 point ROS including Respiratory, CV, GI and , other than that noted in the HPI, is negative.    Objective:  BP (!) 142/84   Pulse 68   Temp 98  F (36.7  C)   Resp 16   Wt 100.3 kg (221 lb 3.2 oz)   SpO2 98%   BMI 35.70 kg/m    Exam:  GENERAL APPEARANCE: Alert, in no distress, cooperative.   ENT: Mouth/posterior oropharynx intact w/ moist mucous membranes, hearing acuity [place], and TM is not visualized secondary to cerumenosis.  EYES: EOM, conjunctivae, lids, pupils and irises normal, PERRL2.     ASSESSMENT/PLAN:  Bilateral impacted cerumen  Conductive hearing loss, bilateral  Acute-on-chronic. Cerumenosis is noted.  Wax is removed by manual debridement via instrumentation. Cerumen was adhered and hardened, and only half of what was present was removable secondary to patient comfort. She otherwise tolerated the procedure. Post-procedure, patient states she can hear a little better. Will order Debrox to soften remaining cerumen.  Nursing can clean ears per protocol or consult provider for repeat if needed.      Orders written by provider at facility and transcribed by : José Miguel Radford  1. Debrox OTIC 5 drops, both ears every day x3 days.  2. Nurse to clean ears on day 3 per in house protocol.  Dx: Bilateral impacted cerumen.    Electronically signed by:  Dr. Haley Sims, APRN CNP DNP

## 2020-01-16 NOTE — LETTER
1/16/2020        RE: Mecca Slade  [facility]  [address] [facility] [address]        [place] [facility]  [place] Medical Record Number:  [id number]  Place of Service where encounter took place:  [facility]  (FGS) [[id number]]  Chief Complaint   Patient presents with     Nursing Home Acute   HPI: Mecca Slade  is a 89 year old (12/14/1921), who is being seen today for an episodic care visit.  HPI information obtained from: facility chart records, facility staff, patient report, [facility] chart review and Care Everywhere [facility] chart review. Today's concern is:    Bilateral impacted cerumen  Conductive hearing loss, bilateral  Nursing and patient's family requested provider debride cerumen in both ears. Patient presented w/ provider debridement as option, discussed risk/benefit/discomforts. Patient consented to procedure. She states she is [place], and she is hoping for cerumen removal to improve her hearing.    PMH/PSH reviewed in [facility] today.  REVIEW OF SYSTEMS: Limited secondary to cognitive impairment but today pt reports the above and 4 point ROS including Respiratory, CV, GI and , other than that noted in the HPI, is negative.    Objective:  BP (!) 142/84   Pulse 68   Temp 98  F (36.7  C)   Resp 16   Wt 100.3 kg (221 lb 3.2 oz)   SpO2 98%   BMI 35.70 kg/m     Exam:  GENERAL APPEARANCE: Alert, in no distress, cooperative.   ENT: Mouth/posterior oropharynx intact w/ moist mucous membranes, hearing acuity [place], and TM is not visualized secondary to cerumenosis.  EYES: EOM, conjunctivae, lids, pupils and irises normal, PERRL2.     ASSESSMENT/PLAN:  Bilateral impacted cerumen  Conductive hearing loss, bilateral  Acute-on-chronic. Cerumenosis is noted.  Wax is removed by manual debridement. Cerumen was adhered and hardened, and only half of what was present was removable secondary to patient comfort. She otherwise tolerated the procedure. Post-procedure,  patient states she can hear a little better. Will order Debrox to soften remaining cerumen. Nursing can clean ears per protocol or consult provider for repeat if needed.      Orders written by provider at facility and transcribed by : José Miguel Radford  1. Debrox OTIC 5 drops, both ears every day x3 days.  2. Nurse to clean ears on day 3 per in house protocol.  Dx: Bilateral impacted cerumen.    Electronically signed by:  Dr. Haley Sims, JOSUE CNP DNP        Sincerely,        JOSUE Gillis CNP

## 2020-01-17 ENCOUNTER — RECORDS - HEALTHEAST (OUTPATIENT)
Dept: LAB | Facility: CLINIC | Age: 85
End: 2020-01-17

## 2020-01-17 LAB — INR PPP: 2.2 (ref 0.9–1.1)

## 2020-01-29 LAB — INR PPP: 1.91 (ref 0.9–1.1)

## 2020-02-03 ENCOUNTER — RECORDS - HEALTHEAST (OUTPATIENT)
Dept: LAB | Facility: CLINIC | Age: 85
End: 2020-02-03

## 2020-02-03 LAB
ANION GAP SERPL CALCULATED.3IONS-SCNC: 11 MMOL/L (ref 5–18)
BUN SERPL-MCNC: 29 MG/DL (ref 8–28)
CALCIUM SERPL-MCNC: 10.8 MG/DL (ref 8.5–10.5)
CHLORIDE BLD-SCNC: 104 MMOL/L (ref 98–107)
CO2 SERPL-SCNC: 26 MMOL/L (ref 22–31)
CREAT SERPL-MCNC: 1.12 MG/DL (ref 0.6–1.1)
GFR SERPL CREATININE-BSD FRML MDRD: 45 ML/MIN/1.73M2
GLUCOSE BLD-MCNC: 288 MG/DL (ref 70–125)
HGB BLD-MCNC: 14.7 G/DL (ref 12–16)
POTASSIUM BLD-SCNC: 4.2 MMOL/L (ref 3.5–5)
SODIUM SERPL-SCNC: 141 MMOL/L (ref 136–145)

## 2020-02-04 LAB — HBA1C MFR BLD: 7.5 % (ref 4.2–6.1)

## 2020-02-13 NOTE — PROGRESS NOTES
[place] [facility]  [place] Medical Record Number:  [id number]  Place of Service where encounter took place: [facility]  (FGS) [[id number]]. HPI: Mecca Slade is a 89 year old  (12/14/1921), who is being seen today for an episodic care visit at the above location.   HPI information obtained from: facility chart records, facility staff, patient report, [facility] chart review and Care Everywhere [facility] chart review. Today's concern is INR/Coumadin management for A. Fib    ROS/Subjective:  Bleeding Signs/Symptoms:  None  Thromboembolic Signs/Symptoms:  None  Medication Changes:  No  Dietary Changes:  No  Activity Changes: No  Bacterial/Viral Infection:  No  Missed Coumadin Doses:  None  On ASA: No  Other Concerns:  No    OBJECTIVE:  BP (!) 154/72   Pulse 74   Temp 97.7  F (36.5  C)   Resp 16   SpO2 98%   Last INR: 2.20 on 1/17/20.  INR Today: 2.44.  Current Dose:  2mg/day.    ASSESSMENT:  Chronic atrial fibrillation  Encounter for therapeutic drug monitoring  Long term (current) use of anticoagulants  Chronic. Stable. Therapeutic. Will dose Coumadin as noted below and recheck INR in 1 month. Follow-up accordingly.Therapeutic INR for goal of 2-3.    PLAN:   Orders written by provider at facility and transcribed by : José Miguel Radford  New Dose: 2 mg PO daily.    Next INR: 3/13/20    Electronically signed by:  Dr. Haley Sims, APRN CNP DNP

## 2020-02-14 ENCOUNTER — NURSING HOME VISIT (OUTPATIENT)
Dept: GERIATRICS | Facility: CLINIC | Age: 85
End: 2020-02-14
Payer: COMMERCIAL

## 2020-02-14 ENCOUNTER — RECORDS - HEALTHEAST (OUTPATIENT)
Dept: LAB | Facility: CLINIC | Age: 85
End: 2020-02-14

## 2020-02-14 ENCOUNTER — TRANSFERRED RECORDS (OUTPATIENT)
Dept: HEALTH INFORMATION MANAGEMENT | Facility: CLINIC | Age: 85
End: 2020-02-14

## 2020-02-14 VITALS
TEMPERATURE: 97.7 F | RESPIRATION RATE: 16 BRPM | SYSTOLIC BLOOD PRESSURE: 154 MMHG | OXYGEN SATURATION: 98 % | DIASTOLIC BLOOD PRESSURE: 72 MMHG | HEART RATE: 74 BPM

## 2020-02-14 DIAGNOSIS — I48.20 CHRONIC ATRIAL FIBRILLATION (H): Primary | ICD-10-CM

## 2020-02-14 DIAGNOSIS — Z51.81 ENCOUNTER FOR THERAPEUTIC DRUG MONITORING: ICD-10-CM

## 2020-02-14 DIAGNOSIS — Z79.01 LONG TERM (CURRENT) USE OF ANTICOAGULANTS: ICD-10-CM

## 2020-02-14 LAB
INR PPP: 2.44 (ref 0.9–1.1)
INR PPP: 2.44 (ref 0.9–1.1)

## 2020-02-14 PROCEDURE — 99308 SBSQ NF CARE LOW MDM 20: CPT | Performed by: NURSE PRACTITIONER

## 2020-02-14 NOTE — LETTER
2/14/2020        RE: Mecca Slade  [facility]  [address] [facility] [address]        [place] [facility]  [place] Medical Record Number:  [id number]  Place of Service where encounter took place: [facility]  (FGS) [[id number]]. HPI: Mecca Slade is a 89 year old  (12/14/1921), who is being seen today for an episodic care visit at the above location.   HPI information obtained from: facility chart records, facility staff, patient report, [facility] chart review and Care Everywhere [facility] chart review. Today's concern is INR/Coumadin management for A. Fib    ROS/Subjective:  Bleeding Signs/Symptoms:  None  Thromboembolic Signs/Symptoms:  None  Medication Changes:  No  Dietary Changes:  No  Activity Changes: No  Bacterial/Viral Infection:  No  Missed Coumadin Doses:  None  On ASA: No  Other Concerns:  No    OBJECTIVE:  BP (!) 154/72   Pulse 74   Temp 97.7  F (36.5  C)   Resp 16   SpO2 98%   Last INR: 2.20 on 1/17/20.  INR Today: 2.44.  Current Dose:  2mg/day.    ASSESSMENT:  Chronic atrial fibrillation  Encounter for therapeutic drug monitoring  Long term (current) use of anticoagulants  Chronic. Stable. Therapeutic. Will dose Coumadin as noted below and recheck INR in 1 month. Follow-up accordingly.Therapeutic INR for goal of 2-3.    PLAN:   Orders written by provider at facility and transcribed by : José Miguel Radford  New Dose: 2 mg PO daily.    Next INR: 3/13/20    Electronically signed by:  JOSUE Clark CNP DNP      Sincerely,        JOSUE Gillis CNP

## 2020-03-12 NOTE — PROGRESS NOTES
[place] [facility]  Chief Complaint   Patient presents with     Annual Comprehensive Nursing Home   [place] Medical Record Number: [id number]. Place of Service where encounter took place: [facility]  (S) [[id number]] HPI: Mecca Slade  is a 89 year old  (12/14/1921), who is being seen today for an annual comprehensive visit. HPI information obtained from: facility chart records, facility staff, patient report, [place] [facility] chart review and Care Everywhere [facility] chart review.  Today's concerns are:    Annual physical exam  Chronic atrial fibrillation  Chronic diastolic congestive heart failure (H)  Essential hypertension  Morbid obesity (H)  Encounter for therapeutic drug monitoring  Long term (current) use of anticoagulants  Last INR was 2.44 on 2/14. Current coumadin dose is 2mg/day. Recheck INR today resulted at 2.51. Last lipid panel, electrolytes, and INRs are included below or in CE. Patient overall denies CP, SOB, palpitations, cough, edema. She is on Lipitor for secondary prevention and only anticoagulated w/ Coumadin (no Aspirin). The etiology of her prior CVA was likely cardioembolic, and thus it may be appropriate to continue her care w/o Aspirin. Her BP/wts trend is as listed below:  BPs:  3/6/2020 10:22 133/78 mmHg   2/28/2020 11:14 123/76 mmHg   2/21/2020 11:27 176/76 mmHg   2/20/2020 11:01 150/80 mmHg   2/19/2020 01:56 132/77 mmHg   2/18/2020 10:33 122/74 mmHg   2/17/2020 13:42 149/78 mmHg   2/16/2020 11:55 140/79 mmHg   2/14/2020 11:28 154/72 mmHg   2/7/2020 10:43 145/75 mmHg   1/31/2020 10:30 150/79 mmHg   1/24/2020 14:06 105/67 mmHg   1/17/2020 09:53 107/72 mmHg   1/10/2020 11:17 142/84 mmHg   1/3/2020 11:12 113/72 mmHg   Weights:  3/9/2020 10:54 218.8 Lbs   2/24/2020 13:05 221.6 Lbs   2/17/2020 14:03 221.2 Lbs    2/10/2020 11:58 221.4 Lbs     Type II diabetes mellitus with peripheral circulatory disorder (H)  Hyperparathyroidism, primary (H)  CKD (chronic kidney  disease) stage 4, GFR 15-29 ml/min (H)  Gas pain  Abdominal pain, epigastric, post prandial, suspect gastroparesis  Zayda has complained on-and-off of post-prandial stomach upset, she describes this as cramping. She has alternating loose stools and then other times constipation. She c/o not being able to get to the bathroom which is distressing for her. We have tried: PT/OT for strengthening r/t toileting and ease of transfer, Simethicone, and PRN Zofran. However, she overall denies nausea, fever, has a very good appetite. Her BG is controlled w/ metformin, her last a1c was 7.5% and her last TSH was 1.21 (December 2019). Her last PTH is included in CE. Her more recent creatinine/GFR are included below. BG trend is listed here:  BGs:  3/9/2020 08:33 153.0 mg/dL   3/2/2020 07:32 148.0 mg/dL   2/24/2020 08:25 150.0 mg/dL   2/17/2020 07:35 149.0 mg/dL   2/10/2020 07:55 170.0 mg/dL   2/3/2020 07:32 150.0 mg/dL   1/27/2020 07:19 146.0 mg/dL   1/20/2020 07:57 141.0 mg/dL   1/13/2020 07:25 143.0 mg/dL   1/6/2020 08:10 159.0 mg/dL   12/30/2019 07:07 152.0 mg/dL   12/23/2019 07:28 145.0 mg/dL   12/16/2019 07:51 170.0 mg/dL   12/9/2019 07:19 162.0 mg/dL   12/2/2019 07:23 171.0 mg/dL    ALLERGIES: Patient has no known allergies. 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 PAST SURGICAL HISTORY:  has no past surgical history on file.  IMMUNIZATIONS:  Immunization History   Administered Date(s) Administered     Influenza (High Dose) 3 valent vaccine 10/14/2010, 09/30/2011     Influenza (IIV3) PF 10/06/2004, 10/26/2006, 10/25/2007, 10/14/2008, 09/18/2009, 09/15/2012, 10/06/2016     Influenza Vaccine IM > 6 months Valent IIV4 09/03/2013     Pneumo Conj 13-V (2010&after) 04/20/2017     Pneumococcal 23 valent 11/06/1995, 01/27/2005, 10/28/2011     TD (ADULT, 7+) 10/07/1997, 01/24/2001, 12/14/2017   Above immunizations pulled from [employer]. [facility] and facility records also reconciled. Outstanding information sent to  to update [employer].  Future immunizations are not needed at this point as all recommended immunizations are up to date.   Current Outpatient Medications   Medication Sig Dispense Refill     Acetaminophen (TYLENOL PO) Take 500 mg by mouth 2 times daily        ATORVASTATIN CALCIUM PO Take 10 mg by mouth daily       carboxymethylcellulose (REFRESH PLUS) 0.5 % SOLN ophthalmic solution Place 1 drop into both eyes 3 times daily as needed for dry eyes And Qam scheduled       Furosemide (LASIX PO) Take 40 mg by mouth daily       Menthol, Topical Analgesic, (BENGAY COLD THERAPY) 5 % GEL Externally apply topically 4 times daily as needed (pain)       METFORMIN HCL PO Take 500 mg by mouth 2 times daily       metoprolol succinate ER (TOPROL-XL) 25 MG 24 hr tablet Take 1 tablet (25 mg) by mouth daily 30 tablet 0     simethicone (MYLICON) 80 MG chewable tablet Take 2 tablets (160 mg) by mouth daily       Warfarin Sodium (COUMADIN PO) Take by mouth daily Take as directed per INR results       carbamide peroxide (DEBROX) 6.5 % otic solution Place 5 drops into both ears daily       Case Management: I have reviewed the facility/SNF care plan/MDS, including the falls risk, nutrition and pain  screening. I also reviewed the current immunizations, and preventive care. Future cancer screening is not clinically indicated secondary to age/goals of care Patient's desire to return to the community is not assessible due to cognitive impairment. Current Level of Care is appropriate.    Advance Directive Discussion:    I reviewed the current advanced directives as reflected in EPIC, the POLST and the facility chart, and verified the congruency of orders. I spoke w/ Zayda and discussed the plan of Care. I did review the advance directives with the resident.     Team Discussion: I communicated with the appropriate disciplines involved with the Plan of Care: Nursing   and PT  Patient's goal is pain control and comfort. Information reviewed:  Medications, vital signs, orders, and nursing notes.    ROS: Limited secondary to cognitive impairment but today pt reports the above and 6 point ROS of systems including Constitutional, Eyes, Respiratory, Cardiovascular, Gastroenterology, Genitourinary, Integumentary, Musculoskeletal, Psychiatric were all negative except for pertinent positives noted in my HPI.    Vitals:  /74   Pulse 65   Temp 97.7  F (36.5  C)   Resp 18   Wt 99.2 kg (218 lb 12.8 oz)   SpO2 97%   BMI 35.32 kg/m   Body mass index is 35.32 kg/m .  Exam:  GENERAL APPEARANCE: Alert, in no distress, cooperative.   ENT: Mouth/posterior oropharynx intact w/ moist mucous membranes, hearing acuity [place].  EYES: EOM, conjunctivae, lids, pupils and irises normal, PERRL2.   RESP: Respiratory effort good, no respiratory distress, Lung sounds clear. On RA.   CV: Auscultation of heart reveals S1, S2, rate-controlled and rhythm paced, no murmur, no rub or gallop, Edema 1+ BLE. Peripheral pulses are 2+.  ABDOMEN: Normal bowel sounds, soft, non-tender abdomen, and no masses palpated.  SKIN: Inspection/Palpation of skin and subcutaneous tissue baseline w/ fragility. No wounds/rashes noted.   NEURO: CN II-XII at  patient's baseline, sensation baseline PPS.  PSYCH: Insight, judgement, and memory are baseline, affect and mood are happy/engaged.    Lab/Diagnostic data:           ASSESSMENT/PLAN:  Annual physical exam  Chronic atrial fibrillation  Chronic diastolic congestive heart failure (H)  Essential hypertension  Morbid obesity (H)  Encounter for therapeutic drug monitoring  Long term (current) use of anticoagulants  Chronic. Ongoing. INR therapeutic. Will dose Coumadin as noted below and recheck INR in 3 weeks. Will curbside consult w/ neurologyt, to confirm/discuss regimen w/o Aspirin. Will assess lipid panel, electrolytes, w/ next INR check. Will defer cancer screenings secondary to age/goals. Vaccinations are UTD. Follow-up routinely or as needed.    Type II diabetes mellitus with peripheral circulatory disorder (H)  Hyperparathyroidism, primary (H)  CKD (chronic kidney disease) stage 4, GFR 15-29 ml/min (H)  Gas pain  Abdominal pain, epigastric, post prandial, suspect gastroparesis  Acute-on-chronic. Ongoing and bothersome. Will decrease Metformin given good BG control, and will assess ongoing a1c levels. Given her Hyperparathyroidism, we will evaluate TSH and PTH. Given her CKD we will trend CBC and CMP. We noted borderline MCV on last CBC, and will therefore consider folate and/or B12 levels if needed pending these results. Given her ongoing abdominal discomfort, we will trial a 14-day course of PPI. Follow-up routinely or as needed.    Orders written by provider at facility and transcribed by : José Miguel Radford  1. Protonix 20 mg PO daily x14 days Dx: GERD  2. Decrease Metformin to 500 mg PO daily of XL formation Dx: Dm2  3. A1C, Lipids, TSH, Free T4, PTH, CBC, CMP, Vit D Dx: Annual physical  4. Coumadin 2 mg PO daily  5. Recheck INR with other labs on 4/3/2020  FYI: If MCV high, consider folate or B12.     Electronically signed by:  Dr. Haley Sims, APRN CNP DNP

## 2020-03-13 ENCOUNTER — NURSING HOME VISIT (OUTPATIENT)
Dept: GERIATRICS | Facility: CLINIC | Age: 85
End: 2020-03-13
Payer: COMMERCIAL

## 2020-03-13 ENCOUNTER — RECORDS - HEALTHEAST (OUTPATIENT)
Dept: LAB | Facility: CLINIC | Age: 85
End: 2020-03-13

## 2020-03-13 VITALS
DIASTOLIC BLOOD PRESSURE: 74 MMHG | BODY MASS INDEX: 35.32 KG/M2 | WEIGHT: 218.8 LBS | RESPIRATION RATE: 18 BRPM | TEMPERATURE: 97.7 F | HEART RATE: 65 BPM | OXYGEN SATURATION: 97 % | SYSTOLIC BLOOD PRESSURE: 136 MMHG

## 2020-03-13 DIAGNOSIS — I10 ESSENTIAL HYPERTENSION: ICD-10-CM

## 2020-03-13 DIAGNOSIS — R14.1 GAS PAIN: ICD-10-CM

## 2020-03-13 DIAGNOSIS — G81.94 LEFT HEMIPARESIS (H): ICD-10-CM

## 2020-03-13 DIAGNOSIS — I50.32 CHRONIC DIASTOLIC CONGESTIVE HEART FAILURE (H): ICD-10-CM

## 2020-03-13 DIAGNOSIS — E66.01 MORBID OBESITY (H): ICD-10-CM

## 2020-03-13 DIAGNOSIS — Z51.81 ENCOUNTER FOR THERAPEUTIC DRUG MONITORING: ICD-10-CM

## 2020-03-13 DIAGNOSIS — Z79.01 LONG TERM (CURRENT) USE OF ANTICOAGULANTS: ICD-10-CM

## 2020-03-13 DIAGNOSIS — Z00.00 ANNUAL PHYSICAL EXAM: ICD-10-CM

## 2020-03-13 DIAGNOSIS — R10.13 ABDOMINAL PAIN, EPIGASTRIC: ICD-10-CM

## 2020-03-13 DIAGNOSIS — N18.4 CKD (CHRONIC KIDNEY DISEASE) STAGE 4, GFR 15-29 ML/MIN (H): ICD-10-CM

## 2020-03-13 DIAGNOSIS — E21.0 HYPERPARATHYROIDISM, PRIMARY (H): ICD-10-CM

## 2020-03-13 DIAGNOSIS — I48.20 CHRONIC ATRIAL FIBRILLATION (H): Primary | ICD-10-CM

## 2020-03-13 DIAGNOSIS — E11.51 TYPE II DIABETES MELLITUS WITH PERIPHERAL CIRCULATORY DISORDER (H): ICD-10-CM

## 2020-03-13 LAB — INR PPP: 2.51 (ref 0.9–1.1)

## 2020-03-13 PROCEDURE — 99318 ZZC ANNUAL NURSING FAC ASSESSMNT, STABLE: CPT | Performed by: NURSE PRACTITIONER

## 2020-03-13 RX ORDER — PANTOPRAZOLE SODIUM 20 MG/1
20 TABLET, DELAYED RELEASE ORAL DAILY
COMMUNITY
End: 2021-06-10

## 2020-03-13 RX ORDER — METFORMIN HCL 500 MG
500 TABLET, EXTENDED RELEASE 24 HR ORAL
Qty: 30 TABLET | Refills: 0
Start: 2020-03-13 | End: 2021-03-15

## 2020-03-13 NOTE — LETTER
3/13/2020        RE: Mecca Slade  [facility]  [address] [place] [id number]        [place] [facility]  Chief Complaint   Patient presents with     Annual Comprehensive Nursing Home   [facility] [facility] Record Number: [id number]. Place of Service where encounter took place: [facility]  ([facility]) [[id number]] HPI: Mecca Slade  is a 89 year old  (12/14/1921), who is being seen today for an annual comprehensive visit. HPI information obtained from: facility chart records, facility staff, patient report, [facility] chart review and Care Everywhere [facility] chart review.  Today's concerns are:    Annual physical exam  Chronic atrial fibrillation  Chronic diastolic congestive heart failure (H)  Essential hypertension  Morbid obesity (H)  Encounter for therapeutic drug monitoring  Long term (current) use of anticoagulants  Last INR was 2.44 on 2/14. Current coumadin dose is 2mg/day. Recheck INR today resulted at 2.51. Last lipid panel, electrolytes, and INRs are included below or in CE. Patient overall denies CP, SOB, palpitations, cough, edema. She is on Lipitor for secondary prevention and only anticoagulated w/ Coumadin (no Aspirin). The etiology of her prior CVA was likely cardioembolic, and thus it may be appropriate to continue her care w/o Aspirin. Her BP/wts trend is as listed below:  BPs:  3/6/2020 10:22 133/78 mmHg   2/28/2020 11:14 123/76 mmHg   2/21/2020 11:27 176/76 mmHg   2/20/2020 11:01 150/80 mmHg   2/19/2020 01:56 132/77 mmHg   2/18/2020 10:33 122/74 mmHg   2/17/2020 13:42 149/78 mmHg   2/16/2020 11:55 140/79 mmHg   2/14/2020 11:28 154/72 mmHg   2/7/2020 10:43 145/75 mmHg   1/31/2020 10:30 150/79 mmHg   1/24/2020 14:06 105/67 mmHg   1/17/2020 09:53 107/72 mmHg   1/10/2020 11:17 142/84 mmHg   1/3/2020 11:12 113/72 mmHg   Weights:  3/9/2020 10:54 218.8 Lbs   2/24/2020 13:05 221.6 Lbs   2/17/2020 14:03 221.2 Lbs    2/10/2020 11:58 221.4 Lbs     Type II  diabetes mellitus with peripheral circulatory disorder (H)  Hyperparathyroidism, primary (H)  CKD (chronic kidney disease) stage 4, GFR 15-29 ml/min (H)  Gas pain  Abdominal pain, epigastric, post prandial, suspect gastroparesis  Zayda has complained on-and-off of post-prandial stomach upset, she describes this as cramping. She has alternating loose stools and then other times constipation. She c/o not being able to get to the bathroom which is distressing for her. We have tried: PT/OT for strengthening r/t toileting and ease of transfer, Simethicone, and PRN Zofran. However, she overall denies nausea, fever, has a very good appetite. Her BG is controlled w/ metformin, her last a1c was 7.5% and her last TSH was 1.21 (December 2019). Her last PTH is included in CE. Her more recent creatinine/GFR are included below. BG trend is listed here:  BGs:  3/9/2020 08:33 153.0 mg/dL   3/2/2020 07:32 148.0 mg/dL   2/24/2020 08:25 150.0 mg/dL   2/17/2020 07:35 149.0 mg/dL   2/10/2020 07:55 170.0 mg/dL   2/3/2020 07:32 150.0 mg/dL   1/27/2020 07:19 146.0 mg/dL   1/20/2020 07:57 141.0 mg/dL   1/13/2020 07:25 143.0 mg/dL   1/6/2020 08:10 159.0 mg/dL   12/30/2019 07:07 152.0 mg/dL   12/23/2019 07:28 145.0 mg/dL   12/16/2019 07:51 170.0 mg/dL   12/9/2019 07:19 162.0 mg/dL   12/2/2019 07:23 171.0 mg/dL    ALLERGIES: Patient has no known allergies. 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 PAST SURGICAL HISTORY:  has no past surgical history on file.  IMMUNIZATIONS:  Immunization History   Administered Date(s) Administered     Influenza (High Dose) 3 valent vaccine 10/14/2010, 09/30/2011     Influenza (IIV3) PF 10/06/2004, 10/26/2006, 10/25/2007, 10/14/2008, 09/18/2009, 09/15/2012, 10/06/2016     Influenza Vaccine IM > 6 months Valent IIV4 09/03/2013     Pneumo Conj 13-V (2010&after) 04/20/2017     Pneumococcal 23 valent 11/06/1995, 01/27/2005, 10/28/2011     TD (ADULT, 7+) 10/07/1997, 01/24/2001, 12/14/2017   Above immunizations pulled from [employer]. [facility] and facility records also reconciled. Outstanding information sent to  to update [employer].  Future immunizations are not needed at this point as all recommended immunizations are up to date.   Current Outpatient Medications   Medication Sig Dispense Refill     Acetaminophen (TYLENOL PO) Take 500 mg by mouth 2 times daily        ATORVASTATIN CALCIUM PO Take 10 mg by mouth daily       carboxymethylcellulose (REFRESH PLUS) 0.5 % SOLN ophthalmic solution Place 1 drop into both eyes 3 times daily as needed for dry eyes And Qam scheduled       Furosemide (LASIX PO) Take 40 mg by mouth daily       Menthol, Topical Analgesic, (BENGAY COLD THERAPY) 5 % GEL Externally apply topically 4 times daily as needed (pain)       METFORMIN HCL PO Take 500 mg by mouth 2 times daily       metoprolol succinate ER (TOPROL-XL) 25 MG 24 hr tablet Take 1 tablet (25 mg) by mouth daily 30 tablet 0     simethicone (MYLICON) 80 MG chewable tablet Take 2 tablets (160 mg) by mouth daily       Warfarin Sodium (COUMADIN PO) Take by mouth daily Take as directed per INR results       carbamide peroxide (DEBROX) 6.5 % otic solution Place 5 drops into both ears daily       Case  Management: I have reviewed the facility/SNF care plan/MDS, including the falls risk, nutrition and pain screening. I also reviewed the current immunizations, and preventive care. Future cancer screening is not clinically indicated secondary to age/goals of care Patient's desire to return to the community is not assessible due to cognitive impairment. Current Level of Care is appropriate.    Advance Directive Discussion:    I reviewed the current advanced directives as reflected in EPIC, the POLST and the facility chart, and verified the congruency of orders. I spoke w/ Zayda and discussed the plan of Care. I did review the advance directives with the resident.     Team Discussion: I communicated with the appropriate disciplines involved with the Plan of Care: Nursing   and PT  Patient's goal is pain control and comfort. Information reviewed:  Medications, vital signs, orders, and nursing notes.    ROS: Limited secondary to cognitive impairment but today pt reports the above and 6 point ROS of systems including Constitutional, Eyes, Respiratory, Cardiovascular, Gastroenterology, Genitourinary, Integumentary, Musculoskeletal, Psychiatric were all negative except for pertinent positives noted in my HPI.    Vitals:  /74   Pulse 65   Temp 97.7  F (36.5  C)   Resp 18   Wt 99.2 kg (218 lb 12.8 oz)   SpO2 97%   BMI 35.32 kg/m   Body mass index is 35.32 kg/m .  Exam:  GENERAL APPEARANCE: Alert, in no distress, cooperative.   ENT: Mouth/posterior oropharynx intact w/ moist mucous membranes, hearing acuity Ute.  EYES: EOM, conjunctivae, lids, pupils and irises normal, PERRL2.   RESP: Respiratory effort good, no respiratory distress, Lung sounds clear. On RA.   CV: Auscultation of heart reveals S1, S2, rate-controlled and rhythm paced, no murmur, no rub or gallop, Edema 1+ BLE. Peripheral pulses are 2+.  ABDOMEN: Normal bowel sounds, soft, non-tender abdomen, and no masses palpated.  SKIN: Inspection/Palpation of  skin and subcutaneous tissue baseline w/ fragility. No wounds/rashes noted.   NEURO: CN II-XII at patient's baseline, sensation baseline PPS.  PSYCH: Insight, judgement, and memory are baseline, affect and mood are happy/engaged.    Lab/Diagnostic data:           ASSESSMENT/PLAN:  Annual physical exam  Chronic atrial fibrillation  Chronic diastolic congestive heart failure (H)  Essential hypertension  Morbid obesity (H)  Encounter for therapeutic drug monitoring  Long term (current) use of anticoagulants  Chronic. Ongoing. INR therapeutic. Will dose Coumadin as noted below and recheck INR in 3 weeks. Will curbside consult w/ neurologyt, to confirm/discuss regimen w/o Aspirin. Will assess lipid panel, electrolytes, w/ next INR check. Will defer cancer screenings secondary to age/goals. Vaccinations are UTD. Follow-up routinely or as needed.    Type II diabetes mellitus with peripheral circulatory disorder (H)  Hyperparathyroidism, primary (H)  CKD (chronic kidney disease) stage 4, GFR 15-29 ml/min (H)  Gas pain  Abdominal pain, epigastric, post prandial, suspect gastroparesis  Acute-on-chronic. Ongoing and bothersome. Will decrease Metformin given good BG control, and will assess ongoing a1c levels. Given her Hyperparathyroidism, we will evaluate TSH and PTH. Given her CKD we will trend CBC and CMP. We noted borderline MCV on last CBC, and will therefore consider folate and/or B12 levels if needed pending these results. Given her ongoing abdominal discomfort, we will trial a 14-day course of PPI. Follow-up routinely or as needed.    Orders written by provider at facility and transcribed by : José Miguel Radford  1. Protonix 20 mg PO daily x14 days Dx: GERD  2. Decrease Metformin to 500 mg PO daily of XL formation Dx: Dm2  3. A1C, Lipids, TSH, Free T4, PTH, CBC, CMP, Vit D Dx: Annual physical  4. Coumadin 2 mg PO daily  5. Recheck INR with other labs on 4/3/2020  FYI: If MCV high, consider folate or B12.      Electronically signed by:  Dr. Haley Sims, APRN CNP DNP        Sincerely,        JOSUE Gillis CNP

## 2020-03-14 PROBLEM — E66.01 MORBID OBESITY (H): Status: ACTIVE | Noted: 2020-03-14

## 2020-04-02 ENCOUNTER — RECORDS - HEALTHEAST (OUTPATIENT)
Dept: LAB | Facility: CLINIC | Age: 85
End: 2020-04-02

## 2020-04-03 ENCOUNTER — TRANSFERRED RECORDS (OUTPATIENT)
Dept: HEALTH INFORMATION MANAGEMENT | Facility: CLINIC | Age: 85
End: 2020-04-03

## 2020-04-03 ENCOUNTER — TELEPHONE (OUTPATIENT)
Dept: GERIATRICS | Facility: CLINIC | Age: 85
End: 2020-04-03

## 2020-04-03 LAB
25(OH)D3 SERPL-MCNC: 34.7 NG/ML (ref 30–80)
ALBUMIN SERPL-MCNC: 3.1 G/DL (ref 3.5–5)
ALBUMIN SERPL-MCNC: 3.1 G/DL (ref 3.5–5)
ALP SERPL-CCNC: 112 U/L (ref 45–120)
ALP SERPL-CCNC: 112 U/L (ref 45–120)
ALT SERPL W P-5'-P-CCNC: 9 U/L (ref 0–45)
ALT SERPL-CCNC: 9 U/L (ref 0–45)
ANION GAP SERPL CALCULATED.3IONS-SCNC: 10 MMOL/L (ref 5–18)
ANION GAP SERPL CALCULATED.3IONS-SCNC: 10 MMOL/L (ref 5–18)
AST SERPL W P-5'-P-CCNC: 29 U/L (ref 0–40)
AST SERPL-CCNC: 29 U/L (ref 0–40)
BASOPHILS # BLD AUTO: 0.1 THOU/UL (ref 0–0.2)
BASOPHILS NFR BLD AUTO: 1 % (ref 0–2)
BILIRUB SERPL-MCNC: 0.6 MG/DL (ref 0–1)
BILIRUB SERPL-MCNC: 0.6 MG/DL (ref 0–1)
BUN SERPL-MCNC: 23 MG/DL (ref 8–28)
BUN SERPL-MCNC: 23 MG/DL (ref 8–28)
CALCIUM SERPL-MCNC: 10.7 MG/DL (ref 8.5–10.5)
CALCIUM SERPL-MCNC: 10.7 MG/DL (ref 8.5–10.5)
CHLORIDE BLD-SCNC: 105 MMOL/L (ref 98–107)
CHLORIDE SERPLBLD-SCNC: 105 MMOL/L (ref 98–107)
CHOLEST SERPL-MCNC: 146 MG/DL
CHOLEST SERPL-MCNC: 146 MG/DL
CO2 SERPL-SCNC: 29 MMOL/L (ref 22–31)
CO2 SERPL-SCNC: 29 MMOL/L (ref 22–31)
CREAT SERPL-MCNC: 0.84 MG/DL (ref 0.6–1.1)
CREAT SERPL-MCNC: 0.84 MG/DL (ref 0.6–1.1)
DIFFERENTIAL: NORMAL
EOSINOPHIL # BLD AUTO: 0.1 THOU/UL (ref 0–0.4)
EOSINOPHIL NFR BLD AUTO: 2 % (ref 0–6)
ERYTHROCYTE [DISTWIDTH] IN BLOOD BY AUTOMATED COUNT: 13.1 % (ref 11–14.5)
ERYTHROCYTE [DISTWIDTH] IN BLOOD BY AUTOMATED COUNT: 13.1 % (ref 11–14.5)
FASTING STATUS PATIENT QL REPORTED: ABNORMAL
GFR SERPL CREATININE-BSD FRML MDRD: >60 ML/MIN/1.73M2
GFR SERPL CREATININE-BSD FRML MDRD: >60 ML/MIN/1.73M2
GLUCOSE BLD-MCNC: 140 MG/DL (ref 70–125)
GLUCOSE SERPL-MCNC: 140 MG/DL (ref 70–125)
HBA1C MFR BLD: 7.5 %
HBA1C MFR BLD: 7.5 % (ref 0–5.7)
HCT VFR BLD AUTO: 46.3 % (ref 35–47)
HCT VFR BLD AUTO: 46.3 % (ref 35–47)
HDLC SERPL-MCNC: 33 MG/DL
HDLC SERPL-MCNC: 33 MG/DL
HEMOGLOBIN: 14.8 G/DL (ref 12–16)
HGB BLD-MCNC: 14.8 G/DL (ref 12–16)
INR PPP: 2.52 (ref 0.9–1.1)
LDLC SERPL CALC-MCNC: 83 MG/DL
LDLC SERPL CALC-MCNC: 83 MG/DL
LYMPHOCYTES # BLD AUTO: 1.2 THOU/UL (ref 0.8–4.4)
LYMPHOCYTES NFR BLD AUTO: 21 % (ref 20–40)
MCH RBC QN AUTO: 31.4 PG (ref 27–34)
MCH RBC QN AUTO: 31.4 PG (ref 27–34)
MCHC RBC AUTO-ENTMCNC: 32 G/DL (ref 32–36)
MCHC RBC AUTO-ENTMCNC: 32 G/DL (ref 32–36)
MCV RBC AUTO: 98 FL (ref 80–100)
MCV RBC AUTO: 98 FL (ref 80–100)
MONOCYTES # BLD AUTO: 0.5 THOU/UL (ref 0–0.9)
MONOCYTES NFR BLD AUTO: 8 % (ref 2–10)
NEUTROPHILS # BLD AUTO: 4 THOU/UL (ref 2–7.7)
NEUTROPHILS NFR BLD AUTO: 68 % (ref 50–70)
PLATELET # BLD AUTO: 146 THOU/UL (ref 140–440)
PLATELET # BLD AUTO: 146 THOU/UL (ref 140–440)
PMV BLD AUTO: 11.6 FL (ref 8.5–12.5)
POTASSIUM BLD-SCNC: 3.4 MMOL/L (ref 3.5–5)
POTASSIUM SERPL-SCNC: 3.4 MMOL/L (ref 3.5–5)
PROT SERPL-MCNC: 6 G/DL (ref 6–8)
PROT SERPL-MCNC: 6 G/DL (ref 6–8)
PTH-INTACT SERPL-MCNC: 191 PG/ML (ref 10–86)
RBC # BLD AUTO: 4.72 MILL/UL (ref 3.8–5.4)
RBC # BLD AUTO: 4.72 MILL/UL (ref 3.8–5.4)
SODIUM SERPL-SCNC: 144 MMOL/L (ref 136–145)
SODIUM SERPL-SCNC: 144 MMOL/L (ref 136–145)
T4 FREE SERPL-MCNC: 0.9 NG/DL (ref 0.7–1.8)
TRIGL SERPL-MCNC: 148 MG/DL
TRIGL SERPL-MCNC: 148 MG/DL
TSH SERPL DL<=0.005 MIU/L-ACNC: 1.01 UIU/ML (ref 0.3–5)
TSH SERPL-ACNC: 1.01 UIU/ML (ref 0.3–5)
WBC # BLD AUTO: 5.9 THOU/UL (ref 4–11)
WBC: 5.9 THOU/UL (ref 4–11)

## 2020-04-03 NOTE — TELEPHONE ENCOUNTER
[facility]'s Telephone Encounter  Chief Complaint   Patient presents with     INR RESULTS   Mecca Slade is a 89 year old (12/14/1921). Nursing team called/messaged today to report INR of 2.52. Last INR was also therapeutic (one month ago) and current coumadin dosing is 2mg/day. Other annual physical labs returned and are grossly WDL (see Care Everywhere).     ASSESSMENT/PLAN  Chronic. Stable. Therapeutic. Will dose Coumadin as noted below and recheck INR in 4 weeks. Follow-up accordingly.    Orders:  1. Coumadin 2mg/day.  2. Recheck INR x1 on 5/1.   Dx: afib.    Electronically signed by:  Dr. Haley Sims, APRN CNP DNP

## 2020-04-09 ENCOUNTER — CLINICAL UPDATE (OUTPATIENT)
Dept: PHARMACY | Facility: CLINIC | Age: 85
End: 2020-04-09
Payer: COMMERCIAL

## 2020-04-09 DIAGNOSIS — I10 ESSENTIAL HYPERTENSION: ICD-10-CM

## 2020-04-09 DIAGNOSIS — I50.9 CHF (CONGESTIVE HEART FAILURE) (H): Primary | ICD-10-CM

## 2020-04-09 DIAGNOSIS — E11.51 TYPE II DIABETES MELLITUS WITH PERIPHERAL CIRCULATORY DISORDER (H): ICD-10-CM

## 2020-04-09 DIAGNOSIS — I48.91 A-FIB (H): ICD-10-CM

## 2020-04-09 DIAGNOSIS — I63.511 ACUTE RIGHT MCA STROKE (H): ICD-10-CM

## 2020-04-09 PROCEDURE — 99207 ZZC NO CHARGE LOS: CPT | Performed by: PHARMACIST

## 2020-04-09 NOTE — PROGRESS NOTES
This patient's medication list and chart were reviewed as part of the service provided by [facility] and [facility].    Assessment/Recommendations:    Noted most recent K level on 4/3/20 just slightly low at 3.4 (previous K on 2/3/20 = 4.2).  Pt on furosemide, not currently on K supplement, has afib.  Continue to monitor K.    Yanley Juárez, Pharm.D.,[facility]  Board Certified Geriatric Pharmacist  Medication Therapy Management Pharmacist  [phone]

## 2020-05-01 ENCOUNTER — TELEPHONE (OUTPATIENT)
Dept: GERIATRICS | Facility: CLINIC | Age: 85
End: 2020-05-01

## 2020-05-01 ENCOUNTER — RECORDS - HEALTHEAST (OUTPATIENT)
Dept: LAB | Facility: CLINIC | Age: 85
End: 2020-05-01

## 2020-05-01 ENCOUNTER — TRANSFERRED RECORDS (OUTPATIENT)
Dept: HEALTH INFORMATION MANAGEMENT | Facility: CLINIC | Age: 85
End: 2020-05-01

## 2020-05-01 VITALS
HEART RATE: 82 BPM | DIASTOLIC BLOOD PRESSURE: 82 MMHG | RESPIRATION RATE: 18 BRPM | SYSTOLIC BLOOD PRESSURE: 139 MMHG | TEMPERATURE: 97.9 F | OXYGEN SATURATION: 98 % | BODY MASS INDEX: 34.9 KG/M2 | WEIGHT: 216.2 LBS

## 2020-05-01 LAB
INR PPP: 3.2 (ref 0.9–1.1)
INR PPP: 3.2 (ref 0.9–1.1)

## 2020-05-01 NOTE — TELEPHONE ENCOUNTER
[facility] Geriatric[facility]'s Telephone Encounter  Chief Complaint   Patient presents with     INR RESULTS   Mecca Slade is a 89 year old (12/14/1921). Nursing team called/messaged today to report: INR today is 3.2. Last INR was 2.52 on 4/3. Current Coumadin dosing is 2mg/day.    ASSESSMENT/PLAN  Chronic. Stable. Slightly supratherapeutic. Will dose Coumadin as noted below and recheck INR in 1 week. Follow-up accordingly.    Orders:  1. Coumadin 0.5mg PO x1 today.  2. Coumadin 2mg PO every day on AOD.   3. Recheck INR x1 in 1 week on 5/8.   Dx: Afib.    Electronically signed by:  JOSUE Clark CNP DNP

## 2020-05-01 NOTE — PROGRESS NOTES
"[place] [facility] Regulatory   Mecca Slade is being evaluated via a billable video visit due to the restrictions of the Covid-19 pandemic.   The patient has been notified of following:  \"This video visit will be conducted via a call between you and your provider. We have found that certain health care needs can be provided without the need for an in-person physical exam.  This service lets us provide the care you need with a video conversation. If during the course of the call the provider feels a video visit is not appropriate, you will not be charged for this service.\"   The provider has received verbal consent for a Video Visit from the patient or first contact? Yes  Patient or facility staff would like the video invitation sent by: N/A   Video Start Time: 9:54  [facility] Medical Record Number:  [id number]  Place of Location at the time of visit: [facility]  Chief Complaint   Patient presents with     Video Visit     Regulatory     HPI:    Mecca Slade  is 89 year old (12/14/1921), who is being seen today for a federally mandated E/M visit.  HPI information obtained from: facility staff, patient report, [facility] chart review and DNP.     Today's concerns are:   - Resident reports doing fine. Denies stomach pain, nausea or vomiting.   - Denies chest pain, palpitation, shortness of breath, or palpitation   - Reports sleep, appetite and BM are fine.    - RN has on concern.   -  ---------------------------------  - - Past Medical, social, family histories, medications, and allergies reviewed and updated  - Medications reviewed: in the chart and EHR.   - Case Management:   I have reviewed the care plan and MDS and do agree with the plan. Patient's desire to return to the community is not present.  Information reviewed:  Medications, vital signs, orders, and nursing notes.  MEDICATIONS:  Current Outpatient Medications   Medication Sig Dispense Refill     Acetaminophen (TYLENOL PO) Take 500 mg " by mouth 2 times daily        ATORVASTATIN CALCIUM PO Take 10 mg by mouth daily       carboxymethylcellulose (REFRESH PLUS) 0.5 % SOLN ophthalmic solution Place 1 drop into both eyes 3 times daily as needed for dry eyes And Qam scheduled       Furosemide (LASIX PO) Take 40 mg by mouth daily       Menthol, Topical Analgesic, (BENGAY COLD THERAPY) 5 % GEL Externally apply topically 4 times daily as needed (pain)       metFORMIN (GLUCOPHAGE-XR) 500 MG 24 hr tablet Take 1 tablet (500 mg) by mouth daily (with dinner) 30 tablet 0     metoprolol succinate ER (TOPROL-XL) 25 MG 24 hr tablet Take 1 tablet (25 mg) by mouth daily 30 tablet 0     pantoprazole (PROTONIX) 20 MG EC tablet Take 20 mg by mouth daily       simethicone (MYLICON) 80 MG chewable tablet Take 2 tablets (160 mg) by mouth daily       Warfarin Sodium (COUMADIN PO) Take by mouth daily Take as directed per INR results       ROS: 4 point ROS including Respiratory, CV, GI and , other than that noted in the HPI,  is negative    Vitals:  /82   Pulse 82   Temp 97.9  F (36.6  C)   Resp 18   Wt 98.1 kg (216 lb 3.2 oz)   SpO2 98%   BMI 34.90 kg/m    Body mass index is 34.9 kg/m .    Limited visit exam done given COVID-19 precautions.   GENERAL APPEARANCE:  in no distress  RESP:  unlabored breathing  M/S:   no joint deformity noted  SKIN:  no rash noted  NEURO:   no purposeful movement in upper and lower extremities  PSYCH:  affect and mood normal      Lab/Diagnostic data: Reviewed in the chart and EHR.        ASSESSMENT/PLAN  -------------------------------  Chronic congestive heart failure, diastolic type (H)  Essential hypertension  -  EF > 70% (2012)  - compensated. Continue meds       Type II diabetes mellitus with peripheral circulatory disorder (H)   A1C 7.5 04/03/2020    A1C 8.6 08/05/2019   - controlled. - on metformin 500 mg daily. Recommended level is 8-9%  -  In this frail elderly adult with a limited life expectancy, the goal of  the  management is to address the hyperglycemia sx if any,  rather than the  BGs numbers per se.       On coumadin for Atrial fibrillation (H)  -  INR followed closely. CVR, on metoprolol succinate.       Class 2 obesity due to excess calories w/o serious comorbidity with BMI 35.0-35.9 in adult  - Body mass index is 34.9 kg/m . from 36.06 kg/m .  in this age group- frail elderly, keep BMI b/lw 25-35 Kg(m2).   -  to reduce carbs intake.     Primary Hyperparathyroidism (H)   - elevated Ca and PTH.  Resident seems doing fair. Recommend no further work up given limited life expectancy. If become symptomatic consider cinacalcet 30 mg po daily, may increase to bid unitl Ca level have normalized- close attention to hydration status  - no concern.      Hx of Cerebrovascular accident (CVA) due to embolism of right middle cerebral artery  - Left sided residual weakness, stable.   - Off ASA, on lipitor 10 mg from 20 mg.  Given advanced age and limited benefit from statin, consider stopping lipitor.   - requires assistance with ADLs.        GERD: .no concern.     Frail elderly:  Significant  Deficits requiring NH placement. Requiring extensive assistance from nursing. Up for meals only o/w spends the day resting in bed        Orders written by provider at facility and transcribed by : José Miguel Radford    Electronically signed by:  Anirudh Ye MD    Video-Visit Details  Type of service:  Video Visit  Video End Time (time video stopped): 09:57  Distant Location (provider location):  [place] [facility]

## 2020-05-04 ENCOUNTER — VIRTUAL VISIT (OUTPATIENT)
Dept: GERIATRICS | Facility: CLINIC | Age: 85
End: 2020-05-04
Payer: COMMERCIAL

## 2020-05-04 DIAGNOSIS — Z86.73 H/O: CVA (CEREBROVASCULAR ACCIDENT): ICD-10-CM

## 2020-05-04 DIAGNOSIS — E21.0 HYPERPARATHYROIDISM, PRIMARY (H): ICD-10-CM

## 2020-05-04 DIAGNOSIS — I10 ESSENTIAL HYPERTENSION: ICD-10-CM

## 2020-05-04 DIAGNOSIS — I48.91 CONTROLLED ATRIAL FIBRILLATION (H): ICD-10-CM

## 2020-05-04 DIAGNOSIS — E11.51 TYPE II DIABETES MELLITUS WITH PERIPHERAL CIRCULATORY DISORDER (H): ICD-10-CM

## 2020-05-04 DIAGNOSIS — R54 FRAIL ELDERLY: ICD-10-CM

## 2020-05-04 DIAGNOSIS — I50.32 CHRONIC DIASTOLIC CONGESTIVE HEART FAILURE (H): Primary | ICD-10-CM

## 2020-05-04 PROCEDURE — 99309 SBSQ NF CARE MODERATE MDM 30: CPT | Mod: 95 | Performed by: FAMILY MEDICINE

## 2020-05-04 NOTE — LETTER
"    5/4/2020        RE: Mecca Slade  [facility]  [address] [facility] [address]        [place] [facility] Regulatory   Mecca Slade is being evaluated via a billable video visit due to the restrictions of the Covid-19 pandemic.   The patient has been notified of following:  \"This video visit will be conducted via a call between you and your provider. We have found that certain health care needs can be provided without the need for an in-person physical exam.  This service lets us provide the care you need with a video conversation. If during the course of the call the provider feels a video visit is not appropriate, you will not be charged for this service.\"   The provider has received verbal consent for a Video Visit from the patient or first contact? Yes  Patient or facility staff would like the video invitation sent by: N/A   Video Start Time: 09:57  [place] Medical Record Number:  [id number]  Place of Location at the time of visit: [facility]  Chief Complaint   Patient presents with     Video Visit     Regulatory     HPI:    Mecca Slade  is 89 year old (12/14/1921), who is being seen today for a federally mandated E/M visit.  HPI information obtained from: facility staff, patient report, [facility] chart review and DNP.     Today's concerns are:   - Resident reports doing fine. Denies stomach pain, nausea or vomiting.   - Denies chest pain, palpitation, shortness of breath, or palpitation   - Reports sleep, appetite and BM are fine.    - RN has on concern.   -  ---------------------------------  - - Past Medical, social, family histories, medications, and allergies reviewed and updated  - Medications reviewed: in the chart and EHR.   - Case Management:   I have reviewed the care plan and MDS and do agree with the plan. Patient's desire to return to the community is not present.  Information reviewed:  Medications, vital signs, orders, and nursing " notes.  MEDICATIONS:  Current Outpatient Medications   Medication Sig Dispense Refill     Acetaminophen (TYLENOL PO) Take 500 mg by mouth 2 times daily        ATORVASTATIN CALCIUM PO Take 10 mg by mouth daily       carboxymethylcellulose (REFRESH PLUS) 0.5 % SOLN ophthalmic solution Place 1 drop into both eyes 3 times daily as needed for dry eyes And Qam scheduled       Furosemide (LASIX PO) Take 40 mg by mouth daily       Menthol, Topical Analgesic, (BENGAY COLD THERAPY) 5 % GEL Externally apply topically 4 times daily as needed (pain)       metFORMIN (GLUCOPHAGE-XR) 500 MG 24 hr tablet Take 1 tablet (500 mg) by mouth daily (with dinner) 30 tablet 0     metoprolol succinate ER (TOPROL-XL) 25 MG 24 hr tablet Take 1 tablet (25 mg) by mouth daily 30 tablet 0     pantoprazole (PROTONIX) 20 MG EC tablet Take 20 mg by mouth daily       simethicone (MYLICON) 80 MG chewable tablet Take 2 tablets (160 mg) by mouth daily       Warfarin Sodium (COUMADIN PO) Take by mouth daily Take as directed per INR results       ROS: 4 point ROS including Respiratory, CV, GI and , other than that noted in the HPI,  is negative    Vitals:  /82   Pulse 82   Temp 97.9  F (36.6  C)   Resp 18   Wt 98.1 kg (216 lb 3.2 oz)   SpO2 98%   BMI 34.90 kg/m    Body mass index is 34.9 kg/m .    Limited visit exam done given COVID-19 precautions.   GENERAL APPEARANCE:  in no distress  RESP:  unlabored breathing  M/S:   no joint deformity noted  SKIN:  no rash noted  NEURO:   no purposeful movement in upper and lower extremities  PSYCH:  affect and mood normal      Lab/Diagnostic data: Reviewed in the chart and EHR.        ASSESSMENT/PLAN  -------------------------------  Chronic congestive heart failure, diastolic type (H)  Essential hypertension  -  EF > 70% (2012)  - compensated. Continue meds       Type II diabetes mellitus with peripheral circulatory disorder (H)   A1C 7.5 04/03/2020    A1C 8.6 08/05/2019   - controlled. - on metformin  500 mg daily. Recommended level is 8-9%  -  In this frail elderly adult with a limited life expectancy, the goal of  the management is to address the hyperglycemia sx if any,  rather than the  BGs numbers per se.       On coumadin for Atrial fibrillation (H)  -  INR followed closely. CVR, on metoprolol succinate.       Class 2 obesity due to excess calories w/o serious comorbidity with BMI 35.0-35.9 in adult  - Body mass index is 34.9 kg/m . from 36.06 kg/m .  in this age group- frail elderly, keep BMI b/lw 25-35 Kg(m2).   -  to reduce carbs intake.     Primary Hyperparathyroidism (H)   - elevated Ca and PTH.  Resident seems doing fair. Recommend no further work up given limited life expectancy. If become symptomatic consider cinacalcet 30 mg po daily, may increase to bid unitl Ca level have normalized- close attention to hydration status  - no concern.      Hx of Cerebrovascular accident (CVA) due to embolism of right middle cerebral artery  - Left sided residual weakness, stable.   - Off ASA, on lipitor 10 mg from 20 mg.  Given advanced age and limited benefit from statin, consider stopping lipitor.   - requires assistance with ADLs.        GERD: .no concern.     Frail elderly:  Significant  Deficits requiring NH placement. Requiring extensive assistance from nursing. Up for meals only o/w spends the day resting in bed        Orders written by provider at facility and transcribed by : José Miguel Radford    Electronically signed by:  Anirudh Ye MD    Video-Visit Details  Type of service:  Video Visit  Video End Time (time video stopped): 09:54  Distant Location (provider location):  [place] [facility]             Sincerely,        Anirudh Ye MD

## 2020-05-08 ENCOUNTER — RECORDS - HEALTHEAST (OUTPATIENT)
Dept: LAB | Facility: CLINIC | Age: 85
End: 2020-05-08

## 2020-05-08 ENCOUNTER — TRANSFERRED RECORDS (OUTPATIENT)
Dept: HEALTH INFORMATION MANAGEMENT | Facility: CLINIC | Age: 85
End: 2020-05-08

## 2020-05-08 LAB
INR PPP: 2.79 (ref 0.9–1.1)
INR PPP: 2.79 (ref 0.9–1.1)

## 2020-06-03 ENCOUNTER — TELEPHONE (OUTPATIENT)
Dept: GERIATRICS | Facility: CLINIC | Age: 85
End: 2020-06-03

## 2020-06-04 NOTE — TELEPHONE ENCOUNTER
Called re: Left elbow being swollen, erythematous, warm and painful. No known injury. Does have h/o gout not currently on maintenance therapy. Sounds this could be gout flare and/or olecranon bursitis. Tmax 99.2. Otherwise clinically stable.    PLAN: Prednisone 20 mg daily x 10 days for possible gout. Requested uric acid lab be added on to her next venous INR on 6/5/20 and update PCP with results. Based on how she does and labs, consider pros/cons of initiation of maintenance therapy for gout. NOTE, she is diabetic with A1c 7.5% this spring and is on Coumadin so expect prednisone could interfere with glucose and INR.     Renetta Fang, DO

## 2020-06-05 ENCOUNTER — NURSING HOME VISIT (OUTPATIENT)
Dept: GERIATRICS | Facility: CLINIC | Age: 85
End: 2020-06-05
Payer: COMMERCIAL

## 2020-06-05 ENCOUNTER — TRANSFERRED RECORDS (OUTPATIENT)
Dept: HEALTH INFORMATION MANAGEMENT | Facility: CLINIC | Age: 85
End: 2020-06-05

## 2020-06-05 ENCOUNTER — RECORDS - HEALTHEAST (OUTPATIENT)
Dept: LAB | Facility: CLINIC | Age: 85
End: 2020-06-05

## 2020-06-05 VITALS — OXYGEN SATURATION: 97 % | TEMPERATURE: 97.5 F

## 2020-06-05 DIAGNOSIS — Z79.01 LONG TERM (CURRENT) USE OF ANTICOAGULANTS: ICD-10-CM

## 2020-06-05 DIAGNOSIS — Z86.73 H/O: CVA (CEREBROVASCULAR ACCIDENT): ICD-10-CM

## 2020-06-05 DIAGNOSIS — I48.20 CHRONIC ATRIAL FIBRILLATION (H): ICD-10-CM

## 2020-06-05 DIAGNOSIS — Z51.81 ENCOUNTER FOR THERAPEUTIC DRUG MONITORING: ICD-10-CM

## 2020-06-05 DIAGNOSIS — M10.9 ACUTE GOUTY ARTHRITIS: Primary | ICD-10-CM

## 2020-06-05 LAB
INR PPP: 2.91 (ref 0.9–1.1)
INR PPP: 2.91 (ref 0.9–1.1)
URATE SERPL-MCNC: 9.4 MG/DL (ref 2–7.5)

## 2020-06-05 PROCEDURE — 99309 SBSQ NF CARE MODERATE MDM 30: CPT | Performed by: NURSE PRACTITIONER

## 2020-06-05 NOTE — LETTER
6/5/2020        RE: Mecca Slade  [facility]  [address] [facility] [address]        [place] [facility]  [place] Medical Record Number:  [id number]. Place of Service where encounter took place:  [facility]  (FGS) [[id number]]  Chief Complaint   Patient presents with     Nursing Home Acute   HPI: Mecca Slade  is a 89 year old (12/14/1921), who is being seen today for an episodic care visit.  HPI information obtained from: facility chart records, facility staff, patient report, [facility] chart review and Care Everywhere [facility] chart review. Today's concern is:    Zayda seen today when her INR resulted and on follow-up after joint pain. Earlier this week, her left elbow was inflamed, red, painful, and she had decreased ROM. At that time, a uric acid level was scheduled and a steroid burst was started. Her last INR was 2.79 on 5/8 and her current coumadin dosing is 2mg/day.     Today, Lilly states her left elbow is feeling much better (5 more doses of steroids left). Her INR resulted at 2.91. She denies bleeding/bruising. She does states sometimes she still feels constipated, nursing records show minimal PRN use. Her Uric acid level resulted at 9.4 (high).     Past Medical and Surgical History reviewed in Epic today.  MEDICATIONS:  Current Outpatient Medications   Medication Sig Dispense Refill     Acetaminophen (TYLENOL PO) Take 500 mg by mouth 2 times daily        ATORVASTATIN CALCIUM PO Take 10 mg by mouth daily       carboxymethylcellulose (REFRESH PLUS) 0.5 % SOLN ophthalmic solution Place 1 drop into both eyes 3 times daily as needed for dry eyes And Qam scheduled       Furosemide (LASIX PO) Take 40 mg by mouth daily       Menthol, Topical Analgesic, (BENGAY COLD THERAPY) 5 % GEL Externally apply topically 4 times daily as needed (pain)       metFORMIN (GLUCOPHAGE-XR) 500 MG 24 hr tablet Take 1 tablet (500 mg) by mouth daily (with dinner) 30 tablet 0      metoprolol succinate ER (TOPROL-XL) 25 MG 24 hr tablet Take 1 tablet (25 mg) by mouth daily 30 tablet 0     pantoprazole (PROTONIX) 20 MG EC tablet Take 20 mg by mouth daily       simethicone (MYLICON) 80 MG chewable tablet Take 2 tablets (160 mg) by mouth daily       Warfarin Sodium (COUMADIN PO) Take by mouth daily Take as directed per INR results       REVIEW OF SYSTEMS:Limited secondary to cognitive impairment but today pt reports the above and 4 point ROS including Respiratory, CV, GI and , other than that noted in the HPI, is negative.    Objective:  Temp 97.5  F (36.4  C)   SpO2 97%   Exam:  GENERAL APPEARANCE: Alert, in no distress, cooperative.   ENT: Mouth/posterior oropharynx intact w/ moist mucous membranes, hearing acuity [place].  EYES: EOM, conjunctivae, lids, pupils and irises normal, PERRL2.   RESP: Respiratory effort unlabored, no respiratory distress. On RA.   ABDOMEN: soft, non-tender abdomen, and no masses palpated.  MSK/SKIN: Inspection/Palpation of skin and subcutaneous tissue baseline w/ fragility. No wounds/rashes noted. Left elbow is very slightly red and ROM is intact but painful. No profound joint swelling or tophi seen.  NEURO: CN II-XII at patient's baseline, sensation baseline PPS.  PSYCH: Insight, judgement, and memory are baseline, affect and mood are happy/engaged.    Labs:   Recent labs in [facility] reviewed by me today.     ASSESSMENT/PLAN:  Acute gouty arthritis  Chronic atrial fibrillation  H/O: CVA (cerebrovascular accident), R MCA embolism s/p tPA, March 2017  Encounter for therapeutic drug monitoring  Long term (current) use of anticoagulants  Acute-on-Chronic. INR therapeutic. Will dose Coumadin as noted below and recheck INR in 3 days secondary to steroid dosing. Will ask nursing to utilize PRN bowel medications available. Continue to monitor left elbow. Follow-up routinely or as needed.    Orders:  1. Coumadin 2mg PO every day.   2. Recheck INR x1 on 6/8 (d/t steroids).   Dx:  afib.    Electronically signed by:  Dr. Haley Sims, JOSUE CNP DNP                  Sincerely,        JOSUE Gillis CNP

## 2020-06-05 NOTE — PROGRESS NOTES
[place] [facility]  [place] [facility] Record Number:  [id number]. Place of Service where encounter took place:  [facility]  (FGS) [[id number]]  Chief Complaint   Patient presents with     Nursing Home Acute   HPI: Mecca Slade  is a 89 year old (12/14/1921), who is being seen today for an episodic care visit.  HPI information obtained from: facility chart records, facility staff, patient report, [facility] chart review and Care Everywhere [facility] chart review. Today's concern is:    Zayda seen today when her INR resulted and on follow-up after joint pain. Earlier this week, her left elbow was inflamed, red, painful, and she had decreased ROM. At that time, a uric acid level was scheduled and a steroid burst was started. Her last INR was 2.79 on 5/8 and her current coumadin dosing is 2mg/day.     Today, Lilly states her left elbow is feeling much better (5 more doses of steroids left). Her INR resulted at 2.91. She denies bleeding/bruising. She does states sometimes she still feels constipated, nursing records show minimal PRN use. Her Uric acid level resulted at 9.4 (high).     Past Medical and Surgical History reviewed in Epic today.  MEDICATIONS:  Current Outpatient Medications   Medication Sig Dispense Refill     Acetaminophen (TYLENOL PO) Take 500 mg by mouth 2 times daily        ATORVASTATIN CALCIUM PO Take 10 mg by mouth daily       carboxymethylcellulose (REFRESH PLUS) 0.5 % SOLN ophthalmic solution Place 1 drop into both eyes 3 times daily as needed for dry eyes And Qam scheduled       Furosemide (LASIX PO) Take 40 mg by mouth daily       Menthol, Topical Analgesic, (BENGAY COLD THERAPY) 5 % GEL Externally apply topically 4 times daily as needed (pain)       metFORMIN (GLUCOPHAGE-XR) 500 MG 24 hr tablet Take 1 tablet (500 mg) by mouth daily (with dinner) 30 tablet 0     metoprolol succinate ER (TOPROL-XL) 25 MG 24 hr tablet Take 1 tablet (25 mg) by mouth daily 30 tablet 0      pantoprazole (PROTONIX) 20 MG EC tablet Take 20 mg by mouth daily       simethicone (MYLICON) 80 MG chewable tablet Take 2 tablets (160 mg) by mouth daily       Warfarin Sodium (COUMADIN PO) Take by mouth daily Take as directed per INR results       REVIEW OF SYSTEMS:Limited secondary to cognitive impairment but today pt reports the above and 4 point ROS including Respiratory, CV, GI and , other than that noted in the HPI, is negative.    Objective:  Temp 97.5  F (36.4  C)   SpO2 97%   Exam:  GENERAL APPEARANCE: Alert, in no distress, cooperative.   ENT: Mouth/posterior oropharynx intact w/ moist mucous membranes, hearing acuity [place].  EYES: EOM, conjunctivae, lids, pupils and irises normal, PERRL2.   RESP: Respiratory effort unlabored, no respiratory distress. On RA.   ABDOMEN: soft, non-tender abdomen, and no masses palpated.  MSK/SKIN: Inspection/Palpation of skin and subcutaneous tissue baseline w/ fragility. No wounds/rashes noted. Left elbow is very slightly red and ROM is intact but painful. No profound joint swelling or tophi seen.  NEURO: CN II-XII at patient's baseline, sensation baseline PPS.  PSYCH: Insight, judgement, and memory are baseline, affect and mood are happy/engaged.    Labs:   Recent labs in [place] reviewed by me today.     ASSESSMENT/PLAN:  Acute gouty arthritis  Chronic atrial fibrillation  H/O: CVA (cerebrovascular accident), R MCA embolism s/p tPA, March 2017  Encounter for therapeutic drug monitoring  Long term (current) use of anticoagulants  Acute-on-Chronic. INR therapeutic. Will dose Coumadin as noted below and recheck INR in 3 days secondary to steroid dosing. Will ask nursing to utilize PRN bowel medications available. Continue to monitor left elbow. Follow-up routinely or as needed.    Orders:  1. Coumadin 2mg PO every day.   2. Recheck INR x1 on 6/8 (d/t steroids).   Dx: afib.    Electronically signed by:  Dr. Haley Sims, APRN CNP DNP

## 2020-06-08 ENCOUNTER — RECORDS - HEALTHEAST (OUTPATIENT)
Dept: LAB | Facility: CLINIC | Age: 85
End: 2020-06-08

## 2020-06-08 ENCOUNTER — TRANSFERRED RECORDS (OUTPATIENT)
Dept: HEALTH INFORMATION MANAGEMENT | Facility: CLINIC | Age: 85
End: 2020-06-08

## 2020-06-08 LAB
INR PPP: 3.91 (ref 0.9–1.1)
INR PPP: 3.91 (ref 0.9–1.1)

## 2020-06-11 ENCOUNTER — RECORDS - HEALTHEAST (OUTPATIENT)
Dept: LAB | Facility: CLINIC | Age: 85
End: 2020-06-11

## 2020-06-12 ENCOUNTER — TELEPHONE (OUTPATIENT)
Dept: GERIATRICS | Facility: CLINIC | Age: 85
End: 2020-06-12

## 2020-06-12 ENCOUNTER — TRANSFERRED RECORDS (OUTPATIENT)
Dept: HEALTH INFORMATION MANAGEMENT | Facility: CLINIC | Age: 85
End: 2020-06-12

## 2020-06-12 LAB
INR PPP: 2.54 (ref 0.9–1.1)
INR PPP: 2.54 (ref 0.9–1.1)

## 2020-06-12 NOTE — TELEPHONE ENCOUNTER
[place] [facility] TELEPHONE ENCOUNTER    Chief Complaint   Patient presents with     INR RESULTS       Mecca Slade is a 89 year old  (12/14/1921),Nurse called today to report: INR today of 2.54.  Previously, on 6/8/20, INR was 3.91 and warfarin was held on 6/8, received warfarin 0.5 mg on 9th and 10th and 2 mg 11th.      ASSESSMENT/PLAN  INR today therapeutic    Warfarin 2 mg daily     Recheck on 6/17/20       Electronically signed by:   JOSUE Read CNP

## 2020-06-18 ENCOUNTER — RECORDS - HEALTHEAST (OUTPATIENT)
Dept: LAB | Facility: CLINIC | Age: 85
End: 2020-06-18

## 2020-06-19 ENCOUNTER — TRANSFERRED RECORDS (OUTPATIENT)
Dept: HEALTH INFORMATION MANAGEMENT | Facility: CLINIC | Age: 85
End: 2020-06-19

## 2020-06-19 ENCOUNTER — TELEPHONE (OUTPATIENT)
Dept: GERIATRICS | Facility: CLINIC | Age: 85
End: 2020-06-19

## 2020-06-19 LAB
INR PPP: 3.23 (ref 0.9–1.1)
INR PPP: 3.23 (ref 0.9–1.1)

## 2020-06-19 NOTE — TELEPHONE ENCOUNTER
[facility] Geriatric[facility]'s Telephone Encounter  Chief Complaint   Patient presents with     INR RESULTS   Mecca Slade is a 89 year old (12/14/1921). Nursing team called/messaged today to report INR 3.23. Last INR was 2.54 on 6/12 (1 week ago). Current Coumadin dosing is 2mg/day.     ASSESSMENT/PLAN  Chronic. Stable. Supratherapeutic. Will dose Coumadin as noted below and recheck INR in 1 week. Follow-up accordingly.    Orders:  1. Coumadin 0.5mg PO on Sat/Sun.  2. Coumadin 2mg PO every day on AOD.  3. Recheck INR x 1 in 1 week on 6/26.  Dx: afib.     Electronically signed by:  JOSUE Clark CNP DNP

## 2020-06-24 ENCOUNTER — VIRTUAL VISIT (OUTPATIENT)
Dept: GERIATRICS | Facility: CLINIC | Age: 85
End: 2020-06-24
Payer: COMMERCIAL

## 2020-06-24 VITALS
SYSTOLIC BLOOD PRESSURE: 156 MMHG | RESPIRATION RATE: 16 BRPM | BODY MASS INDEX: 35.11 KG/M2 | HEART RATE: 77 BPM | TEMPERATURE: 97.6 F | DIASTOLIC BLOOD PRESSURE: 80 MMHG | OXYGEN SATURATION: 98 % | WEIGHT: 217.5 LBS

## 2020-06-24 DIAGNOSIS — M79.671 RIGHT FOOT PAIN: ICD-10-CM

## 2020-06-24 DIAGNOSIS — M10.9 ACUTE GOUTY ARTHRITIS: Primary | ICD-10-CM

## 2020-06-24 DIAGNOSIS — Z79.01 LONG TERM (CURRENT) USE OF ANTICOAGULANTS: ICD-10-CM

## 2020-06-24 DIAGNOSIS — Z87.39 H/O ACUTE GOUTY ARTHRITIS: ICD-10-CM

## 2020-06-24 PROCEDURE — 99309 SBSQ NF CARE MODERATE MDM 30: CPT | Mod: 95 | Performed by: NURSE PRACTITIONER

## 2020-06-24 NOTE — PROGRESS NOTES
"[facility] Geriatrics Video Visit  Mecca Slade is a 89 year old female who is being evaluated via a billable video visit due to the restrictions of the COVID19 pandemic.The patient has been notified of following: \"This video visit will be conducted via a call between you and your provider. We have found that certain health care needs can be provided without the need for an in-person physical exam.  This service lets us provide the care you need with a video conversation.  If a prescription is necessary we can send it directly to your pharmacy.  If lab work is needed we can place an order for that and you can then stop by our lab to have the test done at a later time. If during the course of the call the provider feels a video visit is not appropriate, you will not be charged for this service.\"     Proivder has received verbal consent for a Video Visit from the patient? Yes    Patient/facility would like the video invitation sent by: Send to e-mail at: [email or web address]    This visit took place virtually, with the patient located at [facility].  ________________________________________________  Video Start Time: 0927  Mecca Slade complains of    Chief Complaint   Patient presents with     Video Visit     Episodic   HPI/Subjective: Patient seen today after nursing consulted provider for new onset (2 days ago) right foot pain and redness. Of note, Zayda was treated 2 weeks ago w/ prednisone for a gout flare in her left elbow, which resolved w/ treatment but did interact with her Coumadin. We have been chasing supratherapeutic INRs since, with the next level scheduled on 6/26. Her Uric acid level at that time was abnormal.    Today, Lilly states her right foot is painful, especially to the touch. She struggles to wear shoes at all. She denies numbness/tingling, fever/chills. She denies SOB, bleeding/bruising or new edema.    History: I have reviewed and updated the " patient's Past Medical History, Social History, Family History and Medication List.  ALLERGIES: Patient has no known allergies.  REVIEW OF SYSTEMS: 4 point ROS including Respiratory, CV, GI and , other than that noted in the HPI,  is negative    Objective:  BP (!) 156/80   Pulse 77   Temp 97.6  F (36.4  C)   Resp 16   Wt 98.7 kg (217 lb 8 oz)   SpO2 98%   BMI 35.11 kg/m    GENERAL APPEARANCE: Alert, in no distress, cooperative.   EYES/ENT: EOM normal on camera, hearing acuity [place].  RESP: Respiratory effort appears unlabored over video screen, no respiratory distress apparent on video, On RA.   CV: Edema appears trace BLE via video. Color appears april/PVD noted.  ABDOMEN: Appears non-distended.  SKIN: Skin appears baseline w/ video inspection except right lateral foot redness, which is blanchable, tender, not edematous, along the first metatarsal vs proximal phalanx of the great toe pictured below:    NEURO: CN II-XII at patient's baseline, BURNETT at baseline on video. Sensation baseline PPS.  PSYCH: Insight, judgement, and memory are baseline, affect and mood are happy/engaged.    Laboratory/Diagnostics: Reviewed in Epic by provider today.     Assessment/Plan:  Acute gouty arthritis  H/O acute gouty arthritis  Right foot pain  Long term (current) use of anticoagulants  Acute-on-chronic. Ongoing. This is second occurrence of gout w/in 90 days. We note Coumadin dosing and INR control has been difficult w/ prednisone dosing in the past. Therefore, for this acute gout, we will attempt Colchicine dosing, and trend electrolytes/renal function w/ next INR on 6/26. May need to consider chronic gout treatment in the future. Follow-up w/in 2 weeks or as needed.    Orders:  1. Colchicine 1.2mg PO x1 now.  2. Colchicine 0.6mg PO x1, 1-hour s/p the above admiistration.  3. Tomorrow (6/25), Give Colchicine 0.6mg PO BID x 1 day.  4. Recheck BMP x1 on 6/26 (with INR already scheduled).   Dx: Acute gout.     Video-Visit  Details  Type of service:  Video Visit  Video Start Time: 0927  Video End Time (time video stopped): 0931  Originating Location (pt. Location): Long term Care  Distant Location (provider location):  [facility]   Mode of Communication:  Video Conference.    Electronically signed by:  Dr. Haley Sims, APRN CNP DNP

## 2020-06-24 NOTE — LETTER
"    6/24/2020        RE: Mecca Slade  [facility]  [address] [facility] [address]        [facility] Geriatrics Video Visit  Mecca Slade is a 89 year old female who is being evaluated via a billable video visit due to the restrictions of the COVID19 pandemic.The patient has been notified of following: \"This video visit will be conducted via a call between you and your provider. We have found that certain health care needs can be provided without the need for an in-person physical exam.  This service lets us provide the care you need with a video conversation.  If a prescription is necessary we can send it directly to your pharmacy.  If lab work is needed we can place an order for that and you can then stop by our lab to have the test done at a later time. If during the course of the call the provider feels a video visit is not appropriate, you will not be charged for this service.\"     Proivder has received verbal consent for a Video Visit from the patient? Yes    Patient/facility would like the video invitation sent by: Send to e-mail at: [email or web address]    This visit took place virtually, with the patient located at [facility].  ________________________________________________  Video Start Time: 0927  Mecca Slade complains of    Chief Complaint   Patient presents with     Video Visit     Episodic   HPI/Subjective: Patient seen today after nursing consulted provider for new onset (2 days ago) right foot pain and redness. Of note, Zayda was treated 2 weeks ago w/ prednisone for a gout flare in her left elbow, which resolved w/ treatment but did interact with her Coumadin. We have been chasing supratherapeutic INRs since, with the next level scheduled on 6/26. Her Uric acid level at that time was abnormal.    Today, Lilly states her right foot is painful, especially to the touch. She struggles to wear shoes at all. She denies numbness/tingling, " fever/chills. She denies SOB, bleeding/bruising or new edema.    History: I have reviewed and updated the patient's Past Medical History, Social History, Family History and Medication List.  ALLERGIES: Patient has no known allergies.  REVIEW OF SYSTEMS: 4 point ROS including Respiratory, CV, GI and , other than that noted in the HPI,  is negative    Objective:  BP (!) 156/80   Pulse 77   Temp 97.6  F (36.4  C)   Resp 16   Wt 98.7 kg (217 lb 8 oz)   SpO2 98%   BMI 35.11 kg/m    GENERAL APPEARANCE: Alert, in no distress, cooperative.   EYES/ENT: EOM normal on camera, hearing acuity [place].  RESP: Respiratory effort appears unlabored over video screen, no respiratory distress apparent on video, On RA.   CV: Edema appears trace BLE via video. Color appears april/PVD noted.  ABDOMEN: Appears non-distended.  SKIN: Skin appears baseline w/ video inspection except right lateral foot redness, which is blanchable, tender, not edematous, along the first metatarsal vs proximal phalanx of the great toe pictured below:    NEURO: CN II-XII at patient's baseline, BURNETT at baseline on video. Sensation baseline PPS.  PSYCH: Insight, judgement, and memory are baseline, affect and mood are happy/engaged.    Laboratory/Diagnostics: Reviewed in Epic by provider today.     Assessment/Plan:  Acute gouty arthritis  H/O acute gouty arthritis  Right foot pain  Long term (current) use of anticoagulants  Acute-on-chronic. Ongoing. This is second occurrence of gout w/in 90 days. We note Coumadin dosing and INR control has been difficult w/ prednisone dosing in the past. Therefore, for this acute gout, we will attempt Colchicine dosing, and trend electrolytes/renal function w/ next INR on 6/26. May need to consider chronic gout treatment in the future. Follow-up w/in 2 weeks or as needed.    Orders:  1. Colchicine 1.2mg PO x1 now.  2. Colchicine 0.6mg PO x1, 1-hour s/p the above admiistration.  3. Tomorrow (6/25), Give Colchicine 0.6mg PO BID  x 1 day.  4. Recheck BMP x1 on 6/26 (with INR already scheduled).   Dx: Acute gout.     Video-Visit Details  Type of service:  Video Visit  Video Start Time: 0927  Video End Time (time video stopped): 0931  Originating Location (pt. Location): Long term Care  Distant Location (provider location):  [facility]   Mode of Communication:  Video Conference.    Electronically signed by:  JOSUE Clark CNP DNP          Sincerely,        JOSUE Gillis CNP

## 2020-06-25 ENCOUNTER — RECORDS - HEALTHEAST (OUTPATIENT)
Dept: LAB | Facility: CLINIC | Age: 85
End: 2020-06-25

## 2020-06-26 ENCOUNTER — TELEPHONE (OUTPATIENT)
Dept: GERIATRICS | Facility: CLINIC | Age: 85
End: 2020-06-26

## 2020-06-26 ENCOUNTER — TRANSFERRED RECORDS (OUTPATIENT)
Dept: HEALTH INFORMATION MANAGEMENT | Facility: CLINIC | Age: 85
End: 2020-06-26

## 2020-06-26 LAB
ANION GAP SERPL CALCULATED.3IONS-SCNC: 12 MMOL/L (ref 5–18)
ANION GAP SERPL CALCULATED.3IONS-SCNC: 12 MMOL/L (ref 6–18)
BUN SERPL-MCNC: 31 MG/DL (ref 8–28)
BUN SERPL-MCNC: 31 MG/DL (ref 8–28)
CALCIUM SERPL-MCNC: 11.1 MG/DL (ref 8.5–10.5)
CALCIUM SERPL-MCNC: 11.1 MG/DL (ref 8.5–105)
CHLORIDE BLD-SCNC: 101 MMOL/L (ref 98–107)
CHLORIDE SERPLBLD-SCNC: 101 MMOL/L (ref 98–107)
CO2 SERPL-SCNC: 28 MMOL/L (ref 22–31)
CO2 SERPL-SCNC: 28 MMOL/L (ref 22–31)
CREAT SERPL-MCNC: 1.13 MG/DL (ref 0.6–1.1)
CREAT SERPL-MCNC: 1.13 MG/DL (ref 0.6–1.1)
DIFFERENTIAL: ABNORMAL
ERYTHROCYTE [DISTWIDTH] IN BLOOD BY AUTOMATED COUNT: 14.6 % (ref 11–14.5)
GFR SERPL CREATININE-BSD FRML MDRD: 44 ML/MIN/1.73M2
GFR SERPL CREATININE-BSD FRML MDRD: 44 ML/MIN/1.73M2
GLUCOSE BLD-MCNC: 196 MG/DL (ref 70–125)
GLUCOSE SERPL-MCNC: 196 MG/DL (ref 70–125)
HCT VFR BLD AUTO: 28.1 % (ref 35–47)
HEMOGLOBIN: 8.8 G/DL (ref 12–16)
INR PPP: 3.04 (ref 0.9–1.1)
INR PPP: 3.04 (ref 0.9–1.1)
MCH RBC QN AUTO: 35.3 PG (ref 27–34)
MCHC RBC AUTO-ENTMCNC: 31.3 G/DL (ref 32–36)
MCV RBC AUTO: 113 FL (ref 80–100)
PLATELET # BLD AUTO: 61 THOU/UL (ref 140–440)
POTASSIUM BLD-SCNC: 3.8 MMOL/L (ref 3.5–5)
POTASSIUM SERPL-SCNC: 3.8 MMOL/L (ref 3.5–5)
RBC # BLD AUTO: 2.49 MILL/UL (ref 3.8–5.4)
SODIUM SERPL-SCNC: 141 MMOL/L (ref 136–145)
SODIUM SERPL-SCNC: 141 MMOL/L (ref 136–145)
WBC # BLD AUTO: 2.2 THOU/UL (ref 4–11)

## 2020-06-26 NOTE — TELEPHONE ENCOUNTER
[place] [facility] TELEPHONE ENCOUNTER    Chief Complaint   Patient presents with     INR RESULTS       Mecca Slade is a 89 year old  (12/14/1921),Nurse called today to report: INR of 3.04.  Last INR 3.23 on 6/19 and has been on warfarin 0.5 mg S/S and 2 mg ANNI.  She is currently being treated for gout, has been on prednisone until 6/13/20 per nursing report.     ASSESSMENT/PLAN  INR slightly elevated, no symptoms bleeding per nursing report     Warfarin 0.5 mg x 3 days    Recheck INR on 6/29/20     Electronically signed by:   JOSUE Read CNP

## 2020-06-29 ENCOUNTER — RECORDS - HEALTHEAST (OUTPATIENT)
Dept: LAB | Facility: CLINIC | Age: 85
End: 2020-06-29

## 2020-06-29 ENCOUNTER — VIRTUAL VISIT (OUTPATIENT)
Dept: GERIATRICS | Facility: CLINIC | Age: 85
End: 2020-06-29
Payer: COMMERCIAL

## 2020-06-29 VITALS
RESPIRATION RATE: 16 BRPM | BODY MASS INDEX: 35.06 KG/M2 | SYSTOLIC BLOOD PRESSURE: 156 MMHG | TEMPERATURE: 97.4 F | OXYGEN SATURATION: 96 % | HEART RATE: 77 BPM | DIASTOLIC BLOOD PRESSURE: 80 MMHG | WEIGHT: 217.2 LBS

## 2020-06-29 DIAGNOSIS — M10.9 ACUTE GOUTY ARTHRITIS: Primary | ICD-10-CM

## 2020-06-29 DIAGNOSIS — M79.671 RIGHT FOOT PAIN: ICD-10-CM

## 2020-06-29 DIAGNOSIS — I48.20 CHRONIC ATRIAL FIBRILLATION (H): ICD-10-CM

## 2020-06-29 DIAGNOSIS — Z79.01 LONG TERM (CURRENT) USE OF ANTICOAGULANTS: ICD-10-CM

## 2020-06-29 DIAGNOSIS — Z87.39 H/O ACUTE GOUTY ARTHRITIS: ICD-10-CM

## 2020-06-29 DIAGNOSIS — Z86.73 H/O: CVA (CEREBROVASCULAR ACCIDENT): ICD-10-CM

## 2020-06-29 DIAGNOSIS — Z51.81 ENCOUNTER FOR THERAPEUTIC DRUG MONITORING: ICD-10-CM

## 2020-06-29 LAB — INR PPP: 2.69 (ref 0.9–1.1)

## 2020-06-29 PROCEDURE — 99309 SBSQ NF CARE MODERATE MDM 30: CPT | Mod: 95 | Performed by: NURSE PRACTITIONER

## 2020-06-29 NOTE — PROGRESS NOTES
"[facility] Geriatrics Video Visit  Mecca Slade is a 89 year old female who is being evaluated via a billable video visit due to the restrictions of the COVID19 pandemic.The patient has been notified of following: \"This video visit will be conducted via a call between you and your provider. We have found that certain health care needs can be provided without the need for an in-person physical exam.  This service lets us provide the care you need with a video conversation.  If a prescription is necessary we can send it directly to your pharmacy.  If lab work is needed we can place an order for that and you can then stop by our lab to have the test done at a later time. If during the course of the call the provider feels a video visit is not appropriate, you will not be charged for this service.\"     Proivder has received verbal consent for a Video Visit from the patient? Yes    Patient/facility would like the video invitation sent by: Send to e-mail at: [email or web address]    This visit took place virtually, with the patient located at [facility].  ________________________________________________  Video Start Time: 1052  Mecca Slade complains of    Chief Complaint   Patient presents with     Video Visit     Episodic   HPI/Subjective: Patient seen today after treatment for gout of right greater metatarsal with colchicine last week. Of note, Zayda was treated 2 weeks ago w/ prednisone for a gout flare in her left elbow, which resolved w/ treatment but did interact with her Coumadin. We have been chasing supratherapeutic INRs since, with her last INR being 3.04 on 6/26 and her current Coumadin dosing of 0.5mg x 3 days. Her Uric acid level from 2 weeks ago was abnormal.    Today, Lilly states her right foot is painful, but slightly better than last week. She denies numbness/tingling, fever/chills. She denies SOB, bleeding/bruising or new edema. Her INR resulted today at " 2.69.    History: I have reviewed and updated the patient's Past Medical History, Social History, Family History and Medication List.  ALLERGIES: Patient has no known allergies.  REVIEW OF SYSTEMS: 4 point ROS including Respiratory, CV, GI and , other than that noted in the HPI, is negative.    Objective:  BP (!) 156/80   Pulse 77   Temp 97.4  F (36.3  C)   Resp 16   Wt 98.5 kg (217 lb 3.2 oz)   SpO2 96%   BMI 35.06 kg/m    GENERAL APPEARANCE: Alert, in no distress, cooperative.   EYES/ENT: EOM normal on camera, hearing acuity [place].  RESP: Respiratory effort appears unlabored over video screen, no respiratory distress apparent on video, On RA.   CV: Edema appears trace BLE via video. Color appears april/PVD noted.  ABDOMEN: Appears non-distended.  SKIN: Skin appears baseline w/ video inspection except right lateral foot redness (improved from last week), which is blanchable, less tender, 1-2+ edematous, along the first metatarsal vs proximal phalanx of the great toe pictured below:    NEURO: CN II-XII at patient's baseline, BURNETT at baseline on video. Sensation baseline PPS.  PSYCH: Insight, judgement, and memory are baseline, affect and mood are happy/engaged.    Laboratory/Diagnostics: Reviewed in Epic by provider today.     Assessment/Plan:  Acute gouty arthritis  H/O acute gouty arthritis  Right foot pain  Chronic afib   H/O CVA  Encounter for therapeutic drug monitoring  Long term (current) use of anticoagulants  Acute-on-chronic. Ongoing. This second occurrence is ongoing. We note Coumadin dosing and INR control has been difficult w/ prednisone dosing in the past. Therefore, we will continue w/ Colcicine until relief is obtained. Will dose Coumadin as noted below and recheck INR in 5 days. Follow-up w/in 1 week or as needed.    Orders:  1. Colchicine 0.6mg PO BID.  Dx: gout.  2. Coumadin 1mg PO QD.  3. Recheck INR x 1 on 7/3.   Dx: afib.    Video-Visit Details  Type of service:  Video Visit  Video Start  Time: 1052  Video End Time (time video stopped): 1059  Originating Location (pt. Location): Long term Care  Distant Location (provider location):  [place] [facility]   Mode of Communication:  Video Conference.    Electronically signed by:  JOSUE Clark CNP DNP

## 2020-06-29 NOTE — LETTER
"    6/29/2020        RE: Mecca Slade  [facility]  [address] [facility] [address]        [facility] Geriatrics Video Visit  Mecca Slade is a 89 year old female who is being evaluated via a billable video visit due to the restrictions of the COVID19 pandemic.The patient has been notified of following: \"This video visit will be conducted via a call between you and your provider. We have found that certain health care needs can be provided without the need for an in-person physical exam.  This service lets us provide the care you need with a video conversation.  If a prescription is necessary we can send it directly to your pharmacy.  If lab work is needed we can place an order for that and you can then stop by our lab to have the test done at a later time. If during the course of the call the provider feels a video visit is not appropriate, you will not be charged for this service.\"     Proivder has received verbal consent for a Video Visit from the patient? Yes    Patient/facility would like the video invitation sent by: Send to e-mail at: [email or web address]    This visit took place virtually, with the patient located at [facility].  ________________________________________________  Video Start Time: 1052  Mecca Slade complains of    Chief Complaint   Patient presents with     Video Visit     Episodic   HPI/Subjective: Patient seen today after treatment for gout of right greater metatarsal with colchicine last week. Of note, Zayda was treated 2 weeks ago w/ prednisone for a gout flare in her left elbow, which resolved w/ treatment but did interact with her Coumadin. We have been chasing supratherapeutic INRs since, with her last INR being 3.04 on 6/26 and her current Coumadin dosing of 0.5mg x 3 days. Her Uric acid level from 2 weeks ago was abnormal.    Today, Lilly states her right foot is painful, but slightly better than last week. She denies " numbness/tingling, fever/chills. She denies SOB, bleeding/bruising or new edema. Her INR resulted today at 2.69.    History: I have reviewed and updated the patient's Past Medical History, Social History, Family History and Medication List.  ALLERGIES: Patient has no known allergies.  REVIEW OF SYSTEMS: 4 point ROS including Respiratory, CV, GI and , other than that noted in the HPI, is negative.    Objective:  BP (!) 156/80   Pulse 77   Temp 97.4  F (36.3  C)   Resp 16   Wt 98.5 kg (217 lb 3.2 oz)   SpO2 96%   BMI 35.06 kg/m    GENERAL APPEARANCE: Alert, in no distress, cooperative.   EYES/ENT: EOM normal on camera, hearing acuity [place].  RESP: Respiratory effort appears unlabored over video screen, no respiratory distress apparent on video, On RA.   CV: Edema appears trace BLE via video. Color appears april/PVD noted.  ABDOMEN: Appears non-distended.  SKIN: Skin appears baseline w/ video inspection except right lateral foot redness (improved from last week), which is blanchable, less tender, 1-2+ edematous, along the first metatarsal vs proximal phalanx of the great toe pictured below:    NEURO: CN II-XII at patient's baseline, BURNETT at baseline on video. Sensation baseline PPS.  PSYCH: Insight, judgement, and memory are baseline, affect and mood are happy/engaged.    Laboratory/Diagnostics: Reviewed in Epic by provider today.     Assessment/Plan:  Acute gouty arthritis  H/O acute gouty arthritis  Right foot pain  Chronic afib   H/O CVA  Encounter for therapeutic drug monitoring  Long term (current) use of anticoagulants  Acute-on-chronic. Ongoing. This second occurrence is ongoing. We note Coumadin dosing and INR control has been difficult w/ prednisone dosing in the past. Therefore, we will continue w/ Colcicine until relief is obtained. Will dose Coumadin as noted below and recheck INR in 5 days. Follow-up w/in 1 week or as needed.    Orders:  1. Colchicine 0.6mg PO BID.  Dx: gout.  2. Coumadin 1mg PO  QD.  3. Recheck INR x 1 on 7/3.   Dx: afib.    Video-Visit Details  Type of service:  Video Visit  Video Start Time: 1052  Video End Time (time video stopped): 1059  Originating Location (pt. Location): Long term Care  Distant Location (provider location):  [facility]   Mode of Communication:  Video Conference.    Electronically signed by:  JOSUE Clark CNP DNP              Sincerely,        JOSUE Gillis CNP

## 2020-06-30 RX ORDER — COLCHICINE 0.6 MG/1
0.6 TABLET ORAL 2 TIMES DAILY
Start: 2020-06-30 | End: 2020-07-03

## 2020-07-02 ENCOUNTER — RECORDS - HEALTHEAST (OUTPATIENT)
Dept: LAB | Facility: CLINIC | Age: 85
End: 2020-07-02

## 2020-07-03 ENCOUNTER — TRANSFERRED RECORDS (OUTPATIENT)
Dept: HEALTH INFORMATION MANAGEMENT | Facility: CLINIC | Age: 85
End: 2020-07-03

## 2020-07-03 ENCOUNTER — VIRTUAL VISIT (OUTPATIENT)
Dept: GERIATRICS | Facility: CLINIC | Age: 85
End: 2020-07-03
Payer: COMMERCIAL

## 2020-07-03 ENCOUNTER — TRANSFERRED RECORDS (OUTPATIENT)
Dept: MULTI SPECIALTY CLINIC | Facility: CLINIC | Age: 85
End: 2020-07-03

## 2020-07-03 VITALS
WEIGHT: 217.2 LBS | TEMPERATURE: 97.5 F | BODY MASS INDEX: 35.06 KG/M2 | SYSTOLIC BLOOD PRESSURE: 124 MMHG | RESPIRATION RATE: 16 BRPM | DIASTOLIC BLOOD PRESSURE: 61 MMHG | OXYGEN SATURATION: 99 % | HEART RATE: 70 BPM

## 2020-07-03 DIAGNOSIS — M10.9 ACUTE GOUTY ARTHRITIS: Primary | ICD-10-CM

## 2020-07-03 DIAGNOSIS — I48.20 CHRONIC ATRIAL FIBRILLATION (H): ICD-10-CM

## 2020-07-03 DIAGNOSIS — M79.671 RIGHT FOOT PAIN: ICD-10-CM

## 2020-07-03 DIAGNOSIS — Z86.73 H/O: CVA (CEREBROVASCULAR ACCIDENT): ICD-10-CM

## 2020-07-03 DIAGNOSIS — Z87.39 H/O ACUTE GOUTY ARTHRITIS: ICD-10-CM

## 2020-07-03 DIAGNOSIS — Z51.81 ENCOUNTER FOR THERAPEUTIC DRUG MONITORING: ICD-10-CM

## 2020-07-03 DIAGNOSIS — Z79.01 LONG TERM (CURRENT) USE OF ANTICOAGULANTS: ICD-10-CM

## 2020-07-03 LAB
INR PPP: 1.93 (ref 0.9–1.1)
INR PPP: 1.93 (ref 0.9–1.1)

## 2020-07-03 PROCEDURE — 99207 ZZC CDG-MDM COMPONENT: MEETS LOW - DOWN CODED: CPT | Performed by: NURSE PRACTITIONER

## 2020-07-03 PROCEDURE — 99308 SBSQ NF CARE LOW MDM 20: CPT | Mod: 95 | Performed by: NURSE PRACTITIONER

## 2020-07-03 NOTE — PROGRESS NOTES
"[facility] Geriatrics Video Visit  Mecca Slade is a 89 year old female who is being evaluated via a billable video visit due to the restrictions of the COVID19 pandemic.The patient has been notified of following: \"This video visit will be conducted via a call between you and your provider. We have found that certain health care needs can be provided without the need for an in-person physical exam.  This service lets us provide the care you need with a video conversation.  If a prescription is necessary we can send it directly to your pharmacy.  If lab work is needed we can place an order for that and you can then stop by our lab to have the test done at a later time. If during the course of the call the provider feels a video visit is not appropriate, you will not be charged for this service.\"     Proivder has received verbal consent for a Video Visit from the patient? Yes    Patient/facility would like the video invitation sent by: Send to e-mail at: [email or web address]    This visit took place virtually, with the patient located at [facility].  ________________________________________________  Video Start Time: 1033  Mecca Slade complains of    Chief Complaint   Patient presents with     Video Visit     Episodic   HPI/Subjective: Patient seen today after treatment for gout of right greater metatarsal with colchicine last week. Of note, Zayda was treated 2 weeks ago w/ prednisone for a gout flare in her left elbow, which resolved w/ treatment but did interact with her Coumadin. We have been chasing supratherapeutic INRs since, with her last INR being 3.04 on 6/26 and her current Coumadin dosing of 0.5mg x 3 days. Her Uric acid level from 2 weeks ago was abnormal.    Today, Lilly states her right foot is improved.  She denies numbness/tingling, fever/chills. She denies SOB, bleeding/bruising or new edema. Her INR resulted today at 1.93.    History: I have reviewed and " updated the patient's Past Medical History, Social History, Family History and Medication List.  ALLERGIES: Patient has no known allergies.  REVIEW OF SYSTEMS: 4 point ROS including Respiratory, CV, GI and , other than that noted in the HPI, is negative.    Objective:  /61   Pulse 70   Temp 97.5  F (36.4  C)   Resp 16   Wt 98.5 kg (217 lb 3.2 oz)   SpO2 99%   BMI 35.06 kg/m    GENERAL APPEARANCE: Alert, in no distress, cooperative.   EYES/ENT: EOM normal on camera, hearing acuity [place].  RESP: Respiratory effort appears unlabored over video screen, no respiratory distress apparent on video, On RA.   CV: Edema appears trace BLE via video. Color appears april/PVD noted.  ABDOMEN: Appears non-distended.  SKIN: Skin appears baseline w/ video inspection except right lateral foot 1-2+ edematous (no redness), along the first metatarsal vs proximal phalanx of the great toe   NEURO: CN II-XII at patient's baseline, BURNETT at baseline on video. Sensation baseline PPS.  PSYCH: Insight, judgement, and memory are baseline, affect and mood are happy/engaged.    Laboratory/Diagnostics: Reviewed in Epic by provider today.     Assessment/Plan:  Acute gouty arthritis  H/O acute gouty arthritis  Right foot pain  Chronic afib   H/O CVA  Encounter for therapeutic drug monitoring  Long term (current) use of anticoagulants  Acute-on-chronic. Ongoing. This second occurrence is almost resolved. Therefore, we will discontinue Colcicine. Will dose Coumadin as noted below and recheck INR in 1 week. Follow-up w/in 1 week or as needed.    Orders:  1. Discontinue Colchicine.  2. Coumadin 2mg PO x1 today.  3. Coumadin 1mg PO every day on AOD.  4. Recheck INR x1 on 7/10.  Dx: afib.    Video-Visit Details  Type of service:  Video Visit  Video Start Time: 1033  Video End Time (time video stopped): 1037  Originating Location (pt. Location): Long term Care  Distant Location (provider location):  [facility]   Mode of  Communication:  Video Conference.    Electronically signed by:  Dr. Haley Sims, APRN CNP DNP

## 2020-07-03 NOTE — LETTER
"    7/3/2020        RE: Mecca Slade  [facility]  [address] [facility] [address]        [facility] Geriatrics Video Visit  Mecca Slade is a 89 year old female who is being evaluated via a billable video visit due to the restrictions of the COVID19 pandemic.The patient has been notified of following: \"This video visit will be conducted via a call between you and your provider. We have found that certain health care needs can be provided without the need for an in-person physical exam.  This service lets us provide the care you need with a video conversation.  If a prescription is necessary we can send it directly to your pharmacy.  If lab work is needed we can place an order for that and you can then stop by our lab to have the test done at a later time. If during the course of the call the provider feels a video visit is not appropriate, you will not be charged for this service.\"     Proivder has received verbal consent for a Video Visit from the patient? Yes    Patient/facility would like the video invitation sent by: Send to e-mail at: [email or web address]    This visit took place virtually, with the patient located at [facility].  ________________________________________________  Video Start Time: 1033  Mecca Slade complains of    Chief Complaint   Patient presents with     Video Visit     Episodic   HPI/Subjective: Patient seen today after treatment for gout of right greater metatarsal with colchicine last week. Of note, Zayda was treated 2 weeks ago w/ prednisone for a gout flare in her left elbow, which resolved w/ treatment but did interact with her Coumadin. We have been chasing supratherapeutic INRs since, with her last INR being 3.04 on 6/26 and her current Coumadin dosing of 0.5mg x 3 days. Her Uric acid level from 2 weeks ago was abnormal.    Today, Lilly states her right foot is improved.  She denies numbness/tingling, fever/chills. She " denies SOB, bleeding/bruising or new edema. Her INR resulted today at 1.93.    History: I have reviewed and updated the patient's Past Medical History, Social History, Family History and Medication List.  ALLERGIES: Patient has no known allergies.  REVIEW OF SYSTEMS: 4 point ROS including Respiratory, CV, GI and , other than that noted in the HPI, is negative.    Objective:  /61   Pulse 70   Temp 97.5  F (36.4  C)   Resp 16   Wt 98.5 kg (217 lb 3.2 oz)   SpO2 99%   BMI 35.06 kg/m    GENERAL APPEARANCE: Alert, in no distress, cooperative.   EYES/ENT: EOM normal on camera, hearing acuity [place].  RESP: Respiratory effort appears unlabored over video screen, no respiratory distress apparent on video, On RA.   CV: Edema appears trace BLE via video. Color appears april/PVD noted.  ABDOMEN: Appears non-distended.  SKIN: Skin appears baseline w/ video inspection except right lateral foot 1-2+ edematous (no redness), along the first metatarsal vs proximal phalanx of the great toe   NEURO: CN II-XII at patient's baseline, BURNETT at baseline on video. Sensation baseline PPS.  PSYCH: Insight, judgement, and memory are baseline, affect and mood are happy/engaged.    Laboratory/Diagnostics: Reviewed in Epic by provider today.     Assessment/Plan:  Acute gouty arthritis  H/O acute gouty arthritis  Right foot pain  Chronic afib   H/O CVA  Encounter for therapeutic drug monitoring  Long term (current) use of anticoagulants  Acute-on-chronic. Ongoing. This second occurrence is almost resolved. Therefore, we will discontinue Colcicine. Will dose Coumadin as noted below and recheck INR in 1 week. Follow-up w/in 1 week or as needed.    Orders:  1. Discontinue Colchicine.  2. Coumadin 2mg PO x1 today.  3. Coumadin 1mg PO every day on AOD.  4. Recheck INR x1 on 7/10.  Dx: afib.    Video-Visit Details  Type of service:  Video Visit  Video Start Time: 1033  Video End Time (time video stopped): 1037  Originating Location (pt.  Location): Long term Care  Distant Location (provider location):  [place] [facility]   Mode of Communication:  Video Conference.    Electronically signed by:  JOSUE Clark CNP DNP            Sincerely,        JOSUE Gillis CNP

## 2020-07-09 ENCOUNTER — RECORDS - HEALTHEAST (OUTPATIENT)
Dept: LAB | Facility: CLINIC | Age: 85
End: 2020-07-09

## 2020-07-10 ENCOUNTER — NURSING HOME VISIT (OUTPATIENT)
Dept: GERIATRICS | Facility: CLINIC | Age: 85
End: 2020-07-10
Payer: COMMERCIAL

## 2020-07-10 VITALS
OXYGEN SATURATION: 98 % | DIASTOLIC BLOOD PRESSURE: 61 MMHG | BODY MASS INDEX: 34.57 KG/M2 | HEART RATE: 70 BPM | SYSTOLIC BLOOD PRESSURE: 124 MMHG | TEMPERATURE: 97.6 F | WEIGHT: 214.2 LBS | RESPIRATION RATE: 16 BRPM

## 2020-07-10 DIAGNOSIS — I48.20 CHRONIC ATRIAL FIBRILLATION (H): Primary | ICD-10-CM

## 2020-07-10 DIAGNOSIS — Z51.81 ENCOUNTER FOR THERAPEUTIC DRUG MONITORING: ICD-10-CM

## 2020-07-10 DIAGNOSIS — Z86.73 H/O: CVA (CEREBROVASCULAR ACCIDENT): ICD-10-CM

## 2020-07-10 DIAGNOSIS — Z79.01 LONG TERM (CURRENT) USE OF ANTICOAGULANTS: ICD-10-CM

## 2020-07-10 LAB — INR PPP: 1.68 (ref 0.9–1.1)

## 2020-07-10 PROCEDURE — 99308 SBSQ NF CARE LOW MDM 20: CPT | Performed by: NURSE PRACTITIONER

## 2020-07-10 NOTE — PROGRESS NOTES
[place] [facility]  [place] Medical Record Number:  [id number]. Place of Service where encounter took place: [facility]  (S) [[id number]] HPI: Mecca Slade is a 89 year old  (12/14/1921), who is being seen today for an episodic care visit at the above location. HPI information obtained from: facility chart records, facility staff, patient report, [facility] chart review and Care Everywhere [facility] chart review. Today's concern is INR/Coumadin management for A. Fib    ROS/Subjective:  Bleeding Signs/Symptoms:  None  Thromboembolic Signs/Symptoms:  None  Medication Changes:  No  Dietary Changes:  No  Activity Changes: No  Bacterial/Viral Infection:  No  Missed Coumadin Doses:  None  On ASA: No  Other Concerns:  No    OBJECTIVE:  /61   Pulse 70   Temp 97.6  F (36.4  C)   Resp 16   Wt 97.2 kg (214 lb 3.2 oz)   SpO2 98%   BMI 34.57 kg/m    Last INR: 1.93 on 7/3 (one week ago).  INR Today: 1.68.  Current Dose:  2mg x1, and 1mg on AOD.     ASSESSMENT:  Chronic atrial fibrillation (H)  H/O: CVA (cerebrovascular accident), R MCA embolism s/p tPA, March 2017  Long term (current) use of anticoagulants  Encounter for therapeutic drug monitoring  Chronic. Stable. Subtherapeutic. Will dose Coumadin as noted below and recheck INR in 2 days. Follow-up accordingly. Therapeutic INR for goal of 2-3.    PLAN:   Orders written by provider:  1. Coumadin 2mg PO QD x 3 days.  2. Recheck INR x1 on 7/13.   Dx: afib.    Electronically signed by:  Dr. Haley Sims, APRN CNP DNP

## 2020-07-10 NOTE — LETTER
7/10/2020        RE: Mecca Slade  [facility]  [address] [facility] [address]        [place] [facility]  [place] Medical Record Number:  [id number]. Place of Service where encounter took place: [facility]  (S) [[id number]] HPI: Mecca Slade is a 89 year old  (12/14/1921), who is being seen today for an episodic care visit at the above location. HPI information obtained from: facility chart records, facility staff, patient report, [facility] chart review and Care Everywhere [facility] chart review. Today's concern is INR/Coumadin management for A. Fib    ROS/Subjective:  Bleeding Signs/Symptoms:  None  Thromboembolic Signs/Symptoms:  None  Medication Changes:  No  Dietary Changes:  No  Activity Changes: No  Bacterial/Viral Infection:  No  Missed Coumadin Doses:  None  On ASA: No  Other Concerns:  No    OBJECTIVE:  /61   Pulse 70   Temp 97.6  F (36.4  C)   Resp 16   Wt 97.2 kg (214 lb 3.2 oz)   SpO2 98%   BMI 34.57 kg/m    Last INR: 1.93 on 7/3 (one week ago).  INR Today: 1.68.  Current Dose:  2mg x1, and 1mg on AOD.     ASSESSMENT:  Chronic atrial fibrillation (H)  H/O: CVA (cerebrovascular accident), R MCA embolism s/p tPA, March 2017  Long term (current) use of anticoagulants  Encounter for therapeutic drug monitoring  Chronic. Stable. Subtherapeutic. Will dose Coumadin as noted below and recheck INR in 2 days. Follow-up accordingly. Therapeutic INR for goal of 2-3.    PLAN:   Orders written by provider:  1. Coumadin 2mg PO QD x 3 days.  2. Recheck INR x1 on 7/13.   Dx: afib.    Electronically signed by:  JOSUE Clark CNP DNP            Sincerely,        JOSUE Gillis CNP

## 2020-07-13 ENCOUNTER — RECORDS - HEALTHEAST (OUTPATIENT)
Dept: LAB | Facility: CLINIC | Age: 85
End: 2020-07-13

## 2020-07-13 ENCOUNTER — TRANSFERRED RECORDS (OUTPATIENT)
Dept: HEALTH INFORMATION MANAGEMENT | Facility: CLINIC | Age: 85
End: 2020-07-13

## 2020-07-13 LAB
INR PPP: 1.44 (ref 0.9–1.1)
INR PPP: 1.44 (ref 0.9–1.1)

## 2020-07-14 ENCOUNTER — TRANSFERRED RECORDS (OUTPATIENT)
Dept: HEALTH INFORMATION MANAGEMENT | Facility: CLINIC | Age: 85
End: 2020-07-14

## 2020-07-14 LAB
INR PPP: 1.46 (ref 0.9–1.1)
INR PPP: 1.46 (ref 0.9–1.1)

## 2020-07-16 ENCOUNTER — RECORDS - HEALTHEAST (OUTPATIENT)
Dept: LAB | Facility: CLINIC | Age: 85
End: 2020-07-16

## 2020-07-16 VITALS
OXYGEN SATURATION: 99 % | TEMPERATURE: 97.8 F | RESPIRATION RATE: 16 BRPM | BODY MASS INDEX: 34.57 KG/M2 | DIASTOLIC BLOOD PRESSURE: 61 MMHG | WEIGHT: 214.2 LBS | SYSTOLIC BLOOD PRESSURE: 124 MMHG | HEART RATE: 70 BPM

## 2020-07-17 ENCOUNTER — TRANSFERRED RECORDS (OUTPATIENT)
Dept: HEALTH INFORMATION MANAGEMENT | Facility: CLINIC | Age: 85
End: 2020-07-17

## 2020-07-17 ENCOUNTER — VIRTUAL VISIT (OUTPATIENT)
Dept: GERIATRICS | Facility: CLINIC | Age: 85
End: 2020-07-17
Payer: COMMERCIAL

## 2020-07-17 DIAGNOSIS — Z51.81 ENCOUNTER FOR THERAPEUTIC DRUG MONITORING: ICD-10-CM

## 2020-07-17 DIAGNOSIS — I48.20 CHRONIC ATRIAL FIBRILLATION (H): Primary | ICD-10-CM

## 2020-07-17 DIAGNOSIS — Z86.73 H/O: CVA (CEREBROVASCULAR ACCIDENT): ICD-10-CM

## 2020-07-17 DIAGNOSIS — Z79.01 LONG TERM (CURRENT) USE OF ANTICOAGULANTS: ICD-10-CM

## 2020-07-17 LAB
INR PPP: 2.14 (ref 0.9–1.1)
INR PPP: 2.14 (ref 0.9–1.1)

## 2020-07-17 PROCEDURE — 99308 SBSQ NF CARE LOW MDM 20: CPT | Mod: 95 | Performed by: NURSE PRACTITIONER

## 2020-07-17 NOTE — PROGRESS NOTES
"[facility] Geriatrics Video Visit  Mecca Slade is a 89 year old female who is being evaluated via a billable video visit due to the restrictions of the COVID19 pandemic.The patient has been notified of following: \"This video visit will be conducted via a call between you and your provider. We have found that certain health care needs can be provided without the need for an in-person physical exam.  This service lets us provide the care you need with a video conversation.  If a prescription is necessary we can send it directly to your pharmacy.  If lab work is needed we can place an order for that and you can then stop by our lab to have the test done at a later time. If during the course of the call the provider feels a video visit is not appropriate, you will not be charged for this service.\"     Proivder has received verbal consent for a Video Visit from the patient? Yes    Patient/facility would like the video invitation sent by: Send to e-mail at: [email or web address]    This visit took place virtually, with the patient located at [facility].  ________________________________________________  Video Start Time: 1138  Mecca Slade complains of    Chief Complaint   Patient presents with     RECHECK   [place] Medical Record Number:  [id number]. Place of Service where encounter took place: No question data found. HPI: Mecca Slade is a 89 year old  (12/14/1921), who is being seen today for an episodic care visit at the above location. HPI information obtained from: facility chart records, facility staff, patient report, [facility] chart review and Care Everywhere [facility] chart review. Today's concern is INR/Coumadin management for A. Fib    ROS/Subjective:  Bleeding Signs/Symptoms:  None  Thromboembolic Signs/Symptoms:  None  Medication Changes:  No  Dietary Changes:  No  Activity Changes: No  Bacterial/Viral Infection:  No  Missed Coumadin Doses:  None  On ASA: " No  Other Concerns:  No    OBJECTIVE:  /61   Pulse 70   Temp 97.8  F (36.6  C)   Resp 16   Wt 97.2 kg (214 lb 3.2 oz)   SpO2 99%   BMI 34.57 kg/m    Last INR: 1.46 on 7/14.  INR Today: 2.14.  Current Dose:  4mg x1, and 2mg x 2 days.   Date INR New Dose/Orders   7/14 1.46 4mg (7/14), 2mg (7/15, 7/16).   7/13 1.44 2mg x1.   7/10 1.68 2mg/day   7/3 1.93 2mg x1, 1mg AOD.       ASSESSMENT:  Chronic atrial fibrillation (H)  H/O: CVA (cerebrovascular accident), R MCA embolism s/p tPA, March 2017  Long term (current) use of anticoagulants  Encounter for therapeutic drug monitoring  Chronic. Stable. Subtherapeutic. Will dose Coumadin as noted below and recheck INR in 2 days. Follow-up accordingly. Therapeutic INR for goal of 2-3.    PLAN:   Orders written by provider:  1. Coumadin 2mg PO QD.  2. Recheck INR x1 on 7/24.   Dx: afib.      Video-Visit Details  Type of service:  Video Visit  Video Start Time: 1138  Video End Time (time video stopped): 1138  Originating Location (pt. Location): Long term Care  Distant Location (provider location):  [facility]   Mode of Communication:  Video Conference.    Electronically signed by:  Dr. Haley Sims, APRN CNP DNP

## 2020-07-17 NOTE — LETTER
"    7/17/2020        RE: Mecca Slade  [facility]  [address] [facility] [address]        [facility] Geriatrics Video Visit  Mecca Slade is a 89 year old female who is being evaluated via a billable video visit due to the restrictions of the COVID19 pandemic.The patient has been notified of following: \"This video visit will be conducted via a call between you and your provider. We have found that certain health care needs can be provided without the need for an in-person physical exam.  This service lets us provide the care you need with a video conversation.  If a prescription is necessary we can send it directly to your pharmacy.  If lab work is needed we can place an order for that and you can then stop by our lab to have the test done at a later time. If during the course of the call the provider feels a video visit is not appropriate, you will not be charged for this service.\"     Proivder has received verbal consent for a Video Visit from the patient? Yes    Patient/facility would like the video invitation sent by: Send to e-mail at: [email or web address]    This visit took place virtually, with the patient located at [facility].  ________________________________________________  Video Start Time: 1138  Mecca Slade complains of    Chief Complaint   Patient presents with     RECHECK   [place] Medical Record Number:  [id number]. Place of Service where encounter took place: No question data found. HPI: Mecca Slade is a 89 year old  (12/14/1921), who is being seen today for an episodic care visit at the above location. HPI information obtained from: facility chart records, facility staff, patient report, [facility] chart review and Care Everywhere [facility] chart review. Today's concern is INR/Coumadin management for A. Fib    ROS/Subjective:  Bleeding Signs/Symptoms:  None  Thromboembolic Signs/Symptoms:  None  Medication Changes:  No  Dietary " Changes:  No  Activity Changes: No  Bacterial/Viral Infection:  No  Missed Coumadin Doses:  None  On ASA: No  Other Concerns:  No    OBJECTIVE:  /61   Pulse 70   Temp 97.8  F (36.6  C)   Resp 16   Wt 97.2 kg (214 lb 3.2 oz)   SpO2 99%   BMI 34.57 kg/m    Last INR: 1.46 on 7/14.  INR Today: 2.14.  Current Dose:  4mg x1, and 2mg x 2 days.   Date INR New Dose/Orders   7/14 1.46 4mg (7/14), 2mg (7/15, 7/16).   7/13 1.44 2mg x1.   7/10 1.68 2mg/day   7/3 1.93 2mg x1, 1mg AOD.       ASSESSMENT:  Chronic atrial fibrillation (H)  H/O: CVA (cerebrovascular accident), R MCA embolism s/p tPA, March 2017  Long term (current) use of anticoagulants  Encounter for therapeutic drug monitoring  Chronic. Stable. Subtherapeutic. Will dose Coumadin as noted below and recheck INR in 2 days. Follow-up accordingly. Therapeutic INR for goal of 2-3.    PLAN:   Orders written by provider:  1. Coumadin 2mg PO QD.  2. Recheck INR x1 on 7/24.   Dx: afib.      Video-Visit Details  Type of service:  Video Visit  Video Start Time: 1138  Video End Time (time video stopped): 1138  Originating Location (pt. Location): Long term Care  Distant Location (provider location):  [facility]   Mode of Communication:  Video Conference.    Electronically signed by:  JOSUE Clark CNP DNP      Sincerely,        JOSUE Gillis CNP

## 2020-07-23 ENCOUNTER — RECORDS - HEALTHEAST (OUTPATIENT)
Dept: LAB | Facility: CLINIC | Age: 85
End: 2020-07-23

## 2020-07-24 ENCOUNTER — TELEPHONE (OUTPATIENT)
Dept: GERIATRICS | Facility: CLINIC | Age: 85
End: 2020-07-24

## 2020-07-24 ENCOUNTER — TRANSFERRED RECORDS (OUTPATIENT)
Dept: HEALTH INFORMATION MANAGEMENT | Facility: CLINIC | Age: 85
End: 2020-07-24

## 2020-07-24 LAB
INR PPP: 3.41 (ref 0.9–1.1)
INR PPP: 3.41 (ref 0.9–1.1)

## 2020-07-27 NOTE — TELEPHONE ENCOUNTER
[place] [facility] TELEPHONE ENCOUNTER    Chief Complaint   Patient presents with     INR RESULTS       Mecca Slade is a 89 year old  (12/14/1921),Nurse called today to report: INR of 3.41, has been on 2 mg since 7/17/20, previous INR was 2.14.    ASSESSMENT/PLAN  INR now therapeutic.  No bleeding, on no antibiotics.     Warfarin 1 mg on M/W/F and 2 mg all remaining days    Recheck INR on 7/31/20     Electronically signed by:   JOSUE Read CNP

## 2020-07-28 ENCOUNTER — RECORDS - HEALTHEAST (OUTPATIENT)
Dept: LAB | Facility: CLINIC | Age: 85
End: 2020-07-28

## 2020-07-29 LAB — INR PPP: 3.17 (ref 0.9–1.1)

## 2020-07-30 ENCOUNTER — RECORDS - HEALTHEAST (OUTPATIENT)
Dept: LAB | Facility: CLINIC | Age: 85
End: 2020-07-30

## 2020-07-31 LAB — INR PPP: 2.66 (ref 0.9–1.1)

## 2020-08-02 ENCOUNTER — RECORDS - HEALTHEAST (OUTPATIENT)
Dept: LAB | Facility: CLINIC | Age: 85
End: 2020-08-02

## 2020-08-03 LAB
ANION GAP SERPL CALCULATED.3IONS-SCNC: 6 MMOL/L (ref 5–18)
BUN SERPL-MCNC: 29 MG/DL (ref 8–28)
CALCIUM SERPL-MCNC: 10.5 MG/DL (ref 8.5–10.5)
CHLORIDE BLD-SCNC: 107 MMOL/L (ref 98–107)
CO2 SERPL-SCNC: 29 MMOL/L (ref 22–31)
CREAT SERPL-MCNC: 0.99 MG/DL (ref 0.6–1.1)
GFR SERPL CREATININE-BSD FRML MDRD: 52 ML/MIN/1.73M2
GLUCOSE BLD-MCNC: 165 MG/DL (ref 70–125)
HBA1C MFR BLD: 8.6 %
HGB BLD-MCNC: 12.7 G/DL (ref 12–16)
POTASSIUM BLD-SCNC: 3.9 MMOL/L (ref 3.5–5)
SODIUM SERPL-SCNC: 142 MMOL/L (ref 136–145)

## 2020-08-06 ENCOUNTER — RECORDS - HEALTHEAST (OUTPATIENT)
Dept: LAB | Facility: CLINIC | Age: 85
End: 2020-08-06

## 2020-08-07 ENCOUNTER — VIRTUAL VISIT (OUTPATIENT)
Dept: GERIATRICS | Facility: CLINIC | Age: 85
End: 2020-08-07
Payer: COMMERCIAL

## 2020-08-07 VITALS
DIASTOLIC BLOOD PRESSURE: 72 MMHG | RESPIRATION RATE: 16 BRPM | BODY MASS INDEX: 35.41 KG/M2 | SYSTOLIC BLOOD PRESSURE: 117 MMHG | HEART RATE: 67 BPM | WEIGHT: 219.4 LBS | TEMPERATURE: 97.4 F | OXYGEN SATURATION: 97 %

## 2020-08-07 DIAGNOSIS — Z86.73 H/O: CVA (CEREBROVASCULAR ACCIDENT): ICD-10-CM

## 2020-08-07 DIAGNOSIS — Z79.01 LONG TERM (CURRENT) USE OF ANTICOAGULANTS: ICD-10-CM

## 2020-08-07 DIAGNOSIS — Z51.81 ENCOUNTER FOR THERAPEUTIC DRUG MONITORING: ICD-10-CM

## 2020-08-07 DIAGNOSIS — I48.20 CHRONIC ATRIAL FIBRILLATION (H): Primary | ICD-10-CM

## 2020-08-07 DIAGNOSIS — H61.23 BILATERAL IMPACTED CERUMEN: ICD-10-CM

## 2020-08-07 LAB — INR PPP: 1.49 (ref 0.9–1.1)

## 2020-08-07 PROCEDURE — 99308 SBSQ NF CARE LOW MDM 20: CPT | Mod: 95 | Performed by: NURSE PRACTITIONER

## 2020-08-07 PROCEDURE — 99207 ZZC CDG-MDM COMPONENT: MEETS LOW - DOWN CODED: CPT | Performed by: NURSE PRACTITIONER

## 2020-08-07 NOTE — PROGRESS NOTES
"[facility] Geriatrics Video Visit  Mecca Slade is a 89 year old female who is being evaluated via a billable video visit due to the restrictions of the COVID19 pandemic.The patient has been notified of following: \"This video visit will be conducted via a call between you and your provider. We have found that certain health care needs can be provided without the need for an in-person physical exam.  This service lets us provide the care you need with a video conversation.  If a prescription is necessary we can send it directly to your pharmacy.  If lab work is needed we can place an order for that and you can then stop by our lab to have the test done at a later time. If during the course of the call the provider feels a video visit is not appropriate, you will not be charged for this service.\"     Proivder has received verbal consent for a Video Visit from the patient? Yes    Patient/facility would like the video invitation sent by: Send to e-mail at: [email or web address]    This visit took place virtually, with the patient located at [facility].  ________________________________________________  Video Start Time: 1119  Mecca Slade complains of    Chief Complaint   Patient presents with     Video Visit     INR   HPI/Subjective: Zayda seen today by video, while up in her recliner in her room. She denies SOB, swelling, and is curious about her resulted INR level. Last INR was 2.66 on 7/31. Her current Coumadin dosing is 1mg M/W/F and 2mg AOD. Today, her INR resulted at 1.49. She is also wondering if she can have her ears cleaned. The last time provider removed impacted cerumen, patient did not tolerate well as she was mildly uncomfortable.     History: I have reviewed and updated the patient's Past Medical History, Social History, Family History and Medication List.  ALLERGIES: Patient has no known allergies.  REVIEW OF SYSTEMS: 4 point ROS including Respiratory, CV, GI " and , other than that noted in the HPI, is negative.    Objective:  /72   Pulse 67   Temp 97.4  F (36.3  C)   Resp 16   Wt 99.5 kg (219 lb 6.4 oz)   SpO2 97%   BMI 35.41 kg/m    GENERAL APPEARANCE: Alert, in no distress, cooperative.   EYES/ENT: EOM normal on camera, hearing acuity [place].  RESP: Respiratory effort appears unlabored over video screen, no respiratory distress apparent on video, On RA.   SKIN: Skin appears baseline w/ video inspection. No wounds/rashes noted.    NEURO: CN II-XII at patient's baseline, BURNETT at baseline on video. Sensation baseline PPS.  PSYCH: Insight, judgement, and memory are baseline, affect and mood are happy/engaged.    Laboratory/Diagnostics: Reviewed in Epic by provider today.     Assessment/Plan:  Chronic atrial fibrillation (H)  H/O: CVA (cerebrovascular accident), R MCA embolism s/p tPA, March 2017  Bilateral impacted cerumen  Long term (current) use of anticoagulants  Encounter for therapeutic drug monitoring  Acute-on-Chronic. Stable. Subtherapeutic. Will dose Coumadin as noted below and recheck INR in 2 days. Follow-up accordingly.    Orders:  1. Coumadin 4mg PO x1 today.  2. Coumadin 2mg x1 tomorrow 8/8.  3. Coumadin 1mg PO x1 on 8/9.  4. Recheck INR x1 on 8/10. Dx: afib.  5. Debrox Otic solution, 4 gtts, each ear, BID, x 3 days. Dx: cerumen.   6. 6. In 3 days, nursing to attempt ear irrigation per house protocol. Dx: cerumen.    Video-Visit Details  Type of service:  Video Visit  Video Start Time: 1119  Video End Time (time video stopped): 1122  Originating Location (pt. Location): Long term Care  Distant Location (provider location):  [facility] SERVICES   Mode of Communication:  Video Conference.    Electronically signed by:  Dr. Haley Sims, APRN CNP DNP

## 2020-08-07 NOTE — LETTER
"    8/7/2020        RE: Mecca Slade  [facility]  [address] [facility] [address]        [facility] Geriatrics Video Visit  Mecca Slade is a 89 year old female who is being evaluated via a billable video visit due to the restrictions of the COVID19 pandemic.The patient has been notified of following: \"This video visit will be conducted via a call between you and your provider. We have found that certain health care needs can be provided without the need for an in-person physical exam.  This service lets us provide the care you need with a video conversation.  If a prescription is necessary we can send it directly to your pharmacy.  If lab work is needed we can place an order for that and you can then stop by our lab to have the test done at a later time. If during the course of the call the provider feels a video visit is not appropriate, you will not be charged for this service.\"     Proivder has received verbal consent for a Video Visit from the patient? Yes    Patient/facility would like the video invitation sent by: Send to e-mail at: [email or web address]    This visit took place virtually, with the patient located at [facility].  ________________________________________________  Video Start Time: 1119  Mecca Slade complains of    Chief Complaint   Patient presents with     Video Visit     INR   HPI/Subjective: Zayda seen today by video, while up in her recliner in her room. She denies SOB, swelling, and is curious about her resulted INR level. Last INR was 2.66 on 7/31. Her current Coumadin dosing is 1mg M/W/F and 2mg AOD. Today, her INR resulted at 1.49. She is also wondering if she can have her ears cleaned. The last time provider removed impacted cerumen, patient did not tolerate well as she was mildly uncomfortable.     History: I have reviewed and updated the patient's Past Medical History, Social History, Family History and Medication " List.  ALLERGIES: Patient has no known allergies.  REVIEW OF SYSTEMS: 4 point ROS including Respiratory, CV, GI and , other than that noted in the HPI, is negative.    Objective:  /72   Pulse 67   Temp 97.4  F (36.3  C)   Resp 16   Wt 99.5 kg (219 lb 6.4 oz)   SpO2 97%   BMI 35.41 kg/m    GENERAL APPEARANCE: Alert, in no distress, cooperative.   EYES/ENT: EOM normal on camera, hearing acuity [place].  RESP: Respiratory effort appears unlabored over video screen, no respiratory distress apparent on video, On RA.   SKIN: Skin appears baseline w/ video inspection. No wounds/rashes noted.    NEURO: CN II-XII at patient's baseline, BURNETT at baseline on video. Sensation baseline PPS.  PSYCH: Insight, judgement, and memory are baseline, affect and mood are happy/engaged.    Laboratory/Diagnostics: Reviewed in Epic by provider today.     Assessment/Plan:  Chronic atrial fibrillation (H)  H/O: CVA (cerebrovascular accident), R MCA embolism s/p tPA, March 2017  Bilateral impacted cerumen  Long term (current) use of anticoagulants  Encounter for therapeutic drug monitoring  Acute-on-Chronic. Stable. Subtherapeutic. Will dose Coumadin as noted below and recheck INR in 2 days. Follow-up accordingly.    Orders:  1. Coumadin 4mg PO x1 today.  2. Coumadin 2mg x1 tomorrow 8/8.  3. Coumadin 1mg PO x1 on 8/9.  4. Recheck INR x1 on 8/10. Dx: afib.  5. Debrox Otic solution, 4 gtts, each ear, BID, x 3 days. Dx: cerumen.   6. 6. In 3 days, nursing to attempt ear irrigation per house protocol. Dx: cerumen.    Video-Visit Details  Type of service:  Video Visit  Video Start Time: 1119  Video End Time (time video stopped): 1122  Originating Location (pt. Location): Long term Care  Distant Location (provider location):  [facility]   Mode of Communication:  Video Conference.    Electronically signed by:  JOSUE Clark CNP DNP          Sincerely,        JOSUE Gillis CNP

## 2020-08-08 ENCOUNTER — RECORDS - HEALTHEAST (OUTPATIENT)
Dept: LAB | Facility: CLINIC | Age: 85
End: 2020-08-08

## 2020-08-10 ENCOUNTER — VIRTUAL VISIT (OUTPATIENT)
Dept: GERIATRICS | Facility: CLINIC | Age: 85
End: 2020-08-10
Payer: COMMERCIAL

## 2020-08-10 VITALS
DIASTOLIC BLOOD PRESSURE: 72 MMHG | HEART RATE: 67 BPM | WEIGHT: 219.4 LBS | RESPIRATION RATE: 16 BRPM | OXYGEN SATURATION: 97 % | TEMPERATURE: 97.2 F | SYSTOLIC BLOOD PRESSURE: 117 MMHG | BODY MASS INDEX: 35.41 KG/M2

## 2020-08-10 DIAGNOSIS — Z86.73 H/O: CVA (CEREBROVASCULAR ACCIDENT): ICD-10-CM

## 2020-08-10 DIAGNOSIS — Z51.81 ENCOUNTER FOR THERAPEUTIC DRUG MONITORING: ICD-10-CM

## 2020-08-10 DIAGNOSIS — Z79.01 LONG TERM (CURRENT) USE OF ANTICOAGULANTS: ICD-10-CM

## 2020-08-10 DIAGNOSIS — I48.20 CHRONIC ATRIAL FIBRILLATION (H): Primary | ICD-10-CM

## 2020-08-10 LAB — INR PPP: 2 (ref 0.9–1.1)

## 2020-08-10 PROCEDURE — 99308 SBSQ NF CARE LOW MDM 20: CPT | Mod: 95 | Performed by: NURSE PRACTITIONER

## 2020-08-10 NOTE — LETTER
"    8/10/2020        RE: [facility]  [address] [facility] [address]        [facility] Geriatrics Video Visit  Mecca Slade is a 89 year old female who is being evaluated via a billable video visit due to the restrictions of the COVID19 pandemic.The patient has been notified of following: \"This video visit will be conducted via a call between you and your provider. We have found that certain health care needs can be provided without the need for an in-person physical exam.  This service lets us provide the care you need with a video conversation.  If a prescription is necessary we can send it directly to your pharmacy.  If lab work is needed we can place an order for that and you can then stop by our lab to have the test done at a later time. If during the course of the call the provider feels a video visit is not appropriate, you will not be charged for this service.\"     Proivder has received verbal consent for a Video Visit from the patient? YesYes    Patient/facility would like the video invitation sent by: Send to e-mail at: [email or web address]    This visit took place virtually, with the patient located at [facility].  ________________________________________________  Video Start Time: 1246  HPI: Mecca Slade is a 89 year old  (12/14/1921), who is being seen today for an episodic care visit at the above location.   HPI information obtained from: facility chart records, facility staff, patient report, [facility] chart review and Care Everywhere [facility] chart review. Today's concern is INR/Coumadin management for A. Fib    ROS/Subjective:  Bleeding Signs/Symptoms:  None  Thromboembolic Signs/Symptoms:  None  Medication Changes:  No  Dietary Changes:  No  Activity Changes: No  Bacterial/Viral Infection:  No  Missed Coumadin Doses:  None  On ASA: No  Other Concerns:  No    OBJECTIVE:  /72   Pulse 67   Temp 97.2  F (36.2  C)   " Resp 16   Wt 99.5 kg (219 lb 6.4 oz)   SpO2 97%   BMI 35.41 kg/m    Last INR: 1.4 on 8/7  INR Today:  2.0  Current Dose: 4mg x1, 2mg x1, 1mg x1. Was 1-2mg/day prior.     ASSESSMENT:  Chronic atrial fibrillation (H)  H/O: CVA (cerebrovascular accident), R MCA embolism s/p tPA, March 2017  Long term (current) use of anticoagulants  Encounter for therapeutic drug monitoring  Chronic. Stable. Therapeutic. Will dose Coumadin as noted below and recheck INR in 4 days. Follow-up accordingly.    PLAN:   Orders written by provider:  1. Coumadin 2mg PO M/W  2. Coumadin 1mg PO on Tu/Th.  2. Recheck INR x1 on 8/14.    Video-Visit Details  Type of service:  Video Visit  Video Start Time: 1246  Video End Time (time video stopped): 1248  Originating Location (pt. Location): Long term Care  Distant Location (provider location):  [facility] SERVICES   Mode of Communication:  Video Conference.    Electronically signed by:  JOSUE Clark CNP DNP            Sincerely,        JOSUE Gillis CNP

## 2020-08-10 NOTE — PROGRESS NOTES
"[facility] Geriatrics Video Visit  Mecca Slade is a 89 year old female who is being evaluated via a billable video visit due to the restrictions of the COVID19 pandemic.The patient has been notified of following: \"This video visit will be conducted via a call between you and your provider. We have found that certain health care needs can be provided without the need for an in-person physical exam.  This service lets us provide the care you need with a video conversation.  If a prescription is necessary we can send it directly to your pharmacy.  If lab work is needed we can place an order for that and you can then stop by our lab to have the test done at a later time. If during the course of the call the provider feels a video visit is not appropriate, you will not be charged for this service.\"     Proivder has received verbal consent for a Video Visit from the patient? YesYes    Patient/facility would like the video invitation sent by: Send to e-mail at: [email or web address]    This visit took place virtually, with the patient located at [facility].  ________________________________________________  Video Start Time: 1246  HPI: Mecca Slade is a 89 year old  (12/14/1921), who is being seen today for an episodic care visit at the above location.   HPI information obtained from: facility chart records, facility staff, patient report, [facility] chart review and Care Everywhere [facility] chart review. Today's concern is INR/Coumadin management for A. Fib    ROS/Subjective:  Bleeding Signs/Symptoms:  None  Thromboembolic Signs/Symptoms:  None  Medication Changes:  No  Dietary Changes:  No  Activity Changes: No  Bacterial/Viral Infection:  No  Missed Coumadin Doses:  None  On ASA: No  Other Concerns:  No    OBJECTIVE:  /72   Pulse 67   Temp 97.2  F (36.2  C)   Resp 16   Wt 99.5 kg (219 lb 6.4 oz)   SpO2 97%   BMI 35.41 kg/m    Last INR: 1.4 on 8/7  INR Today:  " 2.0  Current Dose: 4mg x1, 2mg x1, 1mg x1. Was 1-2mg/day prior.     ASSESSMENT:  Chronic atrial fibrillation (H)  H/O: CVA (cerebrovascular accident), R MCA embolism s/p tPA, March 2017  Long term (current) use of anticoagulants  Encounter for therapeutic drug monitoring  Chronic. Stable. Therapeutic. Will dose Coumadin as noted below and recheck INR in 4 days. Follow-up accordingly.    PLAN:   Orders written by provider:  1. Coumadin 2mg PO M/W  2. Coumadin 1mg PO on Tu/Th.  2. Recheck INR x1 on 8/14.    Video-Visit Details  Type of service:  Video Visit  Video Start Time: 1246  Video End Time (time video stopped): 1248  Originating Location (pt. Location): Long term Care  Distant Location (provider location):  [facility]   Mode of Communication:  Video Conference.    Electronically signed by:  Dr. Haley Sims, APRN CNP DNP

## 2020-08-13 ENCOUNTER — RECORDS - HEALTHEAST (OUTPATIENT)
Dept: LAB | Facility: CLINIC | Age: 85
End: 2020-08-13

## 2020-08-14 ENCOUNTER — VIRTUAL VISIT (OUTPATIENT)
Dept: GERIATRICS | Facility: CLINIC | Age: 85
End: 2020-08-14
Payer: COMMERCIAL

## 2020-08-14 VITALS
SYSTOLIC BLOOD PRESSURE: 117 MMHG | RESPIRATION RATE: 16 BRPM | OXYGEN SATURATION: 94 % | HEART RATE: 67 BPM | WEIGHT: 216.2 LBS | BODY MASS INDEX: 34.9 KG/M2 | TEMPERATURE: 97.2 F | DIASTOLIC BLOOD PRESSURE: 72 MMHG

## 2020-08-14 DIAGNOSIS — I48.20 CHRONIC ATRIAL FIBRILLATION (H): Primary | ICD-10-CM

## 2020-08-14 DIAGNOSIS — Z86.73 H/O: CVA (CEREBROVASCULAR ACCIDENT): ICD-10-CM

## 2020-08-14 DIAGNOSIS — Z51.81 ENCOUNTER FOR THERAPEUTIC DRUG MONITORING: ICD-10-CM

## 2020-08-14 DIAGNOSIS — Z79.01 LONG TERM (CURRENT) USE OF ANTICOAGULANTS: ICD-10-CM

## 2020-08-14 LAB — INR PPP: 2.13 (ref 0.9–1.1)

## 2020-08-14 PROCEDURE — 99308 SBSQ NF CARE LOW MDM 20: CPT | Mod: 95 | Performed by: NURSE PRACTITIONER

## 2020-08-14 NOTE — PROGRESS NOTES
"[facility] Geriatrics Video Visit  Mecca Slade is a 89 year old female who is being evaluated via a billable video visit due to the restrictions of the COVID19 pandemic.The patient has been notified of following: \"This video visit will be conducted via a call between you and your provider. We have found that certain health care needs can be provided without the need for an in-person physical exam.  This service lets us provide the care you need with a video conversation.  If a prescription is necessary we can send it directly to your pharmacy.  If lab work is needed we can place an order for that and you can then stop by our lab to have the test done at a later time. If during the course of the call the provider feels a video visit is not appropriate, you will not be charged for this service.\"     Proivder has received verbal consent for a Video Visit from the patient? Yes    Patient/facility would like the video invitation sent by: Send to e-mail at: [email or web address]    This visit took place virtually, with the patient located at [facility].  ________________________________________________  Video start time: 1116  [place] Medical Record Number:  [id number] HPI: Mecca Slade is a 89 year old  (12/14/1921), who is being seen today for an episodic care visit at the above location.   HPI information obtained from: facility chart records, facility staff, patient report, [facility] chart review and Care Everywhere [facility] chart review. Today's concern is INR/Coumadin management for A. Fib    ROS/Subjective:  Bleeding Signs/Symptoms:  None  Thromboembolic Signs/Symptoms:  None  Medication Changes:  No  Dietary Changes:  No  Activity Changes: No  Bacterial/Viral Infection:  No  Missed Coumadin Doses:  None  On ASA: No  Other Concerns:  No    OBJECTIVE:  /72   Pulse 67   Temp 97.2  F (36.2  C)   Resp 16   Wt 98.1 kg (216 lb 3.2 oz)   SpO2 94%   BMI 34.90 kg/m  "   Last INR: 2.0 on 8/10.  INR Today:  2.13  Current Dose:  2mg M/W, 1mg Tu/Th.    ASSESSMENT:  Chronic atrial fibrillation (H)  H/O: CVA (cerebrovascular accident), R MCA embolism s/p tPA, March 2017  Long term (current) use of anticoagulants  Encounter for therapeutic drug monitoring  Chronic. Stable. Therapeutic. Will dose Coumadin as noted below and recheck INR in 1 weeks. Follow-up accordingly.    PLAN:   Orders written by provider:  New Dose: Coumadin 2mg PO M/W/F, 1mg PO QD AOD.  Recheck INR x1 on 8/21.   Dx: afib    Video-Visit Details  Type of service:  Video Visit  Video Start Time: 1116  Video End Time (time video stopped): 1117  Originating Location (pt. Location): Long term Care  Distant Location (provider location):  [facility]ATRICS TRANSITIONAL CARE   Mode of Communication:  Video Conference.    Electronically signed by:  Dr. Haley Sims, APRN CNP DNP

## 2020-08-14 NOTE — LETTER
"    8/14/2020        RE: Mecca Slade  [facility]  [address] [facility] [address]        [facility] Geriatrics Video Visit  Mecca Slade is a 89 year old female who is being evaluated via a billable video visit due to the restrictions of the COVID19 pandemic.The patient has been notified of following: \"This video visit will be conducted via a call between you and your provider. We have found that certain health care needs can be provided without the need for an in-person physical exam.  This service lets us provide the care you need with a video conversation.  If a prescription is necessary we can send it directly to your pharmacy.  If lab work is needed we can place an order for that and you can then stop by our lab to have the test done at a later time. If during the course of the call the provider feels a video visit is not appropriate, you will not be charged for this service.\"     Proivder has received verbal consent for a Video Visit from the patient? Yes    Patient/facility would like the video invitation sent by: Send to e-mail at: [email or web address]    This visit took place virtually, with the patient located at [facility].  ________________________________________________  Video start time: 1116  [place] Medical Record Number:  [id number] HPI: Mecca Slade is a 89 year old  (12/14/1921), who is being seen today for an episodic care visit at the above location.   HPI information obtained from: facility chart records, facility staff, patient report, [facility] chart review and Care Everywhere [facility] chart review. Today's concern is INR/Coumadin management for A. Fib    ROS/Subjective:  Bleeding Signs/Symptoms:  None  Thromboembolic Signs/Symptoms:  None  Medication Changes:  No  Dietary Changes:  No  Activity Changes: No  Bacterial/Viral Infection:  No  Missed Coumadin Doses:  None  On ASA: No  Other Concerns:  No    OBJECTIVE:  /72   " Pulse 67   Temp 97.2  F (36.2  C)   Resp 16   Wt 98.1 kg (216 lb 3.2 oz)   SpO2 94%   BMI 34.90 kg/m    Last INR: 2.0 on 8/10.  INR Today:  2.13  Current Dose:  2mg M/W, 1mg Tu/Th.    ASSESSMENT:  Chronic atrial fibrillation (H)  H/O: CVA (cerebrovascular accident), R MCA embolism s/p tPA, March 2017  Long term (current) use of anticoagulants  Encounter for therapeutic drug monitoring  Chronic. Stable. Therapeutic. Will dose Coumadin as noted below and recheck INR in 1 weeks. Follow-up accordingly.    PLAN:   Orders written by provider:  New Dose: Coumadin 2mg PO M/W/F, 1mg PO QD AOD.  Recheck INR x1 on 8/21.   Dx: afib    Video-Visit Details  Type of service:  Video Visit  Video Start Time: 1116  Video End Time (time video stopped): 1117  Originating Location (pt. Location): Long term Care  Distant Location (provider location):  [facility]IATRICS TRANSITIONAL CARE   Mode of Communication:  Video Conference.    Electronically signed by:  JOSUE Clark CNP DNP          Sincerely,        JOSUE Gillis CNP

## 2020-08-20 VITALS
TEMPERATURE: 98 F | OXYGEN SATURATION: 97 % | RESPIRATION RATE: 16 BRPM | WEIGHT: 216.2 LBS | BODY MASS INDEX: 34.9 KG/M2 | DIASTOLIC BLOOD PRESSURE: 72 MMHG | HEART RATE: 67 BPM | SYSTOLIC BLOOD PRESSURE: 117 MMHG

## 2020-08-20 NOTE — PROGRESS NOTES
"[facility] Geriatrics Video Visit  Mecca Slade is a 89 year old female who is being evaluated via a billable video visit due to the restrictions of the COVID19 pandemic.The patient has been notified of following: \"This video visit will be conducted via a call between you and your provider. We have found that certain health care needs can be provided without the need for an in-person physical exam.  This service lets us provide the care you need with a video conversation.  If a prescription is necessary we can send it directly to your pharmacy.  If lab work is needed we can place an order for that and you can then stop by our lab to have the test done at a later time. If during the course of the call the provider feels a video visit is not appropriate, you will not be charged for this service.\"     Proivder has received verbal consent for a Video Visit from the patient? Yes    Patient/facility would like the video invitation sent by: Send to e-mail at: [email or web address]    This visit took place virtually, with the patient located at [facility].  ________________________________________________  Video start time: 1121  [place] Medical Record Number:  [id number] HPI: Mecca Slade is a 89 year old  (12/14/1921), who is being seen today for an episodic care visit at the above location.   HPI information obtained from: facility chart records, facility staff, patient report, [facility] chart review and Care Everywhere [facility] chart review. Today's concern is INR/Coumadin management for A. Fib    ROS/Subjective:  Bleeding Signs/Symptoms:  None  Thromboembolic Signs/Symptoms:  None  Medication Changes:  No  Dietary Changes:  No  Activity Changes: No  Bacterial/Viral Infection:  No  Missed Coumadin Doses:  None  On ASA: No  Other Concerns:  No    OBJECTIVE:  /72   Pulse 67   Temp 98  F (36.7  C)   Resp 16   Wt 98.1 kg (216 lb 3.2 oz)   SpO2 97%   BMI 34.90 kg/m  "   Last INR: 2.13 on 8/14.  INR Today:  1.90  Current Dose:  2mg M/W/F, 1mg AOD.    ASSESSMENT:  Chronic atrial fibrillation (H)  H/O: CVA (cerebrovascular accident), R MCA embolism s/p tPA, March 2017  Long term (current) use of anticoagulants  Encounter for therapeutic drug monitoring  Chronic. Stable. Slightly subtherapeutic. Will dose Coumadin as noted below and recheck INR in 1 weeks. Follow-up accordingly.    PLAN:   Orders written by provider:  New Dose: Coumadin 2mg M/W/F, 1mg AOD.  Recheck INR in 1 week on 8/31.  Dx: afib    Video-Visit Details  Type of service:  Video Visit  Video Start Time: 1121  Video End Time (time video stopped): 1124  Originating Location (pt. Location): Long term Care  Distant Location (provider location):  GERIATRICS TRANSITIONAL CARE   Mode of Communication:  Video Conference.    Electronically signed by:  Dr. Haley Sims, APRN CNP DNP

## 2020-08-22 ENCOUNTER — RECORDS - HEALTHEAST (OUTPATIENT)
Dept: LAB | Facility: CLINIC | Age: 85
End: 2020-08-22

## 2020-08-24 ENCOUNTER — VIRTUAL VISIT (OUTPATIENT)
Dept: GERIATRICS | Facility: CLINIC | Age: 85
End: 2020-08-24
Payer: COMMERCIAL

## 2020-08-24 DIAGNOSIS — Z51.81 ENCOUNTER FOR THERAPEUTIC DRUG MONITORING: ICD-10-CM

## 2020-08-24 DIAGNOSIS — Z79.01 LONG TERM (CURRENT) USE OF ANTICOAGULANTS: ICD-10-CM

## 2020-08-24 DIAGNOSIS — I48.20 CHRONIC ATRIAL FIBRILLATION (H): Primary | ICD-10-CM

## 2020-08-24 DIAGNOSIS — Z86.73 H/O: CVA (CEREBROVASCULAR ACCIDENT): ICD-10-CM

## 2020-08-24 LAB — INR PPP: 1.9 (ref 0.9–1.1)

## 2020-08-24 PROCEDURE — 99308 SBSQ NF CARE LOW MDM 20: CPT | Mod: 95 | Performed by: NURSE PRACTITIONER

## 2020-08-24 NOTE — LETTER
"    8/24/2020        RE: Mecca Slade  [facility]  [address] [facility] [address]        [facility] Geriatrics Video Visit  Mecca Slade is a 89 year old female who is being evaluated via a billable video visit due to the restrictions of the COVID19 pandemic.The patient has been notified of following: \"This video visit will be conducted via a call between you and your provider. We have found that certain health care needs can be provided without the need for an in-person physical exam.  This service lets us provide the care you need with a video conversation.  If a prescription is necessary we can send it directly to your pharmacy.  If lab work is needed we can place an order for that and you can then stop by our lab to have the test done at a later time. If during the course of the call the provider feels a video visit is not appropriate, you will not be charged for this service.\"     Proivder has received verbal consent for a Video Visit from the patient? Yes    Patient/facility would like the video invitation sent by: Send to e-mail at: [email or web address]    This visit took place virtually, with the patient located at [facility].  ________________________________________________  Video start time: 1121  [place] Medical Record Number:  [id number] HPI: Mecca Slade is a 89 year old  (12/14/1921), who is being seen today for an episodic care visit at the above location.   HPI information obtained from: facility chart records, facility staff, patient report, [facility] chart review and Care Everywhere [facility] chart review. Today's concern is INR/Coumadin management for A. Fib    ROS/Subjective:  Bleeding Signs/Symptoms:  None  Thromboembolic Signs/Symptoms:  None  Medication Changes:  No  Dietary Changes:  No  Activity Changes: No  Bacterial/Viral Infection:  No  Missed Coumadin Doses:  None  On ASA: No  Other Concerns:  No    OBJECTIVE:  /72   " Pulse 67   Temp 98  F (36.7  C)   Resp 16   Wt 98.1 kg (216 lb 3.2 oz)   SpO2 97%   BMI 34.90 kg/m    Last INR: 2.13 on 8/14.  INR Today:  1.90  Current Dose:  2mg M/W/F, 1mg AOD.    ASSESSMENT:  Chronic atrial fibrillation (H)  H/O: CVA (cerebrovascular accident), R MCA embolism s/p tPA, March 2017  Long term (current) use of anticoagulants  Encounter for therapeutic drug monitoring  Chronic. Stable. Slightly subtherapeutic. Will dose Coumadin as noted below and recheck INR in 1 weeks. Follow-up accordingly.    PLAN:   Orders written by provider:  New Dose: Coumadin 2mg M/W/F, 1mg AOD.  Recheck INR in 1 week on 8/31.  Dx: afib    Video-Visit Details  Type of service:  Video Visit  Video Start Time: 1121  Video End Time (time video stopped): 1124  Originating Location (pt. Location): Long term Care  Distant Location (provider location):  GERIATRICS TRANSITIONAL CARE   Mode of Communication:  Video Conference.    Electronically signed by:  JOSUE Clark CNP DNP        Sincerely,        JOSUE Gillis CNP

## 2020-08-28 ENCOUNTER — RECORDS - HEALTHEAST (OUTPATIENT)
Dept: LAB | Facility: CLINIC | Age: 85
End: 2020-08-28

## 2020-08-31 ENCOUNTER — TRANSFERRED RECORDS (OUTPATIENT)
Dept: HEALTH INFORMATION MANAGEMENT | Facility: CLINIC | Age: 85
End: 2020-08-31

## 2020-08-31 ENCOUNTER — VIRTUAL VISIT (OUTPATIENT)
Dept: GERIATRICS | Facility: CLINIC | Age: 85
End: 2020-08-31
Payer: COMMERCIAL

## 2020-08-31 VITALS
RESPIRATION RATE: 16 BRPM | BODY MASS INDEX: 40.98 KG/M2 | TEMPERATURE: 98.3 F | DIASTOLIC BLOOD PRESSURE: 94 MMHG | HEART RATE: 61 BPM | SYSTOLIC BLOOD PRESSURE: 181 MMHG | OXYGEN SATURATION: 97 % | WEIGHT: 253.9 LBS

## 2020-08-31 DIAGNOSIS — Z86.73 H/O: CVA (CEREBROVASCULAR ACCIDENT): ICD-10-CM

## 2020-08-31 DIAGNOSIS — I48.20 CHRONIC ATRIAL FIBRILLATION (H): Primary | ICD-10-CM

## 2020-08-31 DIAGNOSIS — Z79.01 LONG TERM (CURRENT) USE OF ANTICOAGULANTS: ICD-10-CM

## 2020-08-31 DIAGNOSIS — Z51.81 ENCOUNTER FOR THERAPEUTIC DRUG MONITORING: ICD-10-CM

## 2020-08-31 LAB
INR PPP: 2.33 (ref 0.9–1.1)
INR PPP: 2.33 (ref 0.9–1.1)

## 2020-08-31 PROCEDURE — 99308 SBSQ NF CARE LOW MDM 20: CPT | Mod: 95 | Performed by: NURSE PRACTITIONER

## 2020-08-31 NOTE — LETTER
"    8/31/2020        RE: Mecca Slade  [facility]  [address] [facility] [address]        [facility] Geriatrics Video Visit  Mecca Slade is a 89 year old female who is being evaluated via a billable video visit due to the restrictions of the COVID19 pandemic.The patient has been notified of following: \"This video visit will be conducted via a call between you and your provider. We have found that certain health care needs can be provided without the need for an in-person physical exam.  This service lets us provide the care you need with a video conversation.  If a prescription is necessary we can send it directly to your pharmacy.  If lab work is needed we can place an order for that and you can then stop by our lab to have the test done at a later time. If during the course of the call the provider feels a video visit is not appropriate, you will not be charged for this service.\"     Proivder has received verbal consent for a Video Visit from the patient? Yes    Patient/facility would like the video invitation sent by: Send to e-mail at: [email or web address]    This visit took place virtually, with the patient located at [facility].  ________________________________________________  Video start time: 1259  Patient presents with:  Video Visit: Episodic  [place] Medical Record Number:  [id number] HPI: Mecca Slade is a 89 year old  (12/14/1921), who is being seen today for an episodic care visit at the above location.   HPI information obtained from: facility chart records, facility staff, patient report, [facility] chart review and Care Everywhere [facility] chart review. Today's concern is INR/Coumadin management for A. Fib.     ROS/Subjective:  Bleeding Signs/Symptoms:  None  Thromboembolic Signs/Symptoms:  None  Medication Changes:  No  Dietary Changes:  No  Activity Changes: No  Bacterial/Viral Infection:  No  Missed Coumadin Doses:  None  On ASA: " No  Other Concerns:  No     OBJECTIVE:  BP (!) 181/94   Pulse 61   Temp 98.3  F (36.8  C)   Resp 16   Wt 115.2 kg (253 lb 14.4 oz)   SpO2 97%   BMI 40.98 kg/m    Last INR: 1.90 on 8/24.  INR Today: 2.33  Current Dose: 2mg M/W/F, 1mg AOD.     ASSESSMENT:  Chronic atrial fibrillation (H)  H/O: CVA (cerebrovascular accident), R MCA embolism s/p tPA, March 2017  Long term (current) use of anticoagulants  Encounter for therapeutic drug monitoring  Chronic. Stable. Therapeutic. Will dose Coumadin as noted below and recheck INR in 1 week. Follow-up accordingly.     PLAN:   Orders written by provider:  New Dose: Coumadin 2mg M/W/F, 1mg AOD.  Recheck INR in 1 week on 9/8.  Dx: afib     Video-Visit Details  Type of service:  Video Visit  Video Start Time: 1259  Video End Time (time video stopped): 1301  Originating Location (pt. Location): Long term Care  Distant Location (provider location):  GERIATRICS TRANSITIONAL CARE   Mode of Communication:  Video Conference.     Electronically signed by:  JOSUE Clark CNP DNP      Sincerely,        JOSUE Gillis CNP

## 2020-09-01 NOTE — PROGRESS NOTES
"[facility] Geriatrics Video Visit  Mecca Slade is a 89 year old female who is being evaluated via a billable video visit due to the restrictions of the COVID19 pandemic.The patient has been notified of following: \"This video visit will be conducted via a call between you and your provider. We have found that certain health care needs can be provided without the need for an in-person physical exam.  This service lets us provide the care you need with a video conversation.  If a prescription is necessary we can send it directly to your pharmacy.  If lab work is needed we can place an order for that and you can then stop by our lab to have the test done at a later time. If during the course of the call the provider feels a video visit is not appropriate, you will not be charged for this service.\"     Proivder has received verbal consent for a Video Visit from the patient? Yes    Patient/facility would like the video invitation sent by: Send to e-mail at: [email or web address]    This visit took place virtually, with the patient located at [facility].  ________________________________________________  Video start time: 1259  Patient presents with:  Video Visit: Episodic  [place] Medical Record Number:  [id number] HPI: Mecca Slade is a 89 year old  (12/14/1921), who is being seen today for an episodic care visit at the above location.   HPI information obtained from: facility chart records, facility staff, patient report, [facility] chart review and Care Everywhere [facility] chart review. Today's concern is INR/Coumadin management for A. Fib.     ROS/Subjective:  Bleeding Signs/Symptoms:  None  Thromboembolic Signs/Symptoms:  None  Medication Changes:  No  Dietary Changes:  No  Activity Changes: No  Bacterial/Viral Infection:  No  Missed Coumadin Doses:  None  On ASA: No  Other Concerns:  No     OBJECTIVE:  BP (!) 181/94   Pulse 61   Temp 98.3  F (36.8  C)   Resp 16   Wt " 115.2 kg (253 lb 14.4 oz)   SpO2 97%   BMI 40.98 kg/m    Last INR: 1.90 on 8/24.  INR Today: 2.33  Current Dose: 2mg M/W/F, 1mg AOD.     ASSESSMENT:  Chronic atrial fibrillation (H)  H/O: CVA (cerebrovascular accident), R MCA embolism s/p tPA, March 2017  Long term (current) use of anticoagulants  Encounter for therapeutic drug monitoring  Chronic. Stable. Therapeutic. Will dose Coumadin as noted below and recheck INR in 1 week. Follow-up accordingly.     PLAN:   Orders written by provider:  New Dose: Coumadin 2mg M/W/F, 1mg AOD.  Recheck INR in 1 week on 9/8.  Dx: afib     Video-Visit Details  Type of service:  Video Visit  Video Start Time: 1259  Video End Time (time video stopped): 1301  Originating Location (pt. Location): Long term Care  Distant Location (provider location):  GERIATRICS TRANSITIONAL CARE   Mode of Communication:  Video Conference.     Electronically signed by:  JOSUE Clark CNP DNP

## 2020-09-04 ENCOUNTER — RECORDS - HEALTHEAST (OUTPATIENT)
Dept: LAB | Facility: CLINIC | Age: 85
End: 2020-09-04

## 2020-09-04 DIAGNOSIS — R52 GENERALIZED PAIN: ICD-10-CM

## 2020-09-08 ENCOUNTER — TELEPHONE (OUTPATIENT)
Dept: GERIATRICS | Facility: CLINIC | Age: 85
End: 2020-09-08

## 2020-09-08 ENCOUNTER — TRANSFERRED RECORDS (OUTPATIENT)
Dept: HEALTH INFORMATION MANAGEMENT | Facility: CLINIC | Age: 85
End: 2020-09-08

## 2020-09-08 LAB
INR PPP: 2.23 (ref 0.9–1.1)
INR PPP: 2.23 (ref 0.9–1.1)

## 2020-09-09 NOTE — TELEPHONE ENCOUNTER
[place] [facility] TELEPHONE ENCOUNTER    Chief Complaint   Patient presents with     INR RESULTS       Mecca Slade is a 89 year old  (12/14/1921),Nurse called today to report: INR today of 2.23, has been on stable warfarin dosing x 1 week or 2 mg on MWF and 1 mg NANI.  No bleeding, no new symptoms, no medication changes    ASSESSMENT/PLAN  Therapeutic INR    Warfarin 2 mg on MWF, 1 mg all remaining days    Recheck INR on 9/21/20    Electronically signed by:   JOSUE Read CNP

## 2020-09-10 NOTE — PROGRESS NOTES
"  -   [facility] Regulatory   Mecca Slade is being evaluated via a billable video visit due to the restrictions of the Covid-19 pandemic.   The patient has been notified of following:  \"This video visit will be conducted via a call between you and your provider. We have found that certain health care needs can be provided without the need for an in-person physical exam.  This service lets us provide the care you need with a video conversation. If during the course of the call the provider feels a video visit is not appropriate, you will not be charged for this service.\"   The provider has received verbal consent for a Video Visit from the patient or first contact? Yes  Patient or facility staff would like the video invitation sent by: N/A   Video Start Time: 09:25  [place] Medical Record Number:  [id number]  Place of Location at the time of visit: [facility]  Chief Complaint   Patient presents with     half-way Regulatory     Video Visit     HPI:    Mecca Slade  is 89 year old (12/14/1921), who is being seen today for a federally mandated E/M visit.  HPI information obtained from: facility staff, patient report, [facility] chart review and DNP.     Today's concerns are:  - Pt seen in the presence of the nursing staff who graciously assisted with the virtual visit  - \" I am doing not too bad\".   - reports \" My heart is doing ok I guess, I am still here\". Denies CP or SOB. Reports sugar ups and down\"  \" I do not feel thirsty but should drink more water\".   - \" I wonder what my INR was last time checked\"  ---------------------------------------------------------------------------------  - - Past Medical, social, family histories, medications, and allergies reviewed and updated  - Medications reviewed: in the chart and EHR.   - Case Management:   I have reviewed the care plan and MDS and do agree with the plan. Patient's desire to return to the community is not present.  Information " "reviewed:  Medications, vital signs, orders, and nursing notes.  MEDICATIONS:  Current Outpatient Medications   Medication Sig Dispense Refill     Acetaminophen (TYLENOL PO) Take 500 mg by mouth 2 times daily        ATORVASTATIN CALCIUM PO Take 10 mg by mouth daily       carboxymethylcellulose (REFRESH PLUS) 0.5 % SOLN ophthalmic solution Place 1 drop into both eyes 3 times daily as needed for dry eyes And Qam scheduled       Furosemide (LASIX PO) Take 40 mg by mouth daily       Menthol, Topical Analgesic, (BENGAY COLD THERAPY) 5 % GEL Externally apply topically 4 times daily as needed (pain) 113 g 0     metFORMIN (GLUCOPHAGE-XR) 500 MG 24 hr tablet Take 1 tablet (500 mg) by mouth daily (with dinner) 30 tablet 0     metoprolol succinate ER (TOPROL-XL) 25 MG 24 hr tablet Take 1 tablet (25 mg) by mouth daily 30 tablet 0     pantoprazole (PROTONIX) 20 MG EC tablet Take 20 mg by mouth daily       simethicone (MYLICON) 80 MG chewable tablet Take 2 tablets (160 mg) by mouth daily       Warfarin Sodium (COUMADIN PO) Take by mouth daily Take as directed per INR results       ROS: 4 point ROS including Respiratory, CV, GI and , other than that noted in the HPI,  is negative    Vitals:  /74   Pulse 77   Temp 97.5  F (36.4  C)   Resp 18   Ht 1.676 m (5' 6\")   Wt 98.1 kg (216 lb 3.2 oz)   SpO2 95%   BMI 34.90 kg/m    Body mass index is 34.9 kg/m .    Limited visit exam done given COVID-19 precautions.   GENERAL APPEARANCE:  in no distress  RESP:  unlabored breathing  M/S:   no joint deformity noted  SKIN:  no rash noted  NEURO:   no purposeful movement in upper and lower extremities  PSYCH:  affect and mood normal      Lab/Diagnostic data: Reviewed in the chart and EHR.        ASSESSMENT/PLAN  -------------------------------  Chronic congestive heart failure, diastolic type (H)  Essential hypertension  -  EF > 70% (2012)  - compensated. Continue meds       Type II diabetes mellitus with peripheral circulatory " disorder (H)  - 8.6% (August 2020). Controlled. Recommended level is 8-9%  -  In this frail elderly adult with a limited life expectancy, the goal of  the management is to address the hyperglycemia sx if any,  rather than the  BGs numbers per se.       On coumadin for Atrial fibrillation (H): INR followed closely. CVR, on metoprolol succinate.       Primary Hyperparathyroidism (H)   - elevated Ca and PTH.  Resident seems doing fair. Recommend no further work up given limited life expectancy. If become symptomatic consider cinacalcet 30 mg po daily, may increase to bid unitl Ca level have normalized- close attention to hydration status  - no concern.       CKD stage 3a, GFR 52 ml/min (H): improving. - Avoid nephrotoxic drugs. Renal dose the medications.        Hx of Cerebrovascular accident (CVA) due to embolism of right middle cerebral artery  - Left sided residual weakness, stable. Off ASA, on lipitor 10 mg from 20 mg.  Requires assistance with ADLs.        Class 2 obesity due to excess calories w/o serious comorbidity with BMI 35.0-35.9 in adult  - Body mass index is 34.9 kg/m . from 36.06 kg/m .  in this age group- frail elderly, keep BMI b/lw 25-35 Kg(m2).     GERD: .no concern. On Protonix 20 mg.       Frail elderly:  Significant  Deficits requiring NH placement. Requiring extensive assistance from nursing. Up for meals only o/w spends the day resting in bed        Orders written by provider at facility and transcribed by : José Miguel Radford    Electronically signed by:  Anirudh Ye MD    Video-Visit Details  Type of service:  Video Visit  Video End Time (time video stopped): 09:27  Distant Location (provider location):  [place] [facility]

## 2020-09-11 ENCOUNTER — VIRTUAL VISIT (OUTPATIENT)
Dept: GERIATRICS | Facility: CLINIC | Age: 85
End: 2020-09-11
Payer: COMMERCIAL

## 2020-09-11 VITALS
WEIGHT: 216.2 LBS | OXYGEN SATURATION: 95 % | DIASTOLIC BLOOD PRESSURE: 74 MMHG | BODY MASS INDEX: 34.75 KG/M2 | RESPIRATION RATE: 18 BRPM | HEART RATE: 77 BPM | TEMPERATURE: 97.5 F | SYSTOLIC BLOOD PRESSURE: 123 MMHG | HEIGHT: 66 IN

## 2020-09-11 DIAGNOSIS — E11.51 TYPE II DIABETES MELLITUS WITH PERIPHERAL CIRCULATORY DISORDER (H): ICD-10-CM

## 2020-09-11 DIAGNOSIS — I10 ESSENTIAL HYPERTENSION: ICD-10-CM

## 2020-09-11 DIAGNOSIS — I50.32 CHRONIC DIASTOLIC CONGESTIVE HEART FAILURE (H): Primary | ICD-10-CM

## 2020-09-11 DIAGNOSIS — E21.0 HYPERPARATHYROIDISM, PRIMARY (H): ICD-10-CM

## 2020-09-11 DIAGNOSIS — N18.4 CKD (CHRONIC KIDNEY DISEASE) STAGE 4, GFR 15-29 ML/MIN (H): ICD-10-CM

## 2020-09-11 DIAGNOSIS — I48.91 CONTROLLED ATRIAL FIBRILLATION (H): ICD-10-CM

## 2020-09-11 DIAGNOSIS — Z86.73 H/O: CVA (CEREBROVASCULAR ACCIDENT): ICD-10-CM

## 2020-09-11 PROCEDURE — 99309 SBSQ NF CARE MODERATE MDM 30: CPT | Mod: 95 | Performed by: FAMILY MEDICINE

## 2020-09-11 ASSESSMENT — MIFFLIN-ST. JEOR: SCORE: 1377.43

## 2020-09-11 NOTE — LETTER
"    9/11/2020        RE: Mecca Slade  [facility]  [address] [facility] [address]          -   [place] [facility] Regulatory   Mecca Slade is being evaluated via a billable video visit due to the restrictions of the Covid-19 pandemic.   The patient has been notified of following:  \"This video visit will be conducted via a call between you and your provider. We have found that certain health care needs can be provided without the need for an in-person physical exam.  This service lets us provide the care you need with a video conversation. If during the course of the call the provider feels a video visit is not appropriate, you will not be charged for this service.\"   The provider has received verbal consent for a Video Visit from the patient or first contact? Yes  Patient or facility staff would like the video invitation sent by: N/A   Video Start Time: 09:25  [place] Medical Record Number:  [id number]  Place of Location at the time of visit: [facility]  Chief Complaint   Patient presents with     [facility] Regulatory     Video Visit     HPI:    Mecca Slade  is 89 year old (12/14/1921), who is being seen today for a federally mandated E/M visit.  HPI information obtained from: facility staff, patient report, [facility] chart review and DNP.     Today's concerns are:  - Pt seen in the presence of the nursing staff who graciously assisted with the virtual visit  - \" I am doing not too bad\".   - reports \" My heart is doing ok I guess, I am still here\". Denies CP or SOB. Reports sugar ups and down\"  \" I do not feel thirsty but should drink more water\".   - \" I wonder what my INR was last time checked\"  ---------------------------------------------------------------------------------  - - Past Medical, social, family histories, medications, and allergies reviewed and updated  - Medications reviewed: in the chart and EHR.   - Case Management:   I have reviewed the care plan " "and MDS and do agree with the plan. Patient's desire to return to the community is not present.  Information reviewed:  Medications, vital signs, orders, and nursing notes.  MEDICATIONS:  Current Outpatient Medications   Medication Sig Dispense Refill     Acetaminophen (TYLENOL PO) Take 500 mg by mouth 2 times daily        ATORVASTATIN CALCIUM PO Take 10 mg by mouth daily       carboxymethylcellulose (REFRESH PLUS) 0.5 % SOLN ophthalmic solution Place 1 drop into both eyes 3 times daily as needed for dry eyes And Qam scheduled       Furosemide (LASIX PO) Take 40 mg by mouth daily       Menthol, Topical Analgesic, (BENGAY COLD THERAPY) 5 % GEL Externally apply topically 4 times daily as needed (pain) 113 g 0     metFORMIN (GLUCOPHAGE-XR) 500 MG 24 hr tablet Take 1 tablet (500 mg) by mouth daily (with dinner) 30 tablet 0     metoprolol succinate ER (TOPROL-XL) 25 MG 24 hr tablet Take 1 tablet (25 mg) by mouth daily 30 tablet 0     pantoprazole (PROTONIX) 20 MG EC tablet Take 20 mg by mouth daily       simethicone (MYLICON) 80 MG chewable tablet Take 2 tablets (160 mg) by mouth daily       Warfarin Sodium (COUMADIN PO) Take by mouth daily Take as directed per INR results       ROS: 4 point ROS including Respiratory, CV, GI and , other than that noted in the HPI,  is negative    Vitals:  /74   Pulse 77   Temp 97.5  F (36.4  C)   Resp 18   Ht 1.676 m (5' 6\")   Wt 98.1 kg (216 lb 3.2 oz)   SpO2 95%   BMI 34.90 kg/m    Body mass index is 34.9 kg/m .    Limited visit exam done given COVID-19 precautions.   GENERAL APPEARANCE:  in no distress  RESP:  unlabored breathing  M/S:   no joint deformity noted  SKIN:  no rash noted  NEURO:   no purposeful movement in upper and lower extremities  PSYCH:  affect and mood normal      Lab/Diagnostic data: Reviewed in the chart and EHR.        ASSESSMENT/PLAN  -------------------------------  Chronic congestive heart failure, diastolic type (H)  Essential hypertension  -  " EF > 70% (2012)  - compensated. Continue meds       Type II diabetes mellitus with peripheral circulatory disorder (H)  - 8.6% (August 2020). Controlled. Recommended level is 8-9%  -  In this frail elderly adult with a limited life expectancy, the goal of  the management is to address the hyperglycemia sx if any,  rather than the  BGs numbers per se.       On coumadin for Atrial fibrillation (H): INR followed closely. CVR, on metoprolol succinate.       Primary Hyperparathyroidism (H)   - elevated Ca and PTH.  Resident seems doing fair. Recommend no further work up given limited life expectancy. If become symptomatic consider cinacalcet 30 mg po daily, may increase to bid unitl Ca level have normalized- close attention to hydration status  - no concern.       CKD stage 3a, GFR 52 ml/min (H): improving. - Avoid nephrotoxic drugs. Renal dose the medications.        Hx of Cerebrovascular accident (CVA) due to embolism of right middle cerebral artery  - Left sided residual weakness, stable. Off ASA, on lipitor 10 mg from 20 mg.  Requires assistance with ADLs.        Class 2 obesity due to excess calories w/o serious comorbidity with BMI 35.0-35.9 in adult  - Body mass index is 34.9 kg/m . from 36.06 kg/m .  in this age group- frail elderly, keep BMI b/lw 25-35 Kg(m2).     GERD: .no concern. On Protonix 20 mg.       Frail elderly:  Significant  Deficits requiring NH placement. Requiring extensive assistance from nursing. Up for meals only o/w spends the day resting in bed        Orders written by provider at facility and transcribed by : José Miguel Radford    Electronically signed by:  Anirudh Ye MD    Video-Visit Details  Type of service:  Video Visit  Video End Time (time video stopped): 09:27  Distant Location (provider location):  [place] [facility]             Sincerely,        Anirudh Ye MD

## 2020-09-18 ENCOUNTER — RECORDS - HEALTHEAST (OUTPATIENT)
Dept: LAB | Facility: CLINIC | Age: 85
End: 2020-09-18

## 2020-09-21 ENCOUNTER — TRANSFERRED RECORDS (OUTPATIENT)
Dept: HEALTH INFORMATION MANAGEMENT | Facility: CLINIC | Age: 85
End: 2020-09-21

## 2020-09-21 ENCOUNTER — VIRTUAL VISIT (OUTPATIENT)
Dept: GERIATRICS | Facility: CLINIC | Age: 85
End: 2020-09-21
Payer: COMMERCIAL

## 2020-09-21 VITALS
OXYGEN SATURATION: 97 % | RESPIRATION RATE: 18 BRPM | DIASTOLIC BLOOD PRESSURE: 74 MMHG | HEART RATE: 77 BPM | SYSTOLIC BLOOD PRESSURE: 123 MMHG | WEIGHT: 216.2 LBS | BODY MASS INDEX: 34.9 KG/M2 | TEMPERATURE: 97.8 F

## 2020-09-21 DIAGNOSIS — Z79.01 LONG TERM (CURRENT) USE OF ANTICOAGULANTS: ICD-10-CM

## 2020-09-21 DIAGNOSIS — I48.91 CONTROLLED ATRIAL FIBRILLATION (H): Primary | ICD-10-CM

## 2020-09-21 DIAGNOSIS — Z51.81 ENCOUNTER FOR THERAPEUTIC DRUG MONITORING: ICD-10-CM

## 2020-09-21 LAB
INR PPP: 2.14 (ref 0.9–1.1)
INR PPP: 2.14 (ref 0.9–1.1)

## 2020-09-21 PROCEDURE — 99308 SBSQ NF CARE LOW MDM 20: CPT | Mod: 95 | Performed by: NURSE PRACTITIONER

## 2020-09-21 NOTE — PROGRESS NOTES
"[facility] Geriatrics Video Visit  Mecca Slade is a 89 year old female who is being evaluated via a billable video visit due to the restrictions of the COVID19 pandemic.The patient has been notified of following: \"This video visit will be conducted via a call between you and your provider. We have found that certain health care needs can be provided without the need for an in-person physical exam.  This service lets us provide the care you need with a video conversation.  If a prescription is necessary we can send it directly to your pharmacy.  If lab work is needed we can place an order for that and you can then stop by our lab to have the test done at a later time. If during the course of the call the provider feels a video visit is not appropriate, you will not be charged for this service.\"     Proivder has received verbal consent for a Video Visit from the patient? Yes    Patient/facility would like the video invitation sent by: Send to e-mail at: [email or web address]    This visit took place virtually, with the patient located at [facility].  ________________________________________________  Video Start Time: 1134  [place] Medical Record Number:  [id number]  HPI: Mecca Slade is a 89 year old  (12/14/1921), who is being seen today for an episodic care visit at the above location.   HPI information obtained from: facility chart records, facility staff, patient report, [facility] chart review and Care Everywhere [facility] chart review. Today's concern is INR/Coumadin management for A. Fib    ROS/Subjective:  Bleeding Signs/Symptoms:  None  Thromboembolic Signs/Symptoms:  None  Medication Changes:  No  Dietary Changes:  No  Activity Changes: No  Bacterial/Viral Infection:  No  Missed Coumadin Doses:  None  On ASA: No  Other Concerns:  No    OBJECTIVE:  /74   Pulse 77   Temp 97.8  F (36.6  C)   Resp 18   Wt 98.1 kg (216 lb 3.2 oz)   SpO2 97%   BMI 34.90 kg/m  "   Last INR: 9/8 on 2.23 (2 weeks ago).  INR Today:  2.14  Current Dose: 2mg M/W/F, 1mg AOD.     ASSESSMENT:  Controlled atrial fibrillation (H)  Encounter for therapeutic drug monitoring  Long term (current) use of anticoagulants  Chronic. Stable. Therapeutic. Will dose Coumadin as noted below and recheck INR in 3 weeks. Follow-up accordingly.    PLAN:   Orders:  New Dose: Same. 2mg M/W/F, 1mg AOD.  Next INR: 10/12 (in 3 weeks).     Video-Visit Details  Type of service:  Video Visit  Video Start Time: 1134  Video End Time (time video stopped): 1136  Originating Location (pt. Location): Long term Care  Distant Location (provider location):  [facility] SERVICES   Mode of Communication:  Video Conference.    Electronically signed by:  JOSUE Clark CNP DNP

## 2020-09-21 NOTE — LETTER
"    9/21/2020        RE: Mecca Sldae  [facility]  [address] [facility] [address]        [facility] Geriatrics Video Visit  Mecca Slade is a 89 year old female who is being evaluated via a billable video visit due to the restrictions of the COVID19 pandemic.The patient has been notified of following: \"This video visit will be conducted via a call between you and your provider. We have found that certain health care needs can be provided without the need for an in-person physical exam.  This service lets us provide the care you need with a video conversation.  If a prescription is necessary we can send it directly to your pharmacy.  If lab work is needed we can place an order for that and you can then stop by our lab to have the test done at a later time. If during the course of the call the provider feels a video visit is not appropriate, you will not be charged for this service.\"     Proivder has received verbal consent for a Video Visit from the patient? Yes    Patient/facility would like the video invitation sent by: Send to e-mail at: [email or web address]    This visit took place virtually, with the patient located at [facility].  ________________________________________________  Video Start Time: 1134  [place] Medical Record Number:  [id number]  HPI: Mecca Slade is a 89 year old  (12/14/1921), who is being seen today for an episodic care visit at the above location.   HPI information obtained from: facility chart records, facility staff, patient report, [facility] chart review and Care Everywhere [facility] chart review. Today's concern is INR/Coumadin management for A. Fib    ROS/Subjective:  Bleeding Signs/Symptoms:  None  Thromboembolic Signs/Symptoms:  None  Medication Changes:  No  Dietary Changes:  No  Activity Changes: No  Bacterial/Viral Infection:  No  Missed Coumadin Doses:  None  On ASA: No  Other Concerns:  No    OBJECTIVE:  /74   " Pulse 77   Temp 97.8  F (36.6  C)   Resp 18   Wt 98.1 kg (216 lb 3.2 oz)   SpO2 97%   BMI 34.90 kg/m    Last INR: 9/8 on 2.23 (2 weeks ago).  INR Today:  2.14  Current Dose: 2mg M/W/F, 1mg AOD.     ASSESSMENT:  Controlled atrial fibrillation (H)  Encounter for therapeutic drug monitoring  Long term (current) use of anticoagulants  Chronic. Stable. Therapeutic. Will dose Coumadin as noted below and recheck INR in 3 weeks. Follow-up accordingly.    PLAN:   Orders:  New Dose: Same. 2mg M/W/F, 1mg AOD.  Next INR: 10/12 (in 3 weeks).     Video-Visit Details  Type of service:  Video Visit  Video Start Time: 1134  Video End Time (time video stopped): 1136  Originating Location (pt. Location): Long term Care  Distant Location (provider location):  [facility] SERVICES   Mode of Communication:  Video Conference.    Electronically signed by:  JOSUE Clark CNP [facility]            Sincerely,        JOSUE Gillis CNP

## 2020-10-08 ENCOUNTER — RECORDS - HEALTHEAST (OUTPATIENT)
Dept: LAB | Facility: CLINIC | Age: 85
End: 2020-10-08

## 2020-10-12 ENCOUNTER — VIRTUAL VISIT (OUTPATIENT)
Dept: GERIATRICS | Facility: CLINIC | Age: 85
End: 2020-10-12
Payer: COMMERCIAL

## 2020-10-12 ENCOUNTER — TRANSFERRED RECORDS (OUTPATIENT)
Dept: HEALTH INFORMATION MANAGEMENT | Facility: CLINIC | Age: 85
End: 2020-10-12

## 2020-10-12 VITALS
OXYGEN SATURATION: 93 % | SYSTOLIC BLOOD PRESSURE: 108 MMHG | TEMPERATURE: 97.6 F | HEART RATE: 77 BPM | DIASTOLIC BLOOD PRESSURE: 76 MMHG | RESPIRATION RATE: 16 BRPM | BODY MASS INDEX: 34.67 KG/M2 | WEIGHT: 214.8 LBS

## 2020-10-12 DIAGNOSIS — Z79.01 LONG TERM (CURRENT) USE OF ANTICOAGULANTS: ICD-10-CM

## 2020-10-12 DIAGNOSIS — I48.91 CONTROLLED ATRIAL FIBRILLATION (H): Primary | ICD-10-CM

## 2020-10-12 DIAGNOSIS — Z51.81 ENCOUNTER FOR THERAPEUTIC DRUG MONITORING: ICD-10-CM

## 2020-10-12 LAB
INR PPP: 2.34 (ref 0.9–1.1)
INR PPP: 2.34 (ref 0.9–1.1)

## 2020-10-12 PROCEDURE — 99308 SBSQ NF CARE LOW MDM 20: CPT | Mod: 95 | Performed by: NURSE PRACTITIONER

## 2020-10-12 NOTE — PROGRESS NOTES
"[facility] Geriatrics Video Visit  Mecca Slade is a 89 year old female who is being evaluated via a billable video visit due to the restrictions of the COVID19 pandemic.The patient has been notified of following: \"This video visit will be conducted via a call between you and your provider. We have found that certain health care needs can be provided without the need for an in-person physical exam.  This service lets us provide the care you need with a video conversation.  If a prescription is necessary we can send it directly to your pharmacy.  If lab work is needed we can place an order for that and you can then stop by our lab to have the test done at a later time. If during the course of the call the provider feels a video visit is not appropriate, you will not be charged for this service.\"     Proivder has received verbal consent for a Video Visit from the patient? Yes    Patient/facility would like the video invitation sent by: Send to e-mail at: [email or web address]    This visit took place virtually, with the patient located at [facility].  ________________________________________________  Video Start Time: 1203  INR/Coumadin Encounter  [place] MRN: [id number]. HPI: Mecca Slade is a 89 year old  (12/14/1921), who is being seen today for an episodic care visit at the above location.   HPI information obtained from: facility chart records, facility staff, patient report, [facility] chart review and Care Everywhere [facility] chart review. Today's concern is INR/Coumadin management for A. Fib    ROS/Subjective:  Bleeding Signs/Symptoms:  None  Thromboembolic Signs/Symptoms:  None  Medication Changes:  No  Dietary Changes:  No  Activity Changes: No  Bacterial/Viral Infection:  No  Missed Coumadin Doses:  None  On ASA: No  Other Concerns:  No    OBJECTIVE:  /76   Pulse 77   Temp 97.6  F (36.4  C)   Resp 16   Wt 97.4 kg (214 lb 12.8 oz)   SpO2 93%   BMI 34.67 " kg/m    Last INR: 2.14 on 9/21 (3 weeks ago).  INR Today:  2.34  Current Dose:  2mg M/W/F, 1mg AOD.    ASSESSMENT:  Controlled atrial fibrillation (H)  Encounter for therapeutic drug monitoring  Long term (current) use of anticoagulants  Chronic. Stable. Therapeutic. Will dose Coumadin as noted below and recheck INR in 4 weeks. Follow-up accordingly.     PLAN:   Orders:  New Dose: 2mg M/W/F, 1mg AOD.  Next INR: 4 weeks on 11/9.  Dx: afib.    Video-Visit Details  Type of service:  Video Visit  Video Start Time: 1203  Video End Time (time video stopped): 1205  Originating Location (pt. Location): Long term Care  Distant Location (provider location):  [facility] GERIATRIC SERVICES   Mode of Communication:  Video Conference.    Electronically signed by:  Dr. Haley Sims, JOSUE CNP DNP

## 2020-10-12 NOTE — LETTER
"    10/12/2020        RE: Mecca Slade  [facility]  [address] [facility] [address]        [facility] Geriatrics Video Visit  Mecca Slade is a 89 year old female who is being evaluated via a billable video visit due to the restrictions of the COVID19 pandemic.The patient has been notified of following: \"This video visit will be conducted via a call between you and your provider. We have found that certain health care needs can be provided without the need for an in-person physical exam.  This service lets us provide the care you need with a video conversation.  If a prescription is necessary we can send it directly to your pharmacy.  If lab work is needed we can place an order for that and you can then stop by our lab to have the test done at a later time. If during the course of the call the provider feels a video visit is not appropriate, you will not be charged for this service.\"     Proivder has received verbal consent for a Video Visit from the patient? Yes    Patient/facility would like the video invitation sent by: Send to e-mail at: [email or web address]    This visit took place virtually, with the patient located at [facility].  ________________________________________________  Video Start Time: 1203  INR/Coumadin Encounter  [place] MRN: [id number]. HPI: Mecca Slade is a 89 year old  (12/14/1921), who is being seen today for an episodic care visit at the above location.   HPI information obtained from: facility chart records, facility staff, patient report, [facility] chart review and Care Everywhere [facility] chart review. Today's concern is INR/Coumadin management for A. Fib    ROS/Subjective:  Bleeding Signs/Symptoms:  None  Thromboembolic Signs/Symptoms:  None  Medication Changes:  No  Dietary Changes:  No  Activity Changes: No  Bacterial/Viral Infection:  No  Missed Coumadin Doses:  None  On ASA: No  Other Concerns:  No    OBJECTIVE:  BP " 108/76   Pulse 77   Temp 97.6  F (36.4  C)   Resp 16   Wt 97.4 kg (214 lb 12.8 oz)   SpO2 93%   BMI 34.67 kg/m    Last INR: 2.14 on 9/21 (3 weeks ago).  INR Today:  2.34  Current Dose:  2mg M/W/F, 1mg AOD.    ASSESSMENT:  Controlled atrial fibrillation (H)  Encounter for therapeutic drug monitoring  Long term (current) use of anticoagulants  Chronic. Stable. Therapeutic. Will dose Coumadin as noted below and recheck INR in 4 weeks. Follow-up accordingly.     PLAN:   Orders:  New Dose: 2mg M/W/F, 1mg AOD.  Next INR: 4 weeks on 11/9.  Dx: afib.    Video-Visit Details  Type of service:  Video Visit  Video Start Time: 1203  Video End Time (time video stopped): 1205  Originating Location (pt. Location): Long term Care  Distant Location (provider location):  [facility] GERIATRIC SERVICES   Mode of Communication:  Video Conference.    Electronically signed by:  JOSUE Clark CNP DNP            Sincerely,        JOSUE Gillis CNP

## 2020-10-24 DIAGNOSIS — R54 FRAIL ELDERLY: Primary | ICD-10-CM

## 2020-10-25 NOTE — LETTER
"    11/29/2018        RE: Mecca Slade  [facility]  [address] [facility] [address]        [place] GERIATRIC SERVICES  Chief Complaint   Patient presents with     care home Regulatory     [place] Medical Record Number:  [id number]  Place of Service where encounter took place:  [facility]  ([facility]) [[id number]]    HPI:    Mecca Slade is a 89 year old  (12/14/1921), who is being seen today for a federally mandated E/M visit.  HPI information obtained from: facility chart records, facility staff, patient report, [facility] chart review and Care Everywhere [facility] chart review. Today's concerns are:    Chronic atrial fibrillation (H)  Essential hypertension  Edema, lower extremity  Patient denies CP, SOB, palpitations, headache/dizziness, and says her edema is pretty good. She is rate-controlled w/ Toprol XL, and is anticoagulated w/ coumadin. She is also on Lasix, Verapamil, Lipitor.  Her BPs are noted below:  11/24/2018 18:01 138/76 mmHg   11/17/2018 22:09 138/78 mmHg   11/10/2018 21:23 132/78 mmHg   11/04/2018 16:07 136/82 mmHg   10/13/2018 17:42 133/68 mmHg   10/04/2018 21:47 140/66 mmHg   10/04/2018 07:19 146/67 mmHg   10/03/2018 22:35 122/66 mmHg     Type 2 diabetes mellitus with diabetic neuropathy, without long-term current use of insulin (H)  Morbid obesity (H)  Slow transit constipation  Patient states her appetite is \"too good,\" she denies numbness/tingling, denies changes in weight. Patient c/o constipation every other day or so.  Patient's regimen includes Senna BID, Miralax PRN. Chart review shows daily or every other day BMs. She is currently taking the following and her BG trend is noted below:    Metformin 500mg BID.  11/26/2018 07:50 158 mg/dL  11/19/2018 09:45 155 mg/dL  11/05/2018 10:30 157 mg/dL  10/29/2018 11:29 158 mg/dL  10/22/2018 08:08 133 mg/dL  10/15/2018 13:56 138 mg/dL  10/01/2018 13:14 132 mg/dL  09/24/2018 11:12 132 mg/dL  09/17/2018 11:27 " Prescription refill request has been completed. Please call the patient and let her know. 158 mg/dL      ALLERGIES: Review of patient's allergies indicates no known allergies.  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  PAST SURGICAL HISTORY:  has no past surgical history on file.  FAMILY HISTORY: family history is not on file.  SOCIAL HISTORY:  reports that she has never smoked. She has never used smokeless tobacco. She reports that she does not drink alcohol or use illicit drugs.    MEDICATIONS:  Current Outpatient Prescriptions   Medication Sig Dispense Refill     Acetaminophen (TYLENOL PO) Take 1,000 mg by mouth every morning And give Q8hrs PRN x 2 doses       ATORVASTATIN CALCIUM PO Take 20 mg by mouth daily       carboxymethylcellulose (REFRESH PLUS) 0.5 % SOLN ophthalmic solution Place 1 drop into both eyes 3 times daily as needed for dry eyes       Furosemide (LASIX PO) Take 40 mg by mouth daily       METFORMIN HCL PO Take 500 mg by mouth daily (with breakfast)        METOPROLOL TARTRATE PO Take 25 mg by mouth daily       polyethylene glycol (MIRALAX/GLYCOLAX) Packet Take 17 g by mouth daily as needed        Sennosides (SENNA) 8.6 MG CAPS Take 1 Tablespoonful by mouth daily       VERAPAMIL HCL PO Take 120 mg by mouth At Bedtime       VITAMIN D, CHOLECALCIFEROL, PO Take  2,000 Units by mouth daily       Warfarin Sodium (COUMADIN PO) Take by mouth daily Take as directed per INR results       [DISCONTINUED] METFORMIN HCL PO Take 1,000 mg by mouth At Bedtime       Medications reviewed:  Medications reconciled to facility chart and changes were made to reflect current medications as identified as above med list. Below are the changes that were made:   Medications stopped since last EPIC medication reconciliation:   There are no discontinued medications.    Medications started since last [facility] medication reconciliation:  No orders of the defined types were placed in this encounter.    Case Management:  I have reviewed the care plan and MDS and do agree with the plan. Patient's desire to return to the community is present, but is not able due to care needs .  Information reviewed:  Medications, vital signs, orders, and nursing notes.    ROS:  Limited secondary to cognitive impairment but today pt reports the above and 10 point ROS of systems including Constitutional, Eyes, Respiratory, Cardiovascular, Gastroenterology, Genitourinary, Integumentary, Musculoskeletal, Psychiatric were all negative except for pertinent positives noted in my HPI.    Exam:  Vitals: /76  Pulse 60  Temp 98  F (36.7  C)  Resp 16  Wt 224 lb (101.6 kg)  SpO2 96%  BMI 36.15 kg/m2. BMI= Body mass index is 36.15 kg/(m^2).  GENERAL APPEARANCE: Alert, in no distress, morbidly obese.  ENT: Mouth and posterior oropharynx normal, moist mucous membranes, [place].  EYES: EOM, conjunctivae, lids, pupils and irises normal, PERRL2. Glasses.  RESP: Respiratory effort and palpation of chest normal, lungs clear to auscultation , no respiratory distress.  CV: Palpation and auscultation of heart done, rate-controlled and rhythm irregular, no murmur, rub, or gallop, +2 pedal pulses, peripheral edema 2+ in BLE.  ABDOMEN: Normal bowel sounds, soft, nontender, no hepatosplenomegaly or other masses, no guarding or rebound.  SKIN:  Inspection of skin and subcutaneous tissue baseline, Palpation of skin and subcutaneous tissue baseline w/ fragility.  NEURO: Cranial nerves 2-12 are grossly at patient's baseline, Examination of sensation by touch normal.  PSYCH: Insight and judgement impaired, memory impaired, affect and mood normal.    Lab/Diagnostic data:   CBC RESULTS:   Recent Labs   Lab Test 12/14/17   HGB  14.2     Last Basic Metabolic Panel:  Recent Labs   Lab Test 08/07/17 04/13/17   NA  140  139   POTASSIUM  4.4  4.0   CHLORIDE  103  101   SUN  11.0*  10.8*   CO2  31  30   BUN  26  27   CR  0.90  0.93   GLC  167*  193*     Lab Results   Component Value Date    A1C 7.9 08/07/2017    A1C 7.6 04/13/2017     ASSESSMENT/PLAN  Chronic atrial fibrillation (H)  Essential hypertension  Edema, lower extremity  Chronic. Stable. Well controlled. May consider Lipitor GDR in future.  Continue on current regimen and follow-up routinely or as needed.    Type 2 diabetes mellitus with diabetic neuropathy, without long-term current use of insulin (H)  Morbid obesity (H)  Slow transit constipation  Chronic. Stable. Controlled. Last a1c was 7.5%. BM record shows control. Follow-up routinely or as needed.    Orders:  No new orders.     Electronically signed by:  Dr. Haley Sims, APRN CNP DNP        Sincerely,        Haley Sims NP

## 2020-10-26 RX ORDER — CARBOXYMETHYLCELLULOSE SODIUM 5 MG/ML
SOLUTION/ DROPS OPHTHALMIC
Qty: 15 ML | Refills: 3 | Status: SHIPPED | OUTPATIENT
Start: 2020-10-26 | End: 2021-01-01

## 2020-11-05 ENCOUNTER — RECORDS - HEALTHEAST (OUTPATIENT)
Dept: LAB | Facility: CLINIC | Age: 85
End: 2020-11-05

## 2020-11-09 ENCOUNTER — VIRTUAL VISIT (OUTPATIENT)
Dept: GERIATRICS | Facility: CLINIC | Age: 85
End: 2020-11-09
Payer: COMMERCIAL

## 2020-11-09 VITALS
TEMPERATURE: 97.5 F | OXYGEN SATURATION: 97 % | BODY MASS INDEX: 34.44 KG/M2 | RESPIRATION RATE: 16 BRPM | DIASTOLIC BLOOD PRESSURE: 74 MMHG | SYSTOLIC BLOOD PRESSURE: 120 MMHG | WEIGHT: 213.4 LBS | HEART RATE: 64 BPM

## 2020-11-09 DIAGNOSIS — Z86.73 H/O: CVA (CEREBROVASCULAR ACCIDENT): ICD-10-CM

## 2020-11-09 DIAGNOSIS — Z79.01 LONG TERM (CURRENT) USE OF ANTICOAGULANTS: ICD-10-CM

## 2020-11-09 DIAGNOSIS — Z51.81 ENCOUNTER FOR THERAPEUTIC DRUG MONITORING: ICD-10-CM

## 2020-11-09 DIAGNOSIS — I50.32 CHRONIC DIASTOLIC CONGESTIVE HEART FAILURE (H): ICD-10-CM

## 2020-11-09 DIAGNOSIS — N18.4 CKD (CHRONIC KIDNEY DISEASE) STAGE 4, GFR 15-29 ML/MIN (H): ICD-10-CM

## 2020-11-09 DIAGNOSIS — E11.51 TYPE II DIABETES MELLITUS WITH PERIPHERAL CIRCULATORY DISORDER (H): ICD-10-CM

## 2020-11-09 DIAGNOSIS — I48.91 CONTROLLED ATRIAL FIBRILLATION (H): Primary | ICD-10-CM

## 2020-11-09 DIAGNOSIS — I10 ESSENTIAL HYPERTENSION: ICD-10-CM

## 2020-11-09 LAB — INR PPP: 1.91 (ref 0.9–1.1)

## 2020-11-09 PROCEDURE — 99309 SBSQ NF CARE MODERATE MDM 30: CPT | Mod: GT | Performed by: NURSE PRACTITIONER

## 2020-11-09 NOTE — LETTER
"    11/9/2020        RE: Mecca Slade  [facility]  [address] [facility] [address]        [facility] Geriatrics Video Visit  Mecca Slade is a 89 year old female who is being evaluated via a billable video visit due to the restrictions of the COVID19 pandemic.The patient has been notified of following: \"This video visit will be conducted via a call between you and your provider. We have found that certain health care needs can be provided without the need for an in-person physical exam.  This service lets us provide the care you need with a video conversation.  If a prescription is necessary we can send it directly to your pharmacy.  If lab work is needed we can place an order for that and you can then stop by our lab to have the test done at a later time. If during the course of the call the provider feels a video visit is not appropriate, you will not be charged for this service.\"     Proivder has received verbal consent for a Video Visit from the patient? Yes    Patient/facility would like the video invitation sent by: Send to e-mail at: [email or web address]    This visit took place virtually, with the patient located at [facility].  ________________________________________________  Video Start Time: 1134  [place] Regulatory  Chief Complaint   Patient presents with     Video Visit     [facility] [place] [facility] MRN: [id number]. HPI: Mecca Slade  is 89 year old (12/14/1921), who is being seen today for a federally mandated E/M visit.  HPI information obtained from: facility chart records, facility staff, patient report, [facility] chart review and Care Everywhere [facility] chart review. Today's concerns are:    Zayda seen today for regulatory visit. She has no new complaints. She denies SOB, CP, cough, pain, dizziness, swelling. Her last INR was 2.34 on 10/12. Her current Coumadin dosing is 2mg M/W/F, 1mg AOD. Her INR resulted today at 1.91. " Chart review shows VS are stable.     ALLERGIES:Patient has no known allergies. 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), DM type 2 (diabetes mellitus, type 2) (H), HTN (hypertension), Left-sided weakness (03/01/2017), Neurological neglect syndrome, Pure hypercholesterolemia, Right sided cerebral hemisphere cerebrovascular accident (H), Tissue plasminogen activator (t-PA) administered at other facility within 24 hours prior to current admission (03/01/2017), and Type 2 diabetes mellitus with diabetic neuropathy, without long-term current use of insulin (H) (4/3/2017).PAST SURGICAL HISTORY:   has no past surgical history on file.FAMILY HISTORY: family history is not on file.SOCIAL HISTORY:  reports that she has never smoked. She has never used smokeless tobacco. She reports that she does not drink alcohol or use drugs.  MEDICATIONS:  Current Outpatient Medications   Medication Sig Dispense Refill     Acetaminophen (TYLENOL PO) Take 500 mg by mouth 2 times daily        ATORVASTATIN CALCIUM PO Take 10 mg by mouth daily       CARBOXYMETHYLCELLULOSE SODIUM 0.5 % SOLN ophthalmic solution INSTILL 1 DROP IN EACH EYE EVERY MORNING 15 mL 3     Furosemide (LASIX PO) Take 40 mg by mouth daily       Menthol, Topical Analgesic, (BENGAY COLD THERAPY) 5 % GEL Externally apply topically 4 times daily as needed (pain) 113 g 0     metFORMIN (GLUCOPHAGE-XR) 500 MG 24 hr tablet Take 1 tablet (500 mg) by mouth daily (with dinner) 30 tablet 0     metoprolol succinate ER (TOPROL-XL) 25 MG 24 hr tablet Take 1 tablet (25 mg) by mouth daily 30 tablet 0     pantoprazole (PROTONIX) 20 MG EC tablet Take 20 mg by mouth daily       simethicone (MYLICON) 80 MG  chewable tablet Take 2 tablets (160 mg) by mouth daily       Warfarin Sodium (COUMADIN PO) Take by mouth daily Take as directed per INR results       Case Management:  I have reviewed the care plan and MDS and do agree with the plan. Patient's desire to return to the community is not assessible due to cognitive impairment. Information reviewed:  Medications, vital signs, orders, and nursing notes.    ROS: Limited secondary to cognitive impairment but today pt reports the above and 4 point ROS including Respiratory, CV, GI and , other than that noted in the HPI, is negative.    Vitals:  /74   Pulse 64   Temp 97.5  F (36.4  C)   Resp 16   Wt 96.8 kg (213 lb 6.4 oz)   SpO2 97%   BMI 34.44 kg/m    Body mass index is 34.44 kg/m .  Exam:  GENERAL APPEARANCE: Alert, in no distress, cooperative.   EYES/ENT: EOM normal on camera, hearing acuity [place].  RESP: Respiratory effort appears unlabored over video screen, no respiratory distress apparent on video, On RA.   ABDOMEN: Appears non-distended, rounded.  SKIN: Skin appears baseline w/ video inspection. No wounds/rashes noted.   NEURO: CN II-XII at patient's baseline, BURNETT at baseline on video. Sensation baseline PPS.  PSYCH: Insight, judgement, and memory are baseline, affect and mood are happy/engaged.    Lab/Diagnostic data:   Recent labs in [facility] reviewed by me today.     ASSESSMENT/PLAN  Controlled atrial fibrillation (H)  Chronic diastolic congestive heart failure (H)  Essential hypertension  Type II diabetes mellitus with peripheral circulatory disorder (H)  CKD (chronic kidney disease) stage 4, GFR 15-29 ml/min (H)  H/O: CVA (cerebrovascular accident), R MCA embolism s/p tPA, March 2017  Encounter for therapeutic drug monitoring  Long term (current) use of anticoagulants  Chronic. Ongoing. CHF/HTN, CKD and DM appear controlled. INR is slightly subtherapeutic. Will dose Coumadin as noted below and recheck INR in 1 week. Follow-up routinely or as  needed.    Orders:  1. Coumadin 2mg M/W/F/Sat.  2. Coumadin 1mg PO every day on AOD.  3. Recheck INR x1 in 1 week on 11/16.  Dx: afib.    Video-Visit Details  Type of service:  Video Visit  Video Start Time: 1134  Video End Time (time video stopped): 1137  Originating Location (pt. Location): Long term Care  Distant Location (provider location):  [facility] GERIATRIC SERVICES   Mode of Communication:  Video Conference.    Electronically signed by:  JOSUE Clark CNP DNP            Sincerely,        JOSUE Gillis CNP

## 2020-11-09 NOTE — PROGRESS NOTES
"[facility] Geriatrics Video Visit  Mecca Slade is a 89 year old female who is being evaluated via a billable video visit due to the restrictions of the COVID19 pandemic.The patient has been notified of following: \"This video visit will be conducted via a call between you and your provider. We have found that certain health care needs can be provided without the need for an in-person physical exam.  This service lets us provide the care you need with a video conversation.  If a prescription is necessary we can send it directly to your pharmacy.  If lab work is needed we can place an order for that and you can then stop by our lab to have the test done at a later time. If during the course of the call the provider feels a video visit is not appropriate, you will not be charged for this service.\"     Proivder has received verbal consent for a Video Visit from the patient? Yes    Patient/facility would like the video invitation sent by: Send to e-mail at: [email or web address]    This visit took place virtually, with the patient located at [facility].  ________________________________________________  Video Start Time: 1134  [place] Regulatory  Chief Complaint   Patient presents with     Video Visit     [facility] MRN: [id number]. HPI: Mecca Slade  is 89 year old (12/14/1921), who is being seen today for a federally mandated E/M visit.  HPI information obtained from: facility chart records, facility staff, patient report, [facility] chart review and Care Everywhere [facility] chart review. Today's concerns are:    Zayda seen today for regulatory visit. She has no new complaints. She denies SOB, CP, cough, pain, dizziness, swelling. Her last INR was 2.34 on 10/12. Her current Coumadin dosing is 2mg M/W/F, 1mg AOD. Her INR resulted today at 1.91. Chart review shows VS are stable.     ALLERGIES:Patient has no known allergies. 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), DM type 2 (diabetes mellitus, type 2) (H), HTN (hypertension), Left-sided weakness (03/01/2017), Neurological neglect syndrome, Pure hypercholesterolemia, Right sided cerebral hemisphere cerebrovascular accident (H), Tissue plasminogen activator (t-PA) administered at other facility within 24 hours prior to current admission (03/01/2017), and Type 2 diabetes mellitus with diabetic neuropathy, without long-term current use of insulin (H) (4/3/2017).PAST SURGICAL HISTORY:   has no past surgical history on file.FAMILY HISTORY: family history is not on file.SOCIAL HISTORY:  reports that she has never smoked. She has never used smokeless tobacco. She reports that she does not drink alcohol or use drugs.  MEDICATIONS:  Current Outpatient Medications   Medication Sig Dispense Refill     Acetaminophen (TYLENOL PO) Take 500 mg by mouth 2 times daily        ATORVASTATIN CALCIUM PO Take 10 mg by mouth daily       CARBOXYMETHYLCELLULOSE SODIUM 0.5 % SOLN ophthalmic solution INSTILL 1 DROP IN EACH EYE EVERY MORNING 15 mL 3     Furosemide (LASIX PO) Take 40 mg by mouth daily       Menthol, Topical Analgesic, (BENGAY COLD THERAPY) 5 % GEL Externally apply topically 4 times daily as needed (pain) 113 g 0     metFORMIN (GLUCOPHAGE-XR) 500 MG 24 hr tablet Take 1 tablet (500 mg) by mouth daily (with dinner) 30 tablet 0     metoprolol succinate ER (TOPROL-XL) 25 MG 24 hr tablet Take 1 tablet (25 mg) by mouth daily 30 tablet 0     pantoprazole (PROTONIX) 20 MG EC tablet Take 20 mg by mouth daily       simethicone (MYLICON) 80 MG chewable tablet Take 2 tablets (160 mg) by mouth daily       Warfarin Sodium (COUMADIN PO) Take by mouth daily  Take as directed per INR results       Case Management:  I have reviewed the care plan and MDS and do agree with the plan. Patient's desire to return to the community is not assessible due to cognitive impairment. Information reviewed:  Medications, vital signs, orders, and nursing notes.    ROS: Limited secondary to cognitive impairment but today pt reports the above and 4 point ROS including Respiratory, CV, GI and , other than that noted in the HPI, is negative.    Vitals:  /74   Pulse 64   Temp 97.5  F (36.4  C)   Resp 16   Wt 96.8 kg (213 lb 6.4 oz)   SpO2 97%   BMI 34.44 kg/m    Body mass index is 34.44 kg/m .  Exam:  GENERAL APPEARANCE: Alert, in no distress, cooperative.   EYES/ENT: EOM normal on camera, hearing acuity [place].  RESP: Respiratory effort appears unlabored over video screen, no respiratory distress apparent on video, On RA.   ABDOMEN: Appears non-distended, rounded.  SKIN: Skin appears baseline w/ video inspection. No wounds/rashes noted.   NEURO: CN II-XII at patient's baseline, BURNETT at baseline on video. Sensation baseline PPS.  PSYCH: Insight, judgement, and memory are baseline, affect and mood are happy/engaged.    Lab/Diagnostic data:   Recent labs in [facility] reviewed by me today.     ASSESSMENT/PLAN  Controlled atrial fibrillation (H)  Chronic diastolic congestive heart failure (H)  Essential hypertension  Type II diabetes mellitus with peripheral circulatory disorder (H)  CKD (chronic kidney disease) stage 4, GFR 15-29 ml/min (H)  H/O: CVA (cerebrovascular accident), R MCA embolism s/p tPA, March 2017  Encounter for therapeutic drug monitoring  Long term (current) use of anticoagulants  Chronic. Ongoing. CHF/HTN, CKD and DM appear controlled. INR is slightly subtherapeutic. Will dose Coumadin as noted below and recheck INR in 1 week. Follow-up routinely or as needed.    Orders:  1. Coumadin 2mg M/W/F/Sat.  2. Coumadin 1mg PO every day on AOD.  3. Recheck INR x1 in 1 week on  11/16.  Dx: jackib.    Video-Visit Details  Type of service:  Video Visit  Video Start Time: 1134  Video End Time (time video stopped): 1137  Originating Location (pt. Location): Long term Care  Distant Location (provider location):  [facility] GERIATRIC SERVICES   Mode of Communication:  Video Conference.    Electronically signed by:  Dr. Haley Sims, APRN CNP DNP

## 2020-11-14 ENCOUNTER — RECORDS - HEALTHEAST (OUTPATIENT)
Dept: LAB | Facility: CLINIC | Age: 85
End: 2020-11-14

## 2020-11-16 ENCOUNTER — TRANSFERRED RECORDS (OUTPATIENT)
Dept: HEALTH INFORMATION MANAGEMENT | Facility: CLINIC | Age: 85
End: 2020-11-16

## 2020-11-16 LAB
INR PPP: 1.66 (ref 0.9–1.1)
INR PPP: 1.66 (ref 0.9–1.1)

## 2020-11-18 ENCOUNTER — RECORDS - HEALTHEAST (OUTPATIENT)
Dept: LAB | Facility: CLINIC | Age: 85
End: 2020-11-18

## 2020-11-18 ENCOUNTER — TELEPHONE (OUTPATIENT)
Dept: GERIATRICS | Facility: CLINIC | Age: 85
End: 2020-11-18

## 2020-11-18 NOTE — TELEPHONE ENCOUNTER
"Was receiving Coumadin 2 mg  Sat ,1 mg ROW. INR 1.66 on . Dose increased to 2.5 mg PO for the last 2 days. Orders to recheck today however facility does not \"do labs on \".     Orders:  1. Continue same dose this evenin.5 mg  2. Recheck INR tomorrow morning     ANTHONY Basilio  20       "

## 2020-11-19 ENCOUNTER — RECORDS - HEALTHEAST (OUTPATIENT)
Dept: LAB | Facility: CLINIC | Age: 85
End: 2020-11-19

## 2020-11-19 LAB — INR PPP: 2.09 (ref 0.9–1.1)

## 2020-11-27 ENCOUNTER — VIRTUAL VISIT (OUTPATIENT)
Dept: GERIATRICS | Facility: CLINIC | Age: 85
End: 2020-11-27
Payer: COMMERCIAL

## 2020-11-27 VITALS
HEART RATE: 67 BPM | DIASTOLIC BLOOD PRESSURE: 65 MMHG | OXYGEN SATURATION: 97 % | RESPIRATION RATE: 16 BRPM | WEIGHT: 212 LBS | TEMPERATURE: 97.2 F | SYSTOLIC BLOOD PRESSURE: 108 MMHG | BODY MASS INDEX: 34.22 KG/M2

## 2020-11-27 DIAGNOSIS — Z51.81 ENCOUNTER FOR THERAPEUTIC DRUG MONITORING: ICD-10-CM

## 2020-11-27 DIAGNOSIS — I48.91 CONTROLLED ATRIAL FIBRILLATION (H): Primary | ICD-10-CM

## 2020-11-27 DIAGNOSIS — Z79.01 LONG TERM (CURRENT) USE OF ANTICOAGULANTS: ICD-10-CM

## 2020-11-27 LAB — INR PPP: 3.28 (ref 0.9–1.1)

## 2020-11-27 PROCEDURE — 99308 SBSQ NF CARE LOW MDM 20: CPT | Mod: 95 | Performed by: NURSE PRACTITIONER

## 2020-11-27 NOTE — PROGRESS NOTES
"[facility] Geriatrics Video Visit  Mecca Slade is a 89 year old female who is being evaluated via a billable video visit due to the restrictions of the COVID19 pandemic.The patient has been notified of following: \"This video visit will be conducted via a call between you and your provider. We have found that certain health care needs can be provided without the need for an in-person physical exam.  This service lets us provide the care you need with a video conversation.  If a prescription is necessary we can send it directly to your pharmacy.  If lab work is needed we can place an order for that and you can then stop by our lab to have the test done at a later time. If during the course of the call the provider feels a video visit is not appropriate, you will not be charged for this service.\"     Proivder has received verbal consent for a Video Visit from the patient? Yes    Patient/facility would like the video invitation sent by: Send to e-mail at: [email or web address]    This visit took place virtually, with the patient located at [facility].  ________________________________________________  Video Start Time: 1023  INR/Coumadin Encounter  [place] Medical Record Number:  [id number]. HPI:    Mecca Slade is a 89 year old  (12/14/1921), who is being seen today for an episodic care visit at the above location.   HPI information obtained from: facility chart records, facility staff, patient report, [facility] chart review and Care Everywhere [facility] chart review. Today's concern is INR/Coumadin management for A. Fib    ROS/Subjective:  Bleeding Signs/Symptoms:  None  Thromboembolic Signs/Symptoms:  None  Medication Changes:  No  Dietary Changes:  No  Activity Changes: No  Bacterial/Viral Infection:  No  Missed Coumadin Doses:  None  On ASA: No  Other Concerns:  No    OBJECTIVE:  /65   Pulse 67   Temp 97.2  F (36.2  C)   Resp 16   Wt 96.2 kg (212 lb)   SpO2 97%  "  BMI 34.22 kg/m    Last INR: 2.09 on 11/19  INR Today: 3.28.  Current Dose: Coumadin 2mg PO every day.     ASSESSMENT:  Controlled atrial fibrillation (H)  Encounter for therapeutic drug monitoring  Long term (current) use of anticoagulants  Chronic. Stable. Slightly supratherapeutic. Will dose Coumadin as noted below and recheck INR x1 in 1 week. Follow-up accordingly.    PLAN:   Orders:  1. Coumadin 0.5mg PO on 11/27 & 11/28.   2. Coumadin 2mg PO x1 on 11/29.  3. Recheck INR x1 on 11/30.  Dx: afib.    Video-Visit Details  Type of service:  Video Visit  Video Start Time: 1023  Video End Time (time video stopped): 1025  Originating Location (pt. Location): Long term Care  Distant Location (provider location):  [facility] GERIATRIC SERVICES   Mode of Communication:  Video Conference.    Electronically signed by:  JOSUE Clark CNP DNP

## 2020-11-27 NOTE — LETTER
"    11/27/2020        RE: Mecca Slade  [facility]  [address] [place] [id number]        [facility] Geriatrics Video Visit  Mecca Slade is a 89 year old female who is being evaluated via a billable video visit due to the restrictions of the COVID19 pandemic.The patient has been notified of following: \"This video visit will be conducted via a call between you and your provider. We have found that certain health care needs can be provided without the need for an in-person physical exam.  This service lets us provide the care you need with a video conversation.  If a prescription is necessary we can send it directly to your pharmacy.  If lab work is needed we can place an order for that and you can then stop by our lab to have the test done at a later time. If during the course of the call the provider feels a video visit is not appropriate, you will not be charged for this service.\"     Proivder has received verbal consent for a Video Visit from the patient? Yes    Patient/facility would like the video invitation sent by: Send to e-mail at: [email or web address]    This visit took place virtually, with the patient located at [facility].  ________________________________________________  Video Start Time: 1023  INR/Coumadin Encounter  [place] Medical Record Number:  [id number]. HPI:    Mecca Slade is a 89 year old  (12/14/1921), who is being seen today for an episodic care visit at the above location.   HPI information obtained from: facility chart records, facility staff, patient report, [facility] chart review and Care Everywhere [facility] chart review. Today's concern is INR/Coumadin management for A. Fib    ROS/Subjective:  Bleeding Signs/Symptoms:  None  Thromboembolic Signs/Symptoms:  None  Medication Changes:  No  Dietary Changes:  No  Activity Changes: No  Bacterial/Viral Infection:  No  Missed Coumadin Doses:  None  On ASA: No  Other Concerns:  " No    OBJECTIVE:  /65   Pulse 67   Temp 97.2  F (36.2  C)   Resp 16   Wt 96.2 kg (212 lb)   SpO2 97%   BMI 34.22 kg/m    Last INR: 2.09 on 11/19  INR Today: 3.28.  Current Dose: Coumadin 2mg PO every day.     ASSESSMENT:  Controlled atrial fibrillation (H)  Encounter for therapeutic drug monitoring  Long term (current) use of anticoagulants  Chronic. Stable. Slightly supratherapeutic. Will dose Coumadin as noted below and recheck INR x1 in 1 week. Follow-up accordingly.    PLAN:   Orders:  1. Coumadin 0.5mg PO on 11/27 & 11/28.   2. Coumadin 2mg PO x1 on 11/29.  3. Recheck INR x1 on 11/30.  Dx: afib.    Video-Visit Details  Type of service:  Video Visit  Video Start Time: 1023  Video End Time (time video stopped): 1025  Originating Location (pt. Location): Long term Care  Distant Location (provider location):  [facility] GERIATRIC SERVICES   Mode of Communication:  Video Conference.    Electronically signed by:  JOSUE Clark CNP DNP              Sincerely,        JOSUE Gillis CNP

## 2020-11-30 LAB — INR PPP: 2.64 (ref 0.9–1.1)

## 2020-12-04 ENCOUNTER — RECORDS - HEALTHEAST (OUTPATIENT)
Dept: LAB | Facility: CLINIC | Age: 85
End: 2020-12-04

## 2020-12-07 ENCOUNTER — TELEPHONE (OUTPATIENT)
Dept: GERIATRICS | Facility: CLINIC | Age: 85
End: 2020-12-07

## 2020-12-07 DIAGNOSIS — I48.91 CONTROLLED ATRIAL FIBRILLATION (H): Primary | ICD-10-CM

## 2020-12-07 LAB — INR PPP: 2.88 (ref 0.9–1.1)

## 2020-12-08 NOTE — TELEPHONE ENCOUNTER
[place] GERIATRIC SERVICES TELEPHONE ENCOUNTER    Mecca Slade is a 89 year old  (12/14/1921),Nurse called today to report:   INR today is 2.8    ASSESSMENT/PLAN      INR therapeutic, 2.8 from  2.09 (11/27)    On coumadin 1 mg on Wed, and 2 mg all other days    Continue current regiment    Recheck in one week.       Electronically signed by:   Anirudh Ye MD

## 2020-12-11 ENCOUNTER — RECORDS - HEALTHEAST (OUTPATIENT)
Dept: LAB | Facility: CLINIC | Age: 85
End: 2020-12-11

## 2020-12-14 LAB — INR PPP: 5.98 (ref 0.9–1.1)

## 2020-12-16 ENCOUNTER — RECORDS - HEALTHEAST (OUTPATIENT)
Dept: LAB | Facility: CLINIC | Age: 85
End: 2020-12-16

## 2020-12-18 ENCOUNTER — TRANSFERRED RECORDS (OUTPATIENT)
Dept: HEALTH INFORMATION MANAGEMENT | Facility: CLINIC | Age: 85
End: 2020-12-18

## 2020-12-18 ENCOUNTER — TELEPHONE (OUTPATIENT)
Dept: GERIATRICS | Facility: CLINIC | Age: 85
End: 2020-12-18

## 2020-12-18 LAB
INR PPP: 3.19 (ref 0.9–1.1)
INR PPP: 3.19 (ref 0.9–1.1)

## 2020-12-19 ENCOUNTER — RECORDS - HEALTHEAST (OUTPATIENT)
Dept: LAB | Facility: CLINIC | Age: 85
End: 2020-12-19

## 2020-12-19 NOTE — TELEPHONE ENCOUNTER
INR: 3.19, last INR on 12/14 INR was 5.98  Coumadin as been on hold, given 1mg on 12/16 and 2mg on 12/17 and recheck today  Patient is on coumadin for atrial fib  Order: give 1mg coumadinQD and recheck INR on Monday

## 2020-12-21 ENCOUNTER — TRANSFERRED RECORDS (OUTPATIENT)
Dept: HEALTH INFORMATION MANAGEMENT | Facility: CLINIC | Age: 85
End: 2020-12-21

## 2020-12-21 ENCOUNTER — TELEPHONE (OUTPATIENT)
Dept: GERIATRICS | Facility: CLINIC | Age: 85
End: 2020-12-21

## 2020-12-21 DIAGNOSIS — Z79.01 LONG TERM CURRENT USE OF ANTICOAGULANT WITH INTERNATIONAL NORMALIZED RATIO (INR) GOAL OF 1.5-2.0: Primary | ICD-10-CM

## 2020-12-21 LAB
INR PPP: 2.03 (ref 0.9–1.1)
INR PPP: 2.03 (ref 0.9–1.1)

## 2020-12-21 NOTE — TELEPHONE ENCOUNTER
[place] GERIATRIC SERVICES TELEPHONE ENCOUNTER    Mecca Slade is a 89 year old  (12/14/1921),Nurse called today to report: *  INR today is  2.03    ASSESSMENT/PLAN      Date                        INR                                Order    11/27    2.8                             Cont. Coumadin 1 mg (Wed), 2 mg AOD    12/14       5.98   Coumadin on hold 15th and 16th, 2 mg 17th 12/18    3.19   Coumadin 1 mg daily    12/21    2.03   Coumadin 1 mg Wed, 2 mg AOD, recheck in one week      Electronically signed by:   Anirudh Ye MD

## 2020-12-24 ENCOUNTER — RECORDS - HEALTHEAST (OUTPATIENT)
Dept: LAB | Facility: CLINIC | Age: 85
End: 2020-12-24

## 2020-12-28 LAB — INR PPP: 2.05 (ref 0.9–1.1)

## 2021-01-01 ENCOUNTER — LAB REQUISITION (OUTPATIENT)
Dept: LAB | Facility: CLINIC | Age: 86
End: 2021-01-01
Payer: COMMERCIAL

## 2021-01-01 ENCOUNTER — NURSING HOME VISIT (OUTPATIENT)
Dept: GERIATRICS | Facility: CLINIC | Age: 86
End: 2021-01-01
Payer: COMMERCIAL

## 2021-01-01 ENCOUNTER — TELEPHONE (OUTPATIENT)
Dept: GERIATRICS | Facility: CLINIC | Age: 86
End: 2021-01-01

## 2021-01-01 ENCOUNTER — APPOINTMENT (OUTPATIENT)
Dept: GENERAL RADIOLOGY | Facility: CLINIC | Age: 86
DRG: 481 | End: 2021-01-01
Attending: INTERNAL MEDICINE
Payer: COMMERCIAL

## 2021-01-01 ENCOUNTER — APPOINTMENT (OUTPATIENT)
Dept: SPEECH THERAPY | Facility: CLINIC | Age: 86
DRG: 481 | End: 2021-01-01
Payer: COMMERCIAL

## 2021-01-01 ENCOUNTER — TRANSFERRED RECORDS (OUTPATIENT)
Dept: HEALTH INFORMATION MANAGEMENT | Facility: CLINIC | Age: 86
End: 2021-01-01

## 2021-01-01 ENCOUNTER — APPOINTMENT (OUTPATIENT)
Dept: PHYSICAL THERAPY | Facility: CLINIC | Age: 86
DRG: 481 | End: 2021-01-01
Payer: COMMERCIAL

## 2021-01-01 ENCOUNTER — DOCUMENTATION ONLY (OUTPATIENT)
Dept: OTHER | Facility: CLINIC | Age: 86
End: 2021-01-01
Payer: COMMERCIAL

## 2021-01-01 ENCOUNTER — ANESTHESIA EVENT (OUTPATIENT)
Dept: SURGERY | Facility: CLINIC | Age: 86
DRG: 481 | End: 2021-01-01
Payer: COMMERCIAL

## 2021-01-01 ENCOUNTER — APPOINTMENT (OUTPATIENT)
Dept: CT IMAGING | Facility: CLINIC | Age: 86
DRG: 481 | End: 2021-01-01
Attending: EMERGENCY MEDICINE
Payer: COMMERCIAL

## 2021-01-01 ENCOUNTER — HOSPITAL ENCOUNTER (INPATIENT)
Facility: CLINIC | Age: 86
LOS: 4 days | Discharge: NURSING, REHAB OR LONG-TERM CARE | DRG: 481 | End: 2021-11-19
Attending: EMERGENCY MEDICINE | Admitting: INTERNAL MEDICINE
Payer: COMMERCIAL

## 2021-01-01 ENCOUNTER — APPOINTMENT (OUTPATIENT)
Dept: GENERAL RADIOLOGY | Facility: CLINIC | Age: 86
DRG: 481 | End: 2021-01-01
Attending: EMERGENCY MEDICINE
Payer: COMMERCIAL

## 2021-01-01 ENCOUNTER — APPOINTMENT (OUTPATIENT)
Dept: CT IMAGING | Facility: CLINIC | Age: 86
DRG: 481 | End: 2021-01-01
Attending: PHYSICIAN ASSISTANT
Payer: COMMERCIAL

## 2021-01-01 ENCOUNTER — ANESTHESIA (OUTPATIENT)
Dept: SURGERY | Facility: CLINIC | Age: 86
DRG: 481 | End: 2021-01-01
Payer: COMMERCIAL

## 2021-01-01 VITALS
HEIGHT: 66 IN | HEART RATE: 72 BPM | BODY MASS INDEX: 32.78 KG/M2 | DIASTOLIC BLOOD PRESSURE: 67 MMHG | WEIGHT: 204 LBS | TEMPERATURE: 98.3 F | SYSTOLIC BLOOD PRESSURE: 123 MMHG | OXYGEN SATURATION: 93 % | RESPIRATION RATE: 16 BRPM

## 2021-01-01 VITALS
TEMPERATURE: 97.5 F | RESPIRATION RATE: 18 BRPM | BODY MASS INDEX: 31.34 KG/M2 | WEIGHT: 195 LBS | HEIGHT: 66 IN | OXYGEN SATURATION: 96 % | SYSTOLIC BLOOD PRESSURE: 137 MMHG | HEART RATE: 67 BPM | DIASTOLIC BLOOD PRESSURE: 78 MMHG

## 2021-01-01 VITALS
WEIGHT: 197.5 LBS | SYSTOLIC BLOOD PRESSURE: 136 MMHG | HEART RATE: 84 BPM | TEMPERATURE: 98 F | OXYGEN SATURATION: 96 % | BODY MASS INDEX: 31.74 KG/M2 | DIASTOLIC BLOOD PRESSURE: 78 MMHG | RESPIRATION RATE: 18 BRPM | HEIGHT: 66 IN

## 2021-01-01 VITALS
HEIGHT: 66 IN | WEIGHT: 195 LBS | OXYGEN SATURATION: 95 % | DIASTOLIC BLOOD PRESSURE: 68 MMHG | RESPIRATION RATE: 20 BRPM | TEMPERATURE: 97.7 F | SYSTOLIC BLOOD PRESSURE: 134 MMHG | HEART RATE: 76 BPM | BODY MASS INDEX: 31.34 KG/M2

## 2021-01-01 VITALS
DIASTOLIC BLOOD PRESSURE: 84 MMHG | OXYGEN SATURATION: 95 % | RESPIRATION RATE: 16 BRPM | BODY MASS INDEX: 33.27 KG/M2 | SYSTOLIC BLOOD PRESSURE: 133 MMHG | TEMPERATURE: 97.6 F | HEART RATE: 94 BPM | WEIGHT: 207 LBS | HEIGHT: 66 IN

## 2021-01-01 VITALS
HEIGHT: 66 IN | HEART RATE: 65 BPM | WEIGHT: 202 LBS | SYSTOLIC BLOOD PRESSURE: 135 MMHG | BODY MASS INDEX: 32.47 KG/M2 | DIASTOLIC BLOOD PRESSURE: 72 MMHG | TEMPERATURE: 97.9 F | OXYGEN SATURATION: 95 % | RESPIRATION RATE: 18 BRPM

## 2021-01-01 VITALS
RESPIRATION RATE: 16 BRPM | DIASTOLIC BLOOD PRESSURE: 70 MMHG | WEIGHT: 201.4 LBS | SYSTOLIC BLOOD PRESSURE: 138 MMHG | HEIGHT: 66 IN | HEART RATE: 82 BPM | TEMPERATURE: 97.6 F | BODY MASS INDEX: 32.37 KG/M2 | OXYGEN SATURATION: 96 %

## 2021-01-01 VITALS
OXYGEN SATURATION: 93 % | HEIGHT: 66 IN | HEART RATE: 75 BPM | WEIGHT: 199.8 LBS | RESPIRATION RATE: 16 BRPM | DIASTOLIC BLOOD PRESSURE: 80 MMHG | TEMPERATURE: 98.1 F | BODY MASS INDEX: 32.11 KG/M2 | SYSTOLIC BLOOD PRESSURE: 148 MMHG

## 2021-01-01 VITALS
BODY MASS INDEX: 32.47 KG/M2 | DIASTOLIC BLOOD PRESSURE: 82 MMHG | HEIGHT: 66 IN | WEIGHT: 202 LBS | TEMPERATURE: 98.5 F | RESPIRATION RATE: 16 BRPM | HEART RATE: 66 BPM | OXYGEN SATURATION: 95 % | SYSTOLIC BLOOD PRESSURE: 145 MMHG

## 2021-01-01 VITALS
RESPIRATION RATE: 20 BRPM | DIASTOLIC BLOOD PRESSURE: 66 MMHG | OXYGEN SATURATION: 95 % | SYSTOLIC BLOOD PRESSURE: 109 MMHG | BODY MASS INDEX: 33.27 KG/M2 | TEMPERATURE: 97.6 F | HEIGHT: 66 IN | HEART RATE: 83 BPM | WEIGHT: 207 LBS

## 2021-01-01 VITALS
HEIGHT: 66 IN | HEART RATE: 89 BPM | WEIGHT: 218.4 LBS | DIASTOLIC BLOOD PRESSURE: 48 MMHG | SYSTOLIC BLOOD PRESSURE: 99 MMHG | BODY MASS INDEX: 35.1 KG/M2 | RESPIRATION RATE: 20 BRPM | TEMPERATURE: 99 F | OXYGEN SATURATION: 94 %

## 2021-01-01 VITALS
RESPIRATION RATE: 18 BRPM | HEART RATE: 67 BPM | HEIGHT: 66 IN | BODY MASS INDEX: 31.34 KG/M2 | DIASTOLIC BLOOD PRESSURE: 78 MMHG | WEIGHT: 195 LBS | SYSTOLIC BLOOD PRESSURE: 137 MMHG | OXYGEN SATURATION: 98 % | TEMPERATURE: 97.4 F

## 2021-01-01 VITALS
WEIGHT: 210.2 LBS | DIASTOLIC BLOOD PRESSURE: 68 MMHG | HEIGHT: 66 IN | OXYGEN SATURATION: 95 % | SYSTOLIC BLOOD PRESSURE: 136 MMHG | HEART RATE: 86 BPM | RESPIRATION RATE: 16 BRPM | TEMPERATURE: 97.7 F | BODY MASS INDEX: 33.78 KG/M2

## 2021-01-01 VITALS
SYSTOLIC BLOOD PRESSURE: 148 MMHG | HEIGHT: 66 IN | TEMPERATURE: 97.2 F | HEART RATE: 75 BPM | BODY MASS INDEX: 32.11 KG/M2 | RESPIRATION RATE: 16 BRPM | DIASTOLIC BLOOD PRESSURE: 80 MMHG | WEIGHT: 199.8 LBS | OXYGEN SATURATION: 94 %

## 2021-01-01 VITALS
TEMPERATURE: 98.2 F | OXYGEN SATURATION: 92 % | BODY MASS INDEX: 32.52 KG/M2 | DIASTOLIC BLOOD PRESSURE: 56 MMHG | RESPIRATION RATE: 18 BRPM | HEART RATE: 89 BPM | WEIGHT: 201.5 LBS | SYSTOLIC BLOOD PRESSURE: 121 MMHG

## 2021-01-01 DIAGNOSIS — S72.491D OTHER CLOSED FRACTURE OF DISTAL END OF RIGHT FEMUR WITH ROUTINE HEALING, SUBSEQUENT ENCOUNTER: Primary | ICD-10-CM

## 2021-01-01 DIAGNOSIS — I50.32 CHRONIC DIASTOLIC CONGESTIVE HEART FAILURE (H): ICD-10-CM

## 2021-01-01 DIAGNOSIS — G81.94 LEFT HEMIPARESIS (H): ICD-10-CM

## 2021-01-01 DIAGNOSIS — L89.302 PRESSURE INJURY OF BUTTOCK, STAGE 2, UNSPECIFIED LATERALITY (H): Primary | ICD-10-CM

## 2021-01-01 DIAGNOSIS — Z51.81 ENCOUNTER FOR THERAPEUTIC DRUG MONITORING: ICD-10-CM

## 2021-01-01 DIAGNOSIS — R54 FRAIL ELDERLY: ICD-10-CM

## 2021-01-01 DIAGNOSIS — E11.51 TYPE II DIABETES MELLITUS WITH PERIPHERAL CIRCULATORY DISORDER (H): ICD-10-CM

## 2021-01-01 DIAGNOSIS — U07.1 COVID-19: ICD-10-CM

## 2021-01-01 DIAGNOSIS — E66.811 CLASS 1 OBESITY DUE TO EXCESS CALORIES WITH SERIOUS COMORBIDITY AND BODY MASS INDEX (BMI) OF 33.0 TO 33.9 IN ADULT: ICD-10-CM

## 2021-01-01 DIAGNOSIS — Z47.89 ORTHOPEDIC AFTERCARE: ICD-10-CM

## 2021-01-01 DIAGNOSIS — Z79.01 LONG TERM CURRENT USE OF ANTICOAGULANT WITH INTERNATIONAL NORMALIZED RATIO (INR) GOAL OF 1.5-2.0: ICD-10-CM

## 2021-01-01 DIAGNOSIS — S01.01XD LACERATION OF SCALP, SUBSEQUENT ENCOUNTER: ICD-10-CM

## 2021-01-01 DIAGNOSIS — S01.01XA LACERATION OF SCALP, INITIAL ENCOUNTER: ICD-10-CM
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
## 2021-01-01 DIAGNOSIS — I48.91 UNSPECIFIED ATRIAL FIBRILLATION (H): ICD-10-CM

## 2021-01-01 DIAGNOSIS — I50.20 SYSTOLIC CONGESTIVE HEART FAILURE, UNSPECIFIED HF CHRONICITY (H): ICD-10-CM

## 2021-01-01 DIAGNOSIS — S72.491D OTHER CLOSED FRACTURE OF DISTAL END OF RIGHT FEMUR WITH ROUTINE HEALING, SUBSEQUENT ENCOUNTER: ICD-10-CM

## 2021-01-01 DIAGNOSIS — L89.302 PRESSURE INJURY OF BUTTOCK, STAGE 2, UNSPECIFIED LATERALITY (H): ICD-10-CM

## 2021-01-01 DIAGNOSIS — I10 ESSENTIAL HYPERTENSION: ICD-10-CM

## 2021-01-01 DIAGNOSIS — L89.890 PRESSURE INJURY OF LEFT LEG, UNSTAGEABLE (H): ICD-10-CM

## 2021-01-01 DIAGNOSIS — S82.92XG: ICD-10-CM

## 2021-01-01 DIAGNOSIS — I48.91 CONTROLLED ATRIAL FIBRILLATION (H): Primary | ICD-10-CM

## 2021-01-01 DIAGNOSIS — M15.0 PRIMARY OSTEOARTHRITIS INVOLVING MULTIPLE JOINTS: ICD-10-CM

## 2021-01-01 DIAGNOSIS — I48.91 CONTROLLED ATRIAL FIBRILLATION (H): ICD-10-CM
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
## 2021-01-01 DIAGNOSIS — N18.4 CKD (CHRONIC KIDNEY DISEASE) STAGE 4, GFR 15-29 ML/MIN (H): ICD-10-CM

## 2021-01-01 DIAGNOSIS — M10.9 GOUT, UNSPECIFIED: ICD-10-CM

## 2021-01-01 DIAGNOSIS — Z79.01 LONG TERM (CURRENT) USE OF ANTICOAGULANTS: ICD-10-CM

## 2021-01-01 DIAGNOSIS — I48.20 CHRONIC ATRIAL FIBRILLATION (H): ICD-10-CM

## 2021-01-01 DIAGNOSIS — M10.041 ACUTE IDIOPATHIC GOUT OF RIGHT HAND: Primary | ICD-10-CM

## 2021-01-01 DIAGNOSIS — S72.91XA CLOSED FRACTURE OF RIGHT FEMUR, UNSPECIFIED FRACTURE MORPHOLOGY, UNSPECIFIED PORTION OF FEMUR, INITIAL ENCOUNTER (H): ICD-10-CM

## 2021-01-01 DIAGNOSIS — N18.4 CHRONIC KIDNEY DISEASE, STAGE 4 (SEVERE) (H): ICD-10-CM

## 2021-01-01 DIAGNOSIS — Z00.00 ANNUAL PHYSICAL EXAM: Primary | ICD-10-CM

## 2021-01-01 DIAGNOSIS — S72.491A: ICD-10-CM

## 2021-01-01 DIAGNOSIS — R14.1 GAS PAIN: ICD-10-CM
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
## 2021-01-01 DIAGNOSIS — Z48.810 ENCOUNTER FOR SURGICAL AFTERCARE FOLLOWING SURGERY ON THE SENSE ORGANS: ICD-10-CM

## 2021-01-01 DIAGNOSIS — I48.20 CHRONIC ATRIAL FIBRILLATION, UNSPECIFIED (H): ICD-10-CM

## 2021-01-01 DIAGNOSIS — E66.09 CLASS 1 OBESITY DUE TO EXCESS CALORIES WITH SERIOUS COMORBIDITY AND BODY MASS INDEX (BMI) OF 33.0 TO 33.9 IN ADULT: ICD-10-CM

## 2021-01-01 DIAGNOSIS — Z11.52 ENCOUNTER FOR SCREENING LABORATORY TESTING FOR SEVERE ACUTE RESPIRATORY SYNDROME CORONAVIRUS 2 (SARS-COV-2): ICD-10-CM
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
## 2021-01-01 DIAGNOSIS — I50.9 HEART FAILURE, UNSPECIFIED (H): ICD-10-CM

## 2021-01-01 DIAGNOSIS — E83.52 HYPERCALCEMIA: ICD-10-CM

## 2021-01-01 DIAGNOSIS — S72.90XA: ICD-10-CM

## 2021-01-01 DIAGNOSIS — I50.30 DIASTOLIC CONGESTIVE HEART FAILURE, UNSPECIFIED HF CHRONICITY (H): ICD-10-CM

## 2021-01-01 DIAGNOSIS — I50.20 SYSTOLIC CONGESTIVE HEART FAILURE, UNSPECIFIED HF CHRONICITY (H): Primary | ICD-10-CM

## 2021-01-01 DIAGNOSIS — N18.31 STAGE 3A CHRONIC KIDNEY DISEASE (H): ICD-10-CM

## 2021-01-01 LAB
ABO/RH(D): NORMAL
ALBUMIN SERPL-MCNC: 2.5 G/DL (ref 3.4–5)
ALP SERPL-CCNC: 102 U/L (ref 40–150)
ALT SERPL W P-5'-P-CCNC: 15 U/L (ref 0–50)
ANION GAP SERPL CALCULATED.3IONS-SCNC: 12 MMOL/L (ref 5–18)
ANION GAP SERPL CALCULATED.3IONS-SCNC: 4 MMOL/L (ref 3–14)
ANION GAP SERPL CALCULATED.3IONS-SCNC: 5 MMOL/L (ref 3–14)
ANION GAP SERPL CALCULATED.3IONS-SCNC: 6 MMOL/L (ref 3–14)
ANION GAP SERPL CALCULATED.3IONS-SCNC: 8 MMOL/L (ref 3–14)
ANION GAP SERPL CALCULATED.3IONS-SCNC: 8 MMOL/L (ref 5–18)
ANION GAP SERPL CALCULATED.3IONS-SCNC: 8 MMOL/L (ref 5–18)
ANTIBODY SCREEN: NEGATIVE
AST SERPL W P-5'-P-CCNC: 34 U/L (ref 0–45)
BASOPHILS # BLD AUTO: 0 10E3/UL (ref 0–0.2)
BASOPHILS # BLD AUTO: 0 10E3/UL (ref 0–0.2)
BASOPHILS # BLD AUTO: 0.1 10E3/UL (ref 0–0.2)
BASOPHILS # BLD AUTO: 0.1 10E3/UL (ref 0–0.2)
BASOPHILS NFR BLD AUTO: 0 %
BASOPHILS NFR BLD AUTO: 1 %
BILIRUB SERPL-MCNC: 0.7 MG/DL (ref 0.2–1.3)
BUN SERPL-MCNC: 30 MG/DL (ref 8–28)
BUN SERPL-MCNC: 38 MG/DL (ref 8–28)
BUN SERPL-MCNC: 40 MG/DL (ref 8–28)
BUN SERPL-MCNC: 46 MG/DL (ref 7–30)
BUN SERPL-MCNC: 49 MG/DL (ref 7–30)
BUN SERPL-MCNC: 52 MG/DL (ref 7–30)
BUN SERPL-MCNC: 53 MG/DL (ref 7–30)
CALCIUM SERPL-MCNC: 10.1 MG/DL (ref 8.5–10.1)
CALCIUM SERPL-MCNC: 10.1 MG/DL (ref 8.5–10.1)
CALCIUM SERPL-MCNC: 10.2 MG/DL (ref 8.5–10.1)
CALCIUM SERPL-MCNC: 10.3 MG/DL (ref 8.5–10.5)
CALCIUM SERPL-MCNC: 10.7 MG/DL (ref 8.5–10.5)
CALCIUM SERPL-MCNC: 11.2 MG/DL (ref 8.5–10.1)
CALCIUM SERPL-MCNC: 9.9 MG/DL (ref 8.5–10.5)
CHLORIDE BLD-SCNC: 104 MMOL/L (ref 98–107)
CHLORIDE BLD-SCNC: 106 MMOL/L (ref 98–107)
CHLORIDE BLD-SCNC: 108 MMOL/L (ref 94–109)
CHLORIDE BLD-SCNC: 109 MMOL/L (ref 98–107)
CHLORIDE BLD-SCNC: 110 MMOL/L (ref 94–109)
CHLORIDE BLD-SCNC: 111 MMOL/L (ref 94–109)
CHLORIDE BLD-SCNC: 111 MMOL/L (ref 94–109)
CHOLEST SERPL-MCNC: 109 MG/DL
CO2 SERPL-SCNC: 24 MMOL/L (ref 22–31)
CO2 SERPL-SCNC: 25 MMOL/L (ref 20–32)
CO2 SERPL-SCNC: 26 MMOL/L (ref 20–32)
CO2 SERPL-SCNC: 26 MMOL/L (ref 22–31)
CO2 SERPL-SCNC: 27 MMOL/L (ref 20–32)
CO2 SERPL-SCNC: 27 MMOL/L (ref 22–31)
CO2 SERPL-SCNC: 29 MMOL/L (ref 20–32)
CREAT SERPL-MCNC: 0.75 MG/DL (ref 0.6–1.1)
CREAT SERPL-MCNC: 0.85 MG/DL (ref 0.6–1.1)
CREAT SERPL-MCNC: 0.95 MG/DL (ref 0.52–1.04)
CREAT SERPL-MCNC: 1 MG/DL (ref 0.52–1.04)
CREAT SERPL-MCNC: 1 MG/DL (ref 0.6–1.1)
CREAT SERPL-MCNC: 1.01 MG/DL (ref 0.52–1.04)
CREAT SERPL-MCNC: 1.05 MG/DL (ref 0.52–1.04)
EOSINOPHIL # BLD AUTO: 0.1 10E3/UL (ref 0–0.7)
EOSINOPHIL NFR BLD AUTO: 1 %
EOSINOPHIL NFR BLD AUTO: 2 %
ERYTHROCYTE [DISTWIDTH] IN BLOOD BY AUTOMATED COUNT: 13.6 % (ref 10–15)
ERYTHROCYTE [DISTWIDTH] IN BLOOD BY AUTOMATED COUNT: 13.7 % (ref 10–15)
ERYTHROCYTE [DISTWIDTH] IN BLOOD BY AUTOMATED COUNT: 14.1 % (ref 10–15)
ERYTHROCYTE [DISTWIDTH] IN BLOOD BY AUTOMATED COUNT: 14.3 % (ref 10–15)
ERYTHROCYTE [DISTWIDTH] IN BLOOD BY AUTOMATED COUNT: 14.8 % (ref 10–15)
ERYTHROCYTE [DISTWIDTH] IN BLOOD BY AUTOMATED COUNT: 17 % (ref 10–15)
ERYTHROCYTE [DISTWIDTH] IN BLOOD BY AUTOMATED COUNT: 17.7 % (ref 10–15)
FASTING STATUS PATIENT QL REPORTED: ABNORMAL
GFR SERPL CREATININE-BSD FRML MDRD: 44 ML/MIN/1.73M2
GFR SERPL CREATININE-BSD FRML MDRD: 46 ML/MIN/1.73M2
GFR SERPL CREATININE-BSD FRML MDRD: 47 ML/MIN/1.73M2
GFR SERPL CREATININE-BSD FRML MDRD: 47 ML/MIN/1.73M2
GFR SERPL CREATININE-BSD FRML MDRD: 50 ML/MIN/1.73M2
GFR SERPL CREATININE-BSD FRML MDRD: 57 ML/MIN/1.73M2
GFR SERPL CREATININE-BSD FRML MDRD: 66 ML/MIN/1.73M2
GLUCOSE BLD-MCNC: 160 MG/DL (ref 70–125)
GLUCOSE BLD-MCNC: 213 MG/DL (ref 70–99)
GLUCOSE BLD-MCNC: 224 MG/DL (ref 70–125)
GLUCOSE BLD-MCNC: 226 MG/DL (ref 70–99)
GLUCOSE BLD-MCNC: 232 MG/DL (ref 70–99)
GLUCOSE BLD-MCNC: 254 MG/DL (ref 70–99)
GLUCOSE BLD-MCNC: 292 MG/DL (ref 70–125)
GLUCOSE BLDC GLUCOMTR-MCNC: 193 MG/DL (ref 70–99)
GLUCOSE BLDC GLUCOMTR-MCNC: 206 MG/DL (ref 70–99)
GLUCOSE BLDC GLUCOMTR-MCNC: 211 MG/DL (ref 70–99)
GLUCOSE BLDC GLUCOMTR-MCNC: 216 MG/DL (ref 70–99)
GLUCOSE BLDC GLUCOMTR-MCNC: 218 MG/DL (ref 70–99)
GLUCOSE BLDC GLUCOMTR-MCNC: 225 MG/DL (ref 70–99)
GLUCOSE BLDC GLUCOMTR-MCNC: 227 MG/DL (ref 70–99)
GLUCOSE BLDC GLUCOMTR-MCNC: 230 MG/DL (ref 70–99)
GLUCOSE BLDC GLUCOMTR-MCNC: 230 MG/DL (ref 70–99)
GLUCOSE BLDC GLUCOMTR-MCNC: 233 MG/DL (ref 70–99)
GLUCOSE BLDC GLUCOMTR-MCNC: 242 MG/DL (ref 70–99)
GLUCOSE BLDC GLUCOMTR-MCNC: 244 MG/DL (ref 70–99)
GLUCOSE BLDC GLUCOMTR-MCNC: 249 MG/DL (ref 70–99)
GLUCOSE BLDC GLUCOMTR-MCNC: 255 MG/DL (ref 70–99)
GLUCOSE BLDC GLUCOMTR-MCNC: 265 MG/DL (ref 70–99)
GLUCOSE BLDC GLUCOMTR-MCNC: 271 MG/DL (ref 70–99)
GLUCOSE BLDC GLUCOMTR-MCNC: 273 MG/DL (ref 70–99)
GLUCOSE BLDC GLUCOMTR-MCNC: 284 MG/DL (ref 70–99)
GLUCOSE BLDC GLUCOMTR-MCNC: 328 MG/DL (ref 70–99)
GLUCOSE BLDC GLUCOMTR-MCNC: 350 MG/DL (ref 70–99)
GLUCOSE BLDC GLUCOMTR-MCNC: 362 MG/DL (ref 70–99)
HBA1C MFR BLD: 8.1 %
HBA1C MFR BLD: 9.1 % (ref 0–5.6)
HCT VFR BLD AUTO: 28.1 % (ref 35–47)
HCT VFR BLD AUTO: 29.2 % (ref 35–47)
HCT VFR BLD AUTO: 31.2 % (ref 35–47)
HCT VFR BLD AUTO: 33.2 % (ref 35–47)
HCT VFR BLD AUTO: 35 % (ref 35–47)
HCT VFR BLD AUTO: 35.1 % (ref 35–47)
HCT VFR BLD AUTO: 45.1 % (ref 35–47)
HDLC SERPL-MCNC: 25 MG/DL
HGB BLD-MCNC: 10.2 G/DL (ref 11.7–15.7)
HGB BLD-MCNC: 10.2 G/DL (ref 11.7–15.7)
HGB BLD-MCNC: 10.5 G/DL (ref 11.7–15.7)
HGB BLD-MCNC: 11.2 G/DL (ref 11.7–15.7)
HGB BLD-MCNC: 14.3 G/DL (ref 11.7–15.7)
HGB BLD-MCNC: 8.5 G/DL (ref 11.7–15.7)
HGB BLD-MCNC: 8.7 G/DL (ref 11.7–15.7)
HGB BLD-MCNC: 9 G/DL (ref 11.7–15.7)
HGB BLD-MCNC: 9.8 G/DL (ref 11.7–15.7)
HOLD SPECIMEN: NORMAL
IMM GRANULOCYTES # BLD: 0 10E3/UL
IMM GRANULOCYTES # BLD: 0.1 10E3/UL
IMM GRANULOCYTES NFR BLD: 1 %
INR PPP: 1.25 (ref 0.85–1.15)
INR PPP: 1.29 (ref 0.85–1.15)
INR PPP: 1.31 (ref 0.85–1.15)
INR PPP: 1.43 (ref 0.85–1.15)
INR PPP: 1.45 (ref 0.85–1.15)
INR PPP: 1.56 (ref 0.85–1.15)
INR PPP: 1.66 (ref 0.85–1.15)
INR PPP: 1.77 (ref 0.85–1.15)
INR PPP: 1.82 (ref 0.85–1.15)
INR PPP: 1.91 (ref 0.85–1.15)
INR PPP: 1.93 (ref 0.85–1.15)
INR PPP: 1.95 (ref 0.85–1.15)
INR PPP: 1.98 (ref 0.85–1.15)
INR PPP: 2.21 (ref 0.85–1.15)
INR PPP: 2.36 (ref 0.85–1.15)
INR PPP: 2.46 (ref 0.85–1.15)
INR PPP: 2.65 (ref 0.85–1.15)
INR PPP: 2.87 (ref 0.85–1.15)
INR PPP: 2.91 (ref 0.85–1.15)
INR PPP: 3.01 (ref 0.85–1.15)
INR PPP: 3.04 (ref 0.85–1.15)
INR PPP: 3.16 (ref 0.85–1.15)
INR PPP: 3.21 (ref 0.85–1.15)
INR PPP: 3.64 (ref 0.85–1.15)
INR PPP: 4.81 (ref 0.85–1.15)
INR PPP: 5.34 (ref 0.85–1.15)
LACTATE SERPL-SCNC: 1.4 MMOL/L (ref 0.7–2)
LACTATE SERPL-SCNC: 2.3 MMOL/L (ref 0.7–2)
LACTATE SERPL-SCNC: 2.7 MMOL/L (ref 0.7–2)
LDLC SERPL CALC-MCNC: 54 MG/DL
LYMPHOCYTES # BLD AUTO: 0.7 10E3/UL (ref 0.8–5.3)
LYMPHOCYTES # BLD AUTO: 0.9 10E3/UL (ref 0.8–5.3)
LYMPHOCYTES # BLD AUTO: 1 10E3/UL (ref 0.8–5.3)
LYMPHOCYTES # BLD AUTO: 4 10E3/UL (ref 0.8–5.3)
LYMPHOCYTES NFR BLD AUTO: 19 %
LYMPHOCYTES NFR BLD AUTO: 33 %
LYMPHOCYTES NFR BLD AUTO: 8 %
LYMPHOCYTES NFR BLD AUTO: 9 %
MCH RBC QN AUTO: 30.8 PG (ref 26.5–33)
MCH RBC QN AUTO: 31.2 PG (ref 26.5–33)
MCH RBC QN AUTO: 31.3 PG (ref 26.5–33)
MCH RBC QN AUTO: 31.5 PG (ref 26.5–33)
MCH RBC QN AUTO: 31.6 PG (ref 26.5–33)
MCH RBC QN AUTO: 32.2 PG (ref 26.5–33)
MCH RBC QN AUTO: 32.2 PG (ref 26.5–33)
MCHC RBC AUTO-ENTMCNC: 29.9 G/DL (ref 31.5–36.5)
MCHC RBC AUTO-ENTMCNC: 30.2 G/DL (ref 31.5–36.5)
MCHC RBC AUTO-ENTMCNC: 30.7 G/DL (ref 31.5–36.5)
MCHC RBC AUTO-ENTMCNC: 30.8 G/DL (ref 31.5–36.5)
MCHC RBC AUTO-ENTMCNC: 31.4 G/DL (ref 31.5–36.5)
MCHC RBC AUTO-ENTMCNC: 31.7 G/DL (ref 31.5–36.5)
MCHC RBC AUTO-ENTMCNC: 32 G/DL (ref 31.5–36.5)
MCV RBC AUTO: 100 FL (ref 78–100)
MCV RBC AUTO: 101 FL (ref 78–100)
MCV RBC AUTO: 103 FL (ref 78–100)
MCV RBC AUTO: 105 FL (ref 78–100)
MCV RBC AUTO: 108 FL (ref 78–100)
MCV RBC AUTO: 99 FL (ref 78–100)
MCV RBC AUTO: 99 FL (ref 78–100)
MONOCYTES # BLD AUTO: 0.5 10E3/UL (ref 0–1.3)
MONOCYTES # BLD AUTO: 0.7 10E3/UL (ref 0–1.3)
MONOCYTES # BLD AUTO: 0.7 10E3/UL (ref 0–1.3)
MONOCYTES # BLD AUTO: 0.9 10E3/UL (ref 0–1.3)
MONOCYTES NFR BLD AUTO: 10 %
MONOCYTES NFR BLD AUTO: 6 %
MONOCYTES NFR BLD AUTO: 7 %
MONOCYTES NFR BLD AUTO: 8 %
NEUTROPHILS # BLD AUTO: 3.7 10E3/UL (ref 1.6–8.3)
NEUTROPHILS # BLD AUTO: 6.9 10E3/UL (ref 1.6–8.3)
NEUTROPHILS # BLD AUTO: 7.3 10E3/UL (ref 1.6–8.3)
NEUTROPHILS # BLD AUTO: 9.1 10E3/UL (ref 1.6–8.3)
NEUTROPHILS NFR BLD AUTO: 57 %
NEUTROPHILS NFR BLD AUTO: 67 %
NEUTROPHILS NFR BLD AUTO: 82 %
NEUTROPHILS NFR BLD AUTO: 82 %
NRBC # BLD AUTO: 0 10E3/UL
NRBC BLD AUTO-RTO: 0 /100
PLATELET # BLD AUTO: 136 10E3/UL (ref 150–450)
PLATELET # BLD AUTO: 142 10E3/UL (ref 150–450)
PLATELET # BLD AUTO: 156 10E3/UL (ref 150–450)
PLATELET # BLD AUTO: 199 10E3/UL (ref 150–450)
PLATELET # BLD AUTO: 218 10E3/UL (ref 150–450)
PLATELET # BLD AUTO: 243 10E3/UL (ref 150–450)
PLATELET # BLD AUTO: 374 10E3/UL (ref 150–450)
POTASSIUM BLD-SCNC: 4.4 MMOL/L (ref 3.4–5.3)
POTASSIUM BLD-SCNC: 4.5 MMOL/L (ref 3.5–5)
POTASSIUM BLD-SCNC: 4.5 MMOL/L (ref 3.5–5)
POTASSIUM BLD-SCNC: 4.6 MMOL/L (ref 3.4–5.3)
POTASSIUM BLD-SCNC: 4.8 MMOL/L (ref 3.4–5.3)
POTASSIUM BLD-SCNC: 4.8 MMOL/L (ref 3.4–5.3)
POTASSIUM BLD-SCNC: 5 MMOL/L (ref 3.5–5)
PROT SERPL-MCNC: 5.9 G/DL (ref 6.8–8.8)
RBC # BLD AUTO: 2.72 10E6/UL (ref 3.8–5.2)
RBC # BLD AUTO: 2.92 10E6/UL (ref 3.8–5.2)
RBC # BLD AUTO: 3.1 10E6/UL (ref 3.8–5.2)
RBC # BLD AUTO: 3.17 10E6/UL (ref 3.8–5.2)
RBC # BLD AUTO: 3.26 10E6/UL (ref 3.8–5.2)
RBC # BLD AUTO: 3.55 10E6/UL (ref 3.8–5.2)
RBC # BLD AUTO: 4.58 10E6/UL (ref 3.8–5.2)
SARS-COV-2 RNA RESP QL NAA+PROBE: NEGATIVE
SARS-COV-2 RNA RESP QL NAA+PROBE: NOT DETECTED
SARS-COV-2 RNA RESP QL NAA+PROBE: NOT DETECTED
SODIUM SERPL-SCNC: 138 MMOL/L (ref 136–145)
SODIUM SERPL-SCNC: 140 MMOL/L (ref 133–144)
SODIUM SERPL-SCNC: 142 MMOL/L (ref 136–145)
SODIUM SERPL-SCNC: 143 MMOL/L (ref 133–144)
SODIUM SERPL-SCNC: 143 MMOL/L (ref 133–144)
SODIUM SERPL-SCNC: 144 MMOL/L (ref 133–144)
SODIUM SERPL-SCNC: 144 MMOL/L (ref 136–145)
SPECIMEN EXPIRATION DATE: NORMAL
TRIGL SERPL-MCNC: 151 MG/DL
TSH SERPL DL<=0.005 MIU/L-ACNC: 0.83 UIU/ML (ref 0.3–5)
URATE SERPL-MCNC: 8.9 MG/DL (ref 2–7.5)
WBC # BLD AUTO: 10.1 10E3/UL (ref 4–11)
WBC # BLD AUTO: 10.2 10E3/UL (ref 4–11)
WBC # BLD AUTO: 11 10E3/UL (ref 4–11)
WBC # BLD AUTO: 12.1 10E3/UL (ref 4–11)
WBC # BLD AUTO: 13.2 10E3/UL (ref 4–11)
WBC # BLD AUTO: 5.4 10E3/UL (ref 4–11)
WBC # BLD AUTO: 8.8 10E3/UL (ref 4–11)

## 2021-01-01 PROCEDURE — 258N000003 HC RX IP 258 OP 636: Performed by: ORTHOPAEDIC SURGERY

## 2021-01-01 PROCEDURE — 99310 SBSQ NF CARE HIGH MDM 45: CPT | Performed by: NURSE PRACTITIONER

## 2021-01-01 PROCEDURE — 36415 COLL VENOUS BLD VENIPUNCTURE: CPT | Mod: ORL | Performed by: FAMILY MEDICINE

## 2021-01-01 PROCEDURE — 250N000013 HC RX MED GY IP 250 OP 250 PS 637: Performed by: PHYSICIAN ASSISTANT

## 2021-01-01 PROCEDURE — 250N000011 HC RX IP 250 OP 636: Performed by: ORTHOPAEDIC SURGERY

## 2021-01-01 PROCEDURE — 70450 CT HEAD/BRAIN W/O DYE: CPT

## 2021-01-01 PROCEDURE — 36415 COLL VENOUS BLD VENIPUNCTURE: CPT | Performed by: PHYSICIAN ASSISTANT

## 2021-01-01 PROCEDURE — 12005 RPR S/N/A/GEN/TRK12.6-20.0CM: CPT | Performed by: EMERGENCY MEDICINE

## 2021-01-01 PROCEDURE — 85610 PROTHROMBIN TIME: CPT | Mod: ORL | Performed by: FAMILY MEDICINE

## 2021-01-01 PROCEDURE — 85610 PROTHROMBIN TIME: CPT | Performed by: PHYSICIAN ASSISTANT

## 2021-01-01 PROCEDURE — P9604 ONE-WAY ALLOW PRORATED TRIP: HCPCS | Mod: ORL | Performed by: FAMILY MEDICINE

## 2021-01-01 PROCEDURE — P9603 ONE-WAY ALLOW PRORATED MILES: HCPCS | Mod: ORL | Performed by: FAMILY MEDICINE

## 2021-01-01 PROCEDURE — 99309 SBSQ NF CARE MODERATE MDM 30: CPT | Performed by: NURSE PRACTITIONER

## 2021-01-01 PROCEDURE — 36415 COLL VENOUS BLD VENIPUNCTURE: CPT | Mod: ORL | Performed by: INTERNAL MEDICINE

## 2021-01-01 PROCEDURE — 85025 COMPLETE CBC W/AUTO DIFF WBC: CPT | Mod: ORL | Performed by: FAMILY MEDICINE

## 2021-01-01 PROCEDURE — 72170 X-RAY EXAM OF PELVIS: CPT

## 2021-01-01 PROCEDURE — 72131 CT LUMBAR SPINE W/O DYE: CPT

## 2021-01-01 PROCEDURE — 82374 ASSAY BLOOD CARBON DIOXIDE: CPT | Performed by: EMERGENCY MEDICINE

## 2021-01-01 PROCEDURE — 99308 SBSQ NF CARE LOW MDM 20: CPT | Performed by: NURSE PRACTITIONER

## 2021-01-01 PROCEDURE — 99318 PR ANNUAL NURSING FAC ASSESSMNT, STABLE: CPT | Performed by: NURSE PRACTITIONER

## 2021-01-01 PROCEDURE — U0003 INFECTIOUS AGENT DETECTION BY NUCLEIC ACID (DNA OR RNA); SEVERE ACUTE RESPIRATORY SYNDROME CORONAVIRUS 2 (SARS-COV-2) (CORONAVIRUS DISEASE [COVID-19]), AMPLIFIED PROBE TECHNIQUE, MAKING USE OF HIGH THROUGHPUT TECHNOLOGIES AS DESCRIBED BY CMS-2020-01-R: HCPCS | Mod: ORL | Performed by: FAMILY MEDICINE

## 2021-01-01 PROCEDURE — 250N000011 HC RX IP 250 OP 636: Performed by: EMERGENCY MEDICINE

## 2021-01-01 PROCEDURE — 250N000013 HC RX MED GY IP 250 OP 250 PS 637: Performed by: INTERNAL MEDICINE

## 2021-01-01 PROCEDURE — 258N000001 HC RX 258: Performed by: ORTHOPAEDIC SURGERY

## 2021-01-01 PROCEDURE — 80053 COMPREHEN METABOLIC PANEL: CPT | Performed by: PHYSICIAN ASSISTANT

## 2021-01-01 PROCEDURE — 96376 TX/PRO/DX INJ SAME DRUG ADON: CPT | Performed by: EMERGENCY MEDICINE

## 2021-01-01 PROCEDURE — 85610 PROTHROMBIN TIME: CPT | Mod: ORL | Performed by: INTERNAL MEDICINE

## 2021-01-01 PROCEDURE — 86900 BLOOD TYPING SEROLOGIC ABO: CPT | Performed by: EMERGENCY MEDICINE

## 2021-01-01 PROCEDURE — 92526 ORAL FUNCTION THERAPY: CPT | Mod: GN | Performed by: SPEECH-LANGUAGE PATHOLOGIST

## 2021-01-01 PROCEDURE — 72125 CT NECK SPINE W/O DYE: CPT

## 2021-01-01 PROCEDURE — 85610 PROTHROMBIN TIME: CPT | Performed by: EMERGENCY MEDICINE

## 2021-01-01 PROCEDURE — 999N000141 HC STATISTIC PRE-PROCEDURE NURSING ASSESSMENT: Performed by: ORTHOPAEDIC SURGERY

## 2021-01-01 PROCEDURE — 271N000001 HC OR GENERAL SUPPLY NON-STERILE: Performed by: ORTHOPAEDIC SURGERY

## 2021-01-01 PROCEDURE — 85027 COMPLETE CBC AUTOMATED: CPT | Performed by: PHYSICIAN ASSISTANT

## 2021-01-01 PROCEDURE — 120N000001 HC R&B MED SURG/OB

## 2021-01-01 PROCEDURE — 85025 COMPLETE CBC W/AUTO DIFF WBC: CPT | Performed by: EMERGENCY MEDICINE

## 2021-01-01 PROCEDURE — 80048 BASIC METABOLIC PNL TOTAL CA: CPT | Mod: ORL | Performed by: FAMILY MEDICINE

## 2021-01-01 PROCEDURE — 360N000084 HC SURGERY LEVEL 4 W/ FLUORO, PER MIN: Performed by: ORTHOPAEDIC SURGERY

## 2021-01-01 PROCEDURE — 85014 HEMATOCRIT: CPT | Performed by: PHYSICIAN ASSISTANT

## 2021-01-01 PROCEDURE — C1713 ANCHOR/SCREW BN/BN,TIS/BN: HCPCS | Performed by: ORTHOPAEDIC SURGERY

## 2021-01-01 PROCEDURE — 250N000011 HC RX IP 250 OP 636: Performed by: PHYSICIAN ASSISTANT

## 2021-01-01 PROCEDURE — 80048 BASIC METABOLIC PNL TOTAL CA: CPT | Mod: ORL | Performed by: INTERNAL MEDICINE

## 2021-01-01 PROCEDURE — 99239 HOSP IP/OBS DSCHRG MGMT >30: CPT | Performed by: INTERNAL MEDICINE

## 2021-01-01 PROCEDURE — 96374 THER/PROPH/DIAG INJ IV PUSH: CPT | Mod: 59 | Performed by: EMERGENCY MEDICINE

## 2021-01-01 PROCEDURE — P9603 ONE-WAY ALLOW PRORATED MILES: HCPCS | Mod: ORL | Performed by: NURSE PRACTITIONER

## 2021-01-01 PROCEDURE — 250N000009 HC RX 250: Performed by: NURSE ANESTHETIST, CERTIFIED REGISTERED

## 2021-01-01 PROCEDURE — 272N000001 HC OR GENERAL SUPPLY STERILE: Performed by: ORTHOPAEDIC SURGERY

## 2021-01-01 PROCEDURE — 84550 ASSAY OF BLOOD/URIC ACID: CPT | Mod: ORL | Performed by: FAMILY MEDICINE

## 2021-01-01 PROCEDURE — 0QS804Z REPOSITION RIGHT FEMORAL SHAFT WITH INTERNAL FIXATION DEVICE, OPEN APPROACH: ICD-10-PCS | Performed by: ORTHOPAEDIC SURGERY

## 2021-01-01 PROCEDURE — 80048 BASIC METABOLIC PNL TOTAL CA: CPT | Performed by: PHYSICIAN ASSISTANT

## 2021-01-01 PROCEDURE — 250N000013 HC RX MED GY IP 250 OP 250 PS 637: Performed by: ORTHOPAEDIC SURGERY

## 2021-01-01 PROCEDURE — 250N000009 HC RX 250: Performed by: EMERGENCY MEDICINE

## 2021-01-01 PROCEDURE — 258N000003 HC RX IP 258 OP 636: Performed by: PHYSICIAN ASSISTANT

## 2021-01-01 PROCEDURE — 83605 ASSAY OF LACTIC ACID: CPT | Performed by: PHYSICIAN ASSISTANT

## 2021-01-01 PROCEDURE — G0463 HOSPITAL OUTPT CLINIC VISIT: HCPCS

## 2021-01-01 PROCEDURE — 85014 HEMATOCRIT: CPT | Performed by: INTERNAL MEDICINE

## 2021-01-01 PROCEDURE — 999N000179 XR SURGERY CARM FLUORO LESS THAN 5 MIN W STILLS: Mod: TC

## 2021-01-01 PROCEDURE — 73560 X-RAY EXAM OF KNEE 1 OR 2: CPT | Mod: RT

## 2021-01-01 PROCEDURE — 73552 X-RAY EXAM OF FEMUR 2/>: CPT | Mod: RT

## 2021-01-01 PROCEDURE — 36415 COLL VENOUS BLD VENIPUNCTURE: CPT | Performed by: INTERNAL MEDICINE

## 2021-01-01 PROCEDURE — 83605 ASSAY OF LACTIC ACID: CPT | Performed by: INTERNAL MEDICINE

## 2021-01-01 PROCEDURE — 99232 SBSQ HOSP IP/OBS MODERATE 35: CPT | Performed by: INTERNAL MEDICINE

## 2021-01-01 PROCEDURE — 85025 COMPLETE CBC W/AUTO DIFF WBC: CPT | Mod: ORL | Performed by: INTERNAL MEDICINE

## 2021-01-01 PROCEDURE — 87635 SARS-COV-2 COVID-19 AMP PRB: CPT | Performed by: EMERGENCY MEDICINE

## 2021-01-01 PROCEDURE — C9803 HOPD COVID-19 SPEC COLLECT: HCPCS | Performed by: EMERGENCY MEDICINE

## 2021-01-01 PROCEDURE — 97161 PT EVAL LOW COMPLEX 20 MIN: CPT | Mod: GP

## 2021-01-01 PROCEDURE — 93005 ELECTROCARDIOGRAM TRACING: CPT | Performed by: EMERGENCY MEDICINE

## 2021-01-01 PROCEDURE — 85018 HEMOGLOBIN: CPT | Performed by: INTERNAL MEDICINE

## 2021-01-01 PROCEDURE — P9604 ONE-WAY ALLOW PRORATED TRIP: HCPCS | Mod: ORL | Performed by: INTERNAL MEDICINE

## 2021-01-01 PROCEDURE — 250N000011 HC RX IP 250 OP 636: Performed by: NURSE ANESTHETIST, CERTIFIED REGISTERED

## 2021-01-01 PROCEDURE — 80048 BASIC METABOLIC PNL TOTAL CA: CPT | Performed by: INTERNAL MEDICINE

## 2021-01-01 PROCEDURE — 258N000003 HC RX IP 258 OP 636: Performed by: EMERGENCY MEDICINE

## 2021-01-01 PROCEDURE — 85610 PROTHROMBIN TIME: CPT | Mod: ORL | Performed by: NURSE PRACTITIONER

## 2021-01-01 PROCEDURE — 72128 CT CHEST SPINE W/O DYE: CPT

## 2021-01-01 PROCEDURE — 85610 PROTHROMBIN TIME: CPT | Performed by: FAMILY MEDICINE

## 2021-01-01 PROCEDURE — 83036 HEMOGLOBIN GLYCOSYLATED A1C: CPT | Mod: ORL | Performed by: FAMILY MEDICINE

## 2021-01-01 PROCEDURE — 36415 COLL VENOUS BLD VENIPUNCTURE: CPT | Performed by: FAMILY MEDICINE

## 2021-01-01 PROCEDURE — 250N000012 HC RX MED GY IP 250 OP 636 PS 637: Performed by: PHYSICIAN ASSISTANT

## 2021-01-01 PROCEDURE — 99285 EMERGENCY DEPT VISIT HI MDM: CPT | Mod: 25 | Performed by: EMERGENCY MEDICINE

## 2021-01-01 PROCEDURE — 710N000009 HC RECOVERY PHASE 1, LEVEL 1, PER MIN: Performed by: ORTHOPAEDIC SURGERY

## 2021-01-01 PROCEDURE — 99232 SBSQ HOSP IP/OBS MODERATE 35: CPT | Performed by: PHYSICIAN ASSISTANT

## 2021-01-01 PROCEDURE — 71260 CT THORAX DX C+: CPT

## 2021-01-01 PROCEDURE — 83036 HEMOGLOBIN GLYCOSYLATED A1C: CPT | Performed by: PHYSICIAN ASSISTANT

## 2021-01-01 PROCEDURE — 250N000011 HC RX IP 250 OP 636: Performed by: FAMILY MEDICINE

## 2021-01-01 PROCEDURE — 73552 X-RAY EXAM OF FEMUR 2/>: CPT | Mod: LT

## 2021-01-01 PROCEDURE — 370N000017 HC ANESTHESIA TECHNICAL FEE, PER MIN: Performed by: ORTHOPAEDIC SURGERY

## 2021-01-01 PROCEDURE — 84443 ASSAY THYROID STIM HORMONE: CPT | Mod: ORL | Performed by: FAMILY MEDICINE

## 2021-01-01 PROCEDURE — 36415 COLL VENOUS BLD VENIPUNCTURE: CPT | Mod: ORL | Performed by: NURSE PRACTITIONER

## 2021-01-01 PROCEDURE — 92610 EVALUATE SWALLOWING FUNCTION: CPT | Mod: GN | Performed by: SPEECH-LANGUAGE PATHOLOGIST

## 2021-01-01 PROCEDURE — 0HQ0XZZ REPAIR SCALP SKIN, EXTERNAL APPROACH: ICD-10-PCS | Performed by: EMERGENCY MEDICINE

## 2021-01-01 PROCEDURE — 85018 HEMOGLOBIN: CPT | Mod: ORL | Performed by: FAMILY MEDICINE

## 2021-01-01 PROCEDURE — 258N000003 HC RX IP 258 OP 636: Performed by: NURSE ANESTHETIST, CERTIFIED REGISTERED

## 2021-01-01 PROCEDURE — 80061 LIPID PANEL: CPT | Mod: ORL | Performed by: FAMILY MEDICINE

## 2021-01-01 PROCEDURE — 36415 COLL VENOUS BLD VENIPUNCTURE: CPT | Performed by: EMERGENCY MEDICINE

## 2021-01-01 PROCEDURE — 258N000003 HC RX IP 258 OP 636: Performed by: FAMILY MEDICINE

## 2021-01-01 DEVICE — IMPLANTABLE DEVICE: Type: IMPLANTABLE DEVICE | Site: LEG | Status: FUNCTIONAL

## 2021-01-01 RX ORDER — ASPIRIN 81 MG/1
81 TABLET ORAL 2 TIMES DAILY
Status: DISCONTINUED | OUTPATIENT
Start: 2021-01-01 | End: 2021-01-01

## 2021-01-01 RX ORDER — AMOXICILLIN 250 MG
1 CAPSULE ORAL 2 TIMES DAILY
Status: DISCONTINUED | OUTPATIENT
Start: 2021-01-01 | End: 2021-01-01 | Stop reason: HOSPADM

## 2021-01-01 RX ORDER — NALOXONE HYDROCHLORIDE 0.4 MG/ML
0.2 INJECTION, SOLUTION INTRAMUSCULAR; INTRAVENOUS; SUBCUTANEOUS
Status: DISCONTINUED | OUTPATIENT
Start: 2021-01-01 | End: 2021-01-01 | Stop reason: HOSPADM

## 2021-01-01 RX ORDER — LISINOPRIL 2.5 MG/1
1 TABLET ORAL EVERY EVENING
Status: ON HOLD | COMMUNITY
Start: 2021-01-01 | End: 2021-01-01

## 2021-01-01 RX ORDER — ONDANSETRON 2 MG/ML
4 INJECTION INTRAMUSCULAR; INTRAVENOUS EVERY 6 HOURS PRN
Status: DISCONTINUED | OUTPATIENT
Start: 2021-01-01 | End: 2021-01-01 | Stop reason: HOSPADM

## 2021-01-01 RX ORDER — METFORMIN HCL 500 MG
1000 TABLET, EXTENDED RELEASE 24 HR ORAL
DISCHARGE
Start: 2021-01-01 | End: 2022-01-01

## 2021-01-01 RX ORDER — ACETAMINOPHEN 325 MG/1
975 TABLET ORAL EVERY 8 HOURS
Status: DISCONTINUED | OUTPATIENT
Start: 2021-01-01 | End: 2021-01-01 | Stop reason: HOSPADM

## 2021-01-01 RX ORDER — LIDOCAINE 40 MG/G
CREAM TOPICAL
Status: DISCONTINUED | OUTPATIENT
Start: 2021-01-01 | End: 2021-01-01 | Stop reason: HOSPADM

## 2021-01-01 RX ORDER — SODIUM CHLORIDE, SODIUM LACTATE, POTASSIUM CHLORIDE, CALCIUM CHLORIDE 600; 310; 30; 20 MG/100ML; MG/100ML; MG/100ML; MG/100ML
INJECTION, SOLUTION INTRAVENOUS CONTINUOUS
Status: DISCONTINUED | OUTPATIENT
Start: 2021-01-01 | End: 2021-01-01 | Stop reason: HOSPADM

## 2021-01-01 RX ORDER — LISINOPRIL 5 MG/1
2.5 TABLET ORAL DAILY
COMMUNITY
End: 2021-01-01

## 2021-01-01 RX ORDER — FENTANYL CITRATE 50 UG/ML
25 INJECTION, SOLUTION INTRAMUSCULAR; INTRAVENOUS
Status: DISCONTINUED | OUTPATIENT
Start: 2021-01-01 | End: 2021-01-01 | Stop reason: HOSPADM

## 2021-01-01 RX ORDER — AMINO ACIDS/PROTEIN HYDROLYS 15G-100/30
1 LIQUID (ML) ORAL EVERY MORNING
Status: DISCONTINUED | OUTPATIENT
Start: 2021-01-01 | End: 2021-01-01 | Stop reason: HOSPADM

## 2021-01-01 RX ORDER — DEXTROSE MONOHYDRATE 25 G/50ML
25-50 INJECTION, SOLUTION INTRAVENOUS
Status: DISCONTINUED | OUTPATIENT
Start: 2021-01-01 | End: 2021-01-01

## 2021-01-01 RX ORDER — ONDANSETRON 2 MG/ML
4 INJECTION INTRAMUSCULAR; INTRAVENOUS EVERY 6 HOURS PRN
Status: DISCONTINUED | OUTPATIENT
Start: 2021-01-01 | End: 2021-01-01

## 2021-01-01 RX ORDER — PREDNISONE 20 MG/1
20 TABLET ORAL DAILY
Qty: 5 TABLET | Refills: 0 | Status: SHIPPED
Start: 2021-01-01 | End: 2021-01-01

## 2021-01-01 RX ORDER — FENTANYL CITRATE 50 UG/ML
25 INJECTION, SOLUTION INTRAMUSCULAR; INTRAVENOUS EVERY 5 MIN PRN
Status: DISCONTINUED | OUTPATIENT
Start: 2021-01-01 | End: 2021-01-01 | Stop reason: HOSPADM

## 2021-01-01 RX ORDER — HYDROMORPHONE HCL IN WATER/PF 6 MG/30 ML
0.2 PATIENT CONTROLLED ANALGESIA SYRINGE INTRAVENOUS
Status: DISCONTINUED | OUTPATIENT
Start: 2021-01-01 | End: 2021-01-01

## 2021-01-01 RX ORDER — HYDROMORPHONE HYDROCHLORIDE 2 MG/1
1 TABLET ORAL EVERY 4 HOURS PRN
Qty: 15 TABLET | Refills: 0 | Status: SHIPPED | OUTPATIENT
Start: 2021-01-01 | End: 2021-01-01

## 2021-01-01 RX ORDER — SIMETHICONE 80 MG
160 TABLET,CHEWABLE ORAL EVERY MORNING
Start: 2021-01-01 | End: 2021-01-01

## 2021-01-01 RX ORDER — ACETAMINOPHEN 325 MG/1
650 TABLET ORAL EVERY 4 HOURS PRN
Status: DISCONTINUED | OUTPATIENT
Start: 2021-01-01 | End: 2021-01-01 | Stop reason: HOSPADM

## 2021-01-01 RX ORDER — NICOTINE POLACRILEX 4 MG
15-30 LOZENGE BUCCAL
Status: DISCONTINUED | OUTPATIENT
Start: 2021-01-01 | End: 2021-01-01

## 2021-01-01 RX ORDER — PROCHLORPERAZINE MALEATE 5 MG
5 TABLET ORAL EVERY 6 HOURS PRN
Status: DISCONTINUED | OUTPATIENT
Start: 2021-01-01 | End: 2021-01-01

## 2021-01-01 RX ORDER — ONDANSETRON 4 MG/1
4 TABLET, ORALLY DISINTEGRATING ORAL EVERY 6 HOURS PRN
Status: DISCONTINUED | OUTPATIENT
Start: 2021-01-01 | End: 2021-01-01

## 2021-01-01 RX ORDER — OXYCODONE HYDROCHLORIDE 5 MG/1
5 TABLET ORAL EVERY 4 HOURS PRN
Status: DISCONTINUED | OUTPATIENT
Start: 2021-01-01 | End: 2021-01-01 | Stop reason: HOSPADM

## 2021-01-01 RX ORDER — PROPOFOL 10 MG/ML
INJECTION, EMULSION INTRAVENOUS CONTINUOUS PRN
Status: DISCONTINUED | OUTPATIENT
Start: 2021-01-01 | End: 2021-01-01

## 2021-01-01 RX ORDER — LIDOCAINE 40 MG/G
CREAM TOPICAL
Status: DISCONTINUED | OUTPATIENT
Start: 2021-01-01 | End: 2021-01-01

## 2021-01-01 RX ORDER — METOPROLOL SUCCINATE 25 MG/1
25 TABLET, EXTENDED RELEASE ORAL EVERY EVENING
DISCHARGE
Start: 2021-01-01

## 2021-01-01 RX ORDER — WARFARIN SODIUM 1 MG/1
1 TABLET ORAL
Status: COMPLETED | OUTPATIENT
Start: 2021-01-01 | End: 2021-01-01

## 2021-01-01 RX ORDER — PROCHLORPERAZINE 25 MG
12.5 SUPPOSITORY, RECTAL RECTAL EVERY 12 HOURS PRN
Status: DISCONTINUED | OUTPATIENT
Start: 2021-01-01 | End: 2021-01-01 | Stop reason: HOSPADM

## 2021-01-01 RX ORDER — IOPAMIDOL 755 MG/ML
100 INJECTION, SOLUTION INTRAVASCULAR ONCE
Status: COMPLETED | OUTPATIENT
Start: 2021-01-01 | End: 2021-01-01

## 2021-01-01 RX ORDER — LISINOPRIL 5 MG/1
2.5 TABLET ORAL EVERY EVENING
Start: 2021-01-01 | End: 2022-01-01

## 2021-01-01 RX ORDER — WARFARIN SODIUM 3 MG/1
3 TABLET ORAL
Status: COMPLETED | OUTPATIENT
Start: 2021-01-01 | End: 2021-01-01

## 2021-01-01 RX ORDER — CEFAZOLIN SODIUM 2 G/100ML
2 INJECTION, SOLUTION INTRAVENOUS SEE ADMIN INSTRUCTIONS
Status: DISCONTINUED | OUTPATIENT
Start: 2021-01-01 | End: 2021-01-01 | Stop reason: HOSPADM

## 2021-01-01 RX ORDER — EPINEPHRINE 1 MG/ML
INJECTION, SOLUTION, CONCENTRATE INTRAVENOUS PRN
Status: DISCONTINUED | OUTPATIENT
Start: 2021-01-01 | End: 2021-01-01

## 2021-01-01 RX ORDER — DEXTROSE MONOHYDRATE 25 G/50ML
25-50 INJECTION, SOLUTION INTRAVENOUS
Status: DISCONTINUED | OUTPATIENT
Start: 2021-01-01 | End: 2021-01-01 | Stop reason: HOSPADM

## 2021-01-01 RX ORDER — NALOXONE HYDROCHLORIDE 0.4 MG/ML
0.4 INJECTION, SOLUTION INTRAMUSCULAR; INTRAVENOUS; SUBCUTANEOUS
Status: DISCONTINUED | OUTPATIENT
Start: 2021-01-01 | End: 2021-01-01 | Stop reason: HOSPADM

## 2021-01-01 RX ORDER — WARFARIN SODIUM 2 MG/1
2 TABLET ORAL EVERY OTHER DAY
COMMUNITY
Start: 2022-01-01 | End: 2022-01-01

## 2021-01-01 RX ORDER — BISACODYL 10 MG
10 SUPPOSITORY, RECTAL RECTAL DAILY PRN
Status: DISCONTINUED | OUTPATIENT
Start: 2021-01-01 | End: 2021-01-01 | Stop reason: HOSPADM

## 2021-01-01 RX ORDER — SODIUM CHLORIDE, SODIUM LACTATE, POTASSIUM CHLORIDE, CALCIUM CHLORIDE 600; 310; 30; 20 MG/100ML; MG/100ML; MG/100ML; MG/100ML
INJECTION, SOLUTION INTRAVENOUS CONTINUOUS PRN
Status: DISCONTINUED | OUTPATIENT
Start: 2021-01-01 | End: 2021-01-01

## 2021-01-01 RX ORDER — HYDROMORPHONE HYDROCHLORIDE 2 MG/1
TABLET ORAL
Qty: 8 TABLET | Refills: 0 | Status: SHIPPED | OUTPATIENT
Start: 2021-01-01 | End: 2022-01-01

## 2021-01-01 RX ORDER — FENTANYL CITRATE-0.9 % NACL/PF 10 MCG/ML
100 PLASTIC BAG, INJECTION (ML) INTRAVENOUS
Status: DISCONTINUED | OUTPATIENT
Start: 2021-01-01 | End: 2021-01-01 | Stop reason: HOSPADM

## 2021-01-01 RX ORDER — ACETAMINOPHEN 500 MG
1000 TABLET ORAL 3 TIMES DAILY
COMMUNITY

## 2021-01-01 RX ORDER — ATORVASTATIN CALCIUM 10 MG/1
10 TABLET, FILM COATED ORAL EVERY EVENING
COMMUNITY
Start: 2020-10-12 | End: 2022-01-01

## 2021-01-01 RX ORDER — ACETAMINOPHEN 325 MG/1
650 TABLET ORAL EVERY 4 HOURS PRN
Status: ON HOLD | COMMUNITY
Start: 2021-01-01 | End: 2021-01-01

## 2021-01-01 RX ORDER — ACETAMINOPHEN 325 MG/1
975 TABLET ORAL EVERY 8 HOURS
Status: DISCONTINUED | OUTPATIENT
Start: 2021-01-01 | End: 2021-01-01

## 2021-01-01 RX ORDER — WARFARIN SODIUM 2 MG/1
2 TABLET ORAL
Status: COMPLETED | OUTPATIENT
Start: 2021-01-01 | End: 2021-01-01

## 2021-01-01 RX ORDER — ONDANSETRON 2 MG/ML
4 INJECTION INTRAMUSCULAR; INTRAVENOUS EVERY 30 MIN PRN
Status: DISCONTINUED | OUTPATIENT
Start: 2021-01-01 | End: 2021-01-01 | Stop reason: HOSPADM

## 2021-01-01 RX ORDER — HYDROMORPHONE HYDROCHLORIDE 1 MG/ML
0.2 INJECTION, SOLUTION INTRAMUSCULAR; INTRAVENOUS; SUBCUTANEOUS
Status: DISCONTINUED | OUTPATIENT
Start: 2021-01-01 | End: 2021-01-01

## 2021-01-01 RX ORDER — AMOXICILLIN 250 MG
1 CAPSULE ORAL 2 TIMES DAILY PRN
Status: DISCONTINUED | OUTPATIENT
Start: 2021-01-01 | End: 2021-01-01 | Stop reason: HOSPADM

## 2021-01-01 RX ORDER — HYDROMORPHONE HYDROCHLORIDE 2 MG/1
1 TABLET ORAL EVERY 4 HOURS PRN
Qty: 8 TABLET | Refills: 0 | Status: SHIPPED | OUTPATIENT
Start: 2021-01-01 | End: 2021-01-01

## 2021-01-01 RX ORDER — LISINOPRIL 5 MG/1
5 TABLET ORAL EVERY EVENING
DISCHARGE
Start: 2021-01-01 | End: 2021-01-01

## 2021-01-01 RX ORDER — AMOXICILLIN 250 MG
2 CAPSULE ORAL 2 TIMES DAILY PRN
Status: DISCONTINUED | OUTPATIENT
Start: 2021-01-01 | End: 2021-01-01 | Stop reason: HOSPADM

## 2021-01-01 RX ORDER — MEPERIDINE HYDROCHLORIDE 25 MG/ML
12.5 INJECTION INTRAMUSCULAR; INTRAVENOUS; SUBCUTANEOUS
Status: DISCONTINUED | OUTPATIENT
Start: 2021-01-01 | End: 2021-01-01 | Stop reason: HOSPADM

## 2021-01-01 RX ORDER — METHOCARBAMOL 500 MG/1
500 TABLET, FILM COATED ORAL 3 TIMES DAILY PRN
COMMUNITY

## 2021-01-01 RX ORDER — HYDROMORPHONE HCL IN WATER/PF 6 MG/30 ML
0.2 PATIENT CONTROLLED ANALGESIA SYRINGE INTRAVENOUS
Status: DISCONTINUED | OUTPATIENT
Start: 2021-01-01 | End: 2021-01-01 | Stop reason: HOSPADM

## 2021-01-01 RX ORDER — KETAMINE HYDROCHLORIDE 10 MG/ML
INJECTION, SOLUTION INTRAMUSCULAR; INTRAVENOUS PRN
Status: DISCONTINUED | OUTPATIENT
Start: 2021-01-01 | End: 2021-01-01

## 2021-01-01 RX ORDER — SODIUM CHLORIDE 9 MG/ML
INJECTION, SOLUTION INTRAVENOUS CONTINUOUS
Status: DISCONTINUED | OUTPATIENT
Start: 2021-01-01 | End: 2021-01-01 | Stop reason: CLARIF

## 2021-01-01 RX ORDER — SODIUM CHLORIDE, SODIUM LACTATE, POTASSIUM CHLORIDE, CALCIUM CHLORIDE 600; 310; 30; 20 MG/100ML; MG/100ML; MG/100ML; MG/100ML
INJECTION, SOLUTION INTRAVENOUS CONTINUOUS
Status: DISCONTINUED | OUTPATIENT
Start: 2021-01-01 | End: 2021-01-01

## 2021-01-01 RX ORDER — PROCHLORPERAZINE MALEATE 5 MG
5 TABLET ORAL EVERY 6 HOURS PRN
Status: DISCONTINUED | OUTPATIENT
Start: 2021-01-01 | End: 2021-01-01 | Stop reason: HOSPADM

## 2021-01-01 RX ORDER — AMINO ACIDS/PROTEIN HYDROLYS 15G-100/30
LIQUID (ML) ORAL EVERY MORNING
COMMUNITY

## 2021-01-01 RX ORDER — CARBOXYMETHYLCELLULOSE SODIUM 5 MG/ML
1 SOLUTION/ DROPS OPHTHALMIC EVERY MORNING
Status: DISCONTINUED | OUTPATIENT
Start: 2021-01-01 | End: 2021-01-01 | Stop reason: HOSPADM

## 2021-01-01 RX ORDER — ATORVASTATIN CALCIUM 10 MG/1
10 TABLET, FILM COATED ORAL EVERY EVENING
Status: DISCONTINUED | OUTPATIENT
Start: 2021-01-01 | End: 2021-01-01 | Stop reason: HOSPADM

## 2021-01-01 RX ORDER — POLYETHYLENE GLYCOL 3350 17 G/17G
17 POWDER, FOR SOLUTION ORAL DAILY PRN
Status: DISCONTINUED | OUTPATIENT
Start: 2021-01-01 | End: 2021-01-01 | Stop reason: HOSPADM

## 2021-01-01 RX ORDER — HYDROMORPHONE HCL IN WATER/PF 6 MG/30 ML
0.2 PATIENT CONTROLLED ANALGESIA SYRINGE INTRAVENOUS EVERY 5 MIN PRN
Status: DISCONTINUED | OUTPATIENT
Start: 2021-01-01 | End: 2021-01-01 | Stop reason: HOSPADM

## 2021-01-01 RX ORDER — CEFAZOLIN SODIUM 2 G/100ML
2 INJECTION, SOLUTION INTRAVENOUS EVERY 8 HOURS
Status: COMPLETED | OUTPATIENT
Start: 2021-01-01 | End: 2021-01-01

## 2021-01-01 RX ORDER — METOPROLOL TARTRATE 1 MG/ML
5 INJECTION, SOLUTION INTRAVENOUS EVERY 4 HOURS PRN
Status: DISCONTINUED | OUTPATIENT
Start: 2021-01-01 | End: 2021-01-01 | Stop reason: HOSPADM

## 2021-01-01 RX ORDER — ONDANSETRON 4 MG/1
4 TABLET, ORALLY DISINTEGRATING ORAL EVERY 30 MIN PRN
Status: DISCONTINUED | OUTPATIENT
Start: 2021-01-01 | End: 2021-01-01 | Stop reason: HOSPADM

## 2021-01-01 RX ORDER — LISINOPRIL 2.5 MG/1
2.5 TABLET ORAL EVERY EVENING
Status: DISCONTINUED | OUTPATIENT
Start: 2021-01-01 | End: 2021-01-01 | Stop reason: HOSPADM

## 2021-01-01 RX ORDER — EPHEDRINE SULFATE 50 MG/ML
5 INJECTION, SOLUTION INTRAMUSCULAR; INTRAVENOUS; SUBCUTANEOUS EVERY 5 MIN PRN
Status: DISCONTINUED | OUTPATIENT
Start: 2021-01-01 | End: 2021-01-01 | Stop reason: HOSPADM

## 2021-01-01 RX ORDER — METFORMIN HCL 500 MG
1000 TABLET, EXTENDED RELEASE 24 HR ORAL
Status: DISCONTINUED | OUTPATIENT
Start: 2021-01-01 | End: 2021-01-01 | Stop reason: HOSPADM

## 2021-01-01 RX ORDER — LIDOCAINE HYDROCHLORIDE 10 MG/ML
INJECTION, SOLUTION INFILTRATION; PERINEURAL PRN
Status: DISCONTINUED | OUTPATIENT
Start: 2021-01-01 | End: 2021-01-01

## 2021-01-01 RX ORDER — ONDANSETRON 4 MG/1
4 TABLET, ORALLY DISINTEGRATING ORAL EVERY 6 HOURS PRN
Status: DISCONTINUED | OUTPATIENT
Start: 2021-01-01 | End: 2021-01-01 | Stop reason: HOSPADM

## 2021-01-01 RX ORDER — WARFARIN SODIUM 2 MG/1
2 TABLET ORAL
Status: DISCONTINUED | OUTPATIENT
Start: 2021-01-01 | End: 2021-01-01 | Stop reason: HOSPADM

## 2021-01-01 RX ORDER — HYDROMORPHONE HYDROCHLORIDE 2 MG/1
2 TABLET ORAL EVERY 4 HOURS PRN
Status: DISCONTINUED | OUTPATIENT
Start: 2021-01-01 | End: 2021-01-01 | Stop reason: HOSPADM

## 2021-01-01 RX ORDER — FUROSEMIDE 40 MG
40 TABLET ORAL DAILY
COMMUNITY
Start: 2017-04-07

## 2021-01-01 RX ORDER — GLUCOSAMINE HCL/CHONDROITIN SU 500-400 MG
CAPSULE ORAL WEEKLY
COMMUNITY
End: 2021-01-01

## 2021-01-01 RX ORDER — MENTHOL 1.4 %
ADHESIVE PATCH, MEDICATED TOPICAL EVERY 6 HOURS PRN
COMMUNITY
Start: 2021-01-01 | End: 2021-01-01

## 2021-01-01 RX ORDER — FUROSEMIDE 40 MG
40 TABLET ORAL DAILY
Status: DISCONTINUED | OUTPATIENT
Start: 2021-01-01 | End: 2021-01-01 | Stop reason: HOSPADM

## 2021-01-01 RX ORDER — WARFARIN SODIUM 2 MG/1
2 TABLET ORAL
Status: DISCONTINUED | OUTPATIENT
Start: 2021-01-01 | End: 2021-01-01 | Stop reason: DRUGHIGH

## 2021-01-01 RX ORDER — CARBOXYMETHYLCELLULOSE SODIUM 5 MG/ML
1 SOLUTION/ DROPS OPHTHALMIC EVERY MORNING
Start: 2021-01-01

## 2021-01-01 RX ORDER — BUPIVACAINE HYDROCHLORIDE 7.5 MG/ML
INJECTION, SOLUTION INTRASPINAL PRN
Status: DISCONTINUED | OUTPATIENT
Start: 2021-01-01 | End: 2021-01-01

## 2021-01-01 RX ORDER — METOPROLOL SUCCINATE 25 MG/1
25 TABLET, EXTENDED RELEASE ORAL EVERY EVENING
Status: DISCONTINUED | OUTPATIENT
Start: 2021-01-01 | End: 2021-01-01 | Stop reason: HOSPADM

## 2021-01-01 RX ORDER — CEFAZOLIN SODIUM 2 G/100ML
2 INJECTION, SOLUTION INTRAVENOUS
Status: COMPLETED | OUTPATIENT
Start: 2021-01-01 | End: 2021-01-01

## 2021-01-01 RX ORDER — WARFARIN SODIUM 1 MG/1
1 TABLET ORAL EVERY OTHER DAY
COMMUNITY
Start: 2022-01-01 | End: 2022-01-01

## 2021-01-01 RX ORDER — POLYETHYLENE GLYCOL 3350 17 G/17G
17 POWDER, FOR SOLUTION ORAL DAILY
Status: DISCONTINUED | OUTPATIENT
Start: 2021-01-01 | End: 2021-01-01 | Stop reason: HOSPADM

## 2021-01-01 RX ORDER — NICOTINE POLACRILEX 4 MG
15-30 LOZENGE BUCCAL
Status: DISCONTINUED | OUTPATIENT
Start: 2021-01-01 | End: 2021-01-01 | Stop reason: HOSPADM

## 2021-01-01 RX ADMIN — ACETAMINOPHEN 975 MG: 325 TABLET, FILM COATED ORAL at 10:59

## 2021-01-01 RX ADMIN — Medication 1 DROP: at 09:23

## 2021-01-01 RX ADMIN — PHENYLEPHRINE HYDROCHLORIDE 200 MCG: 10 INJECTION INTRAVENOUS at 13:58

## 2021-01-01 RX ADMIN — SODIUM CHLORIDE: 9 INJECTION, SOLUTION INTRAVENOUS at 01:46

## 2021-01-01 RX ADMIN — INSULIN ASPART 5 UNITS: 100 INJECTION, SOLUTION INTRAVENOUS; SUBCUTANEOUS at 17:37

## 2021-01-01 RX ADMIN — LISINOPRIL 2.5 MG: 2.5 TABLET ORAL at 18:04

## 2021-01-01 RX ADMIN — BUPIVACAINE HYDROCHLORIDE IN DEXTROSE 1.6 ML: 7.5 INJECTION, SOLUTION SUBARACHNOID at 13:42

## 2021-01-01 RX ADMIN — HYDROMORPHONE HYDROCHLORIDE 0.2 MG: 0.2 INJECTION, SOLUTION INTRAMUSCULAR; INTRAVENOUS; SUBCUTANEOUS at 15:56

## 2021-01-01 RX ADMIN — ACETAMINOPHEN 975 MG: 325 TABLET, FILM COATED ORAL at 08:50

## 2021-01-01 RX ADMIN — METOPROLOL SUCCINATE 25 MG: 25 TABLET, FILM COATED, EXTENDED RELEASE ORAL at 17:32

## 2021-01-01 RX ADMIN — ACETAMINOPHEN 975 MG: 325 TABLET, FILM COATED ORAL at 11:03

## 2021-01-01 RX ADMIN — CEFAZOLIN SODIUM 2 G: 2 INJECTION, SOLUTION INTRAVENOUS at 05:18

## 2021-01-01 RX ADMIN — WARFARIN SODIUM 3 MG: 3 TABLET ORAL at 17:54

## 2021-01-01 RX ADMIN — HYDROMORPHONE HYDROCHLORIDE 0.2 MG: 0.2 INJECTION, SOLUTION INTRAMUSCULAR; INTRAVENOUS; SUBCUTANEOUS at 08:54

## 2021-01-01 RX ADMIN — KETAMINE HYDROCHLORIDE 25 MG: 10 INJECTION INTRAMUSCULAR; INTRAVENOUS at 13:40

## 2021-01-01 RX ADMIN — CEFAZOLIN SODIUM 2 G: 2 INJECTION, SOLUTION INTRAVENOUS at 22:26

## 2021-01-01 RX ADMIN — SODIUM CHLORIDE 65 ML: 9 INJECTION, SOLUTION INTRAVENOUS at 05:16

## 2021-01-01 RX ADMIN — POLYETHYLENE GLYCOL 3350 17 G: 17 POWDER, FOR SOLUTION ORAL at 08:25

## 2021-01-01 RX ADMIN — PHENYLEPHRINE HYDROCHLORIDE 200 MCG: 10 INJECTION INTRAVENOUS at 14:09

## 2021-01-01 RX ADMIN — INSULIN ASPART 2 UNITS: 100 INJECTION, SOLUTION INTRAVENOUS; SUBCUTANEOUS at 08:19

## 2021-01-01 RX ADMIN — ATORVASTATIN CALCIUM 10 MG: 10 TABLET, FILM COATED ORAL at 17:33

## 2021-01-01 RX ADMIN — INSULIN ASPART 3 UNITS: 100 INJECTION, SOLUTION INTRAVENOUS; SUBCUTANEOUS at 12:22

## 2021-01-01 RX ADMIN — ACETAMINOPHEN 975 MG: 325 TABLET, FILM COATED ORAL at 11:54

## 2021-01-01 RX ADMIN — POLYETHYLENE GLYCOL 3350 17 G: 17 POWDER, FOR SOLUTION ORAL at 09:25

## 2021-01-01 RX ADMIN — SODIUM CHLORIDE: 9 INJECTION, SOLUTION INTRAVENOUS at 11:01

## 2021-01-01 RX ADMIN — CEFAZOLIN SODIUM 2 G: 2 INJECTION, SOLUTION INTRAVENOUS at 13:37

## 2021-01-01 RX ADMIN — METOPROLOL SUCCINATE 25 MG: 25 TABLET, FILM COATED, EXTENDED RELEASE ORAL at 18:12

## 2021-01-01 RX ADMIN — WARFARIN SODIUM 2 MG: 2 TABLET ORAL at 20:47

## 2021-01-01 RX ADMIN — PHENYLEPHRINE HYDROCHLORIDE 200 MCG: 10 INJECTION INTRAVENOUS at 13:50

## 2021-01-01 RX ADMIN — METOPROLOL SUCCINATE 25 MG: 25 TABLET, FILM COATED, EXTENDED RELEASE ORAL at 17:54

## 2021-01-01 RX ADMIN — ATORVASTATIN CALCIUM 10 MG: 10 TABLET, FILM COATED ORAL at 18:10

## 2021-01-01 RX ADMIN — PHENYLEPHRINE HYDROCHLORIDE 100 MCG: 10 INJECTION INTRAVENOUS at 13:52

## 2021-01-01 RX ADMIN — Medication 1 DROP: at 08:48

## 2021-01-01 RX ADMIN — HYDROMORPHONE HYDROCHLORIDE 0.2 MG: 0.2 INJECTION, SOLUTION INTRAMUSCULAR; INTRAVENOUS; SUBCUTANEOUS at 01:46

## 2021-01-01 RX ADMIN — METFORMIN ER 500 MG 1000 MG: 500 TABLET ORAL at 08:26

## 2021-01-01 RX ADMIN — PHENYLEPHRINE HYDROCHLORIDE 0.75 MCG/KG/MIN: 10 INJECTION INTRAVENOUS at 14:11

## 2021-01-01 RX ADMIN — Medication 1 DROP: at 08:34

## 2021-01-01 RX ADMIN — DOCUSATE SODIUM AND SENNOSIDES 1 TABLET: 8.6; 5 TABLET ORAL at 08:26

## 2021-01-01 RX ADMIN — INSULIN ASPART 2 UNITS: 100 INJECTION, SOLUTION INTRAVENOUS; SUBCUTANEOUS at 17:58

## 2021-01-01 RX ADMIN — HYDROMORPHONE HYDROCHLORIDE 0.2 MG: 0.2 INJECTION, SOLUTION INTRAMUSCULAR; INTRAVENOUS; SUBCUTANEOUS at 10:22

## 2021-01-01 RX ADMIN — WARFARIN SODIUM 1 MG: 1 TABLET ORAL at 18:15

## 2021-01-01 RX ADMIN — IOPAMIDOL 100 ML: 755 INJECTION, SOLUTION INTRAVENOUS at 05:17

## 2021-01-01 RX ADMIN — ACETAMINOPHEN 975 MG: 325 TABLET, FILM COATED ORAL at 17:53

## 2021-01-01 RX ADMIN — Medication 1 MG: at 14:41

## 2021-01-01 RX ADMIN — SODIUM CHLORIDE, POTASSIUM CHLORIDE, SODIUM LACTATE AND CALCIUM CHLORIDE: 600; 310; 30; 20 INJECTION, SOLUTION INTRAVENOUS at 13:37

## 2021-01-01 RX ADMIN — ACETAMINOPHEN 975 MG: 325 TABLET, FILM COATED ORAL at 18:04

## 2021-01-01 RX ADMIN — LISINOPRIL 2.5 MG: 2.5 TABLET ORAL at 18:09

## 2021-01-01 RX ADMIN — HYDROMORPHONE HYDROCHLORIDE 0.2 MG: 1 INJECTION, SOLUTION INTRAMUSCULAR; INTRAVENOUS; SUBCUTANEOUS at 04:37

## 2021-01-01 RX ADMIN — Medication 1 MG: at 17:33

## 2021-01-01 RX ADMIN — INSULIN ASPART 3 UNITS: 100 INJECTION, SOLUTION INTRAVENOUS; SUBCUTANEOUS at 17:48

## 2021-01-01 RX ADMIN — DOCUSATE SODIUM AND SENNOSIDES 1 TABLET: 8.6; 5 TABLET ORAL at 09:01

## 2021-01-01 RX ADMIN — INSULIN ASPART 9 UNITS: 100 INJECTION, SOLUTION INTRAVENOUS; SUBCUTANEOUS at 18:53

## 2021-01-01 RX ADMIN — PHENYLEPHRINE HYDROCHLORIDE 200 MCG: 10 INJECTION INTRAVENOUS at 14:16

## 2021-01-01 RX ADMIN — PHENYLEPHRINE HYDROCHLORIDE 200 MCG: 10 INJECTION INTRAVENOUS at 14:13

## 2021-01-01 RX ADMIN — LIDOCAINE HYDROCHLORIDE 3 ML: 10 INJECTION, SOLUTION INFILTRATION; PERINEURAL at 13:42

## 2021-01-01 RX ADMIN — INSULIN ASPART 3 UNITS: 100 INJECTION, SOLUTION INTRAVENOUS; SUBCUTANEOUS at 09:19

## 2021-01-01 RX ADMIN — Medication 1 MG: at 22:37

## 2021-01-01 RX ADMIN — HYDROMORPHONE HYDROCHLORIDE 0.2 MG: 1 INJECTION, SOLUTION INTRAMUSCULAR; INTRAVENOUS; SUBCUTANEOUS at 05:50

## 2021-01-01 RX ADMIN — INSULIN ASPART 3 UNITS: 100 INJECTION, SOLUTION INTRAVENOUS; SUBCUTANEOUS at 12:28

## 2021-01-01 RX ADMIN — Medication 1 MG: at 12:53

## 2021-01-01 RX ADMIN — METFORMIN ER 500 MG 1000 MG: 500 TABLET ORAL at 09:01

## 2021-01-01 RX ADMIN — Medication 1 MG: at 07:54

## 2021-01-01 RX ADMIN — PHYTONADIONE 2 MG: 2 INJECTION, EMULSION INTRAMUSCULAR; INTRAVENOUS; SUBCUTANEOUS at 07:19

## 2021-01-01 RX ADMIN — LISINOPRIL 2.5 MG: 2.5 TABLET ORAL at 17:28

## 2021-01-01 RX ADMIN — SODIUM CHLORIDE, POTASSIUM CHLORIDE, SODIUM LACTATE AND CALCIUM CHLORIDE 500 ML: 600; 310; 30; 20 INJECTION, SOLUTION INTRAVENOUS at 18:04

## 2021-01-01 RX ADMIN — DOCUSATE SODIUM AND SENNOSIDES 1 TABLET: 8.6; 5 TABLET ORAL at 20:47

## 2021-01-01 RX ADMIN — EPINEPHRINE 0.2 MG: 1 INJECTION, SOLUTION INTRAMUSCULAR; SUBCUTANEOUS at 13:42

## 2021-01-01 RX ADMIN — PHENYLEPHRINE HYDROCHLORIDE 100 MCG: 10 INJECTION INTRAVENOUS at 13:54

## 2021-01-01 RX ADMIN — ATORVASTATIN CALCIUM 10 MG: 10 TABLET, FILM COATED ORAL at 17:54

## 2021-01-01 RX ADMIN — ACETAMINOPHEN 975 MG: 325 TABLET, FILM COATED ORAL at 01:47

## 2021-01-01 RX ADMIN — KETAMINE HYDROCHLORIDE 25 MG: 10 INJECTION INTRAMUSCULAR; INTRAVENOUS at 13:37

## 2021-01-01 RX ADMIN — PHENYLEPHRINE HYDROCHLORIDE 200 MCG: 10 INJECTION INTRAVENOUS at 14:05

## 2021-01-01 RX ADMIN — METOPROLOL SUCCINATE 25 MG: 25 TABLET, FILM COATED, EXTENDED RELEASE ORAL at 17:16

## 2021-01-01 RX ADMIN — INSULIN ASPART 4 UNITS: 100 INJECTION, SOLUTION INTRAVENOUS; SUBCUTANEOUS at 12:21

## 2021-01-01 RX ADMIN — METFORMIN ER 500 MG 1000 MG: 500 TABLET ORAL at 09:24

## 2021-01-01 RX ADMIN — ACETAMINOPHEN 975 MG: 325 TABLET, FILM COATED ORAL at 18:39

## 2021-01-01 RX ADMIN — Medication 1 MG: at 18:10

## 2021-01-01 RX ADMIN — ACETAMINOPHEN 975 MG: 325 TABLET, FILM COATED ORAL at 18:16

## 2021-01-01 RX ADMIN — HYDROMORPHONE HYDROCHLORIDE 2 MG: 2 TABLET ORAL at 12:22

## 2021-01-01 RX ADMIN — LISINOPRIL 2.5 MG: 2.5 TABLET ORAL at 17:54

## 2021-01-01 RX ADMIN — DOCUSATE SODIUM AND SENNOSIDES 1 TABLET: 8.6; 5 TABLET ORAL at 09:24

## 2021-01-01 RX ADMIN — HYDROMORPHONE HYDROCHLORIDE 0.2 MG: 0.2 INJECTION, SOLUTION INTRAMUSCULAR; INTRAVENOUS; SUBCUTANEOUS at 06:06

## 2021-01-01 RX ADMIN — Medication 1 DROP: at 09:01

## 2021-01-01 RX ADMIN — HYDROMORPHONE HYDROCHLORIDE 0.2 MG: 0.2 INJECTION, SOLUTION INTRAMUSCULAR; INTRAVENOUS; SUBCUTANEOUS at 12:15

## 2021-01-01 RX ADMIN — INSULIN ASPART 2 UNITS: 100 INJECTION, SOLUTION INTRAVENOUS; SUBCUTANEOUS at 09:01

## 2021-01-01 RX ADMIN — Medication 1 MG: at 05:51

## 2021-01-01 RX ADMIN — Medication 1 MG: at 09:24

## 2021-01-01 RX ADMIN — ATORVASTATIN CALCIUM 10 MG: 10 TABLET, FILM COATED ORAL at 18:04

## 2021-01-01 RX ADMIN — PROPOFOL 100 MCG/KG/MIN: 10 INJECTION, EMULSION INTRAVENOUS at 13:43

## 2021-01-01 RX ADMIN — SODIUM CHLORIDE, POTASSIUM CHLORIDE, SODIUM LACTATE AND CALCIUM CHLORIDE: 600; 310; 30; 20 INJECTION, SOLUTION INTRAVENOUS at 14:01

## 2021-01-01 RX ADMIN — SODIUM CHLORIDE, POTASSIUM CHLORIDE, SODIUM LACTATE AND CALCIUM CHLORIDE: 600; 310; 30; 20 INJECTION, SOLUTION INTRAVENOUS at 17:44

## 2021-01-01 ASSESSMENT — ACTIVITIES OF DAILY LIVING (ADL)
ADLS_ACUITY_SCORE: 30
ADLS_ACUITY_SCORE: 26
ADLS_ACUITY_SCORE: 30
ADLS_ACUITY_SCORE: 26
ADLS_ACUITY_SCORE: 28
ADLS_ACUITY_SCORE: 26
ADLS_ACUITY_SCORE: 24
ADLS_ACUITY_SCORE: 26
ADLS_ACUITY_SCORE: 30
ADLS_ACUITY_SCORE: 18
ADLS_ACUITY_SCORE: 24
ADLS_ACUITY_SCORE: 28
ADLS_ACUITY_SCORE: 24
ADLS_ACUITY_SCORE: 30
ADLS_ACUITY_SCORE: 28
ADLS_ACUITY_SCORE: 30
ADLS_ACUITY_SCORE: 28
ADLS_ACUITY_SCORE: 26
ADLS_ACUITY_SCORE: 28
DRESSING/BATHING: BATHING DIFFICULTY, DEPENDENT;DRESSING DIFFICULTY, DEPENDENT
ADLS_ACUITY_SCORE: 30
ADLS_ACUITY_SCORE: 28
ADLS_ACUITY_SCORE: 26
ADLS_ACUITY_SCORE: 26
ADLS_ACUITY_SCORE: 12
DIFFICULTY_COMMUNICATING: NO
ADLS_ACUITY_SCORE: 30
ADLS_ACUITY_SCORE: 26
NUMBER_OF_TIMES_PATIENT_HAS_FALLEN_WITHIN_LAST_SIX_MONTHS: 1
EQUIPMENT_CURRENTLY_USED_AT_HOME: LIFT DEVICE;WHEELCHAIR, MANUAL
ADLS_ACUITY_SCORE: 28
WALKING_OR_CLIMBING_STAIRS_DIFFICULTY: YES
ADLS_ACUITY_SCORE: 24
ADLS_ACUITY_SCORE: 26
FALL_HISTORY_WITHIN_LAST_SIX_MONTHS: YES
ADLS_ACUITY_SCORE: 28
ADLS_ACUITY_SCORE: 28
ADLS_ACUITY_SCORE: 30
ADLS_ACUITY_SCORE: 24
ADLS_ACUITY_SCORE: 12
ADLS_ACUITY_SCORE: 30
ADLS_ACUITY_SCORE: 24
ADLS_ACUITY_SCORE: 28
ADLS_ACUITY_SCORE: 26
ADLS_ACUITY_SCORE: 30
ADLS_ACUITY_SCORE: 26
ADLS_ACUITY_SCORE: 24
ADLS_ACUITY_SCORE: 30
ADLS_ACUITY_SCORE: 28
ADLS_ACUITY_SCORE: 24
ADLS_ACUITY_SCORE: 28
ADLS_ACUITY_SCORE: 26
ADLS_ACUITY_SCORE: 26
ADLS_ACUITY_SCORE: 30
ADLS_ACUITY_SCORE: 28
DEPENDENT_IADLS:: CLEANING;COOKING;LAUNDRY;SHOPPING;MEAL PREPARATION;MEDICATION MANAGEMENT;MONEY MANAGEMENT;TRANSPORTATION
ADLS_ACUITY_SCORE: 28
ADLS_ACUITY_SCORE: 26
ADLS_ACUITY_SCORE: 30
ADLS_ACUITY_SCORE: 28
ADLS_ACUITY_SCORE: 26
ADLS_ACUITY_SCORE: 28
ADLS_ACUITY_SCORE: 26
ADLS_ACUITY_SCORE: 30
PATIENT_/_FAMILY_COMMUNICATION_STYLE: SPOKEN LANGUAGE (ENGLISH OR BILINGUAL)
ADLS_ACUITY_SCORE: 28
ADLS_ACUITY_SCORE: 26
ADLS_ACUITY_SCORE: 30
ADLS_ACUITY_SCORE: 26
ADLS_ACUITY_SCORE: 30
VISION_MANAGEMENT: GLASSES
ADLS_ACUITY_SCORE: 30
CONCENTRATING,_REMEMBERING_OR_MAKING_DECISIONS_DIFFICULTY: YES
ADLS_ACUITY_SCORE: 28
ADLS_ACUITY_SCORE: 24
ADLS_ACUITY_SCORE: 30
ADLS_ACUITY_SCORE: 28
ADLS_ACUITY_SCORE: 28
ADLS_ACUITY_SCORE: 26
ADLS_ACUITY_SCORE: 24
WALKING_OR_CLIMBING_STAIRS: AMBULATION DIFFICULTY, DEPENDENT;TRANSFERRING DIFFICULTY, DEPENDENT
ADLS_ACUITY_SCORE: 30
ADLS_ACUITY_SCORE: 28
ADLS_ACUITY_SCORE: 12
ADLS_ACUITY_SCORE: 26
WEAR_GLASSES_OR_BLIND: YES
ADLS_ACUITY_SCORE: 30
ADLS_ACUITY_SCORE: 28
ADLS_ACUITY_SCORE: 26
ADLS_ACUITY_SCORE: 30
ADLS_ACUITY_SCORE: 24
ADLS_ACUITY_SCORE: 28
DIFFICULTY_EATING/SWALLOWING: NO
ADLS_ACUITY_SCORE: 26
ADLS_ACUITY_SCORE: 18
ADLS_ACUITY_SCORE: 30
ADLS_ACUITY_SCORE: 28
DOING_ERRANDS_INDEPENDENTLY_DIFFICULTY: YES
ADLS_ACUITY_SCORE: 26
HEARING_DIFFICULTY_OR_DEAF: YES
ADLS_ACUITY_SCORE: 28
TOILETING_ASSISTANCE: TOILETING DIFFICULTY, DEPENDENT
ADLS_ACUITY_SCORE: 28
ADLS_ACUITY_SCORE: 30
ADLS_ACUITY_SCORE: 28
ADLS_ACUITY_SCORE: 26
ADLS_ACUITY_SCORE: 12
ADLS_ACUITY_SCORE: 26
DRESSING/BATHING_DIFFICULTY: YES
ADLS_ACUITY_SCORE: 28
TOILETING_ISSUES: YES
ADLS_ACUITY_SCORE: 30
ADLS_ACUITY_SCORE: 28
ADLS_ACUITY_SCORE: 26

## 2021-01-01 ASSESSMENT — MIFFLIN-ST. JEOR
SCORE: 1312.09
SCORE: 1298.04
SCORE: 1276.26
SCORE: 1345.21
SCORE: 1276.26
SCORE: 1382.41
SCORE: 1308.02
SCORE: 1305.29
SCORE: 1330.7
SCORE: 1308.02
SCORE: 1282.6
SCORE: 1330.7
SCORE: 1298.04
SCORE: 1276.26

## 2021-01-01 ASSESSMENT — ENCOUNTER SYMPTOMS: DYSRHYTHMIAS: 1

## 2021-01-04 ENCOUNTER — VIRTUAL VISIT (OUTPATIENT)
Dept: GERIATRICS | Facility: CLINIC | Age: 86
End: 2021-01-04
Payer: COMMERCIAL

## 2021-01-04 VITALS
HEART RATE: 77 BPM | DIASTOLIC BLOOD PRESSURE: 80 MMHG | WEIGHT: 212 LBS | SYSTOLIC BLOOD PRESSURE: 164 MMHG | OXYGEN SATURATION: 95 % | RESPIRATION RATE: 18 BRPM | BODY MASS INDEX: 34.07 KG/M2 | HEIGHT: 66 IN | TEMPERATURE: 97.5 F

## 2021-01-04 DIAGNOSIS — I48.91 ON COUMADIN FOR ATRIAL FIBRILLATION (H): ICD-10-CM

## 2021-01-04 DIAGNOSIS — E11.51 TYPE II DIABETES MELLITUS WITH PERIPHERAL CIRCULATORY DISORDER (H): ICD-10-CM

## 2021-01-04 DIAGNOSIS — N18.31 STAGE 3A CHRONIC KIDNEY DISEASE (H): ICD-10-CM

## 2021-01-04 DIAGNOSIS — E21.0 HYPERPARATHYROIDISM, PRIMARY (H): ICD-10-CM

## 2021-01-04 DIAGNOSIS — I48.91 CONTROLLED ATRIAL FIBRILLATION (H): ICD-10-CM

## 2021-01-04 DIAGNOSIS — I50.32 CHRONIC DIASTOLIC CONGESTIVE HEART FAILURE (H): Primary | ICD-10-CM

## 2021-01-04 DIAGNOSIS — Z86.73 H/O: CVA (CEREBROVASCULAR ACCIDENT): ICD-10-CM

## 2021-01-04 DIAGNOSIS — Z79.01 ON COUMADIN FOR ATRIAL FIBRILLATION (H): ICD-10-CM

## 2021-01-04 PROCEDURE — 99309 SBSQ NF CARE MODERATE MDM 30: CPT | Mod: 95 | Performed by: FAMILY MEDICINE

## 2021-01-04 ASSESSMENT — MIFFLIN-ST. JEOR: SCORE: 1353.38

## 2021-01-04 NOTE — LETTER
"    1/4/2021        RE: Mecca Slade  [facility]  [address] [facility] [address]        [place] GERIATRIC SERVICES Regulatory   Mecca Slade is being evaluated via a billable video visit due to the restrictions of the Covid-19 pandemic.   The patient has been notified of following:  \"This video visit will be conducted via a call between you and your provider. We have found that certain health care needs can be provided without the need for an in-person physical exam.  This service lets us provide the care you need with a video conversation. If during the course of the call the provider feels a video visit is not appropriate, you will not be charged for this service.\"   The provider has received verbal consent for a Video Visit from the patient or first contact? Yes  Patient or facility staff would like the video invitation sent by: N/A   Video Start Time: 11:53  [place] Medical Record Number:  [id number]  Place of Location at the time of visit: [facility]  Chief Complaint   Patient presents with     [facility] Regulatory     Video Visit     HPI:    Mecca Slade  is 89 year old (12/14/1921), who is being seen today for a federally mandated E/M visit.  HPI information obtained from: facility staff, patient report, [facility] chart review and DNP.     Today's concerns are:  - Pt seen in the presence of the nursing staff who graciously assisted with the virtual visit  - Pt reports has been feeling fairly good  - CHF: denies CP, SOB, orthopnea, or PND, legs  -T2D: reports appetite is too good.   - A-fib: denies palpitation.   ---------------------------------------------------------------------------------  - - Past Medical, social, family histories, medications, and allergies reviewed and updated  - Medications reviewed: in the chart and EHR.   - Case Management:   I have reviewed the care plan and MDS and do agree with the plan. Patient's desire to return to the community is not " "present.  Information reviewed:  Medications, vital signs, orders, and nursing notes.  MEDICATIONS:  Current Outpatient Medications   Medication Sig Dispense Refill     Acetaminophen (TYLENOL PO) Take 500 mg by mouth 2 times daily        ATORVASTATIN CALCIUM PO Take 10 mg by mouth daily       CARBOXYMETHYLCELLULOSE SODIUM 0.5 % SOLN ophthalmic solution INSTILL 1 DROP IN EACH EYE EVERY MORNING 15 mL 3     Furosemide (LASIX PO) Take 40 mg by mouth daily       Menthol, Topical Analgesic, (BENGAY COLD THERAPY) 5 % GEL Externally apply topically 4 times daily as needed (pain) 113 g 0     metFORMIN (GLUCOPHAGE-XR) 500 MG 24 hr tablet Take 1 tablet (500 mg) by mouth daily (with dinner) 30 tablet 0     metoprolol succinate ER (TOPROL-XL) 25 MG 24 hr tablet Take 1 tablet (25 mg) by mouth daily 30 tablet 0     pantoprazole (PROTONIX) 20 MG EC tablet Take 20 mg by mouth daily       simethicone (MYLICON) 80 MG chewable tablet Take 2 tablets (160 mg) by mouth daily       Warfarin Sodium (COUMADIN PO) Take by mouth daily Take as directed per INR results       ROS: 4 point ROS including Respiratory, CV, GI and , other than that noted in the HPI,  is negative    Vitals:  BP (!) 164/80   Pulse 77   Temp 97.5  F (36.4  C)   Resp 18   Ht 1.676 m (5' 6\")   Wt 96.2 kg (212 lb)   SpO2 95%   BMI 34.22 kg/m    Body mass index is 34.22 kg/m .    Limited visit exam done given COVID-19 precautions.   GENERAL APPEARANCE:  in no distress  RESP:  unlabored breathing  M/S:   no joint deformity noted. No peripheral edema  SKIN:  no rash noted  NEURO:   no purposeful movement in upper and lower extremities  PSYCH:  affect and mood normal      Lab/Diagnostic data: Reviewed in the chart and EHR.        ASSESSMENT/PLAN  -------------------------------  Chronic congestive heart failure, diastolic type (H)  Essential hypertension  -  EF > 70% (2012)  - compensated. Continue meds         Type II diabetes mellitus with peripheral circulatory " disorder (H)  - 8.6% (August 2020). Controlled.  In this frail elderly adult with a limited life expectancy, the goal of  the management is to address the hyperglycemia sx if any,  rather than the  BGs numbers per se.       On coumadin for Atrial fibrillation (H): INR followed closely by DNP. CVR, on metoprolol succinate. Clinically stable.       Primary Hyperparathyroidism (H)   - elevated Ca and PTH.  Resident seems doing fair. Recommend no further work up given limited life expectancy. If become symptomatic consider cinacalcet 30 mg po daily, may increase to bid unitl Ca level have normalized- close attention to hydration status  - no concern.       CKD stage 3a, GFR 52 ml/min (H): improving. - Avoid nephrotoxic drugs. Renal dose the medications. Consider BMP check.       Hx of Cerebrovascular accident (CVA) due to embolism of right middle cerebral artery  - Left sided residual weakness, stable. Off ASA, on lipitor 10 mg from 20 mg.  Requires assistance with ADLs.        Class 2 obesity due to excess calories w/o serious comorbidity with Body mass index is 34.22 kg/m .    - In this age group- frail elderly, keep BMI b/lw 25-35 Kg(m2).       GERD: .no concern. On Protonix 20 mg.       Frail elderly:  Significant  Deficits requiring NH placement. Requiring extensive assistance from nursing. Up for meals only o/w spends the day resting in bed        Orders written by provider at facility and transcribed by : José Miguel Radford    Electronically signed by:  Anirudh Ye MD    Video-Visit Details  Type of service:  Video Visit  Video End Time (time video stopped): 11:57  Distant Location (provider location):  [place] GERIATRIC SERVICES               Sincerely,        Anirudh Ye MD

## 2021-01-04 NOTE — PROGRESS NOTES
"[facility] Regulatory   Mecca Slade is being evaluated via a billable video visit due to the restrictions of the Covid-19 pandemic.   The patient has been notified of following:  \"This video visit will be conducted via a call between you and your provider. We have found that certain health care needs can be provided without the need for an in-person physical exam.  This service lets us provide the care you need with a video conversation. If during the course of the call the provider feels a video visit is not appropriate, you will not be charged for this service.\"   The provider has received verbal consent for a Video Visit from the patient or first contact? Yes  Patient or facility staff would like the video invitation sent by: N/A   Video Start Time: 11:53  [facility] Medical Record Number:  [id number]  Place of Location at the time of visit: [facility]  Chief Complaint   Patient presents with     FDC Regulatory     Video Visit     HPI:    Mecca Slade  is 89 year old (12/14/1921), who is being seen today for a federally mandated E/M visit.  HPI information obtained from: facility staff, patient report, [facility] Epic chart review and DNP.     Today's concerns are:  - Pt seen in the presence of the nursing staff who graciously assisted with the virtual visit  - Pt reports has been feeling fairly good  - CHF: denies CP, SOB, orthopnea, or PND, legs  -T2D: reports appetite is too good.   - A-fib: denies palpitation.   ---------------------------------------------------------------------------------  - - Past Medical, social, family histories, medications, and allergies reviewed and updated  - Medications reviewed: in the chart and EHR.   - Case Management:   I have reviewed the care plan and MDS and do agree with the plan. Patient's desire to return to the community is not present.  Information reviewed:  Medications, vital signs, orders, and nursing notes.  MEDICATIONS:  Current " "Outpatient Medications   Medication Sig Dispense Refill     Acetaminophen (TYLENOL PO) Take 500 mg by mouth 2 times daily        ATORVASTATIN CALCIUM PO Take 10 mg by mouth daily       CARBOXYMETHYLCELLULOSE SODIUM 0.5 % SOLN ophthalmic solution INSTILL 1 DROP IN EACH EYE EVERY MORNING 15 mL 3     Furosemide (LASIX PO) Take 40 mg by mouth daily       Menthol, Topical Analgesic, (BENGAY COLD THERAPY) 5 % GEL Externally apply topically 4 times daily as needed (pain) 113 g 0     metFORMIN (GLUCOPHAGE-XR) 500 MG 24 hr tablet Take 1 tablet (500 mg) by mouth daily (with dinner) 30 tablet 0     metoprolol succinate ER (TOPROL-XL) 25 MG 24 hr tablet Take 1 tablet (25 mg) by mouth daily 30 tablet 0     pantoprazole (PROTONIX) 20 MG EC tablet Take 20 mg by mouth daily       simethicone (MYLICON) 80 MG chewable tablet Take 2 tablets (160 mg) by mouth daily       Warfarin Sodium (COUMADIN PO) Take by mouth daily Take as directed per INR results       ROS: 4 point ROS including Respiratory, CV, GI and , other than that noted in the HPI,  is negative    Vitals:  BP (!) 164/80   Pulse 77   Temp 97.5  F (36.4  C)   Resp 18   Ht 1.676 m (5' 6\")   Wt 96.2 kg (212 lb)   SpO2 95%   BMI 34.22 kg/m    Body mass index is 34.22 kg/m .    Limited visit exam done given COVID-19 precautions.   GENERAL APPEARANCE:  in no distress  RESP:  unlabored breathing  M/S:   no joint deformity noted. No peripheral edema  SKIN:  no rash noted  NEURO:   no purposeful movement in upper and lower extremities  PSYCH:  affect and mood normal      Lab/Diagnostic data: Reviewed in the chart and EHR.        ASSESSMENT/PLAN  -------------------------------  Chronic congestive heart failure, diastolic type (H)  Essential hypertension  -  EF > 70% (2012)  - compensated. Continue meds         Type II diabetes mellitus with peripheral circulatory disorder (H)  - 8.6% (August 2020). Controlled.  In this frail elderly adult with a limited life expectancy, the " goal of  the management is to address the hyperglycemia sx if any,  rather than the  BGs numbers per se.       On coumadin for Atrial fibrillation (H): INR followed closely by DNP. CVR, on metoprolol succinate. Clinically stable.       Primary Hyperparathyroidism (H)   - elevated Ca and PTH.  Resident seems doing fair. Recommend no further work up given limited life expectancy. If become symptomatic consider cinacalcet 30 mg po daily, may increase to bid unitl Ca level have normalized- close attention to hydration status  - no concern.       CKD stage 3a, GFR 52 ml/min (H): improving. - Avoid nephrotoxic drugs. Renal dose the medications. Consider BMP check.       Hx of Cerebrovascular accident (CVA) due to embolism of right middle cerebral artery  - Left sided residual weakness, stable. Off ASA, on lipitor 10 mg from 20 mg.  Requires assistance with ADLs.        Class 2 obesity due to excess calories w/o serious comorbidity with Body mass index is 34.22 kg/m .    - In this age group- frail elderly, keep BMI b/lw 25-35 Kg(m2).       GERD: .no concern. On Protonix 20 mg.       Frail elderly:  Significant  Deficits requiring NH placement. Requiring extensive assistance from nursing. Up for meals only o/w spends the day resting in bed        Orders written by provider at facility and transcribed by : José Miguel Radford    Electronically signed by:  Anirudh Ye MD    Video-Visit Details  Type of service:  Video Visit  Video End Time (time video stopped): 11:57  Distant Location (provider location):  [facility]

## 2021-01-06 ENCOUNTER — RECORDS - HEALTHEAST (OUTPATIENT)
Dept: LAB | Facility: CLINIC | Age: 86
End: 2021-01-06

## 2021-01-10 PROBLEM — E21.0 HYPERPARATHYROIDISM, PRIMARY (H): Status: ACTIVE | Noted: 2021-01-10

## 2021-01-10 PROBLEM — I48.91 CONTROLLED ATRIAL FIBRILLATION (H): Status: ACTIVE | Noted: 2018-02-13

## 2021-01-11 ENCOUNTER — TRANSFERRED RECORDS (OUTPATIENT)
Dept: HEALTH INFORMATION MANAGEMENT | Facility: CLINIC | Age: 86
End: 2021-01-11

## 2021-01-11 LAB — INR PPP: 1.55 (ref 0.9–1.1)

## 2021-01-13 ENCOUNTER — RECORDS - HEALTHEAST (OUTPATIENT)
Dept: LAB | Facility: CLINIC | Age: 86
End: 2021-01-13

## 2021-01-15 ENCOUNTER — VIRTUAL VISIT (OUTPATIENT)
Dept: GERIATRICS | Facility: CLINIC | Age: 86
End: 2021-01-15
Payer: COMMERCIAL

## 2021-01-15 ENCOUNTER — TRANSFERRED RECORDS (OUTPATIENT)
Dept: HEALTH INFORMATION MANAGEMENT | Facility: CLINIC | Age: 86
End: 2021-01-15

## 2021-01-15 VITALS
WEIGHT: 200.2 LBS | TEMPERATURE: 97.4 F | HEART RATE: 68 BPM | BODY MASS INDEX: 32.31 KG/M2 | SYSTOLIC BLOOD PRESSURE: 132 MMHG | RESPIRATION RATE: 16 BRPM | DIASTOLIC BLOOD PRESSURE: 83 MMHG | OXYGEN SATURATION: 97 %

## 2021-01-15 DIAGNOSIS — Z51.81 ENCOUNTER FOR THERAPEUTIC DRUG MONITORING: ICD-10-CM

## 2021-01-15 DIAGNOSIS — I48.91 CONTROLLED ATRIAL FIBRILLATION (H): Primary | ICD-10-CM

## 2021-01-15 DIAGNOSIS — Z79.01 LONG TERM CURRENT USE OF ANTICOAGULANT WITH INTERNATIONAL NORMALIZED RATIO (INR) GOAL OF 1.5-2.0: ICD-10-CM

## 2021-01-15 LAB
INR PPP: 1.91 (ref 0.9–1.1)
INR PPP: 1.91 (ref 0.9–1.1)

## 2021-01-15 PROCEDURE — 99308 SBSQ NF CARE LOW MDM 20: CPT | Mod: 95 | Performed by: NURSE PRACTITIONER

## 2021-01-15 NOTE — LETTER
"    1/15/2021        RE: Mecca Slade  [facility]  [address] [facility] [address]        [facility] Geriatrics Video Visit  Mecca Slade is a 89 year old female who is being evaluated via a billable video visit due to the restrictions of the COVID19 pandemic.The patient has been notified of following: \"This video visit will be conducted via a call between you and your provider. We have found that certain health care needs can be provided without the need for an in-person physical exam.  This service lets us provide the care you need with a video conversation.  If a prescription is necessary we can send it directly to your pharmacy.  If lab work is needed we can place an order for that and you can then stop by our lab to have the test done at a later time. If during the course of the call the provider feels a video visit is not appropriate, you will not be charged for this service.\"     Proivder has received verbal consent for a Video Visit from the patient? Yes    Patient/facility would like the video invitation sent by: Send to e-mail at: [email or web address]     This visit took place virtually, with the patient located at [facility]   ________________________________________________  Video Start Time: 1124  Coumadin/INR Encounter  [place] MRN: [id number]. HPI: Mecca Slade is a 89 year old  (12/14/1921), who is being seen today for an episodic care visit at the above location. HPI information obtained from: facility chart records, facility staff, patient report, [facility] chart review and Care Everywhere [facility] chart review. Today's concern is INR/Coumadin management for A. Fib    ROS/Subjective:  Bleeding Signs/Symptoms:  None  Thromboembolic Signs/Symptoms:  None  Medication Changes:  No  Dietary Changes:  No  Activity Changes: No  Bacterial/Viral Infection:  No  Missed Coumadin Doses:  None  On ASA: No  Other Concerns:  No    OBJECTIVE:  BP " 132/83   Pulse 68   Temp 97.4  F (36.3  C)   Resp 16   Wt 90.8 kg (200 lb 3.2 oz)   SpO2 97%   BMI 32.31 kg/m    Last INR: 1.55 on 1/11  INR Today:  1.91.  Current Dose:  2mg M/Tu, 1mg W/Th. Was 1mg/day prior.     ASSESSMENT:  Controlled atrial fibrillation (H)  Long term current use of anticoagulant with international normalized ratio (INR) goal of 1.5-2.0  Encounter for therapeutic drug monitoring  Chronic. Stable. Mildly subtherapeutic. Will dose Coumadin as noted below and recheck INR in 1 week. Follow-up accordingly.    PLAN:   Orders:  1. Coumadin 2mg PO every day on M/W/F.  2. Coumadin 1mg PO every day on AOD.   3. Recheck INR x1 on 1/22.   Dx: afib.     Video-Visit Details  Type of service:  Video Visit  Video Start Time: 1124  Video End Time (time video stopped): 1127  Originating Location (pt. Location): Long term Care  Distant Location (provider location):  [facility]   Mode of Communication:  Video Conference.    Electronically signed by:  JOSUE Clark CNP DNP            Sincerely,        JOSUE Gillis CNP

## 2021-01-15 NOTE — PROGRESS NOTES
"[facility] Geriatrics Video Visit  Mecca Slade is a 89 year old female who is being evaluated via a billable video visit due to the restrictions of the COVID19 pandemic.The patient has been notified of following: \"This video visit will be conducted via a call between you and your provider. We have found that certain health care needs can be provided without the need for an in-person physical exam.  This service lets us provide the care you need with a video conversation.  If a prescription is necessary we can send it directly to your pharmacy.  If lab work is needed we can place an order for that and you can then stop by our lab to have the test done at a later time. If during the course of the call the provider feels a video visit is not appropriate, you will not be charged for this service.\"     Proivder has received verbal consent for a Video Visit from the patient? Yes    Patient/facility would like the video invitation sent by: Send to e-mail at: [email or web address]     This visit took place virtually, with the patient located at [facility]   ________________________________________________  Video Start Time: 1124  Coumadin/INR Encounter  [place] MRN: [id number]. HPI: Mecca Slade is a 89 year old  (12/14/1921), who is being seen today for an episodic care visit at the above location. HPI information obtained from: facility chart records, facility staff, patient report, [facility] chart review and Care Everywhere [facility] chart review. Today's concern is INR/Coumadin management for A. Fib    ROS/Subjective:  Bleeding Signs/Symptoms:  None  Thromboembolic Signs/Symptoms:  None  Medication Changes:  No  Dietary Changes:  No  Activity Changes: No  Bacterial/Viral Infection:  No  Missed Coumadin Doses:  None  On ASA: No  Other Concerns:  No    OBJECTIVE:  /83   Pulse 68   Temp 97.4  F (36.3  C)   Resp 16   Wt 90.8 kg (200 lb 3.2 oz)   SpO2 97%   BMI 32.31 " kg/m    Last INR: 1.55 on 1/11  INR Today:  1.91.  Current Dose:  2mg M/Tu, 1mg W/Th. Was 1mg/day prior.     ASSESSMENT:  Controlled atrial fibrillation (H)  Long term current use of anticoagulant with international normalized ratio (INR) goal of 1.5-2.0  Encounter for therapeutic drug monitoring  Chronic. Stable. Mildly subtherapeutic. Will dose Coumadin as noted below and recheck INR in 1 week. Follow-up accordingly.    PLAN:   Orders:  1. Coumadin 2mg PO every day on M/W/F.  2. Coumadin 1mg PO every day on AOD.   3. Recheck INR x1 on 1/22.   Dx: afib.     Video-Visit Details  Type of service:  Video Visit  Video Start Time: 1124  Video End Time (time video stopped): 1127  Originating Location (pt. Location): Long term Care  Distant Location (provider location):  [facility]ERIATRIC SERVICES   Mode of Communication:  Video Conference.    Electronically signed by:  JOSUE Clark CNP DNP

## 2021-01-20 ENCOUNTER — RECORDS - HEALTHEAST (OUTPATIENT)
Dept: LAB | Facility: CLINIC | Age: 86
End: 2021-01-20

## 2021-01-22 ENCOUNTER — VIRTUAL VISIT (OUTPATIENT)
Dept: GERIATRICS | Facility: CLINIC | Age: 86
End: 2021-01-22
Payer: COMMERCIAL

## 2021-01-22 VITALS
DIASTOLIC BLOOD PRESSURE: 78 MMHG | OXYGEN SATURATION: 96 % | TEMPERATURE: 97.5 F | RESPIRATION RATE: 18 BRPM | BODY MASS INDEX: 32.18 KG/M2 | HEART RATE: 78 BPM | WEIGHT: 199.4 LBS | SYSTOLIC BLOOD PRESSURE: 114 MMHG

## 2021-01-22 DIAGNOSIS — Z79.01 LONG TERM CURRENT USE OF ANTICOAGULANT WITH INTERNATIONAL NORMALIZED RATIO (INR) GOAL OF 1.5-2.0: ICD-10-CM

## 2021-01-22 DIAGNOSIS — I48.91 CONTROLLED ATRIAL FIBRILLATION (H): Primary | ICD-10-CM

## 2021-01-22 DIAGNOSIS — Z51.81 ENCOUNTER FOR THERAPEUTIC DRUG MONITORING: ICD-10-CM

## 2021-01-22 LAB — INR PPP: 1.93 (ref 0.9–1.1)

## 2021-01-22 PROCEDURE — 99308 SBSQ NF CARE LOW MDM 20: CPT | Mod: 95 | Performed by: NURSE PRACTITIONER

## 2021-01-22 NOTE — PROGRESS NOTES
"[facility] Geriatrics Video Visit  Mecca Slade is a 89 year old female who is being evaluated via a billable video visit due to the restrictions of the COVID19 pandemic.The patient has been notified of following: \"This video visit will be conducted via a call between you and your provider. We have found that certain health care needs can be provided without the need for an in-person physical exam.  This service lets us provide the care you need with a video conversation.  If a prescription is necessary we can send it directly to your pharmacy.  If lab work is needed we can place an order for that and you can then stop by our lab to have the test done at a later time. If during the course of the call the provider feels a video visit is not appropriate, you will not be charged for this service.\"     Proivder has received verbal consent for a Video Visit from the patient? Yes    Patient/facility would like the video invitation sent by: Send to e-mail at: [email or web address]     This visit took place virtually, with the patient located at [facility]   ________________________________________________  Video Start Time: 1129  [place] INR/Coumadin Encounter  [place] MRN: [id number]. HPI: Mecca Slade is a 89 year old  (12/14/1921), who is being seen today for an episodic care visit at the above location.   HPI information obtained from: facility chart records, facility staff, patient report, [facility] chart review and Care Everywhere [facility] chart review. Today's concern is INR/Coumadin management for A. Fib    ROS/Subjective:  Bleeding Signs/Symptoms:  None  Thromboembolic Signs/Symptoms:  None  Medication Changes:  No  Dietary Changes:  No  Activity Changes: No  Bacterial/Viral Infection:  No  Missed Coumadin Doses:  None  On ASA: No  Other Concerns:  No    OBJECTIVE:  /78   Pulse 78   Temp 97.5  F (36.4  C)   Resp 18   Wt 90.4 kg (199 lb 6.4 oz)   SpO2 96%   " BMI 32.18 kg/m    Last INR: 1.91 on 1/15/21.  INR Today:  1.93.  Current Dose:  2mg M/W/F, 1mg AOD.    ASSESSMENT:  Controlled atrial fibrillation (H)  Long term current use of anticoagulant with international normalized ratio (INR) goal of 1.5-2.0  Encounter for therapeutic drug monitoring  Chronic. Stable. Slightly subtherapeutic. Will dose Coumadin as noted below and recheck INR in 1 week. Follow-up accordingly.    PLAN:   Orders:  1. Coumadin 2mg PO M/W/F.  2. Coumadin 1mg PO every day on AOD.  3. Recheck INR x1 in 1 week.   Dx: afib.     Video-Visit Details  Type of service:  Video Visit  Video Start Time: 1129  Video End Time (time video stopped): 1131  Originating Location (pt. Location): Long term Care  Distant Location (provider location):  [facility] SERVICES   Mode of Communication:  Video Conference.    Electronically signed by:  Dr. Haley Sims, APRN CNP DNP

## 2021-01-22 NOTE — LETTER
"    1/22/2021        RE: Mecca Slade  [facility]  [address] [facility] [address]        [facility] Geriatrics Video Visit  Mecca Slade is a 89 year old female who is being evaluated via a billable video visit due to the restrictions of the COVID19 pandemic.The patient has been notified of following: \"This video visit will be conducted via a call between you and your provider. We have found that certain health care needs can be provided without the need for an in-person physical exam.  This service lets us provide the care you need with a video conversation.  If a prescription is necessary we can send it directly to your pharmacy.  If lab work is needed we can place an order for that and you can then stop by our lab to have the test done at a later time. If during the course of the call the provider feels a video visit is not appropriate, you will not be charged for this service.\"     Proivder has received verbal consent for a Video Visit from the patient? Yes    Patient/facility would like the video invitation sent by: Send to e-mail at: [email or web address]     This visit took place virtually, with the patient located at [facility]   ________________________________________________  Video Start Time: 1129  [place] INR/Coumadin Encounter  [place] MRN: [id number]. HPI: Mecca Slade is a 89 year old  (12/14/1921), who is being seen today for an episodic care visit at the above location.   HPI information obtained from: facility chart records, facility staff, patient report, [facility] chart review and Care Everywhere [facility] chart review. Today's concern is INR/Coumadin management for A. Fib    ROS/Subjective:  Bleeding Signs/Symptoms:  None  Thromboembolic Signs/Symptoms:  None  Medication Changes:  No  Dietary Changes:  No  Activity Changes: No  Bacterial/Viral Infection:  No  Missed Coumadin Doses:  None  On ASA: No  Other Concerns:  " No    OBJECTIVE:  /78   Pulse 78   Temp 97.5  F (36.4  C)   Resp 18   Wt 90.4 kg (199 lb 6.4 oz)   SpO2 96%   BMI 32.18 kg/m    Last INR: 1.91 on 1/15/21.  INR Today:  1.93.  Current Dose:  2mg M/W/F, 1mg AOD.    ASSESSMENT:  Controlled atrial fibrillation (H)  Long term current use of anticoagulant with international normalized ratio (INR) goal of 1.5-2.0  Encounter for therapeutic drug monitoring  Chronic. Stable. Slightly subtherapeutic. Will dose Coumadin as noted below and recheck INR in 1 week. Follow-up accordingly.    PLAN:   Orders:  1. Coumadin 2mg PO M/W/F.  2. Coumadin 1mg PO every day on AOD.  3. Recheck INR x1 in 1 week.   Dx: afib.     Video-Visit Details  Type of service:  Video Visit  Video Start Time: 1129  Video End Time (time video stopped): 1131  Originating Location (pt. Location): Long term Care  Distant Location (provider location):  [facility] GERIATRIC SERVICES   Mode of Communication:  Video Conference.    Electronically signed by:  JOSUE Clark CNP DNP            Sincerely,        JOSUE Gillis CNP

## 2021-01-27 ENCOUNTER — RECORDS - HEALTHEAST (OUTPATIENT)
Dept: LAB | Facility: CLINIC | Age: 86
End: 2021-01-27

## 2021-01-29 ENCOUNTER — VIRTUAL VISIT (OUTPATIENT)
Dept: GERIATRICS | Facility: CLINIC | Age: 86
End: 2021-01-29
Payer: COMMERCIAL

## 2021-01-29 ENCOUNTER — TRANSFERRED RECORDS (OUTPATIENT)
Dept: HEALTH INFORMATION MANAGEMENT | Facility: CLINIC | Age: 86
End: 2021-01-29

## 2021-01-29 VITALS
WEIGHT: 204 LBS | HEART RATE: 78 BPM | RESPIRATION RATE: 18 BRPM | SYSTOLIC BLOOD PRESSURE: 114 MMHG | TEMPERATURE: 97.2 F | BODY MASS INDEX: 32.93 KG/M2 | DIASTOLIC BLOOD PRESSURE: 77 MMHG | OXYGEN SATURATION: 99 %

## 2021-01-29 DIAGNOSIS — I48.91 CONTROLLED ATRIAL FIBRILLATION (H): Primary | ICD-10-CM

## 2021-01-29 DIAGNOSIS — Z79.01 LONG TERM CURRENT USE OF ANTICOAGULANT WITH INTERNATIONAL NORMALIZED RATIO (INR) GOAL OF 1.5-2.0: ICD-10-CM

## 2021-01-29 DIAGNOSIS — Z51.81 ENCOUNTER FOR THERAPEUTIC DRUG MONITORING: ICD-10-CM

## 2021-01-29 LAB — INR PPP: 1.91 (ref 0.9–1.1)

## 2021-01-29 PROCEDURE — 99308 SBSQ NF CARE LOW MDM 20: CPT | Mod: 95 | Performed by: NURSE PRACTITIONER

## 2021-01-29 NOTE — PROGRESS NOTES
"[facility] Geriatrics Video Visit  Mecca Slade is a 89 year old female who is being evaluated via a billable video visit due to the restrictions of the COVID19 pandemic.The patient has been notified of following: \"This video visit will be conducted via a call between you and your provider. We have found that certain health care needs can be provided without the need for an in-person physical exam.  This service lets us provide the care you need with a video conversation.  If a prescription is necessary we can send it directly to your pharmacy.  If lab work is needed we can place an order for that and you can then stop by our lab to have the test done at a later time. If during the course of the call the provider feels a video visit is not appropriate, you will not be charged for this service.\"     Proivder has received verbal consent for a Video Visit from the patient? Yes    Patient/facility would like the video invitation sent by: Send to e-mail at: [email or web address]     This visit took place virtually, with the patient located at [facility]   ________________________________________________  Video Start Time: 1056  INR/Coumadin Encounter  [place] MRN: [id number]. HPI: Mecca Slade is a 89 year old  (12/14/1921), who is being seen today for an episodic care visit at the above location.   HPI information obtained from: facility chart records, facility staff, patient report, [facility] chart review and Care Everywhere [facility] chart review. Today's concern is INR/Coumadin management for A. Fib    ROS/Subjective:  Bleeding Signs/Symptoms:  None  Thromboembolic Signs/Symptoms:  None  Medication Changes:  No  Dietary Changes:  No  Activity Changes: No  Bacterial/Viral Infection:  No  Missed Coumadin Doses:  None  On ASA: No  Other Concerns:  No    OBJECTIVE:  /77   Pulse 78   Temp 97.2  F (36.2  C)   Resp 18   Wt 92.5 kg (204 lb)   SpO2 99%   BMI 32.93 kg/m  "   Last INR: 1.93 on 1/22  INR Today:  1.91  Current Dose: 2mg M/W/F, 1mg AOD.    ASSESSMENT:  Controlled atrial fibrillation (H)  Long term current use of anticoagulant with international normalized ratio (INR) goal of 1.5-2.0  Encounter for therapeutic drug monitoring  Chronic. Stable. Subtherapeutic. Will dose Coumadin as noted below and recheck INR in 1 week. Follow-up accordingly.    PLAN:   Orders:  1. Coumadin 2mg PO on M/W/F/Sat.  2. Coumadin 1mg PO every day on AOD.  3. Recheck INR x1 in 1 week on 2/5.   Dx: afib    Video-Visit Details  Type of service:  Video Visit  Video Start Time: 1056  Video End Time (time video stopped): 1011  Originating Location (pt. Location): Long term Care  Distant Location (provider location):  [facility] SERVICES   Mode of Communication:  Video Conference.    Electronically signed by:  JOSUE Clark CNP DNP

## 2021-01-29 NOTE — LETTER
"    1/29/2021        RE: Mecca Slade  [facility]  [address] [facility] [address]        M [facility] Geriatrics Video Visit  Mecca Slade is a 89 year old female who is being evaluated via a billable video visit due to the restrictions of the COVID19 pandemic.The patient has been notified of following: \"This video visit will be conducted via a call between you and your provider. We have found that certain health care needs can be provided without the need for an in-person physical exam.  This service lets us provide the care you need with a video conversation.  If a prescription is necessary we can send it directly to your pharmacy.  If lab work is needed we can place an order for that and you can then stop by our lab to have the test done at a later time. If during the course of the call the provider feels a video visit is not appropriate, you will not be charged for this service.\"     Proivder has received verbal consent for a Video Visit from the patient? Yes    Patient/facility would like the video invitation sent by: Send to e-mail at: [email or web address]     This visit took place virtually, with the patient located at [facility]   ________________________________________________  Video Start Time: 1056  INR/Coumadin Encounter  [place] MRN: [id number]. HPI: Mecca Slade is a 89 year old  (12/14/1921), who is being seen today for an episodic care visit at the above location.   HPI information obtained from: facility chart records, facility staff, patient report, [facility] chart review and Care Everywhere [facility] chart review. Today's concern is INR/Coumadin management for A. Fib    ROS/Subjective:  Bleeding Signs/Symptoms:  None  Thromboembolic Signs/Symptoms:  None  Medication Changes:  No  Dietary Changes:  No  Activity Changes: No  Bacterial/Viral Infection:  No  Missed Coumadin Doses:  None  On ASA: No  Other Concerns:  No    OBJECTIVE:  BP " 114/77   Pulse 78   Temp 97.2  F (36.2  C)   Resp 18   Wt 92.5 kg (204 lb)   SpO2 99%   BMI 32.93 kg/m    Last INR: 1.93 on 1/22  INR Today:  1.91  Current Dose: 2mg M/W/F, 1mg AOD.    ASSESSMENT:  Controlled atrial fibrillation (H)  Long term current use of anticoagulant with international normalized ratio (INR) goal of 1.5-2.0  Encounter for therapeutic drug monitoring  Chronic. Stable. Subtherapeutic. Will dose Coumadin as noted below and recheck INR in 1 week. Follow-up accordingly.    PLAN:   Orders:  1. Coumadin 2mg PO on M/W/F/Sat.  2. Coumadin 1mg PO every day on AOD.  3. Recheck INR x1 in 1 week on 2/5.   Dx: afib    Video-Visit Details  Type of service:  Video Visit  Video Start Time: 1056  Video End Time (time video stopped): 1011  Originating Location (pt. Location): Long term Care  Distant Location (provider location):  [facility] GERIATRIC SERVICES   Mode of Communication:  Video Conference.    Electronically signed by:  JOSUE Clark CNP DNP            Sincerely,        JOSUE Gillis CNP

## 2021-02-03 ENCOUNTER — RECORDS - HEALTHEAST (OUTPATIENT)
Dept: LAB | Facility: CLINIC | Age: 86
End: 2021-02-03

## 2021-02-04 VITALS
RESPIRATION RATE: 16 BRPM | DIASTOLIC BLOOD PRESSURE: 73 MMHG | WEIGHT: 280 LBS | OXYGEN SATURATION: 96 % | HEART RATE: 65 BPM | BODY MASS INDEX: 45.19 KG/M2 | TEMPERATURE: 98.2 F | SYSTOLIC BLOOD PRESSURE: 133 MMHG

## 2021-02-04 NOTE — PROGRESS NOTES
"[facility] Geriatrics Video Visit  Mecca Slade is a 89 year old female who is being evaluated via a billable video visit due to the restrictions of the COVID19 pandemic.The patient has been notified of following: \"This video visit will be conducted via a call between you and your provider. We have found that certain health care needs can be provided without the need for an in-person physical exam.  This service lets us provide the care you need with a video conversation.  If a prescription is necessary we can send it directly to your pharmacy.  If lab work is needed we can place an order for that and you can then stop by our lab to have the test done at a later time. If during the course of the call the provider feels a video visit is not appropriate, you will not be charged for this service.\"     Proivder has received verbal consent for a Video Visit from the patient? Yes    Patient/facility would like the video invitation sent by: Send to e-mail at: [email or web address]     This visit took place virtually, with the patient located at [facility]   ________________________________________________  Video Start Time: 1138  INR/Coumadin Encounter  [place] MRN: [id number]. HPI: Mecca Slade is a 89 year old  (12/14/1921), who is being seen today for an episodic care visit at the above location.   HPI information obtained from: facility chart records, facility staff, patient report, [facility] chart review and Care Everywhere [facility] chart review. Today's concern is INR/Coumadin management for A. Fib    ROS/Subjective:  Bleeding Signs/Symptoms:  None  Thromboembolic Signs/Symptoms:  None  Medication Changes:  No  Dietary Changes:  No  Activity Changes: No  Bacterial/Viral Infection:  No  Missed Coumadin Doses:  None  On ASA: No  Other Concerns:  No    OBJECTIVE:  /73   Pulse 65   Temp 98.2  F (36.8  C)   Resp 16   Wt 127 kg (280 lb)   SpO2 96%   BMI 45.19 kg/m  "   Last INR: 1.91 on 1/29  INR Today: 2.28.  Current Dose: 2mg M/W/F/Sat, 1mg AOD.    ASSESSMENT:  Controlled atrial fibrillation (H)  Long term current use of anticoagulant with international normalized ratio (INR) goal of 1.5-2.0  Encounter for therapeutic drug monitoring  Chronic. Stable. Therapeutic. Will dose Coumadin as noted below and recheck INR in 1 week. Follow-up accordingly.    PLAN:   Orders:  1. Coumadin 2mg PO on M/W/F/Sat.   2. Coumadin 1mg PO on AOD.  3. Recheck INR x1 in 1 week.   Dx: afib.    Video-Visit Details  Type of service:  Video Visit  Video Start Time: 1138  Video End Time (time video stopped): 1140  Originating Location (pt. Location): Long term [facility]  Distant Location (provider location):  [facility] GERIATRIC SERVICES   Mode of Communication:  Video Conference.    Electronically signed by:  JOSUE Clark CNP DNP

## 2021-02-05 ENCOUNTER — VIRTUAL VISIT (OUTPATIENT)
Dept: GERIATRICS | Facility: CLINIC | Age: 86
End: 2021-02-05
Payer: COMMERCIAL

## 2021-02-05 DIAGNOSIS — I48.91 CONTROLLED ATRIAL FIBRILLATION (H): Primary | ICD-10-CM

## 2021-02-05 DIAGNOSIS — Z51.81 ENCOUNTER FOR THERAPEUTIC DRUG MONITORING: ICD-10-CM

## 2021-02-05 DIAGNOSIS — Z79.01 LONG TERM CURRENT USE OF ANTICOAGULANT WITH INTERNATIONAL NORMALIZED RATIO (INR) GOAL OF 1.5-2.0: ICD-10-CM

## 2021-02-05 LAB — INR PPP: 2.28 (ref 0.9–1.1)

## 2021-02-05 PROCEDURE — 99308 SBSQ NF CARE LOW MDM 20: CPT | Mod: 95 | Performed by: NURSE PRACTITIONER

## 2021-02-05 NOTE — LETTER
"    2/5/2021        RE: Mecca Slade  [facility]  [address] [facility] [address]        [facility] Geriatrics Video Visit  Mecca Slade is a 89 year old female who is being evaluated via a billable video visit due to the restrictions of the COVID19 pandemic.The patient has been notified of following: \"This video visit will be conducted via a call between you and your provider. We have found that certain health care needs can be provided without the need for an in-person physical exam.  This service lets us provide the care you need with a video conversation.  If a prescription is necessary we can send it directly to your pharmacy.  If lab work is needed we can place an order for that and you can then stop by our lab to have the test done at a later time. If during the course of the call the provider feels a video visit is not appropriate, you will not be charged for this service.\"     Proivder has received verbal consent for a Video Visit from the patient? Yes    Patient/facility would like the video invitation sent by: Send to e-mail at: [email or web address]     This visit took place virtually, with the patient located at [facility]   ________________________________________________  Video Start Time: 1138  INR/Coumadin Encounter  [place] MRN: [id number]. HPI: Mecca Slade is a 89 year old  (12/14/1921), who is being seen today for an episodic care visit at the above location.   HPI information obtained from: facility chart records, facility staff, patient report, [facility] chart review and Care Everywhere [facility] chart review. Today's concern is INR/Coumadin management for A. Fib    ROS/Subjective:  Bleeding Signs/Symptoms:  None  Thromboembolic Signs/Symptoms:  None  Medication Changes:  No  Dietary Changes:  No  Activity Changes: No  Bacterial/Viral Infection:  No  Missed Coumadin Doses:  None  On ASA: No  Other Concerns:  No    OBJECTIVE:  BP " 133/73   Pulse 65   Temp 98.2  F (36.8  C)   Resp 16   Wt 127 kg (280 lb)   SpO2 96%   BMI 45.19 kg/m    Last INR: 1.91 on 1/29  INR Today: 2.28.  Current Dose: 2mg M/W/F/Sat, 1mg AOD.    ASSESSMENT:  Controlled atrial fibrillation (H)  Long term current use of anticoagulant with international normalized ratio (INR) goal of 1.5-2.0  Encounter for therapeutic drug monitoring  Chronic. Stable. Therapeutic. Will dose Coumadin as noted below and recheck INR in 1 week. Follow-up accordingly.    PLAN:   Orders:  1. Coumadin 2mg PO on M/W/F/Sat.   2. Coumadin 1mg PO on AOD.  3. Recheck INR x1 in 1 week.   Dx: afib.    Video-Visit Details  Type of service:  Video Visit  Video Start Time: 1138  Video End Time (time video stopped): 1140  Originating Location (pt. Location): Long term Care  Distant Location (provider location):  [facility] GERIATRIC SERVICES   Mode of Communication:  Video Conference.    Electronically signed by:  JOSUE Clark CNP DNP          Sincerely,        JOSUE Gillis CNP

## 2021-02-10 ENCOUNTER — RECORDS - HEALTHEAST (OUTPATIENT)
Dept: LAB | Facility: CLINIC | Age: 86
End: 2021-02-10

## 2021-02-12 ENCOUNTER — TELEPHONE (OUTPATIENT)
Dept: GERIATRICS | Facility: CLINIC | Age: 86
End: 2021-02-12

## 2021-02-12 ENCOUNTER — TRANSFERRED RECORDS (OUTPATIENT)
Dept: HEALTH INFORMATION MANAGEMENT | Facility: CLINIC | Age: 86
End: 2021-02-12

## 2021-02-12 LAB
INR PPP: 2.63 (ref 0.9–1.1)
INR PPP: 2.63 (ref 0.9–1.1)

## 2021-02-12 NOTE — TELEPHONE ENCOUNTER
[facility]iatric[facility]'s Telephone Encounter  Chief Complaint   Patient presents with     INR RESULTS   Mecca Slade is a 89 year old (12/14/1921). Nursing team called/messaged today to report: INR of 2.63. Last INR was also therapeutic one week ago.     ASSESSMENT/PLAN  Chronic. Stable. Therapeutic. Will dose Coumadin as noted below and recheck INR in 2 weeks. Follow-up accordingly.    Orders:  1. Coumadin 2mg PO on M/W/F/Sat.   2. Coumadin 1mg PO on AOD.  3. Recheck INR x1 on 2/25.   Dx: afib.    Electronically signed by:  Dr. Haley Sims, APRN CNP DNP

## 2021-02-23 ENCOUNTER — RECORDS - HEALTHEAST (OUTPATIENT)
Dept: LAB | Facility: CLINIC | Age: 86
End: 2021-02-23

## 2021-02-25 ENCOUNTER — TRANSFERRED RECORDS (OUTPATIENT)
Dept: HEALTH INFORMATION MANAGEMENT | Facility: CLINIC | Age: 86
End: 2021-02-25

## 2021-02-25 LAB
INR PPP: 2.39 (ref 0.9–1.1)
INR PPP: 2.39 (ref 0.9–1.1)

## 2021-02-26 ENCOUNTER — NURSING HOME VISIT (OUTPATIENT)
Dept: GERIATRICS | Facility: CLINIC | Age: 86
End: 2021-02-26
Payer: COMMERCIAL

## 2021-02-26 VITALS
HEIGHT: 66 IN | BODY MASS INDEX: 32.64 KG/M2 | RESPIRATION RATE: 18 BRPM | SYSTOLIC BLOOD PRESSURE: 139 MMHG | WEIGHT: 203.1 LBS | HEART RATE: 77 BPM | DIASTOLIC BLOOD PRESSURE: 77 MMHG | TEMPERATURE: 97.4 F | OXYGEN SATURATION: 98 %

## 2021-02-26 DIAGNOSIS — M15.0 PRIMARY OSTEOARTHRITIS INVOLVING MULTIPLE JOINTS: ICD-10-CM

## 2021-02-26 DIAGNOSIS — E11.51 TYPE II DIABETES MELLITUS WITH PERIPHERAL CIRCULATORY DISORDER (H): ICD-10-CM

## 2021-02-26 DIAGNOSIS — N18.4 CKD (CHRONIC KIDNEY DISEASE) STAGE 4, GFR 15-29 ML/MIN (H): ICD-10-CM

## 2021-02-26 DIAGNOSIS — Z87.39 H/O ACUTE GOUTY ARTHRITIS: Primary | ICD-10-CM

## 2021-02-26 DIAGNOSIS — E66.01 MORBID OBESITY (H): ICD-10-CM

## 2021-02-26 PROCEDURE — 99309 SBSQ NF CARE MODERATE MDM 30: CPT | Performed by: NURSE PRACTITIONER

## 2021-02-26 ASSESSMENT — MIFFLIN-ST. JEOR: SCORE: 1313.01

## 2021-02-26 NOTE — PROGRESS NOTES
[facility] [place] [facility]IC SERVICE  Episodic/Acute/Follow-Up  [place] MRN: [id number]. Place of Service where encounter took place:  [facility] () [[id number]]   Chief Complaint   Patient presents with     RECHECK   HPI: Mecca Slade  is a 89 year old (12/14/1921), who is being seen today for an episodic care visit.  HPI information obtained from: facility chart records, facility staff, patient report, [facility] chart review and Care Everywhere [facility] chart review. Today's concern is:    Nursing requested provider review of Zayda's hand today, as they reported finger swelling w/ gouty tophi present and inflamed (had been stable prior). Today, Zayda denies, headache, dizziness, nausea, heartburn, CP, SOB, cough, fever. She statesher finger hurts when I press on it, but that it is looking much better today. Chart review shows VS are stable.     Past Medical and Surgical History reviewed in Epic today.  MEDICATIONS:  Current Outpatient Medications   Medication Sig Dispense Refill     Acetaminophen (TYLENOL PO) Take 500 mg by mouth 2 times daily        ATORVASTATIN CALCIUM PO Take 10 mg by mouth daily       CARBOXYMETHYLCELLULOSE SODIUM 0.5 % SOLN ophthalmic solution INSTILL 1 DROP IN EACH EYE EVERY MORNING 15 mL 3     Furosemide (LASIX PO) Take 40 mg by mouth daily       Menthol, Topical Analgesic, (BENGAY COLD THERAPY) 5 % GEL Externally apply topically 4 times daily as needed (pain) 113 g 0     metFORMIN (GLUCOPHAGE-XR) 500 MG 24 hr tablet Take 1 tablet (500 mg) by mouth daily (with dinner) 30 tablet 0     metoprolol succinate ER (TOPROL-XL) 25 MG 24 hr tablet Take 1 tablet (25 mg) by mouth daily 30 tablet 0     pantoprazole (PROTONIX) 20 MG EC tablet Take 20 mg by mouth daily       simethicone (MYLICON) 80 MG chewable tablet Take 2 tablets (160 mg) by mouth daily       Warfarin Sodium (COUMADIN PO) Take by mouth daily Take as directed per INR results       REVIEW OF SYSTEMS: Limited secondary  "to cognitive impairment but today pt reports the above and 4 point ROS including Respiratory, CV, GI and , other than that noted in the HPI, is negative.    Objective: /77   Pulse 77   Temp 97.4  F (36.3  C)   Resp 18   Ht 1.676 m (5' 6\")   Wt 92.1 kg (203 lb 1.6 oz)   SpO2 98%   BMI 32.78 kg/m    Exam:  GENERAL APPEARANCE: Alert, in no distress, cooperative.   ENT: Mouth/posterior oropharynx intact w/ moist mucous membranes, hearing acuity [place].  EYES: EOM, conjunctivae, lids, pupils and irises normal, PERRL2.   RESP: Respiratory effort good, no respiratory distress, Lung sounds clear. On RA.   CV: Auscultation of heart reveals S1, S2, rate-controlled and rhythm irregular, no murmur, no rub or gallop, Edema 1+ BLE. Peripheral pulses are 2+.  ABDOMEN: Normal bowel sounds, soft, non-tender abdomen, and no masses palpated.  SKIN: Inspection/Palpation of skin and subcutaneous tissue baseline w/ fragility.    NEURO: CN II-XII at patient's baseline, sensation baseline PPS.  PSYCH: Insight, judgement, and memory are baseline, affect and mood are happy/engaged.    Labs: Labs done in facility are in EPIC. Please refer to them using [employer] Everywhere.    ASSESSMENT/PLAN:  H/O acute gouty arthritis  Primary osteoarthritis involving multiple joints  Type II diabetes mellitus with peripheral circulatory disorder (H)  Stage 4 chronic kidney disease (H)  Morbid obesity (H)  Acute-on-chronic. Ongoing.    Zayda is experiencing an arthritis flare, but this does not appear to be gout.  Nevertheless, we will trend some blood work to identify inflammation and uric acid levels.    To treat Zayda in the meantime, we will increase her scheduled Tylenol, apply ice to her arthritic lesions, and ask nursing to continue to monitor.    Her CKD and obesity are ongoing at baseline. We will continue the current plan-of-care for these items.   Follow-up within 1 week or as needed.    Orders written by provider at facility and " transcribed by : Christina Goodwin  1. Increase scheduled Tylenol to 1000 mg PO TID - dx: arthritis  2. Apply ice to L middle finger TID x 7 days - dx: arthritis flare  3. CRP & Vric acid level x 1 on 3/1 - dx: arthritis flare    Electronically signed by:  Dr. Haley Sims, APRN CNP DNP

## 2021-02-26 NOTE — LETTER
2/26/2021        RE: Mecca Slade  [facility]  [address] [facility] [place] [address]        [facility] [place] GERIATRIC SERVICE  Episodic/Acute/Follow-Up  [place] MRN: [id number]. Place of Service where encounter took place:  [facility] () [[id number]]   Chief Complaint   Patient presents with     RECHECK   HPI: Mecca Slade  is a 89 year old (12/14/1921), who is being seen today for an episodic care visit.  HPI information obtained from: facility chart records, facility staff, patient report, [facility] chart review and Care Everywhere [facility] chart review. Today's concern is:    Nursing requested provider review of Zayda's hand today, as they reported finger swelling w/ gouty tophi present and inflamed (had been stable prior). Today, Zayda denies, headache, dizziness, nausea, heartburn, CP, SOB, cough, fever. She statesher finger hurts when I press on it, but that it is looking much better today. Chart review shows VS are stable.     Past Medical and Surgical History reviewed in Epic today.  MEDICATIONS:  Current Outpatient Medications   Medication Sig Dispense Refill     Acetaminophen (TYLENOL PO) Take 500 mg by mouth 2 times daily        ATORVASTATIN CALCIUM PO Take 10 mg by mouth daily       CARBOXYMETHYLCELLULOSE SODIUM 0.5 % SOLN ophthalmic solution INSTILL 1 DROP IN EACH EYE EVERY MORNING 15 mL 3     Furosemide (LASIX PO) Take 40 mg by mouth daily       Menthol, Topical Analgesic, (BENGAY COLD THERAPY) 5 % GEL Externally apply topically 4 times daily as needed (pain) 113 g 0     metFORMIN (GLUCOPHAGE-XR) 500 MG 24 hr tablet Take 1 tablet (500 mg) by mouth daily (with dinner) 30 tablet 0     metoprolol succinate ER (TOPROL-XL) 25 MG 24 hr tablet Take 1 tablet (25 mg) by mouth daily 30 tablet 0     pantoprazole (PROTONIX) 20 MG EC tablet Take 20 mg by mouth daily       simethicone (MYLICON) 80 MG chewable tablet Take 2 tablets (160 mg) by mouth daily       Warfarin  "Sodium (COUMADIN PO) Take by mouth daily Take as directed per INR results       REVIEW OF SYSTEMS: Limited secondary to cognitive impairment but today pt reports the above and 4 point ROS including Respiratory, CV, GI and , other than that noted in the HPI, is negative.    Objective: /77   Pulse 77   Temp 97.4  F (36.3  C)   Resp 18   Ht 1.676 m (5' 6\")   Wt 92.1 kg (203 lb 1.6 oz)   SpO2 98%   BMI 32.78 kg/m    Exam:  GENERAL APPEARANCE: Alert, in no distress, cooperative.   ENT: Mouth/posterior oropharynx intact w/ moist mucous membranes, hearing acuity [place].  EYES: EOM, conjunctivae, lids, pupils and irises normal, PERRL2.   RESP: Respiratory effort good, no respiratory distress, Lung sounds clear. On RA.   CV: Auscultation of heart reveals S1, S2, rate-controlled and rhythm irregular, no murmur, no rub or gallop, Edema 1+ BLE. Peripheral pulses are 2+.  ABDOMEN: Normal bowel sounds, soft, non-tender abdomen, and no masses palpated.  SKIN: Inspection/Palpation of skin and subcutaneous tissue baseline w/ fragility.    NEURO: CN II-XII at patient's baseline, sensation baseline PPS.  PSYCH: Insight, judgement, and memory are baseline, affect and mood are happy/engaged.    Labs: Labs done in facility are in EPIC. Please refer to them using [employer]/Care Everywhere.    ASSESSMENT/PLAN:  H/O acute gouty arthritis  Primary osteoarthritis involving multiple joints  Type II diabetes mellitus with peripheral circulatory disorder (H)  Stage 4 chronic kidney disease (H)  Morbid obesity (H)  Acute-on-chronic. Ongoing.    Zayda is experiencing an arthritis flare, but this does not appear to be gout.  Nevertheless, we will trend some blood work to identify inflammation and uric acid levels.    To treat Zayda in the meantime, we will increase her scheduled Tylenol, apply ice to her arthritic lesions, and ask nursing to continue to monitor.    Her CKD and obesity are ongoing at baseline. We will continue the current " plan-of-care for these items.   Follow-up within 1 week or as needed.    Orders written by provider at facility and transcribed by : Christina Goodwin  1. Increase scheduled Tylenol to 1000 mg PO TID - dx: arthritis  2. Apply ice to L middle finger TID x 7 days - dx: arthritis flare  3. CRP & Vric acid level x 1 on 3/1 - dx: arthritis flare    Electronically signed by:  Dr. Haley Sims, JOSUE CNP DNP          Sincerely,        JOSUE Gillis CNP

## 2021-02-27 ENCOUNTER — RECORDS - HEALTHEAST (OUTPATIENT)
Dept: LAB | Facility: CLINIC | Age: 86
End: 2021-02-27

## 2021-02-27 PROBLEM — N18.4 CKD (CHRONIC KIDNEY DISEASE) STAGE 4, GFR 15-29 ML/MIN (H): Status: ACTIVE | Noted: 2017-12-11

## 2021-03-01 ENCOUNTER — TELEPHONE (OUTPATIENT)
Dept: GERIATRICS | Facility: CLINIC | Age: 86
End: 2021-03-01

## 2021-03-01 LAB
C REACTIVE PROTEIN LHE: 0.3 MG/DL (ref 0–0.8)
URATE SERPL-MCNC: 9.6 MG/DL (ref 2–7.5)

## 2021-03-01 NOTE — TELEPHONE ENCOUNTER
Orders written by provider at facility and transcribed by : Christina Goodwin  1. Colchicine 0.6 mg PO x 1 today - dx: gout  2. Colchicine 0.3 mg PO x 1 one hour after first dose - dx: gout  3. Recheck INR x 1 on 3/4 - dx: afib

## 2021-03-02 ENCOUNTER — RECORDS - HEALTHEAST (OUTPATIENT)
Dept: LAB | Facility: CLINIC | Age: 86
End: 2021-03-02

## 2021-03-03 NOTE — PROGRESS NOTES
"[place] Annual Physical  Chief Complaint   Patient presents with     Annual Comprehensive Nursing Home    [place] MRN: [id number] Place of Service where encounter took place:  [facility] () [[id number]] HPI: Mecca Slade  is a 89 year old  (12/14/1921), who is being seen today for an annual comprehensive visit. HPI information obtained from: facility chart records, facility staff, patient report, [facility] chart review and Care Everywhere [facility] chart review.  Today's concerns are:    Zayda seen today for her annual physical exam.  This year she has endured several bouts of gouty arthritis flares, INR fluctuations, and the novel corona virus.  We note that given her age and goals of care, we would not recommend cancer screenings.  She is up-to-date on her vaccinations.  And her blood pressures are overall controlled.  Her mental health screenings are included below.    Today, Zayda states that the pain in her hands is a lot better than last week.  We suspected that colchicine would contribute to INR changes, and sure enough her INR resulted today at 3.9.  She denies signs and symptoms of bleeding/bruising.  Zayda states that she does not have any headaches, dizziness, shortness of breath, palpitations, nausea, or trouble with sleeping.  She states her appetite is \"too good!\"  She states that she sleeps very well in her recliner.  She states that she fluctuates between constipation and loose stools, and this is believed to be triggered with food that she eats.  She has not been able to pinpoint a specific food that causes problems.  Chart review shows that her vital signs are overall stable or at her baseline.    PHQ9: 3/27.  BIMS: 12/15.    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  IMMUNIZATIONS:  Immunization History   Administered Date(s) Administered     COVID-19,PF,Moderna 12/31/2020, 02/02/2021     Flu, Unspecified 10/06/2004, 11/16/2005, 10/26/2006, 10/25/2018     Influenza (High Dose) 3 valent vaccine 10/14/2010, 09/30/2011, 11/03/2015, 10/06/2016     Influenza (IIV3) PF 10/06/2004, 10/26/2006, 10/25/2007, 10/14/2008, 09/18/2009, 09/07/2012, 10/06/2016     Influenza Quad, Recombinant, p-free (RIV4) 10/08/2019     Influenza Vaccine IM > 6 months Valent IIV4 09/03/2013, 10/01/2016     Influenza, Whole Virus 10/20/2017     Pneumo Conj 13-V (2010&after) 04/20/2017     Pneumococcal 23 valent 11/06/1995, 01/27/2005, 10/28/2011     Pneumococcal, Unspecified 07/01/2011     TD (ADULT, 7+) 10/07/1997, 01/24/2001, 12/14/2017     TDAP Vaccine (Adacel) 12/12/2017     Td (Adult), Adsorbed 10/07/1997, 01/24/2001   Above immunizations pulled from [facility]. [facility] and facility records also reconciled. Outstanding information sent to  to update [facility].  Future immunizations are not needed at this point as all recommended immunizations are up to date.   Current Outpatient Medications   Medication Sig Dispense Refill     Acetaminophen (TYLENOL PO) Take 1,000 mg by mouth 3 times daily        ATORVASTATIN CALCIUM PO Take 10 mg by mouth daily        CARBOXYMETHYLCELLULOSE SODIUM 0.5 % SOLN ophthalmic solution INSTILL 1 DROP IN EACH EYE EVERY MORNING 15 mL 3     Furosemide (LASIX PO) Take 40 mg by mouth daily       Menthol, Topical Analgesic, (BENGAY COLD THERAPY) 5 % GEL Externally apply topically 4 times daily as needed (pain) 113 g 0     metFORMIN (GLUCOPHAGE-XR) 500 MG 24 hr tablet Take 1 tablet (500 mg) by mouth daily (with dinner) 30 tablet 0     metoprolol succinate ER (TOPROL-XL) 25 MG 24 hr tablet Take 1 tablet (25 mg) by mouth daily 30 tablet 0     pantoprazole (PROTONIX) 20 MG EC tablet Take 20 mg by mouth daily       simethicone (MYLICON) 80 MG chewable tablet Take 2 tablets (160 mg) by mouth daily       Warfarin Sodium (COUMADIN PO) Take by mouth daily Take as directed per INR results       Case Management: I have reviewed the facility/SNF care plan/MDS, including the falls risk, nutrition and pain screening. I also reviewed the current immunizations, and preventive care. .Future cancer screening is not clinically indicated secondary to age/goals of care Patient's desire to return to the community is not assessible due to cognitive impairment. Current Level of Care is appropriate.    Advance Directive Discussion: I reviewed the current advanced directives as reflected in EPIC, the POLST and the facility chart, and verified the congruency of orders. I contacted the first party Jacqui and discussed the plan of Care. I did not due to cognitive impairment review the advance directives with the resident.     Team Discussion: I communicated with the appropriate disciplines involved with the Plan of Care: Nursing  .Patient's goal is pain control and comfort. Information reviewed: Medications, vital signs, orders, and nursing notes.    ROS: Limited secondary to cognitive impairment but today pt reports the above and 10 point ROS of systems including Constitutional, Eyes, Respiratory, Cardiovascular, Gastroenterology, Genitourinary, Integumentary,  "Musculoskeletal, Psychiatric were all negative except for pertinent positives noted in my HPI.    Vitals: BP (!) 140/83   Pulse 77   Temp 97.8  F (36.6  C)   Resp 18   Ht 1.676 m (5' 6\")   Wt 91.8 kg (202 lb 6.4 oz)   SpO2 94%   BMI 32.67 kg/m     Exam:  GENERAL APPEARANCE: Alert, in no distress, cooperative.   ENT: Mouth/posterior oropharynx intact w/ moist mucous membranes, hearing acuity [place].  EYES: EOM, conjunctivae, lids, pupils and irises normal, PERRL2.   RESP: Respiratory effort good, no respiratory distress, Lung sounds clear. On RA.   CV: Auscultation of heart reveals S1, S2, rate-controlled and rhythm irregular, no murmur, no rub or gallop, Edema 1+ BLE. Peripheral pulses are 2+.  ABDOMEN: Normal bowel sounds, soft, non-tender abdomen, and no masses palpated.  SKIN: Inspection/Palpation of skin and subcutaneous tissue baseline w/ fragility. No wounds/rashes noted.   NEURO: CN II-XII at patient's baseline, sensation baseline PPS.  PSYCH: Insight, judgement, and memory are baseline, affect and mood are happy/engaged.    Lab/Diagnostic data: Recent labs in [facility] reviewed by me today.     ASSESSMENT/PLAN  Annual physical exam  H/O acute gouty arthritis  Primary osteoarthritis involving multiple joints  Type II diabetes mellitus with peripheral circulatory disorder (H)  CKD (chronic kidney disease) stage 4, GFR 15-29 ml/min (H)  Controlled atrial fibrillation (H)  Chronic diastolic congestive heart failure (H)  Essential hypertension  H/O: CVA (cerebrovascular accident), R MCA embolism s/p tPA, March 2017  Encounter for therapeutic drug monitoring  Long term current use of anticoagulant with international normalized ratio (INR) goal of 1.5-2.0  Morbid obesity (H)  Hyperparathyroidism, primary (H)  Conductive hearing loss, bilateral  Acute-on-chronic. Ongoing.    Given Zayda's age and goals of care, we will not pursue cancer screenings.      We will pursue routine blood testing to screen her diabetes " control, lipids, thyroid, and her notable hyperparathyroidism.  We will also trend her INR and dose Coumadin as noted below.    We coordinated care with her daughter Esther, we confirmed this plan of care and also that Zayda will remain DNR/DNI.    Zayda's gout flare appears to be better controlled today, but as suspected the colchicine did slightly raise INR.  Nursing to monitor for signs and symptoms of bleeding.  Follow-up routinely or as needed.    Orders:  1. CBC, CMP, a1c, lipids, TSH, PTH, INR x1 on 3/8. Dx: annual physical exam.   2. Hold Coumadin x1.  3. Coumadin 2mg PO every day on AOD.  4. DNR/DNI.     Electronically signed by:  Dr. Haley Sims, JOSUE CNP DNP

## 2021-03-04 ENCOUNTER — NURSING HOME VISIT (OUTPATIENT)
Dept: GERIATRICS | Facility: CLINIC | Age: 86
End: 2021-03-04
Payer: COMMERCIAL

## 2021-03-04 VITALS
DIASTOLIC BLOOD PRESSURE: 83 MMHG | WEIGHT: 202.4 LBS | BODY MASS INDEX: 32.53 KG/M2 | TEMPERATURE: 97.8 F | OXYGEN SATURATION: 94 % | SYSTOLIC BLOOD PRESSURE: 140 MMHG | HEART RATE: 77 BPM | RESPIRATION RATE: 18 BRPM | HEIGHT: 66 IN

## 2021-03-04 DIAGNOSIS — E66.01 MORBID OBESITY (H): ICD-10-CM

## 2021-03-04 DIAGNOSIS — H90.0 CONDUCTIVE HEARING LOSS, BILATERAL: ICD-10-CM

## 2021-03-04 DIAGNOSIS — N18.4 CKD (CHRONIC KIDNEY DISEASE) STAGE 4, GFR 15-29 ML/MIN (H): ICD-10-CM

## 2021-03-04 DIAGNOSIS — Z87.39 H/O ACUTE GOUTY ARTHRITIS: Primary | ICD-10-CM

## 2021-03-04 DIAGNOSIS — Z51.81 ENCOUNTER FOR THERAPEUTIC DRUG MONITORING: ICD-10-CM

## 2021-03-04 DIAGNOSIS — Z86.73 H/O: CVA (CEREBROVASCULAR ACCIDENT): ICD-10-CM

## 2021-03-04 DIAGNOSIS — Z00.00 ANNUAL PHYSICAL EXAM: ICD-10-CM

## 2021-03-04 DIAGNOSIS — I48.91 CONTROLLED ATRIAL FIBRILLATION (H): ICD-10-CM

## 2021-03-04 DIAGNOSIS — I10 ESSENTIAL HYPERTENSION: ICD-10-CM

## 2021-03-04 DIAGNOSIS — I50.32 CHRONIC DIASTOLIC CONGESTIVE HEART FAILURE (H): ICD-10-CM

## 2021-03-04 DIAGNOSIS — Z79.01 LONG TERM CURRENT USE OF ANTICOAGULANT WITH INTERNATIONAL NORMALIZED RATIO (INR) GOAL OF 1.5-2.0: ICD-10-CM

## 2021-03-04 DIAGNOSIS — M15.0 PRIMARY OSTEOARTHRITIS INVOLVING MULTIPLE JOINTS: ICD-10-CM

## 2021-03-04 DIAGNOSIS — E21.0 HYPERPARATHYROIDISM, PRIMARY (H): ICD-10-CM

## 2021-03-04 DIAGNOSIS — E11.51 TYPE II DIABETES MELLITUS WITH PERIPHERAL CIRCULATORY DISORDER (H): ICD-10-CM

## 2021-03-04 LAB — INR PPP: 3.98 (ref 0.9–1.1)

## 2021-03-04 PROCEDURE — 99318 PR ANNUAL NURSING FAC ASSESSMNT, STABLE: CPT | Performed by: NURSE PRACTITIONER

## 2021-03-04 ASSESSMENT — MIFFLIN-ST. JEOR: SCORE: 1309.83

## 2021-03-04 NOTE — LETTER
"    3/4/2021        RE: Mecca Slade  [facility]  [address] [facility] [address]        [place] Annual Physical  Chief Complaint   Patient presents with     Annual Comprehensive Nursing Home    [place] MRN: [id number] Place of Service where encounter took place:  [facility] () [[id number]] HPI: Mecca Slade  is a 89 year old  (12/14/1921), who is being seen today for an annual comprehensive visit. HPI information obtained from: facility chart records, facility staff, patient report, [facility] chart review and Care Everywhere [facility] chart review.  Today's concerns are:    Zayda seen today for her annual physical exam.  This year she has endured several bouts of gouty arthritis flares, INR fluctuations, and the novel corona virus.  We note that given her age and goals of care, we would not recommend cancer screenings.  She is up-to-date on her vaccinations.  And her blood pressures are overall controlled.  Her mental health screenings are included below.    Today, Zayda states that the pain in her hands is a lot better than last week.  We suspected that colchicine would contribute to INR changes, and sure enough her INR resulted today at 3.9.  She denies signs and symptoms of bleeding/bruising.  Zayda states that she does not have any headaches, dizziness, shortness of breath, palpitations, nausea, or trouble with sleeping.  She states her appetite is \"too good!\"  She states that she sleeps very well in her recliner.  She states that she fluctuates between constipation and loose stools, and this is believed to be triggered with food that she eats.  She has not been able to pinpoint a specific food that causes problems.  Chart review shows that her vital signs are overall stable or at her baseline.    PHQ9: 3/27.  BIMS: 12/15.    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  IMMUNIZATIONS:  Immunization History   Administered Date(s) Administered     COVID-19,PF,Moderna 12/31/2020, 02/02/2021     Flu, Unspecified 10/06/2004, 11/16/2005, 10/26/2006, 10/25/2018     Influenza (High Dose) 3 valent vaccine 10/14/2010, 09/30/2011, 11/03/2015, 10/06/2016     Influenza (IIV3) PF 10/06/2004, 10/26/2006, 10/25/2007, 10/14/2008, 09/18/2009, 09/07/2012, 10/06/2016     Influenza Quad, Recombinant, p-free (RIV4) 10/08/2019     Influenza Vaccine IM > 6 months Valent IIV4 09/03/2013, 10/01/2016     Influenza, Whole Virus 10/20/2017     Pneumo Conj 13-V (2010&after) 04/20/2017     Pneumococcal 23 valent 11/06/1995, 01/27/2005, 10/28/2011     Pneumococcal, Unspecified 07/01/2011     TD (ADULT, 7+) 10/07/1997, 01/24/2001, 12/14/2017     TDAP Vaccine (Adacel) 12/12/2017     Td (Adult), Adsorbed 10/07/1997, 01/24/2001   Above immunizations pulled from [facility]. MIIC and facility records also reconciled. Outstanding information sent to  to update [facility].  Future immunizations are not needed at this point as all recommended immunizations are up to date.   Current Outpatient Medications   Medication Sig Dispense Refill     Acetaminophen  (TYLENOL PO) Take 1,000 mg by mouth 3 times daily        ATORVASTATIN CALCIUM PO Take 10 mg by mouth daily       CARBOXYMETHYLCELLULOSE SODIUM 0.5 % SOLN ophthalmic solution INSTILL 1 DROP IN EACH EYE EVERY MORNING 15 mL 3     Furosemide (LASIX PO) Take 40 mg by mouth daily       Menthol, Topical Analgesic, (BENGAY COLD THERAPY) 5 % GEL Externally apply topically 4 times daily as needed (pain) 113 g 0     metFORMIN (GLUCOPHAGE-XR) 500 MG 24 hr tablet Take 1 tablet (500 mg) by mouth daily (with dinner) 30 tablet 0     metoprolol succinate ER (TOPROL-XL) 25 MG 24 hr tablet Take 1 tablet (25 mg) by mouth daily 30 tablet 0     pantoprazole (PROTONIX) 20 MG EC tablet Take 20 mg by mouth daily       simethicone (MYLICON) 80 MG chewable tablet Take 2 tablets (160 mg) by mouth daily       Warfarin Sodium (COUMADIN PO) Take by mouth daily Take as directed per INR results       Case Management: I have reviewed the facility/SNF care plan/MDS, including the falls risk, nutrition and pain screening. I also reviewed the current immunizations, and preventive care. .Future cancer screening is not clinically indicated secondary to age/goals of care Patient's desire to return to the community is not assessible due to cognitive impairment. Current Level of Care is appropriate.    Advance Directive Discussion: I reviewed the current advanced directives as reflected in EPIC, the POLST and the facility chart, and verified the congruency of orders. I contacted the first party Jacqui and discussed the plan of Care. I did not due to cognitive impairment review the advance directives with the resident.     Team Discussion: I communicated with the appropriate disciplines involved with the Plan of Care: Nursing  .Patient's goal is pain control and comfort. Information reviewed: Medications, vital signs, orders, and nursing notes.    ROS: Limited secondary to cognitive impairment but today pt reports the above and 10 point ROS of systems  "including Constitutional, Eyes, Respiratory, Cardiovascular, Gastroenterology, Genitourinary, Integumentary, Musculoskeletal, Psychiatric were all negative except for pertinent positives noted in my HPI.    Vitals: BP (!) 140/83   Pulse 77   Temp 97.8  F (36.6  C)   Resp 18   Ht 1.676 m (5' 6\")   Wt 91.8 kg (202 lb 6.4 oz)   SpO2 94%   BMI 32.67 kg/m     Exam:  GENERAL APPEARANCE: Alert, in no distress, cooperative.   ENT: Mouth/posterior oropharynx intact w/ moist mucous membranes, hearing acuity [place].  EYES: EOM, conjunctivae, lids, pupils and irises normal, PERRL2.   RESP: Respiratory effort good, no respiratory distress, Lung sounds clear. On RA.   CV: Auscultation of heart reveals S1, S2, rate-controlled and rhythm irregular, no murmur, no rub or gallop, Edema 1+ BLE. Peripheral pulses are 2+.  ABDOMEN: Normal bowel sounds, soft, non-tender abdomen, and no masses palpated.  SKIN: Inspection/Palpation of skin and subcutaneous tissue baseline w/ fragility. No wounds/rashes noted.   NEURO: CN II-XII at patient's baseline, sensation baseline PPS.  PSYCH: Insight, judgement, and memory are baseline, affect and mood are happy/engaged.    Lab/Diagnostic data: Recent labs in [facility] reviewed by me today.     ASSESSMENT/PLAN  Annual physical exam  H/O acute gouty arthritis  Primary osteoarthritis involving multiple joints  Type II diabetes mellitus with peripheral circulatory disorder (H)  CKD (chronic kidney disease) stage 4, GFR 15-29 ml/min (H)  Controlled atrial fibrillation (H)  Chronic diastolic congestive heart failure (H)  Essential hypertension  H/O: CVA (cerebrovascular accident), R MCA embolism s/p tPA, March 2017  Encounter for therapeutic drug monitoring  Long term current use of anticoagulant with international normalized ratio (INR) goal of 1.5-2.0  Morbid obesity (H)  Hyperparathyroidism, primary (H)  Conductive hearing loss, bilateral  Acute-on-chronic. Ongoing.    Given Zayda's age and goals of " care, we will not pursue cancer screenings.      We will pursue routine blood testing to screen her diabetes control, lipids, thyroid, and her notable hyperparathyroidism.  We will also trend her INR and dose Coumadin as noted below.    We coordinated care with her daughter Esther, we confirmed this plan of care and also that Zayda will remain DNR/DNI.    Zayda's gout flare appears to be better controlled today, but as suspected the colchicine did slightly raise INR.  Nursing to monitor for signs and symptoms of bleeding.  Follow-up routinely or as needed.    Orders:  1. CBC, CMP, a1c, lipids, TSH, PTH, INR x1 on 3/8. Dx: annual physical exam.   2. Hold Coumadin x1.  3. Coumadin 2mg PO every day on AOD.  4. DNR/DNI.     Electronically signed by:  JOSUE Clark CNP DNP            Sincerely,        JOSUE Gillis CNP

## 2021-03-06 ENCOUNTER — RECORDS - HEALTHEAST (OUTPATIENT)
Dept: LAB | Facility: CLINIC | Age: 86
End: 2021-03-06

## 2021-03-08 ENCOUNTER — TRANSFERRED RECORDS (OUTPATIENT)
Dept: HEALTH INFORMATION MANAGEMENT | Facility: CLINIC | Age: 86
End: 2021-03-08

## 2021-03-08 ENCOUNTER — NURSING HOME VISIT (OUTPATIENT)
Dept: GERIATRICS | Facility: CLINIC | Age: 86
End: 2021-03-08
Payer: COMMERCIAL

## 2021-03-08 VITALS
OXYGEN SATURATION: 98 % | DIASTOLIC BLOOD PRESSURE: 61 MMHG | RESPIRATION RATE: 18 BRPM | HEIGHT: 66 IN | HEART RATE: 64 BPM | WEIGHT: 204.2 LBS | TEMPERATURE: 97.2 F | SYSTOLIC BLOOD PRESSURE: 108 MMHG | BODY MASS INDEX: 32.82 KG/M2

## 2021-03-08 DIAGNOSIS — Z79.01 LONG TERM CURRENT USE OF ANTICOAGULANT WITH INTERNATIONAL NORMALIZED RATIO (INR) GOAL OF 1.5-2.0: ICD-10-CM

## 2021-03-08 DIAGNOSIS — Z51.81 ENCOUNTER FOR THERAPEUTIC DRUG MONITORING: ICD-10-CM

## 2021-03-08 DIAGNOSIS — I48.91 CONTROLLED ATRIAL FIBRILLATION (H): Primary | ICD-10-CM

## 2021-03-08 LAB
ALBUMIN SERPL-MCNC: 3.2 G/DL (ref 3.5–5)
ALBUMIN SERPL-MCNC: 3.2 G/DL (ref 3.5–5)
ALP SERPL-CCNC: 136 U/L (ref 45–120)
ALP SERPL-CCNC: 136 U/L (ref 45–120)
ALT SERPL W P-5'-P-CCNC: 13 U/L (ref 0–45)
ALT SERPL-CCNC: 13 U/L (ref 0–45)
ANION GAP SERPL CALCULATED.3IONS-SCNC: 10 MMOL/L (ref 5–18)
ANION GAP SERPL CALCULATED.3IONS-SCNC: 10 MMOL/L (ref 5–18)
AST SERPL W P-5'-P-CCNC: 39 U/L (ref 0–40)
AST SERPL-CCNC: 39 U/L (ref 0–40)
BASOPHILS # BLD AUTO: 0.1 THOU/UL (ref 0–0.2)
BASOPHILS NFR BLD AUTO: 1 % (ref 0–2)
BILIRUB SERPL-MCNC: 0.7 MG/DL (ref 0–1)
BILIRUB SERPL-MCNC: 0.7 MG/DL (ref 0–1)
BUN SERPL-MCNC: 35 MG/DL (ref 8–28)
BUN SERPL-MCNC: 35 MG/DL (ref 8–28)
CALCIUM SERPL-MCNC: 10.9 MG/DL (ref 8.5–10.5)
CALCIUM SERPL-MCNC: 10.9 MG/DL (ref 8.5–10.5)
CHLORIDE BLD-SCNC: 104 MMOL/L (ref 98–107)
CHLORIDE SERPLBLD-SCNC: 104 MMOL/L (ref 98–107)
CHOLEST SERPL-MCNC: 169 MG/DL
CHOLEST SERPL-MCNC: 169 MG/DL
CO2 SERPL-SCNC: 25 MMOL/L (ref 22–31)
CO2 SERPL-SCNC: 25 MMOL/L (ref 22–31)
CREAT SERPL-MCNC: 1.09 MG/DL (ref 0.6–1.1)
CREAT SERPL-MCNC: 1.09 MG/DL (ref 0.6–1.1)
DIFFERENTIAL: ABNORMAL
EOSINOPHIL # BLD AUTO: 0.1 THOU/UL (ref 0–0.4)
EOSINOPHIL NFR BLD AUTO: 2 % (ref 0–6)
ERYTHROCYTE [DISTWIDTH] IN BLOOD BY AUTOMATED COUNT: 13.4 % (ref 11–14.5)
ERYTHROCYTE [DISTWIDTH] IN BLOOD BY AUTOMATED COUNT: 13.4 % (ref 11–14.5)
FASTING STATUS PATIENT QL REPORTED: ABNORMAL
GFR SERPL CREATININE-BSD FRML MDRD: 46 ML/MIN/1.73M2
GFR SERPL CREATININE-BSD FRML MDRD: 46 ML/MIN/1.73M2
GLUCOSE BLD-MCNC: 285 MG/DL (ref 70–125)
GLUCOSE SERPL-MCNC: 285 MG/DL (ref 70–125)
HBA1C MFR BLD: 8.6 %
HCT VFR BLD AUTO: 47.2 % (ref 35–47)
HCT VFR BLD AUTO: 47.2 % (ref 35–47)
HDLC SERPL-MCNC: 31 MG/DL
HDLC SERPL-MCNC: 31 MG/DL
HEMOGLOBIN: 15.2 G/DL (ref 12–16)
HGB BLD-MCNC: 15.2 G/DL (ref 12–16)
IMM GRANULOCYTES # BLD: 0 THOU/UL
IMM GRANULOCYTES NFR BLD: 1 %
INR PPP: 3.04 (ref 0.9–1.1)
INR PPP: 3.04 (ref 0.9–1.1)
LDLC SERPL CALC-MCNC: 88 MG/DL
LDLC SERPL CALC-MCNC: 88 MG/DL
LYMPHOCYTES # BLD AUTO: 1.3 THOU/UL (ref 0.8–4.4)
LYMPHOCYTES NFR BLD AUTO: 19 % (ref 20–40)
MCH RBC QN AUTO: 31.3 PG (ref 27–34)
MCH RBC QN AUTO: 31.3 PG (ref 27–34)
MCHC RBC AUTO-ENTMCNC: 32.2 G/DL (ref 32–36)
MCHC RBC AUTO-ENTMCNC: 32.2 G/DL (ref 32–36)
MCV RBC AUTO: 97 FL (ref 80–100)
MCV RBC AUTO: 97 FL (ref 80–100)
MONOCYTES # BLD AUTO: 0.5 THOU/UL (ref 0–0.9)
MONOCYTES NFR BLD AUTO: 7 % (ref 2–10)
NEUTROPHILS # BLD AUTO: 4.8 THOU/UL (ref 2–7.7)
NEUTROPHILS NFR BLD AUTO: 71 % (ref 50–70)
PLATELET # BLD AUTO: 134 THOU/UL (ref 140–440)
PLATELET # BLD AUTO: 134 THOU/UL (ref 140–440)
PMV BLD AUTO: 13 FL (ref 8.5–12.5)
POTASSIUM BLD-SCNC: 5 MMOL/L (ref 3.5–5)
POTASSIUM SERPL-SCNC: 5 MMOL/L (ref 3.5–5)
PROT SERPL-MCNC: 6.5 G/DL (ref 6–8)
PROT SERPL-MCNC: 6.5 G/DL (ref 6–8)
PTH-INTACT SERPL-MCNC: 222 PG/ML (ref 10–86)
RBC # BLD AUTO: 4.86 MILL/UL (ref 3.8–5.4)
RBC # BLD AUTO: 4.86 MILL/UL (ref 3.8–5.4)
SODIUM SERPL-SCNC: 139 MMOL/L (ref 136–145)
SODIUM SERPL-SCNC: 139 MMOL/L (ref 136–145)
TRIGL SERPL-MCNC: 250 MG/DL
TRIGL SERPL-MCNC: 250 MG/DL
TSH SERPL DL<=0.005 MIU/L-ACNC: 1.99 UIU/ML (ref 0.3–5)
WBC # BLD AUTO: 6.8 THOU/UL (ref 4–11)
WBC: 6.8 THOU/UL (ref 4–11)

## 2021-03-08 PROCEDURE — 99308 SBSQ NF CARE LOW MDM 20: CPT | Performed by: NURSE PRACTITIONER

## 2021-03-08 ASSESSMENT — MIFFLIN-ST. JEOR: SCORE: 1318

## 2021-03-08 NOTE — LETTER
"    3/8/2021        RE: Mecca Slade  [facility]  [address] [facility] [address]        [facility] GERIATRIC SERVICES  INR/Coumadin  [place] MRN: [id number].Place of Service where encounter took place: [facility] () [[id number]]   Chief Complaint   Patient presents with     Anticoagulation   HPI:   Mecca Slade is a 89 year old  (12/14/1921), who is being seen today for an episodic care visit at the above location.   HPI information obtained from: facility chart records, facility staff, patient report, [facility] chart review and Care Everywhere [facility] chart review. Today's concern is INR/Coumadin management for A. Fib    ROS/Subjective:  Bleeding Signs/Symptoms:  None  Thromboembolic Signs/Symptoms:  None  Medication Changes:  No  Dietary Changes:  No  Activity Changes: No  Bacterial/Viral Infection:  No  Missed Coumadin Doses:  None  On ASA: No  Other Concerns:  No    OBJECTIVE:  /61   Pulse 64   Temp 97.2  F (36.2  C)   Resp 18   Ht 1.676 m (5' 6\")   Wt 92.6 kg (204 lb 3.2 oz)   SpO2 98%   BMI 32.96 kg/m    Last INR: 3/4/21 on 3.98  INR Today:  3.04  Current Dose:  Held x1, 2mg AOD.     ASSESSMENT:  Controlled atrial fibrillation (H)  Encounter for therapeutic drug monitoring  Long term current use of anticoagulant with international normalized ratio (INR) goal of 1.5-2.0  Chronic. Stable. Slightly supratherapeutic, but trending down. Will dose Coumadin as noted below and recheck INR in 1 week. Follow-up accordingly.    PLAN:   Orders:  1. Coumadin 2mg PO every day.  2. Recheck INR x1 in 1 week.  Dx: afib.    Electronically signed by:  JOSUE Clark CNP DNP        Sincerely,        JOSUE Gillis CNP    "

## 2021-03-08 NOTE — PROGRESS NOTES
"[facility] GERIATRIC SERVICES  INR/Coumadin  [place] MRN: [id number].Place of Service where encounter took place: [facility] () [[id number]]   Chief Complaint   Patient presents with     Anticoagulation   HPI:   Mecca Slade is a 89 year old  (12/14/1921), who is being seen today for an episodic care visit at the above location.   HPI information obtained from: facility chart records, facility staff, patient report, [facility] chart review and Care Everywhere [facility] chart review. Today's concern is INR/Coumadin management for A. Fib    ROS/Subjective:  Bleeding Signs/Symptoms:  None  Thromboembolic Signs/Symptoms:  None  Medication Changes:  No  Dietary Changes:  No  Activity Changes: No  Bacterial/Viral Infection:  No  Missed Coumadin Doses:  None  On ASA: No  Other Concerns:  No    OBJECTIVE:  /61   Pulse 64   Temp 97.2  F (36.2  C)   Resp 18   Ht 1.676 m (5' 6\")   Wt 92.6 kg (204 lb 3.2 oz)   SpO2 98%   BMI 32.96 kg/m    Last INR: 3/4/21 on 3.98  INR Today:  3.04  Current Dose:  Held x1, 2mg AOD.     ASSESSMENT:  Controlled atrial fibrillation (H)  Encounter for therapeutic drug monitoring  Long term current use of anticoagulant with international normalized ratio (INR) goal of 1.5-2.0  Chronic. Stable. Slightly supratherapeutic, but trending down. Will dose Coumadin as noted below and recheck INR in 1 week. Follow-up accordingly.    PLAN:   Orders:  1. Coumadin 2mg PO every day.  2. Recheck INR x1 in 1 week.  Dx: afib.    Electronically signed by:  Dr. Haley Sims, APRN CNP DNP  "

## 2021-03-12 ENCOUNTER — RECORDS - HEALTHEAST (OUTPATIENT)
Dept: LAB | Facility: CLINIC | Age: 86
End: 2021-03-12

## 2021-03-14 NOTE — PROGRESS NOTES
[facility]  Episodic/Acute/Follow-Up  [place] MRN: [id number]. Place of Service where encounter took place:  [facility] () [[id number]]   Chief Complaint   Patient presents with     RECHECK   HPI: Mecca Slade  is a 89 year old (12/14/1921), who is being seen today for an episodic care visit.  HPI information obtained from: facility chart records, facility staff, patient report, [facility] chart review and Care Everywhere [facility] chart review. Today's concern is:    Zayda had an annual physical exam last week with some routine blood work.  On this blood work it was noted that her parathyroid hormone and serum calcium levels were increased from prior levels. Corrected calcium was 10.8.  Also noting INR resulted today at 3.44.    Today, Zayda is surprised that her INR level is high.  She denies bleeding/bruising.  She denies pain, shortness of breath, and states that she has had a very good appetite.  a1c returned at 8.6%. Chart review shows her vital signs continue to be at baseline/stable.    Past Medical and Surgical History reviewed in Epic today.  MEDICATIONS:  Current Outpatient Medications   Medication Sig Dispense Refill     Acetaminophen (TYLENOL PO) Take 1,000 mg by mouth 3 times daily        ATORVASTATIN CALCIUM PO Take 10 mg by mouth daily       CARBOXYMETHYLCELLULOSE SODIUM 0.5 % SOLN ophthalmic solution INSTILL 1 DROP IN EACH EYE EVERY MORNING 15 mL 3     Furosemide (LASIX PO) Take 40 mg by mouth daily       Menthol, Topical Analgesic, (BENGAY COLD THERAPY) 5 % GEL Externally apply topically 4 times daily as needed (pain) 113 g 0     metFORMIN (GLUCOPHAGE-XR) 500 MG 24 hr tablet Take 1 tablet (500 mg) by mouth daily (with dinner) 30 tablet 0     metoprolol succinate ER (TOPROL-XL) 25 MG 24 hr tablet Take 1 tablet (25 mg) by mouth daily 30 tablet 0     pantoprazole (PROTONIX) 20 MG EC tablet Take 20 mg by mouth daily       simethicone (MYLICON) 80 MG chewable  "tablet Take 2 tablets (160 mg) by mouth daily       Warfarin Sodium (COUMADIN PO) Take by mouth daily Take as directed per INR results       REVIEW OF SYSTEMS: Limited secondary to cognitive impairment but today pt reports the above and 4 point ROS including Respiratory, CV, GI and , other than that noted in the HPI, is negative.    Objective: /74   Pulse 79   Temp 97  F (36.1  C)   Resp 16   Ht 1.676 m (5' 6\")   Wt 92.6 kg (204 lb 3.2 oz)   SpO2 95%   BMI 32.96 kg/m    Exam:  GENERAL APPEARANCE: Alert, in no distress, cooperative.   ENT: Mouth/posterior oropharynx intact w/ moist mucous membranes, hearing acuity [place].  EYES: EOM, conjunctivae, lids, pupils and irises normal, PERRL2.   RESP: Respiratory effort unlabored, no respiratory distress, Lung sounds clear. On RA.   CV: Auscultation of heart reveals S1, S2, rate-controlled and rhythm irregular, no murmur, no rub or gallop, Edema trace BLE. Peripheral pulses are 2+.  ABDOMEN: Normal bowel sounds, soft, non-tender abdomen, and no masses palpated.  SKIN: Inspection/Palpation of skin and subcutaneous tissue baseline w/ fragility. No wounds/rashes noted.   NEURO: CN II-XII at patient's baseline, sensation baseline PPS.  PSYCH: Insight, judgement, and memory are baseline, affect and mood are happy/engaged.    Labs: Labs done in facility are in EPIC. Please refer to them using [facility]/Care Everywhere.    ASSESSMENT/PLAN:  Controlled atrial fibrillation (H)  Encounter for therapeutic drug monitoring  Long term current use of anticoagulant with international normalized ratio (INR) goal of 1.5-2.0  Hyperparathyroidism, primary (H)  CKD (chronic kidney disease) stage 4, GFR 15-29 ml/min (H)  Type II diabetes mellitus with peripheral circulatory disorder (H)  H/O acute gouty arthritis  Acute-on-chronic. Ongoing.    Noting that Zayda's arthritis is much improved this week.    Given her increased A1c, and higher fasting sugars, will increase her Metformin " slightly.  Noting that higher A1c at Zayda's age is appropriate.    INR is supratherapeutic, therefore will hold Coumadin and dose otherwise as noted below.    In the past, hyperparathyroidism has not required treatment for Zayda.  Given her frailty, renal impairment, and her overall asymptomatic case, will further investigate with blood work.  Unless extreme problem found, we will conservatively treat.  We consulted PharmD. and another provider colleague around this.  Follow-up w/in 1 month or as needed.    Orders:  1. Increase Metformin XR to 1000mg PO every day. Dx: DM II.  2. Hold Coumadin x1 today.   3. Coumadin 0.5mg PO x1 tomorrow.  4. Coumadin 2mg PO every day on AOD. Dx: afib.  5. Recheck INR, Vitamin D, Phosphorus x1 on 3/18. Dx: afib/hyperthyroidism.     Electronically signed by:  Dr. Haley Sims, APRN CNP DNP

## 2021-03-15 ENCOUNTER — NURSING HOME VISIT (OUTPATIENT)
Dept: GERIATRICS | Facility: CLINIC | Age: 86
End: 2021-03-15
Payer: COMMERCIAL

## 2021-03-15 VITALS
SYSTOLIC BLOOD PRESSURE: 129 MMHG | BODY MASS INDEX: 32.82 KG/M2 | RESPIRATION RATE: 16 BRPM | HEART RATE: 79 BPM | OXYGEN SATURATION: 95 % | HEIGHT: 66 IN | DIASTOLIC BLOOD PRESSURE: 74 MMHG | WEIGHT: 204.2 LBS | TEMPERATURE: 97 F

## 2021-03-15 DIAGNOSIS — Z79.01 LONG TERM CURRENT USE OF ANTICOAGULANT WITH INTERNATIONAL NORMALIZED RATIO (INR) GOAL OF 1.5-2.0: ICD-10-CM

## 2021-03-15 DIAGNOSIS — I48.91 CONTROLLED ATRIAL FIBRILLATION (H): Primary | ICD-10-CM

## 2021-03-15 DIAGNOSIS — Z87.39 H/O ACUTE GOUTY ARTHRITIS: ICD-10-CM

## 2021-03-15 DIAGNOSIS — E11.51 TYPE II DIABETES MELLITUS WITH PERIPHERAL CIRCULATORY DISORDER (H): ICD-10-CM

## 2021-03-15 DIAGNOSIS — E21.0 HYPERPARATHYROIDISM, PRIMARY (H): ICD-10-CM

## 2021-03-15 DIAGNOSIS — N18.4 CKD (CHRONIC KIDNEY DISEASE) STAGE 4, GFR 15-29 ML/MIN (H): ICD-10-CM

## 2021-03-15 DIAGNOSIS — Z51.81 ENCOUNTER FOR THERAPEUTIC DRUG MONITORING: ICD-10-CM

## 2021-03-15 LAB — INR PPP: 3.44 (ref 0.9–1.1)

## 2021-03-15 PROCEDURE — 99309 SBSQ NF CARE MODERATE MDM 30: CPT | Performed by: NURSE PRACTITIONER

## 2021-03-15 RX ORDER — METFORMIN HCL 500 MG
1000 TABLET, EXTENDED RELEASE 24 HR ORAL
Qty: 30 TABLET | Refills: 0 | Status: ON HOLD
Start: 2021-03-15 | End: 2021-01-01

## 2021-03-15 ASSESSMENT — MIFFLIN-ST. JEOR: SCORE: 1318

## 2021-03-15 NOTE — LETTER
3/15/2021        RE: Mecca Slade  [facility]  [address] [facility] [address]        [facility] GERIATRIC SERVICE  Episodic/Acute/Follow-Up  [place] MRN: [id number]. Place of Service where encounter took place:  [facility] () [[id number]]   Chief Complaint   Patient presents with     RECHECK   HPI: Mecca Slade  is a 89 year old (12/14/1921), who is being seen today for an episodic care visit.  HPI information obtained from: facility chart records, facility staff, patient report, [facility] chart review and Care Everywhere [facility] chart review. Today's concern is:    Zayda had an annual physical exam last week with some routine blood work.  On this blood work it was noted that her parathyroid hormone and serum calcium levels were increased from prior levels. Corrected calcium was 10.8.  Also noting INR resulted today at 3.44.    Today, Zayda is surprised that her INR level is high.  She denies bleeding/bruising.  She denies pain, shortness of breath, and states that she has had a very good appetite.  a1c returned at 8.6%. Chart review shows her vital signs continue to be at baseline/stable.    Past Medical and Surgical History reviewed in Epic today.  MEDICATIONS:  Current Outpatient Medications   Medication Sig Dispense Refill     Acetaminophen (TYLENOL PO) Take 1,000 mg by mouth 3 times daily        ATORVASTATIN CALCIUM PO Take 10 mg by mouth daily       CARBOXYMETHYLCELLULOSE SODIUM 0.5 % SOLN ophthalmic solution INSTILL 1 DROP IN EACH EYE EVERY MORNING 15 mL 3     Furosemide (LASIX PO) Take 40 mg by mouth daily       Menthol, Topical Analgesic, (BENGAY COLD THERAPY) 5 % GEL Externally apply topically 4 times daily as needed (pain) 113 g 0     metFORMIN (GLUCOPHAGE-XR) 500 MG 24 hr tablet Take 1 tablet (500 mg) by mouth daily (with dinner) 30 tablet 0     metoprolol succinate ER (TOPROL-XL) 25 MG 24 hr tablet Take 1 tablet (25 mg) by mouth daily 30 tablet 0  "    pantoprazole (PROTONIX) 20 MG EC tablet Take 20 mg by mouth daily       simethicone (MYLICON) 80 MG chewable tablet Take 2 tablets (160 mg) by mouth daily       Warfarin Sodium (COUMADIN PO) Take by mouth daily Take as directed per INR results       REVIEW OF SYSTEMS: Limited secondary to cognitive impairment but today pt reports the above and 4 point ROS including Respiratory, CV, GI and , other than that noted in the HPI, is negative.    Objective: /74   Pulse 79   Temp 97  F (36.1  C)   Resp 16   Ht 1.676 m (5' 6\")   Wt 92.6 kg (204 lb 3.2 oz)   SpO2 95%   BMI 32.96 kg/m    Exam:  GENERAL APPEARANCE: Alert, in no distress, cooperative.   ENT: Mouth/posterior oropharynx intact w/ moist mucous membranes, hearing acuity [place].  EYES: EOM, conjunctivae, lids, pupils and irises normal, PERRL2.   RESP: Respiratory effort unlabored, no respiratory distress, Lung sounds clear. On RA.   CV: Auscultation of heart reveals S1, S2, rate-controlled and rhythm irregular, no murmur, no rub or gallop, Edema trace BLE. Peripheral pulses are 2+.  ABDOMEN: Normal bowel sounds, soft, non-tender abdomen, and no masses palpated.  SKIN: Inspection/Palpation of skin and subcutaneous tissue baseline w/ fragility. No wounds/rashes noted.   NEURO: CN II-XII at patient's baseline, sensation baseline PPS.  PSYCH: Insight, judgement, and memory are baseline, affect and mood are happy/engaged.    Labs: Labs done in facility are in EPIC. Please refer to them using [place]/Care Everywhere.    ASSESSMENT/PLAN:  Controlled atrial fibrillation (H)  Encounter for therapeutic drug monitoring  Long term current use of anticoagulant with international normalized ratio (INR) goal of 1.5-2.0  Hyperparathyroidism, primary (H)  CKD (chronic kidney disease) stage 4, GFR 15-29 ml/min (H)  Type II diabetes mellitus with peripheral circulatory disorder (H)  H/O acute gouty arthritis  Acute-on-chronic. Ongoing.    Noting that Zayda's arthritis is " much improved this week.    Given her increased A1c, and higher fasting sugars, will increase her Metformin slightly.  Noting that higher A1c at Zayda's age is appropriate.    INR is supratherapeutic, therefore will hold Coumadin and dose otherwise as noted below.    In the past, hyperparathyroidism has not required treatment for Zayda.  Given her frailty, renal impairment, and her overall asymptomatic case, will further investigate with blood work.  Unless extreme problem found, we will conservatively treat.  We consulted PharmD. and another provider colleague around this.  Follow-up w/in 1 month or as needed.    Orders:  1. Increase Metformin XR to 1000mg PO every day. Dx: DM II.  2. Hold Coumadin x1 today.   3. Coumadin 0.5mg PO x1 tomorrow.  4. Coumadin 2mg PO every day on AOD. Dx: afib.  5. Recheck INR, Vitamin D, Phosphorus x1 on 3/18. Dx: afib/hyperthyroidism.     Electronically signed by:  Dr. Haley Sims, JOSUE CNP DNP            Sincerely,        JOSUE Gillis CNP

## 2021-03-16 ENCOUNTER — RECORDS - HEALTHEAST (OUTPATIENT)
Dept: LAB | Facility: CLINIC | Age: 86
End: 2021-03-16

## 2021-03-18 ENCOUNTER — NURSING HOME VISIT (OUTPATIENT)
Dept: GERIATRICS | Facility: CLINIC | Age: 86
End: 2021-03-18
Payer: COMMERCIAL

## 2021-03-18 VITALS
DIASTOLIC BLOOD PRESSURE: 74 MMHG | BODY MASS INDEX: 32.59 KG/M2 | WEIGHT: 202.8 LBS | HEART RATE: 79 BPM | OXYGEN SATURATION: 96 % | HEIGHT: 66 IN | RESPIRATION RATE: 16 BRPM | SYSTOLIC BLOOD PRESSURE: 129 MMHG | TEMPERATURE: 97.8 F

## 2021-03-18 DIAGNOSIS — Z79.01 LONG TERM CURRENT USE OF ANTICOAGULANT WITH INTERNATIONAL NORMALIZED RATIO (INR) GOAL OF 1.5-2.0: ICD-10-CM

## 2021-03-18 DIAGNOSIS — I48.91 CONTROLLED ATRIAL FIBRILLATION (H): Primary | ICD-10-CM

## 2021-03-18 DIAGNOSIS — Z51.81 ENCOUNTER FOR THERAPEUTIC DRUG MONITORING: ICD-10-CM

## 2021-03-18 LAB
25(OH)D3 SERPL-MCNC: 33.2 NG/ML (ref 30–80)
INR PPP: 2.92 (ref 0.9–1.1)
LDLC SERPL CALC-MCNC: 82 MG/DL
PHOSPHATE SERPL-MCNC: 2.4 MG/DL (ref 2.5–4.5)

## 2021-03-18 PROCEDURE — 99308 SBSQ NF CARE LOW MDM 20: CPT | Performed by: NURSE PRACTITIONER

## 2021-03-18 ASSESSMENT — MIFFLIN-ST. JEOR: SCORE: 1311.64

## 2021-03-18 NOTE — LETTER
"    3/18/2021        RE: Mecca Slade  [facility]  [address] [facility] [address]        [place] GERIATRIC SERVICES  INR/Coumadin Encounter   [place] MRN: [id number]. Place of Service where encounter took place: [facility] () [[id number]]. HPI: Mecca Slade is a 89 year old (12/14/1921), who is being seen today for an episodic care visit at the above location. HPI information obtained from: facility chart records, facility staff, patient report, [facility] chart review and Care Everywhere [facility] chart review. Today's concern is INR/Coumadin management for A. Fib    ROS/Subjective:  Bleeding Signs/Symptoms:  None  Thromboembolic Signs/Symptoms:  None  Medication Changes:  No  Dietary Changes:  No  Activity Changes: No  Bacterial/Viral Infection:  No  Missed Coumadin Doses:  None  On ASA: No  Other Concerns:  No    OBJECTIVE:  /74   Pulse 79   Temp 97.8  F (36.6  C)   Resp 16   Ht 1.676 m (5' 6\")   Wt 92 kg (202 lb 12.8 oz)   SpO2 96%   BMI 32.73 kg/m    Last INR: 3.44 on 3/15.  INR Today:  2.92  Current Dose:  Held x1, 0.5mg x1, 2mg x1. Was 2mg/day prior.     ASSESSMENT:  Controlled atrial fibrillation (H)  Encounter for therapeutic drug monitoring  Long term current use of anticoagulant with international normalized ratio (INR) goal of 1.5-2.0  Chronic. Stable. Therapeutic. Will dose Coumadin as noted below and recheck INR in 1 week. Follow-up accordingly.    PLAN:   transcribed by : Christina Goodwin  1. Coumadin 1mg PO QDay on Tu/Th.  2. Coumadin 2 mg PO QDay on AOD.  3. Recheck INR x1 in 1 week on 3/25.  Dx: afib.    Electronically signed by:  JOSUE Clark CNP DNP        Sincerely,        JOSUE Gillis CNP    "

## 2021-03-18 NOTE — PROGRESS NOTES
"[place] GERIATRIC SERVICES  INR/Coumadin Encounter   [place] MRN: [id number]. Place of Service where encounter took place: [facility] () [[id number]]. HPI: Mecca Slade is a 89 year old (12/14/1921), who is being seen today for an episodic care visit at the above location. HPI information obtained from: facility chart records, facility staff, patient report, [facility] chart review and Care Everywhere [facility] chart review. Today's concern is INR/Coumadin management for ALE. Fib    ROS/Subjective:  Bleeding Signs/Symptoms:  None  Thromboembolic Signs/Symptoms:  None  Medication Changes:  No  Dietary Changes:  No  Activity Changes: No  Bacterial/Viral Infection:  No  Missed Coumadin Doses:  None  On ASA: No  Other Concerns:  No    OBJECTIVE:  /74   Pulse 79   Temp 97.8  F (36.6  C)   Resp 16   Ht 1.676 m (5' 6\")   Wt 92 kg (202 lb 12.8 oz)   SpO2 96%   BMI 32.73 kg/m    Last INR: 3.44 on 3/15.  INR Today:  2.92  Current Dose:  Held x1, 0.5mg x1, 2mg x1. Was 2mg/day prior.     ASSESSMENT:  Controlled atrial fibrillation (H)  Encounter for therapeutic drug monitoring  Long term current use of anticoagulant with international normalized ratio (INR) goal of 1.5-2.0  Chronic. Stable. Therapeutic. Will dose Coumadin as noted below and recheck INR in 1 week. Follow-up accordingly.    PLAN:   transcribed by : Christina Goodwin  1. Coumadin 1mg PO QDay on Tu/Th.  2. Coumadin 2 mg PO QDay on AOD.  3. Recheck INR x1 in 1 week on 3/25.  Dx: afib.    Electronically signed by:  Dr. Haley Sims, APRN CNP DNP  "

## 2021-03-19 NOTE — PROGRESS NOTES
"[place] GERIATRIC SERVICES  INR/Coumadin Encounter   [place] MRN: [id number]. Place of Service where encounter took place: [facility] () [[id number]]. HPI: Mecca Slade is a 89 year old (12/14/1921), who is being seen today for an episodic care visit at the above location. HPI information obtained from: facility chart records, facility staff, patient report, [facility] chart review and Care Everywhere [facility] chart review. Today's concern is INR/Coumadin management for ALE. Fib    ROS/Subjective:  Bleeding Signs/Symptoms:  None  Thromboembolic Signs/Symptoms:  None  Medication Changes:  No  Dietary Changes:  No  Activity Changes: No  Bacterial/Viral Infection:  No  Missed Coumadin Doses:  None  On ASA: No  Other Concerns:  No    OBJECTIVE:  /70   Pulse 57   Temp 97.4  F (36.3  C)   Resp 16   Ht 1.676 m (5' 6\")   Wt 92.2 kg (203 lb 3.2 oz)   SpO2 94%   BMI 32.80 kg/m    Last INR: 2.92 on 3/18.  INR Today:  Unknown.  Current Dose: 2mg/day prior.     ASSESSMENT:  Controlled atrial fibrillation (H)  Encounter for therapeutic drug monitoring  Long term current use of anticoagulant with international normalized ratio (INR) goal of 1.5-2.0  Chronic. Stable. Unknown, lab did not draw, though this was ordered correctly. Will dose Coumadin as noted below and recheck INR tomorrow. Follow-up accordingly.    PLAN:   transcribed by : Christina Goodwin  1. Coumadin 1mg PO x1 today (3/25).  2. Recheck INR x1 in 1 week on 3/26.  Dx: afib.    Electronically signed by:  JOSUE Clark CNP DNP  ___________________________________    Update via phone on 3/26:  INR 3.2.    Orders:  1. Hold x1 on 3/26.  2. Coumadin 0.5mg x1 on 3/27.   3. Coumadin 1mg PO every day on 3/28 & 3/29.  4. Coumadin 2mg PO every day on 3/30 & 3/31.  5. Recheck INR x1 on 4/1.  Dx: afib.    "

## 2021-03-22 ENCOUNTER — CLINICAL UPDATE (OUTPATIENT)
Dept: PHARMACY | Facility: CLINIC | Age: 86
End: 2021-03-22
Payer: COMMERCIAL

## 2021-03-22 DIAGNOSIS — I10 ESSENTIAL HYPERTENSION: ICD-10-CM

## 2021-03-22 DIAGNOSIS — I48.91 ATRIAL FIBRILLATION, UNSPECIFIED TYPE (H): ICD-10-CM

## 2021-03-22 DIAGNOSIS — I63.511 ACUTE RIGHT MCA STROKE (H): ICD-10-CM

## 2021-03-22 DIAGNOSIS — E21.0 HYPERPARATHYROIDISM, PRIMARY (H): ICD-10-CM

## 2021-03-22 DIAGNOSIS — E11.51 TYPE II DIABETES MELLITUS WITH PERIPHERAL CIRCULATORY DISORDER (H): Primary | ICD-10-CM

## 2021-03-22 DIAGNOSIS — I50.9 CHF (CONGESTIVE HEART FAILURE) (H): ICD-10-CM

## 2021-03-22 PROCEDURE — 99207 PR NO CHARGE LOS: CPT | Performed by: PHARMACIST

## 2021-03-22 NOTE — PROGRESS NOTES
This patient's medication list and chart were reviewed as part of the service provided by [facility] and Geriatric Services.    Assessment/Recommendations:    As NP notes in her encounter on 3/15/21, we discussed potential treatment of hyperparathyroidism with Sensipar, and opted not to treat hypercalcemia at this time due to not symptomatic currently.  Calcium level on 3/8/21 = 10.9.  Albumin of 3.2, and therefore corrected calcium = 11.54.  Vitamin D level on 3/18 = 33.2.  Continue to monitor for signs/symptoms of hypercalcemia, labs, and may reconsider treating if patient is symptomatic and/or if severe hypercalcemia is present.    Noted Metformin XR dose was increased on 3/15/21.  Most recent eGFR 46ml/min and HgA1c 8.6% on 3/8/21.  May benefit from follow-up renal function in 3-6 months.    Yanely Juárez, Pharm.D.,BCGP  Board Certified Geriatric Pharmacist  Medication Therapy Management Pharmacist  [phone]

## 2021-03-24 VITALS
HEART RATE: 57 BPM | DIASTOLIC BLOOD PRESSURE: 70 MMHG | HEIGHT: 66 IN | BODY MASS INDEX: 32.66 KG/M2 | WEIGHT: 203.2 LBS | RESPIRATION RATE: 16 BRPM | SYSTOLIC BLOOD PRESSURE: 111 MMHG | OXYGEN SATURATION: 94 % | TEMPERATURE: 97.4 F

## 2021-03-24 ASSESSMENT — MIFFLIN-ST. JEOR: SCORE: 1313.46

## 2021-03-25 ENCOUNTER — NURSING HOME VISIT (OUTPATIENT)
Dept: GERIATRICS | Facility: CLINIC | Age: 86
End: 2021-03-25
Payer: COMMERCIAL

## 2021-03-25 ENCOUNTER — RECORDS - HEALTHEAST (OUTPATIENT)
Dept: LAB | Facility: CLINIC | Age: 86
End: 2021-03-25

## 2021-03-25 DIAGNOSIS — Z51.81 ENCOUNTER FOR THERAPEUTIC DRUG MONITORING: ICD-10-CM

## 2021-03-25 DIAGNOSIS — I48.91 CONTROLLED ATRIAL FIBRILLATION (H): Primary | ICD-10-CM

## 2021-03-25 DIAGNOSIS — Z79.01 LONG TERM CURRENT USE OF ANTICOAGULANT WITH INTERNATIONAL NORMALIZED RATIO (INR) GOAL OF 1.5-2.0: ICD-10-CM

## 2021-03-25 PROCEDURE — 99308 SBSQ NF CARE LOW MDM 20: CPT | Performed by: NURSE PRACTITIONER

## 2021-03-25 NOTE — LETTER
"    3/25/2021        RE: Mecca Slade  [facility]  [address] [facility] [address]        [place] GERIATRIC SERVICES  INR/Coumadin Encounter   [place] MRN: [id number]. Place of Service where encounter took place: [facility] () [[id number]]. HPI: Mecca Slade is a 89 year old (12/14/1921), who is being seen today for an episodic care visit at the above location. HPI information obtained from: facility chart records, facility staff, patient report, [facility] chart review and Care Everywhere [facility] chart review. Today's concern is INR/Coumadin management for A. Fib    ROS/Subjective:  Bleeding Signs/Symptoms:  None  Thromboembolic Signs/Symptoms:  None  Medication Changes:  No  Dietary Changes:  No  Activity Changes: No  Bacterial/Viral Infection:  No  Missed Coumadin Doses:  None  On ASA: No  Other Concerns:  No    OBJECTIVE:  /70   Pulse 57   Temp 97.4  F (36.3  C)   Resp 16   Ht 1.676 m (5' 6\")   Wt 92.2 kg (203 lb 3.2 oz)   SpO2 94%   BMI 32.80 kg/m    Last INR: 2.92 on 3/18.  INR Today:  Unknown.  Current Dose: 2mg/day prior.     ASSESSMENT:  Controlled atrial fibrillation (H)  Encounter for therapeutic drug monitoring  Long term current use of anticoagulant with international normalized ratio (INR) goal of 1.5-2.0  Chronic. Stable. Unknown, lab did not draw, though this was ordered correctly. Will dose Coumadin as noted below and recheck INR tomorrow. Follow-up accordingly.    PLAN:   transcribed by : Christina Goodwin  1. Coumadin 1mg PO x1 today (3/25).  2. Recheck INR x1 in 1 week on 3/26.  Dx: afib.    Electronically signed by:  Dr. Haley Sims, JOSUE CNP DNP  ___________________________________    Update via phone on 3/26:  INR 3.2.    Orders:  1. Hold x1 on 3/26.  2. Coumadin 0.5mg x1 on 3/27.   3. Coumadin 1mg PO every day on 3/28 & 3/29.  4. Coumadin 2mg PO every day on 3/30 & 3/31.  5. Recheck INR x1 on 4/1.  Dx: " afib.          Sincerely,        JOSUE Gillis CNP

## 2021-03-26 LAB — INR PPP: 3.29 (ref 0.9–1.1)

## 2021-03-30 ENCOUNTER — RECORDS - HEALTHEAST (OUTPATIENT)
Dept: LAB | Facility: CLINIC | Age: 86
End: 2021-03-30

## 2021-03-31 ENCOUNTER — RECORDS - HEALTHEAST (OUTPATIENT)
Dept: LAB | Facility: CLINIC | Age: 86
End: 2021-03-31

## 2021-03-31 LAB
SARS-COV-2 PCR COMMENT: NORMAL
SARS-COV-2 RNA SPEC QL NAA+PROBE: NEGATIVE
SARS-COV-2 VIRUS SPECIMEN SOURCE: NORMAL

## 2021-03-31 NOTE — PROGRESS NOTES
"[place] GERIATRIC SERVICES  [place] Medical Record Number:  [id number]  Place of Service where encounter took place: [facility] () [[id number]] HPI: Mecca Slade is a 89 year old  (12/14/1921), who is being seen today for an episodic care visit at the above location.   HPI information obtained from: facility chart records, facility staff, patient report, [facility] chart review and Care Everywhere [facility] chart review. Today's concern is INR/Coumadin management for A. Fib    ROS/Subjective:  Bleeding Signs/Symptoms:  None  Thromboembolic Signs/Symptoms:  None  Medication Changes:  No  Dietary Changes:  No  Activity Changes: No  Bacterial/Viral Infection:  No  Missed Coumadin Doses:  None  On ASA: No  Other Concerns:  No    OBJECTIVE:  /80   Pulse 74   Temp 97.8  F (36.6  C)   Resp 16   Ht 1.676 m (5' 6\")   Wt 91.5 kg (201 lb 12.8 oz)   SpO2 95%   BMI 32.57 kg/m    Last INR: 3.29 on 3/26  INR Today: 2.12  Current Dose:  Held x1, 0.5 x1, 1mg x2, 2mg x2. Prior was 2mg/day.    ASSESSMENT:  Controlled atrial fibrillation (H)  Encounter for therapeutic drug monitoring  Long term current use of anticoagulant with international normalized ratio (INR) goal of 1.5-2.0  Chronic. Stable. INR therapeutic. Will dose Coumadin as noted below and recheck INR in 1 week. Follow-up accordingly.    PLAN:   Orders transcribed by : Christina Goodwin  1. Coumadin 1 mg PO every day on M/W/F - dx: afib  2. Coumadin 2 mg PO every day on AOD - dx: afib    Next INR: Recheck x 1 in 1 week in 4/8/21 - dx: afib    Electronically signed by:  Dr. Haley Sims, APRN CNP DNP    "

## 2021-04-01 ENCOUNTER — NURSING HOME VISIT (OUTPATIENT)
Dept: GERIATRICS | Facility: CLINIC | Age: 86
End: 2021-04-01
Payer: COMMERCIAL

## 2021-04-01 VITALS
OXYGEN SATURATION: 95 % | WEIGHT: 201.8 LBS | DIASTOLIC BLOOD PRESSURE: 80 MMHG | HEIGHT: 66 IN | SYSTOLIC BLOOD PRESSURE: 136 MMHG | BODY MASS INDEX: 32.43 KG/M2 | TEMPERATURE: 97.8 F | HEART RATE: 74 BPM | RESPIRATION RATE: 16 BRPM

## 2021-04-01 DIAGNOSIS — Z51.81 ENCOUNTER FOR THERAPEUTIC DRUG MONITORING: ICD-10-CM

## 2021-04-01 DIAGNOSIS — Z79.01 LONG TERM CURRENT USE OF ANTICOAGULANT WITH INTERNATIONAL NORMALIZED RATIO (INR) GOAL OF 1.5-2.0: ICD-10-CM

## 2021-04-01 DIAGNOSIS — I48.91 CONTROLLED ATRIAL FIBRILLATION (H): Primary | ICD-10-CM

## 2021-04-01 LAB — INR PPP: 2.12 (ref 0.9–1.1)

## 2021-04-01 PROCEDURE — 99308 SBSQ NF CARE LOW MDM 20: CPT | Performed by: NURSE PRACTITIONER

## 2021-04-01 ASSESSMENT — MIFFLIN-ST. JEOR: SCORE: 1307.11

## 2021-04-01 NOTE — LETTER
"    4/1/2021        RE: Mecca Slade  [facility]  [address] [facility] [address]        [place] G[facility]IATRIC SERVICES  [place] Medical Record Number:  [id number]  Place of Service where encounter took place: [facility] () [[id number]] HPI: Mecca Slade is a 89 year old  (12/14/1921), who is being seen today for an episodic care visit at the above location.   HPI information obtained from: facility chart records, facility staff, patient report, [facility] chart review and Care Everywhere [facility] chart review. Today's concern is INR/Coumadin management for A. Fib    ROS/Subjective:  Bleeding Signs/Symptoms:  None  Thromboembolic Signs/Symptoms:  None  Medication Changes:  No  Dietary Changes:  No  Activity Changes: No  Bacterial/Viral Infection:  No  Missed Coumadin Doses:  None  On ASA: No  Other Concerns:  No    OBJECTIVE:  /80   Pulse 74   Temp 97.8  F (36.6  C)   Resp 16   Ht 1.676 m (5' 6\")   Wt 91.5 kg (201 lb 12.8 oz)   SpO2 95%   BMI 32.57 kg/m    Last INR: 3.29 on 3/26  INR Today: 2.12  Current Dose:  Held x1, 0.5 x1, 1mg x2, 2mg x2. Prior was 2mg/day.    ASSESSMENT:  Controlled atrial fibrillation (H)  Encounter for therapeutic drug monitoring  Long term current use of anticoagulant with international normalized ratio (INR) goal of 1.5-2.0  Chronic. Stable. INR therapeutic. Will dose Coumadin as noted below and recheck INR in 1 week. Follow-up accordingly.    PLAN:   Orders transcribed by : Christina Goodwin  1. Coumadin 1 mg PO every day on M/W/F - dx: afib  2. Coumadin 2 mg PO every day on AOD - dx: afib    Next INR: Recheck x 1 in 1 week in 4/8/21 - dx: afib    Electronically signed by:  JOSUE Clark CNP DNP          Sincerely,        JOSUE Gillis CNP    "

## 2021-04-07 NOTE — PROGRESS NOTES
"[place] GERIATRIC SERVICES  [place] Medical Record Number:  [id number]. Place of Service where encounter took place: [facility] () [[id number]] HPI: Mecca Slade is a 89 year old  (12/14/1921), who is being seen today for an episodic care visit at the above location.   HPI information obtained from: facility chart records, facility staff, patient report, [facility] chart review and Care Everywhere [facility] chart review. Today's concern is INR/Coumadin management for A. Fib    ROS/Subjective:  Bleeding Signs/Symptoms:  None  Thromboembolic Signs/Symptoms:  None  Medication Changes:  No  Dietary Changes:  No  Activity Changes: No  Bacterial/Viral Infection:  No  Missed Coumadin Doses:  None  On ASA: No  Other Concerns:  No    OBJECTIVE:  /80   Pulse 82   Temp 97  F (36.1  C)   Resp 18   Ht 1.676 m (5' 6\")   Wt 91 kg (200 lb 9.6 oz)   SpO2 95%   BMI 32.38 kg/m    Last INR: 2.12 on 4/1  INR Today: 2.67  Current Dose: 1mg M/W/F, 2mg AOD.    ASSESSMENT:  Controlled atrial fibrillation (H)  Encounter for therapeutic drug monitoring  Long term current use of anticoagulant with international normalized ratio (INR) goal of 1.5-2.0  Chronic. Stable. INR therapeutic. Will dose Coumadin as noted below and recheck INR in 2 weeks. Follow-up accordingly.    PLAN:   Orders transcribed by : Christina Goodwin  1. Coumadin 1 mg PO every day on M/W/F  2. Coumadin 2 mg PO on AOD  3. Recheck INR x 1 on 4/22  DX: afib.    Electronically signed by:  Dr. Haley Sims, APRN CNP DNP    "

## 2021-04-08 ENCOUNTER — NURSING HOME VISIT (OUTPATIENT)
Dept: GERIATRICS | Facility: CLINIC | Age: 86
End: 2021-04-08
Payer: COMMERCIAL

## 2021-04-08 ENCOUNTER — RECORDS - HEALTHEAST (OUTPATIENT)
Dept: LAB | Facility: CLINIC | Age: 86
End: 2021-04-08

## 2021-04-08 VITALS
OXYGEN SATURATION: 95 % | SYSTOLIC BLOOD PRESSURE: 117 MMHG | BODY MASS INDEX: 32.24 KG/M2 | HEART RATE: 82 BPM | RESPIRATION RATE: 18 BRPM | TEMPERATURE: 97 F | WEIGHT: 200.6 LBS | HEIGHT: 66 IN | DIASTOLIC BLOOD PRESSURE: 80 MMHG

## 2021-04-08 DIAGNOSIS — Z51.81 ENCOUNTER FOR THERAPEUTIC DRUG MONITORING: ICD-10-CM

## 2021-04-08 DIAGNOSIS — I48.91 CONTROLLED ATRIAL FIBRILLATION (H): Primary | ICD-10-CM

## 2021-04-08 DIAGNOSIS — Z79.01 LONG TERM CURRENT USE OF ANTICOAGULANT WITH INTERNATIONAL NORMALIZED RATIO (INR) GOAL OF 1.5-2.0: ICD-10-CM

## 2021-04-08 LAB — INR PPP: 2.67 (ref 0.9–1.1)

## 2021-04-08 PROCEDURE — 99308 SBSQ NF CARE LOW MDM 20: CPT | Performed by: NURSE PRACTITIONER

## 2021-04-08 ASSESSMENT — MIFFLIN-ST. JEOR: SCORE: 1301.67

## 2021-04-08 NOTE — LETTER
"    4/8/2021        RE: Mecca Slade  [facility]  [address] [facility] [address]        [place] [facility]  [place] Medical Record Number:  [id number]. Place of Service where encounter took place: [facility] () [[id number]] HPI: Mecca Slade is a 89 year old  (12/14/1921), who is being seen today for an episodic care visit at the above location.   HPI information obtained from: facility chart records, facility staff, patient report, [facility] chart review and Care Everywhere [facility] chart review. Today's concern is INR/Coumadin management for A. Fib    ROS/Subjective:  Bleeding Signs/Symptoms:  None  Thromboembolic Signs/Symptoms:  None  Medication Changes:  No  Dietary Changes:  No  Activity Changes: No  Bacterial/Viral Infection:  No  Missed Coumadin Doses:  None  On ASA: No  Other Concerns:  No    OBJECTIVE:  /80   Pulse 82   Temp 97  F (36.1  C)   Resp 18   Ht 1.676 m (5' 6\")   Wt 91 kg (200 lb 9.6 oz)   SpO2 95%   BMI 32.38 kg/m    Last INR: 2.12 on 4/1  INR Today: 2.67  Current Dose: 1mg M/W/F, 2mg AOD.    ASSESSMENT:  Controlled atrial fibrillation (H)  Encounter for therapeutic drug monitoring  Long term current use of anticoagulant with international normalized ratio (INR) goal of 1.5-2.0  Chronic. Stable. INR therapeutic. Will dose Coumadin as noted below and recheck INR in 2 weeks. Follow-up accordingly.    PLAN:   Orders transcribed by : Christina Goodwin  1. Coumadin 1 mg PO every day on M/W/F  2. Coumadin 2 mg PO on AOD  3. Recheck INR x 1 on 4/22  DX: afib.    Electronically signed by:  JOSUE Clark CNP DNP          Sincerely,        JOSUE Gillis CNP    "

## 2021-04-15 ENCOUNTER — RECORDS - HEALTHEAST (OUTPATIENT)
Dept: LAB | Facility: CLINIC | Age: 86
End: 2021-04-15

## 2021-04-20 NOTE — PROGRESS NOTES
"[place] [facility]  [place] Medical Record Number:  [id number]. Place of Service where encounter took place: [facility] () [[id number]] HPI: Mecca Slade is a 89 year old  (12/14/1921), who is being seen today for an episodic care visit at the above location.   HPI information obtained from: facility chart records, facility staff, patient report, [facility] chart review and Care Everywhere [facility] chart review. Today's concern is INR/Coumadin management for A. Fib    ROS/Subjective:  Bleeding Signs/Symptoms:  None  Thromboembolic Signs/Symptoms:  None  Medication Changes:  No  Dietary Changes:  No  Activity Changes: No  Bacterial/Viral Infection:  No  Missed Coumadin Doses:  None  On ASA: No  Other Concerns:  No    OBJECTIVE:  /72   Pulse 76   Temp 97.5  F (36.4  C)   Resp 18   Ht 1.676 m (5' 6\")   Wt 91.8 kg (202 lb 6.4 oz)   SpO2 97%   BMI 32.67 kg/m    Last INR: 2.67 on 4/8  INR Today: 2.5  Current Dose: 1mg M/W/F, 2mg AOD.    ASSESSMENT:  Controlled atrial fibrillation (H)  Encounter for therapeutic drug monitoring  Long term current use of anticoagulant with international normalized ratio (INR) goal of 1.5-2.0  Chronic. Stable. INR therapeutic. Will dose Coumadin as noted below and recheck INR in in 3 weeks. Follow-up accordingly.    PLAN:   Orders:  1. Coumadin 1mg PO M/W/F.  2. Coumadin 2mg PO every day on AOD.   3. Recheck INR x1 on 5/13/21.   Dx: afib.    Electronically signed by:  Dr. Haley Sims, APRN CNP DNP    "

## 2021-04-21 ENCOUNTER — RECORDS - HEALTHEAST (OUTPATIENT)
Dept: LAB | Facility: CLINIC | Age: 86
End: 2021-04-21

## 2021-04-22 ENCOUNTER — TRANSFERRED RECORDS (OUTPATIENT)
Dept: HEALTH INFORMATION MANAGEMENT | Facility: CLINIC | Age: 86
End: 2021-04-22

## 2021-04-22 ENCOUNTER — NURSING HOME VISIT (OUTPATIENT)
Dept: GERIATRICS | Facility: CLINIC | Age: 86
End: 2021-04-22
Payer: COMMERCIAL

## 2021-04-22 VITALS
TEMPERATURE: 97.5 F | RESPIRATION RATE: 18 BRPM | OXYGEN SATURATION: 97 % | HEIGHT: 66 IN | HEART RATE: 76 BPM | SYSTOLIC BLOOD PRESSURE: 128 MMHG | DIASTOLIC BLOOD PRESSURE: 72 MMHG | BODY MASS INDEX: 32.53 KG/M2 | WEIGHT: 202.4 LBS

## 2021-04-22 DIAGNOSIS — Z51.81 ENCOUNTER FOR THERAPEUTIC DRUG MONITORING: ICD-10-CM

## 2021-04-22 DIAGNOSIS — Z79.01 LONG TERM CURRENT USE OF ANTICOAGULANT WITH INTERNATIONAL NORMALIZED RATIO (INR) GOAL OF 1.5-2.0: ICD-10-CM

## 2021-04-22 DIAGNOSIS — I48.91 CONTROLLED ATRIAL FIBRILLATION (H): Primary | ICD-10-CM

## 2021-04-22 LAB
INR PPP: 2.5 (ref 0.9–1.1)
INR PPP: 2.5 (ref 0.9–1.1)

## 2021-04-22 PROCEDURE — 99308 SBSQ NF CARE LOW MDM 20: CPT | Performed by: NURSE PRACTITIONER

## 2021-04-22 ASSESSMENT — MIFFLIN-ST. JEOR: SCORE: 1309.83

## 2021-04-22 NOTE — LETTER
"    4/22/2021        RE: Mecca Slade  [facility]  [address] [facility] [address]        [place] [facility]  [place] Medical Record Number:  [id number]. Place of Service where encounter took place: [facility] () [[id number]] HPI: Mecca Slade is a 89 year old  (12/14/1921), who is being seen today for an episodic care visit at the above location.   HPI information obtained from: facility chart records, facility staff, patient report, [facility] chart review and Care Everywhere [facility] chart review. Today's concern is INR/Coumadin management for A. Fib    ROS/Subjective:  Bleeding Signs/Symptoms:  None  Thromboembolic Signs/Symptoms:  None  Medication Changes:  No  Dietary Changes:  No  Activity Changes: No  Bacterial/Viral Infection:  No  Missed Coumadin Doses:  None  On ASA: No  Other Concerns:  No    OBJECTIVE:  /72   Pulse 76   Temp 97.5  F (36.4  C)   Resp 18   Ht 1.676 m (5' 6\")   Wt 91.8 kg (202 lb 6.4 oz)   SpO2 97%   BMI 32.67 kg/m    Last INR: 2.67 on 4/8  INR Today: 2.5  Current Dose: 1mg M/W/F, 2mg AOD.    ASSESSMENT:  Controlled atrial fibrillation (H)  Encounter for therapeutic drug monitoring  Long term current use of anticoagulant with international normalized ratio (INR) goal of 1.5-2.0  Chronic. Stable. INR therapeutic. Will dose Coumadin as noted below and recheck INR in in 3 weeks. Follow-up accordingly.    PLAN:   Orders:  1. Coumadin 1mg PO M/W/F.  2. Coumadin 2mg PO every day on AOD.   3. Recheck INR x1 on 5/13/21.   Dx: afib.    Electronically signed by:  JOSUE Clark CNP DNP          Sincerely,        JOSUE Gillis CNP    "

## 2021-04-28 NOTE — PROGRESS NOTES
Addended by: Daniella Schrader on: 4/28/2021 01:59 PM     Modules accepted: [place] [facility]  [place] Medical Record Number:  [id number]  Place of Service where encounter took place: [facility]  ([facility]) [[id number]]    HPI:    Mecca Slade is a 89 year old  (12/14/1921), who is being seen today for an episodic care visit at the above location.   HPI information obtained from: facility chart records, facility staff and patient report. Today's concern is INR/Coumadin management for A. Fib    ROS/Subjective:  Bleeding Signs/Symptoms:  None  Thromboembolic Signs/Symptoms:  None  Medication Changes:  No  Dietary Changes:  No  Activity Changes: No  Bacterial/Viral Infection:  No  Missed Coumadin Doses:  None  On ASA: No  Other Concerns:  No    OBJECTIVE:  /73   Pulse 57   Temp 97.8  F (36.6  C)   Resp 16   Wt 104.1 kg (229 lb 9.6 oz)   SpO2 100%   BMI 37.06 kg/m    Last INR: 1.74 on 5/3  INR Today:  2.05  Current Dose: Coumadin 2mg PO QD      ASSESSMENT:  Chronic atrial fibrillation (H)  Long term current use of anticoagulant therapy  Encounter for therapeutic drug monitoring  Therapeutic INR for goal of 2-3    PLAN:   transcribed by : José Miguel Radford  New Dose: Continue Coumdain 2 mg PO every day  Next INR: 2 weeks      Electronically signed by:  JOSUE Mari CNP

## 2021-04-29 ENCOUNTER — RECORDS - HEALTHEAST (OUTPATIENT)
Dept: LAB | Facility: CLINIC | Age: 86
End: 2021-04-29

## 2021-04-30 NOTE — PROGRESS NOTES
[place] [facility]  Chief Complaint   Patient presents with     penitentiary Regulatory     [place] Medical Record Number:  [id number]  Place of Service where encounter took place:  [facility] () [[id number]]    HPI:    Mecca Slade  is 89 year old (12/14/1921), who is being seen today for a federally mandated E/M visit.  HPI information obtained from: facility staff, patient report and [facility] chart review.     Today's concerns are:  -  - Resident seen and examined.   - Reports denies chest pain, SOB, palpitation, polyuria, polydipsia or polyphagia.   - RN reports recliner was taken away due to pt non compliance with changing position and fear of pressure injury, now has a bed. Resident said that she like the recliner for the mattress hurts her right hip area. Pt endorses butt pain when she is in the recliner but does not care.     --------------------------------  - - Past Medical, social, family histories, medications, and allergies reviewed and updated  - Medications reviewed: in the chart and EHR.   - Case Management:   I have reviewed the care plan and MDS and do agree with the plan. Patient's desire to return to the community is not present.  Information reviewed:  Medications, vital signs, orders, and nursing notes.    MEDICATIONS:  Current Outpatient Medications   Medication Sig Dispense Refill     Acetaminophen (TYLENOL PO) Take 1,000 mg by mouth 3 times daily as needed       ATORVASTATIN CALCIUM PO Take 10 mg by mouth daily       CARBOXYMETHYLCELLULOSE SODIUM 0.5 % SOLN ophthalmic solution INSTILL 1 DROP IN EACH EYE EVERY MORNING 15 mL 3     Furosemide (LASIX PO) Take 40 mg by mouth daily       Menthol, Topical Analgesic, (BENGAY COLD THERAPY) 5 % GEL Externally apply topically 4 times daily as needed (pain) 113 g 0     metFORMIN (GLUCOPHAGE-XR) 500 MG 24 hr tablet Take 2 tablets (1,000 mg) by mouth daily (with dinner) 30 tablet 0     metoprolol succinate ER (TOPROL-XL)  "25 MG 24 hr tablet Take 1 tablet (25 mg) by mouth daily 30 tablet 0     pantoprazole (PROTONIX) 20 MG EC tablet Take 20 mg by mouth daily       simethicone (MYLICON) 80 MG chewable tablet Take 2 tablets (160 mg) by mouth daily       Warfarin Sodium (COUMADIN PO) Take by mouth daily Take as directed per INR results         ROS: 4 point ROS including Respiratory, CV, GI and , other than that noted in the HPI,  is negative    Vitals:  /75   Pulse 66   Temp 97.8  F (36.6  C)   Resp 20   Ht 1.676 m (5' 6\")   Wt 93.7 kg (206 lb 9.6 oz)   SpO2 99%   BMI 33.35 kg/m    Body mass index is 33.35 kg/m .  Exam:  GENERAL APPEARANCE:  in no distress, cooperative  ENT:  Mouth and posterior oropharynx normal, moist mucous membranes, oral mucosa moist, no lesion noted.   EYES:  EOMI, Pupil rounded and equal.  RESP:  lungs clear to auscultation   CV:  S1S2 audible, irregular HR, no murmur appreciated.   ABDOMEN:  soft, NT/ND, BS audible. no mass appreciated on palpation.   M/S:   Ms strength: 4/5 LUE, 4/5 LLE  SKIN:  No rash.   NEURO:   No NFD appreciated on observation. Hand  5/5 b/l  PSYCH:  normal insight, judgement and memory, affect and mood normal      Lab/Diagnostic data: Reviewed in the chart and EHR.        ASSESSMENT/PLAN  ------------------------------  Chronic congestive heart failure, diastolic type (H)  Essential hypertension  -  EF > 70% (2012)  - compensated. Continue meds         Type II diabetes mellitus with peripheral circulatory disorder (H)   A1C 7.5 04/03/2020    A1C 8.6 08/05/2019     - Controlled.  In this frail elderly adult with a limited life expectancy, the goal of  the management is to address the hyperglycemia sx if any,  rather than the  BGs numbers per se.    On coumadin for Atrial fibrillation (H): INR followed closely by DNP. CVR, on metoprolol succinate. Clinically stable.         Primary Hyperparathyroidism (H)   - elevated Ca and PTH.  Resident seems doing fair. Recommend no " further work up given limited life expectancy. If become symptomatic consider cinacalcet 30 mg po daily, may increase to bid unitl Ca level have normalized- close attention to hydration status  - no concern.         CKD stage 3a, GFR 45-59 ml/min (H): - Avoid nephrotoxic  medications. Renally dose medications. Monitor electrolytes, and dehydration status         Hx of Cerebrovascular accident (CVA) due to embolism of right middle cerebral artery  - Left sided residual weakness, stable. Off ASA, on lipitor 10 mg from 20 mg.  Requires assistance with ADLs.          Class 2 obesity due to excess calories w/o serious comorbidity  Body mass index is 33.35 kg/m . from 34.22 kg/m     - In this age group- frail elderly, keep BMI b/lw 25-35 Kg(m2).       GERD: .no concern. On Protonix 20 mg.         Frail elderly:  Significant  Deficits requiring NH placement. Requiring extensive assistance from nursing. Up for meals only o/w spends the day resting in bed      Order: See above, otherwise, continue the rest of the current POC.       Electronically signed by:  Anirudh Ye MD

## 2021-05-03 ENCOUNTER — NURSING HOME VISIT (OUTPATIENT)
Dept: GERIATRICS | Facility: CLINIC | Age: 86
End: 2021-05-03
Payer: COMMERCIAL

## 2021-05-03 VITALS
BODY MASS INDEX: 33.2 KG/M2 | OXYGEN SATURATION: 99 % | DIASTOLIC BLOOD PRESSURE: 75 MMHG | HEIGHT: 66 IN | RESPIRATION RATE: 20 BRPM | SYSTOLIC BLOOD PRESSURE: 115 MMHG | HEART RATE: 66 BPM | TEMPERATURE: 97.8 F | WEIGHT: 206.6 LBS

## 2021-05-03 DIAGNOSIS — E21.0 HYPERPARATHYROIDISM, PRIMARY (H): ICD-10-CM

## 2021-05-03 DIAGNOSIS — Z79.01 ON COUMADIN FOR ATRIAL FIBRILLATION (H): ICD-10-CM

## 2021-05-03 DIAGNOSIS — I50.32 CHRONIC DIASTOLIC CONGESTIVE HEART FAILURE (H): Primary | ICD-10-CM

## 2021-05-03 DIAGNOSIS — I10 ESSENTIAL HYPERTENSION: ICD-10-CM

## 2021-05-03 DIAGNOSIS — E66.09 CLASS 1 OBESITY DUE TO EXCESS CALORIES WITH SERIOUS COMORBIDITY AND BODY MASS INDEX (BMI) OF 33.0 TO 33.9 IN ADULT: ICD-10-CM

## 2021-05-03 DIAGNOSIS — N18.31 STAGE 3A CHRONIC KIDNEY DISEASE (H): ICD-10-CM

## 2021-05-03 DIAGNOSIS — E11.51 TYPE II DIABETES MELLITUS WITH PERIPHERAL CIRCULATORY DISORDER (H): ICD-10-CM

## 2021-05-03 DIAGNOSIS — E66.811 CLASS 1 OBESITY DUE TO EXCESS CALORIES WITH SERIOUS COMORBIDITY AND BODY MASS INDEX (BMI) OF 33.0 TO 33.9 IN ADULT: ICD-10-CM

## 2021-05-03 DIAGNOSIS — I48.91 ON COUMADIN FOR ATRIAL FIBRILLATION (H): ICD-10-CM

## 2021-05-03 PROCEDURE — 99309 SBSQ NF CARE MODERATE MDM 30: CPT | Performed by: FAMILY MEDICINE

## 2021-05-03 ASSESSMENT — MIFFLIN-ST. JEOR: SCORE: 1328.88

## 2021-05-03 NOTE — LETTER
5/3/2021        RE: Mecca Slade  [facility]  [address] [facility] [address]        [place] [facility]  Chief Complaint   Patient presents with     senior care Regulatory     [place] Medical Record Number:  [id number]  Place of Service where encounter took place:  [facility] () [[id number]]    HPI:    Mecca Slade  is 89 year old (12/14/1921), who is being seen today for a federally mandated E/M visit.  HPI information obtained from: facility staff, patient report and [facility] chart review.     Today's concerns are:  -  - Resident seen and examined.   - Reports denies chest pain, SOB, palpitation, polyuria, polydipsia or polyphagia.   - RN reports recliner was taken away due to pt non compliance with changing position and fear of pressure injury, now has a bed. Resident said that she like the recliner for the mattress hurts her right hip area. Pt endorses butt pain when she is in the recliner but does not care.     --------------------------------  - - Past Medical, social, family histories, medications, and allergies reviewed and updated  - Medications reviewed: in the chart and EHR.   - Case Management:   I have reviewed the care plan and MDS and do agree with the plan. Patient's desire to return to the community is not present.  Information reviewed:  Medications, vital signs, orders, and nursing notes.    MEDICATIONS:  Current Outpatient Medications   Medication Sig Dispense Refill     Acetaminophen (TYLENOL PO) Take 1,000 mg by mouth 3 times daily as needed       ATORVASTATIN CALCIUM PO Take 10 mg by mouth daily       CARBOXYMETHYLCELLULOSE SODIUM 0.5 % SOLN ophthalmic solution INSTILL 1 DROP IN EACH EYE EVERY MORNING 15 mL 3     Furosemide (LASIX PO) Take 40 mg by mouth daily       Menthol, Topical Analgesic, (BENGAY COLD THERAPY) 5 % GEL Externally apply topically 4 times daily as needed (pain) 113 g 0     metFORMIN (GLUCOPHAGE-XR) 500 MG 24 hr  "tablet Take 2 tablets (1,000 mg) by mouth daily (with dinner) 30 tablet 0     metoprolol succinate ER (TOPROL-XL) 25 MG 24 hr tablet Take 1 tablet (25 mg) by mouth daily 30 tablet 0     pantoprazole (PROTONIX) 20 MG EC tablet Take 20 mg by mouth daily       simethicone (MYLICON) 80 MG chewable tablet Take 2 tablets (160 mg) by mouth daily       Warfarin Sodium (COUMADIN PO) Take by mouth daily Take as directed per INR results         ROS: 4 point ROS including Respiratory, CV, GI and , other than that noted in the HPI,  is negative    Vitals:  /75   Pulse 66   Temp 97.8  F (36.6  C)   Resp 20   Ht 1.676 m (5' 6\")   Wt 93.7 kg (206 lb 9.6 oz)   SpO2 99%   BMI 33.35 kg/m    Body mass index is 33.35 kg/m .  Exam:  GENERAL APPEARANCE:  in no distress, cooperative  ENT:  Mouth and posterior oropharynx normal, moist mucous membranes, oral mucosa moist, no lesion noted.   EYES:  EOMI, Pupil rounded and equal.  RESP:  lungs clear to auscultation   CV:  S1S2 audible, irregular HR, no murmur appreciated.   ABDOMEN:  soft, NT/ND, BS audible. no mass appreciated on palpation.   M/S:   Ms strength: 4/5 LUE, 4/5 LLE  SKIN:  No rash.   NEURO:   No NFD appreciated on observation. Hand  5/5 b/l  PSYCH:  normal insight, judgement and memory, affect and mood normal      Lab/Diagnostic data: Reviewed in the chart and EHR.        ASSESSMENT/PLAN  ------------------------------  Chronic congestive heart failure, diastolic type (H)  Essential hypertension  -  EF > 70% (2012)  - compensated. Continue meds         Type II diabetes mellitus with peripheral circulatory disorder (H)   A1C 7.5 04/03/2020    A1C 8.6 08/05/2019     - Controlled.  In this frail elderly adult with a limited life expectancy, the goal of  the management is to address the hyperglycemia sx if any,  rather than the  BGs numbers per se.    On coumadin for Atrial fibrillation (H): INR followed closely by DNP. CVR, on metoprolol succinate. Clinically " stable.         Primary Hyperparathyroidism (H)   - elevated Ca and PTH.  Resident seems doing fair. Recommend no further work up given limited life expectancy. If become symptomatic consider cinacalcet 30 mg po daily, may increase to bid unitl Ca level have normalized- close attention to hydration status  - no concern.         CKD stage 3a, GFR 45-59 ml/min (H): - Avoid nephrotoxic  medications. Renally dose medications. Monitor electrolytes, and dehydration status         Hx of Cerebrovascular accident (CVA) due to embolism of right middle cerebral artery  - Left sided residual weakness, stable. Off ASA, on lipitor 10 mg from 20 mg.  Requires assistance with ADLs.          Class 2 obesity due to excess calories w/o serious comorbidity  Body mass index is 33.35 kg/m . from 34.22 kg/m     - In this age group- frail elderly, keep BMI b/lw 25-35 Kg(m2).       GERD: .no concern. On Protonix 20 mg.         Frail elderly:  Significant  Deficits requiring NH placement. Requiring extensive assistance from nursing. Up for meals only o/w spends the day resting in bed      Order: See above, otherwise, continue the rest of the current POC.       Electronically signed by:  Anirudh Ye MD                Sincerely,        Anirudh Ye MD

## 2021-05-05 ENCOUNTER — RECORDS - HEALTHEAST (OUTPATIENT)
Dept: LAB | Facility: CLINIC | Age: 86
End: 2021-05-05

## 2021-05-12 ENCOUNTER — RECORDS - HEALTHEAST (OUTPATIENT)
Dept: LAB | Facility: CLINIC | Age: 86
End: 2021-05-12

## 2021-05-12 NOTE — PROGRESS NOTES
"[place] [facility]  [place] Medical Record Number:  [id number]. Place of Service where encounter took place: [facility] () [[id number]] HPI: Mecca Slade is a 89 year old  (12/14/1921), who is being seen today for an episodic care visit at the above location.   HPI information obtained from: facility chart records, facility staff, patient report, [facility] chart review and Care Everywhere [facility] chart review. Today's concern is INR/Coumadin management for A. Fib    ROS/Subjective:  Bleeding Signs/Symptoms:  None  Thromboembolic Signs/Symptoms:  None  Medication Changes:  No  Dietary Changes:  No  Activity Changes: No  Bacterial/Viral Infection:  No  Missed Coumadin Doses:  None  On ASA: No  Other Concerns:  No    OBJECTIVE:  /75   Pulse 66   Temp 97.7  F (36.5  C)   Resp 20   Ht 1.676 m (5' 6\")   Wt 93.7 kg (206 lb 9.6 oz)   SpO2 95%   BMI 33.35 kg/m    Last INR: 2.5 on 4/22  INR Today: 2.73  Current Dose: 1mg M/W/F, 2mg AOD.    ASSESSMENT:  Controlled atrial fibrillation (H)  Encounter for therapeutic drug monitoring  Long term current use of anticoagulant with international normalized ratio (INR) goal of 1.5-2.0  Chronic. Stable. INR therapeutic. Will dose Coumadin as noted below and recheck INR in in 4 weeks. Follow-up accordingly.    PLAN:   Orders:  1. Coumadin 1mg PO M/W/F.  2. Coumadin 2mg PO every day on AOD.   3. Recheck INR x1 on 6/10/21.   Dx: afib.    Electronically signed by:  Dr. Haley Sims, APRN CNP DNP    "

## 2021-05-13 ENCOUNTER — NURSING HOME VISIT (OUTPATIENT)
Dept: GERIATRICS | Facility: CLINIC | Age: 86
End: 2021-05-13
Payer: COMMERCIAL

## 2021-05-13 ENCOUNTER — RECORDS - HEALTHEAST (OUTPATIENT)
Dept: LAB | Facility: CLINIC | Age: 86
End: 2021-05-13

## 2021-05-13 VITALS
OXYGEN SATURATION: 95 % | TEMPERATURE: 97.7 F | HEIGHT: 66 IN | DIASTOLIC BLOOD PRESSURE: 75 MMHG | BODY MASS INDEX: 33.2 KG/M2 | SYSTOLIC BLOOD PRESSURE: 115 MMHG | WEIGHT: 206.6 LBS | RESPIRATION RATE: 20 BRPM | HEART RATE: 66 BPM

## 2021-05-13 DIAGNOSIS — Z79.01 LONG TERM CURRENT USE OF ANTICOAGULANT WITH INTERNATIONAL NORMALIZED RATIO (INR) GOAL OF 1.5-2.0: ICD-10-CM

## 2021-05-13 DIAGNOSIS — Z51.81 ENCOUNTER FOR THERAPEUTIC DRUG MONITORING: ICD-10-CM

## 2021-05-13 DIAGNOSIS — I48.91 CONTROLLED ATRIAL FIBRILLATION (H): Primary | ICD-10-CM

## 2021-05-13 LAB
INR PPP: 2.73 (ref 0.9–1.1)
SARS-COV-2 PCR COMMENT: NORMAL
SARS-COV-2 RNA SPEC QL NAA+PROBE: NEGATIVE
SARS-COV-2 VIRUS SPECIMEN SOURCE: NORMAL

## 2021-05-13 PROCEDURE — 99308 SBSQ NF CARE LOW MDM 20: CPT | Performed by: NURSE PRACTITIONER

## 2021-05-13 ASSESSMENT — MIFFLIN-ST. JEOR: SCORE: 1328.88

## 2021-05-13 NOTE — LETTER
"    5/13/2021        RE: Mecca Slade  [facility]  [address] [facility] [address]        [place] [facility]  [place] Medical Record Number:  [id number]. Place of Service where encounter took place: [facility] () [[id number]] HPI: Mecca Slade is a 89 year old  (12/14/1921), who is being seen today for an episodic care visit at the above location.   HPI information obtained from: facility chart records, facility staff, patient report, [facility] chart review and Care Everywhere [facility] chart review. Today's concern is INR/Coumadin management for A. Fib    ROS/Subjective:  Bleeding Signs/Symptoms:  None  Thromboembolic Signs/Symptoms:  None  Medication Changes:  No  Dietary Changes:  No  Activity Changes: No  Bacterial/Viral Infection:  No  Missed Coumadin Doses:  None  On ASA: No  Other Concerns:  No    OBJECTIVE:  /75   Pulse 66   Temp 97.7  F (36.5  C)   Resp 20   Ht 1.676 m (5' 6\")   Wt 93.7 kg (206 lb 9.6 oz)   SpO2 95%   BMI 33.35 kg/m    Last INR: 2.5 on 4/22  INR Today: 2.73  Current Dose: 1mg M/W/F, 2mg AOD.    ASSESSMENT:  Controlled atrial fibrillation (H)  Encounter for therapeutic drug monitoring  Long term current use of anticoagulant with international normalized ratio (INR) goal of 1.5-2.0  Chronic. Stable. INR therapeutic. Will dose Coumadin as noted below and recheck INR in in 4 weeks. Follow-up accordingly.    PLAN:   Orders:  1. Coumadin 1mg PO M/W/F.  2. Coumadin 2mg PO every day on AOD.   3. Recheck INR x1 on 6/10/21.   Dx: afib.    Electronically signed by:  JOSUE Clark CNP DNP          Sincerely,        JOSUE Gillis CNP    "

## 2021-06-08 ENCOUNTER — RECORDS - HEALTHEAST (OUTPATIENT)
Dept: LAB | Facility: CLINIC | Age: 86
End: 2021-06-08

## 2021-06-09 NOTE — PROGRESS NOTES
"[place] [facility]  [place] Medical Record Number:  [id number]. Place of Service where encounter took place: [facility] () [[id number]] HPI: Mecca Slade is a 89 year old  (12/14/1921), who is being seen today for an episodic care visit at the above location.   HPI information obtained from: facility chart records, facility staff, patient report, [facility] chart review and Care Everywhere [facility] chart review. Today's concern is INR/Coumadin management for A. Fib    ROS/Subjective:  Bleeding Signs/Symptoms:  None  Thromboembolic Signs/Symptoms:  None  Medication Changes:  No  Dietary Changes:  No  Activity Changes: No  Bacterial/Viral Infection:  No  Missed Coumadin Doses:  None  On ASA: No  Other Concerns:  No    OBJECTIVE:  /63   Pulse 78   Temp 96.4  F (35.8  C)   Resp 16   Ht 1.676 m (5' 6\")   Wt 92.5 kg (204 lb)   SpO2 97%   BMI 32.93 kg/m    Last INR: 2.73 on 5/13.  INR Today: 2.30  Current Dose: 1mg M/W/F, 2mg AOD.    ASSESSMENT:  Controlled atrial fibrillation (H)  Encounter for therapeutic drug monitoring  Long term current use of anticoagulant with international normalized ratio (INR) goal of 1.5-2.0  Chronic. Stable. INR therapeutic. Will dose Coumadin as noted below and recheck INR in 1 month. Follow-up accordingly.    PLAN:   Orders:  1. Coumadin 1mg PO M/W/F.  2. Coumadin 2mg PO every day on AOD.   3. Recheck INR x1 in 1 month.   Dx: afib.    Electronically signed by:  Dr. Haley Sims, APRN CNP DNP    "

## 2021-06-10 ENCOUNTER — NURSING HOME VISIT (OUTPATIENT)
Dept: GERIATRICS | Facility: CLINIC | Age: 86
End: 2021-06-10
Payer: COMMERCIAL

## 2021-06-10 VITALS
DIASTOLIC BLOOD PRESSURE: 63 MMHG | BODY MASS INDEX: 32.78 KG/M2 | WEIGHT: 204 LBS | OXYGEN SATURATION: 97 % | SYSTOLIC BLOOD PRESSURE: 111 MMHG | HEIGHT: 66 IN | TEMPERATURE: 96.4 F | HEART RATE: 78 BPM | RESPIRATION RATE: 16 BRPM

## 2021-06-10 DIAGNOSIS — R52 GENERALIZED PAIN: ICD-10-CM

## 2021-06-10 DIAGNOSIS — I48.91 CONTROLLED ATRIAL FIBRILLATION (H): Primary | ICD-10-CM

## 2021-06-10 DIAGNOSIS — Z79.01 LONG TERM CURRENT USE OF ANTICOAGULANT WITH INTERNATIONAL NORMALIZED RATIO (INR) GOAL OF 1.5-2.0: ICD-10-CM

## 2021-06-10 DIAGNOSIS — Z51.81 ENCOUNTER FOR THERAPEUTIC DRUG MONITORING: ICD-10-CM

## 2021-06-10 LAB — INR PPP: 2.3 (ref 0.9–1.1)

## 2021-06-10 PROCEDURE — 99308 SBSQ NF CARE LOW MDM 20: CPT | Performed by: NURSE PRACTITIONER

## 2021-06-10 ASSESSMENT — MIFFLIN-ST. JEOR: SCORE: 1317.09

## 2021-06-10 NOTE — LETTER
"    6/10/2021        RE: Mecca Slade  [facility]  [address] [facility] [address]        [place] [facility]  [place] Medical Record Number:  [id number]. Place of Service where encounter took place: [facility] () [[id number]] HPI: Mecca Slade is a 89 year old  (12/14/1921), who is being seen today for an episodic care visit at the above location.   HPI information obtained from: facility chart records, facility staff, patient report, [facility] chart review and Care Everywhere [facility] chart review. Today's concern is INR/Coumadin management for A. Fib    ROS/Subjective:  Bleeding Signs/Symptoms:  None  Thromboembolic Signs/Symptoms:  None  Medication Changes:  No  Dietary Changes:  No  Activity Changes: No  Bacterial/Viral Infection:  No  Missed Coumadin Doses:  None  On ASA: No  Other Concerns:  No    OBJECTIVE:  /63   Pulse 78   Temp 96.4  F (35.8  C)   Resp 16   Ht 1.676 m (5' 6\")   Wt 92.5 kg (204 lb)   SpO2 97%   BMI 32.93 kg/m    Last INR: 2.73 on 5/13.  INR Today: 2.30  Current Dose: 1mg M/W/F, 2mg AOD.    ASSESSMENT:  Controlled atrial fibrillation (H)  Encounter for therapeutic drug monitoring  Long term current use of anticoagulant with international normalized ratio (INR) goal of 1.5-2.0  Chronic. Stable. INR therapeutic. Will dose Coumadin as noted below and recheck INR in 1 month. Follow-up accordingly.    PLAN:   Orders:  1. Coumadin 1mg PO M/W/F.  2. Coumadin 2mg PO every day on AOD.   3. Recheck INR x1 in 1 month.   Dx: afib.    Electronically signed by:  JOSUE Clark CNP DNP          Sincerely,        JOSUE Gillis CNP    "

## 2021-07-06 NOTE — PROGRESS NOTES
[place] GERIATRIC SERVICES    Chief Complaint   Patient presents with     Nursing Home Acute       HPI:    Mecca Slade is a 89 year old  (12/14/1921), who is being seen today for an episodic care visit at [place].    HPI information obtained from: facility chart records, facility staff, patient report and [facility] chart review.     Met with Mrs. Slade today to f/u on her RLE hematoma.  She continues to report the hematoma is non-painful, does not bother her.  She does endorse the ace wrap at times get's to tight.  No other complaints.      REVIEW OF SYSTEMS:  7 point ROS done including, light headedness/dizziness, fever/chills, pain, Resp, CV, GI, and  and is negative other than noted in HPI.      /83  Pulse 74  Temp 99.1  F (37.3  C)  Resp 14  Wt 211 lb (95.7 kg)  SpO2 96%  BMI 34.06 kg/m2     GENERAL APPEARANCE:  Elderly obese female sitting up in WC, NAD, non-toxic.  RESP:  Regular relaxed breathing effort.  No cough.   EXTREMITIES:  There is a large oblong hematoma/bruise on her RLE just below the knee to the lateral side.  Hematoma/swelling is localized roughly 6 x 10 cm but bruising does run down to her ankle area, non tender, no excessive warmth.  No lower extremity edema, no calf tenderness.  No change today, ace wrap in place.   PSYCH: Alert and orientated, pleasant and cooperative.    ASSESSMENT/PLAN:  Hematoma of lower extremity, right, subsequent encounter  As noted, Mrs. Slade thinks the hematoma is a couple weeks old but unclear.  One RN reports she has not seen the hematoma before, one RN reports she had the hematoma for several months.  Thus etiology and chronicity of hematoma remains unclear.  She did get a Right knee injection in Sept by Dr. Ye, no hematoma noted at that time.  Hematoma is below the knee not the knee its self, thus low suspicion as etiology but again details are unclear.   Currently hematoma appears stable/unchanged.   -Awaiting Hgb and PT/INR  check.   -Continue ace wrap on RLE.     1. No new orders    Electronically signed by:  JOSUE Monroy CNP   normal

## 2021-07-09 ENCOUNTER — NURSING HOME VISIT (OUTPATIENT)
Dept: GERIATRICS | Facility: CLINIC | Age: 86
End: 2021-07-09
Payer: COMMERCIAL

## 2021-07-09 ENCOUNTER — RECORDS - HEALTHEAST (OUTPATIENT)
Dept: LAB | Facility: CLINIC | Age: 86
End: 2021-07-09

## 2021-07-09 VITALS
DIASTOLIC BLOOD PRESSURE: 68 MMHG | SYSTOLIC BLOOD PRESSURE: 124 MMHG | BODY MASS INDEX: 33.22 KG/M2 | TEMPERATURE: 96.7 F | HEART RATE: 57 BPM | WEIGHT: 205.8 LBS | OXYGEN SATURATION: 99 % | RESPIRATION RATE: 16 BRPM

## 2021-07-09 DIAGNOSIS — Z79.01 LONG TERM CURRENT USE OF ANTICOAGULANT WITH INTERNATIONAL NORMALIZED RATIO (INR) GOAL OF 1.5-2.0: ICD-10-CM

## 2021-07-09 DIAGNOSIS — I48.91 CONTROLLED ATRIAL FIBRILLATION (H): Primary | ICD-10-CM

## 2021-07-09 DIAGNOSIS — Z51.81 ENCOUNTER FOR THERAPEUTIC DRUG MONITORING: ICD-10-CM

## 2021-07-09 LAB — INR PPP: 1.77 (ref 0.9–1.1)

## 2021-07-09 PROCEDURE — 99308 SBSQ NF CARE LOW MDM 20: CPT | Performed by: NURSE PRACTITIONER

## 2021-07-09 NOTE — PROGRESS NOTES
[place] GERIATRIC SERVICES  [place] Medical Record Number:  [id number]  Place of Service where encounter took place: [facility] () [[id number]]    HPI:    Mecca Slade is a 89 year old  (12/14/1921), who is being seen today for an episodic care visit at the above location.   HPI information obtained from: facility chart records, facility staff and patient report. Today's concern is INR/Coumadin management for A. Fib    ROS/Subjective:  Bleeding Signs/Symptoms:  None  Thromboembolic Signs/Symptoms:  None  Medication Changes:  No  Dietary Changes:  No  Activity Changes: No  Bacterial/Viral Infection:  No  Missed Coumadin Doses:  None  On ASA: No  Other Concerns:  No    OBJECTIVE:  /68   Pulse 57   Temp 96.7  F (35.9  C)   Resp 16   Wt 93.4 kg (205 lb 12.8 oz)   SpO2 99%   BMI 33.22 kg/m    Last INR: 2.3 on 6/10  INR Today:  1.77  Current Dose:  Coumadin 1mg PO M/W,F; 2mg AOD      ASSESSMENT:  Controlled atrial fibrillation (H)  Encounter for therapeutic drug monitoring  Long term current use of anticoagulant with international normalized ratio (INR) goal of 1.5-2.0  Therapeutic INR for goal of 1.5-2  However has been coming down with same dose therefore will recheck in 2 weeks    PLAN:     New Dose: No Change    Next INR: 2 weeks      Electronically signed by:  JOSUE Mari CNP

## 2021-07-23 ENCOUNTER — LAB REQUISITION (OUTPATIENT)
Dept: LAB | Facility: CLINIC | Age: 86
End: 2021-07-23
Payer: COMMERCIAL

## 2021-07-23 LAB — INR PPP: 2.08 (ref 0.85–1.15)

## 2021-07-23 PROCEDURE — P9603 ONE-WAY ALLOW PRORATED MILES: HCPCS | Mod: ORL | Performed by: FAMILY MEDICINE

## 2021-07-23 PROCEDURE — 85610 PROTHROMBIN TIME: CPT | Mod: ORL | Performed by: FAMILY MEDICINE

## 2021-07-23 PROCEDURE — 36415 COLL VENOUS BLD VENIPUNCTURE: CPT | Mod: ORL | Performed by: FAMILY MEDICINE

## 2021-07-26 ENCOUNTER — NURSING HOME VISIT (OUTPATIENT)
Dept: GERIATRICS | Facility: CLINIC | Age: 86
End: 2021-07-26
Payer: COMMERCIAL

## 2021-07-26 VITALS
HEIGHT: 66 IN | SYSTOLIC BLOOD PRESSURE: 136 MMHG | HEART RATE: 72 BPM | OXYGEN SATURATION: 98 % | DIASTOLIC BLOOD PRESSURE: 62 MMHG | RESPIRATION RATE: 18 BRPM | TEMPERATURE: 96.5 F | WEIGHT: 203.8 LBS | BODY MASS INDEX: 32.75 KG/M2

## 2021-07-26 DIAGNOSIS — I10 ESSENTIAL HYPERTENSION: ICD-10-CM

## 2021-07-26 DIAGNOSIS — E11.51 TYPE II DIABETES MELLITUS WITH PERIPHERAL CIRCULATORY DISORDER (H): ICD-10-CM

## 2021-07-26 DIAGNOSIS — N18.31 STAGE 3A CHRONIC KIDNEY DISEASE (H): ICD-10-CM

## 2021-07-26 DIAGNOSIS — I50.32 CHRONIC DIASTOLIC CONGESTIVE HEART FAILURE (H): ICD-10-CM

## 2021-07-26 DIAGNOSIS — E66.09 CLASS 1 OBESITY DUE TO EXCESS CALORIES WITH SERIOUS COMORBIDITY AND BODY MASS INDEX (BMI) OF 33.0 TO 33.9 IN ADULT: ICD-10-CM

## 2021-07-26 DIAGNOSIS — E66.811 CLASS 1 OBESITY DUE TO EXCESS CALORIES WITH SERIOUS COMORBIDITY AND BODY MASS INDEX (BMI) OF 33.0 TO 33.9 IN ADULT: ICD-10-CM

## 2021-07-26 DIAGNOSIS — I48.91 CONTROLLED ATRIAL FIBRILLATION (H): Primary | ICD-10-CM

## 2021-07-26 PROCEDURE — 99309 SBSQ NF CARE MODERATE MDM 30: CPT | Performed by: NURSE PRACTITIONER

## 2021-07-26 ASSESSMENT — MIFFLIN-ST. JEOR: SCORE: 1316.18

## 2021-07-26 NOTE — LETTER
7/26/2021        RE: Mecca Slade  [facility]  [address] [facility] [address]        [facility]  Chief Complaint   Patient presents with     [facility]   [place] MRN: [id number] Place of Service where encounter took place:  [facility] () [[id number]] HPI: Mecca Slade  is 89 year old (12/14/1921), who is being seen today for a federally mandated E/M visit.  HPI information obtained from: facility chart records, facility staff, patient report, [facility] chart review, and Care Everywhere [facility] chart review. Today's concerns are:    Zayda seen today per federal mandate, and she has no new concerns or needs for this visit.  She denies any shortness of breath, chest pain, headache, dizziness, she states the only pain that she really has is in her right knee from time to time secondary to arthritis.  She states that she thinks that she is lost a little bit of weight.    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 PAST SURGICAL HISTORY:   has no past surgical history on file.FAMILY HISTORY: family history is not on file.SOCIAL HISTORY:  reports that she has never smoked. She has  "never used smokeless tobacco. She reports that she does not drink alcohol and does not use drugs.  MEDICATIONS:  Current Outpatient Medications   Medication Sig Dispense Refill     Acetaminophen (TYLENOL PO) Take 1,000 mg by mouth 3 times daily       ATORVASTATIN CALCIUM PO Take 10 mg by mouth daily       CARBOXYMETHYLCELLULOSE SODIUM 0.5 % SOLN ophthalmic solution INSTILL 1 DROP IN EACH EYE EVERY MORNING 15 mL 3     Furosemide (LASIX PO) Take 40 mg by mouth daily       Menthol, Topical Analgesic, (BENGAY COLD THERAPY) 5 % GEL Externally apply topically 2 times daily 113 g 0     metFORMIN (GLUCOPHAGE-XR) 500 MG 24 hr tablet Take 2 tablets (1,000 mg) by mouth daily (with dinner) 30 tablet 0     metoprolol succinate ER (TOPROL-XL) 25 MG 24 hr tablet Take 1 tablet (25 mg) by mouth daily 30 tablet 0     simethicone (MYLICON) 80 MG chewable tablet Take 2 tablets (160 mg) by mouth daily       Warfarin Sodium (COUMADIN PO) Take by mouth daily Take as directed per INR results       Case Management:  I have reviewed the care plan and MDS and do agree with the plan. Patient's desire to return to the community is not present. Information reviewed:  Medications, vital signs, orders, and nursing notes.    ROS: 4 point ROS including Respiratory, CV, GI and , other than that noted in the HPI,  is negative    Vitals: /62   Pulse 72   Temp (!) 96.5  F (35.8  C)   Resp 18   Ht 1.676 m (5' 6\")   Wt 92.4 kg (203 lb 12.8 oz)   SpO2 98%   BMI 32.89 kg/m    Exam:  GENERAL APPEARANCE: Alert, in no distress, cooperative.   ENT: Mouth/posterior oropharynx intact w/ moist mucous membranes, hearing acuity [place].  EYES: EOM, conjunctivae, lids, pupils and irises normal, PERRL2.   RESP: Respiratory effort good, no respiratory distress, On RA.   SKIN: Inspection/Palpation of skin and subcutaneous tissue baseline w/ fragility. No wounds/rashes noted.   NEURO: CN II-XII at patient's baseline, sensation baseline PPS.  PSYCH: Insight, " judgement, and memory are baseline impaired, affect and mood are happy/engaged.    Lab/Diagnostic data:   Recent labs in [facility] reviewed by me today.     ASSESSMENT/PLAN  Controlled atrial fibrillation (H)  Chronic diastolic congestive heart failure (H)  Essential hypertension  Type II diabetes mellitus with peripheral circulatory disorder (H)  Stage 3a chronic kidney disease  Class 1 obesity due to excess calories with serious comorbidity and body mass index (BMI) of 33.0 to 33.9 in adult  Chronic. Ongoing.    Chart review does appreciate a sustained 10 pound weight loss in the past few months, which is appropriate for her age and goals, but should be watched closely.    Hypertension and CKD appear well-controlled and at baseline at this time.    A. fib controlled and patient anticoagulated with Coumadin.  Last INR was on 7/23 and was therapeutic.  Follow-up routinely or as needed.    Orders:  No new orders.    Electronically signed by:  JOSUE Clark CNP [facility]        Sincerely,        JOSUE Gillis CNP

## 2021-07-26 NOTE — PROGRESS NOTES
[facility]  Chief Complaint   Patient presents with     [facility]   [place] MRN: [id number] Place of Service where encounter took place:  [facility] () [[id number]] HPI: Mecca Slade  is 89 year old (12/14/1921), who is being seen today for a federally mandated E/M visit.  HPI information obtained from: facility chart records, facility staff, patient report, [place] Epic chart review, and Care Everywhere [facility] chart review. Today's concerns are:    Zayda seen today per federal mandate, and she has no new concerns or needs for this visit.  She denies any shortness of breath, chest pain, headache, dizziness, she states the only pain that she really has is in her right knee from time to time secondary to arthritis.  She states that she thinks that she is lost a little bit of weight.    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 PAST SURGICAL HISTORY:   has no past surgical history on file.FAMILY HISTORY: family history is not on file.SOCIAL HISTORY:  reports that she has never smoked. She has never used smokeless tobacco. She reports that she does not drink alcohol and does not use  "drugs.  MEDICATIONS:  Current Outpatient Medications   Medication Sig Dispense Refill     Acetaminophen (TYLENOL PO) Take 1,000 mg by mouth 3 times daily       ATORVASTATIN CALCIUM PO Take 10 mg by mouth daily       CARBOXYMETHYLCELLULOSE SODIUM 0.5 % SOLN ophthalmic solution INSTILL 1 DROP IN EACH EYE EVERY MORNING 15 mL 3     Furosemide (LASIX PO) Take 40 mg by mouth daily       Menthol, Topical Analgesic, (BENGAY COLD THERAPY) 5 % GEL Externally apply topically 2 times daily 113 g 0     metFORMIN (GLUCOPHAGE-XR) 500 MG 24 hr tablet Take 2 tablets (1,000 mg) by mouth daily (with dinner) 30 tablet 0     metoprolol succinate ER (TOPROL-XL) 25 MG 24 hr tablet Take 1 tablet (25 mg) by mouth daily 30 tablet 0     simethicone (MYLICON) 80 MG chewable tablet Take 2 tablets (160 mg) by mouth daily       Warfarin Sodium (COUMADIN PO) Take by mouth daily Take as directed per INR results       Case Management:  I have reviewed the care plan and MDS and do agree with the plan. Patient's desire to return to the community is not present. Information reviewed:  Medications, vital signs, orders, and nursing notes.    ROS: 4 point ROS including Respiratory, CV, GI and , other than that noted in the HPI,  is negative    Vitals: /62   Pulse 72   Temp (!) 96.5  F (35.8  C)   Resp 18   Ht 1.676 m (5' 6\")   Wt 92.4 kg (203 lb 12.8 oz)   SpO2 98%   BMI 32.89 kg/m    Exam:  GENERAL APPEARANCE: Alert, in no distress, cooperative.   ENT: Mouth/posterior oropharynx intact w/ moist mucous membranes, hearing acuity [place].  EYES: EOM, conjunctivae, lids, pupils and irises normal, PERRL2.   RESP: Respiratory effort good, no respiratory distress, On RA.   SKIN: Inspection/Palpation of skin and subcutaneous tissue baseline w/ fragility. No wounds/rashes noted.   NEURO: CN II-XII at patient's baseline, sensation baseline PPS.  PSYCH: Insight, judgement, and memory are baseline impaired, affect and mood are " happy/engaged.    Lab/Diagnostic data:   Recent labs in [facility] reviewed by me today.     ASSESSMENT/PLAN  Controlled atrial fibrillation (H)  Chronic diastolic congestive heart failure (H)  Essential hypertension  Type II diabetes mellitus with peripheral circulatory disorder (H)  Stage 3a chronic kidney disease  Class 1 obesity due to excess calories with serious comorbidity and body mass index (BMI) of 33.0 to 33.9 in adult  Chronic. Ongoing.    Chart review does appreciate a sustained 10 pound weight loss in the past few months, which is appropriate for her age and goals, but should be watched closely.    Hypertension and CKD appear well-controlled and at baseline at this time.    A. fib controlled and patient anticoagulated with Coumadin.  Last INR was on 7/23 and was therapeutic.  Follow-up routinely or as needed.    Orders:  No new orders.    Electronically signed by:  JOSUE Clark CNP DNP

## 2021-08-04 ENCOUNTER — LAB REQUISITION (OUTPATIENT)
Dept: LAB | Facility: CLINIC | Age: 86
End: 2021-08-04
Payer: COMMERCIAL

## 2021-08-04 DIAGNOSIS — I48.91 UNSPECIFIED ATRIAL FIBRILLATION (H): ICD-10-CM

## 2021-08-05 ENCOUNTER — NURSING HOME VISIT (OUTPATIENT)
Dept: GERIATRICS | Facility: CLINIC | Age: 86
End: 2021-08-05
Payer: COMMERCIAL

## 2021-08-05 VITALS
HEIGHT: 66 IN | RESPIRATION RATE: 18 BRPM | OXYGEN SATURATION: 97 % | BODY MASS INDEX: 32.56 KG/M2 | DIASTOLIC BLOOD PRESSURE: 62 MMHG | WEIGHT: 202.6 LBS | HEART RATE: 72 BPM | TEMPERATURE: 97.5 F | SYSTOLIC BLOOD PRESSURE: 136 MMHG

## 2021-08-05 DIAGNOSIS — Z51.81 ENCOUNTER FOR THERAPEUTIC DRUG MONITORING: ICD-10-CM

## 2021-08-05 DIAGNOSIS — I48.91 CONTROLLED ATRIAL FIBRILLATION (H): Primary | ICD-10-CM

## 2021-08-05 DIAGNOSIS — Z79.01 LONG TERM CURRENT USE OF ANTICOAGULANT WITH INTERNATIONAL NORMALIZED RATIO (INR) GOAL OF 1.5-2.0: ICD-10-CM

## 2021-08-05 LAB
ANION GAP SERPL CALCULATED.3IONS-SCNC: 6 MMOL/L (ref 5–18)
BUN SERPL-MCNC: 40 MG/DL (ref 8–28)
CALCIUM SERPL-MCNC: 11.2 MG/DL (ref 8.5–10.5)
CHLORIDE BLD-SCNC: 104 MMOL/L (ref 98–107)
CO2 SERPL-SCNC: 32 MMOL/L (ref 22–31)
CREAT SERPL-MCNC: 0.93 MG/DL (ref 0.6–1.1)
GFR SERPL CREATININE-BSD FRML MDRD: 51 ML/MIN/1.73M2
GLUCOSE BLD-MCNC: 141 MG/DL (ref 70–125)
HBA1C MFR BLD: 7.8 %
HGB BLD-MCNC: 13.5 G/DL (ref 11.7–15.7)
INR PPP: 2.14 (ref 0.85–1.15)
POTASSIUM BLD-SCNC: 4.4 MMOL/L (ref 3.5–5)
SODIUM SERPL-SCNC: 142 MMOL/L (ref 136–145)

## 2021-08-05 PROCEDURE — 83036 HEMOGLOBIN GLYCOSYLATED A1C: CPT | Mod: ORL,GZ | Performed by: FAMILY MEDICINE

## 2021-08-05 PROCEDURE — 36415 COLL VENOUS BLD VENIPUNCTURE: CPT | Mod: ORL | Performed by: FAMILY MEDICINE

## 2021-08-05 PROCEDURE — 85610 PROTHROMBIN TIME: CPT | Mod: ORL | Performed by: FAMILY MEDICINE

## 2021-08-05 PROCEDURE — 85018 HEMOGLOBIN: CPT | Mod: ORL | Performed by: FAMILY MEDICINE

## 2021-08-05 PROCEDURE — P9603 ONE-WAY ALLOW PRORATED MILES: HCPCS | Mod: ORL | Performed by: FAMILY MEDICINE

## 2021-08-05 PROCEDURE — 80048 BASIC METABOLIC PNL TOTAL CA: CPT | Mod: ORL | Performed by: FAMILY MEDICINE

## 2021-08-05 PROCEDURE — 99308 SBSQ NF CARE LOW MDM 20: CPT | Performed by: NURSE PRACTITIONER

## 2021-08-05 ASSESSMENT — MIFFLIN-ST. JEOR: SCORE: 1310.74

## 2021-08-05 NOTE — LETTER
"    8/5/2021        RE: Mecca Slade  [facility]  [address] [facility] [address]        [facility] GERIATRIC SERVICES  [place] MRN: [id number]. Place of Service where encounter took place: [facility] () [[id number]] HPI: Mecca Slade is a 89 year old (12/14/1921), who is being seen today for an episodic care visit at the above location. Today's concern is INR/Coumadin management for A. Fib    ROS/Subjective:  Bleeding Signs/Symptoms: None  Thromboembolic Signs/Symptoms: None  Medication Changes: No  Dietary Changes: No  Activity Changes: No  Bacterial/Viral Infection: No  Missed Coumadin Doses: None  On ASA: No  Other Concerns: No    OBJECTIVE:  /62   Pulse 72   Temp 97.5  F (36.4  C)   Resp 18   Ht 1.676 m (5' 6\")   Wt 91.9 kg (202 lb 9.6 oz)   SpO2 97%   BMI 32.70 kg/m    Last INR: 2.08 on 7/23.  INR Today: 2.14  Current Dose: 1mg M/W//F, 2mg AOD.     ASSESSMENT:  Controlled atrial fibrillation (H)  Encounter for therapeutic drug monitoring  Long term current use of anticoagulant with international normalized ratio (INR) goal of 1.5-2.0  Chronic. Stable. Therapeutic. Will dose Coumadin as noted below and recheck INR in 3 weeks. Follow-up accordingly.    PLAN:   Orders:  1. Coumadin 1mg M/W/F.  2. Coumadin 2mg PO every day on  AOD.   3. Recheck INR x1 on 8/26.  Dx: Afib.     ORDERS:  Orders:  1. Coumadin 1 mg PO every day on M/W/F. Dx: afib.  2. Coumadin 2 mg PO every day on AOD. Dx: afib.  3. Recheck INR x 1 on 8/26.    Electronically signed by:   JOSUE Clark CNP DNP          Sincerely,        JOSUE Gillis CNP    "

## 2021-08-05 NOTE — PROGRESS NOTES
"[facility] GERIATRIC SERVICES  [place] MRN: [id number]. Place of Service where encounter took place: [facility] () [[id number]] HPI: Mecca Slade is a 89 year old (12/14/1921), who is being seen today for an episodic care visit at the above location. Today's concern is INR/Coumadin management for A. Fib    ROS/Subjective:  Bleeding Signs/Symptoms: None  Thromboembolic Signs/Symptoms: None  Medication Changes: No  Dietary Changes: No  Activity Changes: No  Bacterial/Viral Infection: No  Missed Coumadin Doses: None  On ASA: No  Other Concerns: No    OBJECTIVE:  /62   Pulse 72   Temp 97.5  F (36.4  C)   Resp 18   Ht 1.676 m (5' 6\")   Wt 91.9 kg (202 lb 9.6 oz)   SpO2 97%   BMI 32.70 kg/m    Last INR: 2.08 on 7/23.  INR Today: 2.14  Current Dose: 1mg M/W//F, 2mg AOD.     ASSESSMENT:  Controlled atrial fibrillation (H)  Encounter for therapeutic drug monitoring  Long term current use of anticoagulant with international normalized ratio (INR) goal of 1.5-2.0  Chronic. Stable. Therapeutic. Will dose Coumadin as noted below and recheck INR in 3 weeks. Follow-up accordingly.    PLAN:   Orders:  1. Coumadin 1mg M/W/F.  2. Coumadin 2mg PO every day on  AOD.   3. Recheck INR x1 on 8/26.  Dx: Afib.     ORDERS:  Orders:  1. Coumadin 1 mg PO every day on M/W/F. Dx: afib.  2. Coumadin 2 mg PO every day on AOD. Dx: afib.  3. Recheck INR x 1 on 8/26.    Electronically signed by:   Dr. Haley Sims, APRN CNP DNP    "

## 2021-08-25 ENCOUNTER — LAB REQUISITION (OUTPATIENT)
Dept: LAB | Facility: CLINIC | Age: 86
End: 2021-08-25
Payer: COMMERCIAL

## 2021-08-25 DIAGNOSIS — I48.91 UNSPECIFIED ATRIAL FIBRILLATION (H): ICD-10-CM

## 2021-08-26 ENCOUNTER — NURSING HOME VISIT (OUTPATIENT)
Dept: GERIATRICS | Facility: CLINIC | Age: 86
End: 2021-08-26
Payer: COMMERCIAL

## 2021-08-26 VITALS
DIASTOLIC BLOOD PRESSURE: 72 MMHG | BODY MASS INDEX: 32.59 KG/M2 | SYSTOLIC BLOOD PRESSURE: 126 MMHG | HEART RATE: 68 BPM | RESPIRATION RATE: 16 BRPM | OXYGEN SATURATION: 95 % | WEIGHT: 202.8 LBS | TEMPERATURE: 97.2 F | HEIGHT: 66 IN

## 2021-08-26 DIAGNOSIS — I48.91 CONTROLLED ATRIAL FIBRILLATION (H): Primary | ICD-10-CM

## 2021-08-26 DIAGNOSIS — Z51.81 ENCOUNTER FOR THERAPEUTIC DRUG MONITORING: ICD-10-CM

## 2021-08-26 DIAGNOSIS — Z79.01 LONG TERM CURRENT USE OF ANTICOAGULANT WITH INTERNATIONAL NORMALIZED RATIO (INR) GOAL OF 1.5-2.0: ICD-10-CM

## 2021-08-26 LAB — INR PPP: 1.9 (ref 0.85–1.15)

## 2021-08-26 PROCEDURE — 36415 COLL VENOUS BLD VENIPUNCTURE: CPT | Mod: ORL | Performed by: FAMILY MEDICINE

## 2021-08-26 PROCEDURE — P9604 ONE-WAY ALLOW PRORATED TRIP: HCPCS | Mod: ORL | Performed by: FAMILY MEDICINE

## 2021-08-26 PROCEDURE — 85610 PROTHROMBIN TIME: CPT | Mod: ORL | Performed by: FAMILY MEDICINE

## 2021-08-26 PROCEDURE — 99308 SBSQ NF CARE LOW MDM 20: CPT | Performed by: NURSE PRACTITIONER

## 2021-08-26 ASSESSMENT — MIFFLIN-ST. JEOR: SCORE: 1311.64

## 2021-08-26 NOTE — LETTER
"    8/26/2021        RE: Mecca Slade  [facility]  [address] [facility] [address]        [facility]ERIATRIC SERVICES  [place] Medical Record Number: [id number]  Place of Service where encounter took place: [facility] () [[id number]]    HPI:    Mecca Slade is a 89 year old (12/14/1921), who is being seen today for an episodic care visit at the above location. Today's concern is INR/Coumadin management for A. Fib    ROS/Subjective:  Bleeding Signs/Symptoms: None  Thromboembolic Signs/Symptoms: None  Medication Changes: No  Dietary Changes: No  Activity Changes: No  Bacterial/Viral Infection: No  Missed Coumadin Doses: None  On ASA: No  Other Concerns: No    OBJECTIVE:  /72   Pulse 68   Temp 97.2  F (36.2  C)   Resp 16   Ht 1.676 m (5' 6\")   Wt 92 kg (202 lb 12.8 oz)   SpO2 95%   BMI 32.73 kg/m    Last INR: 2.14 on 8-5-21  INR Today: 1.90  Current Dose: 1mg M, W, F; 2mg AOD      ASSESSMENT:     Controlled atrial fibrillation (H)  Encounter for therapeutic drug monitoring  Long term current use of anticoagulant with international normalized ratio (INR) goal of 1.5-2.0  Subtherapeutic INR for goal of 2-3  Has been stable for a long time, without dose changes.  Given slight dip, will recheck in 1 week to assure not a trend    PLAN/ORDERS:     New Dose: No Change    Next INR: 1 week      Electronically signed by: JOSUE Mari CNP        Sincerely,        JOSUE Mari CNP    "

## 2021-08-26 NOTE — PROGRESS NOTES
"[facility] GERIATRIC SERVICES  [place] Medical Record Number: [id number]  Place of Service where encounter took place: [facility] () [[id number]]    HPI:    Mecca Slade is a 89 year old (12/14/1921), who is being seen today for an episodic care visit at the above location. Today's concern is INR/Coumadin management for A. Fib    ROS/Subjective:  Bleeding Signs/Symptoms: None  Thromboembolic Signs/Symptoms: None  Medication Changes: No  Dietary Changes: No  Activity Changes: No  Bacterial/Viral Infection: No  Missed Coumadin Doses: None  On ASA: No  Other Concerns: No    OBJECTIVE:  /72   Pulse 68   Temp 97.2  F (36.2  C)   Resp 16   Ht 1.676 m (5' 6\")   Wt 92 kg (202 lb 12.8 oz)   SpO2 95%   BMI 32.73 kg/m    Last INR: 2.14 on 8-5-21  INR Today: 1.90  Current Dose: 1mg M, W, F; 2mg AOD      ASSESSMENT:     Controlled atrial fibrillation (H)  Encounter for therapeutic drug monitoring  Long term current use of anticoagulant with international normalized ratio (INR) goal of 1.5-2.0  Subtherapeutic INR for goal of 2-3  Has been stable for a long time, without dose changes.  Given slight dip, will recheck in 1 week to assure not a trend    PLAN/ORDERS:     New Dose: No Change    Next INR: 1 week      Electronically signed by: JOSUE Mari CNP  "

## 2021-09-02 ENCOUNTER — LAB REQUISITION (OUTPATIENT)
Dept: LAB | Facility: CLINIC | Age: 86
End: 2021-09-02
Payer: COMMERCIAL

## 2021-09-02 ENCOUNTER — NURSING HOME VISIT (OUTPATIENT)
Dept: GERIATRICS | Facility: CLINIC | Age: 86
End: 2021-09-02
Payer: COMMERCIAL

## 2021-09-02 VITALS
OXYGEN SATURATION: 96 % | RESPIRATION RATE: 18 BRPM | DIASTOLIC BLOOD PRESSURE: 85 MMHG | HEART RATE: 86 BPM | BODY MASS INDEX: 32.58 KG/M2 | SYSTOLIC BLOOD PRESSURE: 148 MMHG | WEIGHT: 202.7 LBS | TEMPERATURE: 97.4 F | HEIGHT: 66 IN

## 2021-09-02 DIAGNOSIS — Z79.01 LONG TERM CURRENT USE OF ANTICOAGULANT WITH INTERNATIONAL NORMALIZED RATIO (INR) GOAL OF 1.5-2.0: ICD-10-CM

## 2021-09-02 DIAGNOSIS — Z51.81 ENCOUNTER FOR THERAPEUTIC DRUG MONITORING: ICD-10-CM

## 2021-09-02 DIAGNOSIS — I48.91 UNSPECIFIED ATRIAL FIBRILLATION (H): ICD-10-CM

## 2021-09-02 DIAGNOSIS — I48.91 CONTROLLED ATRIAL FIBRILLATION (H): Primary | ICD-10-CM

## 2021-09-02 PROCEDURE — 99308 SBSQ NF CARE LOW MDM 20: CPT | Performed by: NURSE PRACTITIONER

## 2021-09-02 ASSESSMENT — MIFFLIN-ST. JEOR: SCORE: 1311.19

## 2021-09-02 NOTE — PROGRESS NOTES
"[facility] GERIATRIC SERVICES  [place] MRN: [id number]. Place of Service where encounter took place: [facility] () [[id number]] HPI: Mecca Slade is a 89 year old (12/14/1921), who is being seen today for an episodic care visit at the above location. Today's concern is INR/Coumadin management for A. Fib    ROS/Subjective:  Bleeding Signs/Symptoms: None  Thromboembolic Signs/Symptoms: None  Medication Changes: No  Dietary Changes: No  Activity Changes: No  Bacterial/Viral Infection: No  Missed Coumadin Doses: None  On ASA: No  Other Concerns: No    OBJECTIVE:  BP (!) 148/85   Pulse 86   Temp 97.4  F (36.3  C)   Resp 18   Ht 1.676 m (5' 6\")   Wt 91.9 kg (202 lb 11.2 oz)   SpO2 96%   BMI 32.72 kg/m    Last INR: 1.90 on 8/26.  INR Today: Not drawn by lab  Current Dose: 1mg M/W//F, 2mg AOD.     ASSESSMENT:  Controlled atrial fibrillation (H)  Encounter for therapeutic drug monitoring  Long term current use of anticoagulant with international normalized ratio (INR) goal of 1.5-2.0  Chronic. Stable. Unknown level . Noting low last week. Will dose Coumadin as noted below and recheck INR tomorrow hopefully. Follow-up accordingly.    PLAN/ORDERS:  Orders:  1. Coumadin 2mg x1 today.  2. Recheck INR x1 tomorrow.   Dx: Afib.    Electronically signed by:   Dr. Haley Sims, APRN CNP DNP  ________________    INR resulted at 1.65 next day. Given renal impairment, fluctuations are slow. Will continue 2mg and recheck in 4 days on 9/7.     "

## 2021-09-02 NOTE — LETTER
"    9/2/2021        RE: Mecca Slade  [facility]  [address] [facility] [address]        [facility]  [place] MRN: [id number]. Place of Service where encounter took place: [facility] () [[id number]] HPI: Mecca Slade is a 89 year old (12/14/1921), who is being seen today for an episodic care visit at the above location. Today's concern is INR/Coumadin management for A. Fib    ROS/Subjective:  Bleeding Signs/Symptoms: None  Thromboembolic Signs/Symptoms: None  Medication Changes: No  Dietary Changes: No  Activity Changes: No  Bacterial/Viral Infection: No  Missed Coumadin Doses: None  On ASA: No  Other Concerns: No    OBJECTIVE:  BP (!) 148/85   Pulse 86   Temp 97.4  F (36.3  C)   Resp 18   Ht 1.676 m (5' 6\")   Wt 91.9 kg (202 lb 11.2 oz)   SpO2 96%   BMI 32.72 kg/m    Last INR: 1.90 on ***  INR Today: Not drawn by lab  Current Dose: 1mg M/W//F, 2mg AOD.     ASSESSMENT:  Controlled atrial fibrillation (H)  Encounter for therapeutic drug monitoring  Long term current use of anticoagulant with international normalized ratio (INR) goal of 1.5-2.0  Chronic. Stable. Unknown level . Noting low last week. Will dose Coumadin as noted below and recheck INR tomorrow hopefully. Follow-up accordingly.    PLAN/ORDERS:  Orders:  1. Coumadin 2mg x1 today.  2. Recheck INR x1 tomorrow.   Dx: Afib.    Electronically signed by:   JOSUE Clark CNP DNP  ________________    INR resulted at 1.65 next day. Given renal impairment, fluctuations are slow. Will continue 2mg and recheck in 4 days on 9/7.           Sincerely,        JOSUE Gillis CNP    "

## 2021-09-02 NOTE — LETTER
"    9/2/2021        RE: Mecca Slade  [facility]  [address] [facility] [address]        [facility]  [place] MRN: [id number]. Place of Service where encounter took place: [facility] () [[id number]] HPI: Mecca Slade is a 89 year old (12/14/1921), who is being seen today for an episodic care visit at the above location. Today's concern is INR/Coumadin management for A. Fib    ROS/Subjective:  Bleeding Signs/Symptoms: None  Thromboembolic Signs/Symptoms: None  Medication Changes: No  Dietary Changes: No  Activity Changes: No  Bacterial/Viral Infection: No  Missed Coumadin Doses: None  On ASA: No  Other Concerns: No    OBJECTIVE:  BP (!) 148/85   Pulse 86   Temp 97.4  F (36.3  C)   Resp 18   Ht 1.676 m (5' 6\")   Wt 91.9 kg (202 lb 11.2 oz)   SpO2 96%   BMI 32.72 kg/m    Last INR: 1.90 on 8/26.  INR Today: Not drawn by lab  Current Dose: 1mg M/W//F, 2mg AOD.     ASSESSMENT:  Controlled atrial fibrillation (H)  Encounter for therapeutic drug monitoring  Long term current use of anticoagulant with international normalized ratio (INR) goal of 1.5-2.0  Chronic. Stable. Unknown level . Noting low last week. Will dose Coumadin as noted below and recheck INR tomorrow hopefully. Follow-up accordingly.    PLAN/ORDERS:  Orders:  1. Coumadin 2mg x1 today.  2. Recheck INR x1 tomorrow.   Dx: Afib.    Electronically signed by:   JOSUE Clark CNP DNP  ________________    INR resulted at 1.65 next day. Given renal impairment, fluctuations are slow. Will continue 2mg and recheck in 4 days on 9/7.           Sincerely,        JOSUE Gillis CNP    "

## 2021-09-03 LAB — INR PPP: 1.65 (ref 0.85–1.15)

## 2021-09-03 PROCEDURE — P9604 ONE-WAY ALLOW PRORATED TRIP: HCPCS | Mod: ORL | Performed by: FAMILY MEDICINE

## 2021-09-03 PROCEDURE — 36415 COLL VENOUS BLD VENIPUNCTURE: CPT | Mod: ORL | Performed by: FAMILY MEDICINE

## 2021-09-03 PROCEDURE — 85610 PROTHROMBIN TIME: CPT | Mod: ORL | Performed by: FAMILY MEDICINE

## 2021-09-03 NOTE — PROGRESS NOTES
[place] [facility]  Chief Complaint   Patient presents with     senior care Regulatory     [place] Medical Record Number:  [id number]  Place of Service where encounter took place:  [facility] () [[id number]]    HPI:    Mecca Slade  is 89 year old (12/14/1921), who is being seen today for a federally mandated E/M visit.  HPI information obtained from: facility staff, patient report and [facility] chart review.     Today's concerns are:  -  - Resident seen and examined. Denies CP, SOB, palpitation, polyuria, polydipsia or polyphagia.   --------------------------------  - - Past Medical, social, family histories, medications, and allergies reviewed and updated  - Medications reviewed: in the chart and EHR.   - Case Management:   I have reviewed the care plan and MDS and do agree with the plan. Patient's desire to return to the community is not present.  Information reviewed:  Medications, vital signs, orders, and nursing notes.    MEDICATIONS:  Current Outpatient Medications   Medication Sig Dispense Refill     Acetaminophen (TYLENOL PO) Take 1,000 mg by mouth 3 times daily       ATORVASTATIN CALCIUM PO Take 10 mg by mouth daily       CARBOXYMETHYLCELLULOSE SODIUM 0.5 % SOLN ophthalmic solution INSTILL 1 DROP IN EACH EYE EVERY MORNING 15 mL 3     Furosemide (LASIX PO) Take 40 mg by mouth daily       Menthol, Topical Analgesic, (BENGAY COLD THERAPY) 5 % GEL Externally apply topically 2 times daily 113 g 0     metFORMIN (GLUCOPHAGE-XR) 500 MG 24 hr tablet Take 2 tablets (1,000 mg) by mouth daily (with dinner) 30 tablet 0     metoprolol succinate ER (TOPROL-XL) 25 MG 24 hr tablet Take 1 tablet (25 mg) by mouth daily 30 tablet 0     simethicone (MYLICON) 80 MG chewable tablet Take 2 tablets (160 mg) by mouth daily       Warfarin Sodium (COUMADIN PO) Take by mouth daily Take as directed per INR results         ROS: 4 point ROS including Respiratory, CV, GI and , other than that noted in  "the HPI,  is negative    Vitals:  BP (!) 150/82   Pulse 72   Temp (!) 96.4  F (35.8  C)   Resp 18   Ht 1.676 m (5' 6\")   Wt 91.9 kg (202 lb 11.2 oz)   SpO2 96%   BMI 32.72 kg/m    Body mass index is 32.72 kg/m .  Exam:  GENERAL APPEARANCE:  in no distress, cooperative  RESP:  lungs clear to auscultation , unlabored breathing  CV:  S1S2 audible, irregular HR, no murmur appreciated.   ABDOMEN:  soft, NT/ND, BS audible. no mass appreciated on palpation.   M/S:   No joint deformity noted.   SKIN:  No rash.   NEURO:  Ms strength: 4/5 LUE, 4/5 LLE  PSYCH:  normal insight, judgement and memory, affect and mood normal      Lab/Diagnostic data: Reviewed in the chart and EHR.        ASSESSMENT/PLAN  ------------------------------  Chronic congestive heart failure, diastolic type (H)  Essential hypertension  -  EF > 70% (2012)  - compensated. Continue meds         Type II diabetes mellitus with peripheral circulatory disorder (H)   A1C 7.8 08/05/2021    A1C 8.6 03/08/2021    - Controlled.  In this frail elderly adult with a limited life expectancy, the goal of  the management is to address the hyperglycemia sx if any,  rather than the  BGs numbers per se.    On coumadin for Atrial fibrillation (H): INR followed closely by DNP. CVR, on metoprolol succinate. Clinically stable.         Primary Hyperparathyroidism (H)   - elevated Ca and PTH.  Resident seems doing fair. Recommend no further work up given limited life expectancy. If become symptomatic consider cinacalcet 30 mg po daily, may increase to bid unitl Ca level have normalized- close attention to hydration status  - no concern.         CKD stage 3a, GFR 45-59 ml/min (H): - Avoid nephrotoxic  medications. Renally dose medications. Monitor electrolytes, and dehydration status         Hx of Cerebrovascular accident (CVA) due to embolism of right middle cerebral artery  Left sided hemiparesis (H)  Frail elderly:    - Left sided residual weakness, stable. Off ASA, on " lipitor 10 mg.    - Significant  Deficits requiring NH placement. Requiring extensive assistance from nursing. Up for meals only o/w spends the day resting in bed         Class 1 obesity due to excess calories with serious comorbidity :  -  BMI 32.72 kg/m  from 34.22 kg/m  .  In this age group- frail elderly, keep BMI b/lw 25-35 Kg(m2).       GERD: . Off Protonix. No concern.        Order: See above, otherwise, continue the rest of the current POC.       Electronically signed by:  Anirudh Ye MD

## 2021-09-06 ENCOUNTER — NURSING HOME VISIT (OUTPATIENT)
Dept: GERIATRICS | Facility: CLINIC | Age: 86
End: 2021-09-06
Payer: COMMERCIAL

## 2021-09-06 VITALS
OXYGEN SATURATION: 96 % | DIASTOLIC BLOOD PRESSURE: 82 MMHG | HEIGHT: 66 IN | HEART RATE: 72 BPM | RESPIRATION RATE: 18 BRPM | SYSTOLIC BLOOD PRESSURE: 150 MMHG | WEIGHT: 202.7 LBS | TEMPERATURE: 96.4 F | BODY MASS INDEX: 32.58 KG/M2

## 2021-09-06 DIAGNOSIS — E21.0 HYPERPARATHYROIDISM, PRIMARY (H): ICD-10-CM

## 2021-09-06 DIAGNOSIS — E66.09 CLASS 1 OBESITY DUE TO EXCESS CALORIES WITH SERIOUS COMORBIDITY AND BODY MASS INDEX (BMI) OF 33.0 TO 33.9 IN ADULT: ICD-10-CM

## 2021-09-06 DIAGNOSIS — E66.811 CLASS 1 OBESITY DUE TO EXCESS CALORIES WITH SERIOUS COMORBIDITY AND BODY MASS INDEX (BMI) OF 33.0 TO 33.9 IN ADULT: ICD-10-CM

## 2021-09-06 DIAGNOSIS — I50.32 CHRONIC DIASTOLIC CONGESTIVE HEART FAILURE (H): ICD-10-CM

## 2021-09-06 DIAGNOSIS — I48.91 CONTROLLED ATRIAL FIBRILLATION (H): Primary | ICD-10-CM

## 2021-09-06 DIAGNOSIS — E11.51 TYPE II DIABETES MELLITUS WITH PERIPHERAL CIRCULATORY DISORDER (H): ICD-10-CM

## 2021-09-06 DIAGNOSIS — I10 ESSENTIAL HYPERTENSION: ICD-10-CM

## 2021-09-06 DIAGNOSIS — G81.94 LEFT HEMIPARESIS (H): ICD-10-CM

## 2021-09-06 PROCEDURE — 99309 SBSQ NF CARE MODERATE MDM 30: CPT | Performed by: FAMILY MEDICINE

## 2021-09-06 ASSESSMENT — MIFFLIN-ST. JEOR: SCORE: 1311.19

## 2021-09-06 NOTE — LETTER
9/6/2021        RE: Mecca Slade  [facility]  [address] [facility] [address]        [place] [facility]  Chief Complaint   Patient presents with     jail Regulatory     [place] Medical Record Number:  [id number]  Place of Service where encounter took place:  [facility] () [[id number]]    HPI:    Mecca Slade  is 89 year old (12/14/1921), who is being seen today for a federally mandated E/M visit.  HPI information obtained from: facility staff, patient report and [facility] chart review.     Today's concerns are:  -  - Resident seen and examined. Denies CP, SOB, palpitation, polyuria, polydipsia or polyphagia.   --------------------------------  - - Past Medical, social, family histories, medications, and allergies reviewed and updated  - Medications reviewed: in the chart and EHR.   - Case Management:   I have reviewed the care plan and MDS and do agree with the plan. Patient's desire to return to the community is not present.  Information reviewed:  Medications, vital signs, orders, and nursing notes.    MEDICATIONS:  Current Outpatient Medications   Medication Sig Dispense Refill     Acetaminophen (TYLENOL PO) Take 1,000 mg by mouth 3 times daily       ATORVASTATIN CALCIUM PO Take 10 mg by mouth daily       CARBOXYMETHYLCELLULOSE SODIUM 0.5 % SOLN ophthalmic solution INSTILL 1 DROP IN EACH EYE EVERY MORNING 15 mL 3     Furosemide (LASIX PO) Take 40 mg by mouth daily       Menthol, Topical Analgesic, (BENGAY COLD THERAPY) 5 % GEL Externally apply topically 2 times daily 113 g 0     metFORMIN (GLUCOPHAGE-XR) 500 MG 24 hr tablet Take 2 tablets (1,000 mg) by mouth daily (with dinner) 30 tablet 0     metoprolol succinate ER (TOPROL-XL) 25 MG 24 hr tablet Take 1 tablet (25 mg) by mouth daily 30 tablet 0     simethicone (MYLICON) 80 MG chewable tablet Take 2 tablets (160 mg) by mouth daily       Warfarin Sodium (COUMADIN PO) Take by mouth daily Take as  "directed per INR results         ROS: 4 point ROS including Respiratory, CV, GI and , other than that noted in the HPI,  is negative    Vitals:  BP (!) 150/82   Pulse 72   Temp (!) 96.4  F (35.8  C)   Resp 18   Ht 1.676 m (5' 6\")   Wt 91.9 kg (202 lb 11.2 oz)   SpO2 96%   BMI 32.72 kg/m    Body mass index is 32.72 kg/m .  Exam:  GENERAL APPEARANCE:  in no distress, cooperative  RESP:  lungs clear to auscultation , unlabored breathing  CV:  S1S2 audible, irregular HR, no murmur appreciated.   ABDOMEN:  soft, NT/ND, BS audible. no mass appreciated on palpation.   M/S:   No joint deformity noted.   SKIN:  No rash.   NEURO:  Ms strength: 4/5 LUE, 4/5 LLE  PSYCH:  normal insight, judgement and memory, affect and mood normal      Lab/Diagnostic data: Reviewed in the chart and EHR.        ASSESSMENT/PLAN  ------------------------------  Chronic congestive heart failure, diastolic type (H)  Essential hypertension  -  EF > 70% (2012)  - compensated. Continue meds         Type II diabetes mellitus with peripheral circulatory disorder (H)   A1C 7.8 08/05/2021    A1C 8.6 03/08/2021    - Controlled.  In this frail elderly adult with a limited life expectancy, the goal of  the management is to address the hyperglycemia sx if any,  rather than the  BGs numbers per se.    On coumadin for Atrial fibrillation (H): INR followed closely by DNP. CVR, on metoprolol succinate. Clinically stable.         Primary Hyperparathyroidism (H)   - elevated Ca and PTH.  Resident seems doing fair. Recommend no further work up given limited life expectancy. If become symptomatic consider cinacalcet 30 mg po daily, may increase to bid unitl Ca level have normalized- close attention to hydration status  - no concern.         CKD stage 3a, GFR 45-59 ml/min (H): - Avoid nephrotoxic  medications. Renally dose medications. Monitor electrolytes, and dehydration status         Hx of Cerebrovascular accident (CVA) due to embolism of right middle " cerebral artery  Left sided hemiparesis (H)  Frail elderly:    - Left sided residual weakness, stable. Off ASA, on lipitor 10 mg.    - Significant  Deficits requiring NH placement. Requiring extensive assistance from nursing. Up for meals only o/w spends the day resting in bed         Class 1 obesity due to excess calories with serious comorbidity :  -  BMI 32.72 kg/m  from 34.22 kg/m  .  In this age group- frail elderly, keep BMI b/lw 25-35 Kg(m2).       GERD: . Off Protonix. No concern.        Order: See above, otherwise, continue the rest of the current POC.       Electronically signed by:  Anirudh Ye MD                Sincerely,        Anirudh Ye MD

## 2021-09-07 ENCOUNTER — NURSING HOME VISIT (OUTPATIENT)
Dept: GERIATRICS | Facility: CLINIC | Age: 86
End: 2021-09-07
Payer: COMMERCIAL

## 2021-09-07 ENCOUNTER — LAB REQUISITION (OUTPATIENT)
Dept: LAB | Facility: CLINIC | Age: 86
End: 2021-09-07
Payer: COMMERCIAL

## 2021-09-07 VITALS
BODY MASS INDEX: 32.21 KG/M2 | SYSTOLIC BLOOD PRESSURE: 150 MMHG | TEMPERATURE: 97 F | HEIGHT: 66 IN | RESPIRATION RATE: 18 BRPM | WEIGHT: 200.4 LBS | HEART RATE: 72 BPM | OXYGEN SATURATION: 97 % | DIASTOLIC BLOOD PRESSURE: 82 MMHG

## 2021-09-07 DIAGNOSIS — Z51.81 ENCOUNTER FOR THERAPEUTIC DRUG MONITORING: ICD-10-CM

## 2021-09-07 DIAGNOSIS — I48.91 UNSPECIFIED ATRIAL FIBRILLATION (H): ICD-10-CM

## 2021-09-07 DIAGNOSIS — I48.91 CONTROLLED ATRIAL FIBRILLATION (H): Primary | ICD-10-CM

## 2021-09-07 DIAGNOSIS — Z79.01 LONG TERM CURRENT USE OF ANTICOAGULANT WITH INTERNATIONAL NORMALIZED RATIO (INR) GOAL OF 1.5-2.0: ICD-10-CM

## 2021-09-07 LAB — INR PPP: 2.04 (ref 0.85–1.15)

## 2021-09-07 PROCEDURE — P9603 ONE-WAY ALLOW PRORATED MILES: HCPCS | Mod: ORL | Performed by: FAMILY MEDICINE

## 2021-09-07 PROCEDURE — 36415 COLL VENOUS BLD VENIPUNCTURE: CPT | Mod: ORL | Performed by: FAMILY MEDICINE

## 2021-09-07 PROCEDURE — 99308 SBSQ NF CARE LOW MDM 20: CPT | Performed by: NURSE PRACTITIONER

## 2021-09-07 PROCEDURE — 85610 PROTHROMBIN TIME: CPT | Mod: ORL | Performed by: FAMILY MEDICINE

## 2021-09-07 ASSESSMENT — MIFFLIN-ST. JEOR: SCORE: 1300.76

## 2021-09-07 NOTE — LETTER
"    9/7/2021        RE: Mecca Slade  [facility]  [address] [facility] [address]        [facility]  [place] MRN: [id number]. Place of Service where encounter took place: [facility] () [[id number]] HPI: Mecca Slade is a 89 year old (12/14/1921), who is being seen today for an episodic care visit at the above location. Today's concern is INR/Coumadin management for A. Fib    ROS/Subjective:  Bleeding Signs/Symptoms: None  Thromboembolic Signs/Symptoms: None  Medication Changes: No  Dietary Changes: No  Activity Changes: No  Bacterial/Viral Infection: No  Missed Coumadin Doses: None  On ASA: No  Other Concerns: No    OBJECTIVE:  BP (!) 150/82   Pulse 72   Temp 97  F (36.1  C)   Resp 18   Ht 1.676 m (5' 6\")   Wt 90.9 kg (200 lb 6.4 oz)   SpO2 97%   BMI 32.35 kg/m    Last INR: 1.65 on 9/4.  INR Today: 2.04  Current Dose: 2mg/day, Previous was 1mg M/W//F, 2mg AOD.     ASSESSMENT:  Controlled atrial fibrillation (H)  Encounter for therapeutic drug monitoring  Long term current use of anticoagulant with international normalized ratio (INR) goal of 1.5-2.0  Chronic. Stable. Therapeutic. Noting low last week. Will dose Coumadin as noted below and recheck INR in 1 week. Follow-up accordingly.    PLAN/ORDERS:  Orders:  1. Coumadin 1mg PO every day on Tu/Th.  2. Coumadin 2mg PO every day on AOD.  3. Recheck INR x1 on 9/13.  Dx: Afib.    Electronically signed by:   Dr. Haley Sims, JOSUE CNP DNP            Sincerely,        JOSUE Gillis CNP    "

## 2021-09-07 NOTE — PROGRESS NOTES
"[facility]IATRIC [facility]  [place] MRN: [id number]. Place of Service where encounter took place: [facility] () [[id number]] HPI: Mecca Slade is a 89 year old (12/14/1921), who is being seen today for an episodic care visit at the above location. Today's concern is INR/Coumadin management for A. Fib    ROS/Subjective:  Bleeding Signs/Symptoms: None  Thromboembolic Signs/Symptoms: None  Medication Changes: No  Dietary Changes: No  Activity Changes: No  Bacterial/Viral Infection: No  Missed Coumadin Doses: None  On ASA: No  Other Concerns: No    OBJECTIVE:  BP (!) 150/82   Pulse 72   Temp 97  F (36.1  C)   Resp 18   Ht 1.676 m (5' 6\")   Wt 90.9 kg (200 lb 6.4 oz)   SpO2 97%   BMI 32.35 kg/m    Last INR: 1.65 on 9/4.  INR Today: 2.04  Current Dose: 2mg/day, Previous was 1mg M/W//F, 2mg AOD.     ASSESSMENT:  Controlled atrial fibrillation (H)  Encounter for therapeutic drug monitoring  Long term current use of anticoagulant with international normalized ratio (INR) goal of 1.5-2.0  Chronic. Stable. Therapeutic. Noting low last week. Will dose Coumadin as noted below and recheck INR in 1 week. Follow-up accordingly.    PLAN/ORDERS:  Orders:  1. Coumadin 1mg PO every day on Tu/Th.  2. Coumadin 2mg PO every day on AOD.  3. Recheck INR x1 on 9/13.  Dx: Afib.    Electronically signed by:   Dr. Haley Sims, APRN CNP DNP      "

## 2021-09-09 ENCOUNTER — LAB REQUISITION (OUTPATIENT)
Dept: LAB | Facility: CLINIC | Age: 86
End: 2021-09-09
Payer: COMMERCIAL

## 2021-09-09 DIAGNOSIS — U07.1 COVID-19: ICD-10-CM

## 2021-09-10 ENCOUNTER — LAB REQUISITION (OUTPATIENT)
Dept: LAB | Facility: CLINIC | Age: 86
End: 2021-09-10
Payer: COMMERCIAL

## 2021-09-10 DIAGNOSIS — U07.1 COVID-19: ICD-10-CM

## 2021-09-10 PROCEDURE — U0003 INFECTIOUS AGENT DETECTION BY NUCLEIC ACID (DNA OR RNA); SEVERE ACUTE RESPIRATORY SYNDROME CORONAVIRUS 2 (SARS-COV-2) (CORONAVIRUS DISEASE [COVID-19]), AMPLIFIED PROBE TECHNIQUE, MAKING USE OF HIGH THROUGHPUT TECHNOLOGIES AS DESCRIBED BY CMS-2020-01-R: HCPCS | Mod: ORL | Performed by: FAMILY MEDICINE

## 2021-09-10 PROCEDURE — U0005 INFEC AGEN DETEC AMPLI PROBE: HCPCS | Mod: ORL | Performed by: FAMILY MEDICINE

## 2021-09-11 ENCOUNTER — LAB REQUISITION (OUTPATIENT)
Dept: LAB | Facility: CLINIC | Age: 86
End: 2021-09-11
Payer: COMMERCIAL

## 2021-09-11 DIAGNOSIS — I48.91 UNSPECIFIED ATRIAL FIBRILLATION (H): ICD-10-CM

## 2021-09-11 LAB — SARS-COV-2 RNA RESP QL NAA+PROBE: NEGATIVE

## 2021-09-12 NOTE — PROGRESS NOTES
"[facility] GERIATRIC SERVICES  [place] MRN: [id number]. Place of Service where encounter took place: [facility] () [[id number]] HPI: Mecca Slade is a 89 year old (12/14/1921), who is being seen today for an episodic care visit at the above location. Today's concern is INR/Coumadin management for A. Fib    ROS/Subjective:  Bleeding Signs/Symptoms: None  Thromboembolic Signs/Symptoms: None  Medication Changes: No  Dietary Changes: No  Activity Changes: No  Bacterial/Viral Infection: No  Missed Coumadin Doses: None  On ASA: No  Other Concerns: No    OBJECTIVE:  BP (!) 148/80   Pulse 75   Temp 97.3  F (36.3  C)   Resp 16   Ht 1.676 m (5' 6\")   Wt 90.9 kg (200 lb 6.4 oz)   SpO2 94%   BMI 32.35 kg/m    Last INR: 2.04 on 9/7.  INR Today: 1.93  Current Dose: 1mg Tu/Th, 2mg AOD.     ASSESSMENT:  Controlled atrial fibrillation (H)  Encounter for therapeutic drug monitoring  Long term current use of anticoagulant with international normalized ratio (INR) goal of 1.5-2.0  Chronic. Stable. Slightly subtherapeutic. Will dose Coumadin as noted below and recheck INR in 1 week. Follow-up accordingly.    PLAN/ORDERS:  Orders:  1. Coumadin 1mg PO QWednesday.  2. Coumadin 2mg PO every day on AOD.  Dx: Afib.    Electronically signed by:   Dr. Haley Sims, APRN CNP DNP      "

## 2021-09-13 ENCOUNTER — NURSING HOME VISIT (OUTPATIENT)
Dept: GERIATRICS | Facility: CLINIC | Age: 86
End: 2021-09-13
Payer: COMMERCIAL

## 2021-09-13 VITALS
SYSTOLIC BLOOD PRESSURE: 148 MMHG | BODY MASS INDEX: 32.21 KG/M2 | OXYGEN SATURATION: 94 % | RESPIRATION RATE: 16 BRPM | HEART RATE: 75 BPM | DIASTOLIC BLOOD PRESSURE: 80 MMHG | HEIGHT: 66 IN | TEMPERATURE: 97.3 F | WEIGHT: 200.4 LBS

## 2021-09-13 DIAGNOSIS — Z79.01 LONG TERM CURRENT USE OF ANTICOAGULANT WITH INTERNATIONAL NORMALIZED RATIO (INR) GOAL OF 1.5-2.0: ICD-10-CM

## 2021-09-13 DIAGNOSIS — I48.91 CONTROLLED ATRIAL FIBRILLATION (H): Primary | ICD-10-CM

## 2021-09-13 DIAGNOSIS — Z51.81 ENCOUNTER FOR THERAPEUTIC DRUG MONITORING: ICD-10-CM

## 2021-09-13 LAB — INR PPP: 1.93 (ref 0.85–1.15)

## 2021-09-13 PROCEDURE — 36415 COLL VENOUS BLD VENIPUNCTURE: CPT | Mod: ORL | Performed by: FAMILY MEDICINE

## 2021-09-13 PROCEDURE — 99308 SBSQ NF CARE LOW MDM 20: CPT | Performed by: NURSE PRACTITIONER

## 2021-09-13 PROCEDURE — P9604 ONE-WAY ALLOW PRORATED TRIP: HCPCS | Mod: ORL | Performed by: FAMILY MEDICINE

## 2021-09-13 PROCEDURE — 85610 PROTHROMBIN TIME: CPT | Mod: ORL | Performed by: FAMILY MEDICINE

## 2021-09-13 ASSESSMENT — MIFFLIN-ST. JEOR: SCORE: 1300.76

## 2021-09-13 NOTE — LETTER
"    9/13/2021        RE: Mecca Slade  [facility]  [address] [facility] [address]        [facility]  [place] MRN: [id number]. Place of Service where encounter took place: [facility] () [[id number]] HPI: Mecca Slade is a 89 year old (12/14/1921), who is being seen today for an episodic care visit at the above location. Today's concern is INR/Coumadin management for A. Fib    ROS/Subjective:  Bleeding Signs/Symptoms: None  Thromboembolic Signs/Symptoms: None  Medication Changes: No  Dietary Changes: No  Activity Changes: No  Bacterial/Viral Infection: No  Missed Coumadin Doses: None  On ASA: No  Other Concerns: No    OBJECTIVE:  BP (!) 148/80   Pulse 75   Temp 97.3  F (36.3  C)   Resp 16   Ht 1.676 m (5' 6\")   Wt 90.9 kg (200 lb 6.4 oz)   SpO2 94%   BMI 32.35 kg/m    Last INR: 2.04 on 9/7.  INR Today: 1.93  Current Dose: 1mg Tu/Th, 2mg AOD.     ASSESSMENT:  Controlled atrial fibrillation (H)  Encounter for therapeutic drug monitoring  Long term current use of anticoagulant with international normalized ratio (INR) goal of 1.5-2.0  Chronic. Stable. Slightly subtherapeutic. Will dose Coumadin as noted below and recheck INR in 1 week. Follow-up accordingly.    PLAN/ORDERS:  Orders:  1. Coumadin 1mg PO QWednesday.  2. Coumadin 2mg PO every day on AOD.  Dx: Afib.    Electronically signed by:   JOSUE Clark CNP DNP            Sincerely,        JOSUE Gillis CNP    "

## 2021-09-15 ENCOUNTER — LAB REQUISITION (OUTPATIENT)
Dept: LAB | Facility: CLINIC | Age: 86
End: 2021-09-15
Payer: COMMERCIAL

## 2021-09-15 DIAGNOSIS — U07.1 COVID-19: ICD-10-CM

## 2021-09-15 LAB — SARS-COV-2 RNA RESP QL NAA+PROBE: NEGATIVE

## 2021-09-17 NOTE — PROGRESS NOTES
"[facility] GERIATRIC SERVICES  [place] MRN: [id number]. Place of Service where encounter took place: [facility] () [[id number]] HPI: Mecca Slade is a 89 year old (12/14/1921), who is being seen today for an episodic care visit at the above location. Today's concern is INR/Coumadin management for A. Fib    ROS/Subjective:  Bleeding Signs/Symptoms: None  Thromboembolic Signs/Symptoms: None  Medication Changes: No  Dietary Changes: No  Activity Changes: No  Bacterial/Viral Infection: No  Missed Coumadin Doses: None  On ASA: No  Other Concerns: No    OBJECTIVE:  BP (!) 148/80   Pulse 75   Temp 96.9  F (36.1  C)   Resp 16   Ht 1.676 m (5' 6\")   Wt 90.9 kg (200 lb 6.4 oz)   SpO2 94%   BMI 32.35 kg/m    Last INR: 1.93 on 9/13.  INR Today: 2.34  Current Dose: 1mg QWed, 2mg AOD.     ASSESSMENT:  Controlled atrial fibrillation (H)  Encounter for therapeutic drug monitoring  Long term current use of anticoagulant with international normalized ratio (INR) goal of 1.5-2.0  Chronic. Stable. Therapeutic. Will dose Coumadin as noted below and recheck INR in 1 week. Follow-up accordingly.    PLAN/ORDERS:  Orders:  1. Continue 1mg QWed, 2mg AOD.  2. Recheck in 1 week.  Dx: Afib.    Electronically signed by:   Dr. Haley Sims, APRN CNP DNP      "

## 2021-09-18 ENCOUNTER — LAB REQUISITION (OUTPATIENT)
Dept: LAB | Facility: CLINIC | Age: 86
End: 2021-09-18
Payer: COMMERCIAL

## 2021-09-18 DIAGNOSIS — I48.91 UNSPECIFIED ATRIAL FIBRILLATION (H): ICD-10-CM

## 2021-09-19 ENCOUNTER — LAB REQUISITION (OUTPATIENT)
Dept: LAB | Facility: CLINIC | Age: 86
End: 2021-09-19
Payer: COMMERCIAL

## 2021-09-19 DIAGNOSIS — I48.91 UNSPECIFIED ATRIAL FIBRILLATION (H): ICD-10-CM

## 2021-09-20 ENCOUNTER — NURSING HOME VISIT (OUTPATIENT)
Dept: GERIATRICS | Facility: CLINIC | Age: 86
End: 2021-09-20
Payer: COMMERCIAL

## 2021-09-20 VITALS
BODY MASS INDEX: 32.21 KG/M2 | TEMPERATURE: 96.9 F | WEIGHT: 200.4 LBS | DIASTOLIC BLOOD PRESSURE: 80 MMHG | RESPIRATION RATE: 16 BRPM | SYSTOLIC BLOOD PRESSURE: 148 MMHG | OXYGEN SATURATION: 94 % | HEART RATE: 75 BPM | HEIGHT: 66 IN

## 2021-09-20 DIAGNOSIS — Z51.81 ENCOUNTER FOR THERAPEUTIC DRUG MONITORING: ICD-10-CM

## 2021-09-20 DIAGNOSIS — Z79.01 LONG TERM CURRENT USE OF ANTICOAGULANT WITH INTERNATIONAL NORMALIZED RATIO (INR) GOAL OF 1.5-2.0: ICD-10-CM

## 2021-09-20 DIAGNOSIS — I48.91 CONTROLLED ATRIAL FIBRILLATION (H): Primary | ICD-10-CM

## 2021-09-20 LAB — INR PPP: 2.34 (ref 0.85–1.15)

## 2021-09-20 PROCEDURE — 99308 SBSQ NF CARE LOW MDM 20: CPT | Performed by: NURSE PRACTITIONER

## 2021-09-20 PROCEDURE — 85610 PROTHROMBIN TIME: CPT | Mod: ORL | Performed by: FAMILY MEDICINE

## 2021-09-20 PROCEDURE — P9604 ONE-WAY ALLOW PRORATED TRIP: HCPCS | Mod: ORL | Performed by: FAMILY MEDICINE

## 2021-09-20 PROCEDURE — 36415 COLL VENOUS BLD VENIPUNCTURE: CPT | Mod: ORL | Performed by: FAMILY MEDICINE

## 2021-09-20 ASSESSMENT — MIFFLIN-ST. JEOR: SCORE: 1300.76

## 2021-09-20 NOTE — LETTER
"    9/20/2021        RE: Mecca Slade  [facility]  [address] [facility] [address]        [facility]ERIATRIC SERVICES  [place] MRN: [id number]. Place of Service where encounter took place: [facility] () [[id number]] HPI: Mecca Slade is a 89 year old (12/14/1921), who is being seen today for an episodic care visit at the above location. Today's concern is INR/Coumadin management for A. Fib    ROS/Subjective:  Bleeding Signs/Symptoms: None  Thromboembolic Signs/Symptoms: None  Medication Changes: No  Dietary Changes: No  Activity Changes: No  Bacterial/Viral Infection: No  Missed Coumadin Doses: None  On ASA: No  Other Concerns: No    OBJECTIVE:  BP (!) 148/80   Pulse 75   Temp 96.9  F (36.1  C)   Resp 16   Ht 1.676 m (5' 6\")   Wt 90.9 kg (200 lb 6.4 oz)   SpO2 94%   BMI 32.35 kg/m    Last INR: 1.93 on 9/13.  INR Today: 2.34  Current Dose: 1mg QWed, 2mg AOD.     ASSESSMENT:  Controlled atrial fibrillation (H)  Encounter for therapeutic drug monitoring  Long term current use of anticoagulant with international normalized ratio (INR) goal of 1.5-2.0  Chronic. Stable. Therapeutic. Will dose Coumadin as noted below and recheck INR in 1 week. Follow-up accordingly.    PLAN/ORDERS:  Orders:  1. Continue 1mg QWed, 2mg AOD.  2. Recheck in 1 week.  Dx: Afib.    Electronically signed by:   JOSUE Clark CNP DNP            Sincerely,        JOSUE Gillis CNP    "

## 2021-09-24 NOTE — PROGRESS NOTES
"[facility] GERIATRIC SERVICES  [place] MRN: [id number]. Place of Service where encounter took place: [facility] () [[id number]] HPI: Mecca Slade is a 89 year old (12/14/1921), who is being seen today for an episodic care visit at the above location. Today's concern is INR/Coumadin management for A. Fib    ROS/Subjective:  Bleeding Signs/Symptoms: None  Thromboembolic Signs/Symptoms: None  Medication Changes: No  Dietary Changes: No  Activity Changes: No  Bacterial/Viral Infection: No  Missed Coumadin Doses: None  On ASA: No  Other Concerns: No    OBJECTIVE:  BP (!) 148/80   Pulse 75   Temp 98.1  F (36.7  C)   Resp 16   Ht 1.676 m (5' 6\")   Wt 90.6 kg (199 lb 12.8 oz)   SpO2 93%   BMI 32.25 kg/m    Last INR: 2.34 on 9/20.  INR Today: 2.91.  Current Dose: 1mg QWed, 2mg AOD.     ASSESSMENT:  Controlled atrial fibrillation (H)  Encounter for therapeutic drug monitoring  Long term current use of anticoagulant with international normalized ratio (INR) goal of 1.5-2.0  Chronic. Stable. Therapeutic. Will dose Coumadin as noted below and recheck INR in 1.5 weeks. Follow-up accordingly.    PLAN/ORDERS:  Orders:  1. Coumadin 1mg PO QWed.  2. Coumadin 2mg PO every day on AOD.   3. Recheck INR x1 on 10/7.   Dx: afib.    Electronically signed by:   Dr. Halye Sims, APRN CNP DNP      "

## 2021-09-27 NOTE — LETTER
"    9/27/2021        RE: Mecca Slade  [facility]  [address] [facility] [address]        [facility] GERIATRIC SERVICES  [place] MRN: [id number]. Place of Service where encounter took place: [facility] () [[id number]] HPI: Mecca Slade is a 89 year old (12/14/1921), who is being seen today for an episodic care visit at the above location. Today's concern is INR/Coumadin management for A. Fib    ROS/Subjective:  Bleeding Signs/Symptoms: None  Thromboembolic Signs/Symptoms: None  Medication Changes: No  Dietary Changes: No  Activity Changes: No  Bacterial/Viral Infection: No  Missed Coumadin Doses: None  On ASA: No  Other Concerns: No    OBJECTIVE:  BP (!) 148/80   Pulse 75   Temp 98.1  F (36.7  C)   Resp 16   Ht 1.676 m (5' 6\")   Wt 90.6 kg (199 lb 12.8 oz)   SpO2 93%   BMI 32.25 kg/m    Last INR: 2.34 on 9/20.  INR Today: 2.91.  Current Dose: 1mg QWed, 2mg AOD.     ASSESSMENT:  Controlled atrial fibrillation (H)  Encounter for therapeutic drug monitoring  Long term current use of anticoagulant with international normalized ratio (INR) goal of 1.5-2.0  Chronic. Stable. Therapeutic. Will dose Coumadin as noted below and recheck INR in 1.5 weeks. Follow-up accordingly.    PLAN/ORDERS:  Orders:  1. Coumadin 1mg PO QWed.  2. Coumadin 2mg PO every day on AOD.   3. Recheck INR x1 on 10/7.   Dx: afib.    Electronically signed by:   JOSUE Clark CNP DNP            Sincerely,        JOSUE Gillis CNP    "

## 2021-09-30 NOTE — LETTER
9/30/2021        RE: Mecca Slade  [facility]  [address] [facility] [address]        ealth [place] GERIATRIC SERVICE  Episodic/Acute/Follow-Up  [place] MRN: [id number]. Place of Service where encounter took place:  [facility] () [[id number]]   Chief Complaint   Patient presents with     RECHECK    HPI: Mecca Slade  is a 89 year old (12/14/1921), who is being seen today for an episodic care visit.  HPI information obtained from: facility chart records, facility staff, patient report and [facility] chart review. Today's concern is:    Zayda seen today after nursing reported a tender and warm nodule on her right hand.  Zayda had complained that she is worried it is a repeat flare of gout.  Zayda has had this in the past, which has been difficult to treat secondary to her anticoagulation and renal failure.    Today, Zayda states for the last couple days she has had pain in her right hand that has even kept her awake at night.  She does not feel feverish, chest pain, shortness of breath, and this is the only spot where she has the pain.  She does not feel like it is better or worse at times, it just is flared up.  She can barely have it touched, and her range of motion is impaired secondary to pain.    Past Medical and Surgical History reviewed in Epic today.  MEDICATIONS:  Current Outpatient Medications   Medication Sig Dispense Refill     Acetaminophen (TYLENOL PO) Take 1,000 mg by mouth 2 times daily       ATORVASTATIN CALCIUM PO Take 10 mg by mouth daily       CARBOXYMETHYLCELLULOSE SODIUM 0.5 % SOLN ophthalmic solution INSTILL 1 DROP IN EACH EYE EVERY MORNING 15 mL 3     Furosemide (LASIX PO) Take 40 mg by mouth daily       Menthol, Topical Analgesic, (BENGAY COLD THERAPY) 5 % GEL Externally apply topically 2 times daily 113 g 0     metFORMIN (GLUCOPHAGE-XR) 500 MG 24 hr tablet Take 2 tablets (1,000 mg) by mouth daily (with dinner) 30 tablet 0     metoprolol  "succinate ER (TOPROL-XL) 25 MG 24 hr tablet Take 1 tablet (25 mg) by mouth daily 30 tablet 0     simethicone (MYLICON) 80 MG chewable tablet Take 2 tablets (160 mg) by mouth daily       Warfarin Sodium (COUMADIN PO) Take by mouth daily Take as directed per INR results       REVIEW OF SYSTEMS: Limited secondary to cognitive impairment but today pt reports the above and 4 point ROS including Respiratory, CV, GI and , other than that noted in the HPI, is negative.    Objective: BP (!) 148/80   Pulse 75   Temp 97.2  F (36.2  C)   Resp 16   Ht 1.676 m (5' 6\")   Wt 90.6 kg (199 lb 12.8 oz)   SpO2 94%   BMI 32.25 kg/m    Exam:  GENERAL APPEARANCE: Alert, in no distress, cooperative.   ENT: Mouth/posterior oropharynx intact w/ moist mucous membranes, hearing acuity [place].  EYES: EOM, conjunctivae, lids, pupils and irises normal, PERRL2.   RESP: Respiratory effort unlabored, no respiratory distress, On RA.   SKIN: Inspection/Palpation of skin and subcutaneous tissue baseline w/ fragility. No wounds/rashes noted.   MSK: Multiple heberden and yanira nodes noted on bilateral hands, but right middle MCP joint is inflamed, red, warm, and tender to touch. Pictured below:    NEURO: CN II-XII at patient's baseline, sensation baseline PPS.  PSYCH: Insight, judgement, and memory are baseline, affect and mood are happy/engaged.    Labs: Labs done in facility are in EPIC. Please refer to them using [facility] Everywhere.    ASSESSMENT/PLAN:  Acute idiopathic gout of right hand  Hypercalcemia  Controlled atrial fibrillation (H)  Long term current use of anticoagulant with international normalized ratio (INR) goal of 1.5-2.0  CKD 3a  Acute~on~chronic. Ongoing.    Noting that this is a repeat of gout in her right MCP joint of the third digit.  For this, will give a steroid burst.    Given anticoagulation and known CKD, steroids will affect her therapeutic drug levels.  Will trend INR closely and make adjustments to Coumadin as " needed.  Follow up w/in 1 week or as needed.    Orders:  1. Prednisone 20mg PO QDay x 5 days. Dx: gout.   2. Recheck INR x1 on 10/1. Dx: Afib    Electronically signed by:  Dr. Haley Sims, APRN CNP DNP          Sincerely,        JOSUE Gillis CNP

## 2021-09-30 NOTE — PROGRESS NOTES
ealth [place] GERIATRIC SERVICE  Episodic/Acute/Follow-Up  [place] MRN: [id number]. Place of Service where encounter took place:  [facility] () [[id number]]   Chief Complaint   Patient presents with     RECHECK    HPI: Mecca Slade  is a 89 year old (12/14/1921), who is being seen today for an episodic care visit.  HPI information obtained from: facility chart records, facility staff, patient report and [facility] chart review. Today's concern is:    Zayda seen today after nursing reported a tender and warm nodule on her right hand.  Zayda had complained that she is worried it is a repeat flare of gout.  Zayda has had this in the past, which has been difficult to treat secondary to her anticoagulation and renal failure.    Today, Zayda states for the last couple days she has had pain in her right hand that has even kept her awake at night.  She does not feel feverish, chest pain, shortness of breath, and this is the only spot where she has the pain.  She does not feel like it is better or worse at times, it just is flared up.  She can barely have it touched, and her range of motion is impaired secondary to pain.    Past Medical and Surgical History reviewed in Epic today.  MEDICATIONS:  Current Outpatient Medications   Medication Sig Dispense Refill     Acetaminophen (TYLENOL PO) Take 1,000 mg by mouth 2 times daily       ATORVASTATIN CALCIUM PO Take 10 mg by mouth daily       CARBOXYMETHYLCELLULOSE SODIUM 0.5 % SOLN ophthalmic solution INSTILL 1 DROP IN EACH EYE EVERY MORNING 15 mL 3     Furosemide (LASIX PO) Take 40 mg by mouth daily       Menthol, Topical Analgesic, (BENGAY COLD THERAPY) 5 % GEL Externally apply topically 2 times daily 113 g 0     metFORMIN (GLUCOPHAGE-XR) 500 MG 24 hr tablet Take 2 tablets (1,000 mg) by mouth daily (with dinner) 30 tablet 0     metoprolol succinate ER (TOPROL-XL) 25 MG 24 hr tablet Take 1 tablet (25 mg) by mouth daily 30 tablet 0     simethicone  "(MYLICON) 80 MG chewable tablet Take 2 tablets (160 mg) by mouth daily       Warfarin Sodium (COUMADIN PO) Take by mouth daily Take as directed per INR results       REVIEW OF SYSTEMS: Limited secondary to cognitive impairment but today pt reports the above and 4 point ROS including Respiratory, CV, GI and , other than that noted in the HPI, is negative.    Objective: BP (!) 148/80   Pulse 75   Temp 97.2  F (36.2  C)   Resp 16   Ht 1.676 m (5' 6\")   Wt 90.6 kg (199 lb 12.8 oz)   SpO2 94%   BMI 32.25 kg/m    Exam:  GENERAL APPEARANCE: Alert, in no distress, cooperative.   ENT: Mouth/posterior oropharynx intact w/ moist mucous membranes, hearing acuity [place].  EYES: EOM, conjunctivae, lids, pupils and irises normal, PERRL2.   RESP: Respiratory effort unlabored, no respiratory distress, On RA.   SKIN: Inspection/Palpation of skin and subcutaneous tissue baseline w/ fragility. No wounds/rashes noted.   MSK: Multiple heberden and yanira nodes noted on bilateral hands, but right middle MCP joint is inflamed, red, warm, and tender to touch. Pictured below:    NEURO: CN II-XII at patient's baseline, sensation baseline PPS.  PSYCH: Insight, judgement, and memory are baseline, affect and mood are happy/engaged.    Labs: Labs done in facility are in EPIC. Please refer to them using [employer] Everywhere.    ASSESSMENT/PLAN:  Acute idiopathic gout of right hand  Hypercalcemia  Controlled atrial fibrillation (H)  Long term current use of anticoagulant with international normalized ratio (INR) goal of 1.5-2.0  CKD 3a  Acute~on~chronic. Ongoing.    Noting that this is a repeat of gout in her right MCP joint of the third digit.  For this, will give a steroid burst.    Given anticoagulation and known CKD, steroids will affect her therapeutic drug levels.  Will trend INR closely and make adjustments to Coumadin as needed.  Follow up w/in 1 week or as needed.    Orders:  1. Prednisone 20mg PO QDay x 5 days. Dx: gout.   2. " Recheck INR x1 on 10/1. Dx: Afib    Electronically signed by:  Dr. Haley Sims, APRN CNP DNP

## 2021-10-01 NOTE — TELEPHONE ENCOUNTER
[facility] INR Nurse Triage    Provider: JOSUE Mcconnell CNP, DNP  Facility: [place]   Facility Type:  [facility]    Diagnosis/Goal: A. Fib    Todays INR: 3.16  Last INR   9/27 2.91    Heparin/Lovenox:  No  Currently on ABX?: No  Other interacting medication:  OtherPrednisone  Missed or refused doses: No    Verbal Order/Direction given by Provider: Hold Coumadin today, then give 1mg Sat, Sun. Recheck INR on 10/4/21.    Provider Giving Order:  JOSUE Mcconnell CNP, DNP    Verbal Order given to: Esther Cade RN

## 2021-10-04 NOTE — TELEPHONE ENCOUNTER
[place] GERIATRIC SERVICES TELEPHONE ENCOUNTER    Chief Complaint   Patient presents with     INR RESULTS     Mecca Slade is a 89 year old  (12/14/1921),Nurse called today to report: INR 2.46. Previous INR 3.16 on 10/1/21.Warfarin held on 10/1/21 followed by Warfarin 1 mg PO every day. Diagnosis A. Fib    ASSESSMENT/PLAN  Continue Warfarin 1 mg PO QD  Recheck INR on 10/7/21    Electronically signed by:   JOSUE Lombardi CNP

## 2021-10-07 NOTE — LETTER
10/7/2021        RE: Mecca Slade  [facility]  [address] [facility] [address]        ealth [place] GERIATRIC SERVICE  Episodic/Acute/Follow-Up  [place] MRN: [id number]. Place of Service where encounter took place:  [facility] () [[id number]]   Chief Complaint   Patient presents with     Anticoagulation    HPI: Mecca Slade  is a 89 year old (12/14/1921), who is being seen today for an episodic care visit.  HPI information obtained from: facility chart records, facility staff, patient report and [facility] chart review. Today's concern is:    Zayda seen today on followup to assess her gout flare s/p 5 day steroid burst. We trended INR secondary to this dosing, and today INR resulted at 2.2. Today, Zayda states that gout has significantly kept her awake at night and is impacting her ROM. She denies fever, SOB, new swelling, bleeding, and states her appetite is good, she has no other complaints.     Past Medical and Surgical History reviewed in Epic today.  MEDICATIONS:  Current Outpatient Medications   Medication Sig Dispense Refill     Acetaminophen (TYLENOL PO) Take 1,000 mg by mouth 2 times daily       ATORVASTATIN CALCIUM PO Take 10 mg by mouth daily       CARBOXYMETHYLCELLULOSE SODIUM 0.5 % SOLN ophthalmic solution INSTILL 1 DROP IN EACH EYE EVERY MORNING 15 mL 3     Furosemide (LASIX PO) Take 40 mg by mouth daily       Menthol, Topical Analgesic, (BENGAY COLD THERAPY) 5 % GEL Externally apply topically 2 times daily 113 g 0     metFORMIN (GLUCOPHAGE-XR) 500 MG 24 hr tablet Take 2 tablets (1,000 mg) by mouth daily (with dinner) 30 tablet 0     metoprolol succinate ER (TOPROL-XL) 25 MG 24 hr tablet Take 1 tablet (25 mg) by mouth daily 30 tablet 0     simethicone (MYLICON) 80 MG chewable tablet Take 2 tablets (160 mg) by mouth daily       Warfarin Sodium (COUMADIN PO) Take by mouth daily Take as directed per INR results       REVIEW OF SYSTEMS: Limited secondary  "to cognitive impairment but today pt reports the abive and 4 point ROS including Respiratory, CV, GI and , other than that noted in the HPI,  is negative    Objective: BP (!) 145/82   Pulse 66   Temp 98.5  F (36.9  C)   Resp 16   Ht 1.676 m (5' 6\")   Wt 91.6 kg (202 lb)   SpO2 95%   BMI 32.60 kg/m    Exam:  GENERAL APPEARANCE: Alert, in no distress, cooperative.   ENT: Mouth/posterior oropharynx intact w/ moist mucous membranes, hearing acuity [place].  EYES: EOM, conjunctivae, lids, pupils and irises normal, PERRL2.   RESP: Respiratory effort unlabored, no respiratory distress, Lung sounds clear. On RA.   CV: Auscultation of heart reveals S1, S2, rate controlled and rhythm irregular, no murmur, no rub or gallop, Edema 1+ BLE. Peripheral pulses are 2+.  ABDOMEN: Normal bowel sounds, soft, non-tender abdomen, and no masses palpated.  SKIN: Inspection/Palpation of skin and subcutaneous tissue baseline w/ fragility. No wounds/rashes noted, except right MCP joint which is still red, tender, and w/ likely pre~erupting tophi as noted here:    NEURO: CN II-XII at patient's baseline, sensation baseline PPS.  PSYCH: Insight, judgement, and memory are baseline impaired, affect and mood are happy/engaged.    Labs: Labs done in facility are in EPIC. Please refer to them using [employer] Everywhere.    ASSESSMENT/PLAN:  Acute idiopathic gout of right hand  Controlled atrial fibrillation (H)  Long term current use of anticoagulant with international normalized ratio (INR) goal of 1.5-2.0  Encounter for therapeutic drug monitoring  Primary osteoarthritis involving multiple joints  Acute on chronic. Ongoing.    INR therapeutic. Will dose Coumadin as noted below and recheck INR in 4 days.     Prednisone dosing may have relieved sxs, but did not resolve gout. Luckily, this was well tolerated.     Zayda had also tolerated colchicine in the past and we will order a treatment regimen as noted below.  Follow up w/in 1 week or as " needed.    Orders:  1. Colchicine 1.2mg PO x1 today.  2. Colchicine 0.6mg PO x1, 1 hour s/p first administration.   3. Coumadin 1 mg PO every day.  4. Recheck INR x1 on 10/11.    Electronically signed by:  Dr. Haley Sims, JOSUE CNP DNP          Sincerely,        JOSUE Gillis CNP

## 2021-10-07 NOTE — PROGRESS NOTES
ealth [place] GERIATRIC SERVICE  Episodic/Acute/Follow-Up  [place] MRN: [id number]. Place of Service where encounter took place:  [facility] () [[id number]]   Chief Complaint   Patient presents with     Anticoagulation    HPI: Mecca Slade  is a 89 year old (12/14/1921), who is being seen today for an episodic care visit.  HPI information obtained from: facility chart records, facility staff, patient report and [facility] chart review. Today's concern is:    Zayda seen today on followup to assess her gout flare s/p 5 day steroid burst. We trended INR secondary to this dosing, and today INR resulted at 2.2. Today, Zayda states that gout has significantly kept her awake at night and is impacting her ROM. She denies fever, SOB, new swelling, bleeding, and states her appetite is good, she has no other complaints.     Past Medical and Surgical History reviewed in Epic today.  MEDICATIONS:  Current Outpatient Medications   Medication Sig Dispense Refill     Acetaminophen (TYLENOL PO) Take 1,000 mg by mouth 2 times daily       ATORVASTATIN CALCIUM PO Take 10 mg by mouth daily       CARBOXYMETHYLCELLULOSE SODIUM 0.5 % SOLN ophthalmic solution INSTILL 1 DROP IN EACH EYE EVERY MORNING 15 mL 3     Furosemide (LASIX PO) Take 40 mg by mouth daily       Menthol, Topical Analgesic, (BENGAY COLD THERAPY) 5 % GEL Externally apply topically 2 times daily 113 g 0     metFORMIN (GLUCOPHAGE-XR) 500 MG 24 hr tablet Take 2 tablets (1,000 mg) by mouth daily (with dinner) 30 tablet 0     metoprolol succinate ER (TOPROL-XL) 25 MG 24 hr tablet Take 1 tablet (25 mg) by mouth daily 30 tablet 0     simethicone (MYLICON) 80 MG chewable tablet Take 2 tablets (160 mg) by mouth daily       Warfarin Sodium (COUMADIN PO) Take by mouth daily Take as directed per INR results       REVIEW OF SYSTEMS: Limited secondary to cognitive impairment but today pt reports the abive and 4 point ROS including Respiratory, CV, GI and ,  "other than that noted in the HPI,  is negative    Objective: BP (!) 145/82   Pulse 66   Temp 98.5  F (36.9  C)   Resp 16   Ht 1.676 m (5' 6\")   Wt 91.6 kg (202 lb)   SpO2 95%   BMI 32.60 kg/m    Exam:  GENERAL APPEARANCE: Alert, in no distress, cooperative.   ENT: Mouth/posterior oropharynx intact w/ moist mucous membranes, hearing acuity [place].  EYES: EOM, conjunctivae, lids, pupils and irises normal, PERRL2.   RESP: Respiratory effort unlabored, no respiratory distress, Lung sounds clear. On RA.   CV: Auscultation of heart reveals S1, S2, rate controlled and rhythm irregular, no murmur, no rub or gallop, Edema 1+ BLE. Peripheral pulses are 2+.  ABDOMEN: Normal bowel sounds, soft, non-tender abdomen, and no masses palpated.  SKIN: Inspection/Palpation of skin and subcutaneous tissue baseline w/ fragility. No wounds/rashes noted, except right MCP joint which is still red, tender, and w/ likely pre~erupting tophi as noted here:    NEURO: CN II-XII at patient's baseline, sensation baseline PPS.  PSYCH: Insight, judgement, and memory are baseline impaired, affect and mood are happy/engaged.    Labs: Labs done in facility are in EPIC. Please refer to them using [employer] Everywhere.    ASSESSMENT/PLAN:  Acute idiopathic gout of right hand  Controlled atrial fibrillation (H)  Long term current use of anticoagulant with international normalized ratio (INR) goal of 1.5-2.0  Encounter for therapeutic drug monitoring  Primary osteoarthritis involving multiple joints  Acute on chronic. Ongoing.    INR therapeutic. Will dose Coumadin as noted below and recheck INR in 4 days.     Prednisone dosing may have relieved sxs, but did not resolve gout. Luckily, this was well tolerated.     Zayda had also tolerated colchicine in the past and we will order a treatment regimen as noted below.  Follow up w/in 1 week or as needed.    Orders:  1. Colchicine 1.2mg PO x1 today.  2. Colchicine 0.6mg PO x1, 1 hour s/p first " administration.   3. Coumadin 1 mg PO every day.  4. Recheck INR x1 on 10/11.    Electronically signed by:  Dr. Haley Sims, APRN CNP DNP

## 2021-10-08 NOTE — PROGRESS NOTES
"[facility] GERIATRIC SERVICES  [place] Medical Record Number: [id number]  Place of Service where encounter took place: [facility] () [[id number]]. HPI: Mecca Slade is a 89 year old (12/14/1921), who is being seen today for an episodic care visit at the above location. Today's concern is INR/Coumadin management for A. Fib    ROS/Subjective:  Bleeding Signs/Symptoms: None  Thromboembolic Signs/Symptoms: None  Medication Changes: Yes: Colchicine x 2 doses   Dietary Changes: No  Activity Changes: No  Bacterial/Viral Infection: No  Missed Coumadin Doses: None  On ASA: No  Other Concerns: Yes, gout flare.     OBJECTIVE:  /72   Pulse 65   Temp 97.9  F (36.6  C)   Resp 18   Ht 1.676 m (5' 6\")   Wt 91.6 kg (202 lb)   SpO2 95%   BMI 32.60 kg/m    Last INR: 2.2 on 10/7.  INR Today: 1.82  Current Dose: 2mg/day.    ASSESSMENT:  Controlled atrial fibrillation (H)  Long term current use of anticoagulant with international normalized ratio (INR) goal of 2.0 to 3.0  Encounter for therapeutic drug monitoring  Chronic. Stable. Subtherapeutic. Will dose Coumadin as noted below and recheck INR in 1 week. Follow-up accordingly.    PLAN/ORDERS:   1. Coumadin 2mg/day.   2. Recheck INR x1 on 10/18.   Dx: afib    Electronically signed by:   Dr. Haley Sims, APRN CNP DNP    "

## 2021-10-11 NOTE — LETTER
"    10/11/2021        RE: Mecca Slade  [facility]  [address] [facility] [address]        [facility] GERIATRIC SERVICES  [place] Medical Record Number: [id number]  Place of Service where encounter took place: [facility] () [[id number]]. HPI: Mecca Slade is a 89 year old (12/14/1921), who is being seen today for an episodic care visit at the above location. Today's concern is INR/Coumadin management for A. Fib    ROS/Subjective:  Bleeding Signs/Symptoms: None  Thromboembolic Signs/Symptoms: None  Medication Changes: Yes: Colchicine x 2 doses   Dietary Changes: No  Activity Changes: No  Bacterial/Viral Infection: No  Missed Coumadin Doses: None  On ASA: No  Other Concerns: Yes, gout flare.     OBJECTIVE:  /72   Pulse 65   Temp 97.9  F (36.6  C)   Resp 18   Ht 1.676 m (5' 6\")   Wt 91.6 kg (202 lb)   SpO2 95%   BMI 32.60 kg/m    Last INR: 2.2 on 10/7.  INR Today: 1.82  Current Dose: 2mg/day.    ASSESSMENT:  Controlled atrial fibrillation (H)  Long term current use of anticoagulant with international normalized ratio (INR) goal of 2.0 to 3.0  Encounter for therapeutic drug monitoring  Chronic. Stable. Subtherapeutic. Will dose Coumadin as noted below and recheck INR in 1 week. Follow-up accordingly.    PLAN/ORDERS:   1. Coumadin 2mg/day.   2. Recheck INR x1 on 10/18.   Dx: afib    Electronically signed by:   JOSUE Clark CNP DNP          Sincerely,        JOSUE Gillis CNP    "

## 2021-10-22 NOTE — PROGRESS NOTES
"[facility] GERIATRIC SERVICES  [place] Medical Record Number: [id number]  Place of Service where encounter took place: [facility] () [[id number]]. HPI: Mecca Slade is a 89 year old (12/14/1921), who is being seen today for an episodic care visit at the above location. Today's concern is INR/Coumadin management for A. Fib    ROS/Subjective:  Bleeding Signs/Symptoms: None  Thromboembolic Signs/Symptoms: None  Medication Changes: No.  Dietary Changes: No  Activity Changes: No  Bacterial/Viral Infection: No  Missed Coumadin Doses: None  On ASA: No  Other Concerns: No.    OBJECTIVE:  /78   Pulse 67   Temp 97.5  F (36.4  C)   Resp 18   Ht 1.676 m (5' 6\")   Wt 88.5 kg (195 lb)   SpO2 96%   BMI 31.47 kg/m    Last INR: 2.65 on 10/18.  INR Today: 3.04  Current Dose: 2mg/day.    ASSESSMENT:  Controlled atrial fibrillation (H)  Long term current use of anticoagulant with international normalized ratio (INR) goal of 2.0 to 3.0  Encounter for therapeutic drug monitoring  Chronic. Stable. Slightly supratherapeutic. Will dose Coumadin as noted below and recheck INR in 1 week. Follow-up accordingly.    PLAN/ORDERS:   1. Coumadin 2mg M/W/F.  2. Coumadin 1mg PO QDay on AOD.   3. Recheck INR x1 on 11/1.  Dx: afib.    Electronically signed by:   Dr. Haley Sims, APRN CNP DNP    "

## 2021-10-25 NOTE — NURSING NOTE
Orders:  1. Coumadin 2mg PO Wednesday and Friday, 1mg PO AOD Dx: A.fib  2. Recheck INR x1 in 1 week on 11/1 Dx: A. Fib

## 2021-10-25 NOTE — LETTER
10/25/2021        RE: Mecca Slade  [facility]  [address] [facility] [address]        [facility] GERIATRIC SERVICES  [place] Medical Record Number: [id number]  Place of Service where encounter took place: [facility] () [[id number]]. HPI: Mecca Slade is a 89 year old (12/14/1921), who is being seen today for an episodic care visit at the above location. Today's concern is INR/Coumadin management for A. Fib    ROS/Subjective:  Bleeding Signs/Symptoms: None  Thromboembolic Signs/Symptoms: None  Medication Changes: No.  Dietary Changes: No  Activity Changes: No  Bacterial/Viral Infection: No  Missed Coumadin Doses: None  On ASA: No  Other Concerns: No.    OBJECTIVE:  There were no vitals taken for this visit.  Last INR: 2.65 on 10/18.  INR Today: ***  Current Dose: 2mg/day.    ASSESSMENT:  Controlled atrial fibrillation (H)  Long term current use of anticoagulant with international normalized ratio (INR) goal of 2.0 to 3.0  Encounter for therapeutic drug monitoring  Chronic. Stable. ***. Will dose Coumadin as noted below and recheck INR in ***. Follow-up accordingly.    PLAN/ORDERS:   ***    Electronically signed by:   JOSUE Clark CNP DNP          Sincerely,        JOSUE Gillis CNP

## 2021-10-25 NOTE — LETTER
"    10/25/2021        RE: Mecca Slade  [facility]  [address] [facility] [address]        [facility] GERIATRIC SERVICES  [facility] Medical Record Number: [id number]  Place of Service where encounter took place: [facility] () [[id number]]. HPI: Mecca Slade is a 89 year old (12/14/1921), who is being seen today for an episodic care visit at the above location. Today's concern is INR/Coumadin management for A. Fib    ROS/Subjective:  Bleeding Signs/Symptoms: None  Thromboembolic Signs/Symptoms: None  Medication Changes: No.  Dietary Changes: No  Activity Changes: No  Bacterial/Viral Infection: No  Missed Coumadin Doses: None  On ASA: No  Other Concerns: No.    OBJECTIVE:  /78   Pulse 67   Temp 97.5  F (36.4  C)   Resp 18   Ht 1.676 m (5' 6\")   Wt 88.5 kg (195 lb)   SpO2 96%   BMI 31.47 kg/m    Last INR: 2.65 on 10/18.  INR Today: 3.04  Current Dose: 2mg/day.    ASSESSMENT:  Controlled atrial fibrillation (H)  Long term current use of anticoagulant with international normalized ratio (INR) goal of 2.0 to 3.0  Encounter for therapeutic drug monitoring  Chronic. Stable. Slightly supratherapeutic. Will dose Coumadin as noted below and recheck INR in 1 week. Follow-up accordingly.    PLAN/ORDERS:   1. Coumadin 2mg M/W/F.  2. Coumadin 1mg PO QDay on AOD.   3. Recheck INR x1 on 11/1.  Dx: afib.    Electronically signed by:   Dr. Haley Sims, APRN CNP DNP          Sincerely,        Haley Sims, JOSUE CNP    "

## 2021-10-28 NOTE — PROGRESS NOTES
[facility]  Episodic/Acute/Follow-Up  [place] MRN: [id number]. Place of Service where encounter took place:  [facility] () [[id number]]   Chief Complaint   Patient presents with     RECHECK    HPI: Mecca Slade  is a 89 year old (12/14/1921), who is being seen today for an episodic care visit.  HPI information obtained from: facility chart records, facility staff, patient report and [facility] chart review. Today's concern is:    Zayda seen today, after nursing consulted provider for newfound pressure injury on patient's buttocks/gluteal folds.  Today, Zayda states that her bottom hurts, and that sometimes she has jumpy legs.  She states that her arthritis is the same as usual, and she denies shortness of breath, bleeding/bruising, or fever.    Past Medical and Surgical History reviewed in Epic today.  MEDICATIONS:  Current Outpatient Medications   Medication Sig Dispense Refill     Acetaminophen (TYLENOL PO) Take 1,000 mg by mouth 2 times daily       ATORVASTATIN CALCIUM PO Take 10 mg by mouth daily       CARBOXYMETHYLCELLULOSE SODIUM 0.5 % SOLN ophthalmic solution INSTILL 1 DROP IN EACH EYE EVERY MORNING 15 mL 3     Furosemide (LASIX PO) Take 40 mg by mouth daily       Menthol, Topical Analgesic, (BENGAY COLD THERAPY) 5 % GEL Externally apply topically 2 times daily 113 g 0     metFORMIN (GLUCOPHAGE-XR) 500 MG 24 hr tablet Take 2 tablets (1,000 mg) by mouth daily (with dinner) 30 tablet 0     metoprolol succinate ER (TOPROL-XL) 25 MG 24 hr tablet Take 1 tablet (25 mg) by mouth daily 30 tablet 0     simethicone (MYLICON) 80 MG chewable tablet Take 2 tablets (160 mg) by mouth daily       Warfarin Sodium (COUMADIN PO) Take by mouth daily Take as directed per INR results       REVIEW OF SYSTEMS: Limited secondary to cognitive impairment but today pt reports the above and 4 point ROS including Respiratory, CV, GI and , other than that noted in the HPI, is  "negative.    Objective: /78   Pulse 67   Temp 97.4  F (36.3  C)   Resp 18   Ht 1.676 m (5' 6\")   Wt 88.5 kg (195 lb)   SpO2 98%   BMI 31.47 kg/m    Exam:  GENERAL APPEARANCE: Alert, in no distress, cooperative.   ENT: Mouth/posterior oropharynx intact w/ moist mucous membranes, hearing acuity [place].  EYES: EOM, conjunctivae, lids, pupils and irises normal, PERRL2.   RESP: Respiratory effort unlabored, no respiratory distress, On RA.   CV: Edema 0+ BLE. Peripheral pulses are 2+.  ABDOMEN: Normal bowel sounds, soft, non-tender abdomen, and no masses palpated.  SKIN: Inspection/Palpation of skin and subcutaneous tissue baseline w/ fragility. Bilateral buttocks/gluteal folds as noted here:    NEURO: CN II-XII at patient's baseline, sensation baseline PPS.  PSYCH: Insight, judgement, and memory are impaired at baseline, affect and mood are happy/engaged.    Labs: Labs done in facility are in EPIC. Please refer to them using [employer]/Care Everywhere.    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    Provider coordinated care with nursing, who noted that Zayda sleeps in her recliner chair, and is otherwise in her wheelchair.  She does not ever get off of her bottom, unless she is utilizing the standing lift for toileting.  She is also incontinent.    Given the above, will consult OT.  Both of these sitting surfaces should be optimized as Zayda does not even utilize a bed.    Noting that Zayda's incontinence also contributes to pressure injury and skin breakdown, and therefore will order a toileting/repositioning schedule to keep her skin clean, dry, and offloaded while awake.    Given her need for " wound healing, will order prostat as a nutritional supplement to help with this.    Given stage II, obesity, and products on hand, will order EPC.  Suspect that hemiparesis, and CHF also play a role in patient's ability to heal.    Suspect that pain control will be optimized when this wound heals.  Follow up w/in 1 week or as needed.    Orders:  1. Prostat 30mL PO Qday. Dx: pressure injury.   2. OT consult eval/tx x1. Dx: positioning/pressure injury.  3. Apply EPC Qshift and PRN to buttocks. Dx: pressure injury.   4. Repositioning/toileting Q2h while awake to offload. Dx: pressure injury.    Electronically signed by:  Dr. Haley Sims, APRN CNP DNP

## 2021-11-01 NOTE — LETTER
11/1/2021        RE: Mecca Slade  [facility]  [address] [facility] [address]        [facility] [place] GERIATRIC SERVICE  Episodic/Acute/Follow-Up  [place] MRN: [id number]. Place of Service where encounter took place:  [facility] () [[id number]]   Chief Complaint   Patient presents with     RECHECK    HPI: Mecca Slade  is a 89 year old (12/14/1921), who is being seen today for an episodic care visit.  HPI information obtained from: facility chart records, facility staff, patient report and [facility] chart review. Today's concern is:    Zayda seen today on follow up after a stage II pressure injury was found to her buttocks last week. Her INR has slightly fluctuated, but today resulted at 1.93. Lilly now denies any RLS/jumpy legs. She states her bottom hurts, but feels better with the treatment in place. She denies any fever, chills, edema. Nursing reports bloody drainage from wounds.     Past Medical and Surgical History reviewed in Epic today.  MEDICATIONS:  Current Outpatient Medications   Medication Sig Dispense Refill     Acetaminophen (TYLENOL PO) Take 1,000 mg by mouth 2 times daily       ATORVASTATIN CALCIUM PO Take 10 mg by mouth daily       CARBOXYMETHYLCELLULOSE SODIUM 0.5 % SOLN ophthalmic solution INSTILL 1 DROP IN EACH EYE EVERY MORNING 15 mL 3     Furosemide (LASIX PO) Take 40 mg by mouth daily       Menthol, Topical Analgesic, (BENGAY COLD THERAPY) 5 % GEL Externally apply topically 2 times daily 113 g 0     metFORMIN (GLUCOPHAGE-XR) 500 MG 24 hr tablet Take 2 tablets (1,000 mg) by mouth daily (with dinner) 30 tablet 0     metoprolol succinate ER (TOPROL-XL) 25 MG 24 hr tablet Take 1 tablet (25 mg) by mouth daily 30 tablet 0     simethicone (MYLICON) 80 MG chewable tablet Take 2 tablets (160 mg) by mouth daily       Warfarin Sodium (COUMADIN PO) Take by mouth daily Take as directed per INR results       REVIEW OF SYSTEMS: Limited secondary to  "cognitive impairment but today pt reports the above and 4 point ROS including Respiratory, CV, GI and , other than that noted in the HPI, is negative.    Objective: /68   Pulse 76   Temp 97.7  F (36.5  C)   Resp 20   Ht 1.676 m (5' 6\")   Wt 88.5 kg (195 lb)   SpO2 95%   BMI 31.47 kg/m    Exam:  GENERAL APPEARANCE: Alert, in no distress, cooperative.   ENT: Mouth/posterior oropharynx intact w/ moist mucous membranes, hearing acuity [place].  EYES: EOM, conjunctivae, lids, pupils and irises normal, PERRL2.   RESP: Respiratory effort unlabored, no respiratory distress, On RA.   CV: Edema 0+ BLE. Peripheral pulses are 2+.  ABDOMEN: Normal bowel sounds, soft, non-tender abdomen, and no masses palpated.  SKIN: Inspection/Palpation of skin and subcutaneous tissue baseline w/ fragility. Bilateral buttocks/gluteal folds as noted here:    NEURO: CN II-XII at patient's baseline, sensation baseline PPS.  PSYCH: Insight, judgement, and memory are impaired at baseline, affect and mood are happy/engaged.    Labs: Labs done in facility are in EPIC. Please refer to them using [employer]/Care Everywhere.    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    INR slightly subtherapeutic. Will dose Coumadin as noted below and recheck INR in 1 week.    Provider again counseled Zayda on using a bed instead of recliner to sleep in. Highly recommended side~lying and off~loading. Zayda believes this can be done in her recliner and adamantly declined bed use.     Provider coordinated care w/ nursing to facilitate wound care further w/ products that may need to be special ordered. Wound care changes and plan are as noted " below.  Follow up w/in 1 week or as needed.    Orders:  1. Wound care to bilateral buttocks Qday & PRN is soiled:   1. Cleanse w/ wound cleanser, pat dry.   2. Apply medihoney to yellow slough/wound bed bilaterally.   3. Apply skin prep to surrounding periwound.   4. Cover w/ sacral foam dressing, with heart being upside down.  2. Coumadin 2mg PO Qday on M/W/F.  3. Coumadin 1mg PO Qday on AOD.  4. Recheck INR x1 on 11/8. Dx: afib    Electronically signed by:  Dr. Haley Sims, APRMELI CNP DNP        Sincerely,        JOSUE Gillis CNP

## 2021-11-01 NOTE — PROGRESS NOTES
[facility] [place] GERIATRIC SERVICE  Episodic/Acute/Follow-Up  [place] MRN: [id number]. Place of Service where encounter took place:  [facility] () [[id number]]   Chief Complaint   Patient presents with     RECHECK    HPI: Mecca Slade  is a 89 year old (12/14/1921), who is being seen today for an episodic care visit.  HPI information obtained from: facility chart records, facility staff, patient report and [facility] chart review. Today's concern is:    Zayda seen today on follow up after a stage II pressure injury was found to her buttocks last week. Her INR has slightly fluctuated, but today resulted at 1.93. Lilly now denies any RLS/jumpy legs. She states her bottom hurts, but feels better with the treatment in place. She denies any fever, chills, edema. Nursing reports bloody drainage from wounds.     Past Medical and Surgical History reviewed in Epic today.  MEDICATIONS:  Current Outpatient Medications   Medication Sig Dispense Refill     Acetaminophen (TYLENOL PO) Take 1,000 mg by mouth 2 times daily       ATORVASTATIN CALCIUM PO Take 10 mg by mouth daily       CARBOXYMETHYLCELLULOSE SODIUM 0.5 % SOLN ophthalmic solution INSTILL 1 DROP IN EACH EYE EVERY MORNING 15 mL 3     Furosemide (LASIX PO) Take 40 mg by mouth daily       Menthol, Topical Analgesic, (BENGAY COLD THERAPY) 5 % GEL Externally apply topically 2 times daily 113 g 0     metFORMIN (GLUCOPHAGE-XR) 500 MG 24 hr tablet Take 2 tablets (1,000 mg) by mouth daily (with dinner) 30 tablet 0     metoprolol succinate ER (TOPROL-XL) 25 MG 24 hr tablet Take 1 tablet (25 mg) by mouth daily 30 tablet 0     simethicone (MYLICON) 80 MG chewable tablet Take 2 tablets (160 mg) by mouth daily       Warfarin Sodium (COUMADIN PO) Take by mouth daily Take as directed per INR results       REVIEW OF SYSTEMS: Limited secondary to cognitive impairment but today pt reports the above and 4 point ROS including Respiratory, CV, GI and , other than  "that noted in the HPI, is negative.    Objective: /68   Pulse 76   Temp 97.7  F (36.5  C)   Resp 20   Ht 1.676 m (5' 6\")   Wt 88.5 kg (195 lb)   SpO2 95%   BMI 31.47 kg/m    Exam:  GENERAL APPEARANCE: Alert, in no distress, cooperative.   ENT: Mouth/posterior oropharynx intact w/ moist mucous membranes, hearing acuity [place].  EYES: EOM, conjunctivae, lids, pupils and irises normal, PERRL2.   RESP: Respiratory effort unlabored, no respiratory distress, On RA.   CV: Edema 0+ BLE. Peripheral pulses are 2+.  ABDOMEN: Normal bowel sounds, soft, non-tender abdomen, and no masses palpated.  SKIN: Inspection/Palpation of skin and subcutaneous tissue baseline w/ fragility. Bilateral buttocks/gluteal folds as noted here:    NEURO: CN II-XII at patient's baseline, sensation baseline PPS.  PSYCH: Insight, judgement, and memory are impaired at baseline, affect and mood are happy/engaged.    Labs: Labs done in facility are in EPIC. Please refer to them using [email or web address] Everywhere.    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    INR slightly subtherapeutic. Will dose Coumadin as noted below and recheck INR in 1 week.    Provider again counseled Zayda on using a bed instead of recliner to sleep in. Highly recommended side~lying and off~loading. Zayda believes this can be done in her recliner and adamantly declined bed use.     Provider coordinated care w/ nursing to facilitate wound care further w/ products that may need to be special ordered. Wound care changes and plan are as noted below.  Follow up w/in 1 week or as needed.    Orders:  1. Wound care to bilateral buttocks Qday & PRN is soiled:   1. " Cleanse w/ wound cleanser, pat dry.   2. Apply medihoney to yellow slough/wound bed bilaterally.   3. Apply skin prep to surrounding periwound.   4. Cover w/ sacral foam dressing, with heart being upside down.  2. Coumadin 2mg PO Qday on M/W/F.  3. Coumadin 1mg PO Qday on AOD.  4. Recheck INR x1 on 11/8. Dx: afib    Electronically signed by:  JOSUE Clark CNP DNP

## 2021-11-05 NOTE — PROGRESS NOTES
[facility]  Episodic/Acute/Follow-Up  [place] MRN: [id number]. Place of Service where encounter took place:  [facility] () [[id number]]   Chief Complaint   Patient presents with     RECHECK     Anticoagulation    HPI: Mecca Slade  is a 89 year old (12/14/1921), who is being seen today for an episodic care visit.  HPI information obtained from: facility chart records, facility staff, patient report and [facility] chart review. Today's concern is:    Zayda seen today on follow up after a stage II pressure injury was found to her buttocks last week.  INR resulted today at 1.9.  Today, Zayda states her bottom is feeling better.  She denies any other aches or pains, fever, drainage from the site.  She states that it is difficult to stand for dressing changes.  She denies any other shortness of breath, chest pain, constipation/diarrhea.    Past Medical and Surgical History reviewed in Epic today.  MEDICATIONS:  Current Outpatient Medications   Medication Sig Dispense Refill     Acetaminophen (TYLENOL PO) Take 1,000 mg by mouth 2 times daily       ATORVASTATIN CALCIUM PO Take 10 mg by mouth daily       CARBOXYMETHYLCELLULOSE SODIUM 0.5 % SOLN ophthalmic solution INSTILL 1 DROP IN EACH EYE EVERY MORNING 15 mL 3     Furosemide (LASIX PO) Take 40 mg by mouth daily       Menthol, Topical Analgesic, (BENGAY COLD THERAPY) 5 % GEL Externally apply topically 2 times daily 113 g 0     metFORMIN (GLUCOPHAGE-XR) 500 MG 24 hr tablet Take 2 tablets (1,000 mg) by mouth daily (with dinner) 30 tablet 0     metoprolol succinate ER (TOPROL-XL) 25 MG 24 hr tablet Take 1 tablet (25 mg) by mouth daily 30 tablet 0     simethicone (MYLICON) 80 MG chewable tablet Take 2 tablets (160 mg) by mouth daily       Warfarin Sodium (COUMADIN PO) Take by mouth daily Take as directed per INR results       REVIEW OF SYSTEMS: Limited secondary to cognitive impairment but today pt reports the above and 4 point  "ROS including Respiratory, CV, GI and , other than that noted in the HPI, is negative.    Objective: /70   Pulse 82   Temp 97.6  F (36.4  C)   Resp 16   Ht 1.676 m (5' 6\")   Wt 91.4 kg (201 lb 6.4 oz)   SpO2 96%   BMI 32.51 kg/m    Exam:  GENERAL APPEARANCE: Alert, in no distress, cooperative.   ENT: Mouth/posterior oropharynx intact w/ moist mucous membranes, hearing acuity [place].  EYES: EOM, conjunctivae, lids, pupils and irises normal, PERRL2.   RESP: Respiratory effort unlabored, no respiratory distress, On RA.   CV: Edema 0+ BLE. Peripheral pulses are 2+.  ABDOMEN: Normal bowel sounds, soft, non-tender abdomen, and no masses palpated.  SKIN: Inspection/Palpation of skin and subcutaneous tissue baseline w/ fragility. Bilateral buttocks/gluteal folds as noted here:    NEURO: CN II-XII at patient's baseline, sensation baseline PPS.  PSYCH: Insight, judgement, and memory are impaired at baseline, affect and mood are happy/engaged.    Labs: Labs done in facility are in EPIC. Please refer to them using [employer]/Care Everywhere.    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    INR very slightly subtherapeutic. Will dose Coumadin as noted below and recheck INR in 1 week.    Noting with CHF history, and improved renal function, ACE inhibitor use may be appropriate.  Blood pressure will tolerate.  We will start very low-dose lisinopril for this reason.    Given lisinopril start, will trend BMP in 2 weeks.    Mild improvement on right buttocks, and less reddened to left buttocks with ongoing slough in both areas.  We will continue wound care at this site.  Follow up w/in 1 week or as " needed.    Orders:  1. Lisinopril 2.5mg PO Qday. Dx: CHF.  2. BMP x1 on 11/22. Dx: CHF.  3. Coumadin 2mg PO QDay on M/W/F.  4. Coumadin 1mg PO Qday on AOD.   5. Recheck INR x1 on 11/16. Dx: afib.  6. Continue current wound care.     Electronically signed by:  Dr. Haley Sims, APRN CNP DNP

## 2021-11-08 NOTE — LETTER
11/8/2021        RE: Mecca Slade  [facility]  [address] [facility] [address]        [facility] [place] GERIATRIC SERVICE  Episodic/Acute/Follow-Up  [place] MRN: [id number]. Place of Service where encounter took place:  [facility] () [[id number]]   Chief Complaint   Patient presents with     Anticoagulation    HPI: Mecca Slade  is a 89 year old (12/14/1921), who is being seen today for an episodic care visit.  HPI information obtained from: facility chart records, facility staff, patient report and [facility] chart review. Today's concern is:    Zayda seen today on follow up after a stage II pressure injury was found to her buttocks last week. ***    Past Medical and Surgical History reviewed in Epic today.  MEDICATIONS:  Current Outpatient Medications   Medication Sig Dispense Refill     Acetaminophen (TYLENOL PO) Take 1,000 mg by mouth 2 times daily       ATORVASTATIN CALCIUM PO Take 10 mg by mouth daily       CARBOXYMETHYLCELLULOSE SODIUM 0.5 % SOLN ophthalmic solution INSTILL 1 DROP IN EACH EYE EVERY MORNING 15 mL 3     Furosemide (LASIX PO) Take 40 mg by mouth daily       Menthol, Topical Analgesic, (BENGAY COLD THERAPY) 5 % GEL Externally apply topically 2 times daily 113 g 0     metFORMIN (GLUCOPHAGE-XR) 500 MG 24 hr tablet Take 2 tablets (1,000 mg) by mouth daily (with dinner) 30 tablet 0     metoprolol succinate ER (TOPROL-XL) 25 MG 24 hr tablet Take 1 tablet (25 mg) by mouth daily 30 tablet 0     simethicone (MYLICON) 80 MG chewable tablet Take 2 tablets (160 mg) by mouth daily       Warfarin Sodium (COUMADIN PO) Take by mouth daily Take as directed per INR results       REVIEW OF SYSTEMS: Limited secondary to cognitive impairment but today pt reports the above and 4 point ROS including Respiratory, CV, GI and , other than that noted in the HPI, is negative.    Objective: There were no vitals taken for this visit.  Exam:  GENERAL APPEARANCE: Alert, in no  distress, cooperative.   ENT: Mouth/posterior oropharynx intact w/ moist mucous membranes, hearing acuity [place].  EYES: EOM, conjunctivae, lids, pupils and irises normal, PERRL2.   RESP: Respiratory effort unlabored, no respiratory distress, On RA.   CV: Edema 0+ BLE. Peripheral pulses are 2+.  ABDOMEN: Normal bowel sounds, soft, non-tender abdomen, and no masses palpated.  SKIN: Inspection/Palpation of skin and subcutaneous tissue baseline w/ fragility. Bilateral buttocks/gluteal folds as noted here:  ***  NEURO: CN II-XII at patient's baseline, sensation baseline PPS.  PSYCH: Insight, judgement, and memory are impaired at baseline, affect and mood are happy/engaged.    Labs: Labs done in facility are in EPIC. Please refer to them using [employer] Everywhere.    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    INR ***. Will dose Coumadin as noted below and recheck INR in 1 week***.    ***    ***    ***  Follow up w/in 1 week or as needed.    Orders:  Wound care?  Coumadin/INR  Lisinopril?    Electronically signed by:  JOSUE Clark CNP DNP        Sincerely,        JOSUE Gillis CNP

## 2021-11-08 NOTE — LETTER
11/8/2021        RE: Mecca Slade  [facility]  [address] [facility] [address]        [facility] [place] GERIATRIC SERVICE  Episodic/Acute/Follow-Up  [place] MRN: [id number]. Place of Service where encounter took place:  [facility] () [[id number]]   Chief Complaint   Patient presents with     RECHECK     Anticoagulation    HPI: Mecca Slade  is a 89 year old (12/14/1921), who is being seen today for an episodic care visit.  HPI information obtained from: facility chart records, facility staff, patient report and [facility] chart review. Today's concern is:    Zayda seen today on follow up after a stage II pressure injury was found to her buttocks last week.  INR resulted today at 1.9.  Today, Zayda states her bottom is feeling better.  She denies any other aches or pains, fever, drainage from the site.  She states that it is difficult to stand for dressing changes.  She denies any other shortness of breath, chest pain, constipation/diarrhea.    Past Medical and Surgical History reviewed in Epic today.  MEDICATIONS:  Current Outpatient Medications   Medication Sig Dispense Refill     Acetaminophen (TYLENOL PO) Take 1,000 mg by mouth 2 times daily       ATORVASTATIN CALCIUM PO Take 10 mg by mouth daily       CARBOXYMETHYLCELLULOSE SODIUM 0.5 % SOLN ophthalmic solution INSTILL 1 DROP IN EACH EYE EVERY MORNING 15 mL 3     Furosemide (LASIX PO) Take 40 mg by mouth daily       Menthol, Topical Analgesic, (BENGAY COLD THERAPY) 5 % GEL Externally apply topically 2 times daily 113 g 0     metFORMIN (GLUCOPHAGE-XR) 500 MG 24 hr tablet Take 2 tablets (1,000 mg) by mouth daily (with dinner) 30 tablet 0     metoprolol succinate ER (TOPROL-XL) 25 MG 24 hr tablet Take 1 tablet (25 mg) by mouth daily 30 tablet 0     simethicone (MYLICON) 80 MG chewable tablet Take 2 tablets (160 mg) by mouth daily       Warfarin Sodium (COUMADIN PO) Take by mouth daily Take as directed per INR results  "      REVIEW OF SYSTEMS: Limited secondary to cognitive impairment but today pt reports the above and 4 point ROS including Respiratory, CV, GI and , other than that noted in the HPI, is negative.    Objective: /70   Pulse 82   Temp 97.6  F (36.4  C)   Resp 16   Ht 1.676 m (5' 6\")   Wt 91.4 kg (201 lb 6.4 oz)   SpO2 96%   BMI 32.51 kg/m    Exam:  GENERAL APPEARANCE: Alert, in no distress, cooperative.   ENT: Mouth/posterior oropharynx intact w/ moist mucous membranes, hearing acuity [place].  EYES: EOM, conjunctivae, lids, pupils and irises normal, PERRL2.   RESP: Respiratory effort unlabored, no respiratory distress, On RA.   CV: Edema 0+ BLE. Peripheral pulses are 2+.  ABDOMEN: Normal bowel sounds, soft, non-tender abdomen, and no masses palpated.  SKIN: Inspection/Palpation of skin and subcutaneous tissue baseline w/ fragility. Bilateral buttocks/gluteal folds as noted here:    NEURO: CN II-XII at patient's baseline, sensation baseline PPS.  PSYCH: Insight, judgement, and memory are impaired at baseline, affect and mood are happy/engaged.    Labs: Labs done in facility are in EPIC. Please refer to them using [facility]/Care Everywhere.    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    INR very slightly subtherapeutic. Will dose Coumadin as noted below and recheck INR in 1 week.    Noting with CHF history, and improved renal function, ACE inhibitor use may be appropriate.  Blood pressure will tolerate.  We will start very low-dose lisinopril for this reason.    Given lisinopril start, will trend BMP in 2 weeks.    Mild improvement on right buttocks, and less reddened to left buttocks with " ongoing slough in both areas.  We will continue wound care at this site.  Follow up w/in 1 week or as needed.    Orders:  1. Lisinopril 2.5mg PO Qday. Dx: CHF.  2. BMP x1 on 11/22. Dx: CHF.  3. Coumadin 2mg PO QDay on M/W/F.  4. Coumadin 1mg PO Qday on AOD.   5. Recheck INR x1 on 11/16. Dx: afib.  6. Continue current wound care.     Electronically signed by:  JOSUE Clark CNP DNP        Sincerely,        JOSUE Gillis CNP
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
## 2021-11-15 PROBLEM — Z11.52 ENCOUNTER FOR SCREENING LABORATORY TESTING FOR SEVERE ACUTE RESPIRATORY SYNDROME CORONAVIRUS 2 (SARS-COV-2): Status: ACTIVE | Noted: 2021-01-01

## 2021-11-15 PROBLEM — S72.491A: Status: ACTIVE | Noted: 2021-01-01

## 2021-11-15 PROBLEM — S01.01XA LACERATION OF SCALP, INITIAL ENCOUNTER: Status: ACTIVE | Noted: 2021-01-01
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
## 2021-11-15 PROBLEM — S72.91XA CLOSED FRACTURE OF RIGHT FEMUR, UNSPECIFIED FRACTURE MORPHOLOGY, UNSPECIFIED PORTION OF FEMUR, INITIAL ENCOUNTER (H): Status: ACTIVE | Noted: 2021-01-01

## 2021-11-15 NOTE — ANESTHESIA PREPROCEDURE EVALUATION
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 No pertinent surgical history.   No Known Allergies   Social History     Tobacco Use     Smoking status: Never Smoker     Smokeless tobacco: Never Used   Substance Use Topics     Alcohol use: No      Wt Readings from Last 1 Encounters:   11/15/21 93.1 kg (205 lb 4 oz)        Anesthesia Evaluation   Pt has had prior anesthetic. Type: General and MAC.    No history of anesthetic complications       ROS/MED HX  ENT/Pulmonary:  - neg pulmonary ROS     Neurologic: Comment: Cognitive impairment    Right sided cerebral hemisphere cerebrovascular accident (H) Acute right MCA stroke (H)     (+) CVA, date: 2017, with deficits, - Left hemiparesis.     Cardiovascular:     (+) Dyslipidemia hypertension-----Taking blood thinners CHF pacemaker, dysrhythmias, a-fib, Previous cardiac testing   Echo: Date: Results:    Stress Test: Date: Results:    ECG  Reviewed: Date: 11/15/21 Results:  Afib  Cath: Date: Results:      METS/Exercise Tolerance: 1 - Eating, dressing    Hematologic:     (+) History of blood clots, pt is anticoagulated,     Musculoskeletal:   (+) arthritis, fracture, Fracture location: RLE (Distal femur),     GI/Hepatic:  - neg GI/hepatic ROS     Renal/Genitourinary:     (+) renal disease, type: CRI,     Endo:     (+) type II DM, Last HgA1c: 7.8, date: 8/5/21, Diabetic complications: neuropathy. Obesity,     Psychiatric/Substance Use:       Infectious Disease:  - neg infectious disease ROS     Malignancy:  - neg malignancy ROS     Other:      (+) , H/O Chronic Pain,        Physical Exam    Airway        Mallampati: I   TM distance: > 3 FB   Neck ROM: full   Mouth opening: > 3 cm    Respiratory Devices and Support         Dental     Comment: edentulous    (+) missing      Cardiovascular   cardiovascular exam normal       Rhythm and rate: regular and normal     Pulmonary   pulmonary exam normal        breath sounds clear to auscultation           OUTSIDE LABS:  CBC:   Lab Results   Component Value Date    WBC 12.1 (H) 11/15/2021    WBC 6.8 03/08/2021    HGB 14.3 11/15/2021    HGB 13.5 08/05/2021    HCT 45.1 11/15/2021    HCT 47.2 (H) 03/08/2021     11/15/2021     (L) 03/08/2021     BMP:   Lab Results   Component Value Date     11/15/2021     08/05/2021    POTASSIUM 4.4 11/15/2021    POTASSIUM 4.4 08/05/2021    CHLORIDE 108 11/15/2021    CHLORIDE 104 08/05/2021    CO2 27 11/15/2021    CO2 32 (H) 08/05/2021    BUN 46 (H) 11/15/2021    BUN 40 (H) 08/05/2021    CR 0.95 11/15/2021    CR 0.93 08/05/2021     (H) 11/15/2021     (H) 08/05/2021     COAGS:   Lab Results   Component Value Date    INR 1.98 (H) 11/15/2021     POC: No results found for: BGM, HCG, HCGS  HEPATIC:   Lab Results   Component Value Date    ALBUMIN 3.2 (L) 03/08/2021    PROTTOTAL 6.5 03/08/2021    ALT 13 03/08/2021    AST 39 03/08/2021    ALKPHOS 136  (H) 03/08/2021    BILITOTAL 0.7 03/08/2021     OTHER:   Lab Results   Component Value Date    A1C 7.8 (H) 08/05/2021    SUN 11.2 (H) 11/15/2021    PHOS 2.4 (L) 03/18/2021    LIPASE 44 07/25/2018    TSH 1.99 03/08/2021    CRP 0.3 03/01/2021       Anesthesia Plan    ASA Status:  4   NPO Status:  ELEVATED Aspiration Risk/Unknown    Anesthesia Type: Spinal.              Consents    Anesthesia Plan(s) and associated risks, benefits, and realistic alternatives discussed. Questions answered and patient/representative(s) expressed understanding.     - Discussed with:  Patient, Other (See Comment) (el Owen)      - Extended Intubation/Ventilatory Support Discussed: Yes.      - Patient is DNR/DNI Status: Yes             DNR/DNI SUSPEND: ok with intubation if for anesthesia delivery, no chest compressions or defibrillation.    Use of blood products discussed: Yes.     - Discussed with: Patient, Other (see comment) (edgardo Owen).     - Consented: consented to blood products            Reason for refusal: other.     Postoperative Care    Pain management: Multi-modal analgesia, Oral pain medications, IV analgesics.   PONV prophylaxis: Ondansetron (or other 5HT-3), Dexamethasone or Solumedrol     Comments:                JOSUE Martínez CRNA

## 2021-11-15 NOTE — PHARMACY-ANTICOAGULATION SERVICE
Vitamin K Dosing Note:    Pharmacy was consulted to reverse INR for surgical procedure in next 24 hours.  Initial INR 1.98.  Will give Phytonadione 2mg IV once per protocol.    Ryan DeniseD

## 2021-11-15 NOTE — H&P
"[facility]    History and Physical - Hospitalist Service       Date of Admission:  11/15/2021    Assessment & Plan      Mecca Slade is a 89 year old female admitted on 11/15/2021. She presented to the emergency department for evaluation of right leg pain after an unwitnessed fall and was found to have a right distal femur fracture for which she will be admitted for further evaluation and treatment.    Closed comminuted intra-articular fracture of distal end of right femur, initial encounter  Acquired distal right femur fracture after mechanical fall. X-ray right femur shows \"Oblique fracture the distal femoral shaft with dorsal angulation and displacement. Moderate osteoarthritis in the knee joint.\" Case discussed between emergency department and on-call ortho, patient requires INR reversal prior to surgery.  INR 1.98 -- 1.43 -- 1.31  - Pharmacy to dose vitamin K for INR reversal (received 2 mg on 11/15)  - Ortho consult, plans for surgery on 11/16  - Analgesia: scheduled acetaminophen, prn oxycodone and prn IV dilaudid  - NPO at midnight  - Bedrest    Surgery Clearance and RCRI Risk Assessment:    Anesthesia issues: No known  Baseline Activity: < 4 METS  Chest Pain: No  Shortness of breath: No  Cardiac Risk Factors/Assessment:                High Risk Surgery: No              History Ischemic Heart Disease: No              History of Congestive Heart Failure: Yes              History of CVA: Yes              Preoperative Treatment with Insulin: No              Preoperative Creatinine greater than 2.0: No              Total Number of Points: 2 = 6.6% risk of major cardiac event  EKG and chest CT reviewed. Patient may proceed with surgery without further work-up after INR is at acceptable level per ortho team.     Elevated lactic acid level  Patient triggered sepsis BPA, lactic acid found to be 2.7. Patient tachycardic due to atrial fibrillation, with mild leukocytosis likely reactive " due to injury. Low concern for infection on admission, does not appear to be septic.  - 500 ml LR bolus  - Repeat lactic acid in am     Accidental fall, initial encounter  Laceration of scalp, initial encounter  Tripped and fell on 11/15 in her apartment, unwitnessed and found down with head laceration and right knee pain. Extensive traumatic evaluation completed, no traumatic injury identified other than right femur fracture (imaging included head CT, CT spine (cervical, thoracic, and lumbar), CT chest, abdomen, & pelvis, x-ray right knee, x-ray pelvis). Patient presented with large head laceration that was stapled in the emergency department 11/15.   - Manage femur fracture above  - Repeat head CT in am to rule out delayed bleed on anticoagulation   - Will need PT/OT evaluations after surgery  - Scalp laceration s/p staples on 11/15    Type II diabetes mellitus with peripheral circulatory disorder   HgbA1c 9.1. Managed prior to admission with metformin 1000 mg bid.  - Hold metformin while NPO, resume as appropriate  - High sliding scale insulin   - Hyper/hypoglycemia protocols  - Will need consistent carbohydrate diet once advanced    Atrial fibrillation  Long term (current) use of anticoagulants  Rate controlled prior to admission with metoprolol XL 25 mg q pm. Patient was intermittently tachycardic at time of admission. Anticoagulated prior to admission with coumadin. Admit INR 1.98.   - See above regarding INR reversal  - Will need coumadin resumed post-op  - Continue metoprolol with holding parameters  - Monitor on telemetry   - Prn IV metoprolol available for sustained heart rate > 110    CHF (congestive heart failure)  Hypertension  Last echo on record in 2017 with EF 59% (Care Everywhere). Managed prior to admission with lisinopril 2.5 mg q pm, lasix 40 mg daily, and metoprolol XL 25 mg daily. Appears euvolemic on admission.  - Hold lasix while getting IV fluids, resume post-op as appropriate  - Continue  "lisinopril and metoprolol with holding parameters    H/O: CVA (cerebrovascular accident), R MCA embolism s/p tPA, March 2017  Hyperlipidemia  History of CVA in 2017. Has evidence of chronic infarcts on head CT, but no acute evidence of infarction. Has known atrial fibrillation and is on coumadin for stroke risk reduction. Also takes Lipitor 10 mg q pm.  - Continue Lipitor    Tachy-lion syndrome, s/p pacemaker  Cardiac pacemaker in situ  Pacemaker placed in 2012, follows with cardiology through [facility].     COVID Status  COVID PCR negative 11/15/21  Completed Moderna COVID vaccine series as of 2/2/2021. Does not appear that she has yet had her booster.       Diet: NPO per Anesthesia Guidelines for Procedure/Surgery Except for: Meds    DVT Prophylaxis: Warfarin / INR reversal pre-op, resume post op  Cantu Catheter: Not present  Central Lines: None  Code Status: No CPR- Pre-arrest intubation OK    Disposition Plan   Expected discharge: 11/19/2021   recommended to prior living arrangement vs TCU once surgery completed, pain well managed on oral medications, safe disposition plan in place.     The patient's care was discussed with the Attending Physician, Dr. Gaurav Young, and bedside RN.    Fely Garcia PA-C  [facility]  Securely message with the [employer] Web Console (learn more here)  Text page via [employer] Paging/Directory        ______________________________________________________________________    Chief Complaint   \"I was trying to get up from my chair and I fell.\"    History is obtained from the patient, review of EMR, and emergency department sign out.    History of Present Illness   Mecca Slade is a 89 year old female who presented to the emergency department for evaluation after being found down on the ground at her assisted living facility.     Patient does not remember exactly what happened because \"it happened so fast,\" but thinks she fell while attempting to get out of " her chair.   She was unable to get up on her own due to right leg pain.  She hit her head but does not think she lost consciousness.  She does not think she was down on the ground for very long before staff found her and helped her up, brought her to the emergency department.  She does not think she was dizzy or lightheaded prior to her fall, feels she either lost her balance or tripped.    She underwent an extensive traumatic work-up with imaging and was found to have a right distal femur fracture.  She initially had a headache which has since resolved.  He had a large head laceration that was repaired with staples in the emergency department.  Currently she complains of right knee pain but denies any other pain.    On review of systems she mentions having occasional constipation but no recent problems.  She had some occasional tachycardia in the emergency department and has known atrial fibrillation, but denies any palpitations or chest pain.  She has poor vision at baseline, stable.  The remainder review of systems is negative.      Review of Systems    The 10 point Review of Systems is negative other than noted in the HPI or here.     Past Medical History    I have reviewed this patient's medical history and updated it with pertinent information if needed.   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)      DM type 2 (diabetes mellitus, type 2) (H)      HTN (hypertension)      Left-sided weakness 03/01/2017     Neurological neglect syndrome      Pure hypercholesterolemia      Right sided cerebral hemisphere cerebrovascular accident (H)      Tissue plasminogen activator (t-PA) administered at other facility within 24 hours prior  to current admission 03/01/2017     Type 2 diabetes mellitus with diabetic neuropathy, without long-term current use of insulin (H) 4/3/2017       Past Surgical History   I have reviewed this patient's surgical history and updated it with pertinent information if needed.  Past Surgical History:   Procedure Laterality Date     APPENDECTOMY       EP PACEMAKER         Social History   I have reviewed this patient's social history and updated it with pertinent information if needed.  Social History     Tobacco Use     Smoking status: Never Smoker     Smokeless tobacco: Never Used   Substance Use Topics     Alcohol use: No     Drug use: No       Family History   I have reviewed this patient's family history and updated it with pertinent information if needed.  Family History   Problem Relation Age of Onset     Heart Disease Sister        Prior to Admission Medications   Prior to Admission Medications   Prescriptions Last Dose Informant Patient Reported? Taking?   CARBOXYMETHYLCELLULOSE SODIUM 0.5 % SOLN ophthalmic solution 11/14/2021 at 0800 [facility] No Yes   Sig: INSTILL 1 DROP IN EACH EYE EVERY MORNING   Patient taking differently: Place 1 drop into both eyes every morning    Glucose Blood (BLOOD GLUCOSE TEST STRIPS) STRP 11/8/2021 at 0800 #187 Nursing Home Yes Yes   Sig: by In Vitro route once a week On Monday mornings   JANTOVEN ANTICOAGULANT 1 MG tablet 11/14/2021 at 1600 [facility] Yes Yes   Sig: TAKE 1 TAB BY MOUTH ON TUE, THR,SAT,SUN. INR 11/16/21   JANTOVEN ANTICOAGULANT 2 MG tablet 11/12/2021 at 1600 Nursing Home Yes Yes   Sig: TAKE 1 TAB BY MOUTH ON MON, WED,FRI. INR 11/16/21   Menthol-Methyl Salicylate (SAMM LEE GREASELESS) cream Unknown at Unknown time Nursing Home Yes Yes   Sig: Apply topically every 6 hours as needed To low back   acetaminophen (TYLENOL) 325 MG tablet 11/14/2021 at 2129 Nursing Home Yes Yes   Sig: Take 650 mg by mouth every 4 hours as needed for pain For 3 days.  Do not exceed 3,000  mg of Acetaminophen from all sources in 24 hours   acetaminophen (TYLENOL) 500 MG tablet 11/14/2021 at 1600 [facility] Yes Yes   Sig: Take 1,000 mg by mouth 2 times daily   atorvastatin (LIPITOR) 10 MG tablet 11/14/2021 at 1600 [facility] Yes Yes   Sig: Take 10 mg by mouth every evening   furosemide (LASIX) 40 MG tablet 11/14/2021 at 0800 [facility] Yes Yes   Sig: Take 40 mg by mouth daily   lisinopril (ZESTRIL) 2.5 MG tablet 11/14/2021 at 1600 [facility] Yes Yes   Sig: Take 1 tablet by mouth every evening    metFORMIN (GLUCOPHAGE-XR) 500 MG 24 hr tablet 11/14/2021 at 0800 [facility] No Yes   Sig: Take 2 tablets (1,000 mg) by mouth daily (with dinner)   Patient taking differently: Take 1,000 mg by mouth daily (with breakfast)    metoprolol succinate ER (TOPROL-XL) 25 MG 24 hr tablet 11/14/2021 at 1600 [facility] No Yes   Sig: Take 1 tablet (25 mg) by mouth daily   Patient taking differently: Take 25 mg by mouth every evening    protein modular (PRO-STAT) 11/14/2021 at 0800 [facility] Yes Yes   Sig: Take by mouth every morning 30 ml   simethicone (MYLICON) 80 MG chewable tablet 11/14/2021 at 0800 [facility] No Yes   Sig: Take 2 tablets (160 mg) by mouth daily   Patient taking differently: Take 160 mg by mouth every morning After breakfast      Facility-Administered Medications: None     Allergies   No Known Allergies    Physical Exam   Vital Signs: Temp: 98.2  F (36.8  C) Temp src: Oral BP: (!) 141/54 Pulse: 81   Resp: 18 SpO2: 99 % O2 Device: Nasal cannula Oxygen Delivery: 1 LPM  Weight: 205 lbs 3.97 oz    Constitutional: Alert, oriented, cooperative. Hard of hearing, but does well using a pocket talker. No apparent distress, appears nontoxic. Speaking in full coherent sentences.     Eyes: Eyes are clear, pupils are reactive. No scleral icterus.     HEENT: Oropharynx is clear and moist, no lesions. Normocephalic. Large scalp laceration present near the hairline above the right eye, well approximated  with staples, no active bleeding but significant dried blood present.    Cardiovascular: Irregularly irregular rhythm and rate, normal S1 and S2. No murmur, rubs, or gallops. Peripheral pulses intact bilaterally. No lower extremity edema.    Respiratory: Lung sounds are clear to auscultation bilaterally without wheezes, rhonchi, or crackles.    GI: Soft, non-distended. Non-tender, no rebound or guarding. No hepatosplenomegaly or masses appreciated. Normal bowel sounds.     Musculoskeletal: Without obvious deformity. Distal CMS intact. Right distal thigh is tender to palpation.    Skin: Warm and dry, no rashes. No mottling of skin. Mild ecchymosis present on distal right thigh. Head laceration as noted above.      Neurologic: Patient moves all extremities. Gross strength and sensation are equal bilaterally.    Genitourinary: Deferred      Data   Data reviewed today: I reviewed all medications, new labs and imaging results over the last 24 hours. I personally reviewed the femur x-ray image(s) showing distal femur fracture with angulation. .    Recent Labs   Lab 11/15/21  2304 11/15/21  2154 11/15/21  1757 11/15/21  1423 11/15/21  0359   WBC  --   --   --   --  12.1*   HGB  --   --   --   --  14.3   MCV  --   --   --   --  99   PLT  --   --   --   --  218   INR 1.31*  --   --  1.43* 1.98*   NA  --   --   --   --  140   POTASSIUM  --   --   --   --  4.4   CHLORIDE  --   --   --   --  108   CO2  --   --   --   --  27   BUN  --   --   --   --  46*   CR  --   --   --   --  0.95   ANIONGAP  --   --   --   --  5   SUN  --   --   --   --  11.2*   GLC  --  273* 350*  --  254*     Recent Results (from the past 24 hour(s))   CT Head w/o Contrast    Narrative    EXAM: CT HEAD W/O CONTRAST, CT CERVICAL SPINE W/O CONTRAST  LOCATION: [facility]  DATE/TIME: 11/15/2021 4:43 AM    INDICATION: Head trauma, minor (Age >= 65y) neck injury.  COMPARISON: None.  TECHNIQUE:   1) Routine CT Head without IV contrast.  Multiplanar reformats. Dose reduction techniques were used.  2) Routine CT Cervical Spine without IV contrast. Multiplanar reformats. Dose reduction techniques were used.    FINDINGS:   HEAD CT:   INTRACRANIAL CONTENTS: No intracranial hemorrhage, extraaxial collection, or mass effect.  No CT evidence of acute infarct. Moderate presumed chronic small vessel ischemic changes. Moderate generalized volume loss. No hydrocephalus. Chronic infarct in   the lateral right basal ganglia. Small chronic infarct in the left cerebellar hemisphere.     VISUALIZED ORBITS/SINUSES/MASTOIDS: No intraorbital abnormality. No paranasal sinus mucosal disease. No middle ear or mastoid effusion.    BONES/SOFT TISSUES: Soft tissue laceration and small subgaleal swelling over the anterior right frontal bone. No calvarial fracture.    CERVICAL SPINE CT:   VERTEBRA: Cervical vertebra are grossly normal in height. Minimal alterations in the lateral alignment are presumably degenerative. There is a small ossific fragment involving the anterior aspect of the superior C6 endplate. On axial images, the margin   associated with the native vertebral body appears corticated while the posterior margin of the fragment is indeterminate.     CANAL/FORAMINA: Multilevel degenerative changes without appreciable central canal stenosis. There is at least moderate foraminal narrowing bilaterally at the C4-C5 level.    PARASPINAL: Marked enlargement of the right lobe of the thyroid with underlying 3 cm nodule. Visualized lung fields are clear.      Impression    IMPRESSION:  HEAD CT:  1.  No acute intracranial hemorrhage or calvarial fracture.  2.  Moderate volume loss and presumed chronic small vessel ischemic changes.  3.  Small chronic infarcts in the lateral right basal ganglia and the left cerebellar hemisphere.    CERVICAL SPINE CT:  1.  Age-indeterminate fracture deformity involving the anterior aspect of the superior C6 endplate is favored to be chronic  or potentially reflect a developmental variant. A more recent abnormality while considered less likely is not completely excluded.  2.  No additional evidence of cervical spine fracture. No subluxation.  3.  Degenerative changes as highlighted above.      4.  3 cm right thyroid nodule and marked enlargement of the right lobe of the thyroid. Please see follow-up guidelines below.    REFERENCE:  Juancho HICKMAN et al. Managing Incidental Thyroid Nodules Detected on Imaging: White Paper of the ACR Incidental Thyroid Findings Committee. JACR 2015; 12:143-150.    Incidental thyroid nodule detected on CT or MRI without suspicious findings. Applies to general population without limited life expectancy or significant comorbidities.    Age greater than or equal to 35 years  Less than 1.5 cm: No further evaluation.  Greater than or equal to 1.5 cm: Evaluate with thyroid ultrasound.           CT Cervical Spine w/o Contrast    Narrative    EXAM: CT HEAD W/O CONTRAST, CT CERVICAL SPINE W/O CONTRAST  LOCATION: [facility]  DATE/TIME: 11/15/2021 4:43 AM    INDICATION: Head trauma, minor (Age >= 65y) neck injury.  COMPARISON: None.  TECHNIQUE:   1) Routine CT Head without IV contrast. Multiplanar reformats. Dose reduction techniques were used.  2) Routine CT Cervical Spine without IV contrast. Multiplanar reformats. Dose reduction techniques were used.    FINDINGS:   HEAD CT:   INTRACRANIAL CONTENTS: No intracranial hemorrhage, extraaxial collection, or mass effect.  No CT evidence of acute infarct. Moderate presumed chronic small vessel ischemic changes. Moderate generalized volume loss. No hydrocephalus. Chronic infarct in   the lateral right basal ganglia. Small chronic infarct in the left cerebellar hemisphere.     VISUALIZED ORBITS/SINUSES/MASTOIDS: No intraorbital abnormality. No paranasal sinus mucosal disease. No middle ear or mastoid effusion.    BONES/SOFT TISSUES: Soft tissue laceration and small  subgaleal swelling over the anterior right frontal bone. No calvarial fracture.    CERVICAL SPINE CT:   VERTEBRA: Cervical vertebra are grossly normal in height. Minimal alterations in the lateral alignment are presumably degenerative. There is a small ossific fragment involving the anterior aspect of the superior C6 endplate. On axial images, the margin   associated with the native vertebral body appears corticated while the posterior margin of the fragment is indeterminate.     CANAL/FORAMINA: Multilevel degenerative changes without appreciable central canal stenosis. There is at least moderate foraminal narrowing bilaterally at the C4-C5 level.    PARASPINAL: Marked enlargement of the right lobe of the thyroid with underlying 3 cm nodule. Visualized lung fields are clear.      Impression    IMPRESSION:  HEAD CT:  1.  No acute intracranial hemorrhage or calvarial fracture.  2.  Moderate volume loss and presumed chronic small vessel ischemic changes.  3.  Small chronic infarcts in the lateral right basal ganglia and the left cerebellar hemisphere.    CERVICAL SPINE CT:  1.  Age-indeterminate fracture deformity involving the anterior aspect of the superior C6 endplate is favored to be chronic or potentially reflect a developmental variant. A more recent abnormality while considered less likely is not completely excluded.  2.  No additional evidence of cervical spine fracture. No subluxation.  3.  Degenerative changes as highlighted above.      4.  3 cm right thyroid nodule and marked enlargement of the right lobe of the thyroid. Please see follow-up guidelines below.    REFERENCE:  Juancho HICKMAN et al. Managing Incidental Thyroid Nodules Detected on Imaging: White Paper of the ACR Incidental Thyroid Findings Committee. JACR 2015; 12:143-150.    Incidental thyroid nodule detected on CT or MRI without suspicious findings. Applies to general population without limited life expectancy or significant comorbidities.    Age  greater than or equal to 35 years  Less than 1.5 cm: No further evaluation.  Greater than or equal to 1.5 cm: Evaluate with thyroid ultrasound.           CT Thoracic Spine w/o Contrast    Narrative    EXAM: CT THORACIC SPINE W/O CONTRAST, CT LUMBAR SPINE W/O CONTRAST  LOCATION: [facility]  DATE/TIME: 11/15/2021 4:55 AM    INDICATION: Mid-back pain, low back pain.  COMPARISON: None.  TECHNIQUE:  1) Routine CT Thoracic Spine without IV contrast. Multiplanar reformats. Dose reduction techniques were used.   2) Routine CT Lumbar Spine without IV contrast. Multiplanar reformats. Dose reduction techniques were used.     FINDINGS:    THORACIC SPINE CT:  VERTEBRA: There is accentuation of thoracic kyphosis resulting from loss of vertebral body height at multiple mid and lower thoracic levels. Ankylosis of the vertebral column from the T1 level to the T10 level. No fracture or posttraumatic subluxation.     CANAL/FORAMINA: Multilevel degenerative changes. No canal or neural foraminal stenosis.    PARASPINAL: There are bilateral pleural effusions.    LUMBAR SPINE CT:  VERTEBRA: Lumbar vertebra are normal in height. There are mild alterations in the lateral alignment at L4-L5 and L5-S1 attributable to facet arthropathy at these levels. No fracture or posttraumatic subluxation.     CANAL/FORAMINA: Multilevel degenerative changes with prominent facet arthropathy at multiple levels and mild discogenic degenerative changes at multiple levels. There is moderate central canal stenosis at the L3-L4 level. No high-grade foraminal   stenosis.    PARASPINAL: Diffuse atheromatous changes in the distal aorta and branch vessels. Evidence of cholelithiasis.      Impression    IMPRESSION:  THORACIC SPINE CT:  1.  No fracture or posttraumatic subluxation.  2.  No high-grade spinal canal or neural foraminal stenosis.  3.  Bilateral pleural effusions.    LUMBAR SPINE CT:  1.  No fracture or posttraumatic  subluxation.  2.  No high-grade spinal canal or neural foraminal stenosis.  3.  Cholelithiasis.             Lumbar spine CT w/o contrast    Narrative    EXAM: CT THORACIC SPINE W/O CONTRAST, CT LUMBAR SPINE W/O CONTRAST  LOCATION: [facility]  DATE/TIME: 11/15/2021 4:55 AM    INDICATION: Mid-back pain, low back pain.  COMPARISON: None.  TECHNIQUE:  1) Routine CT Thoracic Spine without IV contrast. Multiplanar reformats. Dose reduction techniques were used.   2) Routine CT Lumbar Spine without IV contrast. Multiplanar reformats. Dose reduction techniques were used.     FINDINGS:    THORACIC SPINE CT:  VERTEBRA: There is accentuation of thoracic kyphosis resulting from loss of vertebral body height at multiple mid and lower thoracic levels. Ankylosis of the vertebral column from the T1 level to the T10 level. No fracture or posttraumatic subluxation.     CANAL/FORAMINA: Multilevel degenerative changes. No canal or neural foraminal stenosis.    PARASPINAL: There are bilateral pleural effusions.    LUMBAR SPINE CT:  VERTEBRA: Lumbar vertebra are normal in height. There are mild alterations in the lateral alignment at L4-L5 and L5-S1 attributable to facet arthropathy at these levels. No fracture or posttraumatic subluxation.     CANAL/FORAMINA: Multilevel degenerative changes with prominent facet arthropathy at multiple levels and mild discogenic degenerative changes at multiple levels. There is moderate central canal stenosis at the L3-L4 level. No high-grade foraminal   stenosis.    PARASPINAL: Diffuse atheromatous changes in the distal aorta and branch vessels. Evidence of cholelithiasis.      Impression    IMPRESSION:  THORACIC SPINE CT:  1.  No fracture or posttraumatic subluxation.  2.  No high-grade spinal canal or neural foraminal stenosis.  3.  Bilateral pleural effusions.    LUMBAR SPINE CT:  1.  No fracture or posttraumatic subluxation.  2.  No high-grade spinal canal or neural  foraminal stenosis.  3.  Cholelithiasis.             CT Chest/Abdomen/Pelvis w Contrast    Narrative    EXAM: CT CHEST/ABDOMEN/PELVIS W CONTRAST  LOCATION: [facility]  DATE/TIME: 11/15/2021 5:08 AM    INDICATION: Chest-abdomen-pelvis trauma, blunt. Fall from chair. Forehead laceration. Back pain. Right leg shortened and externally rotated. On warfarin.  COMPARISON: None.  TECHNIQUE: CT scan of the chest, abdomen, and pelvis was performed following injection of IV contrast. Multiplanar reformats were obtained. Dose reduction techniques were used.   CONTRAST: 100 mL Isovue-370.    FINDINGS:   LUNGS AND PLEURA: Small bilateral pleural effusions. Dependent atelectasis bilaterally. Few tiny calcified granulomas. No pneumothorax.    MEDIASTINUM/AXILLAE: Enlarged multinodular thyroid gland. No lymph node enlargement. Left subclavian cardiac device in place. The heart size is normal.    CORONARY ARTERY CALCIFICATION: Moderate.    HEPATOBILIARY: Stones in the gallbladder. There is biliary dilatation into the pancreas head without cause of obstruction seen.    PANCREAS: Small duodenal diverticulum at the pancreas head. The pancreas otherwise appears normal.    SPLEEN: Normal.    ADRENAL GLANDS: Normal.    KIDNEYS/BLADDER: Small bilateral renal cortical cysts. No hydronephrosis.    BOWEL: There are colonic diverticula without acute diverticulitis. No bowel obstruction or inflammation.    LYMPH NODES: Normal.    VASCULATURE: Atherosclerotic calcification of the aorta and its branches. No aneurysm.    PELVIC ORGANS: Several small uterine calcifications. No adnexal mass.    MUSCULOSKELETAL: Small umbilical hernia containing fat. Degenerative disease throughout the spine. No acute bone fracture.      Impression    IMPRESSION:  1.  No acute traumatic abnormality.  2.  Small bilateral pleural effusions.  3.  Colonic diverticula without acute diverticulitis. No bowel obstruction or inflammation.  4.   Cholelithiasis.  5.  Biliary dilatation into the pancreas head. No cause of obstruction is seen.             XR Pelvis 1/2 Views    Narrative    EXAM: XR PELVIS 1/2 VW  LOCATION: [facility]  DATE/TIME: 11/15/2021 5:17 AM    INDICATION: Pain after fall.  COMPARISON: None.      Impression    IMPRESSION: No acute fracture or dislocation.   XR Knee Right 1/2 Views    Narrative    EXAM: XR KNEE RT 1 /2 VW  LOCATION: [facility]  DATE/TIME: 11/15/2021 5:26 AM    INDICATION: Fall with knee pain.  COMPARISON: None.      Impression    IMPRESSION: There is an oblique mildly displaced fracture seen through the shaft of the distal right femur. There is mild posterior and lateral displacement of the distal fracture fragment. Generalized osteopenia. Marked tricompartmental degenerative   arthritis. Vascular calcifications.                XR Femur Right 2 Views    Narrative    EXAM: XR FEMUR RIGHT 2 VIEW  LOCATION: [facility]  DATE/TIME: 11/15/2021 5:32 AM    INDICATION: fall with pain and leg externally rotated  COMPARISON: None.      Impression    IMPRESSION: Oblique fracture the distal femoral shaft with dorsal angulation and displacement. Moderate osteoarthritis in the knee joint.

## 2021-11-15 NOTE — CONSULTS
Care Management Initial Consult    General Information  Assessment completed with: Patient,Children, Debbi  Type of CM/SW Visit: Offer D/C Planning    Primary Care Provider verified and updated as needed: Yes   Readmission within the last 30 days: no previous admission in last 30 days         Advance Care Planning: Advance Care Planning Reviewed: present on chart  POLST       Communication Assessment  Patient's communication style: spoken language (English or Bilingual)    Hearing Difficulty or Deaf: yes   Wear Glasses or Blind: yes    Cognitive  Cognitive/Neuro/Behavioral: WDL                      Living Environment:   People in home: facility resident     Current living Arrangements: extended care facility  Name of Facility: [facility]   Able to return to prior arrangements: yes       Family/Social Support:  Care provided by: other (see comments) ([facility] Staff)  Provides care for: no one     Children,Facility resident(s)/Staff          Description of Support System: Supportive,Involved    Support Assessment: Adequate family and caregiver support    Current Resources:   Patient receiving home care services: No   Community Resources: None  Equipment currently used at home: lift device,wheelchair, manual  Supplies currently used at home:  Diabetic supplies and wound care supplies.     Financial Concerns: No concerns identified        Functional Status:  Prior to admission patient needed assistance:   Dependent ADLs:: Bathing,Dressing,Grooming,Positioning,Transfers,Wheelchair-with assist,Toileting  Dependent IADLs:: Cleaning,Cooking,Laundry,Shopping,Meal Preparation,Medication Management,Money Management,Transportation       Mental Health Status:  Mental Health Status: No Current Concerns       Chemical Dependency Status:  Chemical Dependency Status: No Current Concerns             Values/Beliefs:  Spiritual, Cultural Beliefs, Christianity Practices, Values that affect care: no               Additional  Information:    Met with the Patient and her daughter, Lexie regarding discharge planning.    The Patient lives at [facility] Phone (Main Phone:[phone] Admissions Phone:[phone] Fax: [phone]).  Referral placed for [facility].  Confirmed the Patient has a bed hold.  The Patient requires total care. She is wheelchair dependent.  She requires a zac lift for transfers.  She is very [place].    The patient had a fall, unwitnessed.  She has a history of prior stroke with residual left-sided weakness.  The plan is surgery for a right femur fracture.  Discussed with the Patient, Lexie and Delia, [facility] Admissions returning to LT vs TCU.      Care Management will arrange transportation upon discharge.    Plan:  Return to [facility] vs TCU       Latricia Bowling RN

## 2021-11-15 NOTE — ED TRIAGE NOTES
Patient found face down in room at [facility] in front of her raised electric recliner. Patient appeared to have been trying to get up from chair to go to bathroom. Patient found with large angled laceration just past hairline on top of her head. Patient very [place], Home reports her to be confused at baseline however more than usual tonight. Patient with complaint of right hip and leg pain. Patient transported via EMS and was incontinent of urine.

## 2021-11-15 NOTE — ED PROVIDER NOTES
Emergency Department Patient Sign-out       Brief HPI:  This is a 89 year old female signed out to me by Dr. Gilbert .  See initial ED Provider note for details of the presentation.            Significant Events prior to my assuming care: 89-year-old female past medical history reviewed as above taken in signout from Dr. Josh Gilbert with presentation with concerns of a fall from her chair unwitnessed with injury to her forehead, multiple other areas of pain since the fall including headache, leg pain, back pain, pain all over with any type of movement and the patient is anticoagulated on Coumadin.  Preliminary evaluation revealed a distal right femur fracture, pan scan trauma CT did not reveal any other additional significant traumatic injuries.  Orthopedic surgery has been consulted.  Anesthesia has been consulted with respect to suitability of the patient is an operative candidate at our facility before orthopedics will make a decision.      Exam:   Patient Vitals for the past 24 hrs:   BP Temp Temp src Pulse Resp SpO2 Weight   11/15/21 0630 136/57 -- -- 90 16 96 % --   11/15/21 0600 131/84 -- -- 84 -- 95 % --   11/15/21 0440 (!) 149/71 -- -- -- -- 97 % --   11/15/21 0400 120/83 -- -- 76 -- 93 % --   11/15/21 0355 (!) 146/74 97.8  F (36.6  C) Axillary 76 16 93 % 94.6 kg (208 lb 8 oz)           ED RESULTS:   Results for orders placed or performed during the hospital encounter of 11/15/21 (from the past 24 hour(s))   CBC with platelets differential     Status: Abnormal    Collection Time: 11/15/21  3:59 AM    Narrative    The following orders were created for panel order CBC with platelets differential.  Procedure                               Abnormality         Status                     ---------                               -----------         ------                     CBC with platelets and d...[[id number]]  Abnormal            Final result                 Please view results for these tests on the  individual orders.   INR     Status: Abnormal    Collection Time: 11/15/21  3:59 AM   Result Value Ref Range    INR 1.98 (H) 0.85 - 1.15   Basic metabolic panel     Status: Abnormal    Collection Time: 11/15/21  3:59 AM   Result Value Ref Range    Sodium 140 133 - 144 mmol/L    Potassium 4.4 3.4 - 5.3 mmol/L    Chloride 108 94 - 109 mmol/L    Carbon Dioxide (CO2) 27 20 - 32 mmol/L    Anion Gap 5 3 - 14 mmol/L    Urea Nitrogen 46 (H) 7 - 30 mg/dL    Creatinine 0.95 0.52 - 1.04 mg/dL    Calcium 11.2 (H) 8.5 - 10.1 mg/dL    Glucose 254 (H) 70 - 99 mg/dL    GFR Estimate 50 (L) >60 mL/min/1.73m2   ABO/Rh type and screen     Status: None    Collection Time: 11/15/21  3:59 AM    Narrative    The following orders were created for panel order ABO/Rh type and screen.  Procedure                               Abnormality         Status                     ---------                               -----------         ------                     Adult Type and Screen[[id number]]                            Edited Result - FINAL        Please view results for these tests on the individual orders.   CBC with platelets and differential     Status: Abnormal    Collection Time: 11/15/21  3:59 AM   Result Value Ref Range    WBC Count 12.1 (H) 4.0 - 11.0 10e3/uL    RBC Count 4.58 3.80 - 5.20 10e6/uL    Hemoglobin 14.3 11.7 - 15.7 g/dL    Hematocrit 45.1 35.0 - 47.0 %    MCV 99 78 - 100 fL    MCH 31.2 26.5 - 33.0 pg    MCHC 31.7 31.5 - 36.5 g/dL    RDW 13.6 10.0 - 15.0 %    Platelet Count 218 150 - 450 10e3/uL    % Neutrophils 57 %    % Lymphocytes 33 %    % Monocytes 7 %    % Eosinophils 1 %    % Basophils 1 %    % Immature Granulocytes 1 %    NRBCs per 100 WBC 0 <1 /100    Absolute Neutrophils 6.9 1.6 - 8.3 10e3/uL    Absolute Lymphocytes 4.0 0.8 - 5.3 10e3/uL    Absolute Monocytes 0.9 0.0 - 1.3 10e3/uL    Absolute Eosinophils 0.1 0.0 - 0.7 10e3/uL    Absolute Basophils 0.1 0.0 - 0.2 10e3/uL    Absolute Immature Granulocytes 0.1 (H) <=0.0  10e3/uL    Absolute NRBCs 0.0 10e3/uL   Adult Type and Screen     Status: None    Collection Time: 11/15/21  3:59 AM   Result Value Ref Range    ABO/RH(D) O POS     Antibody Screen Negative Negative    SPECIMEN EXPIRATION DATE [id number]    Asymptomatic COVID-19 Virus (Coronavirus) by PCR Nasopharyngeal     Status: Normal    Collection Time: 11/15/21  4:37 AM    Specimen: Nasopharyngeal; Swab   Result Value Ref Range    SARS CoV2 PCR Negative Negative    Narrative    Testing was performed using the kerry  SARS-CoV-2 & Influenza A/B Assay on the kerry  Jessica  System.  This test should be ordered for the detection of SARS-COV-2 in individuals who meet SARS-CoV-2 clinical and/or epidemiological criteria. Test performance is unknown in asymptomatic patients.  This test is for in vitro diagnostic use under the FDA EUA for laboratories certified under CLIA to perform moderate and/or high complexity testing. This test has not been FDA cleared or approved.  A negative test does not rule out the presence of PCR inhibitors in the specimen or target RNA in concentration below the limit of detection for the assay. The possibility of a false negative should be considered if the patient's recent exposure or clinical presentation suggests COVID-19.  [facility] are certified under the Clinical Laboratory Improvement Amendments of 1988 (CLIA-88) as qualified to perform moderate and/or high complexity laboratory testing.   CT Head w/o Contrast     Status: None    Collection Time: 11/15/21  5:01 AM    Narrative    EXAM: CT HEAD W/O CONTRAST, CT CERVICAL SPINE W/O CONTRAST  LOCATION: [facility]  DATE/TIME: 11/15/2021 4:43 AM    INDICATION: Head trauma, minor (Age >= 65y) neck injury.  COMPARISON: None.  TECHNIQUE:   1) Routine CT Head without IV contrast. Multiplanar reformats. Dose reduction techniques were used.  2) Routine CT Cervical Spine without IV contrast. Multiplanar  reformats. Dose reduction techniques were used.    FINDINGS:   HEAD CT:   INTRACRANIAL CONTENTS: No intracranial hemorrhage, extraaxial collection, or mass effect.  No CT evidence of acute infarct. Moderate presumed chronic small vessel ischemic changes. Moderate generalized volume loss. No hydrocephalus. Chronic infarct in   the lateral right basal ganglia. Small chronic infarct in the left cerebellar hemisphere.     VISUALIZED ORBITS/SINUSES/MASTOIDS: No intraorbital abnormality. No paranasal sinus mucosal disease. No middle ear or mastoid effusion.    BONES/SOFT TISSUES: Soft tissue laceration and small subgaleal swelling over the anterior right frontal bone. No calvarial fracture.    CERVICAL SPINE CT:   VERTEBRA: Cervical vertebra are grossly normal in height. Minimal alterations in the lateral alignment are presumably degenerative. There is a small ossific fragment involving the anterior aspect of the superior C6 endplate. On axial images, the margin   associated with the native vertebral body appears corticated while the posterior margin of the fragment is indeterminate.     CANAL/FORAMINA: Multilevel degenerative changes without appreciable central canal stenosis. There is at least moderate foraminal narrowing bilaterally at the C4-C5 level.    PARASPINAL: Marked enlargement of the right lobe of the thyroid with underlying 3 cm nodule. Visualized lung fields are clear.      Impression    IMPRESSION:  HEAD CT:  1.  No acute intracranial hemorrhage or calvarial fracture.  2.  Moderate volume loss and presumed chronic small vessel ischemic changes.  3.  Small chronic infarcts in the lateral right basal ganglia and the left cerebellar hemisphere.    CERVICAL SPINE CT:  1.  Age-indeterminate fracture deformity involving the anterior aspect of the superior C6 endplate is favored to be chronic or potentially reflect a developmental variant. A more recent abnormality while considered less likely is not completely  excluded.  2.  No additional evidence of cervical spine fracture. No subluxation.  3.  Degenerative changes as highlighted above.      4.  3 cm right thyroid nodule and marked enlargement of the right lobe of the thyroid. Please see follow-up guidelines below.    REFERENCE:  Juancho HICKMAN et al. Managing Incidental Thyroid Nodules Detected on Imaging: White Paper of the ACR Incidental Thyroid Findings Committee. JACR 2015; 12:143-150.    Incidental thyroid nodule detected on CT or MRI without suspicious findings. Applies to general population without limited life expectancy or significant comorbidities.    Age greater than or equal to 35 years  Less than 1.5 cm: No further evaluation.  Greater than or equal to 1.5 cm: Evaluate with thyroid ultrasound.           CT Cervical Spine w/o Contrast     Status: None    Collection Time: 11/15/21  5:02 AM    Narrative    EXAM: CT HEAD W/O CONTRAST, CT CERVICAL SPINE W/O CONTRAST  LOCATION: [facility]  DATE/TIME: 11/15/2021 4:43 AM    INDICATION: Head trauma, minor (Age >= 65y) neck injury.  COMPARISON: None.  TECHNIQUE:   1) Routine CT Head without IV contrast. Multiplanar reformats. Dose reduction techniques were used.  2) Routine CT Cervical Spine without IV contrast. Multiplanar reformats. Dose reduction techniques were used.    FINDINGS:   HEAD CT:   INTRACRANIAL CONTENTS: No intracranial hemorrhage, extraaxial collection, or mass effect.  No CT evidence of acute infarct. Moderate presumed chronic small vessel ischemic changes. Moderate generalized volume loss. No hydrocephalus. Chronic infarct in   the lateral right basal ganglia. Small chronic infarct in the left cerebellar hemisphere.     VISUALIZED ORBITS/SINUSES/MASTOIDS: No intraorbital abnormality. No paranasal sinus mucosal disease. No middle ear or mastoid effusion.    BONES/SOFT TISSUES: Soft tissue laceration and small subgaleal swelling over the anterior right frontal bone. No calvarial  fracture.    CERVICAL SPINE CT:   VERTEBRA: Cervical vertebra are grossly normal in height. Minimal alterations in the lateral alignment are presumably degenerative. There is a small ossific fragment involving the anterior aspect of the superior C6 endplate. On axial images, the margin   associated with the native vertebral body appears corticated while the posterior margin of the fragment is indeterminate.     CANAL/FORAMINA: Multilevel degenerative changes without appreciable central canal stenosis. There is at least moderate foraminal narrowing bilaterally at the C4-C5 level.    PARASPINAL: Marked enlargement of the right lobe of the thyroid with underlying 3 cm nodule. Visualized lung fields are clear.      Impression    IMPRESSION:  HEAD CT:  1.  No acute intracranial hemorrhage or calvarial fracture.  2.  Moderate volume loss and presumed chronic small vessel ischemic changes.  3.  Small chronic infarcts in the lateral right basal ganglia and the left cerebellar hemisphere.    CERVICAL SPINE CT:  1.  Age-indeterminate fracture deformity involving the anterior aspect of the superior C6 endplate is favored to be chronic or potentially reflect a developmental variant. A more recent abnormality while considered less likely is not completely excluded.  2.  No additional evidence of cervical spine fracture. No subluxation.  3.  Degenerative changes as highlighted above.      4.  3 cm right thyroid nodule and marked enlargement of the right lobe of the thyroid. Please see follow-up guidelines below.    REFERENCE:  Juancho HICKMAN et al. Managing Incidental Thyroid Nodules Detected on Imaging: White Paper of the ACR Incidental Thyroid Findings Committee. JACR 2015; 12:143-150.    Incidental thyroid nodule detected on CT or MRI without suspicious findings. Applies to general population without limited life expectancy or significant comorbidities.    Age greater than or equal to 35 years  Less than 1.5 cm: No further  evaluation.  Greater than or equal to 1.5 cm: Evaluate with thyroid ultrasound.           CT Thoracic Spine w/o Contrast     Status: None    Collection Time: 11/15/21  5:06 AM    Narrative    EXAM: CT THORACIC SPINE W/O CONTRAST, CT LUMBAR SPINE W/O CONTRAST  LOCATION: [facility]  DATE/TIME: 11/15/2021 4:55 AM    INDICATION: Mid-back pain, low back pain.  COMPARISON: None.  TECHNIQUE:  1) Routine CT Thoracic Spine without IV contrast. Multiplanar reformats. Dose reduction techniques were used.   2) Routine CT Lumbar Spine without IV contrast. Multiplanar reformats. Dose reduction techniques were used.     FINDINGS:    THORACIC SPINE CT:  VERTEBRA: There is accentuation of thoracic kyphosis resulting from loss of vertebral body height at multiple mid and lower thoracic levels. Ankylosis of the vertebral column from the T1 level to the T10 level. No fracture or posttraumatic subluxation.     CANAL/FORAMINA: Multilevel degenerative changes. No canal or neural foraminal stenosis.    PARASPINAL: There are bilateral pleural effusions.    LUMBAR SPINE CT:  VERTEBRA: Lumbar vertebra are normal in height. There are mild alterations in the lateral alignment at L4-L5 and L5-S1 attributable to facet arthropathy at these levels. No fracture or posttraumatic subluxation.     CANAL/FORAMINA: Multilevel degenerative changes with prominent facet arthropathy at multiple levels and mild discogenic degenerative changes at multiple levels. There is moderate central canal stenosis at the L3-L4 level. No high-grade foraminal   stenosis.    PARASPINAL: Diffuse atheromatous changes in the distal aorta and branch vessels. Evidence of cholelithiasis.      Impression    IMPRESSION:  THORACIC SPINE CT:  1.  No fracture or posttraumatic subluxation.  2.  No high-grade spinal canal or neural foraminal stenosis.  3.  Bilateral pleural effusions.    LUMBAR SPINE CT:  1.  No fracture or posttraumatic subluxation.  2.  No  high-grade spinal canal or neural foraminal stenosis.  3.  Cholelithiasis.             Lumbar spine CT w/o contrast     Status: None    Collection Time: 11/15/21  5:08 AM    Narrative    EXAM: CT THORACIC SPINE W/O CONTRAST, CT LUMBAR SPINE W/O CONTRAST  LOCATION: [facility]  DATE/TIME: 11/15/2021 4:55 AM    INDICATION: Mid-back pain, low back pain.  COMPARISON: None.  TECHNIQUE:  1) Routine CT Thoracic Spine without IV contrast. Multiplanar reformats. Dose reduction techniques were used.   2) Routine CT Lumbar Spine without IV contrast. Multiplanar reformats. Dose reduction techniques were used.     FINDINGS:    THORACIC SPINE CT:  VERTEBRA: There is accentuation of thoracic kyphosis resulting from loss of vertebral body height at multiple mid and lower thoracic levels. Ankylosis of the vertebral column from the T1 level to the T10 level. No fracture or posttraumatic subluxation.     CANAL/FORAMINA: Multilevel degenerative changes. No canal or neural foraminal stenosis.    PARASPINAL: There are bilateral pleural effusions.    LUMBAR SPINE CT:  VERTEBRA: Lumbar vertebra are normal in height. There are mild alterations in the lateral alignment at L4-L5 and L5-S1 attributable to facet arthropathy at these levels. No fracture or posttraumatic subluxation.     CANAL/FORAMINA: Multilevel degenerative changes with prominent facet arthropathy at multiple levels and mild discogenic degenerative changes at multiple levels. There is moderate central canal stenosis at the L3-L4 level. No high-grade foraminal   stenosis.    PARASPINAL: Diffuse atheromatous changes in the distal aorta and branch vessels. Evidence of cholelithiasis.      Impression    IMPRESSION:  THORACIC SPINE CT:  1.  No fracture or posttraumatic subluxation.  2.  No high-grade spinal canal or neural foraminal stenosis.  3.  Bilateral pleural effusions.    LUMBAR SPINE CT:  1.  No fracture or posttraumatic subluxation.  2.  No  high-grade spinal canal or neural foraminal stenosis.  3.  Cholelithiasis.             CT Chest/Abdomen/Pelvis w Contrast     Status: None    Collection Time: 11/15/21  5:15 AM    Narrative    EXAM: CT CHEST/ABDOMEN/PELVIS W CONTRAST  LOCATION: [facility]  DATE/TIME: 11/15/2021 5:08 AM    INDICATION: Chest-abdomen-pelvis trauma, blunt. Fall from chair. Forehead laceration. Back pain. Right leg shortened and externally rotated. On warfarin.  COMPARISON: None.  TECHNIQUE: CT scan of the chest, abdomen, and pelvis was performed following injection of IV contrast. Multiplanar reformats were obtained. Dose reduction techniques were used.   CONTRAST: 100 mL Isovue-370.    FINDINGS:   LUNGS AND PLEURA: Small bilateral pleural effusions. Dependent atelectasis bilaterally. Few tiny calcified granulomas. No pneumothorax.    MEDIASTINUM/AXILLAE: Enlarged multinodular thyroid gland. No lymph node enlargement. Left subclavian cardiac device in place. The heart size is normal.    CORONARY ARTERY CALCIFICATION: Moderate.    HEPATOBILIARY: Stones in the gallbladder. There is biliary dilatation into the pancreas head without cause of obstruction seen.    PANCREAS: Small duodenal diverticulum at the pancreas head. The pancreas otherwise appears normal.    SPLEEN: Normal.    ADRENAL GLANDS: Normal.    KIDNEYS/BLADDER: Small bilateral renal cortical cysts. No hydronephrosis.    BOWEL: There are colonic diverticula without acute diverticulitis. No bowel obstruction or inflammation.    LYMPH NODES: Normal.    VASCULATURE: Atherosclerotic calcification of the aorta and its branches. No aneurysm.    PELVIC ORGANS: Several small uterine calcifications. No adnexal mass.    MUSCULOSKELETAL: Small umbilical hernia containing fat. Degenerative disease throughout the spine. No acute bone fracture.      Impression    IMPRESSION:  1.  No acute traumatic abnormality.  2.  Small bilateral pleural effusions.  3.  Colonic  diverticula without acute diverticulitis. No bowel obstruction or inflammation.  4.  Cholelithiasis.  5.  Biliary dilatation into the pancreas head. No cause of obstruction is seen.             XR Pelvis 1/2 Views     Status: None    Collection Time: 11/15/21  5:25 AM    Narrative    EXAM: XR PELVIS 1/2 VW  LOCATION: [facility]  DATE/TIME: 11/15/2021 5:17 AM    INDICATION: Pain after fall.  COMPARISON: None.      Impression    IMPRESSION: No acute fracture or dislocation.   XR Knee Right 1/2 Views     Status: None    Collection Time: 11/15/21  5:32 AM    Narrative    EXAM: XR KNEE RT 1 /2 VW  LOCATION: [facility]  DATE/TIME: 11/15/2021 5:26 AM    INDICATION: Fall with knee pain.  COMPARISON: None.      Impression    IMPRESSION: There is an oblique mildly displaced fracture seen through the shaft of the distal right femur. There is mild posterior and lateral displacement of the distal fracture fragment. Generalized osteopenia. Marked tricompartmental degenerative   arthritis. Vascular calcifications.                XR Femur Right 2 Views     Status: None    Collection Time: 11/15/21  5:49 AM    Narrative    EXAM: XR FEMUR RIGHT 2 VIEW  LOCATION: [facility]  DATE/TIME: 11/15/2021 5:32 AM    INDICATION: fall with pain and leg externally rotated  COMPARISON: None.      Impression    IMPRESSION: Oblique fracture the distal femoral shaft with dorsal angulation and displacement. Moderate osteoarthritis in the knee joint.       ED MEDICATIONS:   Medications   HYDROmorphone (PF) (DILAUDID) injection 0.2 mg (0.2 mg Intravenous Given 11/15/21 0550)   phytonadione (AQUA-MEPHYTON) 2 mg in sodium chloride 0.9 % 50 mL intermittent infusion (has no administration in time range)   iopamidol (ISOVUE-370) solution 100 mL (100 mLs Intravenous Given 11/15/21 0517)   sodium chloride 0.9 % bag 500mL for CT scan flush use (65 mLs Intravenous Given 11/15/21  0516)         Impression:    ICD-10-CM    1. Fall, initial encounter  W19.XXXA    2. Closed fracture of right femur, unspecified fracture morphology, unspecified portion of femur, initial encounter (H)  S72.91XA    3. Long term (current) use of anticoagulants  Z79.01    4. Diastolic congestive heart failure, unspecified HF chronicity (H)  I50.30    5. CKD (chronic kidney disease) stage 4, GFR 15-29 ml/min (H)  N18.4        Plan:    Pending studies include no further pending studies.  Decision making with respect to admission, operative management, anticoagulation status pending acceptability patient for surgery at our facility.  In any event even if the patient is not an operative candidate she required additional pain management with her acute significant fracture and would be a candidate for observation pending bed availability.    7:36 AM  Anesthesia felt that the patient would be an acceptable candidate at our facility.  They requested the patient's INR be less than 1.5.  The patient is already received 2 mg of vitamin K per the previous provider.  I discussed this patient further with pharmacy.  They felt to be reasonable to check an INR later today as the patient will not be going for surgery for some time.  Her INR is already subtherapeutic but it is not less than 1.5.  I placed an order for an INR check for 2:00 this afternoon.  If her INR requires more expeditious reversal we could consider [facility] but unlikely that we need to reverse her that quickly.      MD Torie Nielsen, Moncho Spears MD  11/20/21 3002

## 2021-11-15 NOTE — ED PROVIDER NOTES
History     Chief Complaint   Patient presents with     Fall     Fall from chair       Laceration     Forehead lac      HPI  Mecca Slade is a 89 year old female who has past medical history significant for atrial fibrillation anticoagulated on warfarin, CHF, chronic kidney disease, hypertension, type 2 diabetes, history of prior stroke with residual left-sided weakness.  Cardiac pacemaker in place, presenting the emergency department after the patient had a fall, unwitnessed.  She presents from the care facility.  The reclining chair at that stands up to help the patient get up was in a fall an upright position.  Patient was flat on the ground.  She has notable head trauma with large scalp laceration.  Patient otherwise unable to further provide any additional accurate history information.  Complaining of headache.  However, complaining of leg pain, back pain, and pain all over with any type of movement.  Chart review does show that patient is anticoagulated on warfarin.    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     Priority: Medium     Cognitive impairment 05/15/2018     Priority: Medium     Type II diabetes mellitus with peripheral circulatory disorder (H) 02/13/2018     Priority: Medium     Controlled atrial fibrillation (H) 02/13/2018     Priority: Medium     Encounter for therapeutic drug monitoring 02/13/2018     Priority: Medium     Frail elderly 02/13/2018     Priority: Medium     CKD (chronic kidney  disease) stage 4, GFR 15-29 ml/min (H) 12/11/2017     Priority: Medium     Long term (current) use of anticoagulants 09/29/2017     Priority: Medium     Acute right MCA stroke (H)      Priority: Medium     Neurological neglect syndrome      Priority: Medium     Chronic anticoagulation 03/04/2017     Priority: Medium     Overview:   For a fib with h/o significant embolic stroke to R MCA    Formatting of this note might be different from the original.  For a fib with h/o significant embolic stroke to R MCA       Hypercalcemia 03/03/2017     Priority: Medium     Abnormal urinalysis 03/03/2017     Priority: Medium     Active advance directive 12/17/2014     Priority: Medium     Overview:   Obtained and on file 12/11/2014    Formatting of this note might be different from the original.  Obtained and on file 12/11/2014       Venous insufficiency of leg 07/24/2014     Priority: Medium     Bilateral lower extremity edema 05/01/2013     Priority: Medium     Chronic kidney disease, stage III (moderate) (H) 11/21/2012     Priority: Medium     Cardiac pacemaker in situ 11/15/2012     Priority: Medium     Overview:   Date of last device in office evaluation: 9/16/2016     ?  and model: MedBooktrope Sensia   Pacemaker.   Date of implant: 10/09/2012      ? Indication for device: Tachy-Vince   ? Cardiac resynchronization therapy:   Y/n No    ? Battery longevity documented as less than 3  Months: no  ? Are any of the leads less than 3 months old: no    ? Programming              ? Pacing mode and programmed lower rate: VVIR  ppm              ? Rate-responsive sensor type, if programmed on: Accelerometer    ? Is the patient pacemaker dependent:  Y/N NO      Underlying rhythm and heart rate if it can be determined:  atrial fibrillation with ventricular sensed response 63-68 bpm       ? What is the response of this device to magnet placement: Asynchronous  ? PM magnet pacing rate: 85 ppm Good   ? Any alert status on CIED  generator or lead: no    ? Last pacing threshold    Atrial   atrial fibrillation            Ventricular RV: 0.75 V @ 0.4 ms          Formatting of this note might be different from the original.  Date of last device in office evaluation: 9/16/2016     ?  and model: Medtronic Sensia   Pacemaker.   Date of implant: 10/09/2012      ? Indication for device: Tachy-Vince   ? Cardiac resynchronization therapy:   Y/n No    ? Battery longevity documented as less than 3  Months: no  ? Are any of the leads less than 3 months old: no    ? Programming              ? Pacing mode and programmed lower rate: VVIR  ppm              ? Rate-responsive sensor type, if programmed on: Accelerometer    ? Is the patient pacemaker dependent:  Y/N NO      Underlying rhythm and heart rate if it can be determined:  atrial fibrillation with ventricular sensed response 63-68 bpm       ? What is the response of this device to magnet placement: Asynchronous  ? PM magnet pacing rate: 85 ppm Good   ? Any alert status on CIED generator or lead: no    ? Last pacing threshold    Atrial   atrial fibrillation            Ventricular RV: 0.75 V @ 0.4 ms       Chronic pain 11/09/2012     Priority: Medium     Overview:   Controlled Substance Careplan: valid until 2015    Mecca Slade is receiving Oxycodone 5 mg (controlled substance) to adequately control her pain symptoms due to severe knee OA.   She is presently using no more than 1 times per day, no more than 60 per 4 week and is receiving prescriptions every 4 weeks.  She should be seen every 3 month(s).  As long as she stays within these parameters, the medication can be refilled.       All prescriptions should be filled at  pharmacy.    Prescription refills should be requested during the workweek up to noon on Fridays.  Any refill requested outside of this time period may be refilled on a one-time basis and the patient needs to contact me before further refills can be considered.    If Mecca is requesting refills outside of the above parameters she should be instructed to make an appointment with me the next available clinic day.    Davina Kim MD     Formatting of this note might be different from the original.  Controlled Substance Careplan: valid until 2015    Mecca Slade is receiving Oxycodone 5 mg (controlled substance) to adequately control her pain symptoms due to severe knee OA.   She is presently using no more than 1 times per day, no more than 60 per 4 week and is receiving prescriptions every 4 weeks.  She should be seen every 3 month(s).  As long as she stays within these parameters, the medication can be refilled.       All prescriptions should be filled at  pharmacy.    Prescription refills should be requested during the workweek up to noon on Fridays.  Any refill requested outside of this time period may be refilled on a one-time basis and the patient needs to contact me before further refills can be considered.   If Mecca is requesting refills outside of the above parameters she should be instructed to make an appointment with me the next available clinic day.    Davina Kim MD       Hematoma of arm 10/19/2012     Priority: Medium     S/P cardiac pacemaker procedure 10/19/2012     Priority: Medium     Bradycardia 10/08/2012     Priority: Medium     Knee swelling 10/06/2012     Priority: Medium     OA (osteoarthritis) of knee 09/07/2012     Priority: Medium     Urinary incontinence 09/07/2012     Priority: Medium     CHF (congestive heart failure) (H) 02/24/2012     Priority: Medium     Gout 01/26/2012     Priority: Medium     Obesity 10/31/2011     Priority: Medium     Diabetes mellitus type II, controlled (H) 07/29/2011     Priority: Medium     Overview:   Refill every 3 month and f/u visit 3 month.  Davina Kim MD  Overview:   a system change updated this record. This will not affect patient care or billing. This comment can be  deleted.    Formatting of this note might be different from the original.  a system change updated this record. This will not affect patient care or billing. This comment can be deleted.    Formatting of this note might be different from the original.  Refill every 3 month and f/u visit 3 month.  Davina Kim MD       Hypertension 07/29/2011     Priority: Medium     Hyperlipidemia 07/29/2011     Priority: Medium        Past Medical History:    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)      DM type 2 (diabetes mellitus, type 2) (H)      HTN (hypertension)      Left-sided weakness 03/01/2017     Neurological neglect syndrome      Pure hypercholesterolemia      Right sided cerebral hemisphere cerebrovascular accident (H)      Tissue plasminogen activator (t-PA) administered at other facility within 24 hours prior to current admission 03/01/2017     Type 2 diabetes mellitus with diabetic neuropathy, without long-term current use of insulin (H) 4/3/2017       Past Surgical History:    History reviewed. No pertinent surgical history.    Family History:    History reviewed. No pertinent family history.    Social History:  Marital Status:   [5]  Social History     Tobacco Use     Smoking status: Never Smoker     Smokeless tobacco: Never Used   Substance Use Topics     Alcohol use: No     Drug use: No        Medications:    Acetaminophen (TYLENOL PO)  ATORVASTATIN CALCIUM PO  CARBOXYMETHYLCELLULOSE SODIUM 0.5 % SOLN ophthalmic solution  Furosemide (LASIX PO)  lisinopril (ZESTRIL) 5 MG tablet  Menthol, Topical Analgesic, (BENGAY COLD THERAPY) 5 % GEL  metFORMIN (GLUCOPHAGE-XR) 500 MG 24 hr tablet  metoprolol succinate ER  (TOPROL-XL) 25 MG 24 hr tablet  simethicone (MYLICON) 80 MG chewable tablet  Warfarin Sodium (COUMADIN PO)          Review of Systems   Unable to perform ROS: Dementia       Physical Exam   BP: (!) 146/74  Pulse: 76  Temp: 97.8  F (36.6  C)  Resp: 16  Weight: 94.6 kg (208 lb 8 oz)  SpO2: 93 %      Physical Exam  /57   Pulse 90   Temp 97.8  F (36.6  C) (Axillary)   Resp 16   Wt 94.6 kg (208 lb 8 oz)   SpO2 96%   BMI 33.65 kg/m    General: alert, interactive, in no apparent distress  Head: Large anterior scalp laceration measuring approximately 15 cm, curvilinear, with bleeding which is controlled  Nose: no rhinorrhea or epistaxis  Ears: no external auditory canal discharge or bleeding.    Eyes: Sclera nonicteric. Conjunctiva noninjected. PERRL, EOMI  Mouth: no tonsillar erythema, edema, or exudate  Neck: supple, no palp LAD  Lungs: CTAB  CV: S1/S2; peripheral pulses palpable and symmetric  Abdomen: soft, nt, nd, no guarding or rebound. Positive bowel sounds  Extremities: Right leg is shortened and externally rotated. Tenderness especially around the right knee, and pain with any movement of the right lower extremity. Able to wiggle the toes.  Skin: no rash or diaphoresis  Neuro: Able to wiggle the toes.; Alert, answering questions appropriately      ED Course        Procedures  [facility] Procedure Note        Laceration Repair:    Performed by: Josh Gilbert MD  Authorized by: Josh Gilbert MD  Consent given by: Patient and Family who states understanding of the procedure being performed after discussing the risks, benefits and alternatives.    Preparation: Patient was prepped and draped in usual sterile fashion.  Irrigation solution: saline    Body area:scalp  Laceration length: 15cm  Contamination: The wound is not contaminated.  Foreign bodies:none  Tendon involvement: none  Anesthesia: Local  Local anesthetic: Lidocaine     1%, with epinephrine  Anesthetic total:  10ml    Debridement: none  Skin closure: Closed with 14 Staples  Technique: interrupted  Approximation: close  Approximation difficulty: simple    Patient tolerance: Patient tolerated the procedure well with no immediate complications.              Critical Care time:  none       Trauma Summary Disposition     Patient is trauma admission:  Trauma  Evaluation      Spine  Backboard removal time: Backboard not applied   CSpine Clearance: C spine cleared clinically  Full Primary and Secondary survey with appropriate immobilization of spine completed in exam section.     Neuro  GCS at arrival:  Motor 6=Obeys commands   Verbal 4=Confused   Eye Opening 4=Spontaneous   Total: 14     GCS at disposition: unchanged    ED Procedures completed  none    Admitting Consultants:  Awaiting Orthopedic consult                  Results for orders placed or performed during the hospital encounter of 11/15/21 (from the past 24 hour(s))   CBC with platelets differential    Narrative    The following orders were created for panel order CBC with platelets differential.  Procedure                               Abnormality         Status                     ---------                               -----------         ------                     CBC with platelets and d...[[id number]]  Abnormal            Final result                 Please view results for these tests on the individual orders.   INR   Result Value Ref Range    INR 1.98 (H) 0.85 - 1.15   Basic metabolic panel   Result Value Ref Range    Sodium 140 133 - 144 mmol/L    Potassium 4.4 3.4 - 5.3 mmol/L    Chloride 108 94 - 109 mmol/L    Carbon Dioxide (CO2) 27 20 - 32 mmol/L    Anion Gap 5 3 - 14 mmol/L    Urea Nitrogen 46 (H) 7 - 30 mg/dL    Creatinine 0.95 0.52 - 1.04 mg/dL    Calcium 11.2 (H) 8.5 - 10.1 mg/dL    Glucose 254 (H) 70 - 99 mg/dL    GFR Estimate 50 (L) >60 mL/min/1.73m2   ABO/Rh type and screen    Narrative    The following orders were created for panel order ABO/Rh type  and screen.  Procedure                               Abnormality         Status                     ---------                               -----------         ------                     Adult Type and Screen[[id number]]                            Edited Result - FINAL        Please view results for these tests on the individual orders.   CBC with platelets and differential   Result Value Ref Range    WBC Count 12.1 (H) 4.0 - 11.0 10e3/uL    RBC Count 4.58 3.80 - 5.20 10e6/uL    Hemoglobin 14.3 11.7 - 15.7 g/dL    Hematocrit 45.1 35.0 - 47.0 %    MCV 99 78 - 100 fL    MCH 31.2 26.5 - 33.0 pg    MCHC 31.7 31.5 - 36.5 g/dL    RDW 13.6 10.0 - 15.0 %    Platelet Count 218 150 - 450 10e3/uL    % Neutrophils 57 %    % Lymphocytes 33 %    % Monocytes 7 %    % Eosinophils 1 %    % Basophils 1 %    % Immature Granulocytes 1 %    NRBCs per 100 WBC 0 <1 /100    Absolute Neutrophils 6.9 1.6 - 8.3 10e3/uL    Absolute Lymphocytes 4.0 0.8 - 5.3 10e3/uL    Absolute Monocytes 0.9 0.0 - 1.3 10e3/uL    Absolute Eosinophils 0.1 0.0 - 0.7 10e3/uL    Absolute Basophils 0.1 0.0 - 0.2 10e3/uL    Absolute Immature Granulocytes 0.1 (H) <=0.0 10e3/uL    Absolute NRBCs 0.0 10e3/uL   Adult Type and Screen   Result Value Ref Range    ABO/RH(D) O POS     Antibody Screen Negative Negative    SPECIMEN EXPIRATION DATE [id number]    Asymptomatic COVID-19 Virus (Coronavirus) by PCR Nasopharyngeal    Specimen: Nasopharyngeal; Swab   Result Value Ref Range    SARS CoV2 PCR Negative Negative    Narrative    Testing was performed using the kerry  SARS-CoV-2 & Influenza A/B Assay on the kerry  Jessica  System.  This test should be ordered for the detection of SARS-COV-2 in individuals who meet SARS-CoV-2 clinical and/or epidemiological criteria. Test performance is unknown in asymptomatic patients.  This test is for in vitro diagnostic use under the FDA EUA for laboratories certified under CLIA to perform moderate and/or high complexity testing. This test  has not been FDA cleared or approved.  A negative test does not rule out the presence of PCR inhibitors in the specimen or target RNA in concentration below the limit of detection for the assay. The possibility of a false negative should be considered if the patient's recent exposure or clinical presentation suggests COVID-19.  [facility] are certified under the Clinical Laboratory Improvement Amendments of 1988 (CLIA-88) as qualified to perform moderate and/or high complexity laboratory testing.   CT Head w/o Contrast    Narrative    EXAM: CT HEAD W/O CONTRAST, CT CERVICAL SPINE W/O CONTRAST  LOCATION: [facility]  DATE/TIME: 11/15/2021 4:43 AM    INDICATION: Head trauma, minor (Age >= 65y) neck injury.  COMPARISON: None.  TECHNIQUE:   1) Routine CT Head without IV contrast. Multiplanar reformats. Dose reduction techniques were used.  2) Routine CT Cervical Spine without IV contrast. Multiplanar reformats. Dose reduction techniques were used.    FINDINGS:   HEAD CT:   INTRACRANIAL CONTENTS: No intracranial hemorrhage, extraaxial collection, or mass effect.  No CT evidence of acute infarct. Moderate presumed chronic small vessel ischemic changes. Moderate generalized volume loss. No hydrocephalus. Chronic infarct in   the lateral right basal ganglia. Small chronic infarct in the left cerebellar hemisphere.     VISUALIZED ORBITS/SINUSES/MASTOIDS: No intraorbital abnormality. No paranasal sinus mucosal disease. No middle ear or mastoid effusion.    BONES/SOFT TISSUES: Soft tissue laceration and small subgaleal swelling over the anterior right frontal bone. No calvarial fracture.    CERVICAL SPINE CT:   VERTEBRA: Cervical vertebra are grossly normal in height. Minimal alterations in the lateral alignment are presumably degenerative. There is a small ossific fragment involving the anterior aspect of the superior C6 endplate. On axial images, the margin   associated with the  native vertebral body appears corticated while the posterior margin of the fragment is indeterminate.     CANAL/FORAMINA: Multilevel degenerative changes without appreciable central canal stenosis. There is at least moderate foraminal narrowing bilaterally at the C4-C5 level.    PARASPINAL: Marked enlargement of the right lobe of the thyroid with underlying 3 cm nodule. Visualized lung fields are clear.      Impression    IMPRESSION:  HEAD CT:  1.  No acute intracranial hemorrhage or calvarial fracture.  2.  Moderate volume loss and presumed chronic small vessel ischemic changes.  3.  Small chronic infarcts in the lateral right basal ganglia and the left cerebellar hemisphere.    CERVICAL SPINE CT:  1.  Age-indeterminate fracture deformity involving the anterior aspect of the superior C6 endplate is favored to be chronic or potentially reflect a developmental variant. A more recent abnormality while considered less likely is not completely excluded.  2.  No additional evidence of cervical spine fracture. No subluxation.  3.  Degenerative changes as highlighted above.      4.  3 cm right thyroid nodule and marked enlargement of the right lobe of the thyroid. Please see follow-up guidelines below.    REFERENCE:  Juancho HICKMAN et al. Managing Incidental Thyroid Nodules Detected on Imaging: White Paper of the ACR Incidental Thyroid Findings Committee. JACR 2015; 12:143-150.    Incidental thyroid nodule detected on CT or MRI without suspicious findings. Applies to general population without limited life expectancy or significant comorbidities.    Age greater than or equal to 35 years  Less than 1.5 cm: No further evaluation.  Greater than or equal to 1.5 cm: Evaluate with thyroid ultrasound.           CT Cervical Spine w/o Contrast    Narrative    EXAM: CT HEAD W/O CONTRAST, CT CERVICAL SPINE W/O CONTRAST  LOCATION: [facility]  DATE/TIME: 11/15/2021 4:43 AM    INDICATION: Head trauma, minor (Age >=  65y) neck injury.  COMPARISON: None.  TECHNIQUE:   1) Routine CT Head without IV contrast. Multiplanar reformats. Dose reduction techniques were used.  2) Routine CT Cervical Spine without IV contrast. Multiplanar reformats. Dose reduction techniques were used.    FINDINGS:   HEAD CT:   INTRACRANIAL CONTENTS: No intracranial hemorrhage, extraaxial collection, or mass effect.  No CT evidence of acute infarct. Moderate presumed chronic small vessel ischemic changes. Moderate generalized volume loss. No hydrocephalus. Chronic infarct in   the lateral right basal ganglia. Small chronic infarct in the left cerebellar hemisphere.     VISUALIZED ORBITS/SINUSES/MASTOIDS: No intraorbital abnormality. No paranasal sinus mucosal disease. No middle ear or mastoid effusion.    BONES/SOFT TISSUES: Soft tissue laceration and small subgaleal swelling over the anterior right frontal bone. No calvarial fracture.    CERVICAL SPINE CT:   VERTEBRA: Cervical vertebra are grossly normal in height. Minimal alterations in the lateral alignment are presumably degenerative. There is a small ossific fragment involving the anterior aspect of the superior C6 endplate. On axial images, the margin   associated with the native vertebral body appears corticated while the posterior margin of the fragment is indeterminate.     CANAL/FORAMINA: Multilevel degenerative changes without appreciable central canal stenosis. There is at least moderate foraminal narrowing bilaterally at the C4-C5 level.    PARASPINAL: Marked enlargement of the right lobe of the thyroid with underlying 3 cm nodule. Visualized lung fields are clear.      Impression    IMPRESSION:  HEAD CT:  1.  No acute intracranial hemorrhage or calvarial fracture.  2.  Moderate volume loss and presumed chronic small vessel ischemic changes.  3.  Small chronic infarcts in the lateral right basal ganglia and the left cerebellar hemisphere.    CERVICAL SPINE CT:  1.  Age-indeterminate fracture  deformity involving the anterior aspect of the superior C6 endplate is favored to be chronic or potentially reflect a developmental variant. A more recent abnormality while considered less likely is not completely excluded.  2.  No additional evidence of cervical spine fracture. No subluxation.  3.  Degenerative changes as highlighted above.      4.  3 cm right thyroid nodule and marked enlargement of the right lobe of the thyroid. Please see follow-up guidelines below.    REFERENCE:  Juancho HICKMAN et al. Managing Incidental Thyroid Nodules Detected on Imaging: White Paper of the ACR Incidental Thyroid Findings Committee. JACR 2015; 12:143-150.    Incidental thyroid nodule detected on CT or MRI without suspicious findings. Applies to general population without limited life expectancy or significant comorbidities.    Age greater than or equal to 35 years  Less than 1.5 cm: No further evaluation.  Greater than or equal to 1.5 cm: Evaluate with thyroid ultrasound.           CT Thoracic Spine w/o Contrast    Narrative    EXAM: CT THORACIC SPINE W/O CONTRAST, CT LUMBAR SPINE W/O CONTRAST  LOCATION: [facility]  DATE/TIME: 11/15/2021 4:55 AM    INDICATION: Mid-back pain, low back pain.  COMPARISON: None.  TECHNIQUE:  1) Routine CT Thoracic Spine without IV contrast. Multiplanar reformats. Dose reduction techniques were used.   2) Routine CT Lumbar Spine without IV contrast. Multiplanar reformats. Dose reduction techniques were used.     FINDINGS:    THORACIC SPINE CT:  VERTEBRA: There is accentuation of thoracic kyphosis resulting from loss of vertebral body height at multiple mid and lower thoracic levels. Ankylosis of the vertebral column from the T1 level to the T10 level. No fracture or posttraumatic subluxation.     CANAL/FORAMINA: Multilevel degenerative changes. No canal or neural foraminal stenosis.    PARASPINAL: There are bilateral pleural effusions.    LUMBAR SPINE CT:  VERTEBRA: Lumbar  vertebra are normal in height. There are mild alterations in the lateral alignment at L4-L5 and L5-S1 attributable to facet arthropathy at these levels. No fracture or posttraumatic subluxation.     CANAL/FORAMINA: Multilevel degenerative changes with prominent facet arthropathy at multiple levels and mild discogenic degenerative changes at multiple levels. There is moderate central canal stenosis at the L3-L4 level. No high-grade foraminal   stenosis.    PARASPINAL: Diffuse atheromatous changes in the distal aorta and branch vessels. Evidence of cholelithiasis.      Impression    IMPRESSION:  THORACIC SPINE CT:  1.  No fracture or posttraumatic subluxation.  2.  No high-grade spinal canal or neural foraminal stenosis.  3.  Bilateral pleural effusions.    LUMBAR SPINE CT:  1.  No fracture or posttraumatic subluxation.  2.  No high-grade spinal canal or neural foraminal stenosis.  3.  Cholelithiasis.             Lumbar spine CT w/o contrast    Narrative    EXAM: CT THORACIC SPINE W/O CONTRAST, CT LUMBAR SPINE W/O CONTRAST  LOCATION: [facility]  DATE/TIME: 11/15/2021 4:55 AM    INDICATION: Mid-back pain, low back pain.  COMPARISON: None.  TECHNIQUE:  1) Routine CT Thoracic Spine without IV contrast. Multiplanar reformats. Dose reduction techniques were used.   2) Routine CT Lumbar Spine without IV contrast. Multiplanar reformats. Dose reduction techniques were used.     FINDINGS:    THORACIC SPINE CT:  VERTEBRA: There is accentuation of thoracic kyphosis resulting from loss of vertebral body height at multiple mid and lower thoracic levels. Ankylosis of the vertebral column from the T1 level to the T10 level. No fracture or posttraumatic subluxation.     CANAL/FORAMINA: Multilevel degenerative changes. No canal or neural foraminal stenosis.    PARASPINAL: There are bilateral pleural effusions.    LUMBAR SPINE CT:  VERTEBRA: Lumbar vertebra are normal in height. There are mild alterations in  the lateral alignment at L4-L5 and L5-S1 attributable to facet arthropathy at these levels. No fracture or posttraumatic subluxation.     CANAL/FORAMINA: Multilevel degenerative changes with prominent facet arthropathy at multiple levels and mild discogenic degenerative changes at multiple levels. There is moderate central canal stenosis at the L3-L4 level. No high-grade foraminal   stenosis.    PARASPINAL: Diffuse atheromatous changes in the distal aorta and branch vessels. Evidence of cholelithiasis.      Impression    IMPRESSION:  THORACIC SPINE CT:  1.  No fracture or posttraumatic subluxation.  2.  No high-grade spinal canal or neural foraminal stenosis.  3.  Bilateral pleural effusions.    LUMBAR SPINE CT:  1.  No fracture or posttraumatic subluxation.  2.  No high-grade spinal canal or neural foraminal stenosis.  3.  Cholelithiasis.             CT Chest/Abdomen/Pelvis w Contrast    Narrative    EXAM: CT CHEST/ABDOMEN/PELVIS W CONTRAST  LOCATION: [facility]  DATE/TIME: 11/15/2021 5:08 AM    INDICATION: Chest-abdomen-pelvis trauma, blunt. Fall from chair. Forehead laceration. Back pain. Right leg shortened and externally rotated. On warfarin.  COMPARISON: None.  TECHNIQUE: CT scan of the chest, abdomen, and pelvis was performed following injection of IV contrast. Multiplanar reformats were obtained. Dose reduction techniques were used.   CONTRAST: 100 mL Isovue-370.    FINDINGS:   LUNGS AND PLEURA: Small bilateral pleural effusions. Dependent atelectasis bilaterally. Few tiny calcified granulomas. No pneumothorax.    MEDIASTINUM/AXILLAE: Enlarged multinodular thyroid gland. No lymph node enlargement. Left subclavian cardiac device in place. The heart size is normal.    CORONARY ARTERY CALCIFICATION: Moderate.    HEPATOBILIARY: Stones in the gallbladder. There is biliary dilatation into the pancreas head without cause of obstruction seen.    PANCREAS: Small duodenal diverticulum at  the pancreas head. The pancreas otherwise appears normal.    SPLEEN: Normal.    ADRENAL GLANDS: Normal.    KIDNEYS/BLADDER: Small bilateral renal cortical cysts. No hydronephrosis.    BOWEL: There are colonic diverticula without acute diverticulitis. No bowel obstruction or inflammation.    LYMPH NODES: Normal.    VASCULATURE: Atherosclerotic calcification of the aorta and its branches. No aneurysm.    PELVIC ORGANS: Several small uterine calcifications. No adnexal mass.    MUSCULOSKELETAL: Small umbilical hernia containing fat. Degenerative disease throughout the spine. No acute bone fracture.      Impression    IMPRESSION:  1.  No acute traumatic abnormality.  2.  Small bilateral pleural effusions.  3.  Colonic diverticula without acute diverticulitis. No bowel obstruction or inflammation.  4.  Cholelithiasis.  5.  Biliary dilatation into the pancreas head. No cause of obstruction is seen.             XR Pelvis 1/2 Views    Narrative    EXAM: XR PELVIS 1/2 VW  LOCATION: [facility]  DATE/TIME: 11/15/2021 5:17 AM    INDICATION: Pain after fall.  COMPARISON: None.      Impression    IMPRESSION: No acute fracture or dislocation.   XR Knee Right 1/2 Views    Narrative    EXAM: XR KNEE RT 1 /2 VW  LOCATION: [facility]  DATE/TIME: 11/15/2021 5:26 AM    INDICATION: Fall with knee pain.  COMPARISON: None.      Impression    IMPRESSION: There is an oblique mildly displaced fracture seen through the shaft of the distal right femur. There is mild posterior and lateral displacement of the distal fracture fragment. Generalized osteopenia. Marked tricompartmental degenerative   arthritis. Vascular calcifications.                XR Femur Right 2 Views    Narrative    EXAM: XR FEMUR RIGHT 2 VIEW  LOCATION: [facility]  DATE/TIME: 11/15/2021 5:32 AM    INDICATION: fall with pain and leg externally rotated  COMPARISON: None.      Impression     IMPRESSION: Oblique fracture the distal femoral shaft with dorsal angulation and displacement. Moderate osteoarthritis in the knee joint.       Medications   HYDROmorphone (PF) (DILAUDID) injection 0.2 mg (0.2 mg Intravenous Given 11/15/21 8289)   iopamidol (ISOVUE-370) solution 100 mL (100 mLs Intravenous Given 11/15/21 6117)   sodium chloride 0.9 % bag 500mL for CT scan flush use (65 mLs Intravenous Given 11/15/21 0716)       Assessments & Plan (with Medical Decision Making)  89 year old female presenting to the emergency department with concerns regarding fall.  Fall was unwitnessed.  The reclining chair was being raised into a full upright/standing position for the standing recliner.  She subsequently fell forward, unwitnessed, and has notable scalp laceration.  The prior medical records were reviewed by myself.  Had nursing home visit with stage II pressure ulcer of the buttocks on November 8.  She is otherwise on blood pressure medicines, diuretics, anticoagulation for her history of atrial fibrillation. She does have pacemaker in place.    Patient with severe pain with any movement of the right lower extremity. Especially tender near the right knee, however pain with range of motion of the right hip. Concern is for possible fracture. However, given the uncertain fall, with difficult to obtain history, decision made for multiple imaging studies including head, cervical spine, chest, abdomen, pelvis, and thoracic and lumbar spine. No acute posttraumatic injury identified on the multiple images with the exception of distal femur which was seen on x-ray imaging. There is what appears to be old chronic appearing cervical spine injury.    Patient given small amount of Dilaudid for pain. INR is 1.98 with her Coumadin use for atrial fibrillation. Basic metabolic panel with creatinine approximately at baseline. Blood sugar 254. CBC unremarkable.    Patient, at time of signout is pending orthopedic consultation.  Orthopedic surgery has been paged shortly prior to 6 AM. Awaiting orthopedic recommendations, however given need for pain control, I feel patient will require hospitalization regardless. If surgery required, would discuss with CRNA to make sure appropriate for [facility]. Multiple borders in the emergency department, and anticipate patient may have prolonged ED stay until bed becomes available. Will also require INR reversal. Patient is nonambulatory at baseline. Uses Jm lift for transfers. However, would defer to orthopedic surgery regarding potential surgical options. Even if not surgical candidate, likely requires hospitalization for pain control.  I discussed with orthopedic surgery, Dr. Nguyen at 6:35 AM.  Somebody from orthopedics will plan to evaluate the patient.  Will need INR reversal.  I also discussed with Ashly Portillo CRNA at 6:44 AM.  They will evaluate patient to determine if she is appropriate for anesthesia at [facility].     I have reviewed the nursing notes.    I have reviewed the findings, diagnosis, plan and need for follow up with the patient.       New Prescriptions    No medications on file       Final diagnoses:   Fall, initial encounter   Closed fracture of right femur, unspecified fracture morphology, unspecified portion of femur, initial encounter (H)   Long term (current) use of anticoagulants   Diastolic congestive heart failure, unspecified HF chronicity (H)   CKD (chronic kidney disease) stage 4, GFR 15-29 ml/min (H)       11/15/2021   [facility] EMERGENCY DEPT     Josh Gilbert MD  11/15/21 0626       Josh Gilbert MD  11/15/21 0639

## 2021-11-15 NOTE — PROGRESS NOTES
[facility] ADMISSION NOTE    Patient admitted to room 2207 at approximately 1010 via cart from emergency room. Patient was accompanied by transport tech and daughter.     Verbal SBAR report received from RN prior to patient arrival.     Patient trasferred to bed via air margie. Patient alert and oriented X 3. Pain is not well controlled.  Medication(s) being used: narcotic analgesics including IV dilaudid. Admission vital signs: Blood pressure 111/53, pulse 98, temperature 98.2  F (36.8  C), temperature source Oral, resp. rate 16, weight 94.6 kg (208 lb 8 oz), SpO2 90 %. Patient and daughter were oriented to plan of care, call light, bed controls, tv, telephone, bathroom and visiting hours.     Risk Assessment    The following safety risks were identified during admission: fall and skin. Yellow risk band applied: YES.     Skin Initial Assessment    This writer admitted this patient and completed a full skin assessment and Luis score in the Adult PCS flowsheet. Appropriate interventions initiated as needed.     Secondary skin check deferred due to fracture.         Education    Patient has a [place] to Observation order: No  Observation education completed and documented: N/A      Tabatha Golden RN

## 2021-11-15 NOTE — ED NOTES
Spoke to daughter, Lexie, and gave patient condition update. Daughter states that she will probably come to ED in about an hour or so. Patient was given pocket talker and able to hear questions and now answering appropriately. Daughter also reports patient is A&O at baseline.

## 2021-11-16 PROBLEM — I49.5 TACHY-BRADY SYNDROME (H): Status: ACTIVE | Noted: 2021-01-01
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
## 2021-11-16 PROBLEM — Z86.73 H/O: CVA (CEREBROVASCULAR ACCIDENT): Status: ACTIVE | Noted: 2017-12-11

## 2021-11-16 PROBLEM — R79.89 ELEVATED LACTIC ACID LEVEL: Status: ACTIVE | Noted: 2021-01-01

## 2021-11-16 NOTE — ANESTHESIA CARE TRANSFER NOTE
Patient: Mecca Slade    Procedure: Procedure(s):  Open Reduction Internal Fixation Distal Femur FRACTURE       Diagnosis: Femur fracture, right (H) [S72.91XA]  Diagnosis Additional Information: No value filed.    Anesthesia Type:   Spinal     Note:    Oropharynx: oropharynx clear of all foreign objects and spontaneously breathing  Level of Consciousness: drowsy  Oxygen Supplementation: nasal cannula  Level of Supplemental Oxygen (L/min / FiO2): 2  Independent Airway: airway patency satisfactory and stable  Dentition: dentition unchanged    Report to RN Given: handoff report given  Patient transferred to: PACU  Comments: Post-op trish gtt until sympathectomy from SAB resolves  Handoff Report: Identifed the Patient, Identified the Reponsible Provider, Reviewed the pertinent medical history, Discussed the surgical course, Reviewed Intra-OP anesthesia mangement and issues during anesthesia, Set expectations for post-procedure period and Allowed opportunity for questions and acknowledgement of understanding      Vitals:  Vitals Value Taken Time   BP 85/44 11/16/21 1545   Temp     Pulse 87 11/16/21 1547   Resp 5 11/16/21 1547   SpO2 98 % 11/16/21 1547   Vitals shown include unvalidated device data.    Electronically Signed By: Harley Cm CRNA, APRN CRNA  November 16, 2021  3:48 PM

## 2021-11-16 NOTE — PROGRESS NOTES
"[facility]    Medicine Progress Note - Hospitalist Service       Date of Admission:  11/15/2021    Assessment & Plan            Mecca Slade is a 89 year old female admitted on 11/15/2021. She presented to the emergency department for evaluation of right leg pain after an unwitnessed fall and was found to have a right distal femur fracture for which she will be admitted for further evaluation and treatment.    Closed comminuted intra-articular fracture of distal end of right femur, initial encounter  Acquired distal right femur fracture after mechanical fall. X-ray right femur shows \"Oblique fracture the distal femoral shaft with dorsal angulation and displacement. Moderate osteoarthritis in the knee joint.\" Case discussed between emergency department and on-call ortho, patient requires INR reversal prior to surgery.  INR 1.98 -- 1.43 -- 1.31 -- 1.25.  - Ortho consult, plans for surgery 11/16  - Analgesia: scheduled acetaminophen, prn oxycodone and prn IV dilaudid  - NPO for surgery   - Bedrest    Surgery Clearance and RCRI Risk Assessment:    Anesthesia issues: No known  Baseline Activity: < 4 METS  Chest Pain: No  Shortness of breath: No  Cardiac Risk Factors/Assessment:                High Risk Surgery: No              History Ischemic Heart Disease: No              History of Congestive Heart Failure: Yes              History of CVA: Yes              Preoperative Treatment with Insulin: Yes              Preoperative Creatinine greater than 2.0: No              Total Number of Points: 3 = 9.1% risk of major cardiac event  EKG and chest CT reviewed. Patient may proceed with surgery without further work-up after INR is at acceptable level per ortho team.     Elevated lactic acid level, resolved  Patient triggered sepsis BPA, lactic acid found to be 2.7. Patient tachycardic due to atrial fibrillation, with mild leukocytosis likely reactive due to injury. Low concern for infection on " admission, does not appear to be septic. Received 500 ml bolus with improvement of tachycardia and lactic elevation.    Accidental fall, initial encounter  Laceration of scalp, initial encounter  Tripped and fell on 11/15 in her apartment, unwitnessed and found down with head laceration and right knee pain. Extensive traumatic evaluation completed, no traumatic injury identified other than right femur fracture (imaging included head CT, CT spine (cervical, thoracic, and lumbar), CT chest, abdomen, & pelvis, x-ray right knee, x-ray pelvis). Patient presented with large head laceration that was stapled in the emergency department 11/15. Repeat CT head on 11/16/21 due to fall while on anticoagulation unremarkable.  - Manage femur fracture above  - Will need PT/OT evaluations after surgery  - Scalp laceration s/p staples on 11/15    Hypoxemia  Following IV dilaudid required 1 L/min of oxygen. Suspect this is related to narcotics, previously normal oxygenation. CT chest done on presentation showed small bilateral pleural effusions and dependent atelectasis.  - IS post-operative  - oxygen as needed, titrate > 90%  - judicious use of narcotics and IVF     Type II diabetes mellitus with peripheral circulatory disorder   HgbA1c 9.1. Managed prior to admission with metformin 1000 mg bid.  - Hold metformin while NPO, resume as appropriate  - High sliding scale insulin   - Hyper/hypoglycemia protocols  - Will need consistent carbohydrate diet once advanced    Atrial fibrillation  Long term (current) use of anticoagulants  Rate controlled prior to admission with metoprolol XL 25 mg q pm. Patient was intermittently tachycardic at time of admission. Anticoagulated prior to admission with coumadin. Admit INR 1.98, underwent reversal for surgery, down to 1.25 prior to surgery.   - Will need coumadin resumed post-op  - Continue metoprolol with holding parameters  - Monitor on telemetry   - Prn IV metoprolol available for sustained  heart rate > 110    Congestive heart failure  Hypertension  Last echo on record in 2017 with EF 59% (Care Everywhere). Managed prior to admission with lisinopril 2.5 mg q pm, lasix 40 mg daily, and metoprolol XL 25 mg daily. Appears euvolemic on admission.  - Hold lasix while getting IV fluids, resume post-op as appropriate  - Continue lisinopril and metoprolol with holding parameters    H/O: CVA (cerebrovascular accident), R MCA embolism s/p tPA, March 2017  Hyperlipidemia  History of CVA in 2017. Has evidence of chronic infarcts on head CT, but no acute evidence of infarction. Has known atrial fibrillation and is on coumadin for stroke risk reduction. Also takes Lipitor 10 mg q pm.  - Continue Lipitor  - Will need coumadin resumed post-op    Tachy-lion syndrome, s/p pacemaker  Cardiac pacemaker in situ  Pacemaker placed in 2012, follows with cardiology through [facility].     CKD  Creatinine on presentation 0.95, GFR 50. Baseline appears to be 0.9-1, stable.  - follow BMP post-operatively  - IVF at 75 ml/hr    COVID Status  COVID PCR negative 11/15/21  Completed Moderna COVID vaccine series as of 2/2/2021. Does not appear that she has yet had her booster.     Diet: NPO per Anesthesia Guidelines for Procedure/Surgery Except for: Meds    DVT Prophylaxis: Pneumatic Compression Devices  Cantu Catheter: Not present  Central Lines: None  Code Status: No CPR- Pre-arrest intubation OK      Disposition Plan   Expected discharge: 2-3 days  recommended to transitional care unit once recovering appropriately from surgery.     The patient's care was discussed with the Attending Physician, Dr. Gaurav Young, Patient and Patient's Family.    Radha Horton PA-C  Hospitalist Service  [facility]  Securely message with the Vocera Web Console (learn more here)  Text page via [employer] Paging/Directory  ______________________________________________________________________    Interval History   Patient seen  pre-operatively. Wants to drink water otherwise no complaints. Recently received IV hydromorphone with improvement in her pain.     She denies fever, chills, lightheadedness, dizziness, cough, shortness of breath, palpitations, chest pain, abdominal pain, nausea, vomiting, diarrhea.    Data reviewed today: I reviewed all medications, new labs and imaging results over the last 24 hours. I personally reviewed no images or EKG's today.    Physical Exam   Vital Signs: Temp: 98.1  F (36.7  C) Temp src: Oral BP: 121/49 Pulse: 84   Resp: 20 SpO2: 100 % O2 Device: Nasal cannula Oxygen Delivery: 3 LPM  Weight: 207 lbs 7.25 oz  Constitutional: elderly appearing female laying down in bed, comfortable, awake, alert, cooperative, no apparent distress, and appears stated age  ENT: Atraumatic, Normocephalic, Oropharynx is clear and moist.  Respiratory: No increased work of breathing, good air exchange, clear to auscultation bilaterally, no crackles or wheezing  Cardiovascular: normal rate and irregularly, irregular rhythm. Toes warm with capillary refill < 2 seconds.  GI: normal bowel sounds, soft, non-distended, non-tender  Genitounirinary: deferred  Skin: no obvious rashes noted.  Musculoskeletal: moving all 4 extremities appropriately.  Neuropsychiatric: calm, pleasant, cooperative. Appropriate thought process/content.     Data   Recent Labs   Lab 11/16/21  0743 11/16/21  0433 11/16/21  0211 11/15/21  2304 11/15/21  1757 11/15/21  1423 11/15/21  0359   WBC  --  13.2*  --   --   --   --  12.1*   HGB  --  11.2*  --   --   --   --  14.3   MCV  --  99  --   --   --   --  99   PLT  --  156  --   --   --   --  218   INR  --  1.25*  --  1.31*  --  1.43* 1.98*   NA  --  143  --   --   --   --  140   POTASSIUM  --  4.8  --   --   --   --  4.4   CHLORIDE  --  111*  --   --   --   --  108   CO2  --  26  --   --   --   --  27   BUN  --  52*  --   --   --   --  46*   CR  --  1.05*  --   --   --   --  0.95   ANIONGAP  --  6  --   --   --    --  5   SUN  --  10.2*  --   --   --   --  11.2*   * 226* 225*  --    < >  --  254*   ALBUMIN  --  2.5*  --   --   --   --   --    PROTTOTAL  --  5.9*  --   --   --   --   --    BILITOTAL  --  0.7  --   --   --   --   --    ALKPHOS  --  102  --   --   --   --   --    ALT  --  15  --   --   --   --   --    AST  --  34  --   --   --   --   --     < > = values in this interval not displayed.     Recent Results (from the past 24 hour(s))   CT Head w/o Contrast    Narrative    EXAM: CT HEAD W/O CONTRAST  LOCATION: [facility]  DATE/TIME: 11/16/2021 5:47 AM    INDICATION: Head trauma, minor (Age >= 65y)  COMPARISON: 11/5/2021  TECHNIQUE: Routine CT Head without IV contrast. Multiplanar reformats. Dose reduction techniques were used.    FINDINGS:  The exam is moderately degraded by motion.    INTRACRANIAL CONTENTS: No definite intracranial hemorrhage, extraaxial collection, or mass effect.  No CT evidence of acute infarct. Moderate presumed chronic small vessel ischemic changes. Moderate generalized volume loss. No hydrocephalus.     VISUALIZED ORBITS/SINUSES/MASTOIDS: No intraorbital abnormality. No paranasal sinus mucosal disease. No middle ear or mastoid effusion.    BONES/SOFT TISSUES: Right frontal scalp hematoma. No fracture.      Impression    IMPRESSION:  1.  The exam is moderately degraded by motion.  2.  Within these limitations, no definite acute intracranial pathology is identified.     Medications     sodium chloride 75 mL/hr at 11/16/21 0146       acetaminophen  975 mg Oral Q8H     atorvastatin  10 mg Oral QPM     carboxymethylcellulose PF  1 drop Both Eyes QAM     [Held by provider] furosemide  40 mg Oral Daily     insulin aspart  1-10 Units Subcutaneous TID AC     insulin aspart  1-7 Units Subcutaneous At Bedtime     lisinopril  2.5 mg Oral QPM     [Held by provider] metFORMIN  1,000 mg Oral Daily with breakfast     metoprolol succinate ER  25 mg Oral QPM     [Held by  provider] protein modular  1 packet Oral QAM     sodium chloride (PF)  3 mL Intracatheter Q8H

## 2021-11-16 NOTE — PROGRESS NOTES
[facility]    Hospital Medicine   Cross Cover Note  Date of Service: 11/16/2021     I was called due to hypoxia after return to the floor post-operatively.  Initially having difficulty getting consistent sat reads, but improving after monitor moved to toe.  Requiring 5L oxymask, O2 sats 98%.   Lungs clear.  Patient denies any respiratory complaints.    Diabetic management changed from NPO to PO sliding scale insulin and protocols.       A / P:  Likely has post-op hypoxia secondary to anesthesia and narcotics.   - Continue to monitor, wean from O2 as able but no further work-up at this time      Fely Garcia PA-C  [facility] Hospitalist Service

## 2021-11-16 NOTE — ANESTHESIA PROCEDURE NOTES
Intrathecal injection Procedure Note    Pre-Procedure   Staff -        Performed By: CRNA       Pre-Anesthestic Checklist: patient identified, IV checked, risks and benefits discussed, informed consent, monitors and equipment checked, pre-op evaluation, at physician/surgeon's request and post-op pain management  Timeout:       Correct Patient: Yes        Correct Procedure: Yes        Correct Site: Yes        Correct Position: Yes   Procedure Documentation  Procedure: intrathecal injection       Patient Position: sitting       Patient Prep/Sterile Barriers: sterile gloves, mask, patient draped       Skin prep: Betadine       Insertion Site: L4-5. (midline approach).       Needle Gauge: 25.        Needle Length (Inches): 3.5        Spinal Needle Type: Pencan       Introducer used      # of attempts: 1 and  # of redirects:     Assessment/Narrative         Paresthesias: No.       CSF fluid: clear.

## 2021-11-16 NOTE — PROGRESS NOTES
Daughter frandy, produced power of attourney directive and this was copied and a copy placed in chart

## 2021-11-16 NOTE — OP NOTE
Preoperative diagnosis  Distal femur fracture, right     Postoperative diagnosis  Same as above     Procedure  1.  Open reduction internal fixation distal distal femur, right.     Surgeon  Tiki Nguyen DO, MHA     Assist  FERMÍN Contreras     EBL  150 mL     Tourniquet  None      Implants  [place] distal femur plate     Complications  None apparent during the procedure     Indications  Patient is a 89-year-old female who was seen in the hospital for R leg pain after fall at SNF. Pt is non-ambulatory at baseline, d/t CVA. Pt c/o pain with movement and transfers. We discussed the risks benefits and alternatives to surgery with the pt and her DTR who is with her today.  The risk of surgery included but were not limited to bleeding, scar, infection, damage to nerves and vessels, failure of surgery, need for more surgery, and risks of anesthesia including but not limited to death.  The patient expressed the risks benefits and alternatives to surgery, he elected to proceed with surgery. An informed consent was signed.     Procedure  Initially, patient was seen in the preoperative holding area.  The operative extremity was confirmed by the patient and marked.  The department of anesthesia evaluated the patient and elected to proceed with a spinal anesthetic.  The patient was taken to the operating room and transferred to the operating table.  All bony prominences and nonoperative extremities were well-padded and secured to the table.  The department of anesthesia verified the working order of all machines and a spinal anesthetic was administered.  IV antibiotics were administered per protocol. The right lower extremity was prepped and draped in sterile fashion.  A timeout was taken to identify the correct patient, procedure, and operative site.  All in the room were in agreement.     Initially a lateral incision was marked centered at the level of the fracture. The incision was taken thru skin and subcut. The fascia was  incised in line with the incision. The fracture was visualized after blunt dissection thru muscle. The fracture was then reduced and clamped. The bone was very soft. After the reduction was clamped, a plate was placed laterally and clamped in place. Xray was used to verify the maintenance of reduction and plate placement. The plate was then initially fixed distally, then fixed proximally with cortical screws, and then finally distal fixation was completed. The out /guide was removed and final images were obtained in the AP and lateral planes.      The incision was irrigated with sterile saline.  bleeding was controlled with direct pressure and electrocautery.  There was no significant bleeding.  The incision was then closed in layers.  the fascia was closed with Vicryl, the skin was closed with Monocryl. the incision was dressed with  Steri-Strips and an Aquacel dressing was placed over the incision.      The patient was then awakened by the department of anesthesia and taken to the recovery room in stable condition.  she will be nonweightbearing on the right lower extremity. She will see PT/OT in house, followed by medicine for shakira-op care, will resume pre-op anticoagulation per medicine.   follow up with ortho in 2 weeks for incision check.

## 2021-11-16 NOTE — PLAN OF CARE
[facility] TRANSPORT NOTE  Data:   Reason for Transport:  To Surgery room 9.    Mecca Slade was transported from Med/Surg room 2207 via cart at approximately 1225.  Patient was accompanied by Nursing Assistant. Equipment used for transport: Oxygen  Nasal Cannula. Family was aware of reason for transport: yes; daughter Lexie is present.    Action:  Report: given to Evelyn MCALLISTER RN.    Response:  Patient's condition when transferred off unit was stable.  Patient was medicated with IV dilaudid 0.2 mg IV at 1215 for anticipated activity.  Patient was placed on airmat and transferred to cart (due to patient's bed has a pulsate air mattress in place.)  Patient's blood sugar was 233 prior to transfer to surgery and was medicated with 2 units of novolog per sliding scale insulin order.  Patient was also incontinent of urine.    Hortensia Brown RN

## 2021-11-16 NOTE — PROGRESS NOTES
Patient unable to see to sign , made an x and daughter frandy manzano who has power of  signed consent

## 2021-11-16 NOTE — CONSULTS
[facility] Consultation    Consultation - [facility]  Level of consult: Consult, follow and place orders    Mecca Slade,  1921, MRN [id number]     Admitting Dx: Long term (current) use of anticoagulants [Z79.01]  CKD (chronic kidney disease) stage 4, GFR 15-29 ml/min (H) [N18.4]  Fall, initial encounter [W19.XXXA]  Laceration of scalp, initial encounter [S01.01XA]  Accidental fall, initial encounter [W19.XXXA]  Closed comminuted intra-articular fracture of distal end of right femur, initial encounter (H) [S72.491A]  Closed fracture of right femur, unspecified fracture morphology, unspecified portion of femur, initial encounter (H) [S72.91XA]  Diastolic congestive heart failure, unspecified HF chronicity (H) [I50.30]  Encounter for screening laboratory testing for severe acute respiratory syndrome coronavirus 2 (SARS-CoV-2) [Z20.822]     PCP: Haley Sims, [phone]     Code status:  No CPR- Pre-arrest intubation OK     Extended Emergency Contact Information  Primary Emergency Contact: Esther Avendano   [facility]  Home Phone: [phone]  Mobile Phone: [phone]  Relation: Daughter  Secondary Emergency Contact: Carey Bland   [facility]  Home Phone: [phone]  Relation: Daughter     Assessment:  Right distal femur fracture, mildly displaced    Plan:  This patient was discussed with Dr.Nicole Nguyen, on-call surgeon for [facility] and they are in agreement with the following plan.   Pt will require ORIF of the right distal femur with plating.   Scheduled for 1300 today with .  INR 1.25 this AM after IV vitamin K on 11/15, head CT negative for bleed.   H&P reviewed as well as anesthesia evaluation, pt is ok to proceed with surgery.  Continue bedrest, NPO, pain management prn.   Fully support the right thigh, knee and calf when moving patient.  Discussed with the pt who expressed understanding of the fracture and plan; daughter  will be present pre-surgery for further discussion with .      Thank you for including [facility] in the care of Mecca Slade. It has been a pleasure participating in her care.      Gareth Cline PA-C  [facility]    Principal Problem:    Closed comminuted intra-articular fracture of distal end of right femur, initial encounter (H)  Active Problems:    Hypertension    Hyperlipidemia    Cardiac pacemaker in situ    CHF (congestive heart failure) (H)    Long term (current) use of anticoagulants    H/O: CVA (cerebrovascular accident), R MCA embolism s/p tPA, March 2017    Type II diabetes mellitus with peripheral circulatory disorder (H)    Controlled atrial fibrillation (H)    Laceration of scalp, initial encounter    Accidental fall, initial encounter    Elevated lactic acid level    Tachy-lion syndrome, s/p pacemaker       Chief Complaint  Right leg pain      HPI  The patient is seen in orthopedic consultation at the request of Fely Garcia PA-C.  The patient is a 89 year old female with moderate pain of the right leg. She has a history of atrial fibrillation on Coumadin, CHF, DM type II and CVA with residual left sided weakness. She lives at [facility] with total cares; she is a non-ambulator who is wheelchair dependent and utilizes a Jm lift for transfers. On 11/15 she was found down on the ground by her caregivers; she thinks she was trying to get up from the chair but can't remember what happened. Per notes, she has denied any lightheadedness or dizziness prior to the fall. She complained of right sided leg pain and had a large laceration on her head. She was brought to [facility] ED where xrays showed a right distal femur fracture and all other evaluations were negative. She has been admitted for further treatment. Currently, she reports minimal pain in the right leg unless she moves it. She denies any other issues.      History is obtained from the  patient and electronic health record     Past Medical History  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)      DM type 2 (diabetes mellitus, type 2) (H)      HTN (hypertension)      Left-sided weakness 03/01/2017     Neurological neglect syndrome      Pure hypercholesterolemia      Right sided cerebral hemisphere cerebrovascular accident (H)      Tissue plasminogen activator (t-PA) administered at other facility within 24 hours prior to current admission 03/01/2017     Type 2 diabetes mellitus with diabetic neuropathy, without long-term current use of insulin (H) 4/3/2017       Surgical History  Past Surgical History:   Procedure Laterality Date     APPENDECTOMY       EP PACEMAKER  10/09/2012        Social History  Social History     Socioeconomic History     Marital status:      Spouse name: Not on file     Number of children: Not on file     Years of education: Not on file     Highest education level: Not on file   Occupational History     Not on file   Tobacco Use     Smoking status: Never Smoker     Smokeless tobacco: Never Used   Substance and Sexual Activity     Alcohol use: No     Drug use: No     Sexual activity: Not on file   Other Topics Concern     Not on file   Social History Narrative     Not on file     Social Determinants of Health     Financial Resource Strain: Not on file   Food Insecurity: Not on file   Transportation Needs: Not on file   Physical Activity: Not on file   Stress: Not on file   Social Connections: Not on file   Intimate Partner Violence: Not on file   Housing Stability: Not on file       Family History  Family History   Problem Relation Age of Onset     Heart Disease Sister          Allergies:  Patient has no known allergies.      Current Medications:  Current Facility-Administered Medications   Medication     acetaminophen (TYLENOL) tablet 975 mg     atorvastatin (LIPITOR) tablet 10 mg     carboxymethylcellulose PF (REFRESH PLUS) 0.5 % ophthalmic solution 1 drop     glucose gel 15-30 g    Or     dextrose 50 % injection 25-50 mL    Or     glucagon injection 1 mg     [Held by provider] furosemide (LASIX) tablet 40 mg     HYDROmorphone (DILAUDID) injection 0.2 mg     insulin aspart (NovoLOG) injection (RAPID ACTING)     insulin aspart (NovoLOG) injection (RAPID ACTING)     lidocaine (LMX4) kit     lidocaine 1 % 0.1-1 mL     lisinopril (ZESTRIL) tablet 2.5 mg     melatonin tablet 1 mg     [Held by provider] metFORMIN (GLUCOPHAGE-XR) 24 hr tablet 1,000 mg     metoprolol (LOPRESSOR) injection 5 mg     metoprolol succinate ER (TOPROL-XL) 24 hr tablet 25 mg     naloxone (NARCAN) injection 0.2 mg    Or     naloxone (NARCAN) injection 0.4 mg    Or     naloxone (NARCAN) injection 0.2 mg    Or     naloxone (NARCAN) injection 0.4 mg     ondansetron (ZOFRAN-ODT) ODT tab 4 mg    Or     ondansetron (ZOFRAN) injection 4 mg     ondansetron (ZOFRAN-ODT) ODT tab 4 mg    Or     ondansetron (ZOFRAN) injection 4 mg     oxyCODONE IR (ROXICODONE) half-tab 2.5 mg     polyethylene glycol (MIRALAX) Packet 17 g     prochlorperazine (COMPAZINE) injection 5 mg    Or     prochlorperazine (COMPAZINE) tablet 5 mg    Or     prochlorperazine (COMPAZINE) suppository 12.5 mg     [Held by provider] protein modular (PROSTAT SUGAR FREE) packet 1 packet     senna-docusate (SENOKOT-S/PERICOLACE) 8.6-50 MG per tablet 1 tablet    Or     senna-docusate (SENOKOT-S/PERICOLACE) 8.6-50 MG per tablet 2 tablet     sodium chloride (PF) 0.9% PF flush 3 mL     sodium chloride (PF) 0.9% PF flush 3 mL     sodium chloride 0.9% infusion       Review of Systems:  See H&P    Physical Exam:  Temp:  [98.1  F (36.7  C)-98.8  F (37.1  C)] 98.1  F (36.7   C)  Pulse:  [] 84  Resp:  [16-20] 20  BP: (111-142)/(49-90) 121/49  SpO2:  [87 %-100 %] 94 %    General: On examination, the patient is resting comfortably, NAD, awake and alert and oriented to person, place, and and general circumstances  SKIN: Mild swelling without open wound or ecchymosis noted at the right distal femur and knee.   Pulses:  dorsalis pedis pulse is intact and equal bilaterally  Sensation: reduced to the distal lower extremities at baseline.  Tenderness: Diffusely TTP throughout the right distal femur and anterior knee.   ROM: Deferred due to fracture and presence of pneumatic compression boots.     Pertinent Labs  Lab Results: personally reviewed.  Lab Results   Component Value Date    WBC 13.2 (H) 11/16/2021    HGB 11.2 (L) 11/16/2021    HCT 35.0 11/16/2021    MCV 99 11/16/2021     11/16/2021     Recent Labs   Lab 11/16/21  0433 11/15/21  2304 11/15/21  1423   INR 1.25* 1.31* 1.43*       Pertinent Radiology  Radiology Results: images and radiology report reviewed  Recent Results (from the past 24 hour(s))   CT Head w/o Contrast    Narrative    EXAM: CT HEAD W/O CONTRAST  LOCATION: [facility]  DATE/TIME: 11/16/2021 5:47 AM    INDICATION: Head trauma, minor (Age >= 65y)  COMPARISON: 11/5/2021  TECHNIQUE: Routine CT Head without IV contrast. Multiplanar reformats. Dose reduction techniques were used.    FINDINGS:  The exam is moderately degraded by motion.    INTRACRANIAL CONTENTS: No definite intracranial hemorrhage, extraaxial collection, or mass effect.  No CT evidence of acute infarct. Moderate presumed chronic small vessel ischemic changes. Moderate generalized volume loss. No hydrocephalus.     VISUALIZED ORBITS/SINUSES/MASTOIDS: No intraorbital abnormality. No paranasal sinus mucosal disease. No middle ear or mastoid effusion.    BONES/SOFT TISSUES: Right frontal scalp hematoma. No fracture.      Impression    IMPRESSION:  1.  The exam is moderately  degraded by motion.  2.  Within these limitations, no definite acute intracranial pathology is identified.     EXAM: XR FEMUR RIGHT 2 VIEW  LOCATION: [facility]  DATE/TIME: 11/15/2021 5:32 AM     INDICATION: fall with pain and leg externally rotated  COMPARISON: None.                                                                      IMPRESSION: Oblique fracture the distal femoral shaft with dorsal angulation and displacement. Moderate osteoarthritis in the knee joint.    EXAM: XR KNEE RT 1 /2 VW  LOCATION: [facility]  DATE/TIME: 11/15/2021 5:26 AM     INDICATION: Fall with knee pain.  COMPARISON: None.                                                                      IMPRESSION: There is an oblique mildly displaced fracture seen through the shaft of the distal right femur. There is mild posterior and lateral displacement of the distal fracture fragment. Generalized osteopenia. Marked tricompartmental degenerative   arthritis. Vascular calcifications.    EXAM: XR PELVIS 1/2 VW  LOCATION: [facility]  DATE/TIME: 11/15/2021 5:17 AM     INDICATION: Pain after fall.  COMPARISON: None.                                                                      IMPRESSION: No acute fracture or dislocation.    Attestation:  I have reviewed today's vital signs, notes, medications, labs and imaging.     Gareth Cline PA-C

## 2021-11-16 NOTE — ANESTHESIA POSTPROCEDURE EVALUATION
Patient: Mecca Slade    Procedure: Procedure(s):  Open Reduction Internal Fixation Distal Femur FRACTURE RIGHT       Diagnosis:Femur fracture, right (H) [S72.91XA]  Diagnosis Additional Information: No value filed.    Anesthesia Type:  Spinal    Note:  Disposition: Inpatient   Postop Pain Control: Uneventful            Sign Out: Well controlled pain   PONV: No   Neuro/Psych: Uneventful            Sign Out: Acceptable/Baseline neuro status   Airway/Respiratory: Uneventful            Sign Out: Acceptable/Baseline resp. status   CV/Hemodynamics: Uneventful            Sign Out: Acceptable CV status; No obvious hypovolemia; No obvious fluid overload   Other NRE: NONE   DID A NON-ROUTINE EVENT OCCUR? No           Last vitals:  Vitals Value Taken Time   /55 11/16/21 1630   Temp 37.2  C (98.9  F) 11/16/21 1630   Pulse 85 11/16/21 1630   Resp 28 11/16/21 1630   SpO2 100 % 11/16/21 1628   Vitals shown include unvalidated device data.    Electronically Signed By: Linda Patterson CRNA, JOSUE CHRISTENSEN  November 16, 2021  4:42 PM

## 2021-11-16 NOTE — PROGRESS NOTES
Report given to Hortensia Rn, pt. Transported by cart on air pal with evening RN at 1636 Scott Sturges, RN on 11/16/2021 at 4:36 PM

## 2021-11-16 NOTE — PLAN OF CARE
Patient has been alert and mostly oriented, somewhat forgetful at times to situation.  Reports pain in right knee with any movement, IV dilaudid given x 2 today.  Patient is wheelchair bound at baseline, pressure ulcer present to coccyx area on admission, surrounding skin red/purple into lower buttocks.  Patient is on a pulsate mattress and heel protectors are on.  Patient has afib, heart rate appeared to be into the 120's - 130's this evening, oral metoprolol dose was given but HR still appears fast.  Fely KOVACS updated and ordered telemetry and as needed metoprolol for heart rate.  Also triggered sepsis, lactic elevated at 2.7, patient receiving 500cc bolus.  Planning for surgery tomorrow afternoon, NPO at midnight.

## 2021-11-17 NOTE — PROGRESS NOTES
[facility] Progress Note      Post-operative Day: 1 Day Post-Op  Procedure(s):  Open Reduction Internal Fixation Distal Femur FRACTURE RIGHT  Subjective:    Pt denies increased pain in her leg, she is comfortable at rest. She does state that she'd like to get up today.     Chest pain, SOB:  No  Nausea, vomiting:  No  Lightheadedness, dizziness:  No  Neuro:  Patient denies new onset numbness or paresthesias      Objective:  Blood pressure 139/51, pulse 82, temperature 98.2  F (36.8  C), temperature source Oral, resp. rate 18, weight 94.1 kg (207 lb 7.3 oz), SpO2 100 %.    Patient Vitals for the past 24 hrs:   BP Temp Temp src Pulse Resp SpO2   11/17/21 0500 139/51 98.2  F (36.8  C) Oral 82 18 100 %   11/17/21 0235 (!) 160/73 97.8  F (36.6  C) Oral 91 17 100 %   11/16/21 2234 123/62 97  F (36.1  C) Oral 94 18 99 %   11/16/21 1930 124/52 -- -- 81 -- --   11/16/21 1900 (!) 146/50 -- -- 85 24 100 %   11/16/21 1848 139/56 97.4  F (36.3  C) Axillary 87 23 100 %   11/16/21 1837 -- -- -- -- -- 98 %   11/16/21 1830 (!) 157/58 -- -- 90 22 95 %   11/16/21 1818 -- -- -- -- -- 100 %   11/16/21 1817 -- -- -- -- -- 100 %   11/16/21 1800 (!) 142/56 -- -- 86 23 100 %   11/16/21 1749 -- -- -- -- -- 100 %   11/16/21 1732 (!) 145/58 97.2  F (36.2  C) Axillary 83 16 100 %   11/16/21 1710 -- -- -- -- -- 95 %   11/16/21 1705 -- -- -- -- -- (!) 85 %   11/16/21 1700 124/58 -- -- 86 -- (!) 84 %   11/16/21 1656 133/54 -- -- 89 -- --   11/16/21 1630 115/55 98.9  F (37.2  C) -- 81 28 --   11/16/21 1620 (!) 84/71 -- -- 86 11 --   11/16/21 1615 101/49 -- -- 82 14 --   11/16/21 1610 93/61 -- -- 82 14 --   11/16/21 1605 (!) 81/51 -- -- 85 14 --   11/16/21 1600 91/55 -- -- 81 14 99 %   11/16/21 1555 91/55 98.7  F (37.1  C) Oral 72 14 98 %   11/16/21 1550 (!) 85/44 -- -- 78 11 --   11/16/21 1253 120/59 -- -- -- 18 100 %   11/16/21 1215 -- -- -- -- -- 100 %   11/16/21 1118 131/41 97.9  F (36.6  C) Oral 84 18 100 %   11/16/21 1055 -- -- --  -- -- 100 %   11/16/21 1033 -- -- -- -- -- 100 %   11/16/21 1025 -- -- -- -- -- (!) 80 %   11/16/21 0912 -- -- -- -- -- 94 %   11/16/21 0911 -- -- -- -- -- (!) 87 %   11/16/21 0907 -- -- -- -- 20 92 %       Wt Readings from Last 4 Encounters:   11/16/21 94.1 kg (207 lb 7.3 oz)   11/08/21 91.4 kg (201 lb 6.4 oz)   11/01/21 88.5 kg (195 lb)   10/28/21 88.5 kg (195 lb)       Gen: A&O x 3. NAD. Appears tired.  Wound status: Covered. Ace wrap present on the right LE; Aquacel noted at the top of the Ace. No obvious drainage.   Circulation, motion and sensation: Distal lower extremity sensation is intact and at baseline. Foot and toes are warm and well perfused.    Swelling: Mild  Calf tenderness: calves are soft and non-tender bilaterally     Pertinent Labs   Lab Results: personally reviewed.     Recent Labs   Lab Test 11/17/21  0509 11/16/21  0433 11/15/21  2304 11/15/21  1423 11/15/21  0359 03/04/21  0505 03/01/21  0525   INR 1.29* 1.25* 1.31*   < > 1.98*   < >  --    HGB 9.8* 11.2*  --   --  14.3   < >  --    HCT 31.2* 35.0  --   --  45.1   < >  --    * 99  --   --  99   < >  --    * 156  --   --  218   < >  --     143  --   --  140   < >  --    CRP  --   --   --   --   --   --  0.3    < > = values in this interval not displayed.       Plan:   Continue current cares and rehabilitation. Pt is a non-ambulator at baseline, uses Jm lift for transfers. Fully support the right leg with mobility.   Anticoagulation protocol: Resume pre-op coumadin management per primary care provider/hospitalist  Pain medications:  dilaudid and tylenol  Weight bearing status:  NWB RLE  Disposition:  Discharge per medicine, Care Management involved for either return to LTC vs TCU.              Report completed by:  Gareth Cline PA-C  Date: 11/17/2021  Time: 9:05 AM

## 2021-11-17 NOTE — PROGRESS NOTES
"[facility]    Hospitalist Progress Note    Date of Service (when I saw the patient): 11/17/2021    Assessment & Plan   Mecca Slade is a 89 year old female admitted on 11/15/2021. She presented to the emergency department for evaluation of right leg pain after an unwitnessed fall and was found to have a right distal femur fracture for which she will be admitted for further evaluation and treatment.     Closed comminuted intra-articular fracture of distal end of right femur, initial encounter  Acquired distal right femur fracture after mechanical fall. X-ray right femur shows \"Oblique fracture the distal femoral shaft with dorsal angulation and displacement. Moderate osteoarthritis in the knee joint.\" Case discussed between emergency department and on-call ortho, patient requires INR reversal prior to surgery.  INR 1.98 -- 1.43 -- 1.31 -- 1.25--1.29  - Ortho consult s/p ORIF on 11/16  - Post op cares per Ortho  - Analgesia: scheduled acetaminophen, prn oxycodone and prn IV dilaudid  - PT and OT  - Patient is non-ambulatory at baseline, uses Jm lift, and lives in LTC  - Care Management for discharge back to LTC vs TCU     Surgery Clearance and RCRI Risk Assessment:    Anesthesia issues: No known  Baseline Activity: < 4 METS  Chest Pain: No  Shortness of breath: No  Cardiac Risk Factors/Assessment:                High Risk Surgery: No              History Ischemic Heart Disease: No              History of Congestive Heart Failure: Yes              History of CVA: Yes              Preoperative Treatment with Insulin: Yes              Preoperative Creatinine greater than 2.0: No              Total Number of Points: 3 = 9.1% risk of major cardiac event  EKG and chest CT reviewed. Patient may proceed with surgery without further work-up after INR is at acceptable level per ortho team.      Elevated lactic acid level, resolved  Patient triggered sepsis BPA, lactic acid found to be 2.7 on " 11/15. Patient tachycardic due to atrial fibrillation, with mild leukocytosis likely reactive due to injury. Low concern for infection on admission, does not appear to be septic. Received 500 ml bolus with improvement of tachycardia and lactic elevation.  - Lactate normal at 1.4 on 11/16    Accidental fall, initial encounter  Laceration of scalp, initial encounter  Tripped and fell on 11/15 in her apartment, unwitnessed and found down with head laceration and right knee pain. Extensive traumatic evaluation completed, no traumatic injury identified other than right femur fracture (imaging included head CT, CT spine (cervical, thoracic, and lumbar), CT chest, abdomen, & pelvis, x-ray right knee, x-ray pelvis). Patient presented with large head laceration that was stapled in the emergency department 11/15. Repeat CT head on 11/16/21 due to fall while on anticoagulation unremarkable.  - Manage femur fracture above  - Will need PT/OT evaluations after surgery  - Scalp laceration s/p staples on 11/15     Hypoxemia  Following IV dilaudid required 1 L/min of oxygen. Suspect this is related to narcotics, previously normal oxygenation. CT chest done on presentation showed small bilateral pleural effusions and dependent atelectasis.  - IS post-operative  - oxygen as needed, titrate > 90%  - judicious use of narcotics and IVF      Type II diabetes mellitus with peripheral circulatory disorder   HgbA1c 9.1. Managed prior to admission with metformin 1000 mg bid.  - Hold metformin while NPO, resume as appropriate  - High sliding scale insulin   - Hyper/hypoglycemia protocols  - Will need consistent carbohydrate diet once advanced     Atrial fibrillation  Long term (current) use of anticoagulants  Rate controlled prior to admission with metoprolol XL 25 mg q pm. Patient was intermittently tachycardic at time of admission. Anticoagulated prior to admission with coumadin. Admit INR 1.98, underwent reversal for surgery, down to 1.25  prior to surgery.   - Will need coumadin resumed post-op  - Continue metoprolol with holding parameters  - Monitor on telemetry   - Prn IV metoprolol available for sustained heart rate > 110     Congestive heart failure  Hypertension  Last echo on record in 2017 with EF 59% (Care Everywhere). Managed prior to admission with lisinopril 2.5 mg q pm, lasix 40 mg daily, and metoprolol XL 25 mg daily. Appears euvolemic on admission.  - Hold lasix while getting IV fluids, resume post-op as appropriate  - Continue lisinopril and metoprolol with holding parameters     H/O: CVA (cerebrovascular accident), R MCA embolism s/p tPA, March 2017  Hyperlipidemia  History of CVA in 2017. Has evidence of chronic infarcts on head CT, but no acute evidence of infarction. Has known atrial fibrillation and is on coumadin for stroke risk reduction. Also takes Lipitor 10 mg q pm.  - Continue Lipitor  - Will need coumadin resumed post-op     Tachy-lion syndrome, s/p pacemaker  Cardiac pacemaker in situ  Pacemaker placed in 2012, follows with cardiology through [facility].      CKD  Creatinine on presentation 0.95, GFR 50. Baseline appears to be 0.9-1, stable.  - follow BMP post-operatively  - IVF at 75 ml/hr     COVID Status  COVID PCR negative 11/15/21  Completed Moderna COVID vaccine series as of 2/2/2021. Does not appear that she has yet had her booster.    Diet: NPO per Anesthesia Guidelines for Procedure/Surgery Except for: Meds    DVT Prophylaxis: Pneumatic Compression Devices  Cantu Catheter: Not present  Central Lines: None  Code Status: No CPR- Pre-arrest intubation OK      Disposition: Expected discharge in 1-2 days once tolerating diet and pain controlled.    Vadim Lynn    Interval History   The patient is resting in bed.  Briefly required 5 L oxygen overnight, felt to be due to opiate sedation.  Currently oxygenating well on 1 L NC.    -Data reviewed today: I reviewed all new labs and imaging results over the last 24 hours.  I personally reviewed no images or EKG's today.    Physical Exam   Temp: 98.2  F (36.8  C) Temp src: Oral BP: 139/51 Pulse: 91   Resp: 16 SpO2: 100 % O2 Device: Nasal cannula Oxygen Delivery: 1 LPM  Vitals:    11/15/21 0355 11/15/21 1100 11/16/21 0625   Weight: 94.6 kg (208 lb 8 oz) 93.1 kg (205 lb 4 oz) 94.1 kg (207 lb 7.3 oz)     Vital Signs with Ranges  Temp:  [97  F (36.1  C)-98.9  F (37.2  C)] 98.2  F (36.8  C)  Pulse:  [72-94] 91  Resp:  [11-28] 16  BP: ()/(44-73) 139/51  SpO2:  [84 %-100 %] 100 %  I/O last 3 completed shifts:  In: 1322.1 [I.V.:1322.1]  Out: 100 [Blood:100]    Gen: Obese debilitated elderly female, no acute distressed  HEENT: Atraumatic, normocephalic; sclera non-injected, anicterric; oral mucosa moist, no lesion, no exudate  Lungs: Clear to ausculation, no wheezes, no rhonchi, no rales  Heart: Regular rate, irregular rhythm, no gallops, no rubs, no murmurs  GI: Bowel sound normal, no hepatosplenomegaly, no masses, non-tender, non-distended, no guarding, no rebound tenderness  Lymph: No lymphadenopathy, no edema  Skin: No rashes, no chronic venous stasis     Medications     lactated ringers 50 mL/hr at 11/16/21 1744     sodium chloride Stopped (11/16/21 1200)     Warfarin Therapy Reminder         acetaminophen  975 mg Oral Q8H     atorvastatin  10 mg Oral QPM     carboxymethylcellulose PF  1 drop Both Eyes QAM     [Held by provider] furosemide  40 mg Oral Daily     insulin aspart  1-7 Units Subcutaneous TID AC     insulin aspart  1-5 Units Subcutaneous At Bedtime     lisinopril  2.5 mg Oral QPM     metFORMIN  1,000 mg Oral Daily with breakfast     metoprolol succinate ER  25 mg Oral QPM     polyethylene glycol  17 g Oral Daily     protein modular  1 packet Oral QAM     senna-docusate  1 tablet Oral BID     sodium chloride (PF)  3 mL Intracatheter Q8H     warfarin ANTICOAGULANT  3 mg Oral ONCE at 18:00       Data   Recent Labs   Lab 11/17/21  0758 11/17/21  0509 11/17/21  0216  11/16/21  0743 11/16/21  0433 11/16/21  0211 11/15/21  2304 11/15/21  1423 11/15/21  0359   WBC  --  10.2  --   --  13.2*  --   --   --  12.1*   HGB  --  9.8*  --   --  11.2*  --   --   --  14.3   MCV  --  101*  --   --  99  --   --   --  99   PLT  --  142*  --   --  156  --   --   --  218   INR  --  1.29*  --   --  1.25*  --  1.31*   < > 1.98*   NA  --  143  --   --  143  --   --   --  140   POTASSIUM  --  4.6  --   --  4.8  --   --   --  4.4   CHLORIDE  --  110*  --   --  111*  --   --   --  108   CO2  --  25  --   --  26  --   --   --  27   BUN  --  53*  --   --  52*  --   --   --  46*   CR  --  1.01  --   --  1.05*  --   --   --  0.95   ANIONGAP  --  8  --   --  6  --   --   --  5   SUN  --  10.1  --   --  10.2*  --   --   --  11.2*   * 232* 230*   < > 226*   < >  --    < > 254*   ALBUMIN  --   --   --   --  2.5*  --   --   --   --    PROTTOTAL  --   --   --   --  5.9*  --   --   --   --    BILITOTAL  --   --   --   --  0.7  --   --   --   --    ALKPHOS  --   --   --   --  102  --   --   --   --    ALT  --   --   --   --  15  --   --   --   --    AST  --   --   --   --  34  --   --   --   --     < > = values in this interval not displayed.       Recent Results (from the past 24 hour(s))   XR Surgery EZRA Fluoro L/T 5 Min w Stills    Narrative    This exam was marked as non-reportable because it will not be read by a   radiologist or a [place] non-radiologist provider.

## 2021-11-17 NOTE — PROGRESS NOTES
Care Management Follow Up    Length of Stay (days): 2    Expected Discharge Date: 11/18/2021  Expected Time of Departure: 11/18/21  Concerns to be Addressed: discharge planning     Patient plan of care discussed at interdisciplinary rounds: Yes    Anticipated Discharge Disposition: Long Term Care     Anticipated Discharge Services: Transportation Services  Anticipated Discharge DME: None    Patient/family educated on Medicare website which has current facility and service quality ratings: no  Education Provided on the Discharge Plan:  DaughterLexie  Patient/Family in Agreement with the Plan:  Yes    Referrals Placed by CM/SW: Transportation,Post Acute Facilities  Private pay costs discussed: transportation costs    Additional Information:    Met with the Patient and her daughter Lexie.  The Patient was asleep.  Discussed returning to [facility] Phone (Main Phone:[phone] Admissions Phone:[phone] Fax: [phone]) LTC vs TCU.  Lexie is in agreement with the discharge plan.  Updated [facility] Admission regarding the discharge plan.  Care Management will arrange transportation upon discharge.    Plan:  Return to [facility] vs TCU      Latricia Bowling RN

## 2021-11-17 NOTE — PLAN OF CARE
Physical Therapy Discharge Summary    Reason for therapy discharge:    No further expectations of functional progress.    Progress towards therapy goal(s). See goals on Care Plan in [facility] electronic health record for goal details.  Goals not met.  Barriers to achieving goals:   limited tolerance for therapy.    Therapy recommendation(s):    Patient is zac lift at baseline per interdisciplinary discussion. Patient will continue to be zac lift due to NWB RLE status. Trial home PT at LTC pending family goals though patient was not able to participate in therapy today. No further IP PT needs identified.

## 2021-11-17 NOTE — PLAN OF CARE
Alert, contused, clearing somewhat since surgery. RLE ace wrapped from surgery, ice applied, CDI. Denies pain, although winces and cries out with any movement. PRN dilaudid 1mg x 2 along with scheduled tylenol. Blood sugars 230 HS; 230 0200, very [place], currently 2L O2, LR @ 50ml/hr. Incontinent, patient did remove purewick, VSS, rested comfortably throughout shift.

## 2021-11-17 NOTE — PROGRESS NOTES
Initial IP Physical Therapy Evaluation       11/17/21 0855   Quick Adds   Type of Visit Initial PT Evaluation   Living Environment   People in home facility resident   Current Living Arrangements extended care facility   Home Accessibility no concerns   Self-Care   Activity/Exercise/Self-Care Comment Patient states she is able to stand and transfer. Per chart review, patient is somewhere between Ax1 for transfers and lift dependent at LT facility   Disability/Function   Hearing Difficulty or Deaf yes   Were auxiliary aids offered? yes   The following aids were provided; pocket talker   Wear Glasses or Blind yes   Vision Management glasses   Concentrating, Remembering or Making Decisions Difficulty yes   Concentration Management known dementia   Difficulty Communicating no   Walking or Climbing Stairs Difficulty yes   Walking or Climbing Stairs ambulation difficulty, dependent;transferring difficulty, assistance 1 person;transferring difficulty, requires equipment   Mobility Management Ax1 with FWW for transfers vs. lift transfer   Toileting issues yes   Toileting Assistance toileting difficulty, dependent   Doing Errands Independently Difficulty (such as shopping) yes   Errands Management dependent   Fall history within last six months yes   Number of times patient has fallen within last six months 1   Change in Functional Status Since Onset of Current Illness/Injury yes   General Information   Onset of Illness/Injury or Date of Surgery 11/15/21   Referring Physician Tiki Nguyen MD   Patient/Family Therapy Goals Statement (PT) none stated   Pertinent History of Current Problem (include personal factors and/or comorbidities that impact the POC) Mecca Slade is a 89 year old female admitted on 11/15/2021. She presented to the emergency department for evaluation of right leg pain after an unwitnessed fall and was found to have a right distal femur fracture for which she will be admitted for further evaluation  and treatment.   Weight-Bearing Status - RLE nonweight-bearing   Cognition   Orientation Status (Cognition) oriented to;person;place;disoriented to;time   Affect/Mental Status (Cognition) confused   Cognitive Status Comments Able to point to leg where pain is, states she is in the hospital. Lethargic. Knows that she had surgery   Pain Assessment   Patient Currently in Pain Yes, see Vital Sign flowsheet   Range of Motion (ROM)   ROM Comment RLE unable to move without patient screaming out in pain   Strength   Strength Comments BLE very deconditioned, unable to formerly assess   Bed Mobility   Comment (Bed Mobility) MaxA supine to partial sitting, patient resisting, yelling to stop, was not even moving RLE at this point. Patient resists with any movement to RLE and appears to be highly distressed. Not redirectable from pain and did not attempt to help reposition in bed or sit up   Clinical Impression   Criteria for Skilled Therapeutic Intervention yes, treatment indicated   PT Diagnosis (PT) Impaired functional mobility   Influenced by the following impairments Pain, weakness, decreased activity tolerance, NWB status   Functional limitations due to impairments Transfers   Clinical Presentation Stable/Uncomplicated   Clinical Presentation Rationale Clinical judgment   Clinical Decision Making (Complexity) low complexity   Therapy Frequency (PT) 5x/week   Predicted Duration of Therapy Intervention (days/wks) 5 days   Planned Therapy Interventions (PT) bed mobility training;home exercise program;strengthening;transfer training   Anticipated Equipment Needs at Discharge (PT) walker, rolling   Risk & Benefits of therapy have been explained evaluation/treatment results reviewed;care plan/treatment goals reviewed;risks/benefits reviewed;current/potential barriers reviewed;participants voiced agreement with care plan;participants included;patient   PT Discharge Planning    PT Discharge Recommendation (DC Rec) Long term care  facility;Transitional Care Facility   PT Rationale for DC Rec Patient not able to participate in therapy today due to some confusion and resistance due to pain. If patient transferred with Ax1 prior to admission, TCU recommended if she is able to participate. LTC recommended if patient with lift transfer prior to admission   PT Brief overview of current status  Dependent all transfers   Total Evaluation Time   Total Evaluation Time (Minutes) 10     Arnav Domingo, PT, DPT

## 2021-11-17 NOTE — PLAN OF CARE
[facility] TRANSPORT NOTE  Data:   Reason for Transport:  Patient returned to Med/Surg room 2207.    Mecca Slade was transported from PACU via cart at approximately 1640.  Patient was accompanied by Registered Nurse. Equipment used for transport: Oxygen  Nasal Cannula. Family was aware of reason for transport: yes    Action:  Report: received from Óscar LOO.    Response:  Patient's condition upon return was stable.    Patient alert & oriented to self upon arrival.  Denied pain.  Room air oxygen saturation was dipping down into the 70's; applied oxygen 3 LPM via NC and oxygen saturation came up to 85 %.  Then applied oxygen 5 LPM via oxymask due to patient mouth breathing and oxygen came up to %.  Patient is currently on 3 LPM via NC; with oxygen saturation 100 %.  Heart rate was reading into the 150's initially; but is now down into the 80's.  Re-applied telemetry upon return to room; patients tele is atrial fibrillation.  Applied a new pulse oximeter to patient's toe on left foot and is getting a good reading.  Fely Garcia PAC was here to see patient.  Patient took oral pills whole 1 at a time with bites of applesauce.  Patient had 1 sip of water and was noted to cough immediately afterwards; reported this to on-coming EZE Jernigan.    Hortensia Brown, EZE

## 2021-11-17 NOTE — PROGRESS NOTES
CLINICAL NUTRITION SERVICES - BRIEF NOTE     Nutrition Prescription    Recommendations already ordered by Registered Dietitian (RD):  -Glucerna strawberry w/ supper tonight, then w/ breakfast daily starting 11/18.         Informed by patient's RN Yadira that patient takes Pro-Stat (protein modular) 30 mL every morning at baseline. It is unclear if it's mixed in something or consumed straight.    This facility does not carry Pro-Stat. The closest equivalent would be ProSource TF but this product is not palatable as it is designed for administering via feeding tube. Therefore, the next best option would be Glucerna.    Met w/ patient at bedside. She seemed to be in agreement with substituting strawberry Glucerna for Pro-Stat during this hospitalization.    No other nutrition interventions at this time. RD will continue to follow patient via IDT rounds, unless consulted.    Pearl Hi RDN, LD  Clinical Dietitian  Office (Monday-Friday): [phone]  Weekend/Holiday Pager: [phone]

## 2021-11-17 NOTE — PROGRESS NOTES
[facility] Nurse Inpatient Wound Assessment   Reason for consultation: Evaluate and treat coccyx wounds    Assessment  Coccyx wounds present on admission most likely mixed etiology of pressure, friction, and incontinence  Status: initial assessment    Treatment Plan  Coccyx Wound Care  1.) Clean wounds/remove Triad Paste using Coloplast Jeremi Cleanse and Protect and soft disposable wash cloths ([employer]). No need to scrub. Remove any soiled Triad but no need to remove fully.  2.) Apply a layer of Triad Hydrophilic Wound Cleanser to open areas and surrounding skin.  Perform these cares twice daily and as needed with incontinence.    If pt's stool incontinence resolves, can use Mepilex Sacral Border Dressing instead of Triad. Wound and 2-3 inches of surrounding skin to be cleansed with Microklenz wound cleanser and gauze. Pat dry.   Apply Mepilex Sacral Border to area; apply evenly ensuring no wrinkles. Wipe skin barrier film product (Sure Prep or 3M No-Sting for example,) to edge of dressing/skin after application to improve seal.  Date dressing.  Change every 3 days.    HOB less than 30 degrees as able, elevate knee gatch with HOB elevation to reduce shear.  Chair cushion when up.  Reposition hourly when in chair and every two hours in bed as able.  Avoid supine position using pillows tucked under alternating hips for at least 15-30 degree turn to lift sacrum/coccyx off bed.  Avoid dragging buttocks with position changes.      Orders Written  Recommended provider order: None, at this time  [facility] Nurse follow-up plan:signing off  Nursing to notify the Provider(s) and re-consult the [facility] Nurse if wound(s) deteriorates or new skin concern.    Patient History  According to provider note(s):  Mecca Slade is a 89 year old female admitted on 11/15/2021. She presented to the emergency department for evaluation of right leg pain after an unwitnessed fall and was found to have a right distal femur fracture for which she will be  admitted for further evaluation and treatment.    Bedside nurse placed consult as unsure if wound had opened up since admission. Reviewed chart with nurse and noted that on admission it was charted as a community acquired pressure injury.    Objective Data  Containment of urine/stool: Brief and External catheter    Active Diet Order  Orders Placed This Encounter      Moderate Consistent Carb (60 g CHO per Meal) Diet      Output:   I/O last 3 completed shifts:  In: 922.1 [P.O.:480; I.V.:442.1]  Out: 175 [Urine:75; Blood:100]    Risk Assessment:   Sensory Perception: 3-->slightly limited  Moisture: 3-->occasionally moist  Activity: 2-->chairfast  Mobility: 2-->very limited  Nutrition: 2-->probably inadequate  Friction and Shear: 2-->potential problem  Luis Score: 14                          Labs:   Recent Labs   Lab 11/17/21  0509 11/16/21  0433 11/15/21  1423 11/15/21  0359   ALBUMIN  --  2.5*  --   --    HGB 9.8* 11.2*  --  14.3   INR 1.29* 1.25*   < > 1.98*   WBC 10.2 13.2*  --  12.1*   A1C  --   --   --  9.1*    < > = values in this interval not displayed.       Physical Exam  Areas of skin assessed: focused coccyx, perineum    Photo taken but not saved due to issue with phone. Pt with 1 area on each buttock in coccyx area almost identical in shape/location. Area on left buttocks appears newly epithelialized. Area on right buttocks/coccyx is open. Mepilex Sacral Dressing in place upon assessment but is soiled with stool and coming off.    Wound Base: 100% clean pink/red     Palpation of the wound bed: normal      Drainage: scant     Description of drainage: serous     Measurements (length x width x depth, in cm) 0.5cm  x 2cm  x 0cm (measured on slight diagonal) - right buttocks     Tunneling N/A     Undermining N/A  Periwound skin: flaky      Color: slightly darker than skin coloring      Temperature: normal   Odor: none  Pain: not noted with wound; pt's right leg is very painful  Interventions  Visual  inspection and assessment completed  Wound Care Rationale Protect periwound skin and most appropriate wound protocol with fecal incontinence and wound location  Wound Care: done per plan of care  Supplies: at bedside, floor stock, discussed with RN and discussed with patient  Current off-loading measures: Heel off-loading boot(s)  Current support surface: Standard  Low air loss mattress  Education provided to: plan of care, wound progress and Hygiene  Discussed plan of care with Patient and Nurse    Rosemarie Campoverde RN, CWOCN  [phone]

## 2021-11-17 NOTE — PHARMACY-ANTICOAGULATION SERVICE
Clinical Pharmacy - Warfarin Dosing Consult     Pharmacy has been consulted to manage this patient s warfarin therapy.  Indication: Atrial Fibrillation  Therapy Goal: INR 2-3  Provider/Team: Nursing home provider  OP Anticoag Clinic: Nursing home provider  Warfarin Prior to Admission: Yes  Warfarin PTA Regimen: 2mg MWF, 1mg TTSS  Recent documented change in oral intake/nutrition: Unknown    INR   Date Value Ref Range Status   11/16/2021 1.25 (H) 0.85 - 1.15 Final   11/15/2021 1.31 (H) 0.85 - 1.15 Final       Recommend warfarin 2 mg today.  Pharmacy will monitor Mecca Slade daily and order warfarin doses to achieve specified goal.      Please contact pharmacy as soon as possible if the warfarin needs to be held for a procedure or if the warfarin goals change.

## 2021-11-18 NOTE — PLAN OF CARE
Patient is comfortable when resting in bed and in chair. Cries out in pain when being moved/repositioned. Complains of pain in both legs and back. Received Tylenol and Dilaudid. Up to chair with use of lift. Swallow study evaluation done. Started with nectar thick then patient continued have episodes of coughing at times. Changed to honey thick which has helped. Daughter was here and concerned about patient having pain in her left leg as her right leg was surgical. Dr. Lynn aware and examined patient and x ray ordered. Fracture noted and discharge to long term care at [facility] on hold. Bm twice today. Using IS poorly, only able to pull to 250. Coughing encouraged. Triad paste to coccyx pressure wound. IV fluid stopped. Oral intake improving.

## 2021-11-18 NOTE — PLAN OF CARE
Alert, up with use of ceiling lift. LR @ 50ml/hr, blood sugars 265 HS; 206 0200, remains on 1L O2 NC, 2 hr reposition. Purewick in place, denies pain, but cries out with any movement. Aquacel CDI, ice applied. PRN dilaudid 1mg x 1 given will small amount of pudding, no coughing noted. No liquids given, awaiting swallow study. Rested comfortably throughout shift.

## 2021-11-18 NOTE — PROGRESS NOTES
Addendum 1230:  The Pt's discharge is cancelled due to a newly discovered fracture in her left leg.      Care Management Discharge Note    Discharge Date: 11/18/21  Expected Time of Departure: 11/18/21    Discharge Disposition: Long Term Care    Discharge Services: Transportation Services    Discharge DME: None    Discharge Transportation: agency    Private pay costs discussed: transportation costs    Patient/family educated on Medicare website which has current facility and service quality ratings: no    Education Provided on the Discharge Plan:  Yes  Persons Notified of Discharge Plans: Daughter, Lexie.  Patient/Family in Agreement with the Plan:  Yes    Handoff Referral Completed: No    Additional Information:    Plan:  Return to [facility].  Transportation provided by [facility].      Latricia Bowling RN

## 2021-11-18 NOTE — DISCHARGE INSTRUCTIONS
Warfarin instructions  Continue prior to admission warfarin (Jantoven) dosinmg Monday, Wednesday, Friday and 1mg Tuesday, Thursday, Saturday and .  Repeat INR Monday or Tuesday next week (21 or 21) and report result to primary care provider or anticoagulation provider for LTC facility.  Brittany Hastings, Pharm.D.     Coccyx Wound Care  1.) Clean wounds/remove Triad Paste using Coloplast Jeremi Cleanse and Protect and soft disposable wash cloths (readness.com). No need to scrub. Remove any soiled Triad but no need to remove fully.  2.) Apply a layer of Triad Hydrophilic Wound Cleanser to open areas and surrounding skin.  Perform these cares twice daily and as needed with incontinence.

## 2021-11-18 NOTE — PLAN OF CARE
Patient is zac lift at baseline per interdisciplinary discussion. Patient will continue to be zac lift due to NWB RLE status. And per PT consult pt total cares at facility no OT needs identified at this time. Plan to discontinue orders

## 2021-11-18 NOTE — PROGRESS NOTES
"[facility]    Hospitalist Progress Note    Date of Service (when I saw the patient): 11/18/2021    Assessment & Plan   Mecca Slade is a 89 year old female admitted on 11/15/2021. She presented to the emergency department for evaluation of right leg pain after an unwitnessed fall and was found to have a right distal femur fracture for which she will be admitted for further evaluation and treatment.     Closed comminuted intra-articular fracture of distal end of right femur, initial encounter  Acquired distal right femur fracture after mechanical fall. X-ray right femur shows \"Oblique fracture the distal femoral shaft with dorsal angulation and displacement. Moderate osteoarthritis in the knee joint.\" Case discussed between emergency department and on-call ortho, patient requires INR reversal prior to surgery.  INR 1.98 -- 1.43 -- 1.31 -- 1.25--1.29  - Ortho consult s/p ORIF on 11/16  - Post op cares per Ortho  - Analgesia: scheduled acetaminophen, prn oxycodone and prn IV dilaudid  - PT and OT  - Patient is non-ambulatory at baseline, uses Jm lift, and lives in LTC  - Care Management for discharge back to LTC vs TCU    Closed fracture of distal left femur fracture of distal medial metaphysis  Patient complaining of tenderness of left leg.  XR demonstrates new fracture of left femoral distal metaphysis.  Discussed with Orthopedic Surgery, non-operative.  Orthotics consult placed for hinged brace.  - Will reevaluate for discharge tomorrow     Surgery Clearance and RCRI Risk Assessment:    Anesthesia issues: No known  Baseline Activity: < 4 METS  Chest Pain: No  Shortness of breath: No  Cardiac Risk Factors/Assessment:                High Risk Surgery: No              History Ischemic Heart Disease: No              History of Congestive Heart Failure: Yes              History of CVA: Yes              Preoperative Treatment with Insulin: Yes              Preoperative Creatinine greater " than 2.0: No              Total Number of Points: 3 = 9.1% risk of major cardiac event  EKG and chest CT reviewed. Patient may proceed with surgery without further work-up after INR is at acceptable level per ortho team.      Elevated lactic acid level, resolved  Patient triggered sepsis BPA, lactic acid found to be 2.7 on 11/15. Patient tachycardic due to atrial fibrillation, with mild leukocytosis likely reactive due to injury. Low concern for infection on admission, does not appear to be septic. Received 500 ml bolus with improvement of tachycardia and lactic elevation.  - Lactate normal at 1.4 on 11/16    Accidental fall, initial encounter  Laceration of scalp, initial encounter  Tripped and fell on 11/15 in her apartment, unwitnessed and found down with head laceration and right knee pain. Extensive traumatic evaluation completed, no traumatic injury identified other than right femur fracture (imaging included head CT, CT spine (cervical, thoracic, and lumbar), CT chest, abdomen, & pelvis, x-ray right knee, x-ray pelvis). Patient presented with large head laceration that was stapled in the emergency department 11/15. Repeat CT head on 11/16/21 due to fall while on anticoagulation unremarkable.  - Manage femur fracture above  - Will need PT/OT evaluations after surgery  - Scalp laceration s/p staples on 11/15     Hypoxemia  Following IV dilaudid required 1 L/min of oxygen. Suspect this is related to narcotics, previously normal oxygenation. CT chest done on presentation showed small bilateral pleural effusions and dependent atelectasis.  - IS post-operative  - oxygen as needed, titrate > 90%  - judicious use of narcotics and IVF   - Resolved prior to discharge     Type II diabetes mellitus with peripheral circulatory disorder   HgbA1c 9.1. Managed prior to admission with metformin 1000 mg bid.  - Hold metformin while NPO, resume as appropriate  - High sliding scale insulin   - Hyper/hypoglycemia protocols  -  Will need consistent carbohydrate diet once advanced     Atrial fibrillation  Long term (current) use of anticoagulants  Rate controlled prior to admission with metoprolol XL 25 mg q pm. Patient was intermittently tachycardic at time of admission. Anticoagulated prior to admission with coumadin. Admit INR 1.98, underwent reversal for surgery, down to 1.25 prior to surgery.   - Will need coumadin resumed post-op  - Continue metoprolol with holding parameters  - Monitor on telemetry   - Prn IV metoprolol available for sustained heart rate > 110     Congestive heart failure  Hypertension  Last echo on record in 2017 with EF 59% (Care Everywhere). Managed prior to admission with lisinopril 2.5 mg q pm, lasix 40 mg daily, and metoprolol XL 25 mg daily. Appears euvolemic on admission.  - Hold lasix while getting IV fluids, resume post-op as appropriate  - Continue lisinopril and metoprolol with holding parameters     H/O: CVA (cerebrovascular accident), R MCA embolism s/p tPA, March 2017  Hyperlipidemia  History of CVA in 2017. Has evidence of chronic infarcts on head CT, but no acute evidence of infarction. Has known atrial fibrillation and is on coumadin for stroke risk reduction. Also takes Lipitor 10 mg q pm.  - Continue Lipitor  - Will need coumadin resumed post-op     Tachy-lion syndrome, s/p pacemaker  Cardiac pacemaker in situ  Pacemaker placed in 2012, follows with cardiology through [facility].      CKD  Creatinine on presentation 0.95, GFR 50. Baseline appears to be 0.9-1, stable.  - follow BMP post-operatively  - IVF at 75 ml/hr     COVID Status  COVID PCR negative 11/15/21  Completed Moderna COVID vaccine series as of 2/2/2021. Does not appear that she has yet had her booster.    Diet: NPO per Anesthesia Guidelines for Procedure/Surgery Except for: Meds    DVT Prophylaxis: Pneumatic Compression Devices  Cantu Catheter: Not present  Central Lines: None  Code Status: No CPR- Pre-arrest intubation OK       Disposition: Expected discharge in 1-2 days once tolerating diet and pain controlled.    Vadim Lynn    Interval History   The patient is resting in bed.  Nurses note pain and tenderness in left leg.  Patient cries out when left leg is palpated or moved.    -Data reviewed today: I reviewed all new labs and imaging results over the last 24 hours. I personally reviewed no images or EKG's today.    Physical Exam   Temp: 98.2  F (36.8  C) Temp src: Axillary BP: (!) 164/63 Pulse: 84   Resp: 18 SpO2: 91 % O2 Device: None (Room air) Oxygen Delivery: 1 LPM  Vitals:    11/15/21 0355 11/15/21 1100 11/16/21 0625   Weight: 94.6 kg (208 lb 8 oz) 93.1 kg (205 lb 4 oz) 94.1 kg (207 lb 7.3 oz)     Vital Signs with Ranges  Temp:  [97.8  F (36.6  C)-98.2  F (36.8  C)] 98.2  F (36.8  C)  Pulse:  [75-89] 84  Resp:  [18-19] 18  BP: (122-164)/(42-63) 164/63  SpO2:  [86 %-99 %] 91 %  I/O last 3 completed shifts:  In: 1280 [P.O.:480; I.V.:800]  Out: 350 [Urine:350]    Gen: Obese debilitated elderly female, no acute distressed  HEENT: Atraumatic, normocephalic; sclera non-injected, anicterric; oral mucosa moist, no lesion, no exudate  Lungs: Clear to ausculation, no wheezes, no rhonchi, no rales  Heart: Regular rate, irregular rhythm, no gallops, no rubs, no murmurs  GI: Bowel sound normal, no hepatosplenomegaly, no masses, non-tender, non-distended, no guarding, no rebound tenderness  Lymph: No lymphadenopathy, no edema  Skin: No rashes, no chronic venous stasis, tenderness of left thigh.    Medications     lactated ringers Stopped (11/18/21 0836)     Warfarin Therapy Reminder         acetaminophen  975 mg Oral Q8H     atorvastatin  10 mg Oral QPM     carboxymethylcellulose PF  1 drop Both Eyes QAM     [Held by provider] furosemide  40 mg Oral Daily     insulin aspart  1-7 Units Subcutaneous TID AC     insulin aspart  1-5 Units Subcutaneous At Bedtime     lisinopril  2.5 mg Oral QPM     metFORMIN  1,000 mg Oral Daily with  breakfast     metoprolol succinate ER  25 mg Oral QPM     polyethylene glycol  17 g Oral Daily     protein modular  1 packet Oral QAM     senna-docusate  1 tablet Oral BID     sodium chloride (PF)  3 mL Intracatheter Q8H     warfarin ANTICOAGULANT  1 mg Oral ONCE at 18:00       Data   Recent Labs   Lab 11/18/21  1149 11/18/21  0740 11/18/21  0438 11/17/21  0758 11/17/21  0509 11/16/21  0743 11/16/21  0433   WBC  --   --  10.1  --  10.2  --  13.2*   HGB  --   --  9.0*  --  9.8*  --  11.2*   MCV  --   --  100  --  101*  --  99   PLT  --   --  136*  --  142*  --  156   INR  --   --  1.45*  --  1.29*  --  1.25*   NA  --   --  144  --  143  --  143   POTASSIUM  --   --  4.8  --  4.6  --  4.8   CHLORIDE  --   --  111*  --  110*  --  111*   CO2  --   --  29  --  25  --  26   BUN  --   --  49*  --  53*  --  52*   CR  --   --  1.00  --  1.01  --  1.05*   ANIONGAP  --   --  4  --  8  --  6   SUN  --   --  10.1  --  10.1  --  10.2*   * 218* 213*   < > 232*   < > 226*   ALBUMIN  --   --   --   --   --   --  2.5*   PROTTOTAL  --   --   --   --   --   --  5.9*   BILITOTAL  --   --   --   --   --   --  0.7   ALKPHOS  --   --   --   --   --   --  102   ALT  --   --   --   --   --   --  15   AST  --   --   --   --   --   --  34    < > = values in this interval not displayed.       Recent Results (from the past 24 hour(s))   XR Femur Port Left 2 Views    Narrative    FEMUR PORTABLE LEFT TWO VIEW   11/18/2021 12:28 PM     HISTORY: Left leg pain with motion.    COMPARISON: None.      Impression    IMPRESSION: Cortical step-off distal medial femoral metaphysis  suspicious for nondisplaced fracture. No femoral neck fracture.  Diffuse osteopenia. Moderate medial compartment joint space narrowing  in the knee.    EUGENIE LARA MD         SYSTEM ID:  [id number]

## 2021-11-18 NOTE — PROGRESS NOTES
[facility]opaed[facility] Progress Note      Post-operative Day: 2 Days Post-Op    Procedure(s):  Open Reduction Internal Fixation Distal Femur FRACTURE RIGHT  Subjective:    Pt reports that her leg is feeling ok today; she denies pain at rest. She recalls getting up to the chair with the lift yesterday.     Chest pain, SOB:  No  Nausea, vomiting:  No  Lightheadedness, dizziness:  No  Neuro:  Patient denies new onset numbness or paresthesias      Objective:  Blood pressure 122/55, pulse 75, temperature 97.9  F (36.6  C), temperature source Axillary, resp. rate 18, weight 94.1 kg (207 lb 7.3 oz), SpO2 98 %.    Patient Vitals for the past 24 hrs:   BP Temp Temp src Pulse Resp SpO2   11/18/21 0741 122/55 97.9  F (36.6  C) Axillary 75 18 98 %   11/18/21 0326 134/56 98  F (36.7  C) Oral 89 18 95 %   11/17/21 2001 129/42 97.8  F (36.6  C) Axillary 84 19 97 %   11/17/21 1900 -- -- -- -- -- 97 %   11/17/21 1818 -- -- -- -- -- 94 %   11/17/21 1817 -- -- -- -- -- (!) 86 %   11/17/21 1514 130/44 -- Oral 89 18 99 %   11/17/21 1145 (!) 113/34 98.2  F (36.8  C) Oral 93 16 99 %   11/17/21 1015 -- -- -- -- 16 --       Wt Readings from Last 4 Encounters:   11/16/21 94.1 kg (207 lb 7.3 oz)   11/08/21 91.4 kg (201 lb 6.4 oz)   11/01/21 88.5 kg (195 lb)   10/28/21 88.5 kg (195 lb)       Gen: A&O x 2. NAD. Appears comfortable, resting in bed.   Wound status: Covered. Aquacel dressing in place with slight areas x 2 of bloody drainage.   Circulation, motion and sensation: Distal lower extremity sensation is intact and equal bilaterally. Foot and toes are warm and well perfused.    Swelling: Mild-moderate with increased ecchymosis at the medial knee  Calf tenderness: calves are soft and non-tender bilaterally     Pertinent Labs   Lab Results: personally reviewed.     Recent Labs   Lab Test 11/18/21  0438 11/17/21  0509 11/16/21  0433 03/04/21  0505 03/01/21  0525   INR 1.45* 1.29* 1.25*   < >  --    HGB 9.0* 9.8* 11.2*   < >  --    HCT  29.2* 31.2* 35.0   < >  --     101* 99   < >  --    * 142* 156   < >  --     143 143   < >  --    CRP  --   --   --   --  0.3    < > = values in this interval not displayed.       Plan:   Continue current cares.   Anticoagulation protocol: Resume pre-op coumadin management per primary care provider/hospitalist. Discussed with , plan to slowly increase dosing to bring her up to therapeutic levels.   Pain medications:  dilaudid and tylenol  Weight bearing status:  NWB RLE. Fully support right leg at the thigh and calf with lift transfers to decrease pain.  Disposition:  Orthopedically stable. Discharge per medicine, returning to LTC with total cares when stable.              Report completed by:  Gareth Cline PA-C  Date: 11/18/2021  Time: 8:56 AM    Addendum  Called by  at 12:45pm; pt began complaining of left knee pain prior to discharge.   Xrays show a minimally displaced left medial femoral condyle vertical fracture.   Images and pt reviewed with Dr.Erik Boudreaux, [facility] orthopedic surgeon.  Recommend non-surgical treatment with close follow up.   Hinged knee brace ordered for the left knee, locked at 0 with no knee ROM.   Ok to remove for hygiene and skin checks, and at rest for comfort.   NWB on bilateral LE.  Repeat xrays at follow up visit with  in 2 weeks.     Gareth Cline PA-C..................12:56pm  11/18/21

## 2021-11-18 NOTE — PROGRESS NOTES
Impression: Pt seen bedside for swallow evaluation.  Pt assessed with thin and mildly thick liquids, soft and chewy solids.  Pt was dependent with feeding.  Oral stage characterized mild-moderately prolonged mastication with solids and pt maintains control of bolus.  No pharyngeal symptoms noted however pt declinied some thin liquid trials and sips were small.  Good use of straw with mildly thick liquids.  Dx. Mild oropharyngeal dysphagia.  Diet recommendations: trial mildly thick liquids.        Bedside Swallow Evaluation  11/18/21   General Information   Onset of Illness/Injury or Date of Surgery 11/17/21   Referring Physician Vadim Lynn MD   Patient/Family Therapy Goal Statement (SLP) Stated wanted regular foods.   Pertinent History of Current Problem Mecca Slade is a 89 year old female admitted on 11/15/2021. She presented to the emergency department for evaluation of right leg pain after an unwitnessed fall and was found to have a right distal femur fracture.  Pt is s/p surgery 2 days.  Nursing reports pt had coughing with thin liquids 11/17/21 both while elevated in bed and when sitting in chair for meals.  Referral was placed for swallow evaluation to assess for dysphagia.   General Observations Pt seen bedside with head of bed elevated with breakfast tray (regular consistency).  Pt is able to answer basic yes/no questions.   Disability/Function   Hearing Difficulty or Deaf yes   Use of hearing assistive devices amplifier   Pain Assessment   Patient Currently in Pain No   Type of Evaluation   Type of Evaluation Swallow Evaluation   Oral Motor   Oral Musculature generally intact   Structural Abnormalities none present   Mucosal Quality adequate   Dentition (Oral Motor)   Dentition (Oral Motor) adequate dentition;dental appliance/dentures   Facial Symmetry (Oral Motor)   Facial Symmetry (Oral Motor) WNL   Lip Function (Oral Motor)   Lip Range of Motion (Oral Motor) WNL   Vocal Quality/Secretion  Management (Oral Motor)   Vocal Quality (Oral Motor) WNL   Comment, Vocal Quality/Secretion Management (Oral Motor) WNL after sips of liquid.   General Swallowing Observations   Current Diet/Method of Nutritional Intake (General Swallowing Observations, NIS) regular diet   Respiratory Support (General Swallowing Observations) nasal cannula   Swallowing Evaluation Clinical swallow evaluation   Clinical Swallow Evaluation   Feeding Assistance dependent   Clinical Swallow Evaluation Textures Trialed thin liquids;mildly thick liquids;solid foods;soft & bite-sized;extremely thick liquids   Clinical Swallow Eval: Thin Liquid Texture Trial   Mode of Presentation, Thin Liquids cup;fed by clinician   Volume of Liquid or Food Presented sip   Oral Phase of Swallow WFL   Pharyngeal Phase of Swallow intact  (small single sip fed by SLP)   Diagnostic Statement Small sip x1 of water.  Pt did not like cold liquid.  Small sip WFL, however limited assessment with SLP determining volume.    Clinical Swallow Eval: Mildly Thick Liquids   Mode of Presentation cup;straw;fed by clinician   Volume Presented 4 oz in total, mostly with straw   Oral Phase WFL   Pharyngeal Phase intact   Diagnostic Statement WNL.  No overt s/sx aspiration via cup or straw.   Clinical Swallow Eval: Extremely Thick Liquids   Mode of Presentation spoon;fed by clinician   Volume Presented 2 oz in total via spoon with meds   Oral Phase WFL   Pharyngeal Phase intact   Diagnostic Statement WNL-  Pt takes pills with pudding.   Clinical Swallow Eval: Soft & Bite Sized   Mode of Presentation fed by clinician;spoon   Volume Presented small bite banana   Oral Phase WFL   Pharyngeal Phase intact   Diagnostic Statement WFL.  Mildly prolonged mastication   Clinical Swallow Evaluation: Solid Food Texture Trial   Mode of Presentation fed by clinician   Volume Presented bite estrada x2   Pharyngeal Phase intact   Diagnostic Statement WFL with moderately prolonged mastication with  solids.  No  overt aspiration or choking- pt reports does not want soft diet.   Esophageal Phase of Swallow   Patient reports or presents with symptoms of esophageal dysphagia No   Swallowing Recommendations   Diet Consistency Recommendations mildly thick liquids (level 2);regular diet   Supervision Level for Intake 1:1 supervision needed   Medication Administration Recommendations, Swallowing (SLP) pills in pudding or applesauce   Comment, Swallowing Recommendations Pt seen bedside for swallow evaluation.  Pt assessed with thin and mildly thick liquids, soft and chewy solids.  Pt was dependent with feeding.  Oral stage characterized mild-moderately prolonged mastication with solids and pt maintains control of bolus.  No pharyngeal symptoms noted however pt declinied some thin liquid trials and sips were small.  Good use of straw with mildly thick liquids.  Dx. Mild oropharyngeal dysphagia.  Diet recommendations: trial mildly thick liquids.   General Therapy Interventions   Planned Therapy Interventions Dysphagia Treatment   Dysphagia treatment Modified diet education   Intervention Comments diet texture analysis for modified diet.   SLP Therapy Assessment/Plan   Criteria for Skilled Therapeutic Interventions Met (SLP Eval) yes;treatment indicated   SLP Diagnosis Mild oropharyngeal dysphagia   Therapy Frequency (SLP Eval) 1 time/wk   Therapy Plan Review/Discharge Plan (SLP)   Therapy Plan Review (SLP) patient;evaluation/treatment results reviewed  (Pt states wants to keep regular solids vs ground )   SLP Discharge Planning    SLP Brief overview of current status  Diet modification with liquids to mildly thick (nectar) consistency.    Total Evaluation Time   Total Evaluation Time (Minutes) 25

## 2021-11-18 NOTE — PLAN OF CARE
Patient received Tylenol and oral Dilaudid for pain. Patient states she has no pain when resting in bed. She cries out in pain with repositioning. Also cries out when head of bed moved up or down. States her pain when cryng out is a 5. Up to chair with use of lift. Patient able to drink thin liquids when up right and only taking small sips. When she takes larger amounts she coughs. No coughing noted with food. Dr. Lynn notified and swallow study ordered. Will hold off on oral fluids until done. PIV infusing LR at 50/hr. Pure wick in use. Urine tatiana. Wound nurse consulted. To use triad paste on pressure area. On pulsate mattress. Chair cushion in use. Pressure reduction boots in use. Pedal pulse present with doppler to both feet. Ace wrap removed as making indent marks into skin. Ice pack in use. Aquacel has 2 small dried areas of blood. Attempted to wean oxygen to room air but saturation decreased to upper 80's. Oxygen currently at 1 L.

## 2021-11-18 NOTE — PLAN OF CARE
"Nursing spoke with SLP stating pt still had coughing during lunch today with mildly thick liquids.  SLP saw pt this afternoon.  Pt seen  with head of bed elevated.  Pt did have cough after both mildly and moderately thickened liquids when via straw and cup.  No aspiration symptoms noted with moderately thick liquids (honey) via spoon.  Pt stated \"Liquids tickle my throat\".   Plan;  Downgrade liquids to moderately thick liquids and feed via spoon.  SLP to follow up 11/19/21  "

## 2021-11-19 NOTE — PROGRESS NOTES
[facility] Progress Note      Post-operative Day: 3 Days Post-Op    Procedure(s):  Open Reduction Internal Fixation Distal Femur FRACTURE RIGHT  Subjective:  Pt seen today with her daughter present. She denies any increased pain at rest and overall feels fine.     Neuro:  Patient denies new onset numbness or paresthesias      Objective:  Blood pressure 121/56, pulse 89, temperature 98.2  F (36.8  C), temperature source Axillary, resp. rate 18, weight 91.4 kg (201 lb 8 oz), SpO2 92 %.    Patient Vitals for the past 24 hrs:   BP Temp Temp src Pulse Resp SpO2 Weight   11/19/21 0500 121/56 98.2  F (36.8  C) Axillary 89 18 92 % 91.4 kg (201 lb 8 oz)   11/19/21 0227 134/53 97.7  F (36.5  C) Axillary 97 18 92 % --   11/18/21 2309 129/67 97.8  F (36.6  C) Oral 87 18 91 % --   11/18/21 1846 (!) 145/50 98.5  F (36.9  C) Axillary 84 16 93 % --   11/18/21 1830 -- -- -- -- 16 -- --   11/18/21 1729 119/62 -- -- 76 -- -- --   11/18/21 1434 (!) 152/62 98.2  F (36.8  C) Oral 55 20 95 % --   11/18/21 1428 -- -- -- -- 16 -- --   11/18/21 1109 (!) 164/63 98.2  F (36.8  C) Axillary 84 18 91 % --       Wt Readings from Last 4 Encounters:   11/19/21 91.4 kg (201 lb 8 oz)   11/08/21 91.4 kg (201 lb 6.4 oz)   11/01/21 88.5 kg (195 lb)   10/28/21 88.5 kg (195 lb)       Gen: A&O x 3. NAD. Appears comfortable.  Wound status: Right knee lateral Aquacel dressing is c/d/i; no change noted. No ecchymosis or appreciable swelling noted at the left knee, no TTP.   Circulation, motion and sensation: Distal lower extremity sensation is intact and equal bilaterally. Foot and toes are warm and well perfused.    Swelling: Mild    Pertinent Labs   Lab Results: personally reviewed.     Recent Labs   Lab Test 11/19/21  0444 11/18/21  0438 11/17/21  0509 11/16/21  0433 03/04/21  0505 03/01/21  0525   INR 1.77* 1.45* 1.29* 1.25*   < >  --    HGB 8.7* 9.0* 9.8* 11.2*   < >  --    HCT  --  29.2* 31.2* 35.0   < >  --    MCV  --  100 101* 99   < >   --    PLT  --  136* 142* 156   < >  --    NA  --  144 143 143   < >  --    CRP  --   --   --   --   --  0.3    < > = values in this interval not displayed.       Plan:   Continue current cares and rehabilitation.  Anticoagulation protocol: Resume pre-op coumadin management per primary care provider/hospitalist    Pain medications:  dilaudid and tylenol  Weight bearing status:  NWB BLE  Discussed left medial condyle fracture with pt and daughter; hinged knee brace ordered and will treat conservatively.  Disposition:  Orthopedically stable. Discharge per medicine, plan is return to LTC when stable and brace is in place.             Report completed by:  Gareth Cline PA-C  Date: 11/19/2021  Time: 9:22 AM

## 2021-11-19 NOTE — PROGRESS NOTES
Speech Language Therapy Discharge Summary    Reason for therapy discharge:    All goals and outcomes met, no further needs identified.    Progress towards therapy goal(s). See goals on Care Plan in [facility] electronic health record for goal details.  Goals met    Therapy recommendation(s):    Pt has been successful with moderatley thick liquids fed via spoon.  Pt is a candidate for swallow evaluation once back at SNF for diet upgrade as appropriate.  Pt's daughter stated it is typical for patient to cough when drinking at baseline.   Pt accepted thickened liquids willingly.

## 2021-11-19 NOTE — DISCHARGE SUMMARY
"[facility]  Hospitalist Discharge Summary       Date of Admission:  11/15/2021  Date of Discharge:  11/19/2021  Discharging Provider: Vadim Lynn MD      Discharge Diagnoses     Closed comminuted intra-articular fracture of distal end of right femur, initial encounter  Closed fracture of distal left femur fracture of distal medial metaphysis  Surgery Clearance and RCRI Risk Assessment    Elevated lactic acid level, resolved  Accidental fall, initial encounter  Laceration of scalp, initial encounter   Hypoxemia  Type II diabetes mellitus with peripheral circulatory disorder   Atrial fibrillation  Long term (current) use of anticoagulants  Congestive heart failure  Hypertension  H/O: CVA (cerebrovascular accident), R MCA embolism s/p tPA, March 2017  Hyperlipidemia   Tachy-lion syndrome, s/p pacemaker  Cardiac pacemaker in situ  CKD  COVID-19 negative    Follow-ups Needed After Discharge   Follow-up Appointments     Follow Up and recommended labs and tests      Follow up with intermediate physician.  The following labs/tests are   recommended: INR, BMP and CBC.         Follow Up and recommended labs and tests      Follow up with Dr.Nicole Nguyen in 2 weeks for your legs. Call her team at   [phone] for questions and to schedule an appointment.           Discharge Disposition   Discharged to short-term care facility  Condition at discharge: Fair    Hospital Course   Mecca Slade is a 89 year old female admitted on 11/15/2021. She presented to the emergency department for evaluation of right leg pain after an unwitnessed fall and was found to have a right distal femur fracture for which she will be admitted for further evaluation and treatment.     Closed comminuted intra-articular fracture of distal end of right femur, initial encounter  Acquired distal right femur fracture after mechanical fall. X-ray right femur shows \"Oblique fracture the distal femoral shaft with dorsal " "angulation and displacement. Moderate osteoarthritis in the knee joint.\" Case discussed between emergency department and on-call ortho, patient requires INR reversal prior to surgery.  INR 1.98 -- 1.43 -- 1.31 -- 1.25--1.29--1.45--1.77  - Ortho consult s/p ORIF on 11/16  - Post op cares per Ortho  - Analgesia with acetaminophen  - PT and OT  - Patient is non-ambulatory at baseline, uses Jm lift, and lives in LTC  - Care Management for discharge to TCU    Closed fracture of distal left femur fracture of distal medial metaphysis  Patient complaining of tenderness of left leg.  XR demonstrates new fracture of left femoral distal metaphysis.  Discussed with Orthopedic Surgery, non-operative.  Orthotics consult placed for hinged brace.  - Orthopedic Surgery reviewed image, non-operative management recommended  - Hinged knee brace ordered for left knee, locked at zero with no knee range of motion  - Non-weight bearing bilateral lower extremities     Surgery Clearance and RCRI Risk Assessment:    Anesthesia issues: No known  Baseline Activity: < 4 METS  Chest Pain: No  Shortness of breath: No  Cardiac Risk Factors/Assessment:                High Risk Surgery: No              History Ischemic Heart Disease: No              History of Congestive Heart Failure: Yes              History of CVA: Yes              Preoperative Treatment with Insulin: Yes              Preoperative Creatinine greater than 2.0: No              Total Number of Points: 3 = 9.1% risk of major cardiac event  EKG and chest CT reviewed. Patient may proceed with surgery without further work-up after INR is at acceptable level per ortho team.      Elevated lactic acid level, resolved  Patient triggered sepsis BPA, lactic acid found to be 2.7 on 11/15. Patient tachycardic due to atrial fibrillation, with mild leukocytosis likely reactive due to injury. Low concern for infection on admission, does not appear to be septic. Received 500 ml bolus with " improvement of tachycardia and lactic elevation.  - Lactate normal at 1.4 on 11/16    Accidental fall, initial encounter  Laceration of scalp, initial encounter  Tripped and fell on 11/15 in her apartment, unwitnessed and found down with head laceration and right knee pain. Extensive traumatic evaluation completed, no traumatic injury identified other than right femur fracture (imaging included head CT, CT spine (cervical, thoracic, and lumbar), CT chest, abdomen, & pelvis, x-ray right knee, x-ray pelvis). Patient presented with large head laceration that was stapled in the emergency department 11/15. Repeat CT head on 11/16/21 due to fall while on anticoagulation unremarkable.  - Manage femur fracture above  - Will need PT/OT evaluations after surgery  - Scalp laceration s/p staples on 11/15     Hypoxemia  Following IV dilaudid required 1 L/min of oxygen. Suspect this is related to narcotics, previously normal oxygenation. CT chest done on presentation showed small bilateral pleural effusions and dependent atelectasis.  - IS post-operative  - oxygen as needed, titrate > 90%  - judicious use of narcotics and IVF   - Resolved prior to discharge     Type II diabetes mellitus with peripheral circulatory disorder   HgbA1c 9.1. Managed prior to admission with metformin 1000 mg bid.  - Hold metformin while NPO, resume at discharge  - High sliding scale insulin with hyper/hypoglycemia protocols during hospitalization  - Will need low salt low fat consistent carbohydrate diet at discharge     Atrial fibrillation  Long term (current) use of anticoagulants  Rate controlled prior to admission with metoprolol XL 25 mg q pm. Patient was intermittently tachycardic at time of admission. Anticoagulated prior to admission with coumadin. Admit INR 1.98, underwent reversal for surgery, down to 1.25 prior to surgery.   - Will need coumadin resumed post-op  - Continue metoprolol with holding parameters  - Monitor on telemetry   - Prn IV  metoprolol available for sustained heart rate > 110  - Discharge on coumadin for anticoagulation     Congestive heart failure  Hypertension  Last echo on record in 2017 with EF 59% (Care Everywhere). Managed prior to admission with lisinopril 2.5 mg q pm, lasix 40 mg daily, and metoprolol XL 25 mg daily. Appears euvolemic on admission.  - Hold lasix while getting IV fluids, resume post-op as appropriate  - Resume lisinopril and metoprolol after discharge  - Lisinopril increased due to elevated BP.     H/O: CVA (cerebrovascular accident), R MCA embolism s/p tPA, March 2017  Hyperlipidemia  History of CVA in 2017. Has evidence of chronic infarcts on head CT, but no acute evidence of infarction. Has known atrial fibrillation and is on coumadin for stroke risk reduction. Also takes Lipitor 10 mg q pm.  - Continue Lipitor  - Coumadin resumed post-op     Tachy-lion syndrome, s/p pacemaker  Cardiac pacemaker in situ  Pacemaker placed in 2012, follows with cardiology through [facility].      CKD  Creatinine on presentation 0.95, GFR 50. Baseline appears to be 0.9-1, stable.  - follow BMP post-operatively  - Cr stable post-op     COVID-19 negative  COVID PCR negative 11/15/21  Completed Moderna COVID vaccine series as of 2/2/2021. Does not appear that she has yet had her booster.    Consultations This Hospital Stay   ORTHOPEDIC SURGERY IP CONSULT  PHARMACY TO DOSE PHYTONADIONE  ORTHOPEDIC SURGERY IP CONSULT  CARE MANAGEMENT / SOCIAL WORK IP CONSULT  CARE MANAGEMENT / SOCIAL WORK IP CONSULT  PHYSICAL THERAPY ADULT IP CONSULT  OCCUPATIONAL THERAPY ADULT IP CONSULT  PHARMACY TO DOSE WARFARIN  WOUND OSTOMY CONTINENCE NURSE  IP CONSULT  SPEECH LANGUAGE PATH ADULT IP CONSULT  PHYSICAL THERAPY ADULT IP CONSULT  OCCUPATIONAL THERAPY ADULT IP CONSULT  ORTHOSIS EXTREMITY LOWER REFERRAL IP CONSULT    Code Status   No CPR- Pre-arrest intubation OK    Time Spent on this Encounter   I, Vadim Lynn MD, personally saw the patient today  and spent greater than 30 minutes discharging this patient.       Vadim Lynn MD  [facility]  ______________________________________________________________________    Physical Exam   Vital Signs: Temp: 98.2  F (36.8  C) Temp src: Axillary BP: 121/56 Pulse: 89   Resp: 18 SpO2: 92 % O2 Device: None (Room air)    Weight: 201 lbs 8.01 oz       Gen: Obese debilitated elderly female, no acute distressed  HEENT: Atraumatic, normocephalic; sclera non-injected, anicterric; oral mucosa moist, no lesion, no exudate  Lungs: Clear to ausculation, no wheezes, no rhonchi, no rales  Heart: Regular rate, irregular rhythm, no gallops, no rubs, no murmurs  GI: Bowel sound normal, no hepatosplenomegaly, no masses, non-tender, non-distended, no guarding, no rebound tenderness  Lymph: No lymphadenopathy, trace BLE edema  Skin: No rashes, no chronic venous stasis, tenderness of left thigh.     Primary Care Physician   Haley Sims    Discharge Orders      Wound care (specify)    Site:   Right distal thigh  Instructions:  You have an Aquacel dressing on your incision. Leave this dressing in place until your first post op visit. It is ok to shower with the dressing uncovered; do not soak the area. If the dressing becomes loose or more than 50% saturated with drainage, contact your surgical team.     General info for SNF    Length of Stay Estimate: Short Term Care: Estimated # of Days <30  Condition at Discharge: Improving  Level of care:skilled   Rehabilitation Potential: Good  Admission H&P remains valid and up-to-date: Yes  Recent Chemotherapy: N/A  Use Nursing Home Standing Orders: Yes     Mantoux instructions    Give two-step Mantoux (PPD) Per Facility Policy Yes     Follow Up and recommended labs and tests    Follow up with [facility] physician.  The following labs/tests are recommended: INR, BMP and CBC.     Follow Up and recommended labs and tests    Follow up with Dr.Nicole Nguyen in 2  weeks for your legs. Call her team at [phone] for questions and to schedule an appointment.     Activity - Up with nursing assistance    Non-weight bearing on bilateral LE. Up to chair with Jm lift.     Reason for your hospital stay    Right distal femur fracture repair, head laceration repair, left distal femur fracture.     Weight bearing status    No weight bearing on the bilateral LE     Activity    No ROM of the left knee; hinged knee brace locked at 0 degrees of flexion.     Physical Therapy Adult Consult    Evaluate and treat as clinically indicated.    Reason:  Physical deconditioning     Occupational Therapy Adult Consult    Evaluate and treat as clinically indicated.    Reason:  Physical deconditioning     Fall precautions     Advance Diet as Tolerated    Follow this diet upon discharge: Orders Placed This Encounter      Snacks/Supplements Adult: Glucerna; With Meals      Combination Diet Moderate Consistent Carb (60 g CHO per Meal) Diet; Mildly Thick (level 2)      2 g per day sodium diet, low fat diet       Significant Results and Procedures   Most Recent 3 CBC's:Recent Labs   Lab Test 11/19/21  0444 11/18/21  0438 11/17/21  0509 11/16/21  0433   WBC  --  10.1 10.2 13.2*   HGB 8.7* 9.0* 9.8* 11.2*   MCV  --  100 101* 99   PLT  --  136* 142* 156     Most Recent 3 BMP's:Recent Labs   Lab Test 11/19/21  1149 11/19/21  0849 11/19/21  0223 11/18/21  0740 11/18/21  0438 11/17/21  0758 11/17/21  0509 11/16/21  0743 11/16/21  0433   NA  --   --   --   --  144  --  143  --  143   POTASSIUM  --   --   --   --  4.8  --  4.6  --  4.8   CHLORIDE  --   --   --   --  111*  --  110*  --  111*   CO2  --   --   --   --  29  --  25  --  26   BUN  --   --   --   --  49*  --  53*  --  52*   CR  --   --   --   --  1.00  --  1.01  --  1.05*   ANIONGAP  --   --   --   --  4  --  8  --  6   SUN  --   --   --   --  10.1  --  10.1  --  10.2*   * 193* 211*   < > 213*   < > 232*   < > 226*    < > = values in this  interval not displayed.     Most Recent 2 LFT's:Recent Labs   Lab Test 11/16/21  0433 03/08/21  0814   AST 34 39   ALT 15 13   ALKPHOS 102 136*   BILITOTAL 0.7 0.7     Most Recent 3 INR's:Recent Labs   Lab Test 11/19/21  0444 11/18/21  0438 11/17/21  0509   INR 1.77* 1.45* 1.29*   ,   Results for orders placed or performed during the hospital encounter of 11/15/21   CT Head w/o Contrast    Narrative    EXAM: CT HEAD W/O CONTRAST, CT CERVICAL SPINE W/O CONTRAST  LOCATION: [facility]  DATE/TIME: 11/15/2021 4:43 AM    INDICATION: Head trauma, minor (Age >= 65y) neck injury.  COMPARISON: None.  TECHNIQUE:   1) Routine CT Head without IV contrast. Multiplanar reformats. Dose reduction techniques were used.  2) Routine CT Cervical Spine without IV contrast. Multiplanar reformats. Dose reduction techniques were used.    FINDINGS:   HEAD CT:   INTRACRANIAL CONTENTS: No intracranial hemorrhage, extraaxial collection, or mass effect.  No CT evidence of acute infarct. Moderate presumed chronic small vessel ischemic changes. Moderate generalized volume loss. No hydrocephalus. Chronic infarct in   the lateral right basal ganglia. Small chronic infarct in the left cerebellar hemisphere.     VISUALIZED ORBITS/SINUSES/MASTOIDS: No intraorbital abnormality. No paranasal sinus mucosal disease. No middle ear or mastoid effusion.    BONES/SOFT TISSUES: Soft tissue laceration and small subgaleal swelling over the anterior right frontal bone. No calvarial fracture.    CERVICAL SPINE CT:   VERTEBRA: Cervical vertebra are grossly normal in height. Minimal alterations in the lateral alignment are presumably degenerative. There is a small ossific fragment involving the anterior aspect of the superior C6 endplate. On axial images, the margin   associated with the native vertebral body appears corticated while the posterior margin of the fragment is indeterminate.     CANAL/FORAMINA: Multilevel degenerative changes  without appreciable central canal stenosis. There is at least moderate foraminal narrowing bilaterally at the C4-C5 level.    PARASPINAL: Marked enlargement of the right lobe of the thyroid with underlying 3 cm nodule. Visualized lung fields are clear.      Impression    IMPRESSION:  HEAD CT:  1.  No acute intracranial hemorrhage or calvarial fracture.  2.  Moderate volume loss and presumed chronic small vessel ischemic changes.  3.  Small chronic infarcts in the lateral right basal ganglia and the left cerebellar hemisphere.    CERVICAL SPINE CT:  1.  Age-indeterminate fracture deformity involving the anterior aspect of the superior C6 endplate is favored to be chronic or potentially reflect a developmental variant. A more recent abnormality while considered less likely is not completely excluded.  2.  No additional evidence of cervical spine fracture. No subluxation.  3.  Degenerative changes as highlighted above.      4.  3 cm right thyroid nodule and marked enlargement of the right lobe of the thyroid. Please see follow-up guidelines below.    REFERENCE:  Juancho HICKMAN et al. Managing Incidental Thyroid Nodules Detected on Imaging: White Paper of the ACR Incidental Thyroid Findings Committee. JACR 2015; 12:143-150.    Incidental thyroid nodule detected on CT or MRI without suspicious findings. Applies to general population without limited life expectancy or significant comorbidities.    Age greater than or equal to 35 years  Less than 1.5 cm: No further evaluation.  Greater than or equal to 1.5 cm: Evaluate with thyroid ultrasound.           CT Cervical Spine w/o Contrast    Narrative    EXAM: CT HEAD W/O CONTRAST, CT CERVICAL SPINE W/O CONTRAST  LOCATION: [facility]  DATE/TIME: 11/15/2021 4:43 AM    INDICATION: Head trauma, minor (Age >= 65y) neck injury.  COMPARISON: None.  TECHNIQUE:   1) Routine CT Head without IV contrast. Multiplanar reformats. Dose reduction techniques were  used.  2) Routine CT Cervical Spine without IV contrast. Multiplanar reformats. Dose reduction techniques were used.    FINDINGS:   HEAD CT:   INTRACRANIAL CONTENTS: No intracranial hemorrhage, extraaxial collection, or mass effect.  No CT evidence of acute infarct. Moderate presumed chronic small vessel ischemic changes. Moderate generalized volume loss. No hydrocephalus. Chronic infarct in   the lateral right basal ganglia. Small chronic infarct in the left cerebellar hemisphere.     VISUALIZED ORBITS/SINUSES/MASTOIDS: No intraorbital abnormality. No paranasal sinus mucosal disease. No middle ear or mastoid effusion.    BONES/SOFT TISSUES: Soft tissue laceration and small subgaleal swelling over the anterior right frontal bone. No calvarial fracture.    CERVICAL SPINE CT:   VERTEBRA: Cervical vertebra are grossly normal in height. Minimal alterations in the lateral alignment are presumably degenerative. There is a small ossific fragment involving the anterior aspect of the superior C6 endplate. On axial images, the margin   associated with the native vertebral body appears corticated while the posterior margin of the fragment is indeterminate.     CANAL/FORAMINA: Multilevel degenerative changes without appreciable central canal stenosis. There is at least moderate foraminal narrowing bilaterally at the C4-C5 level.    PARASPINAL: Marked enlargement of the right lobe of the thyroid with underlying 3 cm nodule. Visualized lung fields are clear.      Impression    IMPRESSION:  HEAD CT:  1.  No acute intracranial hemorrhage or calvarial fracture.  2.  Moderate volume loss and presumed chronic small vessel ischemic changes.  3.  Small chronic infarcts in the lateral right basal ganglia and the left cerebellar hemisphere.    CERVICAL SPINE CT:  1.  Age-indeterminate fracture deformity involving the anterior aspect of the superior C6 endplate is favored to be chronic or potentially reflect a developmental variant. A more  recent abnormality while considered less likely is not completely excluded.  2.  No additional evidence of cervical spine fracture. No subluxation.  3.  Degenerative changes as highlighted above.      4.  3 cm right thyroid nodule and marked enlargement of the right lobe of the thyroid. Please see follow-up guidelines below.    REFERENCE:  Juancho HICKMAN et al. Managing Incidental Thyroid Nodules Detected on Imaging: White Paper of the ACR Incidental Thyroid Findings Committee. JACR 2015; 12:143-150.    Incidental thyroid nodule detected on CT or MRI without suspicious findings. Applies to general population without limited life expectancy or significant comorbidities.    Age greater than or equal to 35 years  Less than 1.5 cm: No further evaluation.  Greater than or equal to 1.5 cm: Evaluate with thyroid ultrasound.           CT Chest/Abdomen/Pelvis w Contrast    Narrative    EXAM: CT CHEST/ABDOMEN/PELVIS W CONTRAST  LOCATION: [facility]  DATE/TIME: 11/15/2021 5:08 AM    INDICATION: Chest-abdomen-pelvis trauma, blunt. Fall from chair. Forehead laceration. Back pain. Right leg shortened and externally rotated. On warfarin.  COMPARISON: None.  TECHNIQUE: CT scan of the chest, abdomen, and pelvis was performed following injection of IV contrast. Multiplanar reformats were obtained. Dose reduction techniques were used.   CONTRAST: 100 mL [id number].    FINDINGS:   LUNGS AND PLEURA: Small bilateral pleural effusions. Dependent atelectasis bilaterally. Few tiny calcified granulomas. No pneumothorax.    MEDIASTINUM/AXILLAE: Enlarged multinodular thyroid gland. No lymph node enlargement. Left subclavian cardiac device in place. The heart size is normal.    CORONARY ARTERY CALCIFICATION: Moderate.    HEPATOBILIARY: Stones in the gallbladder. There is biliary dilatation into the pancreas head without cause of obstruction seen.    PANCREAS: Small duodenal diverticulum at the pancreas head. The pancreas  otherwise appears normal.    SPLEEN: Normal.    ADRENAL GLANDS: Normal.    KIDNEYS/BLADDER: Small bilateral renal cortical cysts. No hydronephrosis.    BOWEL: There are colonic diverticula without acute diverticulitis. No bowel obstruction or inflammation.    LYMPH NODES: Normal.    VASCULATURE: Atherosclerotic calcification of the aorta and its branches. No aneurysm.    PELVIC ORGANS: Several small uterine calcifications. No adnexal mass.    MUSCULOSKELETAL: Small umbilical hernia containing fat. Degenerative disease throughout the spine. No acute bone fracture.      Impression    IMPRESSION:  1.  No acute traumatic abnormality.  2.  Small bilateral pleural effusions.  3.  Colonic diverticula without acute diverticulitis. No bowel obstruction or inflammation.  4.  Cholelithiasis.  5.  Biliary dilatation into the pancreas head. No cause of obstruction is seen.             CT Thoracic Spine w/o Contrast    Narrative    EXAM: CT THORACIC SPINE W/O CONTRAST, CT LUMBAR SPINE W/O CONTRAST  LOCATION: [facility]  DATE/TIME: 11/15/2021 4:55 AM    INDICATION: Mid-back pain, low back pain.  COMPARISON: None.  TECHNIQUE:  1) Routine CT Thoracic Spine without IV contrast. Multiplanar reformats. Dose reduction techniques were used.   2) Routine CT Lumbar Spine without IV contrast. Multiplanar reformats. Dose reduction techniques were used.     FINDINGS:    THORACIC SPINE CT:  VERTEBRA: There is accentuation of thoracic kyphosis resulting from loss of vertebral body height at multiple mid and lower thoracic levels. Ankylosis of the vertebral column from the T1 level to the T10 level. No fracture or posttraumatic subluxation.     CANAL/FORAMINA: Multilevel degenerative changes. No canal or neural foraminal stenosis.    PARASPINAL: There are bilateral pleural effusions.    LUMBAR SPINE CT:  VERTEBRA: Lumbar vertebra are normal in height. There are mild alterations in the lateral alignment at L4-L5 and  L5-S1 attributable to facet arthropathy at these levels. No fracture or posttraumatic subluxation.     CANAL/FORAMINA: Multilevel degenerative changes with prominent facet arthropathy at multiple levels and mild discogenic degenerative changes at multiple levels. There is moderate central canal stenosis at the L3-L4 level. No high-grade foraminal   stenosis.    PARASPINAL: Diffuse atheromatous changes in the distal aorta and branch vessels. Evidence of cholelithiasis.      Impression    IMPRESSION:  THORACIC SPINE CT:  1.  No fracture or posttraumatic subluxation.  2.  No high-grade spinal canal or neural foraminal stenosis.  3.  Bilateral pleural effusions.    LUMBAR SPINE CT:  1.  No fracture or posttraumatic subluxation.  2.  No high-grade spinal canal or neural foraminal stenosis.  3.  Cholelithiasis.             Lumbar spine CT w/o contrast    Narrative    EXAM: CT THORACIC SPINE W/O CONTRAST, CT LUMBAR SPINE W/O CONTRAST  LOCATION: [facility]  DATE/TIME: 11/15/2021 4:55 AM    INDICATION: Mid-back pain, low back pain.  COMPARISON: None.  TECHNIQUE:  1) Routine CT Thoracic Spine without IV contrast. Multiplanar reformats. Dose reduction techniques were used.   2) Routine CT Lumbar Spine without IV contrast. Multiplanar reformats. Dose reduction techniques were used.     FINDINGS:    THORACIC SPINE CT:  VERTEBRA: There is accentuation of thoracic kyphosis resulting from loss of vertebral body height at multiple mid and lower thoracic levels. Ankylosis of the vertebral column from the T1 level to the T10 level. No fracture or posttraumatic subluxation.     CANAL/FORAMINA: Multilevel degenerative changes. No canal or neural foraminal stenosis.    PARASPINAL: There are bilateral pleural effusions.    LUMBAR SPINE CT:  VERTEBRA: Lumbar vertebra are normal in height. There are mild alterations in the lateral alignment at L4-L5 and L5-S1 attributable to facet arthropathy at these levels. No  fracture or posttraumatic subluxation.     CANAL/FORAMINA: Multilevel degenerative changes with prominent facet arthropathy at multiple levels and mild discogenic degenerative changes at multiple levels. There is moderate central canal stenosis at the L3-L4 level. No high-grade foraminal   stenosis.    PARASPINAL: Diffuse atheromatous changes in the distal aorta and branch vessels. Evidence of cholelithiasis.      Impression    IMPRESSION:  THORACIC SPINE CT:  1.  No fracture or posttraumatic subluxation.  2.  No high-grade spinal canal or neural foraminal stenosis.  3.  Bilateral pleural effusions.    LUMBAR SPINE CT:  1.  No fracture or posttraumatic subluxation.  2.  No high-grade spinal canal or neural foraminal stenosis.  3.  Cholelithiasis.             XR Femur Right 2 Views    Narrative    EXAM: XR FEMUR RIGHT 2 VIEW  LOCATION: [facility]  DATE/TIME: 11/15/2021 5:32 AM    INDICATION: fall with pain and leg externally rotated  COMPARISON: None.      Impression    IMPRESSION: Oblique fracture the distal femoral shaft with dorsal angulation and displacement. Moderate osteoarthritis in the knee joint.   XR Pelvis 1/2 Views    Narrative    EXAM: XR PELVIS 1/2 VW  LOCATION: [facility]  DATE/TIME: 11/15/2021 5:17 AM    INDICATION: Pain after fall.  COMPARISON: None.      Impression    IMPRESSION: No acute fracture or dislocation.   XR Knee Right 1/2 Views    Narrative    EXAM: XR KNEE RT 1 /2 VW  LOCATION: [facility]  DATE/TIME: 11/15/2021 5:26 AM    INDICATION: Fall with knee pain.  COMPARISON: None.      Impression    IMPRESSION: There is an oblique mildly displaced fracture seen through the shaft of the distal right femur. There is mild posterior and lateral displacement of the distal fracture fragment. Generalized osteopenia. Marked tricompartmental degenerative   arthritis. Vascular calcifications.                CT Head w/o  Contrast    Narrative    EXAM: CT HEAD W/O CONTRAST  LOCATION: [facility]  DATE/TIME: 11/16/2021 5:47 AM    INDICATION: Head trauma, minor (Age >= 65y)  COMPARISON: 11/5/2021  TECHNIQUE: Routine CT Head without IV contrast. Multiplanar reformats. Dose reduction techniques were used.    FINDINGS:  The exam is moderately degraded by motion.    INTRACRANIAL CONTENTS: No definite intracranial hemorrhage, extraaxial collection, or mass effect.  No CT evidence of acute infarct. Moderate presumed chronic small vessel ischemic changes. Moderate generalized volume loss. No hydrocephalus.     VISUALIZED ORBITS/SINUSES/MASTOIDS: No intraorbital abnormality. No paranasal sinus mucosal disease. No middle ear or mastoid effusion.    BONES/SOFT TISSUES: Right frontal scalp hematoma. No fracture.      Impression    IMPRESSION:  1.  The exam is moderately degraded by motion.  2.  Within these limitations, no definite acute intracranial pathology is identified.   XR Surgery EZRA Fluoro L/T 5 Min w Stills    Narrative    This exam was marked as non-reportable because it will not be read by a   radiologist or a [place] non-radiologist provider.         XR Femur Port Left 2 Views    Narrative    FEMUR PORTABLE LEFT TWO VIEW   11/18/2021 12:28 PM     HISTORY: Left leg pain with motion.    COMPARISON: None.      Impression    IMPRESSION: Cortical step-off distal medial femoral metaphysis  suspicious for nondisplaced fracture. No femoral neck fracture.  Diffuse osteopenia. Moderate medial compartment joint space narrowing  in the knee.    EUGENIE LARA MD         SYSTEM ID:  [id number]       Discharge Medications   Current Discharge Medication List      START taking these medications    Details   HYDROmorphone (DILAUDID) 2 MG tablet Take 0.5 tablets (1 mg) by mouth every 4 hours as needed for moderate pain (Pain greater than 5)  Qty: 15 tablet, Refills: 0    Associated Diagnoses: Closed fracture of right femur,  unspecified fracture morphology, unspecified portion of femur, initial encounter (H)         CONTINUE these medications which have CHANGED    Details   lisinopril (ZESTRIL) 5 MG tablet Take 1 tablet (5 mg) by mouth every evening    Associated Diagnoses: Systolic congestive heart failure, unspecified HF chronicity (H)      metFORMIN (GLUCOPHAGE-XR) 500 MG 24 hr tablet Take 2 tablets (1,000 mg) by mouth daily (with breakfast)    Associated Diagnoses: Type II diabetes mellitus with peripheral circulatory disorder (H)      metoprolol succinate ER (TOPROL-XL) 25 MG 24 hr tablet Take 1 tablet (25 mg) by mouth every evening    Associated Diagnoses: Chronic atrial fibrillation (H)         CONTINUE these medications which have NOT CHANGED    Details   acetaminophen (TYLENOL) 500 MG tablet Take 1,000 mg by mouth 2 times daily      atorvastatin (LIPITOR) 10 MG tablet Take 10 mg by mouth every evening      CARBOXYMETHYLCELLULOSE SODIUM 0.5 % SOLN ophthalmic solution INSTILL 1 DROP IN EACH EYE EVERY MORNING  Qty: 15 mL, Refills: 3    Associated Diagnoses: Frail elderly      furosemide (LASIX) 40 MG tablet Take 40 mg by mouth daily      Glucose Blood (BLOOD GLUCOSE TEST STRIPS) STRP by In Vitro route once a week On Monday mornings      !! JANTOVEN ANTICOAGULANT 1 MG tablet TAKE 1 TAB BY MOUTH ON TUE, THR,SAT,SUN. INR 11/16/21      !! JANTOVEN ANTICOAGULANT 2 MG tablet TAKE 1 TAB BY MOUTH ON MON, WED,FRI. INR 11/16/21      Menthol-Methyl Salicylate (SAMM LEE GREASELESS) cream Apply topically every 6 hours as needed To low back      protein modular (PRO-STAT) Take by mouth every morning 30 ml      simethicone (MYLICON) 80 MG chewable tablet Take 2 tablets (160 mg) by mouth daily    Associated Diagnoses: Gas pain       !! - Potential duplicate medications found. Please discuss with provider.        Allergies   No Known Allergies

## 2021-11-19 NOTE — PHARMACY-ANTICOAGULATION SERVICE
Clinical Pharmacy- Warfarin Discharge Note  This patient is currently on warfarin for the treatment of Atrial fibrillation.  INR Goal= 2-3  Expected length of therapy per PCP.    Warfarin PTA Regimen: 2mg MWF, 1mg TTSS      Anticoagulation Dose History     Recent Dosing and Labs Latest Ref Rng & Units 11/15/2021 11/15/2021 11/15/2021 11/16/2021 11/17/2021 11/18/2021 11/19/2021    Warfarin 1 mg - - - - - - 1 mg -    Warfarin 2 mg - - - - 2 mg - - -    Warfarin 3 mg - - - - - 3 mg - -    INR 0.85 - 1.15 1.98(H) 1.43(H) 1.31(H) 1.25(H) 1.29(H) 1.45(H) 1.77(H)          Vitamin K doses administered during the last 7 days: 2mg IV on 11/15/2021    Recommend discharging the patient on PTA warfarin regimen of 2mg MWF & 1mg TTSS.     The patient should have an INR checked in 1-2 days.    Isaura Montilla, PharmD  November 19, 2021

## 2021-11-19 NOTE — PLAN OF CARE
A&O, forgetful, up with use of ceiling lift. Denies pain, then cries out in pain with any movement. IV SL, VSS, blood sugars 284  0200. Purewick in place, aquacel to right leg CDI, repo q2hr. Rested comfortably throughout shift.

## 2021-11-19 NOTE — PLAN OF CARE
[facility] DISCHARGE NOTE    Writer attempted to call report 3 times before patient left. Writer was unable to get ahold of anyone. Patient discharged to long term care facility at 2:44 PM via cart. Accompanied by daughter and staff. Discharge instructions reviewed with daughter, opportunity offered to ask questions. Prescriptions - None ordered for discharge. All belongings sent with patient.    Jamila Turcios RN

## 2021-11-19 NOTE — PROGRESS NOTES
Care Management Follow Up    Length of Stay (days): 4    Expected Discharge Date: 11/19/2021    Concerns to be Addressed: all concerns addressed in this encounter       Patient plan of care discussed at interdisciplinary rounds: Yes    Anticipated Discharge Disposition: Long Term Care     Anticipated Discharge Services: Transportation Services    Anticipated Discharge DME: None    Patient/family educated on Medicare website which has current facility and service quality ratings: no  Education Provided on the Discharge Plan:  yes  Patient/Family in Agreement with the Plan:  yes    Referrals Placed by CM/SW: Transportation,Post Acute Facilities  Private pay costs discussed: transportation costs    Additional Information:  Per IDT rounds today, MD team states that pt is medically stable for discharge today.  Pt will return to [facility] Phone (Main Phone:[phone] Admissions Phone:[phone] Fax: [phone])  For LTC services.    Transportation discussed with pt's daughterLexie and [facility] to to transport at 1 PM.   Family aware of costs associated with [facility] transportation services.    MICHAEL Irving

## 2021-11-22 NOTE — LETTER
11/22/2021        RE: Mecca Slade  [facility]  [address]  [facility] [address]        [facility] Follow Up  PCP & CLINIC: JOSUE Gillis CNP, [address] [facility] [address] / [place] [address]  Chief Complaint   Patient presents with     Hospital F/U     [facility] 11/15/2021 - 11/19/2021   [facility] MRN: [id number]. Place of Service where encounter took place:  [facility] () [[id number]] Mecca Slade  is a 89 year old  (12/14/1921), admitted to the above facility from  [facility]. Hospital stay 11/15 through 11/19. Admitted to this facility for  rehab, medical management, and nursing care. HPI information obtained from: facility chart records, facility staff, patient report, [facility] chart review, and Care Everywhere [facility] chart review.     Brief Summary of Hospital Course: Lilly presented to [facility] on 11/15 s/p fall at home facility w/ scalp laceration. She was found to have a right distal intra~articular femur fx, and a left distal medial metaphysis femur fx. She underwent ORIF on 11/16 to right femur, left leg was placed in a hinged brace. She is to remain NWB on BLE. Her scalp laceration was stapled,     Updates since admission to long term care unit: Lilly returned to home facility on 11/19 s/p the above hospitalization as noted above. Today, Zayda is calm and relaxed sitting up in bed.  She states she has a little bit of a headache, and her scalp is pretty tender to the touch.  She denies any fever, dizziness, vision changes.  She denies any new shortness of breath, cough, palpitations, chest pain.  She states her bowels and bladder are working normal.  She states she has a little bit of pain in her right leg.    CODE STATUS/ADVANCE DIRECTIVES DISCUSSION: DNR/DNI. Patient's living condition: lives in a skilled nursing facility. ALLERGIES: Patient has no known allergies. 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 PAST SURGICAL HISTORY:   has a past surgical history that includes Electrophysiology Pacemaker (10/09/2012); appendectomy; and Open reduction internal fixation femur distal (Right, 11/16/2021).. FAMILY HISTORY: family history includes Heart Disease in her sister.. SOCIAL HISTORY:   reports that she has never smoked. She has never used smokeless tobacco. She reports that she does not drink alcohol and does not use drugs.  Post Discharge Medication Reconciliation Status: discharge medications reconciled and changed, per note/orders  Current Outpatient Medications   Medication Sig Dispense Refill     carboxymethylcellulose (CARBOXYMETHYLCELLULOSE SODIUM) 0.5 % SOLN ophthalmic solution Place 1 drop into both eyes every morning       methocarbamol (ROBAXIN) 500 MG tablet Take 500 mg by mouth 3 times daily as needed       simethicone (MYLICON) 80 MG chewable tablet Take 2 tablets (160 mg) by mouth every morning After breakfast       acetaminophen (TYLENOL) 500 MG tablet Take 1,000 mg by mouth 3 times daily       atorvastatin (LIPITOR) 10 MG tablet Take 10 mg by mouth every evening       furosemide (LASIX) 40 MG tablet Take 40 mg by mouth daily       HYDROmorphone (DILAUDID) 2 MG tablet Take 0.5 tablets (1 mg) by  "mouth every 4 hours as needed for moderate pain (Pain greater than 5) 15 tablet 0     JANTOVEN ANTICOAGULANT 1 MG tablet TAKE 1 TAB BY MOUTH ON TUE, THR,SAT,SUN. INR 11/16/21       JANTOVEN ANTICOAGULANT 2 MG tablet TAKE 1 TAB BY MOUTH ON MON, WED,FRI. INR 11/16/21       lisinopril (ZESTRIL) 5 MG tablet Take 1 tablet (5 mg) by mouth every evening       Menthol-Methyl Salicylate (SAMM LEE GREASELESS) cream Apply topically every 6 hours as needed To low back       metFORMIN (GLUCOPHAGE-XR) 500 MG 24 hr tablet Take 2 tablets (1,000 mg) by mouth daily (with breakfast)       metoprolol succinate ER (TOPROL-XL) 25 MG 24 hr tablet Take 1 tablet (25 mg) by mouth every evening       protein modular (PRO-STAT) Take by mouth every morning 30 ml       ROS: Limited secondary to cognitive impairment but today pt reports the above and 10 point ROS of systems including Constitutional, Eyes, Respiratory, Cardiovascular, Gastroenterology, Genitourinary, Integumentary, Musculoskeletal, Psychiatric were all negative except for pertinent positives noted in my HPI.    Vitals: BP 99/48   Pulse 89   Temp 99  F (37.2  C)   Resp 20   Ht 1.676 m (5' 6\")   Wt 99.1 kg (218 lb 6.4 oz)   SpO2 94%   BMI 35.25 kg/m    Exam:  GENERAL APPEARANCE: Alert, in no distress, cooperative.   ENT: Mouth/posterior oropharynx intact w/ moist mucous membranes, hearing acuity [place]. Laceration is as pictured here:    EYES: EOM, conjunctivae, lids, pupils and irises normal, PERRL2.   RESP: Respiratory effort unlabored, no respiratory distress, Lung sounds clear/diminished. On RA.   CV: Auscultation of heart reveals S1, S2, rate controlled and rhythm irregular, no murmur, no rub or gallop, Edema minimal/incisional. Peripheral pulses are 2+.  ABDOMEN: Normal bowel sounds, soft, non-tender abdomen, and no masses palpated.  SKIN: Inspection/Palpation of skin and subcutaneous tissue baseline w/ fragility. No wounds/rashes noted, except bottom as noted " here:    NEURO: CN II-XII at patient's baseline, sensation baseline PPS.  PSYCH: Insight, judgement, and memory are impaired at baseline, affect and mood are happy/engaged.    Lab/Diagnostic data: Recent labs in [facility] reviewed by me today.     ASSESSMENT/PLAN:  Fall, subsequent encounter  Laceration of scalp, subsequent encounter  Other closed fracture of distal end of right femur with routine healing, subsequent encounter  Closed metaphyseal fracture of left lower extremity with delayed healing, subsequent encounter  Orthopedic aftercare  Acute. Tenuous.     Provider coordinated care with nursing and rehabilitation services.  Both state that despite presentation during visit today, Zayda is experiencing extreme pain with any movement.  Rehab has attempted to do range of motion with arms, and move Zayda around in bed.  This was demonstrated with rehab and nursing present, and Zayda stated the pain was in her back.  Witnessed cramping and sudden pain.    Nursing requesting extra Dilaudid dose today, as Zayda was so uncomfortable and she was not due for pain medications yet.    Given Zayda is on Coumadin, will await staple removal until 14-day shasta, and order daily care to this site.    Will increase scheduled Tylenol to assist with pain, noting topical agents are available, ice packs available, and after cramping was witnessed, will start low dose Robaxin as noted below.    Aquacel dressing is in place to right lower extremity, clean dry intact with very minimal strikethrough, and left sided immobilizer is also present and locked into place.  Orthopedic follow-up to be scheduled either in person or virtually within 2 weeks.    Left hemiparesis (H)  Pressure injury of buttock, stage 2, unspecified laterality (H)  Controlled atrial fibrillation (H)  Essential hypertension  Long term current use of anticoagulant with international normalized ratio (INR) goal of 1.5-2.0  Encounter for therapeutic drug  monitoring  Chronic. Ongoing. Contributory.     Given underlying history of stroke, and concern for worsened dysphagia, will consult SLP as noted below.    Noting ongoing underlying MASD/skin ulceration to bottom, and now patient even more immobilized than before.  Will order air mattress.    INR is therapeutic today at 2.36.  We will continue current Coumadin dosing, and recheck INR along with routine postoperative labs next Monday.    Given hyperglycemia from hospitalization, will trend blood glucose readings daily.    Provider then coordinated care with patient's daughter Esther.  Esther confirmed the discomfort with her mother's new situation.  Given mortality associated with long bone fractures in the elderly, provider discussed rehab and/or comfort.  Zayda is completely nonweightbearing in bilateral lower extremities, therefore promising rehab may not be possible.  Provider also discussed Zayda's significant discomfort.  Provider introduced the idea of a hospice consultation, and Esther was agreeable to looking into this.  She would like to remain with [facility], as all of her records and care have been with [facility].    Provider then coordinated care with Ирина through [facility] hospice.  We discussed Zayda's case, with whom they are going to evaluate.  They will then reach out to Esther to further coordinate care and potentially sign Zayda onto hospice should she qualify.    Follow up w/in 1 week or as needed.    Orders:  1. Discontinue Glucerna.  2. SLP eval/tx x1. Dx: dysphagial  3. Air mattress, ongoing. Dx: wound care.  4. CBC, BMP, INR x1 on 11/29. Dx: ORIF/Afib.   5. Increase Tylenol to 1000mg PO TID. Dx: Pain.   6. Robaxin 500mg PO TID PRN. Dx: spasms/pain.  7. Coumadin 2mg M/W/F, 1mg AOD. Dx: Afib.  8. Accu checks QAM. Dx: DM II.   9. Staple care: clean, pat dry, daily. Dx: Laceration.  10. Remove staples after 14 days. Dx: Laceration.   11: [facility] eval/tx x1. Dx: femur fx.      Total time spent with patient visit was 45 min including patient visit, review of past records and phone call to patient contact. Greater than 50% of total time spent with counseling and coordinating care with patient's daughter Esther, nursing, rehabilitation services, and  hospice nurse as noted above.     Electronically signed by:  JOSUE Clark CNP DNP                          Sincerely,        JOSUE Gillis CNP

## 2021-11-22 NOTE — PROGRESS NOTES
[facility] Follow Up  PCP & CLINIC: Haley Sims, APRN CNP, [address] / [place] [address]  Chief Complaint   Patient presents with     Hospital F/U     [facility] 11/15/2021 - 11/19/2021   [place] MRN: [id number]. Place of Service where encounter took place:  [facility] () [[id number]] Mecca Slade  is a 89 year old  (12/14/1921), admitted to the above facility from  [facility]. Hospital stay 11/15 through 11/19. Admitted to this facility for  rehab, medical management, and nursing care. HPI information obtained from: facility chart records, facility staff, patient report, [facility] chart review, and Care Everywhere [facility] chart review.     Brief Summary of Hospital Course: Lilly presented to [facility] on 11/15 s/p fall at home facility w/ scalp laceration. She was found to have a right distal intra~articular femur fx, and a left distal medial metaphysis femur fx. She underwent ORIF on 11/16 to right femur, left leg was placed in a hinged brace. She is to remain NWB on BLE. Her scalp laceration was stapled,     Updates since admission to long term care unit: Lilly returned to home facility on 11/19 s/p the above hospitalization as noted above. Today, Zayda is calm and relaxed sitting up in bed.  She states she has a little bit of a headache, and her scalp is pretty tender to the touch.  She denies any fever, dizziness, vision changes.  She denies any new shortness of breath, cough, palpitations, chest pain.  She states her bowels and bladder are working normal.  She states she has a little bit of pain in her right leg.    CODE STATUS/ADVANCE DIRECTIVES DISCUSSION: DNR/DNI. Patient's living condition: lives in a skilled nursing facility. ALLERGIES: Patient has no known allergies. 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 PAST SURGICAL HISTORY:   has a past surgical history that includes Electrophysiology Pacemaker (10/09/2012); appendectomy; and Open reduction internal fixation femur distal (Right, 11/16/2021).. FAMILY HISTORY: family history includes Heart Disease in her sister.. SOCIAL HISTORY:   reports that she has never smoked. She has never used smokeless tobacco. She reports that she does not drink alcohol and does not use drugs.  Post Discharge Medication Reconciliation Status: discharge medications reconciled and changed, per note/orders  Current Outpatient Medications   Medication Sig Dispense Refill     carboxymethylcellulose (CARBOXYMETHYLCELLULOSE SODIUM) 0.5 % SOLN ophthalmic solution Place 1 drop into both eyes every morning       methocarbamol (ROBAXIN) 500 MG tablet Take 500 mg by mouth 3 times daily as needed       simethicone (MYLICON) 80 MG chewable tablet Take 2 tablets (160 mg) by mouth every morning After breakfast       acetaminophen (TYLENOL) 500 MG tablet Take 1,000 mg by mouth 3 times daily       atorvastatin (LIPITOR) 10 MG tablet Take 10 mg by mouth every evening       furosemide (LASIX) 40 MG tablet Take 40 mg by mouth daily       HYDROmorphone (DILAUDID) 2 MG tablet Take 0.5 tablets (1 mg) by mouth every 4 hours as needed for moderate pain (Pain greater than 5) 15 tablet 0     JANTOVEN ANTICOAGULANT 1 MG  "tablet TAKE 1 TAB BY MOUTH ON TUE, THR,SAT,SUN. INR 11/16/21       JANTOVEN ANTICOAGULANT 2 MG tablet TAKE 1 TAB BY MOUTH ON MON, WED,FRI. INR 11/16/21       lisinopril (ZESTRIL) 5 MG tablet Take 1 tablet (5 mg) by mouth every evening       Menthol-Methyl Salicylate (SAMM LEE GREASELESS) cream Apply topically every 6 hours as needed To low back       metFORMIN (GLUCOPHAGE-XR) 500 MG 24 hr tablet Take 2 tablets (1,000 mg) by mouth daily (with breakfast)       metoprolol succinate ER (TOPROL-XL) 25 MG 24 hr tablet Take 1 tablet (25 mg) by mouth every evening       protein modular (PRO-STAT) Take by mouth every morning 30 ml       ROS: Limited secondary to cognitive impairment but today pt reports the above and 10 point ROS of systems including Constitutional, Eyes, Respiratory, Cardiovascular, Gastroenterology, Genitourinary, Integumentary, Musculoskeletal, Psychiatric were all negative except for pertinent positives noted in my HPI.    Vitals: BP 99/48   Pulse 89   Temp 99  F (37.2  C)   Resp 20   Ht 1.676 m (5' 6\")   Wt 99.1 kg (218 lb 6.4 oz)   SpO2 94%   BMI 35.25 kg/m    Exam:  GENERAL APPEARANCE: Alert, in no distress, cooperative.   ENT: Mouth/posterior oropharynx intact w/ moist mucous membranes, hearing acuity [place]. Laceration is as pictured here:    EYES: EOM, conjunctivae, lids, pupils and irises normal, PERRL2.   RESP: Respiratory effort unlabored, no respiratory distress, Lung sounds clear/diminished. On RA.   CV: Auscultation of heart reveals S1, S2, rate controlled and rhythm irregular, no murmur, no rub or gallop, Edema minimal/incisional. Peripheral pulses are 2+.  ABDOMEN: Normal bowel sounds, soft, non-tender abdomen, and no masses palpated.  SKIN: Inspection/Palpation of skin and subcutaneous tissue baseline w/ fragility. No wounds/rashes noted, except bottom as noted here:    NEURO: CN II-XII at patient's baseline, sensation baseline PPS.  PSYCH: Insight, judgement, and memory are impaired at " baseline, affect and mood are happy/engaged.    Lab/Diagnostic data: Recent labs in [facility] reviewed by me today.     ASSESSMENT/PLAN:  Fall, subsequent encounter  Laceration of scalp, subsequent encounter  Other closed fracture of distal end of right femur with routine healing, subsequent encounter  Closed metaphyseal fracture of left lower extremity with delayed healing, subsequent encounter  Orthopedic aftercare  Acute. Tenuous.     Provider coordinated care with nursing and rehabilitation services.  Both state that despite presentation during visit today, Zayda is experiencing extreme pain with any movement.  Rehab has attempted to do range of motion with arms, and move Zayda around in bed.  This was demonstrated with rehab and nursing present, and Zayda stated the pain was in her back.  Witnessed cramping and sudden pain.    Nursing requesting extra Dilaudid dose today, as Zayda was so uncomfortable and she was not due for pain medications yet.    Given Zayda is on Coumadin, will await staple removal until 14-day shasta, and order daily care to this site.    Will increase scheduled Tylenol to assist with pain, noting topical agents are available, ice packs available, and after cramping was witnessed, will start low dose Robaxin as noted below.    Aquacel dressing is in place to right lower extremity, clean dry intact with very minimal strikethrough, and left sided immobilizer is also present and locked into place.  Orthopedic follow-up to be scheduled either in person or virtually within 2 weeks.    Left hemiparesis (H)  Pressure injury of buttock, stage 2, unspecified laterality (H)  Controlled atrial fibrillation (H)  Essential hypertension  Long term current use of anticoagulant with international normalized ratio (INR) goal of 1.5-2.0  Encounter for therapeutic drug monitoring  Chronic. Ongoing. Contributory.     Given underlying history of stroke, and concern for worsened dysphagia, will consult SLP as  noted below.    Noting ongoing underlying MASD/skin ulceration to bottom, and now patient even more immobilized than before.  Will order air mattress.    INR is therapeutic today at 2.36.  We will continue current Coumadin dosing, and recheck INR along with routine postoperative labs next Monday.    Given hyperglycemia from hospitalization, will trend blood glucose readings daily.    Provider then coordinated care with patient's daughter Esther.  Esther confirmed the discomfort with her mother's new situation.  Given mortality associated with long bone fractures in the elderly, provider discussed rehab and/or comfort.  Zayda is completely nonweightbearing in bilateral lower extremities, therefore promising rehab may not be possible.  Provider also discussed Zayda's significant discomfort.  Provider introduced the idea of a hospice consultation, and Esther was agreeable to looking into this.  She would like to remain with [facility], as all of her records and care have been with [facility].    Provider then coordinated care with Ирина through [facility] hospice.  We discussed Zayda's case, with whom they are going to evaluate.  They will then reach out to Esther to further coordinate care and potentially sign Zayda onto hospice should she qualify.    Follow up w/in 1 week or as needed.    Orders:  1. Discontinue Glucerna.  2. SLP eval/tx x1. Dx: dysphagial  3. Air mattress, ongoing. Dx: wound care.  4. CBC, BMP, INR x1 on 11/29. Dx: ORIF/Afib.   5. Increase Tylenol to 1000mg PO TID. Dx: Pain.   6. Robaxin 500mg PO TID PRN. Dx: spasms/pain.  7. Coumadin 2mg M/W/F, 1mg AOD. Dx: Afib.  8. Accu checks QAM. Dx: DM II.   9. Staple care: clean, pat dry, daily. Dx: Laceration.  10. Remove staples after 14 days. Dx: Laceration.   11: [facility] eval/tx x1. Dx: femur fx.     Total time spent with patient visit was 45 min including patient visit, review of past records and phone call to patient contact.  Greater than 50% of total time spent with counseling and coordinating care with patient's daughter Esther, nursing, rehabilitation services, and  hospice nurse as noted above.     Electronically signed by:  JOSUE Clark CNP DNP

## 2021-11-29 NOTE — PROGRESS NOTES
[facility]  Chief Complaint   Patient presents with     Annual Comprehensive Nursing Home    [place] MRN: [id number] Place of Service where encounter took place:  [facility] () [[id number]] HPI: Mecca Slade  is a 89 year old  (12/14/1921), who is being seen today for an annual comprehensive visit. HPI information obtained from: facility chart records, facility staff, patient report and [facility] chart review.  Today's concerns are:    Zayda seen today for her annual physical exam. She recently has returned to facility after breaking both of her legs in a fall and having a large scalp laceration. Hospice was consulted given the severity of her injuries, but family is thinking about this and she has not signed on at this time. She continues upper body rehab.    Though Zayda is not at her baseline, annual physical review still appropriate. Zayda is hard of hearing at baseline, but this has not changed. She denies any headaches/dizziness or vision changes. Staples are still present in her right frontal to parietal region. She denies any trouble swallowing, though noting that she has been working with speech therapy and has nectar thick liquids at this time. She denies any nausea, heartburn, constipation/diarrhea. Zayda denies pain except for when moved. Most of the time her pain is concentrated in her right femur, or in her back. When mobilizing in the bed, her back is what troubles her most. She denies any traveling pain or zingers. She is incontinent of bladder at baseline, and denies any dysuria or fevers.    PHQ9: 2/27.  BIMS: 15/15.    ALLERGIES: Patient has no known allergies.PAST MEDICAL HISTORY:  has a past medical history of A-fib (H), Acute CVA (cerebrovascular accident) (H) (03/01/2017), Acute right MCA stroke (H) (03/01/2017), Cardiac pacemaker in situ, CHF (congestive heart failure) (H), Chronic systolic congestive heart failure (H) (4/3/2017), CKD stage 4  due to type 2 diabetes mellitus (H), DM type 2 (diabetes mellitus, type 2) (H), HTN (hypertension), Left-sided weakness (03/01/2017), Neurological neglect syndrome, Pure hypercholesterolemia, Right sided cerebral hemisphere cerebrovascular accident (H), Tissue plasminogen activator (t-PA) administered at other facility within 24 hours prior to current admission (03/01/2017), and Type 2 diabetes mellitus with diabetic neuropathy, without long-term current use of insulin (H) (4/3/2017).PAST SURGICAL HISTORY:  has a past surgical history that includes Electrophysiology Pacemaker (10/09/2012); appendectomy; and Open reduction internal fixation femur distal (Right, 11/16/2021).  IMMUNIZATIONS:  Immunization History   Administered Date(s) Administered     COVID-19,PF,Moderna 12/31/2020, 02/02/2021     Flu, Unspecified 10/06/2004, 11/16/2005, 10/26/2006, 10/25/2018     Influenza (High Dose) 3 valent vaccine 10/14/2010, 09/30/2011, 11/03/2015, 10/06/2016, 10/08/2020     Influenza (IIV3) PF 10/06/2004, 10/26/2006, 10/25/2007, 10/14/2008, 09/18/2009, 09/07/2012, 10/06/2016     Influenza Quad, Recombinant, pf(RIV4) (Flublok) 10/08/2019     Influenza Vaccine IM > 6 months Valent IIV4 (Alfuria,Fluzone) 09/03/2013, 10/01/2016     Influenza, Whole Virus 10/20/2017     Pneumo Conj 13-V (2010&after) 04/20/2017     Pneumococcal 23 valent 11/06/1995, 01/27/2005, 10/28/2011     Pneumococcal, Unspecified 07/01/2011     TD (ADULT, 7+) 10/07/1997, 01/24/2001, 12/14/2017     TDAP Vaccine (Adacel) 12/12/2017     TDAP Vaccine (Boostrix) 12/12/2017     Td (Adult), Adsorbed 10/07/1997, 01/24/2001     Tdap (Adacel,Boostrix) 12/12/2017    Above immunizations pulled from [facility]. [facility] and facility records also reconciled. Outstanding information sent to  to update [facility].  Future immunizations are not needed at this point as all recommended immunizations are up to date.   Current Outpatient Medications   Medication  Sig Dispense Refill     acetaminophen (TYLENOL) 500 MG tablet Take 1,000 mg by mouth 3 times daily       atorvastatin (LIPITOR) 10 MG tablet Take 10 mg by mouth every evening       carboxymethylcellulose (CARBOXYMETHYLCELLULOSE SODIUM) 0.5 % SOLN ophthalmic solution Place 1 drop into both eyes every morning       furosemide (LASIX) 40 MG tablet Take 40 mg by mouth daily       HYDROmorphone (DILAUDID) 2 MG tablet Take 0.5 tablets (1 mg) by mouth every 4 hours as needed for moderate pain (Pain greater than 5) 15 tablet 0     JANTOVEN ANTICOAGULANT 1 MG tablet TAKE 1 TAB BY MOUTH ON TUE, THR,SAT,SUN. INR 11/16/21       JANTOVEN ANTICOAGULANT 2 MG tablet TAKE 1 TAB BY MOUTH ON MON, WED,FRI. INR 11/16/21       lisinopril (ZESTRIL) 5 MG tablet Take 1 tablet (5 mg) by mouth every evening       Menthol-Methyl Salicylate (SAMM LEE GREASELESS) cream Apply topically every 6 hours as needed To low back       metFORMIN (GLUCOPHAGE-XR) 500 MG 24 hr tablet Take 2 tablets (1,000 mg) by mouth daily (with breakfast)       methocarbamol (ROBAXIN) 500 MG tablet Take 500 mg by mouth 3 times daily as needed       metoprolol succinate ER (TOPROL-XL) 25 MG 24 hr tablet Take 1 tablet (25 mg) by mouth every evening       protein modular (PRO-STAT) Take by mouth every morning 30 ml       simethicone (MYLICON) 80 MG chewable tablet Take 2 tablets (160 mg) by mouth every morning After breakfast       Case Management: I have reviewed the facility/SNF care plan/MDS, including the falls risk, nutrition and pain screening. I also reviewed the current immunizations, and preventive care. .Future cancer screening is not clinically indicated secondary to age/goals of care Patient's desire to return to the community is not present. Current Level of Care is appropriate.    Advance Directive Discussion: I reviewed the current advanced directives as reflected in EPIC, the POLST and the facility chart, and verified the congruency of orders.  I did not due to  "cognitive impairment review the advance directives with the resident.     Team Discussion: I communicated with the appropriate disciplines involved with the Plan of Care: Nursing  .Patient's goal is pain control and comfort.Information reviewed: Medications, vital signs, orders, and nursing notes.    ROS: Limited secondary to cognitive impairment but today pt reports the above and 8 point ROS of systems including Constitutional, Eyes, Respiratory, Cardiovascular, Gastroenterology, Genitourinary, Integumentary, Musculoskeletal, Psychiatric were all negative except for pertinent positives noted in my HPI.    Vitals: /68   Pulse 86   Temp 97.7  F (36.5  C)   Resp 16   Ht 1.676 m (5' 6\")   Wt 95.3 kg (210 lb 3.2 oz)   SpO2 95%   BMI 33.93 kg/m     BPs:    BGs:    Exam:  GENERAL APPEARANCE: Alert, in no distress, cooperative.   ENT: Mouth/posterior oropharynx intact w/ moist mucous membranes, hearing acuity [place].  EYES: EOM, conjunctivae, lids, pupils and irises normal, PERRL2.   RESP: Respiratory effort unlabored, no respiratory distress, Lung sounds clear/diinished. On RA.   CV: Auscultation of heart reveals S1, S2, rate controlled and rhythm irregular, no murmur, no rub or gallop, Edema trace+ BLE. Peripheral pulses are 2+.  ABDOMEN: Normal bowel sounds, soft, non-tender abdomen, and no masses palpated.  SKIN: Inspection/Palpation of skin and subcutaneous tissue baseline w/ fragility. No wounds/rashes noted except buttocks as noted below:    MSK: Left leg is immobilized at 0 degrees.   NEURO: CN II-XII at patient's baseline, sensation baseline PPS.  PSYCH: Insight, judgement, and memory are baseline impaired, affect and mood are happy/engaged.    Lab/Diagnostic data: Recent labs in [facility] reviewed by me today.     ASSESSMENT/PLAN  Annual physical exam  Other closed fracture of distal end of right femur with routine healing, subsequent encounter  Closed metaphyseal fracture of left lower extremity with " delayed healing, subsequent encounter  Orthopedic aftercare  Fall, subsequent encounter  Left hemiparesis (H)  Pressure injury of buttock, stage 2, unspecified laterality (H)  Controlled atrial fibrillation (H)  Long term current use of anticoagulant with international normalized ratio (INR) goal of 1.5-2.0  Encounter for therapeutic drug monitoring  Essential hypertension  Type II diabetes mellitus with peripheral circulatory disorder (H)  CKD (chronic kidney disease) stage 4, GFR 15-29 ml/min (H)  Acute on chronic. Ongoing.    Zayda continues to need extensive support after her severe injuries. Given her age and severity of injuries, provider still highly recommending hospice services to assist with comfort and quality of life.    Will reduce polypharmacy and discontinue unused medications.    Noting significantly elevated INR, and Zayda has always been very sensitive to Coumadin changes. Will hold Coumadin and monitor with recheck tomorrow.    Recent blood work completed as part of her readmission to facility after surgery and injuries. Will evaluate thyroid, diabetes, lipid panel, and trend hemoglobin as noted below as part of her follow-up and as part of her annual physical.    Noting wound care to buttocks was changed at some point. Unclear why this was done. Given slough is still present in the wound bed, patient is having pain at this site when touched, and fragility is noted, will resume previous Medihoney, Skin-Prep, and Mepilex border dressing.  Follow up w/in 1 week or as needed.    Orders:  1. Discontinue Bengay  2. Discontinue Simethicone.  3. Hgb, TSH, a1c, Lipids x1 on 12/2. Dx: annual physical.  4. Discontinue current wound care and switch back to prior:   Medihoney, w/ skin prep surrounding.   Cover w/ Mepelex.   5. Hold Coumadin x1 on 11/29. Dx: afib.  6. Recheck INR x1 on 11/30. Dx: afib.     Electronically signed by:  Dr. Haley Sims, APRN CNP DNP

## 2021-12-03 NOTE — TELEPHONE ENCOUNTER
[facility] TRIAGE ENCOUNTER    Chief Complaint   Patient presents with     INR RESULTS       Mecca Slade is a 89 year old  (12/14/1921), Nurse called today to report: 2.87    ASSESSMENT/PLAN    Coumadin 0.5 mg today    INR tomorrow    Electronically signed by:   Chaparrita Zarco, NP

## 2021-12-05 NOTE — PROGRESS NOTES
[facility] [place] [facility]  Episodic/Acute/Follow-Up  [place] MRN: [id number]. Place of Service where encounter took place:  [facility] () [[id number]]   Chief Complaint   Patient presents with     RECHECK    HPI: Mecca Slade  is a 89 year old (12/14/1921), who is being seen today for an episodic care visit.  HPI information obtained from: facility chart records, facility staff, patient report and [facility] chart review. Today's concern is:    Zayda seen today on routine follow-up as she continues to struggle to recover from bilateral femur fractures.  She states that sometimes the pain is in her left leg, and then sometimes it is in her right leg.  She denies having any numbness or tingling.  She denies any headaches, or dizziness, and noting that her previous stapled laceration has had staples removed successfully.  A large scab remains in place.  She further denies any trouble with shortness of breath, chest pain, cough, fever, wheezing, bladder changes, and states that sometimes she gets constipated and sometimes she is fine. INR resulted low at 1.5 today.     Past Medical and Surgical History reviewed in Epic today.  MEDICATIONS:  Current Outpatient Medications   Medication Sig Dispense Refill     lisinopril (ZESTRIL) 5 MG tablet Take 0.5 tablets (2.5 mg) by mouth every evening       acetaminophen (TYLENOL) 500 MG tablet Take 1,000 mg by mouth 3 times daily       atorvastatin (LIPITOR) 10 MG tablet Take 10 mg by mouth every evening       carboxymethylcellulose (CARBOXYMETHYLCELLULOSE SODIUM) 0.5 % SOLN ophthalmic solution Place 1 drop into both eyes every morning       furosemide (LASIX) 40 MG tablet Take 40 mg by mouth daily       HYDROmorphone (DILAUDID) 2 MG tablet Take 0.5 tablets (1 mg) by mouth every 4 hours as needed for moderate pain (Pain greater than 5) 15 tablet 0     JANTOVEN ANTICOAGULANT 1 MG tablet TAKE 1 TAB BY MOUTH ON TUE, THR,SAT,SUN. INR 11/16/21        "JANTOVEN ANTICOAGULANT 2 MG tablet TAKE 1 TAB BY MOUTH ON MON, WED,FRI. INR 11/16/21       metFORMIN (GLUCOPHAGE-XR) 500 MG 24 hr tablet Take 2 tablets (1,000 mg) by mouth daily (with breakfast)       methocarbamol (ROBAXIN) 500 MG tablet Take 500 mg by mouth 3 times daily as needed       metoprolol succinate ER (TOPROL-XL) 25 MG 24 hr tablet Take 1 tablet (25 mg) by mouth every evening       protein modular (PRO-STAT) Take by mouth every morning 30 ml       REVIEW OF SYSTEMS: Limited secondary to cognitive impairment but today pt reports the above and 10 point ROS of systems including Constitutional, Eyes, Respiratory, Cardiovascular, Gastroenterology, Genitourinary, Integumentary, Musculoskeletal, Psychiatric were all negative except for pertinent positives noted in my HPI.    Vitals: /66   Pulse 83   Temp 97.6  F (36.4  C)   Resp 20   Ht 1.676 m (5' 6\")   Wt 93.9 kg (207 lb)   SpO2 95%   BMI 33.41 kg/m     BPs:    BGs:    Exam:  GENERAL APPEARANCE: Alert, in no distress, cooperative.   ENT: Mouth/posterior oropharynx intact w/ moist mucous membranes, hearing acuity [place].  EYES: EOM, conjunctivae, lids, pupils and irises normal, PERRL2.   RESP: Respiratory effort unlabored, no respiratory distress, Lung sounds clear/diinished. On RA.   CV: Auscultation of heart reveals S1, S2, rate controlled and rhythm irregular, no murmur, no rub or gallop, Edema trace+ BLE. Peripheral pulses are 2+.  ABDOMEN: Normal bowel sounds, soft, non-tender abdomen, and no masses palpated.  SKIN: Inspection/Palpation of skin and subcutaneous tissue baseline w/ fragility. Left ankle DTI and buttocks as noted below:      MSK: Left leg is immobilized at 0 degrees.   NEURO: CN II-XII at patient's baseline, sensation baseline PPS.  PSYCH: Insight, judgement, and memory are baseline impaired, affect and mood are happy/engaged.    Lab/Diagnostic data: Recent labs in [facility] reviewed by me today.     ASSESSMENT/PLAN  Other closed " fracture of distal end of right femur with routine healing, subsequent encounter  Closed metaphyseal fracture of left lower extremity with delayed healing, subsequent encounter  Orthopedic aftercare  Fall, subsequent encounter  Left hemiparesis (H)  Pressure injury of buttock, stage 2, unspecified laterality (H)  Controlled atrial fibrillation (H)  Long term current use of anticoagulant with international normalized ratio (INR) goal of 1.5-2.0  Encounter for therapeutic drug monitoring  Essential hypertension  Type II diabetes mellitus with peripheral circulatory disorder (H)  CKD (chronic kidney disease) stage 4, GFR 15-29 ml/min (H)  Acute on chronic. Ongoing.    INR is subtherapeutic today, will increase Coumadin dosing and monitor closely given atrial fibrillation and immobility.  Will not rule out potential need for Lovenox.  Noting fluctuation upward last week.    Given postoperative status, fluctuations with INR, and overall decline, will trend blood work as noted below.    We will decrease lisinopril slightly to 2.5, which is lowest and most effective dose.    Provider coordinated care with nursing and rehabilitation services who reported a deep tissue injury or pressure injury from orthopedic device to left lower extremity.  This injury as pictured above.  Rehab reports that things may have Prafo boot in-house, but would also order one for patient if needed.    Provider coordinated care with orthopedic specialist Umer Santillan.  Umer reviewed x-rays, injuries, and current treatment plan.  Given deep tissue injury, recommendation to release the lowest buckle on immobilizer, and allow Prafo boot/padding available to be placed and there at all times.  Otherwise defer to orthopedics on follow-up.    Given fragility of Zayda and her skin, will order site treatment to deep tissue injury as noted below.    Provider highly recommended hospice services once again while in interdisciplinary rounds, and therapy will be  completing their skilled services later this week.   will be talking about this with family, and provider noted that  could reapproach hospice as an option for Zayda to be comfortable.  Follow up w/in 1 week or as needed.    Orders:  1. Coumadin 2mg PO QDay x 3 days. Dx: afib  2. Decrease Lisinopril to 2.5mg  3. CBC, BMP, INR x1 on 12/9. Dx: afib, ORIF.  4. Prafo boot to LLE at all times. Dx: DTI.  5. Knee immobilizer: keep bottom strap unstrapped at all times. Dx: DTI.  6. Wound care to left ankle DTI, daily and PRN.    Cleanse and pat dry.    Apply skin prep.    Cover w/ Mepelex.   Dx: DTI.     Total time spent with patient visit was 35 min including patient visit and review of past records. Greater than 50% of total time spent with counseling and coordinating care w/ , rehabilitation team, and orthopedic specialist as noted above.     Electronically signed by:  Dr. Haley Sims, APRN CNP DNP

## 2021-12-06 NOTE — LETTER
12/6/2021        RE: Mecca Slade  [facility]  [address] [facility] [address]        [facility] [place] [facility]IATRIC SERVICE  Episodic/Acute/Follow-Up  [place] MRN: [id number]. Place of Service where encounter took place:  [facility] () [[id number]]   Chief Complaint   Patient presents with     RECHECK    HPI: Mecca Slade  is a 89 year old (12/14/1921), who is being seen today for an episodic care visit.  HPI information obtained from: facility chart records, facility staff, patient report and [facility] chart review. Today's concern is:    Zayda seen today on routine follow-up as she continues to struggle to recover from bilateral femur fractures.  She states that sometimes the pain is in her left leg, and then sometimes it is in her right leg.  She denies having any numbness or tingling.  She denies any headaches, or dizziness, and noting that her previous stapled laceration has had staples removed successfully.  A large scab remains in place.  She further denies any trouble with shortness of breath, chest pain, cough, fever, wheezing, bladder changes, and states that sometimes she gets constipated and sometimes she is fine. INR resulted low at 1.5 today.     Past Medical and Surgical History reviewed in Epic today.  MEDICATIONS:  Current Outpatient Medications   Medication Sig Dispense Refill     lisinopril (ZESTRIL) 5 MG tablet Take 0.5 tablets (2.5 mg) by mouth every evening       acetaminophen (TYLENOL) 500 MG tablet Take 1,000 mg by mouth 3 times daily       atorvastatin (LIPITOR) 10 MG tablet Take 10 mg by mouth every evening       carboxymethylcellulose (CARBOXYMETHYLCELLULOSE SODIUM) 0.5 % SOLN ophthalmic solution Place 1 drop into both eyes every morning       furosemide (LASIX) 40 MG tablet Take 40 mg by mouth daily       HYDROmorphone (DILAUDID) 2 MG tablet Take 0.5 tablets (1 mg) by mouth every 4 hours as needed for moderate pain (Pain greater than 5) 15  "tablet 0     JANTOVEN ANTICOAGULANT 1 MG tablet TAKE 1 TAB BY MOUTH ON TUE, THR,SAT,SUN. INR 11/16/21       JANTOVEN ANTICOAGULANT 2 MG tablet TAKE 1 TAB BY MOUTH ON MON, WED,FRI. INR 11/16/21       metFORMIN (GLUCOPHAGE-XR) 500 MG 24 hr tablet Take 2 tablets (1,000 mg) by mouth daily (with breakfast)       methocarbamol (ROBAXIN) 500 MG tablet Take 500 mg by mouth 3 times daily as needed       metoprolol succinate ER (TOPROL-XL) 25 MG 24 hr tablet Take 1 tablet (25 mg) by mouth every evening       protein modular (PRO-STAT) Take by mouth every morning 30 ml       REVIEW OF SYSTEMS: Limited secondary to cognitive impairment but today pt reports the above and 10 point ROS of systems including Constitutional, Eyes, Respiratory, Cardiovascular, Gastroenterology, Genitourinary, Integumentary, Musculoskeletal, Psychiatric were all negative except for pertinent positives noted in my HPI.    Vitals: /66   Pulse 83   Temp 97.6  F (36.4  C)   Resp 20   Ht 1.676 m (5' 6\")   Wt 93.9 kg (207 lb)   SpO2 95%   BMI 33.41 kg/m     BPs:    BGs:    Exam:  GENERAL APPEARANCE: Alert, in no distress, cooperative.   ENT: Mouth/posterior oropharynx intact w/ moist mucous membranes, hearing acuity [place].  EYES: EOM, conjunctivae, lids, pupils and irises normal, PERRL2.   RESP: Respiratory effort unlabored, no respiratory distress, Lung sounds clear/diinished. On RA.   CV: Auscultation of heart reveals S1, S2, rate controlled and rhythm irregular, no murmur, no rub or gallop, Edema trace+ BLE. Peripheral pulses are 2+.  ABDOMEN: Normal bowel sounds, soft, non-tender abdomen, and no masses palpated.  SKIN: Inspection/Palpation of skin and subcutaneous tissue baseline w/ fragility. Left ankle DTI and buttocks as noted below:      MSK: Left leg is immobilized at 0 degrees.   NEURO: CN II-XII at patient's baseline, sensation baseline PPS.  PSYCH: Insight, judgement, and memory are baseline impaired, affect and mood are " happy/engaged.    Lab/Diagnostic data: Recent labs in [facility] reviewed by me today.     ASSESSMENT/PLAN  Other closed fracture of distal end of right femur with routine healing, subsequent encounter  Closed metaphyseal fracture of left lower extremity with delayed healing, subsequent encounter  Orthopedic aftercare  Fall, subsequent encounter  Left hemiparesis (H)  Pressure injury of buttock, stage 2, unspecified laterality (H)  Controlled atrial fibrillation (H)  Long term current use of anticoagulant with international normalized ratio (INR) goal of 1.5-2.0  Encounter for therapeutic drug monitoring  Essential hypertension  Type II diabetes mellitus with peripheral circulatory disorder (H)  CKD (chronic kidney disease) stage 4, GFR 15-29 ml/min (H)  Acute on chronic. Ongoing.    INR is subtherapeutic today, will increase Coumadin dosing and monitor closely given atrial fibrillation and immobility.  Will not rule out potential need for Lovenox.  Noting fluctuation upward last week.    Given postoperative status, fluctuations with INR, and overall decline, will trend blood work as noted below.    We will decrease lisinopril slightly to 2.5, which is lowest and most effective dose.    Provider coordinated care with nursing and rehabilitation services who reported a deep tissue injury or pressure injury from orthopedic device to left lower extremity.  This injury as pictured above.  Rehab reports that things may have Prafo boot in-house, but would also order one for patient if needed.    Provider coordinated care with orthopedic specialist Umer Santillan.  Umer reviewed x-rays, injuries, and current treatment plan.  Given deep tissue injury, recommendation to release the lowest buckle on immobilizer, and allow Prafo boot/padding available to be placed and there at all times.  Otherwise defer to orthopedics on follow-up.    Given fragility of Zayda and her skin, will order site treatment to deep tissue injury as noted  below.    Provider highly recommended hospice services once again while in interdisciplinary rounds, and therapy will be completing their skilled services later this week.   will be talking about this with family, and provider noted that  could reapproach hospice as an option for Zayda to be comfortable.  Follow up w/in 1 week or as needed.    Orders:  1. Coumadin 2mg PO QDay x 3 days. Dx: afib  2. Decrease Lisinopril to 2.5mg  3. CBC, BMP, INR x1 on 12/9. Dx: afib, ORIF.  4. Prafo boot to LLE at all times. Dx: DTI.  5. Knee immobilizer: keep bottom strap unstrapped at all times. Dx: DTI.  6. Wound care to left ankle DTI, daily and PRN.    Cleanse and pat dry.    Apply skin prep.    Cover w/ Mepelex.   Dx: DTI.     Total time spent with patient visit was 35 min including patient visit and review of past records. Greater than 50% of total time spent with counseling and coordinating care w/ , rehabilitation team, and orthopedic specialist as noted above.     Electronically signed by:  Dr. Haley Sims APRN CNP DNP            Sincerely,        JOSUE Gillis CNP

## 2021-12-12 NOTE — PROGRESS NOTES
[facility] [place] G[facility]IATRIC SERVICE  Episodic/Acute/Follow-Up  [place] MRN: [id number]. Place of Service where encounter took place:  [facility] () [[id number]]   Chief Complaint   Patient presents with     RECHECK     Anticoagulation    HPI: Mecca Slade  is a 89 year old (12/14/1921), who is being seen today for an episodic care visit.  HPI information obtained from: facility chart records, facility staff, patient report and [facility] chart review. Today's concern is:    Zayda seen today on routine follow-up as she continues to struggle to recover from bilateral femur fractures.  Nursing reports that her wound has changed, and she still in significant pain at times.  Zayda states that her pain is in her left lower leg, but it is intermittent.  She denies any new shortness of breath, fever, chest pain, bruises or bleeding.  Her INR last week was subtherapeutic, and recheck was due today.    Past Medical and Surgical History reviewed in Epic today.  MEDICATIONS:  Current Outpatient Medications   Medication Sig Dispense Refill     acetaminophen (TYLENOL) 500 MG tablet Take 1,000 mg by mouth 3 times daily       atorvastatin (LIPITOR) 10 MG tablet Take 10 mg by mouth every evening       carboxymethylcellulose (CARBOXYMETHYLCELLULOSE SODIUM) 0.5 % SOLN ophthalmic solution Place 1 drop into both eyes every morning       furosemide (LASIX) 40 MG tablet Take 40 mg by mouth daily       HYDROmorphone (DILAUDID) 2 MG tablet Take 0.5 tablets (1 mg) by mouth every 4 hours as needed for moderate pain (Pain greater than 5) 15 tablet 0     JANTOVEN ANTICOAGULANT 1 MG tablet TAKE 1 TAB BY MOUTH ON TUE, THR,SAT,SUN. INR 11/16/21       JANTOVEN ANTICOAGULANT 2 MG tablet TAKE 1 TAB BY MOUTH ON MON, WED,FRI. INR 11/16/21       lisinopril (ZESTRIL) 5 MG tablet Take 0.5 tablets (2.5 mg) by mouth every evening       metFORMIN (GLUCOPHAGE-XR) 500 MG 24 hr tablet Take 2 tablets (1,000 mg) by mouth daily (with  breakfast)       methocarbamol (ROBAXIN) 500 MG tablet Take 500 mg by mouth 3 times daily as needed       metoprolol succinate ER (TOPROL-XL) 25 MG 24 hr tablet Take 1 tablet (25 mg) by mouth every evening       protein modular (PRO-STAT) Take by mouth every morning 30 ml       REVIEW OF SYSTEMS: Limited secondary to cognitive impairment but today pt reports the above and 10 point ROS of systems including Constitutional, Eyes, Respiratory, Cardiovascular, Gastroenterology, Genitourinary, Integumentary, Musculoskeletal, Psychiatric were all negative except for pertinent positives noted in my HPI.    Vitals: There were no vitals taken for this visit.   BGs:    Exam:  GENERAL APPEARANCE: Alert, in no distress, cooperative.   ENT: Mouth/posterior oropharynx intact w/ moist mucous membranes, hearing acuity [place].  EYES: EOM, conjunctivae, lids, pupils and irises normal, PERRL2.   RESP: Respiratory effort unlabored, no respiratory distress, Lung sounds clear/diinished. On RA.   CV: Auscultation of heart reveals S1, S2, rate controlled and rhythm irregular, no murmur, no rub or gallop, Edema trace+ BLE. Peripheral pulses are 2+.  ABDOMEN: Normal bowel sounds, soft, non-tender abdomen, and no masses palpated.  SKIN: Inspection/Palpation of skin and subcutaneous tissue baseline w/ fragility. Left ankle DTI and buttocks as noted below:      MSK: Left leg is immobilized at 0 degrees.   NEURO: CN II-XII at patient's baseline, sensation baseline PPS.  PSYCH: Insight, judgement, and memory are baseline impaired, affect and mood are happy/engaged.    Lab/Diagnostic data: Recent labs in [facility] reviewed by me today.   Date INR New Dose/Orders   12/9 1.66 2mg/day x 4 days.   12/6 1.56 2mg/day x 3 days.    12/3 2.87 0.5mg x 3 days.   12/1 3.64 0.5mg x 2 days.   11/30 5.34 Hold x1.   11/29 4.81 Hold x1.        ASSESSMENT/PLAN  Other closed fracture of distal end of right femur with routine healing, subsequent encounter  Closed metaphyseal  fracture of left lower extremity with delayed healing, subsequent encounter  Orthopedic aftercare  Pressure injury of buttock, stage 2, unspecified laterality (H)  Pressure injury of left leg, unstageable (H)  Controlled atrial fibrillation (H)  Long term current use of anticoagulant with international normalized ratio (INR) goal of 1.5-2.0  Encounter for therapeutic drug monitoring  Essential hypertension  Type II diabetes mellitus with peripheral circulatory disorder (H)  Acute on chronic. Ongoing.    Provider coordinated care with orthopedic specialist.  Umer reached out to surgeon Tiki Nguyen, and shared wound findings.  Umer recommending that pillows support left leg at all times and brace remain off.    Provider coordinated care with nursing around pain management and wound care.  Given Zayda has significant pain, will not surgically debride this area.  Will instead apply Iodosorb for daily dressing changes to remove eschar cap.  Wound may become considerably worse before better after this Is removed, and delayed healing is likely secondary to patient's age and condition.  Will change wound care orders as noted below.    Provider coordinated care with rehabilitation services, who will and there skilled time later this week.  They state at that time, hospice services may be taking over to keep Zayda comfortable.    Provider coordinated care with Zayda's daughter Esther.  Tomorrow is Zayda's 89th birthday, and Esther plans to take Zayda out to a local restaurant.  Given Zayda's condition, she may have difficulty in transport or discomfort while out and without nursing care.  Provider advised Esther to monitor for discomfort and return to facility if Zayda is having difficulty.  Esther did explain that family has rented a wheelchair accessible vehicle, and Zayda should be able to stay in her chair at all times.  Nursing will predose Zayda to help alleviate any pain and get her set up  appropriately prior to leaving.  This can be done safely, but daughter made aware that it may be difficult for Zayda and extra care should be taken.    Noting some soft BPs, and will slightly lower Lisinopril as noted below.     INR unknown secondary to no lab drawn. Will dose Coumadin as noted below and recheck INR tomorrow.  Follow up w/in 1 week or as needed.    Orders:  1. Wound care to left lower leg QDay & PRN:     1. Skin prep to surrounding intact skin first, apply liberally.     2. Iodasorb gel to wound eschar/cap (do not touch intact/healthy issue).      3. Cover w/ nonadherent foam dressing, secure w/ kerlix/tape.     4. Surround entire left leg w/ pillows and reduce pressure.  2. Monitor RLE discoloration QDay, outline, protect. Dx: pain.  3. Coumadin 2mg PO Qday. Dx: afib.  4. Recheck INR x1 on 12/14.  Dx: afib.  5. Decrease Lisinopril to 2.5mg PO QDay. Dx: HTN.    Total time spent with patient visit was 40 min including patient visit and review of past records. Greater than 50% of total time spent with counseling and coordinating care w/ daughter Esther, rehabilitation team, orthopedic specialist, and nursing as noted above.     Electronically signed by:  Dr. Haley Sims, JOSUE CNP DNP

## 2021-12-13 NOTE — LETTER
12/13/2021        RE: Mecca Slade  [facility]  [address] [facility] [address]        [facility] [place] GERIATRIC SERVICE  Episodic/Acute/Follow-Up  [place] MRN: [id number]. Place of Service where encounter took place:  [facility] () [[id number]]   Chief Complaint   Patient presents with     RECHECK     Anticoagulation    HPI: Mecca Slade  is a 89 year old (12/14/1921), who is being seen today for an episodic care visit.  HPI information obtained from: facility chart records, facility staff, patient report and [facility] chart review. Today's concern is:    Zayda seen today on routine follow-up as she continues to struggle to recover from bilateral femur fractures.  Nursing reports that her wound has changed, and she still in significant pain at times.  Zayda states that her pain is in her left lower leg, but it is intermittent.  She denies any new shortness of breath, fever, chest pain, bruises or bleeding.  Her INR last week was subtherapeutic, and recheck was due today.    Past Medical and Surgical History reviewed in Epic today.  MEDICATIONS:  Current Outpatient Medications   Medication Sig Dispense Refill     acetaminophen (TYLENOL) 500 MG tablet Take 1,000 mg by mouth 3 times daily       atorvastatin (LIPITOR) 10 MG tablet Take 10 mg by mouth every evening       carboxymethylcellulose (CARBOXYMETHYLCELLULOSE SODIUM) 0.5 % SOLN ophthalmic solution Place 1 drop into both eyes every morning       furosemide (LASIX) 40 MG tablet Take 40 mg by mouth daily       HYDROmorphone (DILAUDID) 2 MG tablet Take 0.5 tablets (1 mg) by mouth every 4 hours as needed for moderate pain (Pain greater than 5) 15 tablet 0     JANTOVEN ANTICOAGULANT 1 MG tablet TAKE 1 TAB BY MOUTH ON TUE, THR,SAT,SUN. INR 11/16/21       JANTOVEN ANTICOAGULANT 2 MG tablet TAKE 1 TAB BY MOUTH ON MON, WED,FRI. INR 11/16/21       lisinopril (ZESTRIL) 5 MG tablet Take 0.5 tablets (2.5 mg) by mouth  every evening       metFORMIN (GLUCOPHAGE-XR) 500 MG 24 hr tablet Take 2 tablets (1,000 mg) by mouth daily (with breakfast)       methocarbamol (ROBAXIN) 500 MG tablet Take 500 mg by mouth 3 times daily as needed       metoprolol succinate ER (TOPROL-XL) 25 MG 24 hr tablet Take 1 tablet (25 mg) by mouth every evening       protein modular (PRO-STAT) Take by mouth every morning 30 ml       REVIEW OF SYSTEMS: Limited secondary to cognitive impairment but today pt reports the above and 10 point ROS of systems including Constitutional, Eyes, Respiratory, Cardiovascular, Gastroenterology, Genitourinary, Integumentary, Musculoskeletal, Psychiatric were all negative except for pertinent positives noted in my HPI.    Vitals: There were no vitals taken for this visit.   BGs:    Exam:  GENERAL APPEARANCE: Alert, in no distress, cooperative.   ENT: Mouth/posterior oropharynx intact w/ moist mucous membranes, hearing acuity [place].  EYES: EOM, conjunctivae, lids, pupils and irises normal, PERRL2.   RESP: Respiratory effort unlabored, no respiratory distress, Lung sounds clear/diinished. On RA.   CV: Auscultation of heart reveals S1, S2, rate controlled and rhythm irregular, no murmur, no rub or gallop, Edema trace+ BLE. Peripheral pulses are 2+.  ABDOMEN: Normal bowel sounds, soft, non-tender abdomen, and no masses palpated.  SKIN: Inspection/Palpation of skin and subcutaneous tissue baseline w/ fragility. Left ankle DTI and buttocks as noted below:      MSK: Left leg is immobilized at 0 degrees.   NEURO: CN II-XII at patient's baseline, sensation baseline PPS.  PSYCH: Insight, judgement, and memory are baseline impaired, affect and mood are happy/engaged.    Lab/Diagnostic data: Recent labs in [facility] reviewed by me today.   Date INR New Dose/Orders   12/9 1.66 2mg/day x 4 days.   12/6 1.56 2mg/day x 3 days.    12/3 2.87 0.5mg x 3 days.   12/1 3.64 0.5mg x 2 days.   11/30 5.34 Hold x1.   11/29 4.81 Hold x1.         ASSESSMENT/PLAN  Other closed fracture of distal end of right femur with routine healing, subsequent encounter  Closed metaphyseal fracture of left lower extremity with delayed healing, subsequent encounter  Orthopedic aftercare  Pressure injury of buttock, stage 2, unspecified laterality (H)  Pressure injury of left leg, unstageable (H)  Controlled atrial fibrillation (H)  Long term current use of anticoagulant with international normalized ratio (INR) goal of 1.5-2.0  Encounter for therapeutic drug monitoring  Essential hypertension  Type II diabetes mellitus with peripheral circulatory disorder (H)  Acute on chronic. Ongoing.    Provider coordinated care with orthopedic specialist.  Umer reached out to surgeon Tiki Nguyen, and shared wound findings.  Umer recommending that pillows support left leg at all times and brace remain off.    Provider coordinated care with nursing around pain management and wound care.  Given Zayda has significant pain, will not surgically debride this area.  Will instead apply Iodosorb for daily dressing changes to remove eschar cap.  Wound may become considerably worse before better after this Is removed, and delayed healing is likely secondary to patient's age and condition.  Will change wound care orders as noted below.    Provider coordinated care with rehabilitation services, who will and there skilled time later this week.  They state at that time, hospice services may be taking over to keep Zayda comfortable.    Provider coordinated care with Zayda's daughter Esther.  Tomorrow is Zayda's 89th birthday, and Esther plans to take Zayda out to a local restaurant.  Given Zayda's condition, she may have difficulty in transport or discomfort while out and without nursing care.  Provider advised Esther to monitor for discomfort and return to facility if Zayda is having difficulty.  Esther did explain that family has rented a wheelchair accessible vehicle, and Zayda  should be able to stay in her chair at all times.  Nursing will predose Zayda to help alleviate any pain and get her set up appropriately prior to leaving.  This can be done safely, but daughter made aware that it may be difficult for Zayda and extra care should be taken.    Noting some soft BPs, and will slightly lower Lisinopril as noted below.     INR unknown secondary to no lab drawn. Will dose Coumadin as noted below and recheck INR tomorrow.  Follow up w/in 1 week or as needed.    Orders:  1. Wound care to left lower leg QDay & PRN:     1. Skin prep to surrounding intact skin first, apply liberally.     2. Iodasorb gel to wound eschar/cap (do not touch intact/healthy issue).      3. Cover w/ nonadherent foam dressing, secure w/ kerlix/tape.     4. Surround entire left leg w/ pillows and reduce pressure.  2. Monitor RLE discoloration QDay, outline, protect. Dx: pain.  3. Coumadin 2mg PO Qday. Dx: afib.  4. Recheck INR x1 on 12/14.  Dx: afib.  5. Decrease Lisinopril to 2.5mg PO QDay. Dx: HTN.    Total time spent with patient visit was 40 min including patient visit and review of past records. Greater than 50% of total time spent with counseling and coordinating care w/ daughter Esther, rehabilitation team, orthopedic specialist, and nursing as noted above.     Electronically signed by:  JOSUE Clark CNP DNP          Sincerely,        JOSUE Gillis CNP

## 2021-12-20 NOTE — PROGRESS NOTES
"[facility] GERIATRIC SERVICE  Episodic/Acute/Follow-Up  [place] MRN: [id number]. Place of Service where encounter took place:  [facility] () [[id number]]   Chief Complaint   Patient presents with     RECHECK    HPI: Mecca Slade  is a 89 year old (12/14/1921), who is being seen today for an episodic care visit.  HPI information obtained from: facility chart records, facility staff, patient report and [facility] chart review. Today's concern is:    Zayda seen today on routine follow-up as she continues to struggle to recover from bilateral femur fractures.  Today, Zayda states that she likes her new wheelchair.  She states that the pain in her left leg has gotten better, and even allows provider to move her leg around a little bit.  She states that the pain in her right leg comes and goes.  She denies any headaches, dizziness, fever, shortness of breath.  She states her appetite is \"too good.\"  She states sometimes she has constipation but sometimes her stools are loose, but overall she is sleeping better and wants to have her recliner back in her room.    Past Medical and Surgical History reviewed in Epic today.  MEDICATIONS:  Current Outpatient Medications   Medication Sig Dispense Refill     acetaminophen (TYLENOL) 500 MG tablet Take 1,000 mg by mouth 3 times daily       atorvastatin (LIPITOR) 10 MG tablet Take 10 mg by mouth every evening       carboxymethylcellulose (CARBOXYMETHYLCELLULOSE SODIUM) 0.5 % SOLN ophthalmic solution Place 1 drop into both eyes every morning       furosemide (LASIX) 40 MG tablet Take 40 mg by mouth daily       HYDROmorphone (DILAUDID) 2 MG tablet Take 0.5 tablets (1 mg) by mouth every 4 hours as needed for moderate pain (Pain greater than 5) 8 tablet 0     JANTOVEN ANTICOAGULANT 1 MG tablet TAKE 1 TAB BY MOUTH ON TUE, THR,SAT,SUN. INR 11/16/21       JANTOVEN ANTICOAGULANT 2 MG tablet TAKE 1 TAB BY MOUTH ON MON, WED,FRI. INR 11/16/21       lisinopril " "(ZESTRIL) 5 MG tablet Take 0.5 tablets (2.5 mg) by mouth every evening       metFORMIN (GLUCOPHAGE-XR) 500 MG 24 hr tablet Take 2 tablets (1,000 mg) by mouth daily (with breakfast)       methocarbamol (ROBAXIN) 500 MG tablet Take 500 mg by mouth 3 times daily as needed       metoprolol succinate ER (TOPROL-XL) 25 MG 24 hr tablet Take 1 tablet (25 mg) by mouth every evening       protein modular (PRO-STAT) Take by mouth every morning 30 ml       REVIEW OF SYSTEMS: Limited secondary to cognitive impairment but today pt reports the above and 10 point ROS of systems including Constitutional, Eyes, Respiratory, Cardiovascular, Gastroenterology, Genitourinary, Integumentary, Musculoskeletal, Psychiatric were all negative except for pertinent positives noted in my HPI.    Vitals: /78   Pulse 84   Temp 98  F (36.7  C)   Resp 18   Ht 1.676 m (5' 6\")   Wt 89.6 kg (197 lb 8 oz)   SpO2 96%   BMI 31.88 kg/m     Exam:  GENERAL APPEARANCE: Alert, in no distress, cooperative.   ENT: Mouth/posterior oropharynx intact w/ moist mucous membranes, hearing acuity [place].  EYES: EOM, conjunctivae, lids, pupils and irises normal, PERRL2.   RESP: Respiratory effort unlabored, no respiratory distress, Lung sounds clear/diinished. On RA.   CV: Auscultation of heart reveals S1, S2, rate controlled and rhythm irregular, no murmur, no rub or gallop, Edema trace+ BLE. Peripheral pulses are 2+.  ABDOMEN: Normal bowel sounds, soft, non-tender abdomen, and no masses palpated.  SKIN: Inspection/Palpation of skin and subcutaneous tissue baseline w/ fragility. Left ankle DTI and buttocks as noted below:    NEURO: CN II-XII at patient's baseline, sensation baseline PPS.  PSYCH: Insight, judgement, and memory are baseline impaired, affect and mood are happy/engaged.    Lab/Diagnostic data: Recent labs in [facility] reviewed by me today.   Date INR New Dose/Orders   12/14 1.95 2mg/day   12/9 1.66 2mg/day x 4 days.   12/6 1.56 2mg/day x 3 days.    12/3 " 2.87 0.5mg x 3 days.    3.64 0.5mg x 2 days.    5.34 Hold x1.    4.81 Hold x1.        ASSESSMENT/PLAN  Other closed fracture of distal end of right femur with routine healing, subsequent encounter  Closed metaphyseal fracture of left lower extremity with delayed healing, subsequent encounter  Orthopedic aftercare  Pressure injury of buttock, stage 2, unspecified laterality (H)  Pressure injury of left leg, unstageable (H)  Controlled atrial fibrillation (H)  Long term current use of anticoagulant with international normalized ratio (INR) goal of 1.5-2.0  Encounter for therapeutic drug monitoring  Essential hypertension  Type II diabetes mellitus with peripheral circulatory disorder (H)  Acute on chronic. Unstable for several problems.    Provider coordinated care with rehabilitation services, who signed off of patient's care last week as it was thought that she would enroll into hospice services.  Her previous leg brace is now removed secondary to deep tissue injury, and nursing is mobilizing this leg with cares, but she is not receiving any weight-based therapy here.    Provider coordinated care with hospice services who was going to sign onto this case, but the patient's family is experiencing a death in the family and would like to defer sign-on until after  services have been completed and family can focus better.  This will likely be after the  holiday.    Provider coordinated care with nursing as Iodosorb appears to be very irritating to the periwound area.  Will discontinue Skin-Prep application and start EPC application to the periwound area for skin protection, and then ask nursing to apply Iodosorb after.  May cover and secure as prior orders state.    INR slightly supratherapeutic today at 3.21.  Will change Coumadin dosing as noted below and recheck INR in 1 week.    HTN and DM do appear well controlled. Continue plan of care here.   Follow up w/in 1 week or as  needed.    Orders:  1. Wound care to left lower leg QDay & PRN:    Discontinue Skin prep.    Surrounding periwound apply EPC protection.    THEN apply iodasorb and cover as previously ordered.  2. Coumadin 1mg PO TuTh  3. Coumadin 2mg PO QDay on AOD  4. Recheck INR x1 on 12/27.  . Dx: afib.    Total time spent with patient visit was 35 min including patient visit and review of past records. Greater than 50% of total time spent with counseling and coordinating care w/ rehabilitation team, hospice team, and nursing as noted above.     Electronically signed by:  Dr. Haley Sims, APRN CNP DNP

## 2021-12-20 NOTE — LETTER
"    12/20/2021        RE: Mecca Slade  [facility]  [address] [facility] [address]        [facility] [place] GERIATRIC SERVICE  Episodic/Acute/Follow-Up  [place] MRN: [id number]. Place of Service where encounter took place:  [facility] () [[id number]]   Chief Complaint   Patient presents with     RECHECK    HPI: Mecca Slade  is a 89 year old (12/14/1921), who is being seen today for an episodic care visit.  HPI information obtained from: facility chart records, facility staff, patient report and [facility] chart review. Today's concern is:    Zayda seen today on routine follow-up as she continues to struggle to recover from bilateral femur fractures.  Today, Zayda states that she likes her new wheelchair.  She states that the pain in her left leg has gotten better, and even allows provider to move her leg around a little bit.  She states that the pain in her right leg comes and goes.  She denies any headaches, dizziness, fever, shortness of breath.  She states her appetite is \"too good.\"  She states sometimes she has constipation but sometimes her stools are loose, but overall she is sleeping better and wants to have her recliner back in her room.    Past Medical and Surgical History reviewed in Epic today.  MEDICATIONS:  Current Outpatient Medications   Medication Sig Dispense Refill     acetaminophen (TYLENOL) 500 MG tablet Take 1,000 mg by mouth 3 times daily       atorvastatin (LIPITOR) 10 MG tablet Take 10 mg by mouth every evening       carboxymethylcellulose (CARBOXYMETHYLCELLULOSE SODIUM) 0.5 % SOLN ophthalmic solution Place 1 drop into both eyes every morning       furosemide (LASIX) 40 MG tablet Take 40 mg by mouth daily       HYDROmorphone (DILAUDID) 2 MG tablet Take 0.5 tablets (1 mg) by mouth every 4 hours as needed for moderate pain (Pain greater than 5) 8 tablet 0     JANTOVEN ANTICOAGULANT 1 MG tablet TAKE 1 TAB BY MOUTH ON TUE, THR,SAT,SUN. INR " "11/16/21       JANTOVEN ANTICOAGULANT 2 MG tablet TAKE 1 TAB BY MOUTH ON MON, WED,FRI. INR 11/16/21       lisinopril (ZESTRIL) 5 MG tablet Take 0.5 tablets (2.5 mg) by mouth every evening       metFORMIN (GLUCOPHAGE-XR) 500 MG 24 hr tablet Take 2 tablets (1,000 mg) by mouth daily (with breakfast)       methocarbamol (ROBAXIN) 500 MG tablet Take 500 mg by mouth 3 times daily as needed       metoprolol succinate ER (TOPROL-XL) 25 MG 24 hr tablet Take 1 tablet (25 mg) by mouth every evening       protein modular (PRO-STAT) Take by mouth every morning 30 ml       REVIEW OF SYSTEMS: Limited secondary to cognitive impairment but today pt reports the above and 10 point ROS of systems including Constitutional, Eyes, Respiratory, Cardiovascular, Gastroenterology, Genitourinary, Integumentary, Musculoskeletal, Psychiatric were all negative except for pertinent positives noted in my HPI.    Vitals: /78   Pulse 84   Temp 98  F (36.7  C)   Resp 18   Ht 1.676 m (5' 6\")   Wt 89.6 kg (197 lb 8 oz)   SpO2 96%   BMI 31.88 kg/m     Exam:  GENERAL APPEARANCE: Alert, in no distress, cooperative.   ENT: Mouth/posterior oropharynx intact w/ moist mucous membranes, hearing acuity [place].  EYES: EOM, conjunctivae, lids, pupils and irises normal, PERRL2.   RESP: Respiratory effort unlabored, no respiratory distress, Lung sounds clear/diinished. On RA.   CV: Auscultation of heart reveals S1, S2, rate controlled and rhythm irregular, no murmur, no rub or gallop, Edema trace+ BLE. Peripheral pulses are 2+.  ABDOMEN: Normal bowel sounds, soft, non-tender abdomen, and no masses palpated.  SKIN: Inspection/Palpation of skin and subcutaneous tissue baseline w/ fragility. Left ankle DTI and buttocks as noted below:    NEURO: CN II-XII at patient's baseline, sensation baseline PPS.  PSYCH: Insight, judgement, and memory are baseline impaired, affect and mood are happy/engaged.    Lab/Diagnostic data: Recent labs in [facility] reviewed by me " today.   Date INR New Dose/Orders    1.95 2mg/day    1.66 2mg/day x 4 days.    1.56 2mg/day x 3 days.    12/3 2.87 0.5mg x 3 days.    3.64 0.5mg x 2 days.    5.34 Hold x1.    4.81 Hold x1.        ASSESSMENT/PLAN  Other closed fracture of distal end of right femur with routine healing, subsequent encounter  Closed metaphyseal fracture of left lower extremity with delayed healing, subsequent encounter  Orthopedic aftercare  Pressure injury of buttock, stage 2, unspecified laterality (H)  Pressure injury of left leg, unstageable (H)  Controlled atrial fibrillation (H)  Long term current use of anticoagulant with international normalized ratio (INR) goal of 1.5-2.0  Encounter for therapeutic drug monitoring  Essential hypertension  Type II diabetes mellitus with peripheral circulatory disorder (H)  Acute on chronic. Unstable for several problems.    Provider coordinated care with rehabilitation services, who signed off of patient's care last week as it was thought that she would enroll into hospice services.  Her previous leg brace is now removed secondary to deep tissue injury, and nursing is mobilizing this leg with cares, but she is not receiving any weight-based therapy here.    Provider coordinated care with hospice services who was going to sign onto this case, but the patient's family is experiencing a death in the family and would like to defer sign-on until after  services have been completed and family can focus better.  This will likely be after the  holiday.    Provider coordinated care with nursing as Iodosorb appears to be very irritating to the periwound area.  Will discontinue Skin-Prep application and start EPC application to the periwound area for skin protection, and then ask nursing to apply Iodosorb after.  May cover and secure as prior orders state.    INR slightly supratherapeutic today at 3.21.  Will change Coumadin dosing as noted below and recheck INR  in 1 week.    HTN and DM do appear well controlled. Continue plan of care here.   Follow up w/in 1 week or as needed.    Orders:  1. Wound care to left lower leg QDay & PRN:    Discontinue Skin prep.    Surrounding periwound apply EPC protection.    THEN apply iodasorb and cover as previously ordered.  2. Coumadin 1mg PO TuTh  3. Coumadin 2mg PO QDay on AOD  4. Recheck INR x1 on 12/27.  . Dx: afib.    Total time spent with patient visit was 35 min including patient visit and review of past records. Greater than 50% of total time spent with counseling and coordinating care w/ rehabilitation team, hospice team, and nursing as noted above.     Electronically signed by:  JOSUE Clark CNP DNP        Sincerely,        JOSUE Gillis CNP

## 2021-12-27 NOTE — PROGRESS NOTES
[facility]  Episodic/Acute/Follow-Up  [place] MRN: [id number]. Place of Service where encounter took place:  [facility] () [[id number]]   Chief Complaint   Patient presents with     RECHECK    HPI: Mecca Slade  is a 89 year old (12/14/1921), who is being seen today for an episodic care visit.  HPI information obtained from: facility chart records, facility staff, patient report and [facility] chart review. Today's concern is:    Zayda seen today on routine follow-up after she broke both of her legs in a fall, and has needed extensive assistance and remaining stable.  She is supposed to have a hospice evaluation tomorrow.  Today, Zayda states that she has left lower extremity pain specifically in her thigh and her foot.  Her right leg is not bothering her as much.  Orthopedics has stopped her immobilizer in the left leg, and therefore her fracture there is not stable.  She denies numbness or tingling, fever, shortness of breath, headache, dizziness, chest pain.  She does not have a lot of feeling around her deep tissue injury of the left ankle.    Past Medical and Surgical History reviewed in Epic today.  MEDICATIONS:  Current Outpatient Medications   Medication Sig Dispense Refill     acetaminophen (TYLENOL) 500 MG tablet Take 1,000 mg by mouth 3 times daily       atorvastatin (LIPITOR) 10 MG tablet Take 10 mg by mouth every evening       carboxymethylcellulose (CARBOXYMETHYLCELLULOSE SODIUM) 0.5 % SOLN ophthalmic solution Place 1 drop into both eyes every morning       furosemide (LASIX) 40 MG tablet Take 40 mg by mouth daily       HYDROmorphone (DILAUDID) 2 MG tablet TAKE 1/2 TAB (1 MG) BY MOUTH EVERY 4 HOURS AS NEEDED FOR MODERATE PAIN (PAIN GREATER THAN 5) 8 tablet 0     JANTOVEN ANTICOAGULANT 1 MG tablet TAKE 1 TAB BY MOUTH ON TUE, THR,SAT,SUN. INR 11/16/21       JANTOVEN ANTICOAGULANT 2 MG tablet TAKE 1 TAB BY MOUTH ON MON, WED,FRI. INR 11/16/21        "lisinopril (ZESTRIL) 5 MG tablet Take 0.5 tablets (2.5 mg) by mouth every evening       metFORMIN (GLUCOPHAGE-XR) 500 MG 24 hr tablet Take 2 tablets (1,000 mg) by mouth daily (with breakfast)       methocarbamol (ROBAXIN) 500 MG tablet Take 500 mg by mouth 3 times daily as needed       metoprolol succinate ER (TOPROL-XL) 25 MG 24 hr tablet Take 1 tablet (25 mg) by mouth every evening       protein modular (PRO-STAT) Take by mouth every morning 30 ml       REVIEW OF SYSTEMS: Limited secondary to cognitive impairment but today pt reports the above and 10 point ROS of systems including Constitutional, Eyes, Respiratory, Cardiovascular, Gastroenterology, Genitourinary, Integumentary, Musculoskeletal, Psychiatric were all negative except for pertinent positives noted in my HPI.    Vitals: /67   Pulse 72   Temp 98.3  F (36.8  C)   Resp 16   Ht 1.676 m (5' 6\")   Wt 92.5 kg (204 lb)   SpO2 93%   BMI 32.93 kg/m     Exam:  GENERAL APPEARANCE: Alert, in no distress, cooperative.   ENT: Mouth/posterior oropharynx intact w/ moist mucous membranes, hearing acuity [place].  EYES: EOM, conjunctivae, lids, pupils and irises normal, PERRL2.   RESP: Respiratory effort unlabored, no respiratory distress, Lung sounds clear/diinished. On RA.   CV: Auscultation of heart reveals S1, S2, rate controlled and rhythm irregular, no murmur, no rub or gallop, Edema trace+ BLE. Peripheral pulses are 2+.  ABDOMEN: Normal bowel sounds, soft, non-tender abdomen, and no masses palpated.  SKIN: Inspection/Palpation of skin and subcutaneous tissue baseline w/ fragility. Left ankle DTI and buttocks as noted below:    NEURO: CN II-XII at patient's baseline, sensation baseline PPS.  PSYCH: Insight, judgement, and memory are baseline impaired, affect and mood are happy/engaged.    Lab/Diagnostic data: Recent labs in [facility] reviewed by me today.   Date INR New Dose/Orders   12/27 3.01 See below.   12/20 3.21 1mg Tu/Th, 2mg AOD.   12/14 1.95 2mg/day "   12/9 1.66 2mg/day x 4 days.   12/6 1.56 2mg/day x 3 days.    12/3 2.87 0.5mg x 3 days.   12/1 3.64 0.5mg x 2 days.   11/30 5.34 Hold x1.   11/29 4.81 Hold x1.        ASSESSMENT/PLAN    ICD-10-CM Acute on chronic.   1. Other closed fracture of distal end of right femur with routine healing, subsequent encounter  S72.491D Unstable. See below.   2. Closed metaphyseal fracture of left lower extremity with delayed healing, subsequent encounter  S82.92XG Unstable. See below.   3. Orthopedic aftercare  Z47.89 Unstable.  Left femur likely not healing appropriately secondary to inability to properly immobilize.  Zayda also cannot bear weight, and therefore cannot rehab.  Recommending hospice services given her age and condition. She remains on dilaudid for pain control.    4. Pressure injury of buttock, stage 2, unspecified laterality (H)  L89.302 Tenuous. Potentially improving.    5. Pressure injury of left leg, unstageable (H)  L89.890 Unstable.  Pressure injury appears to be evolving with eschar still present and good tissue becoming involved.  Will change entire treatment plan, as Iodosorb appears to be too powerful in this instance.  Medihoney may provide gentle chemical debridement, and will switch to this.  Will apply Skin-Prep surrounding to protect intact tissue.  Will stop EPC.  Will apply gauze dressing to allow for debridement with removal. Secondary pressure noted at lateral talus area which is scabbed over.    6. Controlled atrial fibrillation (H)  I48.91 Unstable, INR slightly supratherapeutic. Will dose Coumadin as noted below and recheck INR in 1 week.     7. Long term current use of anticoagulant with international normalized ratio (INR) goal of 1.5-2.0  Z79.01 Unstable. See above.   8. Encounter for therapeutic drug monitoring  Z51.81 Unstable. See above.     Follow up w/in 1 week or as needed.    Orders:  1. Change wound care Qday/PRN to:    Discontinue EPC and Iodasorb.    Clean/pat dry as  prior.    Apply medihoney gel to wound bed and eschar.    Skin prep to entire periwound.    Cover w/ nonadherent 4x4 kalee.    Secure w/ kerlix/tape. Dx: wound care/DTI.  2. Coumadin 1mg M/W/F.  3. Coumadin 2mg AOD.  4. Recheck INR x1 on 1/3/22. Dx: afib.    Electronically signed by:  Dr. Haley Sims, APRN CNP DNP

## 2021-12-27 NOTE — LETTER
12/27/2021        RE: Mecca Slade  [facility]  [address] [facility] [address]        [facility] [place] [facility]IAT[facility]IC SERV[facility]  Episodic/Acute/Follow-Up  [place] MRN: [id number]. Place of Service where encounter took place:  [facility] () [[id number]]   Chief Complaint   Patient presents with     RECHECK    HPI: Mecca Slade  is a 89 year old (12/14/1921), who is being seen today for an episodic care visit.  HPI information obtained from: facility chart records, facility staff, patient report and [facility] chart review. Today's concern is:    Zayda seen today on routine follow-up after she broke both of her legs in a fall, and has needed extensive assistance and remaining stable.  She is supposed to have a hospice evaluation tomorrow.  Today, Zayda states that she has left lower extremity pain specifically in her thigh and her foot.  Her right leg is not bothering her as much.  Orthopedics has stopped her immobilizer in the left leg, and therefore her fracture there is not stable.  She denies numbness or tingling, fever, shortness of breath, headache, dizziness, chest pain.  She does not have a lot of feeling around her deep tissue injury of the left ankle.    Past Medical and Surgical History reviewed in Epic today.  MEDICATIONS:  Current Outpatient Medications   Medication Sig Dispense Refill     acetaminophen (TYLENOL) 500 MG tablet Take 1,000 mg by mouth 3 times daily       atorvastatin (LIPITOR) 10 MG tablet Take 10 mg by mouth every evening       carboxymethylcellulose (CARBOXYMETHYLCELLULOSE SODIUM) 0.5 % SOLN ophthalmic solution Place 1 drop into both eyes every morning       furosemide (LASIX) 40 MG tablet Take 40 mg by mouth daily       HYDROmorphone (DILAUDID) 2 MG tablet TAKE 1/2 TAB (1 MG) BY MOUTH EVERY 4 HOURS AS NEEDED FOR MODERATE PAIN (PAIN GREATER THAN 5) 8 tablet 0     JANTOVEN ANTICOAGULANT 1 MG tablet TAKE 1 TAB BY MOUTH ON TUE, THR,SAT,SUN. INR  "11/16/21       JANTOVEN ANTICOAGULANT 2 MG tablet TAKE 1 TAB BY MOUTH ON MON, WED,FRI. INR 11/16/21       lisinopril (ZESTRIL) 5 MG tablet Take 0.5 tablets (2.5 mg) by mouth every evening       metFORMIN (GLUCOPHAGE-XR) 500 MG 24 hr tablet Take 2 tablets (1,000 mg) by mouth daily (with breakfast)       methocarbamol (ROBAXIN) 500 MG tablet Take 500 mg by mouth 3 times daily as needed       metoprolol succinate ER (TOPROL-XL) 25 MG 24 hr tablet Take 1 tablet (25 mg) by mouth every evening       protein modular (PRO-STAT) Take by mouth every morning 30 ml       REVIEW OF SYSTEMS: Limited secondary to cognitive impairment but today pt reports the above and 10 point ROS of systems including Constitutional, Eyes, Respiratory, Cardiovascular, Gastroenterology, Genitourinary, Integumentary, Musculoskeletal, Psychiatric were all negative except for pertinent positives noted in my HPI.    Vitals: /67   Pulse 72   Temp 98.3  F (36.8  C)   Resp 16   Ht 1.676 m (5' 6\")   Wt 92.5 kg (204 lb)   SpO2 93%   BMI 32.93 kg/m     Exam:  GENERAL APPEARANCE: Alert, in no distress, cooperative.   ENT: Mouth/posterior oropharynx intact w/ moist mucous membranes, hearing acuity [place].  EYES: EOM, conjunctivae, lids, pupils and irises normal, PERRL2.   RESP: Respiratory effort unlabored, no respiratory distress, Lung sounds clear/diinished. On RA.   CV: Auscultation of heart reveals S1, S2, rate controlled and rhythm irregular, no murmur, no rub or gallop, Edema trace+ BLE. Peripheral pulses are 2+.  ABDOMEN: Normal bowel sounds, soft, non-tender abdomen, and no masses palpated.  SKIN: Inspection/Palpation of skin and subcutaneous tissue baseline w/ fragility. Left ankle DTI and buttocks as noted below:    NEURO: CN II-XII at patient's baseline, sensation baseline PPS.  PSYCH: Insight, judgement, and memory are baseline impaired, affect and mood are happy/engaged.    Lab/Diagnostic data: Recent labs in [facility] reviewed by me today. "   Date INR New Dose/Orders   12/27 3.01 See below.   12/20 3.21 1mg Tu/Th, 2mg AOD.   12/14 1.95 2mg/day   12/9 1.66 2mg/day x 4 days.   12/6 1.56 2mg/day x 3 days.    12/3 2.87 0.5mg x 3 days.   12/1 3.64 0.5mg x 2 days.   11/30 5.34 Hold x1.   11/29 4.81 Hold x1.        ASSESSMENT/PLAN    ICD-10-CM Acute on chronic.   1. Other closed fracture of distal end of right femur with routine healing, subsequent encounter  S72.491D Unstable. See below.   2. Closed metaphyseal fracture of left lower extremity with delayed healing, subsequent encounter  S82.92XG Unstable. See below.   3. Orthopedic aftercare  Z47.89 Unstable.  Left femur likely not healing appropriately secondary to inability to properly immobilize.  Zayda also cannot bear weight, and therefore cannot rehab.  Recommending hospice services given her age and condition. She remains on dilaudid for pain control.    4. Pressure injury of buttock, stage 2, unspecified laterality (H)  L89.302 Tenuous. Potentially improving.    5. Pressure injury of left leg, unstageable (H)  L89.890 Unstable.  Pressure injury appears to be evolving with eschar still present and good tissue becoming involved.  Will change entire treatment plan, as Iodosorb appears to be too powerful in this instance.  Medihoney may provide gentle chemical debridement, and will switch to this.  Will apply Skin-Prep surrounding to protect intact tissue.  Will stop EPC.  Will apply gauze dressing to allow for debridement with removal. Secondary pressure noted at lateral talus area which is scabbed over.    6. Controlled atrial fibrillation (H)  I48.91 Unstable, INR slightly supratherapeutic. Will dose Coumadin as noted below and recheck INR in 1 week.     7. Long term current use of anticoagulant with international normalized ratio (INR) goal of 1.5-2.0  Z79.01 Unstable. See above.   8. Encounter for therapeutic drug monitoring  Z51.81 Unstable. See above.     Follow up w/in 1 week or as  needed.    Orders:  1. Change wound care Qday/PRN to:    Discontinue EPC and Iodasorb.    Clean/pat dry as prior.    Apply medihoney gel to wound bed and eschar.    Skin prep to entire periwound.    Cover w/ nonadherent 4x4 kalee.    Secure w/ kerlix/tape. Dx: wound care/DTI.  2. Coumadin 1mg M/W/F.  3. Coumadin 2mg AOD.  4. Recheck INR x1 on 1/3/22. Dx: afib.    Electronically signed by:  Dr. Haley Sims, APRN CNP DNP        Sincerely,        Haley Sims, APRN CNP

## 2022-01-01 ENCOUNTER — LAB REQUISITION (OUTPATIENT)
Dept: LAB | Facility: CLINIC | Age: 87
End: 2022-01-01
Payer: MEDICARE

## 2022-01-01 ENCOUNTER — NURSING HOME VISIT (OUTPATIENT)
Dept: GERIATRICS | Facility: CLINIC | Age: 87
End: 2022-01-01
Payer: COMMERCIAL

## 2022-01-01 ENCOUNTER — NURSING HOME VISIT (OUTPATIENT)
Dept: GERIATRICS | Facility: CLINIC | Age: 87
End: 2022-01-01
Payer: MEDICARE

## 2022-01-01 ENCOUNTER — TELEPHONE (OUTPATIENT)
Dept: GERIATRICS | Facility: CLINIC | Age: 87
End: 2022-01-01

## 2022-01-01 ENCOUNTER — LAB REQUISITION (OUTPATIENT)
Dept: LAB | Facility: CLINIC | Age: 87
End: 2022-01-01
Payer: OTHER MISCELLANEOUS

## 2022-01-01 ENCOUNTER — LAB REQUISITION (OUTPATIENT)
Dept: LAB | Facility: CLINIC | Age: 87
End: 2022-01-01
Payer: COMMERCIAL

## 2022-01-01 ENCOUNTER — NURSING HOME VISIT (OUTPATIENT)
Dept: GERIATRICS | Facility: CLINIC | Age: 87
End: 2022-01-01
Payer: OTHER MISCELLANEOUS

## 2022-01-01 ENCOUNTER — PATIENT OUTREACH (OUTPATIENT)
Dept: GERIATRIC MEDICINE | Facility: CLINIC | Age: 87
End: 2022-01-01

## 2022-01-01 ENCOUNTER — NURSING HOME VISIT (OUTPATIENT)
Dept: GERIATRICS | Facility: CLINIC | Age: 87
End: 2022-01-01

## 2022-01-01 ENCOUNTER — DOCUMENTATION ONLY (OUTPATIENT)
Dept: OTHER | Facility: CLINIC | Age: 87
End: 2022-01-01
Payer: OTHER MISCELLANEOUS

## 2022-01-01 ENCOUNTER — TRANSITIONAL CARE UNIT VISIT (OUTPATIENT)
Dept: GERIATRICS | Facility: CLINIC | Age: 87
End: 2022-01-01

## 2022-01-01 ENCOUNTER — VIRTUAL VISIT (OUTPATIENT)
Dept: GERIATRICS | Facility: CLINIC | Age: 87
End: 2022-01-01
Payer: COMMERCIAL

## 2022-01-01 ENCOUNTER — TELEPHONE (OUTPATIENT)
Dept: GERIATRICS | Facility: CLINIC | Age: 87
End: 2022-01-01
Payer: OTHER MISCELLANEOUS

## 2022-01-01 ENCOUNTER — TRANSITIONAL CARE UNIT VISIT (OUTPATIENT)
Dept: GERIATRICS | Facility: CLINIC | Age: 87
End: 2022-01-01
Payer: MEDICARE

## 2022-01-01 ENCOUNTER — PATIENT OUTREACH (OUTPATIENT)
Dept: GERIATRIC MEDICINE | Facility: CLINIC | Age: 87
End: 2022-01-01
Payer: OTHER MISCELLANEOUS

## 2022-01-01 VITALS
BODY MASS INDEX: 31.42 KG/M2 | HEIGHT: 66 IN | TEMPERATURE: 97.7 F | HEART RATE: 72 BPM | SYSTOLIC BLOOD PRESSURE: 123 MMHG | OXYGEN SATURATION: 94 % | DIASTOLIC BLOOD PRESSURE: 67 MMHG | RESPIRATION RATE: 16 BRPM | WEIGHT: 195.5 LBS

## 2022-01-01 VITALS
DIASTOLIC BLOOD PRESSURE: 63 MMHG | TEMPERATURE: 98.1 F | OXYGEN SATURATION: 94 % | BODY MASS INDEX: 28.21 KG/M2 | WEIGHT: 175.5 LBS | HEIGHT: 66 IN | SYSTOLIC BLOOD PRESSURE: 117 MMHG | HEART RATE: 67 BPM | RESPIRATION RATE: 18 BRPM

## 2022-01-01 VITALS
SYSTOLIC BLOOD PRESSURE: 116 MMHG | OXYGEN SATURATION: 92 % | RESPIRATION RATE: 16 BRPM | TEMPERATURE: 98.3 F | WEIGHT: 203.4 LBS | DIASTOLIC BLOOD PRESSURE: 71 MMHG | HEART RATE: 55 BPM | HEIGHT: 66 IN | BODY MASS INDEX: 32.69 KG/M2

## 2022-01-01 VITALS
DIASTOLIC BLOOD PRESSURE: 67 MMHG | SYSTOLIC BLOOD PRESSURE: 123 MMHG | WEIGHT: 195.5 LBS | OXYGEN SATURATION: 98 % | TEMPERATURE: 98.6 F | BODY MASS INDEX: 31.42 KG/M2 | HEART RATE: 72 BPM | HEIGHT: 66 IN | RESPIRATION RATE: 16 BRPM

## 2022-01-01 VITALS
DIASTOLIC BLOOD PRESSURE: 80 MMHG | RESPIRATION RATE: 20 BRPM | HEIGHT: 66 IN | DIASTOLIC BLOOD PRESSURE: 68 MMHG | TEMPERATURE: 97.8 F | OXYGEN SATURATION: 97 % | RESPIRATION RATE: 16 BRPM | OXYGEN SATURATION: 93 % | WEIGHT: 189.6 LBS | BODY MASS INDEX: 30.29 KG/M2 | SYSTOLIC BLOOD PRESSURE: 126 MMHG | HEART RATE: 90 BPM | HEIGHT: 66 IN | WEIGHT: 188.5 LBS | HEART RATE: 82 BPM | BODY MASS INDEX: 30.47 KG/M2 | TEMPERATURE: 97 F | SYSTOLIC BLOOD PRESSURE: 138 MMHG

## 2022-01-01 VITALS
SYSTOLIC BLOOD PRESSURE: 134 MMHG | HEIGHT: 66 IN | BODY MASS INDEX: 30.37 KG/M2 | RESPIRATION RATE: 16 BRPM | OXYGEN SATURATION: 94 % | WEIGHT: 189 LBS | DIASTOLIC BLOOD PRESSURE: 78 MMHG | HEART RATE: 74 BPM | TEMPERATURE: 97.2 F

## 2022-01-01 VITALS
RESPIRATION RATE: 20 BRPM | BODY MASS INDEX: 30.13 KG/M2 | SYSTOLIC BLOOD PRESSURE: 147 MMHG | OXYGEN SATURATION: 96 % | HEART RATE: 79 BPM | DIASTOLIC BLOOD PRESSURE: 88 MMHG | TEMPERATURE: 98.2 F | HEIGHT: 66 IN | WEIGHT: 187.5 LBS

## 2022-01-01 VITALS
BODY MASS INDEX: 30.78 KG/M2 | WEIGHT: 191.5 LBS | DIASTOLIC BLOOD PRESSURE: 77 MMHG | HEIGHT: 66 IN | OXYGEN SATURATION: 94 % | TEMPERATURE: 98.5 F | HEART RATE: 75 BPM | SYSTOLIC BLOOD PRESSURE: 114 MMHG | RESPIRATION RATE: 16 BRPM

## 2022-01-01 VITALS
OXYGEN SATURATION: 95 % | HEIGHT: 66 IN | WEIGHT: 186 LBS | HEART RATE: 82 BPM | SYSTOLIC BLOOD PRESSURE: 126 MMHG | TEMPERATURE: 97.8 F | BODY MASS INDEX: 29.89 KG/M2 | DIASTOLIC BLOOD PRESSURE: 80 MMHG | RESPIRATION RATE: 16 BRPM

## 2022-01-01 VITALS
SYSTOLIC BLOOD PRESSURE: 123 MMHG | HEART RATE: 72 BPM | TEMPERATURE: 97.8 F | HEIGHT: 66 IN | BODY MASS INDEX: 30.29 KG/M2 | RESPIRATION RATE: 16 BRPM | WEIGHT: 188.5 LBS | OXYGEN SATURATION: 94 % | DIASTOLIC BLOOD PRESSURE: 67 MMHG

## 2022-01-01 VITALS
HEIGHT: 66 IN | RESPIRATION RATE: 16 BRPM | SYSTOLIC BLOOD PRESSURE: 123 MMHG | BODY MASS INDEX: 30.22 KG/M2 | WEIGHT: 188 LBS | OXYGEN SATURATION: 95 % | DIASTOLIC BLOOD PRESSURE: 67 MMHG | HEART RATE: 72 BPM | TEMPERATURE: 97.6 F

## 2022-01-01 VITALS
HEIGHT: 66 IN | SYSTOLIC BLOOD PRESSURE: 138 MMHG | WEIGHT: 190.5 LBS | DIASTOLIC BLOOD PRESSURE: 68 MMHG | HEART RATE: 90 BPM | RESPIRATION RATE: 20 BRPM | BODY MASS INDEX: 30.62 KG/M2 | TEMPERATURE: 98.6 F | OXYGEN SATURATION: 97 %

## 2022-01-01 VITALS
SYSTOLIC BLOOD PRESSURE: 126 MMHG | RESPIRATION RATE: 16 BRPM | HEIGHT: 66 IN | TEMPERATURE: 97.4 F | HEART RATE: 82 BPM | WEIGHT: 188.5 LBS | DIASTOLIC BLOOD PRESSURE: 80 MMHG | BODY MASS INDEX: 30.29 KG/M2 | OXYGEN SATURATION: 96 %

## 2022-01-01 VITALS
DIASTOLIC BLOOD PRESSURE: 65 MMHG | OXYGEN SATURATION: 93 % | RESPIRATION RATE: 18 BRPM | BODY MASS INDEX: 33.23 KG/M2 | HEART RATE: 76 BPM | TEMPERATURE: 98.3 F | WEIGHT: 206.8 LBS | HEIGHT: 66 IN | SYSTOLIC BLOOD PRESSURE: 120 MMHG

## 2022-01-01 VITALS
BODY MASS INDEX: 29.89 KG/M2 | HEART RATE: 81 BPM | TEMPERATURE: 97 F | SYSTOLIC BLOOD PRESSURE: 115 MMHG | DIASTOLIC BLOOD PRESSURE: 74 MMHG | OXYGEN SATURATION: 96 % | HEIGHT: 66 IN | RESPIRATION RATE: 18 BRPM | WEIGHT: 186 LBS

## 2022-01-01 VITALS
HEIGHT: 66 IN | DIASTOLIC BLOOD PRESSURE: 70 MMHG | RESPIRATION RATE: 18 BRPM | OXYGEN SATURATION: 94 % | BODY MASS INDEX: 29.09 KG/M2 | WEIGHT: 181 LBS | HEART RATE: 80 BPM | SYSTOLIC BLOOD PRESSURE: 108 MMHG | TEMPERATURE: 97.7 F

## 2022-01-01 VITALS
DIASTOLIC BLOOD PRESSURE: 68 MMHG | HEIGHT: 66 IN | SYSTOLIC BLOOD PRESSURE: 128 MMHG | HEART RATE: 90 BPM | OXYGEN SATURATION: 96 % | BODY MASS INDEX: 30.47 KG/M2 | WEIGHT: 189.6 LBS | TEMPERATURE: 98 F | RESPIRATION RATE: 20 BRPM

## 2022-01-01 VITALS
BODY MASS INDEX: 30.57 KG/M2 | DIASTOLIC BLOOD PRESSURE: 68 MMHG | SYSTOLIC BLOOD PRESSURE: 138 MMHG | HEART RATE: 90 BPM | WEIGHT: 190.2 LBS | OXYGEN SATURATION: 96 % | HEIGHT: 66 IN | RESPIRATION RATE: 20 BRPM | TEMPERATURE: 98.3 F

## 2022-01-01 VITALS
DIASTOLIC BLOOD PRESSURE: 82 MMHG | WEIGHT: 187 LBS | OXYGEN SATURATION: 93 % | SYSTOLIC BLOOD PRESSURE: 148 MMHG | RESPIRATION RATE: 18 BRPM | TEMPERATURE: 97.4 F | HEIGHT: 66 IN | HEART RATE: 80 BPM | BODY MASS INDEX: 30.05 KG/M2

## 2022-01-01 VITALS
RESPIRATION RATE: 18 BRPM | BODY MASS INDEX: 30.13 KG/M2 | HEIGHT: 66 IN | OXYGEN SATURATION: 92 % | SYSTOLIC BLOOD PRESSURE: 131 MMHG | DIASTOLIC BLOOD PRESSURE: 77 MMHG | TEMPERATURE: 98.2 F | WEIGHT: 187.5 LBS | HEART RATE: 78 BPM

## 2022-01-01 VITALS
RESPIRATION RATE: 20 BRPM | TEMPERATURE: 98.2 F | OXYGEN SATURATION: 93 % | BODY MASS INDEX: 32.33 KG/M2 | DIASTOLIC BLOOD PRESSURE: 88 MMHG | SYSTOLIC BLOOD PRESSURE: 147 MMHG | HEIGHT: 66 IN | HEART RATE: 79 BPM | WEIGHT: 201.2 LBS

## 2022-01-01 VITALS
BODY MASS INDEX: 32.69 KG/M2 | SYSTOLIC BLOOD PRESSURE: 120 MMHG | WEIGHT: 203.4 LBS | TEMPERATURE: 98.7 F | RESPIRATION RATE: 18 BRPM | DIASTOLIC BLOOD PRESSURE: 78 MMHG | OXYGEN SATURATION: 95 % | HEART RATE: 59 BPM | HEIGHT: 66 IN

## 2022-01-01 VITALS
OXYGEN SATURATION: 96 % | RESPIRATION RATE: 16 BRPM | HEART RATE: 72 BPM | WEIGHT: 190.5 LBS | BODY MASS INDEX: 30.62 KG/M2 | DIASTOLIC BLOOD PRESSURE: 67 MMHG | HEIGHT: 66 IN | TEMPERATURE: 97.6 F | SYSTOLIC BLOOD PRESSURE: 123 MMHG

## 2022-01-01 VITALS
RESPIRATION RATE: 18 BRPM | OXYGEN SATURATION: 95 % | WEIGHT: 176 LBS | TEMPERATURE: 96.5 F | SYSTOLIC BLOOD PRESSURE: 117 MMHG | HEART RATE: 67 BPM | DIASTOLIC BLOOD PRESSURE: 63 MMHG | BODY MASS INDEX: 28.28 KG/M2 | HEIGHT: 66 IN

## 2022-01-01 VITALS
OXYGEN SATURATION: 97 % | BODY MASS INDEX: 30.78 KG/M2 | DIASTOLIC BLOOD PRESSURE: 88 MMHG | SYSTOLIC BLOOD PRESSURE: 147 MMHG | HEART RATE: 79 BPM | RESPIRATION RATE: 20 BRPM | TEMPERATURE: 98.1 F | HEIGHT: 66 IN | WEIGHT: 191.5 LBS

## 2022-01-01 VITALS
DIASTOLIC BLOOD PRESSURE: 68 MMHG | WEIGHT: 189.6 LBS | SYSTOLIC BLOOD PRESSURE: 138 MMHG | HEIGHT: 66 IN | TEMPERATURE: 98.5 F | BODY MASS INDEX: 30.47 KG/M2 | OXYGEN SATURATION: 96 % | RESPIRATION RATE: 20 BRPM | HEART RATE: 90 BPM

## 2022-01-01 VITALS
BODY MASS INDEX: 28.21 KG/M2 | RESPIRATION RATE: 18 BRPM | HEART RATE: 72 BPM | HEIGHT: 66 IN | SYSTOLIC BLOOD PRESSURE: 120 MMHG | WEIGHT: 175.5 LBS | DIASTOLIC BLOOD PRESSURE: 68 MMHG | OXYGEN SATURATION: 93 % | TEMPERATURE: 98.3 F

## 2022-01-01 VITALS
DIASTOLIC BLOOD PRESSURE: 77 MMHG | WEIGHT: 191.5 LBS | RESPIRATION RATE: 16 BRPM | BODY MASS INDEX: 30.78 KG/M2 | OXYGEN SATURATION: 92 % | SYSTOLIC BLOOD PRESSURE: 114 MMHG | TEMPERATURE: 96.8 F | HEIGHT: 66 IN | HEART RATE: 75 BPM

## 2022-01-01 VITALS
SYSTOLIC BLOOD PRESSURE: 122 MMHG | HEART RATE: 72 BPM | WEIGHT: 189 LBS | BODY MASS INDEX: 30.37 KG/M2 | TEMPERATURE: 97.8 F | RESPIRATION RATE: 18 BRPM | DIASTOLIC BLOOD PRESSURE: 72 MMHG | HEIGHT: 66 IN | OXYGEN SATURATION: 96 %

## 2022-01-01 VITALS
TEMPERATURE: 97.8 F | DIASTOLIC BLOOD PRESSURE: 63 MMHG | BODY MASS INDEX: 29.33 KG/M2 | RESPIRATION RATE: 18 BRPM | SYSTOLIC BLOOD PRESSURE: 117 MMHG | HEART RATE: 67 BPM | OXYGEN SATURATION: 92 % | HEIGHT: 66 IN | WEIGHT: 182.5 LBS

## 2022-01-01 DIAGNOSIS — L89.890 PRESSURE INJURY OF LEFT LEG, UNSTAGEABLE (H): ICD-10-CM

## 2022-01-01 DIAGNOSIS — I48.91 UNSPECIFIED ATRIAL FIBRILLATION (H): ICD-10-CM

## 2022-01-01 DIAGNOSIS — L89.302 PRESSURE INJURY OF BUTTOCK, STAGE 2, UNSPECIFIED LATERALITY (H): ICD-10-CM

## 2022-01-01 DIAGNOSIS — L89.154: Primary | ICD-10-CM

## 2022-01-01 DIAGNOSIS — E66.01 MORBID OBESITY (H): ICD-10-CM

## 2022-01-01 DIAGNOSIS — G81.94 LEFT HEMIPARESIS (H): ICD-10-CM

## 2022-01-01 DIAGNOSIS — I48.91 CONTROLLED ATRIAL FIBRILLATION (H): ICD-10-CM

## 2022-01-01 DIAGNOSIS — I48.91 CONTROLLED ATRIAL FIBRILLATION (H): Primary | ICD-10-CM

## 2022-01-01 DIAGNOSIS — Z51.5 HOSPICE CARE PATIENT: ICD-10-CM

## 2022-01-01 DIAGNOSIS — Z51.81 ENCOUNTER FOR THERAPEUTIC DRUG MONITORING: ICD-10-CM

## 2022-01-01 DIAGNOSIS — S81.802D UNSPECIFIED OPEN WOUND, LEFT LOWER LEG, SUBSEQUENT ENCOUNTER: ICD-10-CM

## 2022-01-01 DIAGNOSIS — S72.91XA CLOSED FRACTURE OF RIGHT FEMUR, UNSPECIFIED FRACTURE MORPHOLOGY, UNSPECIFIED PORTION OF FEMUR, INITIAL ENCOUNTER (H): ICD-10-CM

## 2022-01-01 DIAGNOSIS — E21.0 HYPERPARATHYROIDISM, PRIMARY (H): ICD-10-CM

## 2022-01-01 DIAGNOSIS — S72.491D OTHER CLOSED FRACTURE OF DISTAL END OF RIGHT FEMUR WITH ROUTINE HEALING, SUBSEQUENT ENCOUNTER: Primary | ICD-10-CM

## 2022-01-01 DIAGNOSIS — S82.92XG: ICD-10-CM

## 2022-01-01 DIAGNOSIS — N18.31 STAGE 3A CHRONIC KIDNEY DISEASE (H): ICD-10-CM

## 2022-01-01 DIAGNOSIS — I50.20 SYSTOLIC CONGESTIVE HEART FAILURE, UNSPECIFIED HF CHRONICITY (H): ICD-10-CM

## 2022-01-01 DIAGNOSIS — I48.20 CHRONIC ATRIAL FIBRILLATION, UNSPECIFIED (H): ICD-10-CM

## 2022-01-01 DIAGNOSIS — Z79.01 LONG TERM CURRENT USE OF ANTICOAGULANT WITH INTERNATIONAL NORMALIZED RATIO (INR) GOAL OF 1.5-2.0: ICD-10-CM

## 2022-01-01 DIAGNOSIS — L89.890 PRESSURE INJURY OF LEFT LEG, UNSTAGEABLE (H): Primary | ICD-10-CM

## 2022-01-01 DIAGNOSIS — N30.01 ACUTE CYSTITIS WITH HEMATURIA: ICD-10-CM

## 2022-01-01 DIAGNOSIS — E11.51 TYPE II DIABETES MELLITUS WITH PERIPHERAL CIRCULATORY DISORDER (H): ICD-10-CM

## 2022-01-01 DIAGNOSIS — R44.3 HALLUCINATIONS: ICD-10-CM

## 2022-01-01 DIAGNOSIS — Z47.89 ORTHOPEDIC AFTERCARE: ICD-10-CM

## 2022-01-01 DIAGNOSIS — S72.491D OTHER CLOSED FRACTURE OF DISTAL END OF RIGHT FEMUR WITH ROUTINE HEALING, SUBSEQUENT ENCOUNTER: ICD-10-CM

## 2022-01-01 DIAGNOSIS — I50.20 SYSTOLIC CONGESTIVE HEART FAILURE, UNSPECIFIED HF CHRONICITY (H): Primary | ICD-10-CM

## 2022-01-01 DIAGNOSIS — R41.0 DISORIENTATION, UNSPECIFIED: ICD-10-CM

## 2022-01-01 DIAGNOSIS — R44.3 HALLUCINATIONS, UNSPECIFIED: ICD-10-CM

## 2022-01-01 DIAGNOSIS — I10 ESSENTIAL HYPERTENSION: ICD-10-CM

## 2022-01-01 DIAGNOSIS — F41.9 ANXIETY: ICD-10-CM

## 2022-01-01 LAB
ALBUMIN UR-MCNC: NEGATIVE MG/DL
ALBUMIN UR-MCNC: NEGATIVE MG/DL
APPEARANCE UR: ABNORMAL
APPEARANCE UR: CLEAR
BACTERIA #/AREA URNS HPF: ABNORMAL /HPF
BACTERIA #/AREA URNS HPF: ABNORMAL /HPF
BACTERIA UR CULT: ABNORMAL
BACTERIA UR CULT: NORMAL
BACTERIA WND CULT: ABNORMAL
BILIRUB UR QL STRIP: NEGATIVE
BILIRUB UR QL STRIP: NEGATIVE
COLOR UR AUTO: ABNORMAL
COLOR UR AUTO: YELLOW
GLUCOSE UR STRIP-MCNC: NEGATIVE MG/DL
GLUCOSE UR STRIP-MCNC: NEGATIVE MG/DL
HGB UR QL STRIP: ABNORMAL
HGB UR QL STRIP: NEGATIVE
HYALINE CASTS: 14 /LPF
HYALINE CASTS: 2 /LPF
INR PPP: 1.82 (ref 0.85–1.15)
INR PPP: 1.95 (ref 0.85–1.15)
INR PPP: 2.14 (ref 0.85–1.15)
INR PPP: 2.14 (ref 0.85–1.15)
INR PPP: 2.16 (ref 0.85–1.15)
INR PPP: 2.19 (ref 0.85–1.15)
INR PPP: 2.26 (ref 0.85–1.15)
INR PPP: 2.44 (ref 0.85–1.15)
INR PPP: 2.46 (ref 0.85–1.15)
INR PPP: 2.47 (ref 0.85–1.15)
INR PPP: 2.52 (ref 0.85–1.15)
INR PPP: 2.52 (ref 0.85–1.15)
INR PPP: 2.62 (ref 0.85–1.15)
INR PPP: 2.63 (ref 0.85–1.15)
INR PPP: 2.67 (ref 0.85–1.15)
INR PPP: 2.68 (ref 0.85–1.15)
INR PPP: 2.78 (ref 0.85–1.15)
INR PPP: 2.82 (ref 0.85–1.15)
INR PPP: 2.87 (ref 0.85–1.15)
INR PPP: 2.89 (ref 0.85–1.15)
INR PPP: 2.9 (ref 0.85–1.15)
INR PPP: 2.97 (ref 0.85–1.15)
INR PPP: 3 (ref 0.85–1.15)
INR PPP: 3.02 (ref 0.85–1.15)
INR PPP: 3.02 (ref 0.85–1.15)
INR PPP: 3.17 (ref 0.85–1.15)
INR PPP: 3.2 (ref 0.85–1.15)
INR PPP: 3.2 (ref 0.85–1.15)
INR PPP: 3.25 (ref 0.85–1.15)
INR PPP: 3.35 (ref 0.85–1.15)
INR PPP: 3.54 (ref 0.85–1.15)
INR PPP: 3.75 (ref 0.85–1.15)
INR PPP: 4.05 (ref 0.85–1.15)
INR PPP: 4.28 (ref 0.85–1.15)
INR PPP: 4.36 (ref 0.85–1.15)
INR PPP: 4.54 (ref 0.85–1.15)
INR PPP: 4.61 (ref 0.85–1.15)
INR PPP: 4.89 (ref 0.85–1.15)
KETONES UR STRIP-MCNC: NEGATIVE MG/DL
KETONES UR STRIP-MCNC: NEGATIVE MG/DL
LEUKOCYTE ESTERASE UR QL STRIP: ABNORMAL
LEUKOCYTE ESTERASE UR QL STRIP: NEGATIVE
MUCOUS THREADS #/AREA URNS LPF: PRESENT /LPF
MUCOUS THREADS #/AREA URNS LPF: PRESENT /LPF
NITRATE UR QL: NEGATIVE
NITRATE UR QL: NEGATIVE
PH UR STRIP: 5 [PH] (ref 5–7)
PH UR STRIP: 5 [PH] (ref 5–7)
RBC URINE: 0 /HPF
RBC URINE: 11 /HPF
SP GR UR STRIP: 1.01 (ref 1–1.03)
SP GR UR STRIP: 1.01 (ref 1–1.03)
SQUAMOUS EPITHELIAL: 2 /HPF
SQUAMOUS EPITHELIAL: <1 /HPF
UROBILINOGEN UR STRIP-MCNC: <2 MG/DL
UROBILINOGEN UR STRIP-MCNC: NORMAL MG/DL
WBC CLUMPS #/AREA URNS HPF: PRESENT /HPF
WBC URINE: 0 /HPF
WBC URINE: 92 /HPF

## 2022-01-01 PROCEDURE — P9603 ONE-WAY ALLOW PRORATED MILES: HCPCS | Mod: ORL | Performed by: FAMILY MEDICINE

## 2022-01-01 PROCEDURE — 36415 COLL VENOUS BLD VENIPUNCTURE: CPT | Mod: ORL | Performed by: FAMILY MEDICINE

## 2022-01-01 PROCEDURE — 99309 SBSQ NF CARE MODERATE MDM 30: CPT | Mod: GV | Performed by: NURSE PRACTITIONER

## 2022-01-01 PROCEDURE — 85610 PROTHROMBIN TIME: CPT | Mod: ORL | Performed by: FAMILY MEDICINE

## 2022-01-01 PROCEDURE — 99308 SBSQ NF CARE LOW MDM 20: CPT | Mod: GW | Performed by: NURSE PRACTITIONER

## 2022-01-01 PROCEDURE — 87086 URINE CULTURE/COLONY COUNT: CPT | Mod: ORL | Performed by: FAMILY MEDICINE

## 2022-01-01 PROCEDURE — 99309 SBSQ NF CARE MODERATE MDM 30: CPT | Performed by: FAMILY MEDICINE

## 2022-01-01 PROCEDURE — 85610 PROTHROMBIN TIME: CPT | Mod: ORL | Performed by: NURSE PRACTITIONER

## 2022-01-01 PROCEDURE — P9604 ONE-WAY ALLOW PRORATED TRIP: HCPCS | Mod: ORL | Performed by: FAMILY MEDICINE

## 2022-01-01 PROCEDURE — P9603 ONE-WAY ALLOW PRORATED MILES: HCPCS | Mod: ORL | Performed by: NURSE PRACTITIONER

## 2022-01-01 PROCEDURE — 99308 SBSQ NF CARE LOW MDM 20: CPT | Mod: GV | Performed by: NURSE PRACTITIONER

## 2022-01-01 PROCEDURE — 99308 SBSQ NF CARE LOW MDM 20: CPT | Performed by: NURSE PRACTITIONER

## 2022-01-01 PROCEDURE — 81001 URINALYSIS AUTO W/SCOPE: CPT | Mod: ORL | Performed by: FAMILY MEDICINE

## 2022-01-01 PROCEDURE — 99310 SBSQ NF CARE HIGH MDM 45: CPT | Performed by: NURSE PRACTITIONER

## 2022-01-01 PROCEDURE — 99310 SBSQ NF CARE HIGH MDM 45: CPT | Performed by: FAMILY MEDICINE

## 2022-01-01 PROCEDURE — 36415 COLL VENOUS BLD VENIPUNCTURE: CPT | Mod: ORL | Performed by: NURSE PRACTITIONER

## 2022-01-01 PROCEDURE — 99310 SBSQ NF CARE HIGH MDM 45: CPT | Mod: GV | Performed by: NURSE PRACTITIONER

## 2022-01-01 PROCEDURE — 87077 CULTURE AEROBIC IDENTIFY: CPT | Mod: ORL | Performed by: FAMILY MEDICINE

## 2022-01-01 RX ORDER — LORAZEPAM 0.5 MG/1
0.5 TABLET ORAL EVERY 4 HOURS PRN
COMMUNITY

## 2022-01-01 RX ORDER — HYDROMORPHONE HYDROCHLORIDE 2 MG/1
TABLET ORAL
Qty: 60 TABLET | Refills: 0 | Status: SHIPPED | OUTPATIENT
Start: 2022-01-01

## 2022-01-01 ASSESSMENT — MIFFLIN-ST. JEOR
SCORE: 1241.78
SCORE: 1244.05
SCORE: 1249.49
SCORE: 1273.53
SCORE: 1250.85
SCORE: 1273.53
SCORE: 1239.51
SCORE: 1250.85

## 2022-01-02 PROBLEM — N18.4 CKD (CHRONIC KIDNEY DISEASE) STAGE 4, GFR 15-29 ML/MIN (H): Status: RESOLVED | Noted: 2017-12-11 | Resolved: 2022-01-01

## 2022-01-02 PROBLEM — L89.302 PRESSURE INJURY OF BUTTOCK, STAGE 2, UNSPECIFIED LATERALITY (H): Status: ACTIVE | Noted: 2022-01-01

## 2022-01-03 NOTE — PROGRESS NOTES
[place] GERIATRIC SERVICES  Chief Complaint   Patient presents with     [facility]     [place] Medical Record Number:  [id number]  Place of Service where encounter took place:  [facility] () [[id number]]    HPI:    Mecca Slade  is 89 year old (12/14/1921), who is being seen today for a federally mandated E/M visit.  HPI information obtained from: facility chart records, facility staff, patient report and [facility] chart review.     Recent Updates:  - had a fall on 11/15/21 resulted in right distal intra-articular femur fx  (SP ORIF) and medial metaphysis left femur fx s/p managed with hinged brace.       Today's concerns are: 01/03.22:  - Pain: reports some soreness in left foot, better with medicine, no aggravating factors  - A-fib: denies palpitation or chest pain  -----------------------------------------------  - MEDICATIONS:  Current Outpatient Medications   Medication Sig Dispense Refill     acetaminophen (TYLENOL) 500 MG tablet Take 1,000 mg by mouth 3 times daily       atorvastatin (LIPITOR) 10 MG tablet Take 10 mg by mouth every evening       carboxymethylcellulose (CARBOXYMETHYLCELLULOSE SODIUM) 0.5 % SOLN ophthalmic solution Place 1 drop into both eyes every morning       furosemide (LASIX) 40 MG tablet Take 40 mg by mouth daily       HYDROmorphone (DILAUDID) 2 MG tablet TAKE 1/2 TAB (1 MG) BY MOUTH EVERY 4 HOURS AS NEEDED FOR MODERATE PAIN (PAIN GREATER THAN 5) 8 tablet 0     JANTOVEN ANTICOAGULANT 1 MG tablet TAKE 1 TAB BY MOUTH ON TUE, THR,SAT,SUN. INR 11/16/21       JANTOVEN ANTICOAGULANT 2 MG tablet TAKE 1 TAB BY MOUTH ON MON, WED,FRI. INR 11/16/21       lisinopril (ZESTRIL) 5 MG tablet Take 0.5 tablets (2.5 mg) by mouth every evening       metFORMIN (GLUCOPHAGE-XR) 500 MG 24 hr tablet Take 2 tablets (1,000 mg) by mouth daily (with breakfast)       methocarbamol (ROBAXIN) 500 MG tablet Take 500 mg by mouth 3 times daily as needed       metoprolol succinate ER  "(TOPROL-XL) 25 MG 24 hr tablet Take 1 tablet (25 mg) by mouth every evening       protein modular (PRO-STAT) Take by mouth every morning 30 ml         ROS: 4 point ROS including Respiratory, CV, GI and , other than that noted in the HPI,  is negative    Vitals:  /67   Pulse 72   Temp 98.6  F (37  C)   Resp 16   Ht 1.676 m (5' 6\")   Wt 88.7 kg (195 lb 8 oz)   SpO2 98%   BMI 31.55 kg/m    Body mass index is 31.55 kg/m .  Exam:  GENERAL APPEARANCE:  in no distress, cooperative  RESP:  lungs clear to auscultation , unlabored breathing  CV:  S1S2 audible, irregular HR, no murmur appreciated.   ABDOMEN:  soft, NT/ND, BS audible. no mass appreciated on palpation.   M/S:   No joint deformity noted.   SKIN:  sluggish erythema over left heal. Dry dressing over left leg  NEURO:  Ms strength: 4/5 LUE, 4/5 LLE. Diminished visual acuity  PSYCH:  fair insight, judgement and memory, affect and mood normal     Lab/Diagnostic data:  Reviewed in the chart and EHR.        ASSESSMENT/PLAN  --------------------------------  Chronic congestive heart failure, diastolic type (H)  Essential hypertension  -  EF > 70% (2012)  - compensated. Continue meds    On coumadin for Atrial fibrillation (H):   - INR today 2.67 from 3.01 (12/27). Continue current regiments, and repeat INR in one week.   - CVR. On toprol.     Type II diabetes mellitus with peripheral circulatory disorder (H) :   A1C 8.1 12/01/2021    A1C 9.1 11/15/2021    A1C 7.8 08/05/2021   - controleld. Goal 8-9%.   -  In this frail elderly adult with a limited life expectancy, the goal of  the management is to address the hyperglycemia sx if any,  rather than the  BGs numbers per se.   - on metformin.       Closed metaphyseal fracture of left lower extremity with delayed healing, subsequent encounter   Other closed fracture of distal end of right femur with routine healing, subsequent encounter   - analgesia optimal.   - Immobilizer of left leg was taken off due to " pressure injury, unstageable. Wound care in place.       Hx of Cerebrovascular accident (CVA) due to embolism of right middle cerebral artery  Left sided hemiparesis (H)  Frail elderly:    - Left sided residual weakness, stable. Off ASA, on lipitor 10 mg.    - Significant  Deficits requiring NH placement. Requiring extensive assistance from nursing. Up for meals only o/w spends the day resting in bed       Class 1 obesity due to excess calories with serious comorbidity (H)  -  Body mass index is 31.55 kg/m .  In this age group- frail elderly, keep BMI b/lw 25-35 Kg(m2).     Primary Hyperparathyroidism (H)   - elevated Ca and PTH.  Resident seems doing fair. Recommend no further work up given limited life expectancy. If become symptomatic consider cinacalcet 30 mg po daily, may increase to bid unitl Ca level have normalized- close attention to hydration status  - no concern.      Left heel sluggish erythema: will start Mepilex, prevention measures.     Electronically signed by:  Anirudh Ye MD

## 2022-01-03 NOTE — LETTER
1/3/2022        RE: Mecca Slade  [facility]  [address] [facility] [address]        [place] GERIATRIC SERVICES  Chief Complaint   Patient presents with     [facility] Regulatory     [place] Medical Record Number:  [id number]  Place of Service where encounter took place:  [facility] () [[id number]]    HPI:    Mecca Slade  is 89 year old (12/14/1921), who is being seen today for a federally mandated E/M visit.  HPI information obtained from: facility chart records, facility staff, patient report and [facility] chart review.     Recent Updates:  - had a fall on 11/15/21 resulted in right distal intra-articular femur fx  (SP ORIF) and medial metaphysis left femur fx s/p managed with hinged brace.       Today's concerns are: 01/03.22:  - Pain: reports some soreness in left foot, better with medicine, no aggravating factors  - A-fib: denies palpitation or chest pain  -----------------------------------------------  - MEDICATIONS:  Current Outpatient Medications   Medication Sig Dispense Refill     acetaminophen (TYLENOL) 500 MG tablet Take 1,000 mg by mouth 3 times daily       atorvastatin (LIPITOR) 10 MG tablet Take 10 mg by mouth every evening       carboxymethylcellulose (CARBOXYMETHYLCELLULOSE SODIUM) 0.5 % SOLN ophthalmic solution Place 1 drop into both eyes every morning       furosemide (LASIX) 40 MG tablet Take 40 mg by mouth daily       HYDROmorphone (DILAUDID) 2 MG tablet TAKE 1/2 TAB (1 MG) BY MOUTH EVERY 4 HOURS AS NEEDED FOR MODERATE PAIN (PAIN GREATER THAN 5) 8 tablet 0     JANTOVEN ANTICOAGULANT 1 MG tablet TAKE 1 TAB BY MOUTH ON TUE, THR,SAT,SUN. INR 11/16/21       JANTOVEN ANTICOAGULANT 2 MG tablet TAKE 1 TAB BY MOUTH ON MON, WED,FRI. INR 11/16/21       lisinopril (ZESTRIL) 5 MG tablet Take 0.5 tablets (2.5 mg) by mouth every evening       metFORMIN (GLUCOPHAGE-XR) 500 MG 24 hr tablet Take 2 tablets (1,000 mg) by mouth daily (with breakfast)        "methocarbamol (ROBAXIN) 500 MG tablet Take 500 mg by mouth 3 times daily as needed       metoprolol succinate ER (TOPROL-XL) 25 MG 24 hr tablet Take 1 tablet (25 mg) by mouth every evening       protein modular (PRO-STAT) Take by mouth every morning 30 ml         ROS: 4 point ROS including Respiratory, CV, GI and , other than that noted in the HPI,  is negative    Vitals:  /67   Pulse 72   Temp 98.6  F (37  C)   Resp 16   Ht 1.676 m (5' 6\")   Wt 88.7 kg (195 lb 8 oz)   SpO2 98%   BMI 31.55 kg/m    Body mass index is 31.55 kg/m .  Exam:  GENERAL APPEARANCE:  in no distress, cooperative  RESP:  lungs clear to auscultation , unlabored breathing  CV:  S1S2 audible, irregular HR, no murmur appreciated.   ABDOMEN:  soft, NT/ND, BS audible. no mass appreciated on palpation.   M/S:   No joint deformity noted.   SKIN:  sluggish erythema over left heal. Dry dressing over left leg  NEURO:  Ms strength: 4/5 LUE, 4/5 LLE. Diminished visual acuity  PSYCH:  fair insight, judgement and memory, affect and mood normal     Lab/Diagnostic data:  Reviewed in the chart and EHR.        ASSESSMENT/PLAN  --------------------------------  Chronic congestive heart failure, diastolic type (H)  Essential hypertension  -  EF > 70% (2012)  - compensated. Continue meds    On coumadin for Atrial fibrillation (H):   - INR today 2.67 from 3.01 (12/27). Continue current regiments, and repeat INR in one week.   - CVR. On toprol.     Type II diabetes mellitus with peripheral circulatory disorder (H) :   A1C 8.1 12/01/2021    A1C 9.1 11/15/2021    A1C 7.8 08/05/2021   - controleld. Goal 8-9%.   -  In this frail elderly adult with a limited life expectancy, the goal of  the management is to address the hyperglycemia sx if any,  rather than the  BGs numbers per se.   - on metformin.       Closed metaphyseal fracture of left lower extremity with delayed healing, subsequent encounter   Other closed fracture of distal end of right femur with " routine healing, subsequent encounter   - analgesia optimal.   - Immobilizer of left leg was taken off due to pressure injury, unstageable. Wound care in place.       Hx of Cerebrovascular accident (CVA) due to embolism of right middle cerebral artery  Left sided hemiparesis (H)  Frail elderly:    - Left sided residual weakness, stable. Off ASA, on lipitor 10 mg.    - Significant  Deficits requiring NH placement. Requiring extensive assistance from nursing. Up for meals only o/w spends the day resting in bed       Class 1 obesity due to excess calories with serious comorbidity (H)  -  Body mass index is 31.55 kg/m .  In this age group- frail elderly, keep BMI b/lw 25-35 Kg(m2).     Primary Hyperparathyroidism (H)   - elevated Ca and PTH.  Resident seems doing fair. Recommend no further work up given limited life expectancy. If become symptomatic consider cinacalcet 30 mg po daily, may increase to bid unitl Ca level have normalized- close attention to hydration status  - no concern.      Left heel sluggish erythema: will start Mepilex, prevention measures.     Electronically signed by:  Anirudh Ye MD        Sincerely,        Anirudh Ye MD

## 2022-01-03 NOTE — PROGRESS NOTES
[facility] [place] [facility]  Episodic/Acute/Follow-Up  [place] MRN: [id number]. Place of Service where encounter took place:  [facility] () [[id number]]   Chief Complaint   Patient presents with     RECHECK     Anticoagulation    HPI: Mecca Slade  is a 89 year old (12/14/1921), who is being seen today for an episodic care visit.  HPI information obtained from: facility chart records, facility staff, patient report and [facility] chart review. Today's concern is:    Zayda seen today on follow-up as she continues to recover from bilateral femur fractures end-stage for ulceration/deep tissue injury to the left lower extremity. Today, she is in more pain than usual, but is due to have an as needed Dilaudid dose. She states the pain does come and go, and denies any paresthesias. She states that her bowels are moving fine, her appetite is very good, and she denies any headaches, dizziness, shortness of breath, cough.    Past Medical and Surgical History reviewed in Epic today.  MEDICATIONS:  Current Outpatient Medications   Medication Sig Dispense Refill     acetaminophen (TYLENOL) 500 MG tablet Take 1,000 mg by mouth 3 times daily       atorvastatin (LIPITOR) 10 MG tablet Take 10 mg by mouth every evening       carboxymethylcellulose (CARBOXYMETHYLCELLULOSE SODIUM) 0.5 % SOLN ophthalmic solution Place 1 drop into both eyes every morning       furosemide (LASIX) 40 MG tablet Take 40 mg by mouth daily       HYDROmorphone (DILAUDID) 2 MG tablet TAKE 1/2 TAB (1 MG) BY MOUTH EVERY 4 HOURS AS NEEDED FOR MODERATE PAIN (PAIN GREATER THAN 5) 8 tablet 0     JANTOVEN ANTICOAGULANT 1 MG tablet TAKE 1 TAB BY MOUTH ON TUE, THR,SAT,SUN. INR 11/16/21       JANTOVEN ANTICOAGULANT 2 MG tablet TAKE 1 TAB BY MOUTH ON MON, WED,FRI. INR 11/16/21       lisinopril (ZESTRIL) 5 MG tablet Take 0.5 tablets (2.5 mg) by mouth every evening       metFORMIN (GLUCOPHAGE-XR) 500 MG 24 hr tablet Take 2 tablets (1,000 mg) by  "mouth daily (with breakfast)       methocarbamol (ROBAXIN) 500 MG tablet Take 500 mg by mouth 3 times daily as needed       metoprolol succinate ER (TOPROL-XL) 25 MG 24 hr tablet Take 1 tablet (25 mg) by mouth every evening       protein modular (PRO-STAT) Take by mouth every morning 30 ml       REVIEW OF SYSTEMS: Limited secondary to cognitive impairment but today pt reports the above and 10 point ROS of systems including Constitutional, Eyes, Respiratory, Cardiovascular, Gastroenterology, Genitourinary, Integumentary, Musculoskeletal, Psychiatric were all negative except for pertinent positives noted in my HPI.    Vitals: /67   Pulse 72   Temp 97.7  F (36.5  C)   Resp 16   Ht 1.676 m (5' 6\")   Wt 88.7 kg (195 lb 8 oz)   SpO2 94%   BMI 31.55 kg/m     Exam:  GENERAL APPEARANCE: Alert, in no distress, cooperative.   ENT: Mouth/posterior oropharynx intact w/ moist mucous membranes, hearing acuity [place].  EYES: EOM, conjunctivae, lids, pupils and irises normal, PERRL2.   RESP: Respiratory effort unlabored, no respiratory distress, Lung sounds clear/diinished. On RA.   CV: Auscultation of heart reveals S1, S2, rate controlled and rhythm irregular, no murmur, no rub or gallop, Edema trace+ BLE. Peripheral pulses are 2+.  ABDOMEN: Normal bowel sounds, soft, non-tender abdomen, and no masses palpated.  SKIN: Inspection/Palpation of skin and subcutaneous tissue baseline w/ fragility. Left ankle DTI and buttocks as noted below:    NEURO: CN II-XII at patient's baseline, sensation baseline PPS.  PSYCH: Insight, judgement, and memory are baseline impaired, affect and mood are happy/engaged.    Lab/Diagnostic data: Recent labs in [facility] reviewed by me today.   Date INR New Dose/Orders   1/3/22 2.67 Continued.   12/27 3.01 1mg M/W/F, 2mg AOD.    12/20 3.21 1mg Tu/Th, 2mg AOD.   12/14 1.95 2mg/day   12/9 1.66 2mg/day x 4 days.   12/6 1.56 2mg/day x 3 days.    12/3 2.87 0.5mg x 3 days.   12/1 3.64 0.5mg x 2 days.   11/30 " 5.34 Hold x1.   11/29 4.81 Hold x1.        ASSESSMENT/PLAN    ICD-10-CM Acute on chronic.    1. Other closed fracture of distal end of right femur with routine healing, subsequent encounter  S72.491D Unstable. Both femur fractures are different in their healing process, as left leg has been destabilized with removal of immobilizer. Immobilizer caused ulceration/injury which we are currently treating and as pictured above. Zayda will continue to need support through pain medications and total care through nursing, and there is still talk about utilization of hospice services. Sign-on/enrollment to hospice has been put on hold due to family losses and significant life altering commitments by family elsewhere. Zayda remains therapeutic with her INR, and is therefore at a lessened risk of DVT.   2. Closed metaphyseal fracture of left lower extremity with delayed healing, subsequent encounter  S82.92XG Unstable. See above.    3. Orthopedic aftercare  Z47.89 Unstable. See above.    4. Pressure injury of left leg, unstageable (H)  L89.890 Unstable. Change from Iodosorb to Medihoney last week appears to have been effective to aid in tissue changes. Zayda has better sensation this week, more eschar has been removed, and eschar Is quite lucent. Zayda was in some pain today, therefore surgical debridement was not appropriate, but this may be considered in the future if It is loose and able to come free with mild excision. Zayda explained some stinging, and therefore will switch wound cleanser to normal saline.   5. Pressure injury of buttock, stage 2, unspecified laterality (H)  L89.302 Improving. Ongoing. Continue plan of care.    6. Left hemiparesis (H)  G81.94 Chronic. Ongoing. Will continue plan of care for ongoing chronic conditions which do not need to be addressed this visit.    7. Systolic congestive heart failure, unspecified HF chronicity (H)  I50.20 Chronic. Ongoing. See above.    8. Type II diabetes mellitus  with peripheral circulatory disorder (H)  E11.51 Chronic. Ongoing. See above.    9. Morbid obesity (H)  E66.01 Chronic. Ongoing. See above.    10. Hyperparathyroidism, primary (H)  E21.0 Chronic. Ongoing. See above.    11. Stage 3a chronic kidney disease (H)  N18.31 Chronic. Ongoing. See above.      Follow up w/in 1 week or as needed.    Orders:  Wound care change:  1. Switch from wound cleanser to cleaning w/ normal saline.     Electronically signed by:  Dr. Haley Sims, APRN CNP DNP

## 2022-01-04 NOTE — LETTER
1/4/2022        RE: Mecca Slade  [facility]  [address] [facility] [address]        [facility] [place] [facility]IAT[facility]IC SERVICE  Episodic/Acute/Follow-Up  [place] MRN: [id number]. Place of Service where encounter took place:  [facility] () [[id number]]   Chief Complaint   Patient presents with     RECHECK     Anticoagulation    HPI: Mecca Slade  is a 89 year old (12/14/1921), who is being seen today for an episodic care visit.  HPI information obtained from: facility chart records, facility staff, patient report and [facility] chart review. Today's concern is:    Zayda seen today on follow-up as she continues to recover from bilateral femur fractures end-stage for ulceration/deep tissue injury to the left lower extremity. Today, she is in more pain than usual, but is due to have an as needed Dilaudid dose. She states the pain does come and go, and denies any paresthesias. She states that her bowels are moving fine, her appetite is very good, and she denies any headaches, dizziness, shortness of breath, cough.    Past Medical and Surgical History reviewed in Epic today.  MEDICATIONS:  Current Outpatient Medications   Medication Sig Dispense Refill     acetaminophen (TYLENOL) 500 MG tablet Take 1,000 mg by mouth 3 times daily       atorvastatin (LIPITOR) 10 MG tablet Take 10 mg by mouth every evening       carboxymethylcellulose (CARBOXYMETHYLCELLULOSE SODIUM) 0.5 % SOLN ophthalmic solution Place 1 drop into both eyes every morning       furosemide (LASIX) 40 MG tablet Take 40 mg by mouth daily       HYDROmorphone (DILAUDID) 2 MG tablet TAKE 1/2 TAB (1 MG) BY MOUTH EVERY 4 HOURS AS NEEDED FOR MODERATE PAIN (PAIN GREATER THAN 5) 8 tablet 0     JANTOVEN ANTICOAGULANT 1 MG tablet TAKE 1 TAB BY MOUTH ON TUE, THR,SAT,SUN. INR 11/16/21       JANTOVEN ANTICOAGULANT 2 MG tablet TAKE 1 TAB BY MOUTH ON MON, WED,FRI. INR 11/16/21       lisinopril (ZESTRIL) 5 MG tablet Take 0.5  "tablets (2.5 mg) by mouth every evening       metFORMIN (GLUCOPHAGE-XR) 500 MG 24 hr tablet Take 2 tablets (1,000 mg) by mouth daily (with breakfast)       methocarbamol (ROBAXIN) 500 MG tablet Take 500 mg by mouth 3 times daily as needed       metoprolol succinate ER (TOPROL-XL) 25 MG 24 hr tablet Take 1 tablet (25 mg) by mouth every evening       protein modular (PRO-STAT) Take by mouth every morning 30 ml       REVIEW OF SYSTEMS: Limited secondary to cognitive impairment but today pt reports the above and 10 point ROS of systems including Constitutional, Eyes, Respiratory, Cardiovascular, Gastroenterology, Genitourinary, Integumentary, Musculoskeletal, Psychiatric were all negative except for pertinent positives noted in my HPI.    Vitals: /67   Pulse 72   Temp 97.7  F (36.5  C)   Resp 16   Ht 1.676 m (5' 6\")   Wt 88.7 kg (195 lb 8 oz)   SpO2 94%   BMI 31.55 kg/m     Exam:  GENERAL APPEARANCE: Alert, in no distress, cooperative.   ENT: Mouth/posterior oropharynx intact w/ moist mucous membranes, hearing acuity [place].  EYES: EOM, conjunctivae, lids, pupils and irises normal, PERRL2.   RESP: Respiratory effort unlabored, no respiratory distress, Lung sounds clear/diinished. On RA.   CV: Auscultation of heart reveals S1, S2, rate controlled and rhythm irregular, no murmur, no rub or gallop, Edema trace+ BLE. Peripheral pulses are 2+.  ABDOMEN: Normal bowel sounds, soft, non-tender abdomen, and no masses palpated.  SKIN: Inspection/Palpation of skin and subcutaneous tissue baseline w/ fragility. Left ankle DTI and buttocks as noted below:    NEURO: CN II-XII at patient's baseline, sensation baseline PPS.  PSYCH: Insight, judgement, and memory are baseline impaired, affect and mood are happy/engaged.    Lab/Diagnostic data: Recent labs in [facility] reviewed by me today.   Date INR New Dose/Orders   1/3/22 2.67 Continued.   12/27 3.01 1mg M/W/F, 2mg AOD.    12/20 3.21 1mg Tu/Th, 2mg AOD.   12/14 1.95 2mg/day "   12/9 1.66 2mg/day x 4 days.   12/6 1.56 2mg/day x 3 days.    12/3 2.87 0.5mg x 3 days.   12/1 3.64 0.5mg x 2 days.   11/30 5.34 Hold x1.   11/29 4.81 Hold x1.        ASSESSMENT/PLAN    ICD-10-CM Acute on chronic.    1. Other closed fracture of distal end of right femur with routine healing, subsequent encounter  S72.491D Unstable. Both femur fractures are different in their healing process, as left leg has been destabilized with removal of immobilizer. Immobilizer caused ulceration/injury which we are currently treating and as pictured above. Zayda will continue to need support through pain medications and total care through nursing, and there is still talk about utilization of hospice services. Sign-on/enrollment to hospice has been put on hold due to family losses and significant life altering commitments by family elsewhere. Zayda remains therapeutic with her INR, and is therefore at a lessened risk of DVT.   2. Closed metaphyseal fracture of left lower extremity with delayed healing, subsequent encounter  S82.92XG Unstable. See above.    3. Orthopedic aftercare  Z47.89 Unstable. See above.    4. Pressure injury of left leg, unstageable (H)  L89.890 Unstable. Change from Iodosorb to Medihoney last week appears to have been effective to aid in tissue changes. Zayda has better sensation this week, more eschar has been removed, and eschar Is quite lucent. Zayda was in some pain today, therefore surgical debridement was not appropriate, but this may be considered in the future if It is loose and able to come free with mild excision. Zayda explained some stinging, and therefore will switch wound cleanser to normal saline.   5. Pressure injury of buttock, stage 2, unspecified laterality (H)  L89.302 Improving. Ongoing. Continue plan of care.    6. Left hemiparesis (H)  G81.94 Chronic. Ongoing. Will continue plan of care for ongoing chronic conditions which do not need to be addressed this visit.    7. Systolic  congestive heart failure, unspecified HF chronicity (H)  I50.20 Chronic. Ongoing. See above.    8. Type II diabetes mellitus with peripheral circulatory disorder (H)  E11.51 Chronic. Ongoing. See above.    9. Morbid obesity (H)  E66.01 Chronic. Ongoing. See above.    10. Hyperparathyroidism, primary (H)  E21.0 Chronic. Ongoing. See above.    11. Stage 3a chronic kidney disease (H)  N18.31 Chronic. Ongoing. See above.      Follow up w/in 1 week or as needed.    Orders:  Wound care change:  1. Switch from wound cleanser to cleaning w/ normal saline.     Electronically signed by:  JOSUE Clark CNP DNP        Sincerely,        JOSUE Gillis CNP

## 2022-01-09 NOTE — LETTER
8/19/2019        RE: Mecca Slade  [facility]  [address] [facility] [address]        [place] [facility]  [place] Medical Record Number:  [id number]  Place of Service where encounter took place: [facility]  ([facility]) [[id number]]    HPI: Mecca Slade is a 89 year old  (12/14/1921), who is being seen today for an episodic care visit at the above location.   HPI information obtained from: facility chart records, facility staff, patient report, [facility] chart review and Care Everywhere [facility] chart review. Today's concern is INR/Coumadin management for A. Fib    ROS/Subjective:  Bleeding Signs/Symptoms:  None  Thromboembolic Signs/Symptoms:  None  Medication Changes:  No  Dietary Changes:  No  Activity Changes: No  Bacterial/Viral Infection:  No  Missed Coumadin Doses:  None  On ASA: No  Other Concerns:  No    OBJECTIVE:  BP (!) 148/78   Pulse 66   Temp 97.4  F (36.3  C)   Resp 19   Wt 101.9 kg (224 lb 11.2 oz)   SpO2 98%   BMI 36.27 kg/m     Last INR: 1.33 on 8/15/19.  INR Today:  1.97  Current Dose: 4mg x2 days, 3mg x1, 2mg x1. Prior dosing was 2mg/day.    ASSESSMENT:  Chronic atrial fibrillation (H)  Long term (current) use of anticoagulants  Encounter for therapeutic drug monitoring  Chronic. Stable. Almost therapeutic. Will dose Coumadin as noted below and recheck INR in 3 days. Follow-up accordingly. Therapeutic INR for goal of 2-3.    PLAN:   Orders written by provider at facility and transcribed by : José Miguel Radford  New Dose: 3 mg PO daily. Dx: Afib.    Next INR: 8/22/19.     Electronically signed by:  Dr. Haley Sims, APRN CNP DNP          Sincerely,        JOSUE Gillis CNP    
Yes

## 2022-01-10 NOTE — LETTER
1/10/2022        RE: Mecca Slade  [facility]  [address] [facility] [address]        [facility] [place] [facility]IATRIC SERVICE  Episodic/Acute/Follow-Up  [place] MRN: [id number]. Place of Service where encounter took place:  [facility] () [[id number]]   Chief Complaint   Patient presents with     RECHECK     Anticoagulation    HPI: Mecca Slade  is a 89 year old (12/14/1921), who is being seen today for an episodic care visit.  HPI information obtained from: facility chart records, facility staff, patient report and [facility] chart review. Today's concern is:    Zayda seen today on routine follow-up as she continues to recover from bilateral femur fractures, and a significant stage IV left lower extremity wound.  Her INR resulted today significantly supratherapeutic.  She denies any bleeding, bruising, shortness of breath, fever, cough.  She states that she has left lower extremity pain when her wound is worked on, but otherwise her pain has been better controlled.  Nursing has been premedicating prior to transfers or wound care.    Past Medical and Surgical History reviewed in Epic today.  MEDICATIONS:  Current Outpatient Medications   Medication Sig Dispense Refill     acetaminophen (TYLENOL) 500 MG tablet Take 1,000 mg by mouth 3 times daily       atorvastatin (LIPITOR) 10 MG tablet Take 10 mg by mouth every evening       carboxymethylcellulose (CARBOXYMETHYLCELLULOSE SODIUM) 0.5 % SOLN ophthalmic solution Place 1 drop into both eyes every morning       furosemide (LASIX) 40 MG tablet Take 40 mg by mouth daily       HYDROmorphone (DILAUDID) 2 MG tablet TAKE 1/2 TAB (1 MG) BY MOUTH EVERY 4 HOURS AS NEEDED FOR MODERATE PAIN (PAIN GREATER THAN 5) 8 tablet 0     JANTOVEN ANTICOAGULANT 1 MG tablet TAKE 1 TAB BY MOUTH ON TUE, THR,SAT,SUN. INR 11/16/21       JANTOVEN ANTICOAGULANT 2 MG tablet TAKE 1 TAB BY MOUTH ON MON, WED,FRI. INR 11/16/21       lisinopril (ZESTRIL) 5 MG  "tablet Take 0.5 tablets (2.5 mg) by mouth every evening       metFORMIN (GLUCOPHAGE-XR) 500 MG 24 hr tablet Take 2 tablets (1,000 mg) by mouth daily (with breakfast)       methocarbamol (ROBAXIN) 500 MG tablet Take 500 mg by mouth 3 times daily as needed       metoprolol succinate ER (TOPROL-XL) 25 MG 24 hr tablet Take 1 tablet (25 mg) by mouth every evening       protein modular (PRO-STAT) Take by mouth every morning 30 ml       REVIEW OF SYSTEMS: Limited secondary to cognitive impairment but today pt reports the above and 10 point ROS of systems including Constitutional, Eyes, Respiratory, Cardiovascular, Gastroenterology, Genitourinary, Integumentary, Musculoskeletal, Psychiatric were all negative except for pertinent positives noted in my HPI.    Vitals: /67   Pulse 72   Temp 97.6  F (36.4  C)   Resp 16   Ht 1.676 m (5' 6\")   Wt 85.3 kg (188 lb)   SpO2 95%   BMI 30.34 kg/m     Exam:  GENERAL APPEARANCE: Alert, in no distress, cooperative.   ENT: Mouth/posterior oropharynx intact w/ moist mucous membranes, hearing acuity [place].  EYES: EOM, conjunctivae, lids, pupils and irises normal, PERRL2.   RESP: Respiratory effort unlabored, no respiratory distress, Lung sounds clear/diinished. On RA.   CV: Auscultation of heart reveals S1, S2, rate controlled and rhythm irregular, no murmur, no rub or gallop, Edema trace+ BLE. Peripheral pulses are 2+.  ABDOMEN: Normal bowel sounds, soft, non-tender abdomen, and no masses palpated.  SKIN: Inspection/Palpation of skin and subcutaneous tissue baseline w/ fragility. Left ankle DTI and buttocks as noted below:    NEURO: CN II-XII at patient's baseline, sensation baseline PPS.  PSYCH: Insight, judgement, and memory are baseline impaired, affect and mood are happy/engaged.    Lab/Diagnostic data: Recent labs in [facility] reviewed by me today.   Date INR New Dose/Orders   1/3/22 2.67 Continued.   12/27 3.01 1mg M/W/F, 2mg AOD.    12/20 3.21 1mg Tu/Th, 2mg AOD.   12/14 1.95 " 2mg/day   12/9 1.66 2mg/day x 4 days.   12/6 1.56 2mg/day x 3 days.    12/3 2.87 0.5mg x 3 days.   12/1 3.64 0.5mg x 2 days.   11/30 5.34 Hold x1.   11/29 4.81 Hold x1.        ASSESSMENT/PLAN    ICD-10-CM Acute on chronic.    1. Other closed fracture of distal end of right femur with routine healing, subsequent encounter  S72.491D Unstable.  Left lower extremity fracture continues to be unstable and leg needs significant support with any movement.  May consider repeat x-rays, will confer with orthopedic specialist first.  We will continue Dilaudid as needed.  Patient has not signed onto hospice yet, but this is still highly recommended.   2. Closed metaphyseal fracture of left lower extremity with delayed healing, subsequent encounter  S82.92XG Unstable. See above.    3. Orthopedic aftercare  Z47.89 Unstable. See above.    4. Pressure injury of left leg, unstageable (H)  L89.890 Unstable.  Zayda continues to have significant injury to her skin.  Though margins of wound appear to be closing, less irritated, there is significant slough and eschar still present.  This may have shrunk since last visit, but scoring and surgical debridement may be required at future visit.   5. Afib (H)  Unstable.  Though no signs or symptoms of bleeding, significant concern that supratherapeutic INR is secondary to another cause.  We will hold Coumadin as noted below and trend INR tomorrow and ongoing.   6. Long term current use of anticoagulants  Unstable. See above.   7.  Encounter for therapeutic drug monitoring  Unstable. See above.   8. Pressure injury of buttock, stage 2, unspecified laterality (H)  L89.302 Improving. Ongoing. Continue plan of care.      Follow up w/in 1 week or as needed.    Orders:  1. Hold Coumadin x1 today.   2. Recheck INR x1 tomorrow.   Dx: afib.   3. INR resulted higher on 1/11, hold x 2 days, recheck 1/13.     Electronically signed by:  Dr. Haley Sims, APRN CNP DNP        Sincerely,        Haley SHEIKH  JOSUE Sims CNP

## 2022-01-10 NOTE — PROGRESS NOTES
[facility] SERV[facility]  Episodic/Acute/Follow-Up  [place] MRN: [id number]. Place of Service where encounter took place:  [facility] () [[id number]]   Chief Complaint   Patient presents with     RECHECK     Anticoagulation    HPI: Mecca Slade  is a 89 year old (12/14/1921), who is being seen today for an episodic care visit.  HPI information obtained from: facility chart records, facility staff, patient report and [facility] chart review. Today's concern is:    Zayda seen today on routine follow-up as she continues to recover from bilateral femur fractures, and a significant stage IV left lower extremity wound.  Her INR resulted today significantly supratherapeutic.  She denies any bleeding, bruising, shortness of breath, fever, cough.  She states that she has left lower extremity pain when her wound is worked on, but otherwise her pain has been better controlled.  Nursing has been premedicating prior to transfers or wound care.    Past Medical and Surgical History reviewed in Epic today.  MEDICATIONS:  Current Outpatient Medications   Medication Sig Dispense Refill     acetaminophen (TYLENOL) 500 MG tablet Take 1,000 mg by mouth 3 times daily       atorvastatin (LIPITOR) 10 MG tablet Take 10 mg by mouth every evening       carboxymethylcellulose (CARBOXYMETHYLCELLULOSE SODIUM) 0.5 % SOLN ophthalmic solution Place 1 drop into both eyes every morning       furosemide (LASIX) 40 MG tablet Take 40 mg by mouth daily       HYDROmorphone (DILAUDID) 2 MG tablet TAKE 1/2 TAB (1 MG) BY MOUTH EVERY 4 HOURS AS NEEDED FOR MODERATE PAIN (PAIN GREATER THAN 5) 8 tablet 0     JANTOVEN ANTICOAGULANT 1 MG tablet TAKE 1 TAB BY MOUTH ON TUE, THR,SAT,SUN. INR 11/16/21       JANTOVEN ANTICOAGULANT 2 MG tablet TAKE 1 TAB BY MOUTH ON MON, WED,FRI. INR 11/16/21       lisinopril (ZESTRIL) 5 MG tablet Take 0.5 tablets (2.5 mg) by mouth every evening       metFORMIN (GLUCOPHAGE-XR) 500 MG 24 hr tablet Take 2  "tablets (1,000 mg) by mouth daily (with breakfast)       methocarbamol (ROBAXIN) 500 MG tablet Take 500 mg by mouth 3 times daily as needed       metoprolol succinate ER (TOPROL-XL) 25 MG 24 hr tablet Take 1 tablet (25 mg) by mouth every evening       protein modular (PRO-STAT) Take by mouth every morning 30 ml       REVIEW OF SYSTEMS: Limited secondary to cognitive impairment but today pt reports the above and 10 point ROS of systems including Constitutional, Eyes, Respiratory, Cardiovascular, Gastroenterology, Genitourinary, Integumentary, Musculoskeletal, Psychiatric were all negative except for pertinent positives noted in my HPI.    Vitals: /67   Pulse 72   Temp 97.6  F (36.4  C)   Resp 16   Ht 1.676 m (5' 6\")   Wt 85.3 kg (188 lb)   SpO2 95%   BMI 30.34 kg/m     Exam:  GENERAL APPEARANCE: Alert, in no distress, cooperative.   ENT: Mouth/posterior oropharynx intact w/ moist mucous membranes, hearing acuity [place].  EYES: EOM, conjunctivae, lids, pupils and irises normal, PERRL2.   RESP: Respiratory effort unlabored, no respiratory distress, Lung sounds clear/diinished. On RA.   CV: Auscultation of heart reveals S1, S2, rate controlled and rhythm irregular, no murmur, no rub or gallop, Edema trace+ BLE. Peripheral pulses are 2+.  ABDOMEN: Normal bowel sounds, soft, non-tender abdomen, and no masses palpated.  SKIN: Inspection/Palpation of skin and subcutaneous tissue baseline w/ fragility. Left ankle DTI and buttocks as noted below:    NEURO: CN II-XII at patient's baseline, sensation baseline PPS.  PSYCH: Insight, judgement, and memory are baseline impaired, affect and mood are happy/engaged.    Lab/Diagnostic data: Recent labs in [facility] reviewed by me today.   Date INR New Dose/Orders   1/3/22 2.67 Continued.   12/27 3.01 1mg M/W/F, 2mg AOD.    12/20 3.21 1mg Tu/Th, 2mg AOD.   12/14 1.95 2mg/day   12/9 1.66 2mg/day x 4 days.   12/6 1.56 2mg/day x 3 days.    12/3 2.87 0.5mg x 3 days.   12/1 3.64 0.5mg x " 2 days.   11/30 5.34 Hold x1.   11/29 4.81 Hold x1.        ASSESSMENT/PLAN    ICD-10-CM Acute on chronic.    1. Other closed fracture of distal end of right femur with routine healing, subsequent encounter  S72.491D Unstable.  Left lower extremity fracture continues to be unstable and leg needs significant support with any movement.  May consider repeat x-rays, will confer with orthopedic specialist first.  We will continue Dilaudid as needed.  Patient has not signed onto hospice yet, but this is still highly recommended.   2. Closed metaphyseal fracture of left lower extremity with delayed healing, subsequent encounter  S82.92XG Unstable. See above.    3. Orthopedic aftercare  Z47.89 Unstable. See above.    4. Pressure injury of left leg, unstageable (H)  L89.890 Unstable.  Zayda continues to have significant injury to her skin.  Though margins of wound appear to be closing, less irritated, there is significant slough and eschar still present.  This may have shrunk since last visit, but scoring and surgical debridement may be required at future visit.   5. Afib (H)  Unstable.  Though no signs or symptoms of bleeding, significant concern that supratherapeutic INR is secondary to another cause.  We will hold Coumadin as noted below and trend INR tomorrow and ongoing.   6. Long term current use of anticoagulants  Unstable. See above.   7.  Encounter for therapeutic drug monitoring  Unstable. See above.   8. Pressure injury of buttock, stage 2, unspecified laterality (H)  L89.302 Improving. Ongoing. Continue plan of care.      Follow up w/in 1 week or as needed.    Orders:  1. Hold Coumadin x1 today.   2. Recheck INR x1 tomorrow.   Dx: afib.   3. INR resulted higher on 1/11, hold x 2 days, recheck 1/13.     Electronically signed by:  Dr. Haley Sims, APRN CNP DNP

## 2022-01-13 NOTE — PROGRESS NOTES
"[place] [facility]  [place] MRN: [id number]. Place of Service where encounter took place: [facility] () [[id number]]. HPI: Mecca Slade is a 89 year old  (12/14/1921), who is being seen today for an episodic care visit at the above location.   HPI information obtained from: facility chart records, facility staff, patient report and [facility] chart review. Today's concern is INR/Coumadin management for A. Fib    ROS/Subjective:  Bleeding Signs/Symptoms:  None  Thromboembolic Signs/Symptoms:  None  Medication Changes:  No  Dietary Changes:  No  Activity Changes: No  Bacterial/Viral Infection:  No  Missed Coumadin Doses:  None  On ASA: No  Other Concerns:  No    OBJECTIVE:  /67   Pulse 72   Temp 97.8  F (36.6  C)   Resp 16   Ht 1.676 m (5' 6\")   Wt 85.5 kg (188 lb 8 oz)   SpO2 94%   BMI 30.42 kg/m    Last INR: 4.89 on 1/11.  INR Today:  3.02.  Current Dose:  Hold x 3 days.     ASSESSMENT:  Controlled atrial fibrillation (H)  Long term current use of anticoagulant with international normalized ratio (INR) goal of 1.5-2.0  Encounter for therapeutic drug monitoring  Chronic. Stable. Slightly supratherapeutic. Will dose Coumadin as noted below and recheck INR in 4 days. Follow-up accordingly.    PLAN:   1. Coumadin 1mg M/W/F.   2. Coumadin 2mg AOD.  3. Recheck INR x1 on 1/17.  Dx: afib.     Electronically signed by:  Dr. Haley Sims, APRN CNP DNP        "

## 2022-01-13 NOTE — LETTER
"    1/13/2022        RE: Mecca Slade  [facility]  [address] [facility] [address]        [place] [facility]  [place] MRN: [id number]. Place of Service where encounter took place: [facility] () [[id number]]. HPI: Mecca Slade is a 89 year old  (12/14/1921), who is being seen today for an episodic care visit at the above location.   HPI information obtained from: facility chart records, facility staff, patient report and [facility] chart review. Today's concern is INR/Coumadin management for A. Fib    ROS/Subjective:  Bleeding Signs/Symptoms:  None  Thromboembolic Signs/Symptoms:  None  Medication Changes:  No  Dietary Changes:  No  Activity Changes: No  Bacterial/Viral Infection:  No  Missed Coumadin Doses:  None  On ASA: No  Other Concerns:  No    OBJECTIVE:  /67   Pulse 72   Temp 97.8  F (36.6  C)   Resp 16   Ht 1.676 m (5' 6\")   Wt 85.5 kg (188 lb 8 oz)   SpO2 94%   BMI 30.42 kg/m    Last INR: 4.89 on 1/11.  INR Today:  3.02.  Current Dose:  Hold x 3 days.     ASSESSMENT:  Controlled atrial fibrillation (H)  Long term current use of anticoagulant with international normalized ratio (INR) goal of 1.5-2.0  Encounter for therapeutic drug monitoring  Chronic. Stable. Slightly supratherapeutic. Will dose Coumadin as noted below and recheck INR in 4 days. Follow-up accordingly.    PLAN:   1. Coumadin 1mg M/W/F.   2. Coumadin 2mg AOD.  3. Recheck INR x1 on 1/17.  Dx: afib.     Electronically signed by:  Dr. Haley Sims, APRN CNP DNP              Sincerely,        Haley Sims, JOSUE CNP    "

## 2022-01-16 NOTE — PROGRESS NOTES
[facility]  Episodic/Acute/Follow-Up  [place] MRN: [id number]. Place of Service where encounter took place:  [facility] () [[id number]]   Chief Complaint   Patient presents with     RECHECK     Anticoagulation    HPI: Mecca Slade  is a 89 year old (12/14/1921), who is being seen today for an episodic care visit.  HPI information obtained from: facility chart records, facility staff, patient report and [facility] chart review. Today's concern is:    Zayda seen today on routine follow-up as her left lower extremity wound requires monitoring and intervention frequently.  Today, Zayda states that she has some burning in the left lower extremity, otherwise her most bothersome pain is in her left knee.  Pain is intermittent, usually related to range of motion or movement, but feels relief at rest.  Today, Zayda denies any bruising or bleeding, denies any headaches, dizziness, shortness of breath, chest pain.  She states sometimes she gets constipated, but not today.    Past Medical and Surgical History reviewed in Epic today.  MEDICATIONS:  Current Outpatient Medications   Medication Sig Dispense Refill     acetaminophen (TYLENOL) 500 MG tablet Take 1,000 mg by mouth 3 times daily       atorvastatin (LIPITOR) 10 MG tablet Take 10 mg by mouth every evening       carboxymethylcellulose (CARBOXYMETHYLCELLULOSE SODIUM) 0.5 % SOLN ophthalmic solution Place 1 drop into both eyes every morning       furosemide (LASIX) 40 MG tablet Take 40 mg by mouth daily       HYDROmorphone (DILAUDID) 2 MG tablet TAKE 1/2 TAB (1 MG) BY MOUTH EVERY 4 HOURS AS NEEDED FOR MODERATE PAIN (PAIN GREATER THAN 5) 60 tablet 0     JANTOVEN ANTICOAGULANT 1 MG tablet TAKE 1 TAB BY MOUTH ON TUE, THR,SAT,SUN. INR 11/16/21       JANTOVEN ANTICOAGULANT 2 MG tablet TAKE 1 TAB BY MOUTH ON MON, WED,FRI. INR 11/16/21       lisinopril (ZESTRIL) 5 MG tablet Take 0.5 tablets (2.5 mg) by mouth every evening        "metFORMIN (GLUCOPHAGE-XR) 500 MG 24 hr tablet Take 2 tablets (1,000 mg) by mouth daily (with breakfast)       methocarbamol (ROBAXIN) 500 MG tablet Take 500 mg by mouth 3 times daily as needed       metoprolol succinate ER (TOPROL-XL) 25 MG 24 hr tablet Take 1 tablet (25 mg) by mouth every evening       protein modular (PRO-STAT) Take by mouth every morning 30 ml       REVIEW OF SYSTEMS: Limited secondary to cognitive impairment but today pt reports the above and 10 point ROS of systems including Constitutional, Eyes, Respiratory, Cardiovascular, Gastroenterology, Genitourinary, Integumentary, Musculoskeletal, Psychiatric were all negative except for pertinent positives noted in my HPI.    Vitals: /67   Pulse 72   Temp 97.6  F (36.4  C)   Resp 16   Ht 1.676 m (5' 6\")   Wt 86.4 kg (190 lb 8 oz)   SpO2 96%   BMI 30.75 kg/m     Exam:  GENERAL APPEARANCE: Alert, in no distress, cooperative.   ENT: Mouth/posterior oropharynx intact w/ moist mucous membranes, hearing acuity [place].  EYES: EOM, conjunctivae, lids, pupils and irises normal, PERRL2.   RESP: Respiratory effort unlabored, no respiratory distress, Lung sounds clear/diinished. On RA.   CV: Auscultation of heart reveals S1, S2, rate controlled and rhythm irregular, no murmur, no rub or gallop, Edema trace+ BLE. Peripheral pulses are 2+.  ABDOMEN: Normal bowel sounds, soft, non-tender abdomen, and no masses palpated.  SKIN: Inspection/Palpation of skin and subcutaneous tissue baseline w/ fragility. Left ankle DTI and buttocks as noted below:    NEURO: CN II-XII at patient's baseline, sensation baseline PPS.  PSYCH: Insight, judgement, and memory are baseline impaired, affect and mood are happy/engaged.    Lab/Diagnostic data: Recent labs in [facility] reviewed by me today.   Date INR New Dose/Orders   1/17 3.0 See below.   1/13 3.02 1mg M/W/F, 2mg AOD.   1/11 4.89 HELD.   1/10 4.54 HELD.   1/3/22 2.67 Continued.   12/27 3.01 1mg M/W/F, 2mg AOD.    12/20 3.21 " 1mg Tu/Th, 2mg AOD.   12/14 1.95 2mg/day   12/9 1.66 2mg/day x 4 days.   12/6 1.56 2mg/day x 3 days.    12/3 2.87 0.5mg x 3 days.   12/1 3.64 0.5mg x 2 days.   11/30 5.34 Hold x1.   11/29 4.81 Hold x1.        ASSESSMENT/PLAN    ICD-10-CM Acute on chronic.    1. Other closed fracture of distal end of right femur with routine healing, subsequent encounter  S72.491D Unstable.  While right femur appears to be healing, left femur remains unstable and is likely causing the knee pain that Zayda reports.  And because of the fixation that was required for left leg, wound evolved as noted above.  Zayda's pain is better controlled with both Dilaudid and Robaxin, but given this fracture is not healing routinely, still highly recommend hospice services.   2. Closed metaphyseal fracture of left lower extremity with delayed healing, subsequent encounter  S82.92XG Unstable. See above.    3. Orthopedic aftercare  Z47.89 Unstable. See above.    4. Pressure injury of left leg, unstageable (H)  L89.890 Unstable.  Evolution seen in this wound as eschar starts to peel away revealing more slough, provider coordinated care with nursing and demonstrated proper application of Medihoney.  Eschar is flexible and able to place Medihoney under eschar slightly.  Zayda has a lot of pain, and may consider a lidocaine spray with wound changes to decrease burning with this care.  Noting pulling with gauze application, therefore will switch back to Telfa.   5. Afib (H)  Tenuous.  INR remains variable with minimal dose changing and no changes in dietary intake.  We will dose Coumadin as noted below and continue to monitor with INR next week.   6. Long term current use of anticoagulants  Unstable. See above.   7.  Encounter for therapeutic drug monitoring  Unstable. See above.   8. Pressure injury of buttock, stage 2, unspecified laterality (H)  L89.302 Improving. Ongoing. Continue plan of care.      Follow up w/in 1 week or as needed.    Orders:  1.  Coumadin 1mg M/W/F.  2. Coumadin 2mg PO QDay on AOD.   3. Recheck INR x1 on 1/24. Dx: afib.  4. Change dressing from gauze to Telfa. Dx: wound care.     Electronically signed by:  Dr. Haley Sims, APRN CNP DNP

## 2022-01-17 NOTE — LETTER
1/17/2022        RE: Mecca Slade  [facility]  [address] [facility] [address]        [facility] [place] [facility]  Episodic/Acute/Follow-Up  [place] MRN: [id number]. Place of Service where encounter took place:  [facility] () [[id number]]   Chief Complaint   Patient presents with     RECHECK     Anticoagulation    HPI: Mecca Slade  is a 89 year old (12/14/1921), who is being seen today for an episodic care visit.  HPI information obtained from: facility chart records, facility staff, patient report and [facility] chart review. Today's concern is:    Zayda seen today on routine follow-up as her left lower extremity wound requires monitoring and intervention frequently.  Today, Zayad states that she has some burning in the left lower extremity, otherwise her most bothersome pain is in her left knee.  Pain is intermittent, usually related to range of motion or movement, but feels relief at rest.  Today, Zayda denies any bruising or bleeding, denies any headaches, dizziness, shortness of breath, chest pain.  She states sometimes she gets constipated, but not today.    Past Medical and Surgical History reviewed in Epic today.  MEDICATIONS:  Current Outpatient Medications   Medication Sig Dispense Refill     acetaminophen (TYLENOL) 500 MG tablet Take 1,000 mg by mouth 3 times daily       atorvastatin (LIPITOR) 10 MG tablet Take 10 mg by mouth every evening       carboxymethylcellulose (CARBOXYMETHYLCELLULOSE SODIUM) 0.5 % SOLN ophthalmic solution Place 1 drop into both eyes every morning       furosemide (LASIX) 40 MG tablet Take 40 mg by mouth daily       HYDROmorphone (DILAUDID) 2 MG tablet TAKE 1/2 TAB (1 MG) BY MOUTH EVERY 4 HOURS AS NEEDED FOR MODERATE PAIN (PAIN GREATER THAN 5) 60 tablet 0     JANTOVEN ANTICOAGULANT 1 MG tablet TAKE 1 TAB BY MOUTH ON TUE, THR,SAT,SUN. INR 11/16/21       JANTOVEN ANTICOAGULANT 2 MG tablet TAKE 1 TAB BY MOUTH ON MON,  "WED,FRI. INR 11/16/21       lisinopril (ZESTRIL) 5 MG tablet Take 0.5 tablets (2.5 mg) by mouth every evening       metFORMIN (GLUCOPHAGE-XR) 500 MG 24 hr tablet Take 2 tablets (1,000 mg) by mouth daily (with breakfast)       methocarbamol (ROBAXIN) 500 MG tablet Take 500 mg by mouth 3 times daily as needed       metoprolol succinate ER (TOPROL-XL) 25 MG 24 hr tablet Take 1 tablet (25 mg) by mouth every evening       protein modular (PRO-STAT) Take by mouth every morning 30 ml       REVIEW OF SYSTEMS: Limited secondary to cognitive impairment but today pt reports the above and 10 point ROS of systems including Constitutional, Eyes, Respiratory, Cardiovascular, Gastroenterology, Genitourinary, Integumentary, Musculoskeletal, Psychiatric were all negative except for pertinent positives noted in my HPI.    Vitals: /67   Pulse 72   Temp 97.6  F (36.4  C)   Resp 16   Ht 1.676 m (5' 6\")   Wt 86.4 kg (190 lb 8 oz)   SpO2 96%   BMI 30.75 kg/m     Exam:  GENERAL APPEARANCE: Alert, in no distress, cooperative.   ENT: Mouth/posterior oropharynx intact w/ moist mucous membranes, hearing acuity [place].  EYES: EOM, conjunctivae, lids, pupils and irises normal, PERRL2.   RESP: Respiratory effort unlabored, no respiratory distress, Lung sounds clear/diinished. On RA.   CV: Auscultation of heart reveals S1, S2, rate controlled and rhythm irregular, no murmur, no rub or gallop, Edema trace+ BLE. Peripheral pulses are 2+.  ABDOMEN: Normal bowel sounds, soft, non-tender abdomen, and no masses palpated.  SKIN: Inspection/Palpation of skin and subcutaneous tissue baseline w/ fragility. Left ankle DTI and buttocks as noted below:    NEURO: CN II-XII at patient's baseline, sensation baseline PPS.  PSYCH: Insight, judgement, and memory are baseline impaired, affect and mood are happy/engaged.    Lab/Diagnostic data: Recent labs in [facility] reviewed by me today.   Date INR New Dose/Orders   1/17 3.0 See below.   1/13 3.02 1mg M/W/F, " 2mg AOD.   1/11 4.89 HELD.   1/10 4.54 HELD.   1/3/22 2.67 Continued.   12/27 3.01 1mg M/W/F, 2mg AOD.    12/20 3.21 1mg Tu/Th, 2mg AOD.   12/14 1.95 2mg/day   12/9 1.66 2mg/day x 4 days.   12/6 1.56 2mg/day x 3 days.    12/3 2.87 0.5mg x 3 days.   12/1 3.64 0.5mg x 2 days.   11/30 5.34 Hold x1.   11/29 4.81 Hold x1.        ASSESSMENT/PLAN    ICD-10-CM Acute on chronic.    1. Other closed fracture of distal end of right femur with routine healing, subsequent encounter  S72.491D Unstable.  While right femur appears to be healing, left femur remains unstable and is likely causing the knee pain that Zayda reports.  And because of the fixation that was required for left leg, wound evolved as noted above.  Zayda's pain is better controlled with both Dilaudid and Robaxin, but given this fracture is not healing routinely, still highly recommend hospice services.   2. Closed metaphyseal fracture of left lower extremity with delayed healing, subsequent encounter  S82.92XG Unstable. See above.    3. Orthopedic aftercare  Z47.89 Unstable. See above.    4. Pressure injury of left leg, unstageable (H)  L89.890 Unstable.  Evolution seen in this wound as eschar starts to peel away revealing more slough, provider coordinated care with nursing and demonstrated proper application of Medihoney.  Eschar is flexible and able to place Medihoney under eschar slightly.  Zayda has a lot of pain, and may consider a lidocaine spray with wound changes to decrease burning with this care.  Noting pulling with gauze application, therefore will switch back to Telfa.   5. Afib (H)  Tenuous.  INR remains variable with minimal dose changing and no changes in dietary intake.  We will dose Coumadin as noted below and continue to monitor with INR next week.   6. Long term current use of anticoagulants  Unstable. See above.   7.  Encounter for therapeutic drug monitoring  Unstable. See above.   8. Pressure injury of buttock, stage 2, unspecified  laterality (H)  L89.302 Improving. Ongoing. Continue plan of care.      Follow up w/in 1 week or as needed.    Orders:  1. Coumadin 1mg M/W/F.  2. Coumadin 2mg PO QDay on AOD.   3. Recheck INR x1 on 1/24. Dx: afib.  4. Change dressing from gauze to Telfa. Dx: wound care.     Electronically signed by:  JOSUE Clark CNP DNP            Sincerely,        JOSUE Gillis CNP

## 2022-01-24 NOTE — LETTER
"    1/24/2022        RE: Mecca Slade  [facility]  [address] [facility] [address]        [facility] GERIATRICS  [place] Medical Record Number: [id number]  Chief Complaint   Patient presents with     RECHECK     HPI: Mecca Slade is a 89 year old (12/14/1921), who is being seen today for an episodic care visit at: [facility] () [[id number]]. Today's concern is: INR and wound follow up.     Zayda is seen today for routine follow up and for her anticoagulation dosing. She denies any new bruising or bleeding. She denies headaches, dizziness, shortness of breath, chest pain, and bowel or bladder concerns. She endorses pain when her left leg is moved or when the wound on the left shin is touched. She has enrolled in hospice as of 1/20. Hospice had their wound provider assess Zayda today and they are now following for wound care. No noted bleeding, changes in condition, medications, intake.    Allergies and PMH/PSH reviewed in [facility] today.    REVIEW OF SYSTEMS:  Limited secondary to cognitive impairment but today pt reports the above and 4 point ROS including Respiratory, CV, GI and , other than that noted in the HPI,  is negative    Objective:   /78   Pulse 74   Temp 97.2  F (36.2  C)   Resp 16   Ht 1.676 m (5' 6\")   Wt 85.7 kg (189 lb)   SpO2 94%   BMI 30.51 kg/m    Exam:  GENERAL APPEARANCE: Alert, in no distress, cooperative.   ENT: Mouth/posterior oropharynx intact w/ moist mucous membranes, hearing acuity [place].  EYES: EOM, conjunctivae, lids, pupils and irises normal, PERRL2.   RESP: Respiratory effort unlabored, no respiratory distress, Lung sounds clear. On RA.   CV: Auscultation of heart reveals S1, S2, rate and rhythm regular, no murmur, no rub or gallop, Edema trace BLE. Peripheral pulses are 2+.  ABDOMEN: Normal bowel sounds, soft, non-tender abdomen, and no masses palpated.  SKIN: Inspection/Palpation of skin and subcutaneous tissue baseline " w/ fragility. No wounds/rashes noted. Left ankle DTI covered with dressing  NEURO: CN II-XII at patient's baseline, sensation baseline PPS.  PSYCH: Insight, judgement, and memory are baseline impaired, affect and mood are happy/cooperative.    Labs:   Recent labs in EPIC reviewed by me today.  and   Most Recent 3 CBC's:  Recent Labs   Lab Test 12/09/21  0710 12/01/21  1300 11/29/21  1055 11/22/21  1100   WBC 5.4  --  11.0 8.8   HGB 10.2* 10.2* 10.5* 8.5*   *  --  108* 103*     --  374 243     Most Recent 3 INR's:  Recent Labs   Lab Test 01/24/22  1306 01/17/22  1130 01/13/22  0815   INR 2.52* 3.00* 3.02*       Assessment/Plan:  (I48.91) Controlled atrial fibrillation (H)  (primary encounter diagnosis)  (Z79.01) Long term current use of anticoagulant with international normalized ratio (INR) goal of 1.5-2.0  (Z51.81) Encounter for therapeutic drug monitoring    INR therapeutic. Continue current dosing as ordered below. Recheck 1 week.    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       Orders:  1. Recheck INR x1 on 1/31/22 Dx: a. Fib  2. Coumadin 1mg PO M/W/F Dx: A. fib  3. Coumdin 2mg PO QDay on AOD Dx: A.fib     KEMAL Helton student  Electronically signed by: JOSUE Mari CNP              Sincerely,        JOSUE Mari CNP

## 2022-01-24 NOTE — PROGRESS NOTES
"[facility] GERIATRICS  [place] Medical Record Number: [id number]  Chief Complaint   Patient presents with     RECHECK     HPI: Mecca Slade is a 89 year old (12/14/1921), who is being seen today for an episodic care visit at: [facility] () [[id number]]. Today's concern is: INR and wound follow up.     Zayda is seen today for routine follow up and for her anticoagulation dosing. She denies any new bruising or bleeding. She denies headaches, dizziness, shortness of breath, chest pain, and bowel or bladder concerns. She endorses pain when her left leg is moved or when the wound on the left shin is touched. She has enrolled in hospice as of 1/20. Hospice had their wound provider assess Zayda today and they are now following for wound care. No noted bleeding, changes in condition, medications, intake.    Allergies and PMH/PSH reviewed in EPIC today.    REVIEW OF SYSTEMS:  Limited secondary to cognitive impairment but today pt reports the above and 4 point ROS including Respiratory, CV, GI and , other than that noted in the HPI,  is negative    Objective:   /78   Pulse 74   Temp 97.2  F (36.2  C)   Resp 16   Ht 1.676 m (5' 6\")   Wt 85.7 kg (189 lb)   SpO2 94%   BMI 30.51 kg/m    Exam:  GENERAL APPEARANCE: Alert, in no distress, cooperative.   ENT: Mouth/posterior oropharynx intact w/ moist mucous membranes, hearing acuity [place].  EYES: EOM, conjunctivae, lids, pupils and irises normal, PERRL2.   RESP: Respiratory effort unlabored, no respiratory distress, Lung sounds clear. On RA.   CV: Auscultation of heart reveals S1, S2, rate and rhythm regular, no murmur, no rub or gallop, Edema trace BLE. Peripheral pulses are 2+.  ABDOMEN: Normal bowel sounds, soft, non-tender abdomen, and no masses palpated.  SKIN: Inspection/Palpation of skin and subcutaneous tissue baseline w/ fragility. No wounds/rashes noted. Left ankle DTI covered with dressing  NEURO: CN II-XII at patient's baseline, " sensation baseline PPS.  PSYCH: Insight, judgement, and memory are baseline impaired, affect and mood are happy/cooperative.    Labs:   Recent labs in EPIC reviewed by me today.  and   Most Recent 3 CBC's:  Recent Labs   Lab Test 12/09/21  0710 12/01/21  1300 11/29/21  1055 11/22/21  1100   WBC 5.4  --  11.0 8.8   HGB 10.2* 10.2* 10.5* 8.5*   *  --  108* 103*     --  374 243     Most Recent 3 INR's:  Recent Labs   Lab Test 01/24/22  1306 01/17/22  1130 01/13/22  0815   INR 2.52* 3.00* 3.02*       Assessment/Plan:  (I48.91) Controlled atrial fibrillation (H)  (primary encounter diagnosis)  (Z79.01) Long term current use of anticoagulant with international normalized ratio (INR) goal of 1.5-2.0  (Z51.81) Encounter for therapeutic drug monitoring    INR therapeutic. Continue current dosing as ordered below. Recheck 1 week.    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       Orders:  1. Recheck INR x1 on 1/31/22 Dx: a. Fib  2. Coumadin 1mg PO M/W/F Dx: A. fib  3. Coumdin 2mg PO QDay on AOD Dx: A.fib     KEMAL Helton student  Electronically signed by: JOSUE Mari CNP

## 2022-01-31 NOTE — LETTER
"    1/31/2022        RE: Mecca Slade  [facility]  [address] [facility] [address]        [facility] Geriatrics Video Visit  Mecca Slade is a 89 year old female who is being evaluated via a billable video visit due to the restrictions of the COVID19 pandemic.The patient has been notified of following: \"This video visit will be conducted via a call between you and your provider. We have found that certain health care needs can be provided without the need for an in-person physical exam.  This service lets us provide the care you need with a video conversation.  If a prescription is necessary we can send it directly to your pharmacy.  If lab work is needed we can place an order for that and you can then stop by our lab to have the test done at a later time. If during the course of the call the provider feels a video visit is not appropriate, you will not be charged for this service.\"     Proivder has received verbal consent for a Video Visit from the patient? Yes    Patient/facility would like the video invitation sent by: Send to e-mail at: [email or web address]     This visit took place virtually, with the patient located at [facility]   ________________________________________________  Video Start Time: 1210  Mecca Slade complains of    Chief Complaint   Patient presents with     RECHECK   HPI/Subjective:  Zayda seen today on routine follow-up as she continues to recover from bilateral femur fractures and a left lower extremity unstageable wound. She did sign onto hospice services last week for helping to control her pain and assisting with wound care. Today, Zayda states that sometimes her left leg gives her trouble around her knee or during dressing changes, stating that the pain becomes quite severe. The medication is helpful. She denies any shortness of breath, changes to her appetite, bowel or bladder changes, though she states she is " "sometimes constipated. She denies any headaches or dizziness or other changes.    Past Medical and Surgical History reviewed in Epic today.  MEDICATIONS:  Current Outpatient Medications   Medication Sig Dispense Refill     acetaminophen (TYLENOL) 500 MG tablet Take 1,000 mg by mouth 3 times daily       atorvastatin (LIPITOR) 10 MG tablet Take 10 mg by mouth every evening       carboxymethylcellulose (CARBOXYMETHYLCELLULOSE SODIUM) 0.5 % SOLN ophthalmic solution Place 1 drop into both eyes every morning       furosemide (LASIX) 40 MG tablet Take 40 mg by mouth daily       HYDROmorphone (DILAUDID) 2 MG tablet TAKE 1/2 TAB (1 MG) BY MOUTH EVERY 4 HOURS AS NEEDED FOR MODERATE PAIN (PAIN GREATER THAN 5) 60 tablet 0     JANTOVEN ANTICOAGULANT 1 MG tablet Take 1 mg by mouth every other day M, W, F       JANTOVEN ANTICOAGULANT 2 MG tablet Take 2 mg by mouth every other day T, TH, Sa,Klein       lisinopril (ZESTRIL) 5 MG tablet Take 0.5 tablets (2.5 mg) by mouth every evening       metFORMIN (GLUCOPHAGE-XR) 500 MG 24 hr tablet Take 2 tablets (1,000 mg) by mouth daily (with breakfast)       methocarbamol (ROBAXIN) 500 MG tablet Take 500 mg by mouth 3 times daily as needed       metoprolol succinate ER (TOPROL-XL) 25 MG 24 hr tablet Take 1 tablet (25 mg) by mouth every evening       protein modular (PRO-STAT) Take by mouth every morning 30 ml       REVIEW OF SYSTEMS: Limited secondary to cognitive impairment but today pt reports the above and 8 point ROS of systems including Constitutional, Eyes, Respiratory, Cardiovascular, Gastroenterology, Genitourinary, Integumentary, Musculoskeletal, Psychiatric were all negative except for pertinent positives noted in my HPI.    Vitals: /68   Pulse 90   Temp 98.6  F (37  C)   Resp 20   Ht 1.676 m (5' 6\")   Wt 86.4 kg (190 lb 8 oz)   SpO2 97%   BMI 30.75 kg/m     Exam:  GENERAL APPEARANCE: Alert, in no distress, cooperative.   EYES/ENT: EOM normal on camera, hearing acuity " [place].  RESP: Respiratory effort appears unlabored over video screen, no respiratory distress apparent on video, On RA.   CV: Edema appears trace+ BLE via video. Color appears pale.  ABDOMEN: Appears non-distended, rounded.  SKIN: Skin appears baseline w/ video inspection. Wound is as pictured here from several days ago:     NEURO: CN II-XII at patient's baseline, BURNETT at baseline on video. Sensation baseline PPS.  PSYCH: Insight, judgement, and memory are impaired at baseline, affect and mood are happy/engaged.    Lab/Diagnostic data: Recent labs in [facility] reviewed by me today.   Date INR New Dose/Orders   1/24 2.52 1mg M/W/F, 2mg AOD.   1/17 3.0 1mg M/W/F, 2mg AOD.   1/13 3.02 1mg M/W/F, 2mg AOD.   1/11 4.89 HELD.   1/10 4.54 HELD.   1/3/22 2.67 Continued.   12/27 3.01 1mg M/W/F, 2mg AOD.        ASSESSMENT/PLAN  Controlled atrial fibrillation (H)  Long term current use of anticoagulant with international normalized ratio (INR) goal of 1.5-2.0  Encounter for therapeutic drug monitoring  Chronic. Stable. Slightly supratherapeutic. Will dose Coumadin as noted below and recheck INR in 1 week.    Pressure injury of left leg, unstageable (H)  Hospice care patient  Acute on chronic. Ongoing.  Provider coordinated care w/ hospice team who has changed to calcium alginate w/ vaseline gauze and they are monitoring/changing M/W/F.     Follow up w/in 1 week or as needed.    Orders:  1. Coumadin 1mg M/W/F.  2. Coumadin 2mg PO QDay on AOD.   3. Recheck INR x1 on 2/7. Dx: afib.    Video-Visit Details  Type of service:  Video Visit  Video Start Time: 1210  Video End Time (time video stopped): 1215  Originating Location (pt. Location): Long term Care  Distant Location (provider location):  [facility]   Mode of Communication:  Video Conference.    Electronically signed by:  JOSUE Clark CNP DNP            Sincerely,        JOSUE Gillis CNP

## 2022-01-31 NOTE — PROGRESS NOTES
"[facility] Geriatrics Video Visit  Mecca Slade is a 89 year old female who is being evaluated via a billable video visit due to the restrictions of the COVID19 pandemic.The patient has been notified of following: \"This video visit will be conducted via a call between you and your provider. We have found that certain health care needs can be provided without the need for an in-person physical exam.  This service lets us provide the care you need with a video conversation.  If a prescription is necessary we can send it directly to your pharmacy.  If lab work is needed we can place an order for that and you can then stop by our lab to have the test done at a later time. If during the course of the call the provider feels a video visit is not appropriate, you will not be charged for this service.\"     Proivder has received verbal consent for a Video Visit from the patient? Yes    Patient/facility would like the video invitation sent by: Send to e-mail at: [email or web address]     This visit took place virtually, with the patient located at [facility]   ________________________________________________  Video Start Time: 1210  Mecca Slade complains of    Chief Complaint   Patient presents with     RECHECK   HPI/Subjective:  Zayda seen today on routine follow-up as she continues to recover from bilateral femur fractures and a left lower extremity unstageable wound. She did sign onto hospice services last week for helping to control her pain and assisting with wound care. Today, Zayda states that sometimes her left leg gives her trouble around her knee or during dressing changes, stating that the pain becomes quite severe. The medication is helpful. She denies any shortness of breath, changes to her appetite, bowel or bladder changes, though she states she is sometimes constipated. She denies any headaches or dizziness or other changes.    Past Medical and Surgical History " "reviewed in Epic today.  MEDICATIONS:  Current Outpatient Medications   Medication Sig Dispense Refill     acetaminophen (TYLENOL) 500 MG tablet Take 1,000 mg by mouth 3 times daily       atorvastatin (LIPITOR) 10 MG tablet Take 10 mg by mouth every evening       carboxymethylcellulose (CARBOXYMETHYLCELLULOSE SODIUM) 0.5 % SOLN ophthalmic solution Place 1 drop into both eyes every morning       furosemide (LASIX) 40 MG tablet Take 40 mg by mouth daily       HYDROmorphone (DILAUDID) 2 MG tablet TAKE 1/2 TAB (1 MG) BY MOUTH EVERY 4 HOURS AS NEEDED FOR MODERATE PAIN (PAIN GREATER THAN 5) 60 tablet 0     JANTOVEN ANTICOAGULANT 1 MG tablet Take 1 mg by mouth every other day M, W, F       JANTOVEN ANTICOAGULANT 2 MG tablet Take 2 mg by mouth every other day T, TH, Sa,Klein       lisinopril (ZESTRIL) 5 MG tablet Take 0.5 tablets (2.5 mg) by mouth every evening       metFORMIN (GLUCOPHAGE-XR) 500 MG 24 hr tablet Take 2 tablets (1,000 mg) by mouth daily (with breakfast)       methocarbamol (ROBAXIN) 500 MG tablet Take 500 mg by mouth 3 times daily as needed       metoprolol succinate ER (TOPROL-XL) 25 MG 24 hr tablet Take 1 tablet (25 mg) by mouth every evening       protein modular (PRO-STAT) Take by mouth every morning 30 ml       REVIEW OF SYSTEMS: Limited secondary to cognitive impairment but today pt reports the above and 8 point ROS of systems including Constitutional, Eyes, Respiratory, Cardiovascular, Gastroenterology, Genitourinary, Integumentary, Musculoskeletal, Psychiatric were all negative except for pertinent positives noted in my HPI.    Vitals: /68   Pulse 90   Temp 98.6  F (37  C)   Resp 20   Ht 1.676 m (5' 6\")   Wt 86.4 kg (190 lb 8 oz)   SpO2 97%   BMI 30.75 kg/m     Exam:  GENERAL APPEARANCE: Alert, in no distress, cooperative.   EYES/ENT: EOM normal on camera, hearing acuity [place].  RESP: Respiratory effort appears unlabored over video screen, no respiratory distress apparent on video, On RA. "   CV: Edema appears trace+ BLE via video. Color appears pale.  ABDOMEN: Appears non-distended, rounded.  SKIN: Skin appears baseline w/ video inspection. Wound is as pictured here from several days ago:     NEURO: CN II-XII at patient's baseline, BURNETT at baseline on video. Sensation baseline PPS.  PSYCH: Insight, judgement, and memory are impaired at baseline, affect and mood are happy/engaged.    Lab/Diagnostic data: Recent labs in [facility] reviewed by me today.   Date INR New Dose/Orders   1/24 2.52 1mg M/W/F, 2mg AOD.   1/17 3.0 1mg M/W/F, 2mg AOD.   1/13 3.02 1mg M/W/F, 2mg AOD.   1/11 4.89 HELD.   1/10 4.54 HELD.   1/3/22 2.67 Continued.   12/27 3.01 1mg M/W/F, 2mg AOD.        ASSESSMENT/PLAN  Controlled atrial fibrillation (H)  Long term current use of anticoagulant with international normalized ratio (INR) goal of 1.5-2.0  Encounter for therapeutic drug monitoring  Chronic. Stable. Slightly supratherapeutic. Will dose Coumadin as noted below and recheck INR in 1 week.    Pressure injury of left leg, unstageable (H)  Hospice care patient  Acute on chronic. Ongoing.  Provider coordinated care w/ hospice team who has changed to calcium alginate w/ vaseline gauze and they are monitoring/changing M/W/F.     Follow up w/in 1 week or as needed.    Orders:  1. Coumadin 1mg M/W/F.  2. Coumadin 2mg PO QDay on AOD.   3. Recheck INR x1 on 2/7. Dx: afib.    Video-Visit Details  Type of service:  Video Visit  Video Start Time: 1210  Video End Time (time video stopped): 1215  Originating Location (pt. Location): Long term Care  Distant Location (provider location):  [facility]   Mode of Communication:  Video Conference.    Electronically signed by:  Dr. Haley Sims, APRN CNP DNP

## 2022-02-07 NOTE — PROGRESS NOTES
[facility] GERIATRIC SERVICE  Episodic/Acute/Follow-Up  [place] MRN: [id number]. Place of Service where encounter took place:  [facility] () [[id number]]   Chief Complaint   Patient presents with     RECHECK    HPI: Mecca Slade  is a 89 year old (12/14/1921), who is being seen today for an episodic care visit.  HPI information obtained from: facility chart records, facility staff, patient report and [facility] chart review. Today's concern is:    Zayda seen today on routine follow-up to check on her wound, and review her INR results.  Today, Zayda states that her left leg is not in as much pain as before, and in fact today early in the morning she had some right knee pain, which used to be her usual arthritic pain prior to her fractures.  She denies shortness of breath, headache, fever and states that she has a good appetite.      Past Medical and Surgical History reviewed in Epic today.  MEDICATIONS:  Current Outpatient Medications   Medication Sig Dispense Refill     acetaminophen (TYLENOL) 500 MG tablet Take 1,000 mg by mouth 3 times daily       atorvastatin (LIPITOR) 10 MG tablet Take 10 mg by mouth every evening       carboxymethylcellulose (CARBOXYMETHYLCELLULOSE SODIUM) 0.5 % SOLN ophthalmic solution Place 1 drop into both eyes every morning       furosemide (LASIX) 40 MG tablet Take 40 mg by mouth daily       HYDROmorphone (DILAUDID) 2 MG tablet TAKE 1/2 TAB (1 MG) BY MOUTH EVERY 4 HOURS AS NEEDED FOR MODERATE PAIN (PAIN GREATER THAN 5) 60 tablet 0     JANTOVEN ANTICOAGULANT 1 MG tablet Take 1 mg by mouth every other day M, W, F       JANTOVEN ANTICOAGULANT 2 MG tablet Take 2 mg by mouth every other day T, TH, Sa,Klein       lisinopril (ZESTRIL) 5 MG tablet Take 0.5 tablets (2.5 mg) by mouth every evening       metFORMIN (GLUCOPHAGE-XR) 500 MG 24 hr tablet Take 2 tablets (1,000 mg) by mouth daily (with breakfast)       methocarbamol (ROBAXIN) 500 MG tablet Take 500 mg by mouth 3  "times daily as needed       metoprolol succinate ER (TOPROL-XL) 25 MG 24 hr tablet Take 1 tablet (25 mg) by mouth every evening       protein modular (PRO-STAT) Take by mouth every morning 30 ml       REVIEW OF SYSTEMS: Limited secondary to cognitive impairment but today pt reports the above and 4 point ROS including Respiratory, CV, GI and , other than that noted in the HPI, is negative.    Objective: /68   Pulse 90   Temp 98.3  F (36.8  C)   Resp 20   Ht 1.676 m (5' 6\")   Wt 86.3 kg (190 lb 3.2 oz)   SpO2 96%   BMI 30.70 kg/m    Exam:  GENERAL APPEARANCE: Alert, in no distress, cooperative.   ENT: Mouth/posterior oropharynx intact w/ moist mucous membranes, hearing acuity [place].  EYES: EOM, conjunctivae, lids, pupils and irises normal, PERRL2.   RESP: Respiratory effort unlabored, no respiratory distress, Lung sounds clear. On RA.   CV: Auscultation of heart reveals S1, S2, rate controlled and rhythm irregular, no murmur, no rub or gallop, Edema trace+ BLE. Peripheral pulses are 2+.  ABDOMEN: Normal bowel sounds, soft, non-tender abdomen, and no masses palpated.  SKIN: Inspection/Palpation of skin and subcutaneous tissue baseline w/ fragility. Wound as pictured here is smaller and less tender than prior:    NEURO: CN II-XII at patient's baseline, sensation baseline PPS.  PSYCH: Insight, judgement, and memory are forgetful at baseline, affect and mood are happy/engaged.    Labs: Labs done in facility are in EPIC. Please refer to them using [facility] Everywhere.    ASSESSMENT/PLAN:  Controlled atrial fibrillation (H)  Long term current use of anticoagulant with international normalized ratio (INR) goal of 1.5-2.0  Encounter for therapeutic drug monitoring  Pressure injury of left leg, unstageable (H)  Hospice care patient  Acute on chronic. Ongoing.    Hospice at bedside, and discussion around wound debridement and ongoing care needs to this site.  Wound appears smaller, but deeper than prior.  There is " good granulation of tissue, but as sure Though smaller is still present, and sloughing remains in certain areas.  Concern that tendon involvement may be present.  Overall this wound is improved.    INR therapeutic.  Will dose Coumadin as noted below and recheck INR in 2 weeks.    Premedication with Dilaudid appears to be very helpful for dressing changes, and pain is much more better controlled.  Follow up w/in 2 weeks or as needed.    Orders:  1. Coumadin 1mg M/W/F.  2. Coumadin 2mg AOD.  3. Recheck INR on 2/21/22.  Dx: afib.    Electronically signed by:  Dr. Haley Sims, APRN CNP DNP

## 2022-02-07 NOTE — LETTER
2/7/2022        RE: Mecca Slade  [facility]  [address] [facility] [address]        [facility] [place] GERIATRIC SERVICE  Episodic/Acute/Follow-Up  [place] MRN: [id number]. Place of Service where encounter took place:  [facility] () [[id number]]   Chief Complaint   Patient presents with     RECHECK    HPI: Mecca Slade  is a 89 year old (12/14/1921), who is being seen today for an episodic care visit.  HPI information obtained from: facility chart records, facility staff, patient report and [facility] chart review. Today's concern is:    Zayda seen today on routine follow-up to check on her wound, and review her INR results.  Today, Zayda states that her left leg is not in as much pain as before, and in fact today early in the morning she had some right knee pain, which used to be her usual arthritic pain prior to her fractures.  She denies shortness of breath, headache, fever and states that she has a good appetite.      Past Medical and Surgical History reviewed in Epic today.  MEDICATIONS:  Current Outpatient Medications   Medication Sig Dispense Refill     acetaminophen (TYLENOL) 500 MG tablet Take 1,000 mg by mouth 3 times daily       atorvastatin (LIPITOR) 10 MG tablet Take 10 mg by mouth every evening       carboxymethylcellulose (CARBOXYMETHYLCELLULOSE SODIUM) 0.5 % SOLN ophthalmic solution Place 1 drop into both eyes every morning       furosemide (LASIX) 40 MG tablet Take 40 mg by mouth daily       HYDROmorphone (DILAUDID) 2 MG tablet TAKE 1/2 TAB (1 MG) BY MOUTH EVERY 4 HOURS AS NEEDED FOR MODERATE PAIN (PAIN GREATER THAN 5) 60 tablet 0     JANTOVEN ANTICOAGULANT 1 MG tablet Take 1 mg by mouth every other day M, W, F       JANTOVEN ANTICOAGULANT 2 MG tablet Take 2 mg by mouth every other day T, TH, Sa,Klein       lisinopril (ZESTRIL) 5 MG tablet Take 0.5 tablets (2.5 mg) by mouth every evening       metFORMIN (GLUCOPHAGE-XR) 500 MG 24 hr tablet Take 2  "tablets (1,000 mg) by mouth daily (with breakfast)       methocarbamol (ROBAXIN) 500 MG tablet Take 500 mg by mouth 3 times daily as needed       metoprolol succinate ER (TOPROL-XL) 25 MG 24 hr tablet Take 1 tablet (25 mg) by mouth every evening       protein modular (PRO-STAT) Take by mouth every morning 30 ml       REVIEW OF SYSTEMS: Limited secondary to cognitive impairment but today pt reports the above and 4 point ROS including Respiratory, CV, GI and , other than that noted in the HPI, is negative.    Objective: /68   Pulse 90   Temp 98.3  F (36.8  C)   Resp 20   Ht 1.676 m (5' 6\")   Wt 86.3 kg (190 lb 3.2 oz)   SpO2 96%   BMI 30.70 kg/m    Exam:  GENERAL APPEARANCE: Alert, in no distress, cooperative.   ENT: Mouth/posterior oropharynx intact w/ moist mucous membranes, hearing acuity [place].  EYES: EOM, conjunctivae, lids, pupils and irises normal, PERRL2.   RESP: Respiratory effort unlabored, no respiratory distress, Lung sounds clear. On RA.   CV: Auscultation of heart reveals S1, S2, rate controlled and rhythm irregular, no murmur, no rub or gallop, Edema trace+ BLE. Peripheral pulses are 2+.  ABDOMEN: Normal bowel sounds, soft, non-tender abdomen, and no masses palpated.  SKIN: Inspection/Palpation of skin and subcutaneous tissue baseline w/ fragility. Wound as pictured here is smaller and less tender than prior:    NEURO: CN II-XII at patient's baseline, sensation baseline PPS.  PSYCH: Insight, judgement, and memory are forgetful at baseline, affect and mood are happy/engaged.    Labs: Labs done in facility are in EPIC. Please refer to them using [facility] Everywhere.    ASSESSMENT/PLAN:  Controlled atrial fibrillation (H)  Long term current use of anticoagulant with international normalized ratio (INR) goal of 1.5-2.0  Encounter for therapeutic drug monitoring  Pressure injury of left leg, unstageable (H)  Hospice care patient  Acute on chronic. Ongoing.    Hospice at bedside, and discussion " around wound debridement and ongoing care needs to this site.  Wound appears smaller, but deeper than prior.  There is good granulation of tissue, but as sure Though smaller is still present, and sloughing remains in certain areas.  Concern that tendon involvement may be present.  Overall this wound is improved.    INR therapeutic.  Will dose Coumadin as noted below and recheck INR in 2 weeks.    Premedication with Dilaudid appears to be very helpful for dressing changes, and pain is much more better controlled.  Follow up w/in 2 weeks or as needed.    Orders:  1. Coumadin 1mg M/W/F.  2. Coumadin 2mg AOD.  3. Recheck INR on 2/21/22.  Dx: afib.    Electronically signed by:  JOSUE Clark CNP DNP          Sincerely,        JOSUE Gillis CNP

## 2022-02-14 NOTE — PROGRESS NOTES
[facility] [place] [facility]  Episodic/Acute/Follow-Up  [place] MRN: [id number]. Place of Service where encounter took place:  [facility] () [[id number]]   Chief Complaint   Patient presents with     RECHECK    HPI: Mecca Slade  is a 89 year old (12/14/1921), who is being seen today for an episodic care visit.  HPI information obtained from: facility chart records, facility staff, patient report and [facility] chart review. Today's concern is:    Zayda seen today on routine follow-up to assess any new needs with her left lower extremity wound, to address her INR, and any other health concerns.  Today, Zayda states that her left leg wound is not giving her as much pain as before, in fact, she says that her right leg especially down by her ankle has been hurting more today.  She has overall weakness, but otherwise feels pretty good.  She denies headaches, dizziness, shortness of breath, chest pain.  She states her bowels and bladder give her trouble sometimes, where she is constipated but then will have a loose stool.    Past Medical and Surgical History reviewed in Epic today.  MEDICATIONS:  Current Outpatient Medications   Medication Sig Dispense Refill     acetaminophen (TYLENOL) 500 MG tablet Take 1,000 mg by mouth 3 times daily       atorvastatin (LIPITOR) 10 MG tablet Take 10 mg by mouth every evening       carboxymethylcellulose (CARBOXYMETHYLCELLULOSE SODIUM) 0.5 % SOLN ophthalmic solution Place 1 drop into both eyes every morning       furosemide (LASIX) 40 MG tablet Take 40 mg by mouth daily       HYDROmorphone (DILAUDID) 2 MG tablet TAKE 1/2 TAB (1 MG) BY MOUTH EVERY 4 HOURS AS NEEDED FOR MODERATE PAIN (PAIN GREATER THAN 5) 60 tablet 0     JANTOVEN ANTICOAGULANT 1 MG tablet Take 1 mg by mouth every other day M, W, F       JANTOVEN ANTICOAGULANT 2 MG tablet Take 2 mg by mouth every other day T, TH, Sa,Klein       lisinopril (ZESTRIL) 5 MG tablet Take 0.5 tablets (2.5 mg) by mouth  "every evening       metFORMIN (GLUCOPHAGE-XR) 500 MG 24 hr tablet Take 2 tablets (1,000 mg) by mouth daily (with breakfast)       methocarbamol (ROBAXIN) 500 MG tablet Take 500 mg by mouth 3 times daily as needed       metoprolol succinate ER (TOPROL-XL) 25 MG 24 hr tablet Take 1 tablet (25 mg) by mouth every evening       protein modular (PRO-STAT) Take by mouth every morning 30 ml       REVIEW OF SYSTEMS: Limited secondary to cognitive impairment but today pt reports the above and 6 point ROS including Respiratory, CV, GI and , other than that noted in the HPI, is negative.    Objective: /68   Pulse 90   Temp 97.8  F (36.6  C)   Resp 20   Ht 1.676 m (5' 6\")   Wt 86 kg (189 lb 9.6 oz)   SpO2 97%   BMI 30.60 kg/m    Exam:  GENERAL APPEARANCE: Alert, in no distress, cooperative.   ENT: Mouth/posterior oropharynx intact w/ moist mucous membranes, hearing acuity [place].  EYES: EOM, conjunctivae, lids, pupils and irises normal, PERRL2.   RESP: Respiratory effort unlabored, no respiratory distress, Lung sounds clear. On RA.   CV: Edema 2+ RLE. Peripheral pulses are 2+.  ABDOMEN: Normal bowel sounds, soft, non-tender abdomen, and no masses palpated.  SKIN: Inspection/Palpation of skin and subcutaneous tissue baseline w/ fragility. Wound as pictured here is smaller and less tender than prior. RLE is swollen and tender, but not hot.      RLE is as noted here and flash used to show discoloration vs shadow:    NEURO: CN II-XII at patient's baseline, sensation baseline PPS.  PSYCH: Insight, judgement, and memory are forgetful at baseline, affect and mood are happy/engaged.    Labs: Labs done in facility are in EPIC. Please refer to them using [facility]Care Everywhere.    ASSESSMENT/PLAN:  Controlled atrial fibrillation (H)  Long term current use of anticoagulant with international normalized ratio (INR) goal of 1.5-2.0  Encounter for therapeutic drug monitoring  Pressure injury of left leg, unstageable (H)  Acute " cystitis with hematuria  Hospice care patient  Acute on chronic. Ongoing.    Provider coordinated care with nursing to assess wound, change dressing, take pictures, and discuss ongoing needs.    Right lower extremity is significantly different in color.  This is been watched closely since readmission, and noting this is her surgical side.  Her INRs have been stable, so doubtful that this is DVT.  She just completed a round of Keflex for UTI, and therefore very doubtful this is cellulitis.  Is worrisome that this could be a superficial vasculitis or thrombophlebitis.    Given concern for right lower extremity, and the fact that this provider has not viewed right lower extremity for 2 weeks, provider coordinated care with hospice nursing who very frequently visits Zayda.  Hospice nursing will follow up within 24 hours and decide if ultrasound should be pursued given Zayda's comfort focused plan.  I defer to their judgment here.    INR supra therapeutic, will have to watch for bruising, bleeding, and closely watch INR. Will hold Coumadin as noted below and recheck INR tomorrow.   Follow up w/in 1 weeks or as needed.    Orders:  1. Hold Coumadin x1 today.   2. Recheck INR x1 tomorrow 2/22.     Total time spent with patient visit was 35 min including patient visit and review of past records. Greater than 50% of total time spent with counseling and coordinating care w/ hospice and nursing as noted above.     Electronically signed by:  JOSUE Clark CNP DNP

## 2022-02-21 NOTE — LETTER
2/21/2022        RE: Mecca Slade  [facility]  [address] [facility] [address]        [facility] [place] [facility]  Episodic/Acute/Follow-Up  [place] MRN: [id number]. Place of Service where encounter took place:  [facility] () [[id number]]   Chief Complaint   Patient presents with     RECHECK    HPI: Mecca Slade  is a 89 year old (12/14/1921), who is being seen today for an episodic care visit.  HPI information obtained from: facility chart records, facility staff, patient report and [facility] chart review. Today's concern is:    Zadya seen today on routine follow-up to assess any new needs with her left lower extremity wound, to address her INR, and any other health concerns.  Today, Zayda states that her left leg wound is not giving her as much pain as before, in fact, she says that her right leg especially down by her ankle has been hurting more today.  She has overall weakness, but otherwise feels pretty good.  She denies headaches, dizziness, shortness of breath, chest pain.  She states her bowels and bladder give her trouble sometimes, where she is constipated but then will have a loose stool.    Past Medical and Surgical History reviewed in Epic today.  MEDICATIONS:  Current Outpatient Medications   Medication Sig Dispense Refill     acetaminophen (TYLENOL) 500 MG tablet Take 1,000 mg by mouth 3 times daily       atorvastatin (LIPITOR) 10 MG tablet Take 10 mg by mouth every evening       carboxymethylcellulose (CARBOXYMETHYLCELLULOSE SODIUM) 0.5 % SOLN ophthalmic solution Place 1 drop into both eyes every morning       furosemide (LASIX) 40 MG tablet Take 40 mg by mouth daily       HYDROmorphone (DILAUDID) 2 MG tablet TAKE 1/2 TAB (1 MG) BY MOUTH EVERY 4 HOURS AS NEEDED FOR MODERATE PAIN (PAIN GREATER THAN 5) 60 tablet 0     JANTOVEN ANTICOAGULANT 1 MG tablet Take 1 mg by mouth every other day M, W, F       JANTOVEN ANTICOAGULANT 2 MG tablet Take  "2 mg by mouth every other day T, TH, Sa,Klein       lisinopril (ZESTRIL) 5 MG tablet Take 0.5 tablets (2.5 mg) by mouth every evening       metFORMIN (GLUCOPHAGE-XR) 500 MG 24 hr tablet Take 2 tablets (1,000 mg) by mouth daily (with breakfast)       methocarbamol (ROBAXIN) 500 MG tablet Take 500 mg by mouth 3 times daily as needed       metoprolol succinate ER (TOPROL-XL) 25 MG 24 hr tablet Take 1 tablet (25 mg) by mouth every evening       protein modular (PRO-STAT) Take by mouth every morning 30 ml       REVIEW OF SYSTEMS: Limited secondary to cognitive impairment but today pt reports the above and 6 point ROS including Respiratory, CV, GI and , other than that noted in the HPI, is negative.    Objective: /68   Pulse 90   Temp 97.8  F (36.6  C)   Resp 20   Ht 1.676 m (5' 6\")   Wt 86 kg (189 lb 9.6 oz)   SpO2 97%   BMI 30.60 kg/m    Exam:  GENERAL APPEARANCE: Alert, in no distress, cooperative.   ENT: Mouth/posterior oropharynx intact w/ moist mucous membranes, hearing acuity [place].  EYES: EOM, conjunctivae, lids, pupils and irises normal, PERRL2.   RESP: Respiratory effort unlabored, no respiratory distress, Lung sounds clear. On RA.   CV: Edema 2+ RLE. Peripheral pulses are 2+.  ABDOMEN: Normal bowel sounds, soft, non-tender abdomen, and no masses palpated.  SKIN: Inspection/Palpation of skin and subcutaneous tissue baseline w/ fragility. Wound as pictured here is smaller and less tender than prior. RLE is swollen and tender, but not hot.      RLE is as noted here and flash used to show discoloration vs shadow:    NEURO: CN II-XII at patient's baseline, sensation baseline PPS.  PSYCH: Insight, judgement, and memory are forgetful at baseline, affect and mood are happy/engaged.    Labs: Labs done in facility are in EPIC. Please refer to them using [facility]Care Everywhere.    ASSESSMENT/PLAN:  Controlled atrial fibrillation (H)  Long term current use of anticoagulant with international normalized ratio (INR) " goal of 1.5-2.0  Encounter for therapeutic drug monitoring  Pressure injury of left leg, unstageable (H)  Acute cystitis with hematuria  Hospice care patient  Acute on chronic. Ongoing.    Provider coordinated care with nursing to assess wound, change dressing, take pictures, and discuss ongoing needs.    Right lower extremity is significantly different in color.  This is been watched closely since readmission, and noting this is her surgical side.  Her INRs have been stable, so doubtful that this is DVT.  She just completed a round of Keflex for UTI, and therefore very doubtful this is cellulitis.  Is worrisome that this could be a superficial vasculitis or thrombophlebitis.    Given concern for right lower extremity, and the fact that this provider has not viewed right lower extremity for 2 weeks, provider coordinated care with hospice nursing who very frequently visits Zayda.  Hospice nursing will follow up within 24 hours and decide if ultrasound should be pursued given Zayda's comfort focused plan.  I defer to their judgment here.    INR supra therapeutic, will have to watch for bruising, bleeding, and closely watch INR. Will hold Coumadin as noted below and recheck INR tomorrow.   Follow up w/in 1 weeks or as needed.    Orders:  1. Hold Coumadin x1 today.   2. Recheck INR x1 tomorrow 2/22.     Total time spent with patient visit was 35 min including patient visit and review of past records. Greater than 50% of total time spent with counseling and coordinating care w/ hospice and nursing as noted above.     Electronically signed by:  JOSUE Clark CNP DNP        Sincerely,        JOSUE Gillis CNP

## 2022-02-22 NOTE — TELEPHONE ENCOUNTER
[facility] Geriatrics Triage Nurse INR     Provider: JOSUE Mcconnell CNP, DNP  Facility: [place]   Facility Type:  LT    Caller: Fatemeh   Call Back Number: [phone]  Reason for call: INR  Diagnosis/Goal: A. Fib    Todays INR: 4.36  Last INR 2/21 4.28 held  2/7 2.89 1mg M, W, F and 2mg AOD      Heparin/Lovenox:  No  Currently on ABX?: Yes; please explain: Keflex until 2/25  Other interacting medication:  None  Missed or refused doses: No    Verbal Order/Direction given by Provider: Hold x1 Next INR 2/23    Provider Giving Order:  JOSUE Mcconnell CNP, DNP    Verbal Order given to: Fatemeh Tao RN

## 2022-02-23 NOTE — TELEPHONE ENCOUNTER
[facility] Geriatrics Triage Nurse INR     Provider: JOSUE Mcconnell CNP, DNP  Facility: [place]   Facility Type:  LT    Caller: Yandy   Call Back Number: [phone]  Reason for call: INR  Diagnosis/Goal: A. Fib    INR today 2.87   2/22 4.36 held   2/21 4.28 held   2/7 2.89 1mg M, W, F and 2mg AOD       Heparin/Lovenox:  No  Currently on ABX?: Yes; please explain: Kelfex till 2/25  Other interacting medication:  None  Missed or refused doses: No    Verbal Order/Direction given by Provider: Warfarin 2mg every day Next INR 2/25    Provider Giving Order:  JOSUE Mcconnell CNP, BRAIN    Verbal Order given to: Yandy Tao RN

## 2022-02-25 NOTE — TELEPHONE ENCOUNTER
[facility] Geriatrics Triage Nurse Telephone Encounter    Provider: JOSUE Mcconnell CNP, DNP  Facility: [place]  Facility Type:  LT    Caller: Irish   Call Back Number: [phone]    Allergies:  No Known Allergies     Reason for call:     Pt was to have an INR drawn today and there was a nurse Error putting the order so they didn't draw,   Last INR 2/23 was 2.87 and has been getting 2mg every day.      Verbal Order/Direction given by Provider: INR has to been tomorrow, warfarin 2mg today INR 2/26    Provider giving Order:  JOSUE Mcconnell CNP, DNP    Verbal Order given to: Irish Tao RN

## 2022-02-28 NOTE — TELEPHONE ENCOUNTER
[place] GERIATRIC SERVICES TRIAGE ENCOUNTER    Chief Complaint   Patient presents with     INR RESULTS       Mecca Slade is a 89 year old  (12/14/1921), Nurse called today to report: INR today was 2.26. On 2 mg daily    ASSESSMENT/PLAN    Continue with 2 Mg and recheck iNR tomorrow    Electronically signed by:   Chaparrita Zarco, NP

## 2022-02-28 NOTE — PROGRESS NOTES
"[facility] GERIATRIC SERVICES  [place] MRN:  [id number]. Place of Service where encounter took place: [facility] () [[id number]]. HPI: Mecca Slade is a 89 year old  (12/14/1921), who is being seen today for an episodic care visit at the above location.   HPI information obtained from: facility chart records, facility staff, patient report and [facility] chart review. Today's concern is INR/Coumadin management for A. Fib    ROS/Subjective:  Bleeding Signs/Symptoms:  None  Thromboembolic Signs/Symptoms:  None  Medication Changes:  No  Dietary Changes:  No  Activity Changes: No  Bacterial/Viral Infection:  No  Missed Coumadin Doses:  None  On ASA: No  Other Concerns:  No    OBJECTIVE:  /68   Pulse 90   Temp 98.5  F (36.9  C)   Resp 20   Ht 1.676 m (5' 6\")   Wt 86 kg (189 lb 9.6 oz)   SpO2 96%   BMI 30.60 kg/m    Last INR: 2.26 on 2/27  INR Today: 2.46.  Current Dose:  2mg/day x 3 days and see below.     ASSESSMENT:   Controlled atrial fibrillation (H)  Encounter for therapeutic drug monitoring  Long term current use of anticoagulant with international normalized ratio (INR) goal of 1.5-2.0  Chronic. Stable. Therapeutic. Will dose Coumadin as noted below and recheck INR in 1 week.     PLAN:   1. Coumadin 1mg M/W/F.  2. Coumadin 2mg PO AOD.   3. Recheck INR x1 on 3/7/22  Dx: afib.     Electronically signed by:  Dr. Haley Sims, APRN CNP DNP        "

## 2022-02-28 NOTE — NURSING NOTE
Orders:  1. Recheck INR x1 on 3/7   2. Coumadin 1mg PO M/W/F  3. Coumadin 2mg PO AOD Dx: ABBEY Zimmerman

## 2022-02-28 NOTE — LETTER
"    2/28/2022        RE: Mecca Slade  [facility]  [address] [facility] [address]        [facility] GERIATRIC SERVICES  [place] MRN:  [id number]. Place of Service where encounter took place: [facility] () [[id number]]. HPI: Mecca Slade is a 89 year old  (12/14/1921), who is being seen today for an episodic care visit at the above location.   HPI information obtained from: facility chart records, facility staff, patient report and [facility] chart review. Today's concern is INR/Coumadin management for A. Fib    ROS/Subjective:  Bleeding Signs/Symptoms:  None  Thromboembolic Signs/Symptoms:  None  Medication Changes:  No  Dietary Changes:  No  Activity Changes: No  Bacterial/Viral Infection:  No  Missed Coumadin Doses:  None  On ASA: No  Other Concerns:  No    OBJECTIVE:  /68   Pulse 90   Temp 98.5  F (36.9  C)   Resp 20   Ht 1.676 m (5' 6\")   Wt 86 kg (189 lb 9.6 oz)   SpO2 96%   BMI 30.60 kg/m    Last INR: 2.26 on 2/27  INR Today: 2.46.  Current Dose:  2mg/day x 3 days and see below.     ASSESSMENT:   Controlled atrial fibrillation (H)  Encounter for therapeutic drug monitoring  Long term current use of anticoagulant with international normalized ratio (INR) goal of 1.5-2.0  Chronic. Stable. Therapeutic. Will dose Coumadin as noted below and recheck INR in 1 week.     PLAN:   1. Coumadin 1mg M/W/F.  2. Coumadin 2mg PO AOD.   3. Recheck INR x1 on 3/7/22  Dx: afib.     Electronically signed by:  Dr. Haley Sims, JOSUE CNP DNP              Sincerely,        JOSUE Gillis CNP      "

## 2022-03-06 NOTE — PROGRESS NOTES
[facility]  Chief Complaint   Patient presents with     [facility]   [place] MRN: [id number] Place of Service where encounter took place:  [facility] () [[id number]] HPI: Mecca Slade  is 89 year old (12/14/1921), who is being seen today for a federally mandated E/M visit.  HPI information obtained from: facility chart records, facility staff, patient report, [facility] chart review, and Care Everywhere [facility] chart review. Today's concerns are:    Zayda seen today on routine follow-up.  She states that her right knee hurts a little bit, but it comes and goes.  Her wound care was done early in the morning, but she is not having pain there.  She denies bruising or bleeding, denies headaches, shortness of breath.  She says her bowels are a little slow today.  Her INR resulted at 3.2.    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 PAST SURGICAL HISTORY:   has a past surgical history that includes Electrophysiology Pacemaker (10/09/2012); appendectomy; and Open reduction internal fixation femur distal (Right, 11/16/2021).FAMILY HISTORY: family history includes Heart Disease in her sister.SOCIAL HISTORY:   reports that she has never smoked. She has never used smokeless tobacco. She reports that she does not drink alcohol and does not use drugs.  MEDICATIONS:  Current Outpatient Medications   Medication Sig Dispense Refill     acetaminophen (TYLENOL) 500 MG tablet Take 1,000 mg by mouth 3 times daily       atorvastatin (LIPITOR) 10 MG tablet Take 10 mg by mouth every evening       carboxymethylcellulose (CARBOXYMETHYLCELLULOSE SODIUM) 0.5 % SOLN ophthalmic solution Place 1 drop into both eyes every morning       furosemide (LASIX) 40 MG tablet Take 40 mg by mouth daily       HYDROmorphone (DILAUDID) 2 MG tablet TAKE 1/2 TAB (1 MG) BY MOUTH EVERY 4 HOURS AS NEEDED FOR MODERATE PAIN (PAIN GREATER THAN 5) 60 tablet 0     JANTOVEN ANTICOAGULANT 1 MG tablet Take 1 mg by mouth every other day M, W, F       JANTOVEN ANTICOAGULANT 2 MG tablet Take 2 mg by mouth every other day T, TH, Sa,Klein       lisinopril (ZESTRIL) 5 MG tablet Take 0.5 tablets (2.5 mg) by mouth every evening       metFORMIN (GLUCOPHAGE-XR) 500 MG 24 hr tablet Take 2 tablets (1,000 mg) by mouth daily (with breakfast)       methocarbamol (ROBAXIN) 500 MG tablet Take 500 mg by mouth 3 times daily as needed       metoprolol succinate ER (TOPROL-XL) 25 MG 24 hr tablet Take 1 tablet (25 mg) by mouth every evening       protein modular (PRO-STAT) Take by mouth every morning 30 ml       Warfarin Therapy Reminder 1 each continuous prn 2/23/22 INR 2.87 Take 2mg every day next INR 2/25 2/22/22 INR 4.36 Hold x1 Next INR 2/23       Case Management:  I have reviewed the care plan and MDS and do agree with the plan. Patient's desire to return to the community is not present. Information reviewed:  Medications, vital signs, orders, and nursing notes.    ROS: Limited secondary to cognitive impairment but today pt reports the above and 4 point ROS including Respiratory, CV, GI and , other than that noted in the HPI, is negative.    Vitals: /68   Pulse 90   Temp 98  " F (36.7  C)   Resp 20   Ht 1.676 m (5' 6\")   Wt 86 kg (189 lb 9.6 oz)   SpO2 96%   BMI 30.60 kg/m    Exam:  GENERAL APPEARANCE: Alert, in no distress, cooperative.   ENT: Mouth/posterior oropharynx intact w/ moist mucous membranes, hearing acuity [place].  EYES: EOM, conjunctivae, lids, pupils and irises normal, PERRL2.   RESP: Respiratory effort unlabored, no respiratory distress, Lung sounds clear. On RA.   CV: Auscultation of heart reveals S1, S2, rate controlled and rhythm irregular, no murmur, no rub or gallop, Edema trace+ BLE. Peripheral pulses are 2+.  ABDOMEN: Normal bowel sounds, soft, non-tender abdomen, and no masses palpated.  SKIN: Inspection/Palpation of skin and subcutaneous tissue baseline w/ fragility. Wound as pictured here:    NEURO: CN II-XII at patient's baseline, sensation baseline PPS.  PSYCH: Insight, judgement, and memory are baseline impaired, affect and mood are happy/engaged.    Lab/Diagnostic data:   Recent labs in [facility] reviewed by me today.     ASSESSMENT/PLAN  Pressure injury of left leg, unstageable (H)  Controlled atrial fibrillation (H)  Encounter for therapeutic drug monitoring  Long term current use of anticoagulant with international normalized ratio (INR) goal of 1.5-2.0  Hospice care patient  Subacute on chronic. Ongoing.    INR supratherapeutic.  Will dose Coumadin as noted below and recheck INR in 1 week.    Very delayed wound healing, but wound bed appears to have smaller eschar cap and some good tissue granulation.    Right lower extremity remains discolored and semitender to touch, but this has not changed, and Zayda remains comfortable.  Follow up routinely or as needed.    Orders:  1. Coumadin 0.5mg PO x1 today.  2. Coumadin 2mg M/F.  3. Coumadin 1mg AOD.   4. Recheck INR x1 on 3/14  Dx: afib    Electronically signed by:  Dr. Haley Sims, APRN CNP DNP    "

## 2022-03-07 NOTE — LETTER
3/7/2022        RE: Mecca Slade  [facility]  [address] [facility] [address]        [facility]  Chief Complaint   Patient presents with     [facility]   [place] MRN: [id number] Place of Service where encounter took place:  [facility] () [[id number]] HPI: Mecca Slade  is 89 year old (12/14/1921), who is being seen today for a federally mandated E/M visit.  HPI information obtained from: facility chart records, facility staff, patient report, [facility] chart review, and Care Everywhere [facility] chart review. Today's concerns are:    Zayda seen today on routine follow-up.  She states that her right knee hurts a little bit, but it comes and goes.  Her wound care was done early in the morning, but she is not having pain there.  She denies bruising or bleeding, denies headaches, shortness of breath.  She says her bowels are a little slow today.  Her INR resulted at 3.2.    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 PAST SURGICAL HISTORY:   has a past surgical history that includes Electrophysiology Pacemaker (10/09/2012); appendectomy; and Open reduction internal fixation  femur distal (Right, 11/16/2021).FAMILY HISTORY: family history includes Heart Disease in her sister.SOCIAL HISTORY:  reports that she has never smoked. She has never used smokeless tobacco. She reports that she does not drink alcohol and does not use drugs.  MEDICATIONS:  Current Outpatient Medications   Medication Sig Dispense Refill     acetaminophen (TYLENOL) 500 MG tablet Take 1,000 mg by mouth 3 times daily       atorvastatin (LIPITOR) 10 MG tablet Take 10 mg by mouth every evening       carboxymethylcellulose (CARBOXYMETHYLCELLULOSE SODIUM) 0.5 % SOLN ophthalmic solution Place 1 drop into both eyes every morning       furosemide (LASIX) 40 MG tablet Take 40 mg by mouth daily       HYDROmorphone (DILAUDID) 2 MG tablet TAKE 1/2 TAB (1 MG) BY MOUTH EVERY 4 HOURS AS NEEDED FOR MODERATE PAIN (PAIN GREATER THAN 5) 60 tablet 0     JANTOVEN ANTICOAGULANT 1 MG tablet Take 1 mg by mouth every other day M, W, F       JANTOVEN ANTICOAGULANT 2 MG tablet Take 2 mg by mouth every other day T, TH, Sa,Klein       lisinopril (ZESTRIL) 5 MG tablet Take 0.5 tablets (2.5 mg) by mouth every evening       metFORMIN (GLUCOPHAGE-XR) 500 MG 24 hr tablet Take 2 tablets (1,000 mg) by mouth daily (with breakfast)       methocarbamol (ROBAXIN) 500 MG tablet Take 500 mg by mouth 3 times daily as needed       metoprolol succinate ER (TOPROL-XL) 25 MG 24 hr tablet Take 1 tablet (25 mg) by mouth every evening       protein modular (PRO-STAT) Take by mouth every morning 30 ml       Warfarin Therapy Reminder 1 each continuous prn 2/23/22 INR 2.87 Take 2mg every day next INR 2/25 2/22/22 INR 4.36 Hold x1 Next INR 2/23       Case Management:  I have reviewed the care plan and MDS and do agree with the plan. Patient's desire to return to the community is not present. Information reviewed:  Medications, vital signs, orders, and nursing notes.    ROS: Limited secondary to cognitive impairment but today pt reports the above and 4 point ROS including  "Respiratory, CV, GI and , other than that noted in the HPI, is negative.    Vitals: /68   Pulse 90   Temp 98  F (36.7  C)   Resp 20   Ht 1.676 m (5' 6\")   Wt 86 kg (189 lb 9.6 oz)   SpO2 96%   BMI 30.60 kg/m    Exam:  GENERAL APPEARANCE: Alert, in no distress, cooperative.   ENT: Mouth/posterior oropharynx intact w/ moist mucous membranes, hearing acuity [place].  EYES: EOM, conjunctivae, lids, pupils and irises normal, PERRL2.   RESP: Respiratory effort unlabored, no respiratory distress, Lung sounds clear. On RA.   CV: Auscultation of heart reveals S1, S2, rate controlled and rhythm irregular, no murmur, no rub or gallop, Edema trace+ BLE. Peripheral pulses are 2+.  ABDOMEN: Normal bowel sounds, soft, non-tender abdomen, and no masses palpated.  SKIN: Inspection/Palpation of skin and subcutaneous tissue baseline w/ fragility. Wound as pictured here:    NEURO: CN II-XII at patient's baseline, sensation baseline PPS.  PSYCH: Insight, judgement, and memory are baseline impaired, affect and mood are happy/engaged.    Lab/Diagnostic data:   Recent labs in [facility] reviewed by me today.     ASSESSMENT/PLAN  Pressure injury of left leg, unstageable (H)  Controlled atrial fibrillation (H)  Encounter for therapeutic drug monitoring  Long term current use of anticoagulant with international normalized ratio (INR) goal of 1.5-2.0  Hospice care patient  Subacute on chronic. Ongoing.    INR supratherapeutic.  Will dose Coumadin as noted below and recheck INR in 1 week.    Very delayed wound healing, but wound bed appears to have smaller eschar cap and some good tissue granulation.    Right lower extremity remains discolored and semitender to touch, but this has not changed, and Zayda remains comfortable.  Follow up routinely or as needed.    Orders:  1. Coumadin 0.5mg PO x1 today.  2. Coumadin 2mg M/F.  3. Coumadin 1mg AOD.   4. Recheck INR x1 on 3/14  Dx: afib    Electronically signed by:  Dr. Haley Sims, APRN CNP " DNP          Sincerely,        JOSUE Gillis CNP

## 2022-03-14 NOTE — LETTER
"    3/14/2022        RE: Mecca Slade  [facility]  [address] [facility] [address]        [facility]  [place] MRN:  [id number]. Place of Service where encounter took place: [facility] () [[id number]]. HPI: Mecca Slade is a 89 year old  (12/14/1921), who is being seen today for an episodic care visit at the above location.   HPI information obtained from: facility chart records, facility staff, patient report and [facility] chart review. Today's concern is INR/Coumadin management for AFib.    ROS/Subjective:  Bleeding Signs/Symptoms:  None  Thromboembolic Signs/Symptoms:  None  Medication Changes:  No  Dietary Changes:  No  Activity Changes: No  Bacterial/Viral Infection:  No  Missed Coumadin Doses:  None  On ASA: No  Other Concerns:  No    OBJECTIVE:  BP (!) 147/88   Pulse 79   Temp 98.1  F (36.7  C)   Resp 20   Ht 1.676 m (5' 6\")   Wt 86.9 kg (191 lb 8 oz)   SpO2 97%   BMI 30.91 kg/m    Last INR: 3.20 on 3/7/22  INR Today:  2.63.  Current Dose: 0.5mg x1, 2mg M/F, 1mg AOD.    ASSESSMENT:  Controlled atrial fibrillation (H)  Encounter for therapeutic drug monitoring  Long term current use of anticoagulant with international normalized ratio (INR) goal of 1.5-2.0  Chronic. Stable. Therapeutic. Will dose Coumadin as noted below and recheck INR in 1 week. Follow-up accordingly.    PLAN:   Orders:  1. Coumadin 2mg M/F.  2. Coumadin 1mg PO QDay on AOD.  3. Recheck INR x1 on 3/21.  Dx: afib.     Electronically signed by:  JOSUE Clark CNP DNP                Sincerely,        JOSUE Gillis CNP      "

## 2022-03-14 NOTE — PROGRESS NOTES
"[facility]  [place] MRN:  [id number]. Place of Service where encounter took place: [facility] () [[id number]]. HPI: Mecca Slade is a 89 year old  (12/14/1921), who is being seen today for an episodic care visit at the above location.   HPI information obtained from: facility chart records, facility staff, patient report and [facility] chart review. Today's concern is INR/Coumadin management for AFib.    ROS/Subjective:  Bleeding Signs/Symptoms:  None  Thromboembolic Signs/Symptoms:  None  Medication Changes:  No  Dietary Changes:  No  Activity Changes: No  Bacterial/Viral Infection:  No  Missed Coumadin Doses:  None  On ASA: No  Other Concerns:  No    OBJECTIVE:  BP (!) 147/88   Pulse 79   Temp 98.1  F (36.7  C)   Resp 20   Ht 1.676 m (5' 6\")   Wt 86.9 kg (191 lb 8 oz)   SpO2 97%   BMI 30.91 kg/m    Last INR: 3.20 on 3/7/22  INR Today:  2.63.  Current Dose: 0.5mg x1, 2mg M/F, 1mg AOD.    ASSESSMENT:  Controlled atrial fibrillation (H)  Encounter for therapeutic drug monitoring  Long term current use of anticoagulant with international normalized ratio (INR) goal of 1.5-2.0  Chronic. Stable. Therapeutic. Will dose Coumadin as noted below and recheck INR in 1 week. Follow-up accordingly.    PLAN:   Orders:  1. Coumadin 2mg M/F.  2. Coumadin 1mg PO QDay on AOD.  3. Recheck INR x1 on 3/21.  Dx: afib.     Electronically signed by:  Dr. Haley Sims, APRN CNP DNP          "

## 2022-03-21 NOTE — PROGRESS NOTES
[place] [facility]AT[facility] SERVICES  [place] Medical Record Number:  [id number]  Place of Service where encounter took place:  [facility] () [[id number]]  Chief Complaint   Patient presents with     Nursing Home Acute       HPI:    Mecca Slade  is a 89 year old (12/14/1921), who is being seen today for an episodic care visit.  HPI information obtained from: facility chart records, facility staff, patient report and [facility] chart review.       Today's concern is:  - Pt seen and examined today. RN has no concern. Denies chest pain or SOB.     Past Medical and Surgical History reviewed in Epic today.    MEDICATIONS:  Current Outpatient Medications   Medication Sig Dispense Refill     acetaminophen (TYLENOL) 500 MG tablet Take 1,000 mg by mouth 3 times daily       atorvastatin (LIPITOR) 10 MG tablet Take 10 mg by mouth every evening       carboxymethylcellulose (CARBOXYMETHYLCELLULOSE SODIUM) 0.5 % SOLN ophthalmic solution Place 1 drop into both eyes every morning       furosemide (LASIX) 40 MG tablet Take 40 mg by mouth daily       HYDROmorphone (DILAUDID) 2 MG tablet TAKE 1/2 TAB (1 MG) BY MOUTH EVERY 4 HOURS AS NEEDED FOR MODERATE PAIN (PAIN GREATER THAN 5) 60 tablet 0     JANTOVEN ANTICOAGULANT 1 MG tablet Take 1 mg by mouth every other day M, W, F       JANTOVEN ANTICOAGULANT 2 MG tablet Take 2 mg by mouth every other day T, TH, Sa,Klein       lisinopril (ZESTRIL) 5 MG tablet Take 0.5 tablets (2.5 mg) by mouth every evening       metFORMIN (GLUCOPHAGE-XR) 500 MG 24 hr tablet Take 2 tablets (1,000 mg) by mouth daily (with breakfast)       methocarbamol (ROBAXIN) 500 MG tablet Take 500 mg by mouth 3 times daily as needed       metoprolol succinate ER (TOPROL-XL) 25 MG 24 hr tablet Take 1 tablet (25 mg) by mouth every evening       protein modular (PRO-STAT) Take by mouth every morning 30 ml       Warfarin Therapy Reminder 1 each continuous prn 2/23/22 INR 2.87 Take 2mg every day next INR 2/25 2/22/22  "INR 4.36 Hold x1 Next INR 2/23       REVIEW OF SYSTEMS: very limited secondary to cognitive impairment but today pt reports    Objective:  BP (!) 147/88   Pulse 79   Temp 98.2  F (36.8  C)   Resp 20   Ht 1.676 m (5' 6\")   Wt 91.3 kg (201 lb 3.2 oz)   SpO2 93%   BMI 32.47 kg/m    Exam:  General: tired looking, sitting up in the chair.   RESP:  lungs clear to auscultation , unlabored breathing  CV:  S1S2 audible, irregular HR, no murmur appreciated  SKIN    RIGHT LEG. Non tender, slight flacutation, no warmth .Left leg covered with dry kerlex.       Labs: no new data to review.       ASSESSMENT/PLAN:  -------------------------------  Controlled atrial fibrillation (H)  Encounter for therapeutic drug monitoring  Long term current use of anticoagulant with international   Hospice care    Date       INR       Order  3/7          3.63     0.5 mg today, then 2 mg MF and 1 mg AOD  3/14        2.63     2 mg MF and 1 mg AOD  3/21        1.82     3 mg tonight, then continue current regiment, recheck in one week    - CVR, on metoprolol. May permit HR around 100, providing patient is asymptomatic.   - no survival benefit from continuing coumadin. Recommend stopping. Focus is comfort car  - consider stopping non essential meds.       Left leg wound: continue care.   Right legs ? Resolving hematoma. Asymptomatic.     Electronically signed by:  Anirudh Ye MD  "

## 2022-03-21 NOTE — LETTER
3/21/2022        RE: Mecca Slade  [facility]  [address] [facility] [address]        [place] [facility]IATRIC SERVICES  [place] Medical Record Number:  [id number]  Place of Service where encounter took place:  [facility] () [[id number]]  Chief Complaint   Patient presents with     Nursing Home Acute       HPI:    Mecca Slade  is a 89 year old (12/14/1921), who is being seen today for an episodic care visit.  HPI information obtained from: facility chart records, facility staff, patient report and [facility] chart review.       Today's concern is:  - Pt seen and examined today. RN has no concern. Denies chest pain or SOB.     Past Medical and Surgical History reviewed in Epic today.    MEDICATIONS:  Current Outpatient Medications   Medication Sig Dispense Refill     acetaminophen (TYLENOL) 500 MG tablet Take 1,000 mg by mouth 3 times daily       atorvastatin (LIPITOR) 10 MG tablet Take 10 mg by mouth every evening       carboxymethylcellulose (CARBOXYMETHYLCELLULOSE SODIUM) 0.5 % SOLN ophthalmic solution Place 1 drop into both eyes every morning       furosemide (LASIX) 40 MG tablet Take 40 mg by mouth daily       HYDROmorphone (DILAUDID) 2 MG tablet TAKE 1/2 TAB (1 MG) BY MOUTH EVERY 4 HOURS AS NEEDED FOR MODERATE PAIN (PAIN GREATER THAN 5) 60 tablet 0     JANTOVEN ANTICOAGULANT 1 MG tablet Take 1 mg by mouth every other day M, W, F       JANTOVEN ANTICOAGULANT 2 MG tablet Take 2 mg by mouth every other day T, TH, Sa,Klein       lisinopril (ZESTRIL) 5 MG tablet Take 0.5 tablets (2.5 mg) by mouth every evening       metFORMIN (GLUCOPHAGE-XR) 500 MG 24 hr tablet Take 2 tablets (1,000 mg) by mouth daily (with breakfast)       methocarbamol (ROBAXIN) 500 MG tablet Take 500 mg by mouth 3 times daily as needed       metoprolol succinate ER (TOPROL-XL) 25 MG 24 hr tablet Take 1 tablet (25 mg) by mouth every evening       protein modular (PRO-STAT) Take by mouth every morning 30  "ml       Warfarin Therapy Reminder 1 each continuous prn 2/23/22 INR 2.87 Take 2mg every day next INR 2/25 2/22/22 INR 4.36 Hold x1 Next INR 2/23       REVIEW OF SYSTEMS: very limited secondary to cognitive impairment but today pt reports    Objective:  BP (!) 147/88   Pulse 79   Temp 98.2  F (36.8  C)   Resp 20   Ht 1.676 m (5' 6\")   Wt 91.3 kg (201 lb 3.2 oz)   SpO2 93%   BMI 32.47 kg/m    Exam:  General: tired looking, sitting up in the chair.   RESP:  lungs clear to auscultation , unlabored breathing  CV:  S1S2 audible, irregular HR, no murmur appreciated  SKIN    RIGHT LEG. Non tender, slight flacutation, no warmth .Left leg covered with dry kerlex.       Labs: no new data to review.       ASSESSMENT/PLAN:  -------------------------------  Controlled atrial fibrillation (H)  Encounter for therapeutic drug monitoring  Long term current use of anticoagulant with international   Hospice care    Date       INR       Order  3/7          3.63     0.5 mg today, then 2 mg MF and 1 mg AOD  3/14        2.63     2 mg MF and 1 mg AOD  3/21        1.82     3 mg tonight, then continue current regiment, recheck in one week    - CVR, on metoprolol. May permit HR around 100, providing patient is asymptomatic.   - no survival benefit from continuing coumadin. Recommend stopping. Focus is comfort car  - consider stopping non essential meds.       Left leg wound: continue care.   Right legs ? Resolving hematoma. Asymptomatic.     Electronically signed by:  Anirudh Ye MD        Sincerely,        Anirudh Ye MD    "

## 2022-03-28 NOTE — LETTER
"    3/28/2022        RE: Mecca Slade  [facility]  [address] [facility] [address]        [facility]AT[facility]  [place] MRN:  [id number]. Place of Service where encounter took place: [facility] (TCU/SNF) [[id number]]. HPI: Mecca Slade is a 89 year old  (12/14/1921), who is being seen today for an episodic care visit at the above location.   HPI information obtained from: facility chart records, facility staff, patient report and [facility] chart review. Today's concern is INR/Coumadin management for A. Fib    ROS/Subjective:  Bleeding Signs/Symptoms:  None  Thromboembolic Signs/Symptoms:  None  Medication Changes:  No  Dietary Changes:  No  Activity Changes: No  Bacterial/Viral Infection:  No  Missed Coumadin Doses:  None  On ASA: No  Other Concerns:  No    OBJECTIVE:  BP (!) 147/88   Pulse 79   Temp 98.2  F (36.8  C)   Resp 20   Ht 1.676 m (5' 6\")   Wt 85 kg (187 lb 8 oz)   SpO2 96%   BMI 30.26 kg/m    Last INR: 1.82 on 3/21  INR Today:  2.19  Current Dose: 3mg x1, 2mg M/F, 1mg AOD.     ASSESSMENT:  Controlled atrial fibrillation (H)  Encounter for therapeutic drug monitoring  Long term current use of anticoagulant with international normalized ratio (INR) goal of 1.5-2.0  Chronic. Stable. Therapeutic. Will dose Coumadin as noted below and recheck INR in 1 week. Follow-up accordingly.    PLAN:   Orders:  1. Coumadin 2mg M/W/F.  2. Coumadin 1mg PO QDay on AOD.   3. Recheck INR x1 on 4/4.   Dx: afib.     Electronically signed by:  Dr. Haley Sims, JOSUE CNP DNP                Sincerely,        JOSUE Gillis CNP      "

## 2022-03-28 NOTE — PROGRESS NOTES
"[facility]  [place] MRN:  [id number]. Place of Service where encounter took place: [facility] (U/SNF) [[id number]]. HPI: Mecca Slade is a 89 year old  (12/14/1921), who is being seen today for an episodic care visit at the above location.   HPI information obtained from: facility chart records, facility staff, patient report and [facility] chart review. Today's concern is INR/Coumadin management for A. Fib    ROS/Subjective:  Bleeding Signs/Symptoms:  None  Thromboembolic Signs/Symptoms:  None  Medication Changes:  No  Dietary Changes:  No  Activity Changes: No  Bacterial/Viral Infection:  No  Missed Coumadin Doses:  None  On ASA: No  Other Concerns:  No    OBJECTIVE:  BP (!) 147/88   Pulse 79   Temp 98.2  F (36.8  C)   Resp 20   Ht 1.676 m (5' 6\")   Wt 85 kg (187 lb 8 oz)   SpO2 96%   BMI 30.26 kg/m    Last INR: 1.82 on 3/21  INR Today:  2.19  Current Dose: 3mg x1, 2mg M/F, 1mg AOD.     ASSESSMENT:  Controlled atrial fibrillation (H)  Encounter for therapeutic drug monitoring  Long term current use of anticoagulant with international normalized ratio (INR) goal of 1.5-2.0  Chronic. Stable. Therapeutic. Will dose Coumadin as noted below and recheck INR in 1 week. Follow-up accordingly.    PLAN:   Orders:  1. Coumadin 2mg M/W/F.  2. Coumadin 1mg PO QDay on AOD.   3. Recheck INR x1 on 4/4.   Dx: afib.     Electronically signed by:  Dr. Haley Sims, APRN CNP DNP          "

## 2022-04-04 NOTE — LETTER
"    4/4/2022        RE: Mecca Slade  [facility]  [address] [facility] [address]        [facility]  [place] MRN:  [id number]. Place of Service where encounter took place: [facility] () [[id number]]. HPI: Mecca lSade is a 89 year old  (12/14/1921), who is being seen today for an episodic care visit at the above location.   HPI information obtained from: facility chart records, facility staff, patient report and [facility] chart review. Today's concern is INR/Coumadin management for A. Fib    ROS/Subjective:  Bleeding Signs/Symptoms:  None  Thromboembolic Signs/Symptoms:  None  Medication Changes:  No  Dietary Changes:  No  Activity Changes: No  Bacterial/Viral Infection:  No  Missed Coumadin Doses:  None  On ASA: No  Other Concerns:  No    OBJECTIVE:  /77   Pulse 78   Temp 98.2  F (36.8  C)   Resp 18   Ht 1.676 m (5' 6\")   Wt 85 kg (187 lb 8 oz)   SpO2 92%   BMI 30.26 kg/m    Last INR: 2.19 on 3/28  INR Today: 2.16  Current Dose: 2mg M/W/F, 1mg AOD.     ASSESSMENT:  Controlled atrial fibrillation (H)  Encounter for therapeutic drug monitoring  Long term current use of anticoagulant with international normalized ratio (INR) goal of 1.5-2.0  Chronic. Stable. Therapeutic. Will dose Coumadin as noted below and recheck INR in 2 weeks. Follow-up accordingly.    PLAN:   Orders:  1. Coumadin 2mg M/W/F.  2. Coumadin 1mg PO QDay on AOD.   3. Recheck INR x2 on 4/18.   Dx: afib.     Electronically signed by:  Dr. Haley Sims, APRN CNP DNP              Sincerely,        JOSUE Gillis CNP      "

## 2022-04-04 NOTE — PROGRESS NOTES
"[facility]  [place] MRN:  [id number]. Place of Service where encounter took place: [facility] () [[id number]]. HPI: Mecca Slade is a 89 year old  (12/14/1921), who is being seen today for an episodic care visit at the above location.   HPI information obtained from: facility chart records, facility staff, patient report and [facility] chart review. Today's concern is INR/Coumadin management for A. Fib    ROS/Subjective:  Bleeding Signs/Symptoms:  None  Thromboembolic Signs/Symptoms:  None  Medication Changes:  No  Dietary Changes:  No  Activity Changes: No  Bacterial/Viral Infection:  No  Missed Coumadin Doses:  None  On ASA: No  Other Concerns:  No    OBJECTIVE:  /77   Pulse 78   Temp 98.2  F (36.8  C)   Resp 18   Ht 1.676 m (5' 6\")   Wt 85 kg (187 lb 8 oz)   SpO2 92%   BMI 30.26 kg/m    Last INR: 2.19 on 3/28  INR Today: 2.16  Current Dose: 2mg M/W/F, 1mg AOD.     ASSESSMENT:  Controlled atrial fibrillation (H)  Encounter for therapeutic drug monitoring  Long term current use of anticoagulant with international normalized ratio (INR) goal of 1.5-2.0  Chronic. Stable. Therapeutic. Will dose Coumadin as noted below and recheck INR in 2 weeks. Follow-up accordingly.    PLAN:   Orders:  1. Coumadin 2mg M/W/F.  2. Coumadin 1mg PO QDay on AOD.   3. Recheck INR x2 on 4/18.   Dx: afib.     Electronically signed by:  Dr. Haley Sims, APRN CNP DNP        "

## 2022-04-18 NOTE — LETTER
"    4/18/2022        RE: Mecca Slade  [facility]  [address] [facility] [address]        [facility]  [place] MRN:  [id number]. Place of Service where encounter took place: [facility] () [[id number]]. HPI: Mecca Slade is a 89 year old  (12/14/1921), who is being seen today for an episodic care visit at the above location.   HPI information obtained from: facility chart records, facility staff, patient report and [facility] chart review. Today's concern is INR/Coumadin management for A. Fib    ROS/Subjective:  Bleeding Signs/Symptoms:  None  Thromboembolic Signs/Symptoms:  None  Medication Changes:  No  Dietary Changes:  No  Activity Changes: No  Bacterial/Viral Infection:  No  Missed Coumadin Doses:  None  On ASA: No  Other Concerns:  No    OBJECTIVE:  /65   Pulse 76   Temp 98.3  F (36.8  C)   Resp 18   Ht 1.676 m (5' 6\")   Wt 93.8 kg (206 lb 12.8 oz)   SpO2 93%   BMI 33.38 kg/m    Last INR: 2.16 on 4/4/22.  INR Today: 3.20  Current Dose: 2mg M/W/F, 1mg AOD.    ASSESSMENT:  Controlled atrial fibrillation (H)  Encounter for therapeutic drug monitoring  Long term current use of anticoagulant with international normalized ratio (INR) goal of 1.5-2.0  Chronic. supratherapeutic. Will dose Coumadin as noted below and recheck INR in 1 week. Follow-up accordingly.    PLAN:   Orders:  1. Coumadin 0.5mg PO x1 today.  2. Coumadin 2mg Tu/Th.  3. Coumadin 1mg PO Qday on AOD.  4. Recheck INR in 1 week.  Dx: afib.    Electronically signed by:  Dr. Haley Sims, JOSUE CNP DNP              Sincerely,        JOSUE Gillis CNP      "

## 2022-04-18 NOTE — PROGRESS NOTES
"[facility]  [place] MRN:  [id number]. Place of Service where encounter took place: [facility] () [[id number]]. HPI: Mecca Slade is a 89 year old  (12/14/1921), who is being seen today for an episodic care visit at the above location.   HPI information obtained from: facility chart records, facility staff, patient report and [facility] chart review. Today's concern is INR/Coumadin management for A. Fib    ROS/Subjective:  Bleeding Signs/Symptoms:  None  Thromboembolic Signs/Symptoms:  None  Medication Changes:  No  Dietary Changes:  No  Activity Changes: No  Bacterial/Viral Infection:  No  Missed Coumadin Doses:  None  On ASA: No  Other Concerns:  No    OBJECTIVE:  /65   Pulse 76   Temp 98.3  F (36.8  C)   Resp 18   Ht 1.676 m (5' 6\")   Wt 93.8 kg (206 lb 12.8 oz)   SpO2 93%   BMI 33.38 kg/m    Last INR: 2.16 on 4/4/22.  INR Today: 3.20  Current Dose: 2mg M/W/F, 1mg AOD.    ASSESSMENT:  Controlled atrial fibrillation (H)  Encounter for therapeutic drug monitoring  Long term current use of anticoagulant with international normalized ratio (INR) goal of 1.5-2.0  Chronic. supratherapeutic. Will dose Coumadin as noted below and recheck INR in 1 week. Follow-up accordingly.    PLAN:   Orders:  1. Coumadin 0.5mg PO x1 today.  2. Coumadin 2mg Tu/Th.  3. Coumadin 1mg PO Qday on AOD.  4. Recheck INR in 1 week.  Dx: afib.    Electronically signed by:  Dr. Haley Sims, APRN CNP DNP        "

## 2022-04-25 NOTE — PROGRESS NOTES
[facility] [place] [facility]  Episodic/Acute/Follow-Up  [place] MRN: [id number]. Place of Service where encounter took place:  [facility] () [[id number]]   Chief Complaint   Patient presents with     Anticoagulation    HPI: Mecca Slade  is a 89 year old (12/14/1921), who is being seen today for an episodic care visit.  HPI information obtained from: facility chart records, facility staff, patient report and [facility] chart review. Today's concern is:    Zayda seen today on follow-up to address her wound and INR results.  Today, Zayda states that the pain in her left leg is much better than before.  She feels a little bit constipated, and this comes and goes.  She would like something as needed for this.  Her INR is therapeutic today, and she is happy about this.  She denies shortness of breath, new swelling, or any other bruising/bleeding.    Past Medical and Surgical History reviewed in Epic today.  MEDICATIONS:  Current Outpatient Medications   Medication Sig Dispense Refill     acetaminophen (TYLENOL) 500 MG tablet Take 1,000 mg by mouth 3 times daily       atorvastatin (LIPITOR) 10 MG tablet Take 10 mg by mouth every evening       carboxymethylcellulose (CARBOXYMETHYLCELLULOSE SODIUM) 0.5 % SOLN ophthalmic solution Place 1 drop into both eyes every morning       furosemide (LASIX) 40 MG tablet Take 40 mg by mouth daily       HYDROmorphone (DILAUDID) 2 MG tablet TAKE 1/2 TAB (1 MG) BY MOUTH EVERY 4 HOURS AS NEEDED FOR MODERATE PAIN (PAIN GREATER THAN 5) 60 tablet 0     JANTOVEN ANTICOAGULANT 1 MG tablet Take 1 mg by mouth every other day M, W, F       JANTOVEN ANTICOAGULANT 2 MG tablet Take 2 mg by mouth every other day T, TH, Sa,Klein       lisinopril (ZESTRIL) 5 MG tablet Take 0.5 tablets (2.5 mg) by mouth every evening       metFORMIN (GLUCOPHAGE-XR) 500 MG 24 hr tablet Take 2 tablets (1,000 mg) by mouth daily (with breakfast)       methocarbamol (ROBAXIN) 500 MG tablet Take 500  "mg by mouth 3 times daily as needed       metoprolol succinate ER (TOPROL-XL) 25 MG 24 hr tablet Take 1 tablet (25 mg) by mouth every evening       protein modular (PRO-STAT) Take by mouth every morning 30 ml       Warfarin Therapy Reminder 1 each continuous prn 2/23/22 INR 2.87 Take 2mg every day next INR 2/25 2/22/22 INR 4.36 Hold x1 Next INR 2/23       REVIEW OF SYSTEMS: Limited secondary to cognitive impairment but today pt reports the above and 4 point ROS including Respiratory, CV, GI and , other than that noted in the HPI, is negative.    Objective: /77   Pulse 75   Temp 96.8  F (36  C)   Resp 16   Ht 1.676 m (5' 6\")   Wt 86.9 kg (191 lb 8 oz)   SpO2 92%   BMI 30.91 kg/m    Exam:  GENERAL APPEARANCE: Alert, in no distress, cooperative.   ENT: Mouth/posterior oropharynx intact w/ moist mucous membranes, hearing acuity [place].  EYES: EOM, conjunctivae, lids, pupils and irises normal, PERRL2.   RESP: Respiratory effort unlabored, no respiratory distress, On RA.   CV: Edema 0+ BLE. Peripheral pulses are 2+.  SKIN: Inspection/Palpation of skin and subcutaneous tissue baseline w/ fragility. Wound as noted here, and both heels have stage I pressure injury (not pictured):    NEURO: CN II-XII at patient's baseline, sensation baseline PPS.  PSYCH: Insight, judgement, and memory are baseline impaired, affect and mood are happy/engaged.    Labs: Labs done in facility are in EPIC. Please refer to them using [employer]/Care Everywhere.    ASSESSMENT/PLAN:  Controlled atrial fibrillation (H)  Encounter for therapeutic drug monitoring  Long term current use of anticoagulant with international normalized ratio (INR) goal of 1.5-2.0  Pressure injury of left leg, unstageable (H)  Hospice care patient  Subacute on chronic. Ongoing.    INR therapeutic, will dose Coumadin as noted below and recheck INR in 1 week.    Provider consulted hospice services, as perhaps vacuum technology via wound VAC would be appropriate to try " for Zayda.  Wound does not appear to be improved, but is not worsened.    PRNs available for constipation, provider will coordinate care with nursing.  Follow up routinely or as needed.    Orders:  1. Coumadin 2mg Tu/Th,  2. Coumadin 1mg PO Qday on AOD  3. Recheck INR in 1 week.  Dx: afib.    Electronically signed by:  Dr. Haley Sims, APRN CNP DNP

## 2022-04-25 NOTE — LETTER
4/25/2022        RE: Mecca Slade  [facility]  [address] [facility] [address]        [facility] [place] [facility]  Episodic/Acute/Follow-Up  [place] MRN: [id number]. Place of Service where encounter took place:  [facility] () [[id number]]   Chief Complaint   Patient presents with     Anticoagulation    HPI: Mecac Slade  is a 89 year old (12/14/1921), who is being seen today for an episodic care visit.  HPI information obtained from: facility chart records, facility staff, patient report and [facility] chart review. Today's concern is:    Zayda seen today on follow-up to address her wound and INR results.  Today, Zayda states that the pain in her left leg is much better than before.  She feels a little bit constipated, and this comes and goes.  She would like something as needed for this.  Her INR is therapeutic today, and she is happy about this.  She denies shortness of breath, new swelling, or any other bruising/bleeding.    Past Medical and Surgical History reviewed in Epic today.  MEDICATIONS:  Current Outpatient Medications   Medication Sig Dispense Refill     acetaminophen (TYLENOL) 500 MG tablet Take 1,000 mg by mouth 3 times daily       atorvastatin (LIPITOR) 10 MG tablet Take 10 mg by mouth every evening       carboxymethylcellulose (CARBOXYMETHYLCELLULOSE SODIUM) 0.5 % SOLN ophthalmic solution Place 1 drop into both eyes every morning       furosemide (LASIX) 40 MG tablet Take 40 mg by mouth daily       HYDROmorphone (DILAUDID) 2 MG tablet TAKE 1/2 TAB (1 MG) BY MOUTH EVERY 4 HOURS AS NEEDED FOR MODERATE PAIN (PAIN GREATER THAN 5) 60 tablet 0     JANTOVEN ANTICOAGULANT 1 MG tablet Take 1 mg by mouth every other day M, W, F       JANTOVEN ANTICOAGULANT 2 MG tablet Take 2 mg by mouth every other day T, TH, Sa,Klein       lisinopril (ZESTRIL) 5 MG tablet Take 0.5 tablets (2.5 mg) by mouth every evening       metFORMIN (GLUCOPHAGE-XR) 500 MG 24 hr  "tablet Take 2 tablets (1,000 mg) by mouth daily (with breakfast)       methocarbamol (ROBAXIN) 500 MG tablet Take 500 mg by mouth 3 times daily as needed       metoprolol succinate ER (TOPROL-XL) 25 MG 24 hr tablet Take 1 tablet (25 mg) by mouth every evening       protein modular (PRO-STAT) Take by mouth every morning 30 ml       Warfarin Therapy Reminder 1 each continuous prn 2/23/22 INR 2.87 Take 2mg every day next INR 2/25 2/22/22 INR 4.36 Hold x1 Next INR 2/23       REVIEW OF SYSTEMS: Limited secondary to cognitive impairment but today pt reports the above and 4 point ROS including Respiratory, CV, GI and , other than that noted in the HPI, is negative.    Objective: /77   Pulse 75   Temp 96.8  F (36  C)   Resp 16   Ht 1.676 m (5' 6\")   Wt 86.9 kg (191 lb 8 oz)   SpO2 92%   BMI 30.91 kg/m    Exam:  GENERAL APPEARANCE: Alert, in no distress, cooperative.   ENT: Mouth/posterior oropharynx intact w/ moist mucous membranes, hearing acuity [place].  EYES: EOM, conjunctivae, lids, pupils and irises normal, PERRL2.   RESP: Respiratory effort unlabored, no respiratory distress, On RA.   CV: Edema 0+ BLE. Peripheral pulses are 2+.  SKIN: Inspection/Palpation of skin and subcutaneous tissue baseline w/ fragility. Wound as noted here, and both heels have stage I pressure injury (not pictured):    NEURO: CN II-XII at patient's baseline, sensation baseline PPS.  PSYCH: Insight, judgement, and memory are baseline impaired, affect and mood are happy/engaged.    Labs: Labs done in facility are in EPIC. Please refer to them using [employer]/Care Everywhere.    ASSESSMENT/PLAN:  Controlled atrial fibrillation (H)  Encounter for therapeutic drug monitoring  Long term current use of anticoagulant with international normalized ratio (INR) goal of 1.5-2.0  Pressure injury of left leg, unstageable (H)  Hospice care patient  Subacute on chronic. Ongoing.    INR therapeutic, will dose Coumadin as noted below and recheck INR in 1 " week.    Provider consulted hospice services, as perhaps vacuum technology via wound VAC would be appropriate to try for Zayda.  Wound does not appear to be improved, but is not worsened.    PRNs available for constipation, provider will coordinate care with nursing.  Follow up routinely or as needed.    Orders:  1. Coumadin 2mg Tu/Th,  2. Coumadin 1mg PO Qday on AOD  3. Recheck INR in 1 week.  Dx: afib.    Electronically signed by:  JOSUE Clark CNP DNP        Sincerely,        JOSUE Gillis CNP

## 2022-05-02 NOTE — LETTER
"    5/2/2022        RE: Mecca Slade  [facility]  [address] [facility] [address]        [facility]  [place] MRN:  [id number]. Place of Service where encounter took place: [facility] () [[id number]]. HPI: Mecca Slade is a 89 year old  (12/14/1921), who is being seen today for an episodic care visit at the above location.   HPI information obtained from: facility chart records, facility staff, patient report and [facility] chart review. Today's concern is INR/Coumadin management for A. Fib    ROS/Subjective:  Bleeding Signs/Symptoms:  None  Thromboembolic Signs/Symptoms:  None  Medication Changes:  Yes: Keflex started  Dietary Changes:  No  Activity Changes: No  Bacterial/Viral Infection:  No  Missed Coumadin Doses:  None  On ASA: No  Other Concerns:  No    OBJECTIVE:  /77   Pulse 75   Temp 98.5  F (36.9  C)   Resp 16   Ht 1.676 m (5' 6\")   Wt 86.9 kg (191 lb 8 oz)   SpO2 94%   BMI 30.91 kg/m    Last INR: 2.82 on 4/28/22.  INR Today: 2.97  Current Dose: 2mg M/W/F, 1mg AOD.    ASSESSMENT:  Controlled atrial fibrillation (H)  Encounter for therapeutic drug monitoring  Long term current use of anticoagulant with international normalized ratio (INR) goal of 1.5-2.0  Chronic. Therapeutic. Will dose Coumadin as noted below and recheck INR in 2 weeks. Follow-up accordingly.    PLAN:   Orders:  1. Coumadin 2mg M/W/F.  2. Coumadin 1mg AOD.  3. Recheck INR x1 in 2 weeks.   Dx: afib.     Electronically signed by:  JOSUE Clark CNP DNP            Sincerely,        JOSUE Gillis CNP      "

## 2022-05-16 NOTE — LETTER
5/16/2022        RE: Mecca Slade  [address] [facility] [address]        [place] [facility]  Chief Complaint   Patient presents with     [facility] Regulatory     [place] Medical Record Number:  [id number]  Place of Service where encounter took place:  [facility] () [[id number]]    HPI:    Mecca Slade  is 89 year old (12/14/1921), who is being seen today for a federally mandated E/M visit.  HPI information obtained from: facility chart records, facility staff, patient report and [facility] chart review.       Today's concerns are:   - Resident seen and examined, chart reviewed and discussed with the nursing staff.   - Hospice: hospice RN reports had increased pain, started on dilaudid. Patient reports pain in the legs, better with medications could not tell the type of the pain or the intensity.   - Left leg wound: RN reports started on abx, wound cx ordered.   - DNP reports Zayda has INR checked today, pending result.   --------------------------------  - Past Medical, social, family histories, medications, and allergies reviewed and updated  - Medications reviewed: in the chart and EHR.   - Case Management:   I have reviewed the care plan and MDS and do agree with the plan. Patient's desire to return to the community is not present.  Information reviewed:  Medications, vital signs, orders, and nursing notes.  -----------------------------------------------  - MEDICATIONS:  Current Outpatient Medications   Medication Sig Dispense Refill     acetaminophen (TYLENOL) 500 MG tablet Take 1,000 mg by mouth 3 times daily       atorvastatin (LIPITOR) 10 MG tablet Take 10 mg by mouth every evening       carboxymethylcellulose (CARBOXYMETHYLCELLULOSE SODIUM) 0.5 % SOLN ophthalmic solution Place 1 drop into both eyes every morning       furosemide (LASIX) 40 MG tablet Take 40 mg by mouth daily       HYDROmorphone (DILAUDID) 2 MG tablet TAKE 1/2 TAB (1 MG) BY MOUTH EVERY 4 HOURS  "AS NEEDED FOR MODERATE PAIN (PAIN GREATER THAN 5) 60 tablet 0     JANTOVEN ANTICOAGULANT 1 MG tablet Take 1 mg by mouth every other day M, W, F       JANTOVEN ANTICOAGULANT 2 MG tablet Take 2 mg by mouth every other day T, TH, Sa,Klein       lisinopril (ZESTRIL) 5 MG tablet Take 0.5 tablets (2.5 mg) by mouth every evening       metFORMIN (GLUCOPHAGE-XR) 500 MG 24 hr tablet Take 2 tablets (1,000 mg) by mouth daily (with breakfast)       methocarbamol (ROBAXIN) 500 MG tablet Take 500 mg by mouth 3 times daily as needed       metoprolol succinate ER (TOPROL-XL) 25 MG 24 hr tablet Take 1 tablet (25 mg) by mouth every evening       protein modular (PRO-STAT) Take by mouth every morning 30 ml       Warfarin Therapy Reminder 1 each continuous prn 2/23/22 INR 2.87 Take 2mg every day next INR 2/25 2/22/22 INR 4.36 Hold x1 Next INR 2/23         ROS: 4 point ROS including Respiratory, CV, GI and , other than that noted in the HPI,  is negative    Vitals:  /71   Pulse 55   Temp 98.3  F (36.8  C)   Resp 16   Ht 1.676 m (5' 6\")   Wt 92.3 kg (203 lb 6.4 oz)   SpO2 92%   BMI 32.83 kg/m    Body mass index is 32.83 kg/m .  Exam:  GENERAL APPEARANCE:  in no distress, cooperative  RESP:  lungs clear to auscultation , unlabored breathing  CV:  S1S2 audible, irregular HR, no murmur appreciated.   ABDOMEN:  soft, NT/ND, BS audible. no mass appreciated on palpation.   M/S:   No joint deformity noted.   SKIN:  sluggish erythema over left heal. Dry dressing over left leg  NEURO:  Ms strength: 4/5 LUE, 4/5 LLE. Diminished visual acuity  PSYCH:  fair insight, judgement and memory, affect and mood normal     Lab/Diagnostic data:  Reviewed in the chart and EHR.        ASSESSMENT/PLAN  --------------------------------  Chronic congestive heart failure, diastolic type (H)  Essential hypertension  -  EF > 70% (2012)  - compensated. Continue meds    On coumadin for Atrial fibrillation (H):   - on coumadin 2 mg M/W/F and 1 mg AOD.   - " will give 1 mg tonight (50% reduction), continue the current regiment, repeat INR next week.   - CVR. On toprol.   - Risk of CVA is 4% in one year. We recommend discontinuing coumadin.       Type II diabetes mellitus with peripheral circulatory disorder (H) :   A1C 8.1 12/01/2021    A1C 9.1 11/15/2021    A1C 7.8 08/05/2021   - controleld. Goal is symptomatic management.       Closed metaphyseal fracture of left lower extremity with delayed healing  Other closed fracture of distal end of right femur with routine healing,  Chronic left wound  - analgesia optimal.   - Wound management as per Hospice care team. Was treated with keflex last month. Approach is comfort care.       Hx of Cerebrovascular accident (CVA) due to embolism of right middle cerebral artery  Left sided hemiparesis (H)  Hospice care.   Frail elderly:    - Left sided residual weakness, stable. Off ASA, on lipitor 10 mg. recommend stopping lipitor   - Significant  Deficits requiring NH placement. Requiring extensive assistance from nursing. Up for meals only o/w spends the day resting in bed       Class 1 obesity due to excess calories with serious comorbidity (H)  -  Body mass index is 32.83 kg/m .  In this age group- frail elderly, keep BMI b/lw 25-35 Kg(m2).     Primary Hyperparathyroidism (H): no concern.       Electronically signed by:  Anirudh Ye MD        Sincerely,        Anirudh Ye MD

## 2022-05-16 NOTE — PROGRESS NOTES
[place] [facility]  Chief Complaint   Patient presents with     long term Regulatory     [place] Medical Record Number:  [id number]  Place of Service where encounter took place:  [facility] () [[id number]]    HPI:    Mecca Slade  is 89 year old (12/14/1921), who is being seen today for a federally mandated E/M visit.  HPI information obtained from: facility chart records, facility staff, patient report and [facility] chart review.       Today's concerns are:   - Resident seen and examined, chart reviewed and discussed with the nursing staff.   - Hospice: hospice RN reports had increased pain, started on dilaudid. Patient reports pain in the legs, better with medications could not tell the type of the pain or the intensity.   - Left leg wound: RN reports started on abx, wound cx ordered.   - DNP reports Zayda has INR checked today, pending result.   --------------------------------  - Past Medical, social, family histories, medications, and allergies reviewed and updated  - Medications reviewed: in the chart and EHR.   - Case Management:   I have reviewed the care plan and MDS and do agree with the plan. Patient's desire to return to the community is not present.  Information reviewed:  Medications, vital signs, orders, and nursing notes.  -----------------------------------------------  - MEDICATIONS:  Current Outpatient Medications   Medication Sig Dispense Refill     acetaminophen (TYLENOL) 500 MG tablet Take 1,000 mg by mouth 3 times daily       atorvastatin (LIPITOR) 10 MG tablet Take 10 mg by mouth every evening       carboxymethylcellulose (CARBOXYMETHYLCELLULOSE SODIUM) 0.5 % SOLN ophthalmic solution Place 1 drop into both eyes every morning       furosemide (LASIX) 40 MG tablet Take 40 mg by mouth daily       HYDROmorphone (DILAUDID) 2 MG tablet TAKE 1/2 TAB (1 MG) BY MOUTH EVERY 4 HOURS AS NEEDED FOR MODERATE PAIN (PAIN GREATER THAN 5) 60 tablet 0     JANTOVEN  "ANTICOAGULANT 1 MG tablet Take 1 mg by mouth every other day M, W, F       JANTOVEN ANTICOAGULANT 2 MG tablet Take 2 mg by mouth every other day T, TH, Sa,Klein       lisinopril (ZESTRIL) 5 MG tablet Take 0.5 tablets (2.5 mg) by mouth every evening       metFORMIN (GLUCOPHAGE-XR) 500 MG 24 hr tablet Take 2 tablets (1,000 mg) by mouth daily (with breakfast)       methocarbamol (ROBAXIN) 500 MG tablet Take 500 mg by mouth 3 times daily as needed       metoprolol succinate ER (TOPROL-XL) 25 MG 24 hr tablet Take 1 tablet (25 mg) by mouth every evening       protein modular (PRO-STAT) Take by mouth every morning 30 ml       Warfarin Therapy Reminder 1 each continuous prn 2/23/22 INR 2.87 Take 2mg every day next INR 2/25 2/22/22 INR 4.36 Hold x1 Next INR 2/23         ROS: 4 point ROS including Respiratory, CV, GI and , other than that noted in the HPI,  is negative    Vitals:  /71   Pulse 55   Temp 98.3  F (36.8  C)   Resp 16   Ht 1.676 m (5' 6\")   Wt 92.3 kg (203 lb 6.4 oz)   SpO2 92%   BMI 32.83 kg/m    Body mass index is 32.83 kg/m .  Exam:  GENERAL APPEARANCE:  in no distress, cooperative  RESP:  lungs clear to auscultation , unlabored breathing  CV:  S1S2 audible, irregular HR, no murmur appreciated.   ABDOMEN:  soft, NT/ND, BS audible. no mass appreciated on palpation.   M/S:   No joint deformity noted.   SKIN:  sluggish erythema over left heal. Dry dressing over left leg  NEURO:  Ms strength: 4/5 LUE, 4/5 LLE. Diminished visual acuity  PSYCH:  fair insight, judgement and memory, affect and mood normal     Lab/Diagnostic data:  Reviewed in the chart and EHR.        ASSESSMENT/PLAN  --------------------------------  Chronic congestive heart failure, diastolic type (H)  Essential hypertension  -  EF > 70% (2012)  - compensated. Continue meds    On coumadin for Atrial fibrillation (H):   - on coumadin 2 mg M/W/F and 1 mg AOD.   - will give 1 mg tonight (50% reduction), continue the current regiment, repeat " INR next week.   - CVR. On toprol.   - Risk of CVA is 4% in one year. We recommend discontinuing coumadin.       Type II diabetes mellitus with peripheral circulatory disorder (H) :   A1C 8.1 12/01/2021    A1C 9.1 11/15/2021    A1C 7.8 08/05/2021   - controleld. Goal is symptomatic management.       Closed metaphyseal fracture of left lower extremity with delayed healing  Other closed fracture of distal end of right femur with routine healing,  Chronic left wound  - analgesia optimal.   - Wound management as per Hospice care team. Was treated with keflex last month. Approach is comfort care.       Hx of Cerebrovascular accident (CVA) due to embolism of right middle cerebral artery  Left sided hemiparesis (H)  Hospice care.   Frail elderly:    - Left sided residual weakness, stable. Off ASA, on lipitor 10 mg. recommend stopping lipitor   - Significant  Deficits requiring NH placement. Requiring extensive assistance from nursing. Up for meals only o/w spends the day resting in bed       Class 1 obesity due to excess calories with serious comorbidity (H)  -  Body mass index is 32.83 kg/m .  In this age group- frail elderly, keep BMI b/lw 25-35 Kg(m2).     Primary Hyperparathyroidism (H): no concern.       Electronically signed by:  Anirudh Ye MD

## 2022-05-23 NOTE — PROGRESS NOTES
[facility] [place] [facility]  Episodic/Acute/Follow-Up  [place] MRN: [id number]. Place of Service where encounter took place:  [facility] () [[id number]]   Chief Complaint   Patient presents with     Anticoagulation    HPI: Mecca Slade  is a 89 year old (1921), who is being seen today for an episodic care visit.  HPI information obtained from: facility chart records, facility staff, patient report and [facility] chart review. Today's concern is:    Zayda seen today on follow-up to address her INR results, but then, nursing reported significant behavior change.  Nursing states that she is actively hallucinating, talking to her  , believes that she is out of state at the time, and hard to redirect.  Hospice notified.    Today, Zayda states she does not know what is going on.  When told her INR, she remembers that this level is good.  She denies any new pain, but then winces when her preferable route is moved.  She denies any breathing problems or fever.  She is otherwise too distressed to answer questions.    Past Medical and Surgical History reviewed in Epic today.  MEDICATIONS:  Current Outpatient Medications   Medication Sig Dispense Refill     acetaminophen (TYLENOL) 500 MG tablet Take 1,000 mg by mouth 3 times daily       atorvastatin (LIPITOR) 10 MG tablet Take 10 mg by mouth every evening       carboxymethylcellulose (CARBOXYMETHYLCELLULOSE SODIUM) 0.5 % SOLN ophthalmic solution Place 1 drop into both eyes every morning       furosemide (LASIX) 40 MG tablet Take 40 mg by mouth daily       HYDROmorphone (DILAUDID) 2 MG tablet TAKE 1/2 TAB (1 MG) BY MOUTH EVERY 4 HOURS AS NEEDED FOR MODERATE PAIN (PAIN GREATER THAN 5) 60 tablet 0     JANTOVEN ANTICOAGULANT 1 MG tablet Take 1 mg by mouth every other day M, W, F       JANTOVEN ANTICOAGULANT 2 MG tablet Take 2 mg by mouth every other day T, , ,Klein       lisinopril (ZESTRIL) 5 MG tablet Take 0.5 tablets (2.5 mg)  "by mouth every evening       metFORMIN (GLUCOPHAGE-XR) 500 MG 24 hr tablet Take 2 tablets (1,000 mg) by mouth daily (with breakfast)       methocarbamol (ROBAXIN) 500 MG tablet Take 500 mg by mouth 3 times daily as needed       metoprolol succinate ER (TOPROL-XL) 25 MG 24 hr tablet Take 1 tablet (25 mg) by mouth every evening       protein modular (PRO-STAT) Take by mouth every morning 30 ml       Warfarin Therapy Reminder 1 each continuous prn 2/23/22 INR 2.87 Take 2mg every day next INR 2/25 2/22/22 INR 4.36 Hold x1 Next INR 2/23       REVIEW OF SYSTEMS: Limited secondary to cognitive impairment but today pt reports the above.    Objective: /78   Pulse 59   Temp 98.7  F (37.1  C)   Resp 18   Ht 1.676 m (5' 6\")   Wt 92.3 kg (203 lb 6.4 oz)   SpO2 95%   BMI 32.83 kg/m    Exam:  GENERAL APPEARANCE: Alert, in no distress, cooperative.   ENT: Mouth/posterior oropharynx intact w/ moist mucous membranes, hearing acuity [place].  EYES: EOM, conjunctivae, lids, pupils and irises normal, PERRL2.   RESP: Respiratory effort unlabored, no respiratory distress, On RA.   CV:  Edema trace+ BLE. Peripheral pulses are 2+.  SKIN: Inspection/Palpation of skin and subcutaneous tissue baseline w/ fragility. LLE wound w/ clean dressing and   NEURO: CN II-XII at patient's baseline, sensation baseline PPS.  PSYCH: Insight, judgement, and memory are impaired from baseline, affect and mood are anxious/inattentive.    Labs: Labs done in facility are in EPIC. Please refer to them using EPIC/Care Everywhere.    ASSESSMENT/PLAN:  Controlled atrial fibrillation (H)  Long term current use of anticoagulant with international normalized ratio (INR) goal of 1.5-2.0  Encounter for therapeutic drug monitoring  Anxiety  Hallucinations  Hospice care patient  Acute on chronic. Ongoing.    Provider coordinated care with hospice nurse Chaparrita, who noted during dressing change, that wound is looking very clean.  Zayda is status post antibiotics for " soft tissue infection, so it is odd that she would be encephalopathic from metabolic cause.    She has previously displayed this type of behavior with UTI.  After speaking with hospice, okay to obtain UA/UC.     INR therapeutic.  Will dose Coumadin as noted below and recheck INR in 1 week.    Medications available via hospice for comfort during this period of anxiety.  Follow up w/ results, routinely, or as needed.    Orders:  1. Coumadin 2mg Tu/Th.  2. Coumadin 1mg PO QDay on AOD.  3. Recheck INR x1 in 1 week.   Dx: afib  4. UA/UC x1 via straight cath. Dx: hallucinations    Electronically signed by:  Dr. Haley Sims, APRN CNP DNP

## 2022-05-23 NOTE — LETTER
2022        RE: Mecca Slade  [facility]  [address] [facility] [address]        [facility]  Episodic/Acute/Follow-Up  [place] MRN: [id number]. Place of Service where encounter took place:  [facility] () [[id number]]   Chief Complaint   Patient presents with     Anticoagulation    HPI: Mecca Slade  is a 89 year old (1921), who is being seen today for an episodic care visit.  HPI information obtained from: facility chart records, facility staff, patient report and [facility] chart review. Today's concern is:    Zayda seen today on follow-up to address her INR results, but then, nursing reported significant behavior change.  Nursing states that she is actively hallucinating, talking to her  , believes that she is out of state at the time, and hard to redirect.  Hospice notified.    Today, Zayda states she does not know what is going on.  When told her INR, she remembers that this level is good.  She denies any new pain, but then winces when her preferable route is moved.  She denies any breathing problems or fever.  She is otherwise too distressed to answer questions.    Past Medical and Surgical History reviewed in Epic today.  MEDICATIONS:  Current Outpatient Medications   Medication Sig Dispense Refill     acetaminophen (TYLENOL) 500 MG tablet Take 1,000 mg by mouth 3 times daily       atorvastatin (LIPITOR) 10 MG tablet Take 10 mg by mouth every evening       carboxymethylcellulose (CARBOXYMETHYLCELLULOSE SODIUM) 0.5 % SOLN ophthalmic solution Place 1 drop into both eyes every morning       furosemide (LASIX) 40 MG tablet Take 40 mg by mouth daily       HYDROmorphone (DILAUDID) 2 MG tablet TAKE 1/2 TAB (1 MG) BY MOUTH EVERY 4 HOURS AS NEEDED FOR MODERATE PAIN (PAIN GREATER THAN 5) 60 tablet 0     JANTOVEN ANTICOAGULANT 1 MG tablet Take 1 mg by mouth every other day M, W, F       JANTOVEN ANTICOAGULANT 2 MG  "tablet Take 2 mg by mouth every other day T, TH, Sa,Klein       lisinopril (ZESTRIL) 5 MG tablet Take 0.5 tablets (2.5 mg) by mouth every evening       metFORMIN (GLUCOPHAGE-XR) 500 MG 24 hr tablet Take 2 tablets (1,000 mg) by mouth daily (with breakfast)       methocarbamol (ROBAXIN) 500 MG tablet Take 500 mg by mouth 3 times daily as needed       metoprolol succinate ER (TOPROL-XL) 25 MG 24 hr tablet Take 1 tablet (25 mg) by mouth every evening       protein modular (PRO-STAT) Take by mouth every morning 30 ml       Warfarin Therapy Reminder 1 each continuous prn 2/23/22 INR 2.87 Take 2mg every day next INR 2/25 2/22/22 INR 4.36 Hold x1 Next INR 2/23       REVIEW OF SYSTEMS: Limited secondary to cognitive impairment but today pt reports the above.    Objective: /78   Pulse 59   Temp 98.7  F (37.1  C)   Resp 18   Ht 1.676 m (5' 6\")   Wt 92.3 kg (203 lb 6.4 oz)   SpO2 95%   BMI 32.83 kg/m    Exam:  GENERAL APPEARANCE: Alert, in no distress, cooperative.   ENT: Mouth/posterior oropharynx intact w/ moist mucous membranes, hearing acuity [place].  EYES: EOM, conjunctivae, lids, pupils and irises normal, PERRL2.   RESP: Respiratory effort unlabored, no respiratory distress, On RA.   CV:  Edema trace+ BLE. Peripheral pulses are 2+.  SKIN: Inspection/Palpation of skin and subcutaneous tissue baseline w/ fragility. LLE wound w/ clean dressing and   NEURO: CN II-XII at patient's baseline, sensation baseline PPS.  PSYCH: Insight, judgement, and memory are impaired from baseline, affect and mood are anxious/inattentive.    Labs: Labs done in facility are in EPIC. Please refer to them using [employer]/Care Everywhere.    ASSESSMENT/PLAN:  Controlled atrial fibrillation (H)  Long term current use of anticoagulant with international normalized ratio (INR) goal of 1.5-2.0  Encounter for therapeutic drug monitoring  Anxiety  Hallucinations  Hospice care patient  Acute on chronic. Ongoing.    Provider coordinated care with hospice " nurse Chaparrita, who noted during dressing change, that wound is looking very clean.  Zayda is status post antibiotics for soft tissue infection, so it is odd that she would be encephalopathic from metabolic cause.    She has previously displayed this type of behavior with UTI.  After speaking with hospice, okay to obtain UA/UC.     INR therapeutic.  Will dose Coumadin as noted below and recheck INR in 1 week.    Medications available via hospice for comfort during this period of anxiety.  Follow up w/ results, routinely, or as needed.    Orders:  1. Coumadin 2mg Tu/Th.  2. Coumadin 1mg PO QDay on AOD.  3. Recheck INR x1 in 1 week.   Dx: afib  4. UA/UC x1 via straight cath. Dx: hallucinations    Electronically signed by:  JOSUE Clark CNP DNP                Sincerely,        JOSUE Gillis CNP

## 2022-05-30 NOTE — LETTER
May 30, 2022    LON CHAPARRO  [facility]  [address] [facility] [address]      Dear Lon:    As a member of [facility] ([facility]), you are provided a care coordinator. I will be your new care coordinator as of 06/01/2022. I will be calling you soon to see how you are doing and determine your needs.    If you have any questions, please feel free to call me at [phone]. If you reach my voice mail, please leave a message and your phone number. If you are hearing impaired, please call the [place] Relay at 450 or [phone] ([email or web address] relay service).    I look forward to speaking with you soon.    Sincerely,    UZIEL Cat, RN, PHN, [facility]  [phone]  [email or web address] is a health plan that contracts with both Medicare and the [place] Medical Assistance (Medicaid) program to provide benefits of both programs to enrollees. Enrollment in [facility] depends on contract renewal.      [facility]  [id number] DHS Approved ([id number])  [id number] (11/18)

## 2022-05-31 NOTE — PROGRESS NOTES
[facility] [place] [facility]  Episodic/Acute/Follow-Up  [place] MRN: [id number]. Place of Service where encounter took place:  [facility] () [[id number]]   Chief Complaint   Patient presents with     Anticoagulation    HPI: Mecca Slade  is a 89 year old (12/14/1921), who is being seen today for an episodic care visit.  HPI information obtained from: facility chart records, facility staff, patient report and [facility] chart review. Today's concern is:    Zayda seen today to address her INR of 3.1.  Hospice also shared wound concerns.  UA/UC did not reveal UTI, and no other outward cause of hallucinations other than potentially Dilaudid.  Today, Zayda says her pain is intermittent, and well controlled.  She does not know if she has numbness or tingling.  She denies feeling feverish.  She denies any shortness of breath.    Past Medical and Surgical History reviewed in Epic today.  MEDICATIONS:  Current Outpatient Medications   Medication Sig Dispense Refill     acetaminophen (TYLENOL) 500 MG tablet Take 1,000 mg by mouth 3 times daily       atorvastatin (LIPITOR) 10 MG tablet Take 10 mg by mouth every evening       carboxymethylcellulose (CARBOXYMETHYLCELLULOSE SODIUM) 0.5 % SOLN ophthalmic solution Place 1 drop into both eyes every morning       furosemide (LASIX) 40 MG tablet Take 40 mg by mouth daily       HYDROmorphone (DILAUDID) 2 MG tablet TAKE 1/2 TAB (1 MG) BY MOUTH EVERY 4 HOURS AS NEEDED FOR MODERATE PAIN (PAIN GREATER THAN 5) 60 tablet 0     JANTOVEN ANTICOAGULANT 1 MG tablet Take 1 mg by mouth every other day M, W, F       JANTOVEN ANTICOAGULANT 2 MG tablet Take 2 mg by mouth every other day T, TH, Sa,Klein       lisinopril (ZESTRIL) 5 MG tablet Take 0.5 tablets (2.5 mg) by mouth every evening       metFORMIN (GLUCOPHAGE-XR) 500 MG 24 hr tablet Take 2 tablets (1,000 mg) by mouth daily (with breakfast)       methocarbamol (ROBAXIN) 500 MG tablet Take 500 mg by mouth 3 times  "daily as needed       metoprolol succinate ER (TOPROL-XL) 25 MG 24 hr tablet Take 1 tablet (25 mg) by mouth every evening       protein modular (PRO-STAT) Take by mouth every morning 30 ml       Warfarin Therapy Reminder 1 each continuous prn 2/23/22 INR 2.87 Take 2mg every day next INR 2/25 2/22/22 INR 4.36 Hold x1 Next INR 2/23       REVIEW OF SYSTEMS: Limited secondary to cognitive impairment but today pt reports the above.    Objective: /74   Pulse 81   Temp 97  F (36.1  C)   Resp 18   Ht 1.676 m (5' 6\")   Wt 84.4 kg (186 lb)   SpO2 96%   BMI 30.02 kg/m    Exam:  GENERAL APPEARANCE: Alert, in no distress, cooperative.   ENT: Mouth/posterior oropharynx intact w/ moist mucous membranes, hearing acuity very [place]. Hearing aid in place.  EYES: EOM, conjunctivae, lids, pupils and irises normal, PERRL2. Glasses off right now.  RESP: Respiratory effort fair, no respiratory distress, On RA. .  SKIN: Inspection/Palpation of skin and subcutaneous tissue baseline w/ fragility. No wounds/rashes noted, except long standing LLE as noted here by hospice team:    NEURO: CN II-XII at patient's baseline, sensation baseline PPS.  PSYCH: Insight, judgement, and memory are baseline impaired, affect and mood are happy/engaged.    Labs: Labs done in facility are in EPIC. Please refer to them using EPIC/Care Everywhere.    ASSESSMENT/PLAN:  Pressure injury of left leg, unstageable (H)  Hallucinations  Controlled atrial fibrillation (H)  Long term current use of anticoagulant with international normalized ratio (INR) goal of 1.5-2.0  Encounter for therapeutic drug monitoring  Hospice care patient  Acute on chronic. Ongoing.    Will slightly reduce Coumadin dosing as INR is slightly supratherapeutic.  Zayda is very sensitive, and therefore will not drastically change.  We will trend over 1 week.    Hallucinations have lessened, but further confusion is still present.  Medications have been effective and patient is easily " redirectable now.    Provider coordinated care with hospice, and wound is looking very clean.  A superior aspect is looking a little darkened, and this should be watched closely.  Follow up routinely or as needed.    Orders:  1. Coumadin 2mg QWednesday.  2. Coumadin 1mg PO every day on AOD.  3. Recheck INR x1 on 6/6.   Dx: afib.    Electronically signed by:  Dr. Haley Sims, APRN CNP DNP

## 2022-05-31 NOTE — LETTER
5/31/2022        RE: Mecca Slade  [facility]  [address] [facility] [address]        [facility] GERIATRIC SERVICE  Episodic/Acute/Follow-Up  [place] MRN: [id number]. Place of Service where encounter took place:  [facility] () [[id number]]   Chief Complaint   Patient presents with     Anticoagulation    HPI: Mecca Slade  is a 89 year old (12/14/1921), who is being seen today for an episodic care visit.  HPI information obtained from: facility chart records, facility staff, patient report and [facility] chart review. Today's concern is:    Zayda seen today to address her INR of 3.1.  Hospice also shared wound concerns.  UA/UC did not reveal UTI, and no other outward cause of hallucinations other than potentially Dilaudid.  Today, Zayda says her pain is intermittent, and well controlled.  She does not know if she has numbness or tingling.  She denies feeling feverish.  She denies any shortness of breath.    Past Medical and Surgical History reviewed in Epic today.  MEDICATIONS:  Current Outpatient Medications   Medication Sig Dispense Refill     acetaminophen (TYLENOL) 500 MG tablet Take 1,000 mg by mouth 3 times daily       atorvastatin (LIPITOR) 10 MG tablet Take 10 mg by mouth every evening       carboxymethylcellulose (CARBOXYMETHYLCELLULOSE SODIUM) 0.5 % SOLN ophthalmic solution Place 1 drop into both eyes every morning       furosemide (LASIX) 40 MG tablet Take 40 mg by mouth daily       HYDROmorphone (DILAUDID) 2 MG tablet TAKE 1/2 TAB (1 MG) BY MOUTH EVERY 4 HOURS AS NEEDED FOR MODERATE PAIN (PAIN GREATER THAN 5) 60 tablet 0     JANTOVEN ANTICOAGULANT 1 MG tablet Take 1 mg by mouth every other day M, W, F       JANTOVEN ANTICOAGULANT 2 MG tablet Take 2 mg by mouth every other day T, TH, Sa,Klein       lisinopril (ZESTRIL) 5 MG tablet Take 0.5 tablets (2.5 mg) by mouth every evening       metFORMIN (GLUCOPHAGE-XR) 500 MG 24 hr tablet Take 2 tablets  "(1,000 mg) by mouth daily (with breakfast)       methocarbamol (ROBAXIN) 500 MG tablet Take 500 mg by mouth 3 times daily as needed       metoprolol succinate ER (TOPROL-XL) 25 MG 24 hr tablet Take 1 tablet (25 mg) by mouth every evening       protein modular (PRO-STAT) Take by mouth every morning 30 ml       Warfarin Therapy Reminder 1 each continuous prn 2/23/22 INR 2.87 Take 2mg every day next INR 2/25 2/22/22 INR 4.36 Hold x1 Next INR 2/23       REVIEW OF SYSTEMS: Limited secondary to cognitive impairment but today pt reports the above.    Objective: /74   Pulse 81   Temp 97  F (36.1  C)   Resp 18   Ht 1.676 m (5' 6\")   Wt 84.4 kg (186 lb)   SpO2 96%   BMI 30.02 kg/m    Exam:  GENERAL APPEARANCE: Alert, in no distress, cooperative.   ENT: Mouth/posterior oropharynx intact w/ moist mucous membranes, hearing acuity very [place]. Hearing aid in place.  EYES: EOM, conjunctivae, lids, pupils and irises normal, PERRL2. Glasses off right now.  RESP: Respiratory effort fair, no respiratory distress, On RA. .  SKIN: Inspection/Palpation of skin and subcutaneous tissue baseline w/ fragility. No wounds/rashes noted, except long standing LLE as noted here by hospice team:    NEURO: CN II-XII at patient's baseline, sensation baseline PPS.  PSYCH: Insight, judgement, and memory are baseline impaired, affect and mood are happy/engaged.    Labs: Labs done in facility are in EPIC. Please refer to them using [facility]/Care Everywhere.    ASSESSMENT/PLAN:  Pressure injury of left leg, unstageable (H)  Hallucinations  Controlled atrial fibrillation (H)  Long term current use of anticoagulant with international normalized ratio (INR) goal of 1.5-2.0  Encounter for therapeutic drug monitoring  Hospice care patient  Acute on chronic. Ongoing.    Will slightly reduce Coumadin dosing as INR is slightly supratherapeutic.  Zayda is very sensitive, and therefore will not drastically change.  We will trend over 1 " week.    Hallucinations have lessened, but further confusion is still present.  Medications have been effective and patient is easily redirectable now.    Provider coordinated care with hospice, and wound is looking very clean.  A superior aspect is looking a little darkened, and this should be watched closely.  Follow up routinely or as needed.    Orders:  1. Coumadin 2mg QWednesday.  2. Coumadin 1mg PO every day on AOD.  3. Recheck INR x1 on 6/6.   Dx: afib.    Electronically signed by:  JOSUE Clark CNP DNP          Sincerely,        JOSUE Gillis CNP

## 2022-05-31 NOTE — PROGRESS NOTES
[facility] Care Coordination Contact    Internal CC change effective 06/01/2022.  Mailed member CC Change letter.  Additional tasks to be completed by CMS include: update database & EPIC, and create member files on [employer].

## 2022-06-05 NOTE — PROGRESS NOTES
"[facility] GERIATRIC SERVICES  [place] MRN: [id number]. Place of Service where encounter took place: [facility] () [[id number]] HPI: Mecca Slade is a 89 year old  (12/14/1921), who is being seen today for an episodic care visit at the above location.   HPI information obtained from: facility chart records, facility staff, patient report and [facility] chart review. Today's concern is INR/Coumadin management for A. Fib    ROS/Subjective:  Bleeding Signs/Symptoms:  None  Thromboembolic Signs/Symptoms:  None  Medication Changes:  No  Dietary Changes:  No  Activity Changes: No  Bacterial/Viral Infection:  No  Missed Coumadin Doses:  None  On ASA: No  Other Concerns:  No    OBJECTIVE:  /80   Pulse 82   Temp 97.8  F (36.6  C)   Resp 16   Ht 1.676 m (5' 6\")   Wt 84.4 kg (186 lb)   SpO2 95%   BMI 30.02 kg/m    Last INR: 3.17 on 5/31  INR Today:  3.54  Current Dose:  2mg QWed, 1mg AOD.    ASSESSMENT:  Controlled atrial fibrillation (H)  Long term current use of anticoagulant with international normalized ratio (INR) goal of 2.0 to 3.0  Encounter for therapeutic drug monitoring  Chronic. Stable. Supratherauetic. Will dose Coumadin as noted below and recheck INR in 3 days. Follow-up accordingly.    PLAN:   Orders:  1. Coumadin 0.5mg PO x1 today.  2. Coumadin 1mg PO every day  On AOD.  3. Recheck INR x1 on 6/9.  Dx: afib.    Electronically signed by:  Dr. Haley Sims, APRN CNP DNP          "

## 2022-06-06 NOTE — LETTER
"    6/6/2022        RE: Mecca Slade  [facility]  [address] [facility] [address]        [facility] GERIAT[facility]  [place] MRN: [id number]. Place of Service where encounter took place: [facility] () [[id number]] HPI: Mecca Slade is a 89 year old  (12/14/1921), who is being seen today for an episodic care visit at the above location.   HPI information obtained from: facility chart records, facility staff, patient report and [facility] chart review. Today's concern is INR/Coumadin management for A. Fib    ROS/Subjective:  Bleeding Signs/Symptoms:  None  Thromboembolic Signs/Symptoms:  None  Medication Changes:  No  Dietary Changes:  No  Activity Changes: No  Bacterial/Viral Infection:  No  Missed Coumadin Doses:  None  On ASA: No  Other Concerns:  No    OBJECTIVE:  /80   Pulse 82   Temp 97.8  F (36.6  C)   Resp 16   Ht 1.676 m (5' 6\")   Wt 84.4 kg (186 lb)   SpO2 95%   BMI 30.02 kg/m    Last INR: 3.17 on 5/31  INR Today:  3.54  Current Dose:  2mg QWed, 1mg AOD.    ASSESSMENT:  Controlled atrial fibrillation (H)  Long term current use of anticoagulant with international normalized ratio (INR) goal of 2.0 to 3.0  Encounter for therapeutic drug monitoring  Chronic. Stable. Supratherauetic. Will dose Coumadin as noted below and recheck INR in 3 days. Follow-up accordingly.    PLAN:   Orders:  1. Coumadin 0.5mg PO x1 today.  2. Coumadin 1mg PO every day  On AOD.  3. Recheck INR x1 on 6/9.  Dx: afib.    Electronically signed by:  JOSUE Clark CNP DNP                Sincerely,        JOSUE Gillis CNP      "

## 2022-06-16 NOTE — PROGRESS NOTES
"[facility] GERIATRIC [facility]  [place] MRN: [id number]. Place of Service where encounter took place: [facility] () [[id number]]. HPI: Mecca Slade is a 89 year old  (12/14/1921), who is being seen today for an episodic care visit at the above location.   HPI information obtained from: facility chart records, facility staff, patient report and [facility] chart review. Today's concern is INR/Coumadin management for A. Fib    ROS/Subjective:  Bleeding Signs/Symptoms:  None  Thromboembolic Signs/Symptoms:  None  Medication Changes:  No  Dietary Changes:  No  Activity Changes: No  Bacterial/Viral Infection:  No  Missed Coumadin Doses:  None  On ASA: No  Other Concerns:  No    OBJECTIVE:  /80   Pulse 82   Temp 97.4  F (36.3  C)   Resp 16   Ht 1.676 m (5' 6\")   Wt 85.5 kg (188 lb 8 oz)   SpO2 96%   BMI 30.42 kg/m    Last INR: 2.14 on 6/9.  INR Today:  3.02  Current Dose:  2mg QWed, 1mg AOD.     ASSESSMENT:  Controlled atrial fibrillation (H)  Long term current use of anticoagulant with international normalized ratio (INR) goal of 1.5-2.0  Encounter for therapeutic drug monitoring  Chronic. Stable. Slightly supratherapeutic. Will dose Coumadin as noted below and recheck INR in about 1 week. Follow-up accordingly.    PLAN:   Orders:  1. Coumadin 1mg PO Qday.   2. Recheck INR x1 on 6/22.   Dx: afib.    Electronically signed by:  Dr. Haley Sims, APRN CNP DNP        "

## 2022-06-16 NOTE — LETTER
"    6/16/2022        RE: Mecca Slade  [facility]  [address] [facility] [address]        [facility]IAT[facility]  [place] MRN: [id number]. Place of Service where encounter took place: [facility] () [[id number]]. HPI: Mecca Slade is a 89 year old  (12/14/1921), who is being seen today for an episodic care visit at the above location.   HPI information obtained from: facility chart records, facility staff, patient report and [facility] chart review. Today's concern is INR/Coumadin management for A. Fib    ROS/Subjective:  Bleeding Signs/Symptoms:  None  Thromboembolic Signs/Symptoms:  None  Medication Changes:  No  Dietary Changes:  No  Activity Changes: No  Bacterial/Viral Infection:  No  Missed Coumadin Doses:  None  On ASA: No  Other Concerns:  No    OBJECTIVE:  /80   Pulse 82   Temp 97.4  F (36.3  C)   Resp 16   Ht 1.676 m (5' 6\")   Wt 85.5 kg (188 lb 8 oz)   SpO2 96%   BMI 30.42 kg/m    Last INR: 2.14 on 6/9.  INR Today:  3.02  Current Dose:  2mg QWed, 1mg AOD.     ASSESSMENT:  Controlled atrial fibrillation (H)  Long term current use of anticoagulant with international normalized ratio (INR) goal of 1.5-2.0  Encounter for therapeutic drug monitoring  Chronic. Stable. Slightly supratherapeutic. Will dose Coumadin as noted below and recheck INR in about 1 week. Follow-up accordingly.    PLAN:   Orders:  1. Coumadin 1mg PO Qday.   2. Recheck INR x1 on 6/22.   Dx: afib.    Electronically signed by:  Dr. Haley Sims, APRN CNP DNP              Sincerely,        JOSUE Gillis CNP      "

## 2022-06-21 NOTE — PROGRESS NOTES
"[facility] GERIATRICS  [place] Medical Record Number: [id number]  Place of Service where encounter took place: [facility] () [[id number]]    HPI:    Mecca Slade is a 89 year old (12/14/1921), who is being seen today for an episodic care visit at the above location. Today's concern is INR/Coumadin management for A. Fib    ROS/Subjective:  Bleeding Signs/Symptoms: None  Thromboembolic Signs/Symptoms: None  Medication Changes: No  Dietary Changes: No  Activity Changes: No  Bacterial/Viral Infection: No  Missed Coumadin Doses: None  On ASA: No  Other Concerns: No    OBJECTIVE:  /80   Pulse 82   Temp 97  F (36.1  C)   Resp 16   Ht 1.676 m (5' 6\")   Wt 85.5 kg (188 lb 8 oz)   SpO2 93%   BMI 30.42 kg/m      INR Today: 2.44     Date INR Dose    5/31/22 3.17 2mg Wed; 1mg AOD   6/6/22 3.54 0.5mg x 1 then 1mg daily   6/9/22 2.14 2mg Wed; 1mg AOD   6/16/22 3.02 1mg daily         ASSESSMENT:  (I48.91) Controlled atrial fibrillation (H)  (primary encounter diagnosis)  (Z51.81) Encounter for therapeutic drug monitoring  (Z79.01) Long term current use of anticoagulant with international normalized ratio (INR) goal of 2-3    Therapeutic INR for goal of 2-3    PLAN/ORDERS:     New Dose: No Change    Next INR: 1 week      Electronically signed by: JOSUE Mari CNP  "

## 2022-06-22 NOTE — LETTER
"    6/22/2022        RE: Mecca Slade  [facility]  [address] [facility] [address]        [facility]  [place] Medical Record Number: [id number]  Place of Service where encounter took place: [facility] () [[id number]]    HPI:    Mecca Slade is a 89 year old (12/14/1921), who is being seen today for an episodic care visit at the above location. Today's concern is INR/Coumadin management for ALE. Fib    ROS/Subjective:  Bleeding Signs/Symptoms: None  Thromboembolic Signs/Symptoms: None  Medication Changes: No  Dietary Changes: No  Activity Changes: No  Bacterial/Viral Infection: No  Missed Coumadin Doses: None  On ASA: No  Other Concerns: No    OBJECTIVE:  /80   Pulse 82   Temp 97  F (36.1  C)   Resp 16   Ht 1.676 m (5' 6\")   Wt 85.5 kg (188 lb 8 oz)   SpO2 93%   BMI 30.42 kg/m      INR Today: 2.44     Date INR Dose    5/31/22 3.17 2mg Wed; 1mg AOD   6/6/22 3.54 0.5mg x 1 then 1mg daily   6/9/22 2.14 2mg Wed; 1mg AOD   6/16/22 3.02 1mg daily         ASSESSMENT:  (I48.91) Controlled atrial fibrillation (H)  (primary encounter diagnosis)  (Z51.81) Encounter for therapeutic drug monitoring  (Z79.01) Long term current use of anticoagulant with international normalized ratio (INR) goal of 2-3    Therapeutic INR for goal of 2-3    PLAN/ORDERS:     New Dose: No Change    Next INR: 1 week      Electronically signed by: JOSUE Mari CNP        Sincerely,        JOSUE Mari CNP    "
verbal cues/nonverbal cues (demo/gestures)/1 person assist

## 2022-06-30 NOTE — PROGRESS NOTES
Continue current dose of coumadin for Wed and call back for new orders once INR has resulted.    JOSUE Guajardo CNP on 6/29/2022 at 9:36 PM

## 2022-06-30 NOTE — TELEPHONE ENCOUNTER
[facility] Geriatrics Triage Nurse INR     Provider: JOSUE Mcconnell CNP, DNP  Facility: [place]   Facility Type:  LT    Caller: Brittany  Call Back Number: [phone]  Reason for call: INR  Diagnosis/Goal: A. Fib    Todays INR: 3.75 from yesterday  Last INR       6/22 2.44 1mg daily    Heparin/Lovenox:  No  Currently on ABX?: No  Other interacting medication:  None  Missed or refused doses: No    Verbal Order/Direction given by Provider: Coumadin 0.5mg Th,Sun and 1mg AOD. Recheck INR on 7/7/22.    Provider Giving Order:  Anirudh Ye MD    Verbal Order given to: Brittany Cade RN

## 2022-07-05 NOTE — PROGRESS NOTES
[facility]  Chief Complaint   Patient presents with     [facility] Regulatory     Anticoagulation   [place] MRN: [id number] Place of Service where encounter took place:  [facility] () [[id number]] HPI: Mecca Slade  is 89 year old (12/14/1921), who is being seen today for a federally mandated E/M visit.  HPI information obtained from: facility chart records, facility staff, patient report, [place] Epic chart review, and Care Everywhere [facility] chart review. Today's concerns are:    Zayda seen today per federal mandate, and to follow-up on her INR result of 2.52.  Today, she says her pain is well controlled, she just occasionally feels pain in her right lower extremity.  She does not have any complaints from her left lower extremity wound.  She denies shortness of breath, headaches, sleeping issues, and says her appetite is very good.    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 PAST SURGICAL HISTORY:   has a past surgical history that includes Electrophysiology Pacemaker (10/09/2012); appendectomy; and Open reduction internal fixation femur distal (Right, 11/16/2021).FAMILY HISTORY: family history includes  Heart Disease in her sister.SOCIAL HISTORY:  reports that she has never smoked. She has never used smokeless tobacco. She reports that she does not drink alcohol and does not use drugs.  MEDICATIONS:  Current Outpatient Medications   Medication Sig Dispense Refill     acetaminophen (TYLENOL) 500 MG tablet Take 1,000 mg by mouth 3 times daily       atorvastatin (LIPITOR) 10 MG tablet Take 10 mg by mouth every evening       carboxymethylcellulose (CARBOXYMETHYLCELLULOSE SODIUM) 0.5 % SOLN ophthalmic solution Place 1 drop into both eyes every morning       furosemide (LASIX) 40 MG tablet Take 40 mg by mouth daily       HYDROmorphone (DILAUDID) 2 MG tablet TAKE 1/2 TAB (1 MG) BY MOUTH EVERY 4 HOURS AS NEEDED FOR MODERATE PAIN (PAIN GREATER THAN 5) 60 tablet 0     JANTOVEN ANTICOAGULANT 1 MG tablet Take 1 mg by mouth every other day M, W, F       JANTOVEN ANTICOAGULANT 2 MG tablet Take 2 mg by mouth every other day T, TH, Sa,Klein       lisinopril (ZESTRIL) 5 MG tablet Take 0.5 tablets (2.5 mg) by mouth every evening       metFORMIN (GLUCOPHAGE-XR) 500 MG 24 hr tablet Take 2 tablets (1,000 mg) by mouth daily (with breakfast)       methocarbamol (ROBAXIN) 500 MG tablet Take 500 mg by mouth 3 times daily as needed       metoprolol succinate ER (TOPROL-XL) 25 MG 24 hr tablet Take 1 tablet (25 mg) by mouth every evening       protein modular (PRO-STAT) Take by mouth every morning 30 ml       Warfarin Therapy Reminder 1 each continuous prn 2/23/22 INR 2.87 Take 2mg every day next INR 2/25 2/22/22 INR 4.36 Hold x1 Next INR 2/23       Case Management:  I have reviewed the care plan and MDS and do agree with the plan. Patient's desire to return to the community is not assessible due to cognitive impairment. Information reviewed:  Medications, vital signs, orders, and nursing notes.    ROS: Limited secondary to cognitive impairment but today pt reports the above and 4 point ROS including Respiratory, CV, GI and , other than that  "noted in the HPI, is negative.    Vitals: BP (!) 148/82   Pulse 80   Temp 97.4  F (36.3  C)   Resp 18   Ht 1.676 m (5' 6\")   Wt 84.8 kg (187 lb)   SpO2 93%   BMI 30.18 kg/m    Exam:  GENERAL APPEARANCE: Alert, in no distress, cooperative.   ENT: Mouth/posterior oropharynx intact w/ moist mucous membranes, hearing acuity is very [place].  EYES: EOM, conjunctivae, lids, pupils and irises normal, PERRL2.   RESP: Respiratory effort good, no respiratory distress, Lung sounds clear anteriorly.  On RA.   CV: Auscultation of heart reveals S1, S2, rate controlled and rhythm irregular, no murmur, no rub or gallop, Edema 0+ BLE. Peripheral pulses are 2+.  ABDOMEN: Normal bowel sounds, soft, non-tender abdomen, and no masses palpated.  SKIN: Inspection/Palpation of skin and subcutaneous tissue baseline w/ fragility. No wounds/rashes noted, except lower extremities which are covered at this time.  NEURO: CN II-XII at patient's baseline, sensation baseline PPS.  PSYCH: Insight, judgement, and memory are impaired at baseline, affect and mood are happy/engaged.    Lab/Diagnostic data:   Recent labs in [facility] reviewed by me today.     ASSESSMENT/PLAN  Controlled atrial fibrillation (H)  Encounter for therapeutic drug monitoring  Long term current use of anticoagulant with international normalized ratio (INR) goal of 2-3  Pressure injury of left leg, unstageable (H)  Orthopedic aftercare  Hospice care patient  Chronic.  Ongoing.    INR therapeutic.  Will dose Coumadin as noted below and recheck INR in 2 weeks.    Left leg pressure injury is unhealed, but managed and comfortable per Zayda by hospice.    Poor physiological reserve and almost completely immobile status post fractures. Hospice appropriate.   Follow up routinely or as needed.    Orders:  1. Coumadin 0.5mg PO Tu/Th,  2. Coumadin 1mg PO Qday on AOD.  3. Recheck INR in 2 weeks on 7/21.   Dx: afib.     Electronically signed by:  Dr. Haley Sims, APRN CNP DNP    "

## 2022-07-07 NOTE — LETTER
7/7/2022        RE: Mecca Slade  [facility]  [address] [facility] [address]        [facility]  Chief Complaint   Patient presents with     [facility] Regulatory     Anticoagulation   [place] MRN: [id number] Place of Service where encounter took place:  [facility] () [[id number]] HPI: Mecca Slade  is 89 year old (12/14/1921), who is being seen today for a federally mandated E/M visit.  HPI information obtained from: facility chart records, facility staff, patient report, [facility] chart review, and Care Everywhere [facility] chart review. Today's concerns are:    Zayda seen today per federal mandate, and to follow-up on her INR result of 2.52.  Today, she says her pain is well controlled, she just occasionally feels pain in her right lower extremity.  She does not have any complaints from her left lower extremity wound.  She denies shortness of breath, headaches, sleeping issues, and says her appetite is very good.    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 PAST SURGICAL HISTORY:   has a past surgical history that includes Electrophysiology Pacemaker (10/09/2012); appendectomy;  and Open reduction internal fixation femur distal (Right, 11/16/2021).FAMILY HISTORY: family history includes Heart Disease in her sister.SOCIAL HISTORY:  reports that she has never smoked. She has never used smokeless tobacco. She reports that she does not drink alcohol and does not use drugs.  MEDICATIONS:  Current Outpatient Medications   Medication Sig Dispense Refill     acetaminophen (TYLENOL) 500 MG tablet Take 1,000 mg by mouth 3 times daily       atorvastatin (LIPITOR) 10 MG tablet Take 10 mg by mouth every evening       carboxymethylcellulose (CARBOXYMETHYLCELLULOSE SODIUM) 0.5 % SOLN ophthalmic solution Place 1 drop into both eyes every morning       furosemide (LASIX) 40 MG tablet Take 40 mg by mouth daily       HYDROmorphone (DILAUDID) 2 MG tablet TAKE 1/2 TAB (1 MG) BY MOUTH EVERY 4 HOURS AS NEEDED FOR MODERATE PAIN (PAIN GREATER THAN 5) 60 tablet 0     JANTOVEN ANTICOAGULANT 1 MG tablet Take 1 mg by mouth every other day M, W, F       JANTOVEN ANTICOAGULANT 2 MG tablet Take 2 mg by mouth every other day T, TH, Sa,Klein       lisinopril (ZESTRIL) 5 MG tablet Take 0.5 tablets (2.5 mg) by mouth every evening       metFORMIN (GLUCOPHAGE-XR) 500 MG 24 hr tablet Take 2 tablets (1,000 mg) by mouth daily (with breakfast)       methocarbamol (ROBAXIN) 500 MG tablet Take 500 mg by mouth 3 times daily as needed       metoprolol succinate ER (TOPROL-XL) 25 MG 24 hr tablet Take 1 tablet (25 mg) by mouth every evening       protein modular (PRO-STAT) Take by mouth every morning 30 ml       Warfarin Therapy Reminder 1 each continuous prn 2/23/22 INR 2.87 Take 2mg every day next INR 2/25 2/22/22 INR 4.36 Hold x1 Next INR 2/23       Case Management:  I have reviewed the care plan and MDS and do agree with the plan. Patient's desire to return to the community is not assessible due to cognitive impairment. Information reviewed:  Medications, vital signs, orders, and nursing notes.    ROS: Limited secondary to cognitive  "impairment but today pt reports the above and 4 point ROS including Respiratory, CV, GI and , other than that noted in the HPI, is negative.    Vitals: BP (!) 148/82   Pulse 80   Temp 97.4  F (36.3  C)   Resp 18   Ht 1.676 m (5' 6\")   Wt 84.8 kg (187 lb)   SpO2 93%   BMI 30.18 kg/m    Exam:  GENERAL APPEARANCE: Alert, in no distress, cooperative.   ENT: Mouth/posterior oropharynx intact w/ moist mucous membranes, hearing acuity is very [place].  EYES: EOM, conjunctivae, lids, pupils and irises normal, PERRL2.   RESP: Respiratory effort good, no respiratory distress, Lung sounds clear anteriorly.  On RA.   CV: Auscultation of heart reveals S1, S2, rate controlled and rhythm irregular, no murmur, no rub or gallop, Edema 0+ BLE. Peripheral pulses are 2+.  ABDOMEN: Normal bowel sounds, soft, non-tender abdomen, and no masses palpated.  SKIN: Inspection/Palpation of skin and subcutaneous tissue baseline w/ fragility. No wounds/rashes noted, except lower extremities which are covered at this time.  NEURO: CN II-XII at patient's baseline, sensation baseline PPS.  PSYCH: Insight, judgement, and memory are impaired at baseline, affect and mood are happy/engaged.    Lab/Diagnostic data:   Recent labs in [facility] reviewed by me today.     ASSESSMENT/PLAN  Controlled atrial fibrillation (H)  Encounter for therapeutic drug monitoring  Long term current use of anticoagulant with international normalized ratio (INR) goal of 2-3  Pressure injury of left leg, unstageable (H)  Orthopedic aftercare  Hospice care patient  Chronic.  Ongoing.    INR therapeutic.  Will dose Coumadin as noted below and recheck INR in 2 weeks.    Left leg pressure injury is unhealed, but managed and comfortable per Zayda by hospice.    Poor physiological reserve and almost completely immobile status post fractures. Hospice appropriate.   Follow up routinely or as needed.    Orders:  1. Coumadin 0.5mg PO Tu/Th,  2. Coumadin 1mg PO Qday on AOD.  3. Recheck " INR in 2 weeks on 7/21.   Dx: afib.     Electronically signed by:  Dr. Haley Sims, JOSUE CNP DNP          Sincerely,        JOSUE Gillis CNP

## 2022-07-21 NOTE — PROGRESS NOTES
"[facility]  [place] MRN:  [id number]. Place of Service where encounter took place: [facility] () [[id number]] HPI: Mecca Slade is a 89 year old  (12/14/1921), who is being seen today for an episodic care visit at the above location.   HPI information obtained from: facility chart records, facility staff, patient report and [facility] chart review. Today's concern is INR/Coumadin management for A. Fib    ROS/Subjective:  Bleeding Signs/Symptoms:  None  Thromboembolic Signs/Symptoms:  None  Medication Changes:  No  Dietary Changes:  No  Activity Changes: No  Bacterial/Viral Infection:  No  Missed Coumadin Doses:  None  On ASA: No  Other Concerns:  No    OBJECTIVE:  /72   Pulse 72   Temp 97.8  F (36.6  C)   Resp 18   Ht 1.676 m (5' 6\")   Wt 85.7 kg (189 lb)   SpO2 96%   BMI 30.51 kg/m    Last INR: 2.52 on 7/7 (2 weeks ago).  INR Today:  2.68  Current Dose:  0.5mg Tu/Th, 1mg AOD.    ASSESSMENT:  Controlled atrial fibrillation (H)  Encounter for therapeutic drug monitoring  Long term current use of anticoagulant with international normalized ratio (INR) goal of 1.5-2.0  Chronic. Stable. Therapeutic. Will dose Coumadin as noted below and recheck INR in 3 weeks. Follow-up accordingly.    PLAN:   1. Coumadin 0.5mg PO Tu/Th  2. Coumadin 1mg PO Qday on AOD.  3. Recheck INR x1 on 8/11 (3 wks).   Dx: afib.    Electronically signed by:  Dr. Haley Sims, APRN CNP DNP        "

## 2022-07-21 NOTE — LETTER
"    7/21/2022        RE: Mecca Slade  [address] [facility] [address]        [facility]  [place] MRN:  [id number]. Place of Service where encounter took place: [facility] () [[id number]] HPI: Mecca Slade is a 89 year old  (12/14/1921), who is being seen today for an episodic care visit at the above location.   HPI information obtained from: facility chart records, facility staff, patient report and [facility] chart review. Today's concern is INR/Coumadin management for A. Fib    ROS/Subjective:  Bleeding Signs/Symptoms:  None  Thromboembolic Signs/Symptoms:  None  Medication Changes:  No  Dietary Changes:  No  Activity Changes: No  Bacterial/Viral Infection:  No  Missed Coumadin Doses:  None  On ASA: No  Other Concerns:  No    OBJECTIVE:  /72   Pulse 72   Temp 97.8  F (36.6  C)   Resp 18   Ht 1.676 m (5' 6\")   Wt 85.7 kg (189 lb)   SpO2 96%   BMI 30.51 kg/m    Last INR: 2.52 on 7/7 (2 weeks ago).  INR Today:  2.68  Current Dose:  0.5mg Tu/Th, 1mg AOD.    ASSESSMENT:  Controlled atrial fibrillation (H)  Encounter for therapeutic drug monitoring  Long term current use of anticoagulant with international normalized ratio (INR) goal of 1.5-2.0  Chronic. Stable. Therapeutic. Will dose Coumadin as noted below and recheck INR in 3 weeks. Follow-up accordingly.    PLAN:   1. Coumadin 0.5mg PO Tu/Th  2. Coumadin 1mg PO Qday on AOD.  3. Recheck INR x1 on 8/11 (3 wks).   Dx: afib.    Electronically signed by:  JOSUE Clark CNP DNP              Sincerely,        JOSUE Gillis CNP      "

## 2022-08-11 NOTE — PROGRESS NOTES
"[facility]  [place] MRN: [id number]. Place of Service where encounter took place: [facility] () [[id number]]. HPI: Mecca Slade is a 89 year old  (12/14/1921), who is being seen today for an episodic care visit at the above location.   HPI information obtained from: facility chart records, facility staff, patient report and [facility] chart review. Today's concern is INR/Coumadin management for A. Fib    ROS/Subjective:  Bleeding Signs/Symptoms:  None  Thromboembolic Signs/Symptoms:  None  Medication Changes:  Yes: Keflex started for UTI.  Dietary Changes:  No  Activity Changes: No  Bacterial/Viral Infection:  Yes: as noted above.  Missed Coumadin Doses:  None  On ASA: No  Other Concerns:  No    OBJECTIVE:  /63   Pulse 67   Temp 97.8  F (36.6  C)   Resp 18   Ht 1.676 m (5' 6\")   Wt 82.8 kg (182 lb 8 oz)   SpO2 92%   BMI 29.46 kg/m    Last INR: 2.68 on 7/21/22.  INR Today:  1.95  Current Dose:  0.5mg Tu/Th, 1mg AOD.    ASSESSMENT:   Controlled atrial fibrillation (H)  Encounter for therapeutic drug monitoring  Long term current use of anticoagulant with international normalized ratio (INR) goal of 1.5-2.0  Chronic. Stable. Slightly subtherapeutic (but on abx). Will dose Coumadin as noted below and recheck INR in 1 week. Follow-up accordingly.    PLAN:   1. Coumadin 0.5mg PO Tu/Th.  2. Coumadin 1mg PO QDay on AOD.  3. Recheck INR x1 in 1 week.   Dx: afib.    Electronically signed by:  Dr. Haley Sims, APRN CNP DNP    "

## 2022-08-11 NOTE — LETTER
"    8/11/2022        RE: Mecca Slade  [address] [facility] [address]        [facility]  [place] MRN: [id number]. Place of Service where encounter took place: [facility] () [[id number]]. HPI: Mecca Slade is a 89 year old  (12/14/1921), who is being seen today for an episodic care visit at the above location.   HPI information obtained from: facility chart records, facility staff, patient report and [facility] chart review. Today's concern is INR/Coumadin management for A. Fib    ROS/Subjective:  Bleeding Signs/Symptoms:  None  Thromboembolic Signs/Symptoms:  None  Medication Changes:  Yes: Keflex started for UTI.  Dietary Changes:  No  Activity Changes: No  Bacterial/Viral Infection:  Yes: as noted above.  Missed Coumadin Doses:  None  On ASA: No  Other Concerns:  No    OBJECTIVE:  /63   Pulse 67   Temp 97.8  F (36.6  C)   Resp 18   Ht 1.676 m (5' 6\")   Wt 82.8 kg (182 lb 8 oz)   SpO2 92%   BMI 29.46 kg/m    Last INR: 2.68 on 7/21/22.  INR Today:  1.95  Current Dose:  0.5mg Tu/Th, 1mg AOD.    ASSESSMENT:   Controlled atrial fibrillation (H)  Encounter for therapeutic drug monitoring  Long term current use of anticoagulant with international normalized ratio (INR) goal of 1.5-2.0  Chronic. Stable. Slightly subtherapeutic (but on abx). Will dose Coumadin as noted below and recheck INR in 1 week. Follow-up accordingly.    PLAN:   1. Coumadin 0.5mg PO Tu/Th.  2. Coumadin 1mg PO QDay on AOD.  3. Recheck INR x1 in 1 week.   Dx: afib.    Electronically signed by:  JOSUE Clark CNP DNP          Sincerely,        JOSUE Gillis CNP      "

## 2022-08-18 NOTE — PROGRESS NOTES
"[facility]  [place] MRN: [id number]. Place of Service where encounter took place: [facility] () [[id number]]. HPI: Mecca Slade is a 89 year old  (12/14/1921), who is being seen today for an episodic care visit at the above location.   HPI information obtained from: facility chart records, facility staff, patient report and [facility] chart review. Today's concern is INR/Coumadin management for A. Fib    ROS/Subjective:  Bleeding Signs/Symptoms:  None  Thromboembolic Signs/Symptoms:  None  Medication Changes:  No  Dietary Changes:  No  Activity Changes: No  Bacterial/Viral Infection:  No  Missed Coumadin Doses:  None  On ASA: No  Other Concerns:  No    OBJECTIVE:  /63   Pulse 67   Temp (!) 96.5  F (35.8  C)   Resp 18   Ht 1.676 m (5' 6\")   Wt 79.8 kg (176 lb)   SpO2 95%   BMI 28.41 kg/m    Last INR: 1.95 on 8/11  INR Today:  2.78  Current Dose: 0.5mg Tu/Th, 1mg AOD.     ASSESSMENT:  Controlled atrial fibrillation (H)  Encounter for therapeutic drug monitoring  Long term current use of anticoagulant with international normalized ratio (INR) goal of 1.5-2.0  Chronic. Stable. Therapeutic. Will dose Coumadin as noted below and recheck INR in 1 week. Follow-up accordingly.    PLAN:   1. Coumadin 0.5mg PO Tu/Th.  2. Coumadin 1mg PO Qday on AOD.  3. Recheck INR x1 in 1 week.   Dx: afib.     Electronically signed by:  Dr. Haley Sims, APRN CNP DNP          "

## 2022-08-18 NOTE — LETTER
"    8/18/2022        RE: Mecca Slade  [address] [facility] [address]        [facility]  [place] MRN: [id number]. Place of Service where encounter took place: [facility] () [[id number]]. HPI: Mecca Slade is a 89 year old  (12/14/1921), who is being seen today for an episodic care visit at the above location.   HPI information obtained from: facility chart records, facility staff, patient report and [facility] chart review. Today's concern is INR/Coumadin management for A. Fib    ROS/Subjective:  Bleeding Signs/Symptoms:  None  Thromboembolic Signs/Symptoms:  None  Medication Changes:  No  Dietary Changes:  No  Activity Changes: No  Bacterial/Viral Infection:  No  Missed Coumadin Doses:  None  On ASA: No  Other Concerns:  No    OBJECTIVE:  /63   Pulse 67   Temp (!) 96.5  F (35.8  C)   Resp 18   Ht 1.676 m (5' 6\")   Wt 79.8 kg (176 lb)   SpO2 95%   BMI 28.41 kg/m    Last INR: 1.95 on 8/11  INR Today:  2.78  Current Dose: 0.5mg Tu/Th, 1mg AOD.     ASSESSMENT:  Controlled atrial fibrillation (H)  Encounter for therapeutic drug monitoring  Long term current use of anticoagulant with international normalized ratio (INR) goal of 1.5-2.0  Chronic. Stable. Therapeutic. Will dose Coumadin as noted below and recheck INR in 1 week. Follow-up accordingly.    PLAN:   1. Coumadin 0.5mg PO Tu/Th.  2. Coumadin 1mg PO Qday on AOD.  3. Recheck INR x1 in 1 week.   Dx: afib.     Electronically signed by:  Dr. Haley Sims, JOSUE CNP DNP                Sincerely,        JOSUE Gillis CNP      "

## 2022-08-25 NOTE — PROGRESS NOTES
"[facility]  [place] MRN: [id number]. Place of Service where encounter took place: [facility] () [[id number]]. HPI: Mecca Slade is a 89 year old  (12/14/1921), who is being seen today for an episodic care visit at the above location.   HPI information obtained from: facility chart records, facility staff, patient report and [facility] chart review. Today's concern is INR/Coumadin management for A. Fib    ROS/Subjective:  Bleeding Signs/Symptoms:  None  Thromboembolic Signs/Symptoms:  None  Medication Changes:  No  Dietary Changes:  No  Activity Changes: No  Bacterial/Viral Infection:  No  Missed Coumadin Doses:  None  On ASA: No  Other Concerns:  No    OBJECTIVE:  /63   Pulse 67   Temp 98.1  F (36.7  C)   Resp 18   Ht 1.676 m (5' 6\")   Wt 79.6 kg (175 lb 8 oz)   SpO2 94%   BMI 28.33 kg/m    Last INR: 2.78 on 8/18.  INR Today:  2.62  Current Dose:  0.5mg Tu/Th, 1mg AOD.    ASSESSMENT:  Controlled atrial fibrillation (H)  Encounter for therapeutic drug monitoring  Long term current use of anticoagulant with international normalized ratio (INR) goal of 1.5-2.0   Chronic. Stable. Therapeutic. Will dose Coumadin as noted below and recheck INR in 2 weeks. Follow-up accordingly.    PLAN:   1. Coumadin 0.5mg PO Qday on Tu/Th.  2. Coumadin 1mg PO QDay on AOD.  3. Recheck INR x1 on 2 weeks.   Dx: afib.     Electronically signed by:  Dr. Haley Sims, APRN CNP DNP          "

## 2022-08-25 NOTE — LETTER
"    8/25/2022        RE: Mecca Slade  [address] [facility] [address]        [facility]  [place] MRN: [id number]. Place of Service where encounter took place: [facility] () [[id number]]. HPI: Mecca Slade is a 89 year old  (12/14/1921), who is being seen today for an episodic care visit at the above location.   HPI information obtained from: facility chart records, facility staff, patient report and [facility] chart review. Today's concern is INR/Coumadin management for A. Fib    ROS/Subjective:  Bleeding Signs/Symptoms:  None  Thromboembolic Signs/Symptoms:  None  Medication Changes:  No  Dietary Changes:  No  Activity Changes: No  Bacterial/Viral Infection:  No  Missed Coumadin Doses:  None  On ASA: No  Other Concerns:  No    OBJECTIVE:  /63   Pulse 67   Temp 98.1  F (36.7  C)   Resp 18   Ht 1.676 m (5' 6\")   Wt 79.6 kg (175 lb 8 oz)   SpO2 94%   BMI 28.33 kg/m    Last INR: 2.78 on 8/18.  INR Today:  2.62  Current Dose:  0.5mg Tu/Th, 1mg AOD.    ASSESSMENT:  Controlled atrial fibrillation (H)  Encounter for therapeutic drug monitoring  Long term current use of anticoagulant with international normalized ratio (INR) goal of 1.5-2.0   Chronic. Stable. Therapeutic. Will dose Coumadin as noted below and recheck INR in 2 weeks. Follow-up accordingly.    PLAN:   1. Coumadin 0.5mg PO Qday on Tu/Th.  2. Coumadin 1mg PO QDay on AOD.  3. Recheck INR x1 on 2 weeks.   Dx: afib.     Electronically signed by:  JOSUE Clark CNP DNP                Sincerely,        JOSUE Gillis CNP      "

## 2022-08-29 NOTE — LETTER
8/29/2022        RE: Mecca Slade  [address] [facility] [address]        [place] [facility]  Chief Complaint   Patient presents with     penitentiary Regulatory     [place] Medical Record Number:  [id number]  Place of Service where encounter took place:  [facility] () [[id number]]    HPI:    Mecca Slade  is 89 year old (12/14/1921), who is being seen today for a federally mandated E/M visit.  HPI information obtained from: facility chart records, facility staff, patient report and [place] [facility] chart review.       Today's concerns are:   - Resident seen and examined, chart reviewed and discussed with the nursing staff.   - Hospice RN reports p pressure injury over buttock is stage II measured 8.6.8 cm. BM is fine, has external hemorrhoids but this is asymptomatic. Resident reports her bottom hurts.. RN reports she puts an order to change the Resident Q2hr.   --------------------------------  - Past Medical, social, family histories, medications, and allergies reviewed and updated  - Medications reviewed: in the chart and EHR.   - Case Management:   I have reviewed the care plan and MDS and do agree with the plan. Patient's desire to return to the community is not present.  Information reviewed:  Medications, vital signs, orders, and nursing notes.  -----------------------------------------------  - MEDICATIONS:  Current Outpatient Medications   Medication Sig Dispense Refill     acetaminophen (TYLENOL) 500 MG tablet Take 1,000 mg by mouth 3 times daily       atorvastatin (LIPITOR) 10 MG tablet Take 10 mg by mouth every evening       carboxymethylcellulose (CARBOXYMETHYLCELLULOSE SODIUM) 0.5 % SOLN ophthalmic solution Place 1 drop into both eyes every morning       furosemide (LASIX) 40 MG tablet Take 40 mg by mouth daily       HYDROmorphone (DILAUDID) 2 MG tablet TAKE 1/2 TAB (1 MG) BY MOUTH EVERY 4 HOURS AS NEEDED FOR MODERATE PAIN (PAIN GREATER THAN 5) 60 tablet 0      "JANTOVEN ANTICOAGULANT 1 MG tablet Take 1 mg by mouth every other day M, W, F       JANTOVEN ANTICOAGULANT 2 MG tablet Take 2 mg by mouth every other day T, TH, Sa,Klein       lisinopril (ZESTRIL) 5 MG tablet Take 0.5 tablets (2.5 mg) by mouth every evening       metFORMIN (GLUCOPHAGE-XR) 500 MG 24 hr tablet Take 2 tablets (1,000 mg) by mouth daily (with breakfast)       methocarbamol (ROBAXIN) 500 MG tablet Take 500 mg by mouth 3 times daily as needed       metoprolol succinate ER (TOPROL-XL) 25 MG 24 hr tablet Take 1 tablet (25 mg) by mouth every evening       protein modular (PRO-STAT) Take by mouth every morning 30 ml       Warfarin Therapy Reminder 1 each continuous prn 2/23/22 INR 2.87 Take 2mg every day next INR 2/25 2/22/22 INR 4.36 Hold x1 Next INR 2/23         ROS: 4 point ROS including Respiratory, CV, GI and , other than that noted in the HPI,  is negative    Vitals:  /68   Pulse 72   Temp 98.3  F (36.8  C)   Resp 18   Ht 1.676 m (5' 6\")   Wt 79.6 kg (175 lb 8 oz)   SpO2 93%   BMI 28.33 kg/m    Body mass index is 28.33 kg/m .  Exam: performed today 8/29  GENERAL APPEARANCE:  sitting up in bed.  RESP:  lungs clear to auscultation , unlabored breathing  CV:  S1S2 audible, irregular HR, no murmur appreciated.   ABDOMEN:  soft, NT/ND, BS audible. no mass appreciated on palpation.   M/S:   No joint deformity noted.   SKIN:  large brownish soft area over lateral surface of right leg. Left legs covered with boot.   NEURO:  Ms strength: 4/5 LUE, 4/5 LLE. Diminished visual acuity  PSYCH:  anxious. fair insight, judgement and memory,      Lab/Diagnostic data:  Reviewed in the chart and EHR.        ASSESSMENT/PLAN  --------------------------------  Chronic congestive heart failure, diastolic type (H)  Essential hypertension  On coumadin for Atrial fibrillation (H):   -  at baseline, compensated.   - CVR on toprol xl. Consider changing to metoprolol tartrate. Goal is symptomatic management  - on " lisinopril 2.5 mg. No survival benefit. No role in HFpEF. Adds to pills burden.   - Risk of CVA is 4% in one year. We continue recommending discontinuing coumadin.       Type II diabetes mellitus with peripheral circulatory disorder (H) :   A1C 8.1 12/01/2021    A1C 9.1 11/15/2021    A1C 7.8 08/05/2021   - over controlled in this frail elderly.  Still on metformin 1000 mg bid.  Goal is symptomatic management.       Closed metaphyseal fracture of left lower extremity with delayed healing  Other closed fracture of distal end of right femur with routine healing,  Chronic left wound  - analgesia optimal.   - Wound management as per Hospice care team. Discussed with hospice team goal is symptomatic management.       Weight loss: this is expecting in this patient with limited life expectancy. Part of progressive anorexia and partly due to Sarcopenia. Was started on ensure. This is not recommended by CMS for hospice patient. Consider discontinuing.       Pressure injury stage 2 over buttock: continue wound care. continue conservative measures as well.     Hx of Cerebrovascular accident (CVA) due to embolism of right middle cerebral artery  Left sided hemiparesis (H)  Hospice care.   Frail elderly:    - Left sided residual weakness, stable. Off ASA, on lipitor 10 mg. recommend stopping lipitor   - Significant  Deficits requiring NH placement. Requiring extensive assistance from nursing. Up for meals only o/w spends the day resting in bed       Electronically signed by:  Anirudh Ye MD        Sincerely,        Anirudh Ye MD

## 2022-08-29 NOTE — PROGRESS NOTES
[facility]  Chief Complaint   Patient presents with     retirement Regulatory     [place] Medical Record Number:  [id number]  Place of Service where encounter took place:  [facility] () [[id number]]    HPI:    Mecca Slade  is 89 year old (12/14/1921), who is being seen today for a federally mandated E/M visit.  HPI information obtained from: facility chart records, facility staff, patient report and [facility] chart review.       Today's concerns are:   - Resident seen and examined, chart reviewed and discussed with the nursing staff.   - Hospice RN reports p pressure injury over buttock is stage II measured 8.6.8 cm. BM is fine, has external hemorrhoids but this is asymptomatic. Resident reports her bottom hurts.. RN reports she puts an order to change the Resident Q2hr.   --------------------------------  - Past Medical, social, family histories, medications, and allergies reviewed and updated  - Medications reviewed: in the chart and EHR.   - Case Management:   I have reviewed the care plan and MDS and do agree with the plan. Patient's desire to return to the community is not present.  Information reviewed:  Medications, vital signs, orders, and nursing notes.  -----------------------------------------------  - MEDICATIONS:  Current Outpatient Medications   Medication Sig Dispense Refill     acetaminophen (TYLENOL) 500 MG tablet Take 1,000 mg by mouth 3 times daily       atorvastatin (LIPITOR) 10 MG tablet Take 10 mg by mouth every evening       carboxymethylcellulose (CARBOXYMETHYLCELLULOSE SODIUM) 0.5 % SOLN ophthalmic solution Place 1 drop into both eyes every morning       furosemide (LASIX) 40 MG tablet Take 40 mg by mouth daily       HYDROmorphone (DILAUDID) 2 MG tablet TAKE 1/2 TAB (1 MG) BY MOUTH EVERY 4 HOURS AS NEEDED FOR MODERATE PAIN (PAIN GREATER THAN 5) 60 tablet 0     JANTOVEN ANTICOAGULANT 1 MG tablet Take 1 mg by mouth every other day M, W, F        "JANTOVEN ANTICOAGULANT 2 MG tablet Take 2 mg by mouth every other day T, TH, Sa,Klein       lisinopril (ZESTRIL) 5 MG tablet Take 0.5 tablets (2.5 mg) by mouth every evening       metFORMIN (GLUCOPHAGE-XR) 500 MG 24 hr tablet Take 2 tablets (1,000 mg) by mouth daily (with breakfast)       methocarbamol (ROBAXIN) 500 MG tablet Take 500 mg by mouth 3 times daily as needed       metoprolol succinate ER (TOPROL-XL) 25 MG 24 hr tablet Take 1 tablet (25 mg) by mouth every evening       protein modular (PRO-STAT) Take by mouth every morning 30 ml       Warfarin Therapy Reminder 1 each continuous prn 2/23/22 INR 2.87 Take 2mg every day next INR 2/25 2/22/22 INR 4.36 Hold x1 Next INR 2/23         ROS: 4 point ROS including Respiratory, CV, GI and , other than that noted in the HPI,  is negative    Vitals:  /68   Pulse 72   Temp 98.3  F (36.8  C)   Resp 18   Ht 1.676 m (5' 6\")   Wt 79.6 kg (175 lb 8 oz)   SpO2 93%   BMI 28.33 kg/m    Body mass index is 28.33 kg/m .  Exam: performed today 8/29  GENERAL APPEARANCE:  sitting up in bed.  RESP:  lungs clear to auscultation , unlabored breathing  CV:  S1S2 audible, irregular HR, no murmur appreciated.   ABDOMEN:  soft, NT/ND, BS audible. no mass appreciated on palpation.   M/S:   No joint deformity noted.   SKIN:  large brownish soft area over lateral surface of right leg. Left legs covered with boot.   NEURO:  Ms strength: 4/5 LUE, 4/5 LLE. Diminished visual acuity  PSYCH:  anxious. fair insight, judgement and memory,      Lab/Diagnostic data:  Reviewed in the chart and EHR.        ASSESSMENT/PLAN  --------------------------------  Chronic congestive heart failure, diastolic type (H)  Essential hypertension  On coumadin for Atrial fibrillation (H):   -  at baseline, compensated.   - CVR on toprol xl. Consider changing to metoprolol tartrate. Goal is symptomatic management  - on lisinopril 2.5 mg. No survival benefit. No role in HFpEF. Adds to pills burden.   - Risk of " CVA is 4% in one year. We continue recommending discontinuing coumadin.       Type II diabetes mellitus with peripheral circulatory disorder (H) :   A1C 8.1 12/01/2021    A1C 9.1 11/15/2021    A1C 7.8 08/05/2021   - over controlled in this frail elderly.  Still on metformin 1000 mg bid.  Goal is symptomatic management.       Closed metaphyseal fracture of left lower extremity with delayed healing  Other closed fracture of distal end of right femur with routine healing,  Chronic left wound  - analgesia optimal.   - Wound management as per Hospice care team. Discussed with hospice team goal is symptomatic management.       Weight loss: this is expecting in this patient with limited life expectancy. Part of progressive anorexia and partly due to Sarcopenia. Was started on ensure. This is not recommended by CMS for hospice patient. Consider discontinuing.       Pressure injury stage 2 over buttock: continue wound care. continue conservative measures as well.     Hx of Cerebrovascular accident (CVA) due to embolism of right middle cerebral artery  Left sided hemiparesis (H)  Hospice care.   Frail elderly:    - Left sided residual weakness, stable. Off ASA, on lipitor 10 mg. recommend stopping lipitor   - Significant  Deficits requiring NH placement. Requiring extensive assistance from nursing. Up for meals only o/w spends the day resting in bed       Electronically signed by:  Anirudh Ye MD

## 2022-09-09 NOTE — TELEPHONE ENCOUNTER
INR 3.35 today.   Last two INRs therapeutic and she's been on stable dose, per the nurse, since July.    Hold dose tonight. Suspect nutritional for increase in INR.     Will route to PCP for ongoing dosing and next INR     Renay Chacon MD

## 2022-09-09 NOTE — CONFIDENTIAL NOTE
[facility] Triage Nurse INR     Provider: JOSUE Mcconnell CNP, DNP  Facility: [place]   Facility Type:  [facility]    Caller: Tra  Call Back Number: [phone]  Reason for call: INR  Diagnosis/Goal: A. Fib    Todays INR: 3.35 (yesterdays INR and Coumadin was held last evening per on-call)  Last INR 2.62 (8/25), 0.5 mg po T,TH and 1 mg po AOD.      Staff did not call in INR till 3:30 the next business day.    Heparin/Lovenox:  No  Currently on ABX?: No  Other interacting medication:  None  Missed or refused doses: No    Verbal Order/Direction given by Provider: Coumadin 0.5 mg po T,TH and 1 mg po AOD.  Recheck INR on 9/12.    Provider Giving Order:  JOSUE Mcconnell CNP, DNP    Verbal Order given to: Tra Perkins RN

## 2022-09-12 NOTE — LETTER
"    9/12/2022        RE: Mecca Slade  [address] [facility] [address]        [facility]  Episodic/Acute/Follow-Up  [place] MRN: [id number]. Place of Service where encounter took place:  [facility] () [[id number]]   Chief Complaint   Patient presents with     Anticoagulation     RECHECK    HPI: Mecca Slade  is a 89 year old (12/14/1921), who is being seen today for an episodic care visit.  HPI information obtained from: facility chart records, facility staff, patient report and [facility] chart review. Today's concern is:    Zayda seen today to address a follow up INR. However, nursing and hospice reported new developments. Today, Zayda is in pain and is requesting pain medication, she flinches at the slightest touch, is anxious, and cannot verbalize specifics. She is unable to further contribute to HPI/ROS.     Past Medical and Surgical History reviewed in Epic today.  MEDICATIONS:  Current Outpatient Medications   Medication Sig Dispense Refill     acetaminophen (TYLENOL) 500 MG tablet Take 1,000 mg by mouth 3 times daily       carboxymethylcellulose (CARBOXYMETHYLCELLULOSE SODIUM) 0.5 % SOLN ophthalmic solution Place 1 drop into both eyes every morning       furosemide (LASIX) 40 MG tablet Take 40 mg by mouth daily       HYDROmorphone (DILAUDID) 2 MG tablet TAKE 1/2 TAB (1 MG) BY MOUTH EVERY 4 HOURS AS NEEDED FOR MODERATE PAIN (PAIN GREATER THAN 5) 60 tablet 0     methocarbamol (ROBAXIN) 500 MG tablet Take 500 mg by mouth 3 times daily as needed       metoprolol succinate ER (TOPROL-XL) 25 MG 24 hr tablet Take 1 tablet (25 mg) by mouth every evening       protein modular (PRO-STAT) Take by mouth every morning 30 ml       REVIEW OF SYSTEMS: Limited secondary to cognitive impairment but today pt reports the above.    Objective: /70   Pulse 80   Temp 97.7  F (36.5  C)   Resp 18   Ht 1.676 m (5' 6\")   Wt 82.1 kg (181 lb)   SpO2 94%   BMI 29.21 " kg/m    Exam:  GENERAL APPEARANCE: drowsy, in mild emotional distress, semi-cooperative.   ENT: Mouth/posterior oropharynx intact w/ moist mucous membranes, hearing acuity [place].  EYES: EOM, conjunctivae, lids, pupils and irises normal, PERRL2.   RESP: Respiratory effort fair, no respiratory distress, Lung sounds clear/diminished. On RA.   CV: Auscultation of heart reveals S1, S2, rate controlled and rhythm irregular, no murmur, no rub or gallop, Edema 0+ BLE. Peripheral pulses are 2+.  ABDOMEN: Normal bowel sounds, soft, non-tender abdomen, and no masses palpated.  SKIN: Inspection/Palpation of skin and subcutaneous tissue baseline w/ fragility. Left leg as pictured here and chronic (improved), and sacral ulceration (new) is severe and pictured here:      NEURO: CN II-XII at patient's baseline, sensation baseline PPS.  PSYCH: Insight, judgement, and memory are impaired from baseline, affect and mood are flat/withdrawn.    Labs: Labs done in facility are in EPIC. Please refer to them using [employer]/Care Everywhere.    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 Tenuous and Terminal.    Provider coordinated care with nursing and hospice. Sacral wound developed w/in a week and has reportedly has stool within it 2x (despite meticulous changes). This quick evolution is characteristic of Lenin.     Furthermore, Zayda is not herself, is altered, in pain. Her INR is elevated ay 4.61. Team believes she is progressing quickly to end-of-life.       Hospice spoke with patient's NOK/daughter Lexie and relayed the above information. Given this, Lexie and team agreed to stop all anticoagulation and many medications.     Hospice increased Dilaudid and added Lorazepam. Given federal requirements, will add 14-day parameter to this order  as noted below. Noted initial PRN orders for non antipsychotic psychotropic medications are limited to 14 days. Start new psychotropic medication x 14 days for sporadic anxiety and end-stage palliative care goals.  Follow up routinely or as needed.    Orders:  1. Lorazepam (per hospice) PRN x 14 days. Dx: anxiety.   2. Discontinue INR checks.   2. Discontinue:    Coumadin.    Metformin.    Lipitor.    Lisinopril.    Electronically signed by:  JOSUE Clark CNP DNP            Sincerely,        JOSUE Gillis CNP

## 2022-09-12 NOTE — PROGRESS NOTES
"[facility]  Episodic/Acute/Follow-Up  [place] MRN: [id number]. Place of Service where encounter took place:  [facility] () [[id number]]   Chief Complaint   Patient presents with     Anticoagulation     RECHECK    HPI: Mecca Slade  is a 89 year old (12/14/1921), who is being seen today for an episodic care visit.  HPI information obtained from: facility chart records, facility staff, patient report and [facility] chart review. Today's concern is:    Zayda seen today to address a follow up INR. However, nursing and hospice reported new developments. Today, Zayda is in pain and is requesting pain medication, she flinches at the slightest touch, is anxious, and cannot verbalize specifics. She is unable to further contribute to HPI/ROS.     Past Medical and Surgical History reviewed in Epic today.  MEDICATIONS:  Current Outpatient Medications   Medication Sig Dispense Refill     acetaminophen (TYLENOL) 500 MG tablet Take 1,000 mg by mouth 3 times daily       carboxymethylcellulose (CARBOXYMETHYLCELLULOSE SODIUM) 0.5 % SOLN ophthalmic solution Place 1 drop into both eyes every morning       furosemide (LASIX) 40 MG tablet Take 40 mg by mouth daily       HYDROmorphone (DILAUDID) 2 MG tablet TAKE 1/2 TAB (1 MG) BY MOUTH EVERY 4 HOURS AS NEEDED FOR MODERATE PAIN (PAIN GREATER THAN 5) 60 tablet 0     methocarbamol (ROBAXIN) 500 MG tablet Take 500 mg by mouth 3 times daily as needed       metoprolol succinate ER (TOPROL-XL) 25 MG 24 hr tablet Take 1 tablet (25 mg) by mouth every evening       protein modular (PRO-STAT) Take by mouth every morning 30 ml       REVIEW OF SYSTEMS: Limited secondary to cognitive impairment but today pt reports the above.    Objective: /70   Pulse 80   Temp 97.7  F (36.5  C)   Resp 18   Ht 1.676 m (5' 6\")   Wt 82.1 kg (181 lb)   SpO2 94%   BMI 29.21 kg/m    Exam:  GENERAL APPEARANCE: drowsy, in mild emotional distress, semi-cooperative. "   ENT: Mouth/posterior oropharynx intact w/ moist mucous membranes, hearing acuity [place].  EYES: EOM, conjunctivae, lids, pupils and irises normal, PERRL2.   RESP: Respiratory effort fair, no respiratory distress, Lung sounds clear/diminished. On RA.   CV: Auscultation of heart reveals S1, S2, rate controlled and rhythm irregular, no murmur, no rub or gallop, Edema 0+ BLE. Peripheral pulses are 2+.  ABDOMEN: Normal bowel sounds, soft, non-tender abdomen, and no masses palpated.  SKIN: Inspection/Palpation of skin and subcutaneous tissue baseline w/ fragility. Left leg as pictured here and chronic (improved), and sacral ulceration (new) is severe and pictured here:      NEURO: CN II-XII at patient's baseline, sensation baseline PPS.  PSYCH: Insight, judgement, and memory are impaired from baseline, affect and mood are flat/withdrawn.    Labs: Labs done in facility are in EPIC. Please refer to them using [employer]/Care Everywhere.    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 Tenuous and Terminal.    Provider coordinated care with nursing and hospice. Sacral wound developed w/in a week and has reportedly has stool within it 2x (despite meticulous changes). This quick evolution is characteristic of [place].     Furthermore, Zayda is not herself, is altered, in pain. Her INR is elevated ay 4.61. Team believes she is progressing quickly to end-of-life.       Hospice spoke with patient's NOK/daughter Lexie and relayed the above information. Given this, Lexie and team agreed to stop all anticoagulation and many medications.     Hospice increased Dilaudid and added Lorazepam. Given federal requirements, will add 14-day parameter to this order as noted below. Noted initial PRN orders for non antipsychotic psychotropic medications  are limited to 14 days. Start new psychotropic medication x 14 days for sporadic anxiety and end-stage palliative care goals.  Follow up routinely or as needed.    Orders:  1. Lorazepam (per hospice) PRN x 14 days. Dx: anxiety.   2. Discontinue INR checks.   2. Discontinue:    Coumadin.    Metformin.    Lipitor.    Lisinopril.    Electronically signed by:  Dr. Haley Sims, APRN CNP DNP

## 2022-09-26 ENCOUNTER — PATIENT OUTREACH (OUTPATIENT)
Dept: GERIATRIC MEDICINE | Facility: CLINIC | Age: 87
End: 2022-09-26

## 2022-09-26 NOTE — PROGRESS NOTES
[facility] Care Coordination Contact    Notified by Chris RODRIGUEZ that member passed away on 9/21/2022 at Nursing Facility.    PCP notified. Called all providers to cancel services.    For [facility]:  Death Notification form completed and faxed to [facility].    Chart reviewed and this CC's encounter closed. Chart handed off to CMS to process disenrollment tasks.      Valerie Tsang RN  Care Coordinator-Long Term Care  [address] [address]  [email or web address]   [email or web address]     Office: [phone]   Fax: [phone]

## 2022-10-29 NOTE — LETTER
5/15/2018        RE: Mecca Slade  [facility]  [address] [facility] [address]        [place] [facility]    Chief Complaint   Patient presents with     FPC Acute       [place] Medical Record Number:  [id number]    HPI:    Mecca Slade is a 89 year old  (12/14/1921), who is being seen today for an episodic care visit at [facility] .  HPI information obtained from: facility chart records, facility staff and patient report.     Met with Mrs. Slade today due to c/o a new acute Left ankle pain.  Mrs. Slade has moderate memory/cognitive impairment thus is a poor historian.  She does not remember any injury or fall and does not remember when her Left ankle started hurting.  RN's report for at least 2 days, they are not aware of any injury.  Mrs. Slade is WC bound and does use the lift stand assist lift to transfer, where all her weight is on her ankles during transfers, and now reports that is very pain fun during transfer.  Also hurts with any weight bearing, minimal at rest and if supported.  No other complaints.     ALLERGIES: Review of patient's allergies indicates no known allergies.  Past Medical, Surgical, Family and Social History reviewed and updated in [employer].    Current Outpatient Prescriptions   Medication Sig Dispense Refill     [START ON 5/30/2018] Acetaminophen (TYLENOL PO) Take 1,000 mg by mouth every 8 hours as needed for mild pain or fever       Acetaminophen (TYLENOL PO) Take 1,000 mg by mouth 3 times daily       ATORVASTATIN CALCIUM PO Take 20 mg by mouth daily       carboxymethylcellulose (REFRESH PLUS) 0.5 % SOLN ophthalmic solution Place 1 drop into both eyes 3 times daily as needed for dry eyes       diclofenac (VOLTAREN) 1 % GEL topical gel Place 1 g onto the skin 2 times daily       Furosemide (LASIX PO) Take 40 mg by mouth daily       METFORMIN HCL PO Take 500 mg by mouth daily (with breakfast)        METFORMIN HCL PO Take  EMERGENCY DEPARTMENT HISTORY AND PHYSICAL EXAM      Date: 10/29/2022  Patient Name: Luisito Parker    History of Presenting Illness     Chief Complaint   Patient presents with    Dental Pain       History Provided By: Patient    HPI: Luisito Parker, 39 y.o. male with no significant or chronic past medical history and other history reviewed as listed below presents with two weeks of right rear dental pain and frontal tooth pain from previous root canal that \"didn't heal right. \"  Received prescription for amoxicillin from his dentist and completed about 2 days ago and has been using ibuprofen and Orajel without relief or improvement. States the pain is \"unbearable\". There is no facial swelling, evidence of abscess, adenopathy neck swelling. Has appointment with his dentist on November 7. Denies any fever chills body aches or systemic symptoms. There are no other complaints, changes, or physical findings at this time. PCP: None    No current facility-administered medications on file prior to encounter. Current Outpatient Medications on File Prior to Encounter   Medication Sig Dispense Refill    cyclobenzaprine (FLEXERIL) 10 mg tablet Take 1 Tab by mouth three (3) times daily as needed for Muscle Spasm(s). 20 Tab 0    [DISCONTINUED] methylPREDNISolone (MEDROL, JACKY,) 4 mg tablet Per pack instructions 1 Dose Pack 0    [DISCONTINUED] naproxen (NAPROSYN) 500 mg tablet Take 1 Tab by mouth every twelve (12) hours as needed for Pain. 20 Tab 0       Past History     Past Medical History:  History reviewed. No pertinent past medical history. Past Surgical History:  No past surgical history on file. Family History:  History reviewed. No pertinent family history.     Social History:  Social History     Tobacco Use    Smoking status: Every Day    Smokeless tobacco: Current   Substance Use Topics    Alcohol use: Yes     Comment: one beer    Drug use: Yes     Types: Marijuana     Comment: h/o cocaine 1,000 mg by mouth At Bedtime       METOPROLOL TARTRATE PO Take 25 mg by mouth daily       polyethylene glycol (MIRALAX/GLYCOLAX) Packet Take 17 g by mouth daily       VERAPAMIL HCL PO Take 120 mg by mouth At Bedtime       VITAMIN D, CHOLECALCIFEROL, PO Take 2,000 Units by mouth daily       Warfarin Sodium (COUMADIN PO) Take 2 mg by mouth daily Give on Tue-Thur--Sat- Sun       Warfarin Sodium (COUMADIN PO) Take 3 mg by mouth See Admin Instructions Give 3mg on M/W/F       Medications reviewed:  Medications reconciled to facility chart and changes were made to reflect current medications as identified as above med list. Below are the changes that were made:   Medications stopped since last EPIC medication reconciliation:       Medications started since last [facility] medication reconciliation:    REVIEW OF SYSTEMS:  7 point ROS done including, light headedness/dizziness, fever/chills, pain, Resp, CV, GI, and  and is negative other than noted in HPI.      Physical Exam:  /80  Pulse 59  Temp 97  F (36.1  C)  Resp 18  Wt 223 lb (101.2 kg)  SpO2 97%  BMI 35.99 kg/m2     GENERAL APPEARANCE:  Elderly obese female sitting up in WC, NAD, non-toxic.  ENT:  Mouth and oropharynx normal, moist mucous membranes.   RESP:  Lungs CTA.  Regular relaxed breathing effort.  No cough.   CV: S1/S2 no murmur or rubs.  Regular rhythm and rate.    ABDOMEN:   Obese, soft, non-tender with active bowel sounds.  No guarding, rigidity, or rebound tenderness.  EXTREMITIES:  Mild bilateral pedal edema, no calf tenderness.   PSYCH: Alert and orientated, pleasant and cooperative.  Mild-moderate cognitive/memory impairment, stable/unchanged.   JOINTS: There is localized 2+ swelling about the Left ankle.  No skin breakdown, no s/s of injury/trauma.  She can minimally flex/extend the foot, only several degrees.  With passive lateral/medial or flex/extend of foot causes pain.  Majority of pain is localized at the lateral Left ankle area.  Pain  Allergies:  No Known Allergies    Review of Systems   Review of Systems   Constitutional: Negative. Negative for fever. HENT:  Positive for dental problem. Negative for drooling, facial swelling and sore throat. Eyes: Negative. Respiratory: Negative. Cardiovascular: Negative. Gastrointestinal: Negative. Genitourinary: Negative. Musculoskeletal: Negative. Skin: Negative. Neurological: Negative. Psychiatric/Behavioral: Negative. All other systems reviewed and are negative. Physical Exam   Physical Exam  Vitals and nursing note reviewed. Constitutional:       General: He is not in acute distress. Appearance: Normal appearance. He is normal weight. He is not ill-appearing, toxic-appearing or diaphoretic. HENT:      Head: Normocephalic and atraumatic. Jaw: There is normal jaw occlusion. No trismus. Mouth/Throat:      Lips: Pink. Mouth: Mucous membranes are moist.      Dentition: Dental tenderness and dental caries present. No gingival swelling, dental abscesses or gum lesions. Palate: No mass. Pharynx: Oropharynx is clear. Uvula midline. Eyes:      Conjunctiva/sclera: Conjunctivae normal.   Cardiovascular:      Rate and Rhythm: Normal rate. Pulmonary:      Effort: Pulmonary effort is normal. No respiratory distress. Abdominal:      Tenderness: There is no abdominal tenderness. Musculoskeletal:         General: Normal range of motion. Cervical back: Neck supple. No tenderness. Lymphadenopathy:      Cervical: No cervical adenopathy. Skin:     General: Skin is warm and dry. Capillary Refill: Capillary refill takes less than 2 seconds. Neurological:      General: No focal deficit present. Mental Status: He is alert and oriented to person, place, and time.    Psychiatric:         Behavior: Behavior normal.       Lab and Diagnostic Study Results   Labs -   No results found for this or any previous visit (from the past 12 hour(s)). Radiologic Studies -   @lastxrresult@  CT Results  (Last 48 hours)      None          CXR Results  (Last 48 hours)      None            Medical Decision Making and ED Course   Differential Diagnosis & Medical Decision Making Provider Note:   Patient presents with dental pain. No obvious abscess that needs drainage. No red flags that make PTA, RPA, ludwigs angina concerning. Will tx with dental ball, antibiotics and outpatient analgesics. Given information on dentists and importance of followup and no smoking.     - I am the first provider for this patient. I reviewed the vital signs, available nursing notes, past medical history, past surgical history, family history and social history. The patients presenting problems have been discussed, and they are in agreement with the care plan formulated and outlined with them. I have encouraged them to ask questions as they arise throughout their visit. Vital Signs-Reviewed the patient's vital signs. Patient Vitals for the past 12 hrs:   Temp Pulse Resp BP SpO2   10/29/22 0332 97.2 °F (36.2 °C) -- -- -- --   10/29/22 0237 -- 69 18 137/83 97 %       Disposition   Disposition: Condition stable  DC- Adult Discharges: All of the diagnostic tests were reviewed and questions answered. Diagnosis, care plan and treatment options were discussed. The patient understands the instructions and will follow up as directed. The patients results have been reviewed with them. They have been counseled regarding their diagnosis. The patient verbally convey understanding and agreement of the signs, symptoms, diagnosis, treatment and prognosis and additionally agrees to follow up as recommended with their PCP in 24 - 48 hours. They also agree with the care-plan and convey that all of their questions have been answered.   I have also put together some discharge instructions for them that include: 1) educational information regarding their diagnosis, 2) how to care for their with weight bearing.     Recent Labs: I have personally reviewed images, which are in facility chart.   5/14 Ankle xray notes Marked demineralized bone with mild degenerative changes.  There is a plantar calcaneal spur.  No acute fracture/dislocation.  There is diffuse soft tissue swelling present.     Assessment/Plan:  Sprain of left ankle, unspecified ligament, subsequent encounter  Acute left ankle pain  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  Frail elderly  Cognitive impairment  Moderate swelling and significant pain with passive ROM of Left ankle with focal point being the lateral Left ankle.  No reports/sign of trauma or injury.  Suspect sprain or strain.  Suspect etiology is that she is obese, and WC dependent and has to use a assist lift zac for pivot transfers and during that time all her weight is on her two ankles.  Now she is not able to transfer that way due to pain.    -Start TID scheduled Acetaminophen for two weeks.   -Ace wrap Left ankle and recommend NWB for now.   --PT/OT to eval and treat, with consideration of short period of NWB.   Which means using a full lift for transfers.      Orders:  1. DC prior prn acetaminophen  2. Start acetaminophen 1000 mg po TID scheduled for pain x 2 weeks.  3. After two weeks, switch tylenol to 1000 mg po Q 8 hr prn pain.  4. Ace wrap left ankle until seen by PT.  Dx left ankle sprain  5. PT to eval and treat left ankle sprain, NWB 1 week per PT Dx left ankle sprain.    Electronically signed by  JOSUE Monroy CNP                      Sincerely,        JOSUE Monroy CNP     diagnosis at home, as well a 3) list of reasons why they would want to return to the ED prior to their follow-up appointment, should their condition change. DISCHARGE PLAN:  1. Current Discharge Medication List        CONTINUE these medications which have NOT CHANGED    Details   methylPREDNISolone (MEDROL, JACKY,) 4 mg tablet Per pack instructions  Qty: 1 Dose Pack, Refills: 0      cyclobenzaprine (FLEXERIL) 10 mg tablet Take 1 Tab by mouth three (3) times daily as needed for Muscle Spasm(s). Qty: 20 Tab, Refills: 0      naproxen (NAPROSYN) 500 mg tablet Take 1 Tab by mouth every twelve (12) hours as needed for Pain. Qty: 20 Tab, Refills: 0           2. Follow-up Information       Follow up With Specialties Details Why [address] [place] [address] Dentistry  Additional dental resource [address] [place] [address]  [phone]    Follow up with primary care, urgent care, or this Emergency department   As needed, If symptoms worsen           3. Return to ED if worse   4. Current Discharge Medication List        START taking these medications    Details   clindamycin (CLEOCIN) 300 mg capsule Take 1 Capsule by mouth four (4) times daily for 7 days. Qty: 28 Capsule, Refills: 0  Start date: 10/29/2022, End date: 11/5/2022      ibuprofen (MOTRIN) 800 mg tablet Take 1 Tablet by mouth every six (6) hours as needed for Pain for up to 7 days. Qty: 20 Tablet, Refills: 0  Start date: 10/29/2022, End date: 11/5/2022               Diagnosis/Clinical Impression     Clinical Impression:   1. Dentalgia        Attestations: Alys Dance, NP, am the primary clinician of record. Please note that this dictation was completed with [employer], the [employer] plate voice recognition software. Quite often unanticipated grammatical, syntax, homophones, and other interpretive errors are inadvertently transcribed by the computer software. Please disregard these errors.   Please excuse any errors that have escaped final proofreading. Thank you.

## 2022-12-04 NOTE — PROGRESS NOTES
"[facility]  [place] MRN:  [id number]. Place of Service where encounter took place: [facility] () [[id number]]. HPI: Mecca Slade is a 89 year old  (12/14/1921), who is being seen today for an episodic care visit at the above location.   HPI information obtained from: facility chart records, facility staff, patient report and [facility] chart review. Today's concern is INR/Coumadin management for A. Fib    ROS/Subjective:  Bleeding Signs/Symptoms:  None  Thromboembolic Signs/Symptoms:  None  Medication Changes:  Yes: Keflex started  Dietary Changes:  No  Activity Changes: No  Bacterial/Viral Infection:  No  Missed Coumadin Doses:  None  On ASA: No  Other Concerns:  No    OBJECTIVE:  /77   Pulse 75   Temp 98.5  F (36.9  C)   Resp 16   Ht 1.676 m (5' 6\")   Wt 86.9 kg (191 lb 8 oz)   SpO2 94%   BMI 30.91 kg/m    Last INR: 2.82 on 4/28/22.  INR Today: 2.97  Current Dose: 2mg M/W/F, 1mg AOD.    ASSESSMENT:  Controlled atrial fibrillation (H)  Encounter for therapeutic drug monitoring  Long term current use of anticoagulant with international normalized ratio (INR) goal of 1.5-2.0  Chronic. Therapeutic. Will dose Coumadin as noted below and recheck INR in 2 weeks. Follow-up accordingly.    PLAN:   Orders:  1. Coumadin 2mg M/W/F.  2. Coumadin 1mg AOD.  3. Recheck INR x1 in 2 weeks.   Dx: afib.     Electronically signed by:  Dr. Haley Sims, APRN CNP DNP      " ED attending aware

## 2023-08-25 NOTE — PLAN OF CARE
"DATE & TIME: 11/15 Night    Cognitive Concerns/ Orientation : Alert, disoriented to place, time, and situation   BEHAVIOR: Anxious, fearful of falling out of bed. Reassurance given, 4 side rails up per patient's request  ABNL VS/O2: VSS on 1 LPM, afebrile  MOBILITY: Bedrest   PAIN MANAGMENT: Right leg pain - managing w/ scheduled tylanol and PRN dilaudid   DIET: NPO  BOWEL/BLADDER: Incontinent, external catheter in place, voiding adequately, urine is concentrated  ABNL LAB/BG: Lactic 2.3 @2300, 1.4 @0430; INR 1.31 @2300, 1.25 @0430  DRAIN/DEVICES: PIV in L arm - infusing NS at 75 mL/hr; External catheter - changed at 0200  TELEMETRY RHYTHM: Atrial fibrillation  SKIN: Head laceration - staples intact, dried blood/scab, open to air. Bruising on forehead. Coccyx pressure injury (pre-existing) - protective barrier applied.  TESTS/PROCEDURES: Head CT in AM; Surgery - femur repair 11/16    Vital signs:  Temp: 98.1  F (36.7  C) Temp src: Oral BP: 135/61 Pulse: 85   Resp: 20 SpO2: 94 % O2 Device: None (Room air) Oxygen Delivery: 1 LPM   Weight: 93.1 kg (205 lb 4 oz)  Estimated body mass index is 33.13 kg/m  as calculated from the following:    Height as of 11/8/21: 1.676 m (5' 6\").    Weight as of this encounter: 93.1 kg (205 lb 4 oz).                " No

## 2023-11-13 NOTE — LETTER
10/28/2021        RE: Mecca Slade  [facility]  [address] [facility] [address]        [facility] [place] [facility]  Episodic/Acute/Follow-Up  [place] MRN: [id number]. Place of Service where encounter took place:  [facility] () [[id number]]   Chief Complaint   Patient presents with     RECHECK    HPI: Mecca Slade  is a 89 year old (12/14/1921), who is being seen today for an episodic care visit.  HPI information obtained from: facility chart records, facility staff, patient report and [facility] chart review. Today's concern is:    Zayda seen today, after nursing consulted provider for newfound pressure injury on patient's buttocks/gluteal folds.  Today, Zayda states that her bottom hurts, and that sometimes she has jumpy legs.  She states that her arthritis is the same as usual, and she denies shortness of breath, bleeding/bruising, or fever.    Past Medical and Surgical History reviewed in Epic today.  MEDICATIONS:  Current Outpatient Medications   Medication Sig Dispense Refill     Acetaminophen (TYLENOL PO) Take 1,000 mg by mouth 2 times daily       ATORVASTATIN CALCIUM PO Take 10 mg by mouth daily       CARBOXYMETHYLCELLULOSE SODIUM 0.5 % SOLN ophthalmic solution INSTILL 1 DROP IN EACH EYE EVERY MORNING 15 mL 3     Furosemide (LASIX PO) Take 40 mg by mouth daily       Menthol, Topical Analgesic, (BENGAY COLD THERAPY) 5 % GEL Externally apply topically 2 times daily 113 g 0     metFORMIN (GLUCOPHAGE-XR) 500 MG 24 hr tablet Take 2 tablets (1,000 mg) by mouth daily (with dinner) 30 tablet 0     metoprolol succinate ER (TOPROL-XL) 25 MG 24 hr tablet Take 1 tablet (25 mg) by mouth daily 30 tablet 0     simethicone (MYLICON) 80 MG chewable tablet Take 2 tablets (160 mg) by mouth daily       Warfarin Sodium (COUMADIN PO) Take by mouth daily Take as directed per INR results       REVIEW OF SYSTEMS: Limited secondary to cognitive impairment but today pt reports  "the above and 4 point ROS including Respiratory, CV, GI and , other than that noted in the HPI, is negative.    Objective: /78   Pulse 67   Temp 97.4  F (36.3  C)   Resp 18   Ht 1.676 m (5' 6\")   Wt 88.5 kg (195 lb)   SpO2 98%   BMI 31.47 kg/m    Exam:  GENERAL APPEARANCE: Alert, in no distress, cooperative.   ENT: Mouth/posterior oropharynx intact w/ moist mucous membranes, hearing acuity [place].  EYES: EOM, conjunctivae, lids, pupils and irises normal, PERRL2.   RESP: Respiratory effort unlabored, no respiratory distress, On RA.   CV: Edema 0+ BLE. Peripheral pulses are 2+.  ABDOMEN: Normal bowel sounds, soft, non-tender abdomen, and no masses palpated.  SKIN: Inspection/Palpation of skin and subcutaneous tissue baseline w/ fragility. Bilateral buttocks/gluteal folds as noted here:    NEURO: CN II-XII at patient's baseline, sensation baseline PPS.  PSYCH: Insight, judgement, and memory are impaired at baseline, affect and mood are happy/engaged.    Labs: Labs done in facility are in EPIC. Please refer to them using [employer] Everywhere.    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    Provider coordinated care with nursing, who noted that Zayda sleeps in her recliner chair, and is otherwise in her wheelchair.  She does not ever get off of her bottom, unless she is utilizing the standing lift for toileting.  She is also incontinent.    Given the above, will consult OT.  Both of these sitting surfaces should be optimized as Zayda does not even utilize a bed.    Noting that Zayda's incontinence also contributes to pressure injury and skin breakdown, and therefore will order a " toileting/repositioning schedule to keep her skin clean, dry, and offloaded while awake.    Given her need for wound healing, will order prostat as a nutritional supplement to help with this.    Given stage II, obesity, and products on hand, will order EPC.  Suspect that hemiparesis, and CHF also play a role in patient's ability to heal.    Suspect that pain control will be optimized when this wound heals.  Follow up w/in 1 week or as needed.    Orders:  1. Prostat 30mL PO Qday. Dx: pressure injury.   2. OT consult eval/tx x1. Dx: positioning/pressure injury.  3. Apply EPC Qshift and PRN to buttocks. Dx: pressure injury.   4. Repositioning/toileting Q2h while awake to offload. Dx: pressure injury.    Electronically signed by:  Dr. Haley Sims, APRN CNP DNP          Sincerely,        Haley Sims, JOSUE CNP     OB Hx:  1 sAB, no d&c    Gyn Hx:  H/o PCOS, previously treated with OCPs.  Denies h/o fibroids, STIs, or abnormal pap smears.

## 2024-03-26 NOTE — LETTER
1/16/2018        RE: Mecca Slade  [address] [facility] [address]        [facility]    Chief Complaint   Patient presents with     Nursing Home Acute       HPI:    Mecca Slade is a 89 year old  (12/14/1921), who is being seen today for an episodic care visit at [facility] ([facility] .  HPI information obtained from: facility chart records, facility staff, patient report and [facility] chart review.  Also met with Marge/daughter/POA at bedside.      Called into room by Mrs. Slade as she and her daughter wanted an update on her RLE hematoma.  Mrs. Slade continues to endorse the bruise/swelling on her RLE does not bother her, no pain.  She does however endorse sinus congestion, a non-productive cough, denies fever/chills, body aches, SOB/BOWDEN.  Has no other complaints.     ALLERGIES: Review of patient's allergies indicates no known allergies.  Past Medical, Surgical, Family and Social History reviewed and [facility] in [facility].    Current Outpatient Prescriptions   Medication Sig Dispense Refill     Acetaminophen (TYLENOL PO) Take 1,000 mg by mouth every 8 hours as needed for mild pain or fever       Acetaminophen (TYLENOL PO) Take 1,000 mg by mouth At Bedtime       METOPROLOL TARTRATE PO Take 25 mg by mouth daily       METFORMIN HCL PO Take 1,000 mg by mouth At Bedtime       diclofenac (VOLTAREN) 1 % GEL topical gel Place 1 g onto the skin 2 times daily       METFORMIN HCL PO Take 500 mg by mouth daily (with breakfast)        ATORVASTATIN CALCIUM PO Take 20 mg by mouth daily       carboxymethylcellulose (REFRESH PLUS) 0.5 % SOLN ophthalmic solution Place 1 drop into both eyes 3 times daily as needed for dry eyes       VITAMIN D, CHOLECALCIFEROL, PO Take 2,000 Units by mouth daily       Furosemide (LASIX PO) Take 40 mg by mouth daily       VERAPAMIL HCL PO Take 120 mg by mouth At Bedtime       Warfarin Sodium (COUMADIN PO) Take 2 mg by mouth daily        Medications  reviewed:  Medications reconciled to facility chart and changes were made to reflect current medications as identified as above med list. Below are the changes that were made:   Medications stopped since last EPIC medication reconciliation:   There are no discontinued medications.    Medications started since last [facility] medication reconciliation:  No orders of the defined types were placed in this encounter.    REVIEW OF SYSTEMS:  7 point ROS done including, light headedness/dizziness, fever/chills, pain, Resp, CV, GI, and  and is negative other than noted in HPI.      Physical Exam:  /60  Pulse 70  Temp 97.4  F (36.3  C)  Resp 18  Wt 213 lb (96.6 kg)  SpO2 97%  BMI 34.38 kg/m2     GENERAL APPEARANCE:  Elderly obese female sitting up in WC, NAD, non-toxic.  ENT:  Mouth and oropharynx normal, moist mucous membranes.   RESP:  Lungs CTA.  Regular relaxed breathing effort.  No cough.   CV: S1/S2 no murmur or rubs.  Regular rhythm and rate.  No generalized edema.   ABDOMEN:   Obese, soft, non-tender with active bowel sounds.  No guarding, rigidity, or rebound tenderness.  EXTREMITIES:  There is a large oblong hematoma/bruise on her RLE just below the knee to the lateral side.  Over all the hematoma is much smaller and softer than before.  Still no s/s of infection, non-tender.   PSYCH: Alert and orientated, pleasant and cooperative.       Recent Labs: I have personally reviewed glucose checks.     Assessment/Plan:  Viral upper respiratory tract infection  She endorses nasal congestion, non-productive cough, which is common right now in the facility.  No fever, body aches, non-toxic, low suspicion for influ.  Encouraged extra fluids and rest.  -Supportive care and clinical monitoring.     Hematoma of lower extremity, right, subsequent encounter  Reports of how long she has had this are still unclear, as some RN's report she has had it for months.  Daughter also does not know.  Overall hematoma is  smaller/softer, is improving.  She sometimes will wear the ace wrap other times reports it bothers her.  At this point due to it's improving, wearing a ace wrap is helpful but minimally so, thus she can refuse if she desires.   -Encourage RLE Ace wrap use but she can decline if desires.     Type 2 diabetes mellitus with diabetic neuropathy, without long-term current use of insulin (H)  Weekly glucose checks are within acceptable limits.   -Continues on Metformin.     H/O: CVA (cerebrovascular accident), R MCA embolism s/p tPA, March 2017  Chronic atrial fibrillation (H)  Chronic congestive heart failure, unspecified congestive heart failure type (H)  Benign essential hypertension  No clinical s/s of CHF.  Review of VS's show VSS and within acceptable range.     Orders:  1. DC prn tramadol  2. DC prn senna  3. Ace wrap to RLE on for most of the day but patient can refuse if desires or have off for breaks Dx lymphedema    Electronically signed by  JOSUE Monroy CNP                      Sincerely,        JOSUE Monroy CNP     Yes

## 2025-01-03 NOTE — LETTER
11/29/2021        RE: Mecca Slade  [facility]  [address] [facility] [address]        [facility] Annual Physical  Chief Complaint   Patient presents with     Annual Comprehensive Nursing Home    [facility] MRN: [id number] Place of Service where encounter took place:  [facility] () [[id number]] HPI: Mecca Slade  is a 89 year old  (12/14/1921), who is being seen today for an annual comprehensive visit. HPI information obtained from: facility chart records, facility staff, patient report and [facility] chart review.  Today's concerns are:    Zayda seen today for her annual physical exam. She recently has returned to facility after breaking both of her legs in a fall and having a large scalp laceration. Hospice was consulted given the severity of her injuries, but family is thinking about this and she has not signed on at this time. She continues upper body rehab.    Though Zayda is not at her baseline, annual physical review still appropriate. Zayda is hard of hearing at baseline, but this has not changed. She denies any headaches/dizziness or vision changes. Staples are still present in her right frontal to parietal region. She denies any trouble swallowing, though noting that she has been working with speech therapy and has nectar thick liquids at this time. She denies any nausea, heartburn, constipation/diarrhea. Zayda denies pain except for when moved. Most of the time her pain is concentrated in her right femur, or in her back. When mobilizing in the bed, her back is what troubles her most. She denies any traveling pain or zingers. She is incontinent of bladder at baseline, and denies any dysuria or fevers.    PHQ9: 2/27.  BIMS: 15/15.    ALLERGIES: Patient has no known allergies.PAST MEDICAL HISTORY:  has a past medical history of A-fib (H), Acute CVA (cerebrovascular accident) (H) (03/01/2017), Acute right MCA stroke (H) (03/01/2017), Cardiac pacemaker in  Patient has new insurance for 2025 and did need a new referral.  New referral entered under patient's new insurance and lvm for patient.   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), DM type 2 (diabetes mellitus, type 2) (H), HTN (hypertension), Left-sided weakness (03/01/2017), Neurological neglect syndrome, Pure hypercholesterolemia, Right sided cerebral hemisphere cerebrovascular accident (H), Tissue plasminogen activator (t-PA) administered at other facility within 24 hours prior to current admission (03/01/2017), and Type 2 diabetes mellitus with diabetic neuropathy, without long-term current use of insulin (H) (4/3/2017).PAST SURGICAL HISTORY:  has a past surgical history that includes Electrophysiology Pacemaker (10/09/2012); appendectomy; and Open reduction internal fixation femur distal (Right, 11/16/2021).  IMMUNIZATIONS:  Immunization History   Administered Date(s) Administered     COVID-19,PF,Moderna 12/31/2020, 02/02/2021     Flu, Unspecified 10/06/2004, 11/16/2005, 10/26/2006, 10/25/2018     Influenza (High Dose) 3 valent vaccine 10/14/2010, 09/30/2011, 11/03/2015, 10/06/2016, 10/08/2020     Influenza (IIV3) PF 10/06/2004, 10/26/2006, 10/25/2007, 10/14/2008, 09/18/2009, 09/07/2012, 10/06/2016     Influenza Quad, Recombinant, pf(RIV4) (Flublok) 10/08/2019     Influenza Vaccine IM > 6 months Valent IIV4 (Alfuria,Fluzone) 09/03/2013, 10/01/2016     Influenza, Whole Virus 10/20/2017     Pneumo Conj 13-V (2010&after) 04/20/2017     Pneumococcal 23 valent 11/06/1995, 01/27/2005, 10/28/2011     Pneumococcal, Unspecified 07/01/2011     TD (ADULT, 7+) 10/07/1997, 01/24/2001, 12/14/2017     TDAP Vaccine (Adacel) 12/12/2017     TDAP Vaccine (Boostrix) 12/12/2017     Td (Adult), Adsorbed 10/07/1997, 01/24/2001     Tdap (Adacel,Boostrix) 12/12/2017    Above immunizations pulled from [facility]. [facility] and facility records also reconciled. Outstanding information sent to  to update [facility].  Future immunizations are not needed  at this point as all recommended immunizations are up to date.   Current Outpatient Medications   Medication Sig Dispense Refill     acetaminophen (TYLENOL) 500 MG tablet Take 1,000 mg by mouth 3 times daily       atorvastatin (LIPITOR) 10 MG tablet Take 10 mg by mouth every evening       carboxymethylcellulose (CARBOXYMETHYLCELLULOSE SODIUM) 0.5 % SOLN ophthalmic solution Place 1 drop into both eyes every morning       furosemide (LASIX) 40 MG tablet Take 40 mg by mouth daily       HYDROmorphone (DILAUDID) 2 MG tablet Take 0.5 tablets (1 mg) by mouth every 4 hours as needed for moderate pain (Pain greater than 5) 15 tablet 0     JANTOVEN ANTICOAGULANT 1 MG tablet TAKE 1 TAB BY MOUTH ON TUE, THR,SAT,SUN. INR 11/16/21       JANTOVEN ANTICOAGULANT 2 MG tablet TAKE 1 TAB BY MOUTH ON MON, WED,FRI. INR 11/16/21       lisinopril (ZESTRIL) 5 MG tablet Take 1 tablet (5 mg) by mouth every evening       Menthol-Methyl Salicylate (SAMM LEE GREASELESS) cream Apply topically every 6 hours as needed To low back       metFORMIN (GLUCOPHAGE-XR) 500 MG 24 hr tablet Take 2 tablets (1,000 mg) by mouth daily (with breakfast)       methocarbamol (ROBAXIN) 500 MG tablet Take 500 mg by mouth 3 times daily as needed       metoprolol succinate ER (TOPROL-XL) 25 MG 24 hr tablet Take 1 tablet (25 mg) by mouth every evening       protein modular (PRO-STAT) Take by mouth every morning 30 ml       simethicone (MYLICON) 80 MG chewable tablet Take 2 tablets (160 mg) by mouth every morning After breakfast       Case Management: I have reviewed the facility/SNF care plan/MDS, including the falls risk, nutrition and pain screening. I also reviewed the current immunizations, and preventive care. .Future cancer screening is not clinically indicated secondary to age/goals of care Patient's desire to return to the community is not present. Current Level of Care is appropriate.    Advance Directive Discussion: I reviewed the current advanced directives as  "reflected in EPIC, the POLST and the facility chart, and verified the congruency of orders.  I did not due to cognitive impairment review the advance directives with the resident.     Team Discussion: I communicated with the appropriate disciplines involved with the Plan of Care: Nursing  .Patient's goal is pain control and comfort.Information reviewed: Medications, vital signs, orders, and nursing notes.    ROS: Limited secondary to cognitive impairment but today pt reports the above and 8 point ROS of systems including Constitutional, Eyes, Respiratory, Cardiovascular, Gastroenterology, Genitourinary, Integumentary, Musculoskeletal, Psychiatric were all negative except for pertinent positives noted in my HPI.    Vitals: /68   Pulse 86   Temp 97.7  F (36.5  C)   Resp 16   Ht 1.676 m (5' 6\")   Wt 95.3 kg (210 lb 3.2 oz)   SpO2 95%   BMI 33.93 kg/m     BPs:    BGs:    Exam:  GENERAL APPEARANCE: Alert, in no distress, cooperative.   ENT: Mouth/posterior oropharynx intact w/ moist mucous membranes, hearing acuity [place].  EYES: EOM, conjunctivae, lids, pupils and irises normal, PERRL2.   RESP: Respiratory effort unlabored, no respiratory distress, Lung sounds clear/diinished. On RA.   CV: Auscultation of heart reveals S1, S2, rate controlled and rhythm irregular, no murmur, no rub or gallop, Edema trace+ BLE. Peripheral pulses are 2+.  ABDOMEN: Normal bowel sounds, soft, non-tender abdomen, and no masses palpated.  SKIN: Inspection/Palpation of skin and subcutaneous tissue baseline w/ fragility. No wounds/rashes noted except buttocks as noted below:    MSK: Left leg is immobilized at 0 degrees.   NEURO: CN II-XII at patient's baseline, sensation baseline PPS.  PSYCH: Insight, judgement, and memory are baseline impaired, affect and mood are happy/engaged.    Lab/Diagnostic data: [facility] reviewed by me today.     ASSESSMENT/PLAN  Annual physical exam  Other closed fracture of distal end of right " femur with routine healing, subsequent encounter  Closed metaphyseal fracture of left lower extremity with delayed healing, subsequent encounter  Orthopedic aftercare  Fall, subsequent encounter  Left hemiparesis (H)  Pressure injury of buttock, stage 2, unspecified laterality (H)  Controlled atrial fibrillation (H)  Long term current use of anticoagulant with international normalized ratio (INR) goal of 1.5-2.0  Encounter for therapeutic drug monitoring  Essential hypertension  Type II diabetes mellitus with peripheral circulatory disorder (H)  CKD (chronic kidney disease) stage 4, GFR 15-29 ml/min (H)  Acute on chronic. Ongoing.    Zayda continues to need extensive support after her severe injuries. Given her age and severity of injuries, provider still highly recommending hospice services to assist with comfort and quality of life.    Will reduce polypharmacy and discontinue unused medications.    Noting significantly elevated INR, and Zayda has always been very sensitive to Coumadin changes. Will hold Coumadin and monitor with recheck tomorrow.    Recent blood work completed as part of her readmission to facility after surgery and injuries. Will evaluate thyroid, diabetes, lipid panel, and trend hemoglobin as noted below as part of her follow-up and as part of her annual physical.    Noting wound care to buttocks was changed at some point. Unclear why this was done. Given slough is still present in the wound bed, patient is having pain at this site when touched, and fragility is noted, will resume previous Medihoney, Skin-Prep, and Mepilex border dressing.  Follow up w/in 1 week or as needed.    Orders:  1. Discontinue Bengay  2. Discontinue Simethicone.  3. Hgb, TSH, a1c, Lipids x1 on 12/2. Dx: annual physical.  4. Discontinue current wound care and switch back to prior:   Medihoney, w/ skin prep surrounding.   Cover w/ Mepelex.   5. Hold Coumadin x1 on 11/29. Dx: afib.  6. Recheck INR x1 on 11/30. Dx: afib.      Electronically signed by:  Dr. Haley Sims, APRN CNP DNP              Sincerely,        JOSUE Gillis CNP

## (undated) DEVICE — DRAPE C-ARMOR 5 SIDED 5523

## (undated) DEVICE — SU VICRYL 2-0 CT-1 36" UND J945H

## (undated) DEVICE — Device

## (undated) DEVICE — DRAPE SHEET REV FOLD 3/4 9349

## (undated) DEVICE — GOWN LG DISP 9515

## (undated) DEVICE — BLADE KNIFE SURG 10 371110

## (undated) DEVICE — DRAPE STERI U 1015

## (undated) DEVICE — GLOVE PROTEXIS W/NEU-THERA 7.0  2D73TE70

## (undated) DEVICE — PREP CHLORAPREP 26ML TINTED ORANGE  260815

## (undated) DEVICE — SU MONOCRYL 3-0 PS-2 18" UND Y497G

## (undated) DEVICE — DRSG ABDOMINAL 07 1/2X8" 7197D

## (undated) DEVICE — DRAPE C-ARM 60X42" 1013

## (undated) DEVICE — CAST PADDING 4" UNSTERILE 9044

## (undated) DEVICE — ESU PENCIL SMOKE EVAC W/ROCKER SWITCH 0703-047-000

## (undated) DEVICE — DRSG GAUZE 4X4" TRAY

## (undated) DEVICE — GLOVE PROTEXIS BLUE W/NEU-THERA 7.0  2D73EB70

## (undated) DEVICE — SYR BULB IRRIG DOVER 60 ML LATEX FREE 67000

## (undated) DEVICE — STPL SKIN 35W ROTATING HEAD PRW35

## (undated) DEVICE — STOCKING SLEEVE COMPRESSION CALF MED

## (undated) DEVICE — SU VICRYL 0 CT-1 36" J946H

## (undated) RX ORDER — FENTANYL CITRATE-0.9 % NACL/PF 10 MCG/ML
PLASTIC BAG, INJECTION (ML) INTRAVENOUS
Status: DISPENSED
Start: 2021-01-01

## (undated) RX ORDER — PHENYLEPHRINE HYDROCHLORIDE 10 MG/ML
INJECTION INTRAVENOUS
Status: DISPENSED
Start: 2021-01-01

## (undated) RX ORDER — EPINEPHRINE 1 MG/ML
INJECTION, SOLUTION, CONCENTRATE INTRAVENOUS
Status: DISPENSED
Start: 2021-01-01

## (undated) RX ORDER — CEFAZOLIN SODIUM 2 G/100ML
INJECTION, SOLUTION INTRAVENOUS
Status: DISPENSED
Start: 2021-01-01